# Patient Record
Sex: FEMALE | Race: BLACK OR AFRICAN AMERICAN | NOT HISPANIC OR LATINO | Employment: OTHER | URBAN - METROPOLITAN AREA
[De-identification: names, ages, dates, MRNs, and addresses within clinical notes are randomized per-mention and may not be internally consistent; named-entity substitution may affect disease eponyms.]

---

## 2017-01-19 ENCOUNTER — TELEPHONE (OUTPATIENT)
Dept: PODIATRY | Facility: CLINIC | Age: 57
End: 2017-01-19

## 2017-01-19 NOTE — TELEPHONE ENCOUNTER
----- Message from Jaki He sent at 1/19/2017  7:42 AM CST -----  Contact: self [phone] or [phone]  States that she would like to speak to nurse regarding her appt today. Please call back at [phone] or [phone]//thank you acc
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
## 2017-01-26 ENCOUNTER — OFFICE VISIT (OUTPATIENT)
Dept: PODIATRY | Facility: CLINIC | Age: 57
End: 2017-01-26
Payer: MEDICARE

## 2017-01-26 VITALS — SYSTOLIC BLOOD PRESSURE: 137 MMHG | HEART RATE: 84 BPM | HEIGHT: 67 IN | DIASTOLIC BLOOD PRESSURE: 88 MMHG

## 2017-01-26 DIAGNOSIS — M79.671 PAIN OF RIGHT HEEL: ICD-10-CM

## 2017-01-26 DIAGNOSIS — M77.51 BURSITIS OF HEEL, RIGHT: ICD-10-CM

## 2017-01-26 DIAGNOSIS — M76.61 ACHILLES TENDINITIS OF RIGHT LOWER EXTREMITY: Primary | ICD-10-CM

## 2017-01-26 PROCEDURE — 99213 OFFICE O/P EST LOW 20 MIN: CPT | Mod: S$PBB,,, | Performed by: PODIATRIST

## 2017-01-26 PROCEDURE — 99999 PR PBB SHADOW E&M-EST. PATIENT-LVL III: CPT | Mod: PBBFAC,,, | Performed by: PODIATRIST

## 2017-01-26 PROCEDURE — 99213 OFFICE O/P EST LOW 20 MIN: CPT | Mod: PBBFAC,PO | Performed by: PODIATRIST

## 2017-01-26 NOTE — PROGRESS NOTES
Subjective: This 56 year old female presents today with heel pain in right foot. Patient states medication or boot did very little. Patient states she does ice and stretch with a little relief.  Patient states pain is most severe in the morning, and after sitting for a prolonged period of time.  Patient states the pain has been present for several months. Patient does not relate a history of trauma. Patient rates pain 8/10. When asked to indicate where the pain is located, patient points to posterior heel. Patient denies other complaints at this time. Patient denies heel pain localized to any certain point when walking.    Chief Complaint   Patient presents with    achilles tendon     diabetic pt., right achilles tendonitits, pt. rated her current pain at a 8 today      PCP:Dr. HUSSAIN Olmos    Patient Active Problem List   Diagnosis    Avascular necrosis of bone of hip    Acquired hypothyroidism    Osteoporosis with pathological fracture    Depression    Anxiety disorder    Vertigo    Abnormal findings on diagnostic imaging of breast    Renal cyst    -donor kidney transplant - 13    Chronic immunosuppression with Prograf and Cellcept    Hypertension, renal    Anemia of chronic renal failure    Secondary hyperparathyroidism of renal origin    Delayed graft function 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     Uncontrolled type II diabetes mellitus with hypoglycemia    CTS (carpal tunnel syndrome)    Cubital tunnel syndrome on right    CKD (chronic kidney disease) stage 2, GFR 60-89 ml/min    Immunosuppressed status    Abnormal PFTs (pulmonary function tests)    Diffusion capacity of lung (dl), decreased    OP (osteoporosis)    Essential hypertension    Bilateral headache    Headache    Obesity, Class II, BMI 35-39.9, with comorbidity    Sleep related hypoventilation/hypoxemia in other disease    Uncontrolled diabetes mellitus type 2 without complications    Colon cancer screening     Moderate COPD (chronic obstructive pulmonary disease)    Nocturnal hypoxemia due to emphysema       Current Outpatient Prescriptions on File Prior to Visit   Medication Sig Dispense Refill    albuterol 90 mcg/actuation inhaler Inhale 2 puffs into the lungs every 4 (four) hours as needed for Wheezing. 1 Inhaler 3    albuterol-ipratropium 2.5mg-0.5mg/3mL (DUO-NEB) 0.5 mg-3 mg(2.5 mg base)/3 mL nebulizer solution Take 3 mLs by nebulization 3 (three) times daily. 270 mL 5    alprazolam (XANAX) 0.5 MG tablet Take 1-2 tablets (0.5-1 mg total) by mouth 2 (two) times daily as needed for Anxiety. sedating 60 tablet 1    blood sugar diagnostic (FREESTYLE TEST) Strp USE FOUR TIMES DAILY AS DIRECTED 400 strip 2    buPROPion (WELLBUTRIN XL) 150 MG TB24 tablet Take 1 tablet (150 mg total) by mouth once daily. 30 tablet 6    cetirizine (ZYRTEC) 10 MG tablet Take 1 tablet (10 mg total) by mouth daily as needed for Allergies. 30 tablet 11    ergocalciferol (ERGOCALCIFEROL) 50,000 unit Cap TAKE ONE CAPSULE BY MOUTH ONCE A WEEK 12 capsule 1    fluticasone-vilanterol (BREO) 200-25 mcg/dose DsDv diskus inhaler Inhale 1 puff into the lungs once daily. 60 each 5    furosemide (LASIX) 20 MG tablet TAKE 1 TABLET BY MOUTH EVERY DAY AS NEEDED 90 tablet 11    insulin glargine (LANTUS SOLOSTAR) 100 unit/mL (3 mL) InPn pen Inject 20 Units into the skin every evening. 6 mL 11    insulin lispro protamin-lispro (HUMALOG MIX 75-25 KWIKPEN) 100 unit/mL (75-25) InPn Inject 25-30 Units as directed 3 (three) times daily. 3 Box 11    lancets (FREESTYLE LANCETS) 28 gauge Misc 1 lancet by Combination route 2 (two) times daily as needed. Qid prn 400 each 2    lancets Misc 1 strip by Misc.(Non-Drug; Combo Route) route 4 (four) times daily with meals and nightly. 150 each 6    levothyroxine (SYNTHROID) 137 MCG Tab tablet Take 1 tablet (137 mcg total) by mouth before breakfast. 30 tablet 6    multivitamin (THERAGRAN) tablet Take 1 tablet by  "mouth once daily.      mycophenolate (CELLCEPT) 250 mg Cap Take 2 capsules (500 mg total) by mouth 2 (two) times daily. 120 capsule 11    nifedipine 30 MG ORAL TR24 (PROCARDIA-XL) 30 MG (OSM) 24 hr tablet Take 1 tablet (30 mg total) by mouth once daily. 30 tablet 11    ondansetron (ZOFRAN-ODT) 4 MG TbDL Take 1 tablet (4 mg total) by mouth every 8 (eight) hours as needed. 40 tablet 2    oxycodone-acetaminophen (PERCOCET)  mg per tablet OXYCODONE-ACETAMINOPHEN (Percocet)  MG ORAL TAB - 1 tab po q 8 hrs prn pain 90 tablet 0    pen needle, diabetic (BD ULTRA-FINE RORY PEN NEEDLES) 32 gauge x 5/32" Ndle Uses 4 daily, on multiple daily insulin injections 150 each 12    tacrolimus (PROGRAF) 1 MG Cap Take 5 mg in the AM by mouth and 4 mg in the PM by mouth. Z94.0 kidney transplant 270 capsule 11    [DISCONTINUED] methylPREDNISolone (MEDROL DOSEPACK) 4 mg tablet Take 1 tablet (4 mg total) by mouth once daily. Use as instructed on dose pack 1 Package 0     No current facility-administered medications on file prior to visit.        Review of patient's allergies indicates:   Allergen Reactions    Azithromycin Nausea And Vomiting    Adhesive        Past Surgical History   Procedure Laterality Date    Hysterectomy      Thyroidectomy      Vascular access surgeries       multiple. last time 2 weeks ago     section      Cyst removed form chest wall      Skin graft       revision    Kidney transplant  2013    Breast surgery      Colonoscopy N/A 3/9/2016     Procedure: COLONOSCOPY;  Surgeon: Douglas Daniel III, MD;  Location: [facility];  Service: Endoscopy;  Laterality: N/A;       Family History   Problem Relation Age of Onset    Diabetes Mother     Kidney disease Mother     Hyperlipidemia Mother     Hypertension Mother     Heart disease Mother     Arthritis Mother     Hypertension Father     Stroke Father     Hypertension Sister     Arthritis Sister     Asthma Sister     Heart " "disease Sister      heart attack       Social History     Social History    Marital status:      Spouse name: N/A    Number of children: 3    Years of education: N/A     Occupational History    disable      dept manager at [employer]     Social History Main Topics    Smoking status: Former Smoker     Packs/day: 1.00     Years: 10.00     Types: Cigarettes    Smokeless tobacco: Never Used      Comment: quit smoking approx 10-15 years ago     Alcohol use No    Drug use: No    Sexual activity: No     Other Topics Concern    Not on file     Social History Narrative    Does housework at home.     Vitals:    01/26/17 1357   BP: 137/88   Pulse: 84   Height: 5' 7" (1.702 m)   PainSc:   8     REVIEW OF SYSTEMS:  General: Denies any fever or chills  Chest: Denies shortness of breath, wheezing, coughing, or sputum production  Heart: Denies chest pain, cold extremities, orthopenia, or reduced exercise tolerance    OBJECTIVE:   PHYSICAL EXAM: Apperance: Alert and orient in no distress,well developed, and with good attention to grooming and body habits  Lower Extremity Exam  VASCULAR: Dorsalis pedis pulses 2/4 bilateral and Posterior Tibial pulses 2/4 bilateral. Capillary fill time <3 seconds and 4 seconds bilateral. No edema observed bilateral. Varicosities absent bilateral. Skin temperature of the lower extremities is warm to warm, proximal to distal. Hair growth WNL bilateral.  DERMATOLOGICAL: No skin rashes, subcutaneous nodules, lesions, or ulcers observed bilateral.   NEUROLOGICAL: Light touch, sharp-dull, proprioception all present and equal bilaterally.    MUSCULOSKELETAL: Muscle strength 5/5 for all foot inverters, everters, plantarflexors, and dorsiflexors bilateral. Ankle joints bilateral shows decreased ROM. bilateral ankle ROM shows greater decrease in dorsiflexion with knee extended. Ankle joint ROM is pain free and without crepitus bilateral. Pain with palpation of Right posterior 1/3 calcaneus at " region of Achilles tendon insertion. Negative pain to palpation bilateral plantar medial tubercle. Negative pain on medial-lateral compression of the calcaneus.     TEST RESULTS: Radiograph of the right foot taken reveals there is generalized osteopenia and multi-articular degenerative change.  Dorsal and plantar calcaneal spurs. Small 5th metatarsal bunionette.  No acute fracture or dislocation.    ASSESSMENT:  1. Achilles tendonitis, right  2. [place], gastrosoleus type, bilateral  3. Pain in foot, right     TREATMENT/PLAN: Treatment today consisted of the followin. The patient was counseled regarding findings of my examination, my impressions, treatment options, results of diagnostic tests, and usual treatment plan.   2. I explained to the patient that etiology and treatment options for heel pain including rest,  ice messages, stretching exercises, strappings/tappings, NSAID's, injections, new shoegear with orthotic inserts, and/or surgical treatment.   3. Patient agreed to continue stretching and icing exercises.  4. The patient and I reviewed the types of shoes she should be wearing, my recommendation includes generally the best time of the day for a shoe fitting is the afternoon, shoes with a wide toe box, very good cushion, and tennis shoes with removable inner soles.  5. Ordered MRI.   6. Patient to be contacted with MRI results for possible physical therapy referral.

## 2017-01-26 NOTE — MR AVS SNAPSHOT
[facility] - Podiatry  [address] [facility] [place] [address]  Phone: [phone]  Fax: [phone]                  Ana Maria Teague   2017 2:00 PM   Office Visit    Description:  Female : 1960   Provider:  Feliz Herrera DPM   Department:  [facility] - Podiatry           Reason for Visit     achilles tendon           Diagnoses this Visit        Comments    Achilles tendinitis of right lower extremity    -  Primary     Bursitis of heel, right                To Do List           Future Appointments        Provider Department Dept Phone    2/3/2017 2:45 PM [facility] [phone]    2017 10:20 AM Marcella Haas PA-C [facility] [phone]    2017 9:45 AM Damien Singleton MD [facility] Cardiology [phone]    2017 7:45 AM LABORATORY, [facility] [phone]    3/6/2017 2:00 PM Karine Olmos MD [facility] Internal Medicine [phone]      Goals (5 Years of Data)              3/28/14    Exercise 5x per week (30 min per time)   Not on track      [facility] Care [facility] -  Assistance  Registered nurses in the [facility] provide clinical advisement, health education, appointment booking, and other advisory services.  Call for this free service at [phone].             Medications           Message regarding Medications     Verify the changes and/or additions to your medication regime listed below are the same as discussed with your clinician today.  If any of these changes or additions are incorrect, please notify your healthcare provider.        STOP taking these medications     methylPREDNISolone (MEDROL DOSEPACK) 4 mg tablet Take 1 tablet (4 mg total) by mouth once daily. Use as instructed on dose pack           Verify that the below list of medications is an accurate representation of the medications you are currently taking.  If none reported, the list may be  blank. If incorrect, please contact your healthcare provider. Carry this list with you in case of emergency.           Current Medications     albuterol 90 mcg/actuation inhaler Inhale 2 puffs into the lungs every 4 (four) hours as needed for Wheezing.    albuterol-ipratropium 2.5mg-0.5mg/3mL (DUO-NEB) 0.5 mg-3 mg(2.5 mg base)/3 mL nebulizer solution Take 3 mLs by nebulization 3 (three) times daily.    alprazolam (XANAX) 0.5 MG tablet Take 1-2 tablets (0.5-1 mg total) by mouth 2 (two) times daily as needed for Anxiety. sedating    blood sugar diagnostic (FREESTYLE TEST) Strp USE FOUR TIMES DAILY AS DIRECTED    buPROPion (WELLBUTRIN XL) 150 MG TB24 tablet Take 1 tablet (150 mg total) by mouth once daily.    cetirizine (ZYRTEC) 10 MG tablet Take 1 tablet (10 mg total) by mouth daily as needed for Allergies.    ergocalciferol (ERGOCALCIFEROL) 50,000 unit Cap TAKE ONE CAPSULE BY MOUTH ONCE A WEEK    fluticasone-vilanterol (BREO) 200-25 mcg/dose DsDv diskus inhaler Inhale 1 puff into the lungs once daily.    furosemide (LASIX) 20 MG tablet TAKE 1 TABLET BY MOUTH EVERY DAY AS NEEDED    insulin glargine (LANTUS SOLOSTAR) 100 unit/mL (3 mL) InPn pen Inject 20 Units into the skin every evening.    insulin lispro protamin-lispro (HUMALOG MIX 75-25 KWIKPEN) 100 unit/mL (75-25) InPn Inject 25-30 Units as directed 3 (three) times daily.    lancets (FREESTYLE LANCETS) 28 gauge Misc 1 lancet by Combination route 2 (two) times daily as needed. Qid prn    lancets Misc 1 strip by Misc.(Non-Drug; Combo Route) route 4 (four) times daily with meals and nightly.    levothyroxine (SYNTHROID) 137 MCG Tab tablet Take 1 tablet (137 mcg total) by mouth before breakfast.    multivitamin (THERAGRAN) tablet Take 1 tablet by mouth once daily.    mycophenolate (CELLCEPT) 250 mg Cap Take 2 capsules (500 mg total) by mouth 2 (two) times daily.    nifedipine 30 MG ORAL TR24 (PROCARDIA-XL) 30 MG (OSM) 24 hr tablet Take 1 tablet (30 mg total) by mouth  "once daily.    ondansetron (ZOFRAN-ODT) 4 MG TbDL Take 1 tablet (4 mg total) by mouth every 8 (eight) hours as needed.    oxycodone-acetaminophen (PERCOCET)  mg per tablet OXYCODONE-ACETAMINOPHEN (Percocet)  MG ORAL TAB - 1 tab po q 8 hrs prn pain    pen needle, diabetic (BD ULTRA-FINE RORY PEN NEEDLES) 32 gauge x 5/32" Ndle Uses 4 daily, on multiple daily insulin injections    tacrolimus (PROGRAF) 1 MG Cap Take 5 mg in the AM by mouth and 4 mg in the PM by mouth. Z94.0 kidney transplant           Clinical Reference Information           Vital Signs - Last Recorded  Most recent update: 1/26/2017  2:02 PM by Alena Adams MA    BP Pulse Ht             137/88 (BP Location: Right arm, Patient Position: Sitting, BP Method: Automatic) 84 5' 7" (1.702 m)         Blood Pressure          Most Recent Value    BP  137/88      Allergies as of 1/26/2017     Azithromycin    Adhesive      Immunizations Administered on Date of Encounter - 1/26/2017     None      Orders Placed During Today's Visit     Future Labs/Procedures Expected by Expires    MRI Lower Extremity Without Contrast Right  1/26/2017 1/26/2018      Maintenance Dialysis History     Start End Type Comments Center    1/8/2004 9/28/2013 Hemo  [facility]            Current Dialysis Center Information     No center information to display.            Transplant Information        Txp Date Organ Coordinator Care Team    9/28/2013 Kidney Frantz Joshi RN Surgeon:  Jus Mcfarland MD   Referring Physician:  Lenora Dooley MD   Current Nephrologist:  Yuval Medina MD         "

## 2017-02-02 ENCOUNTER — TELEPHONE (OUTPATIENT)
Dept: RADIOLOGY | Facility: HOSPITAL | Age: 57
End: 2017-02-02

## 2017-02-03 ENCOUNTER — TELEPHONE (OUTPATIENT)
Dept: INTERNAL MEDICINE | Facility: CLINIC | Age: 57
End: 2017-02-03

## 2017-02-03 ENCOUNTER — HOSPITAL ENCOUNTER (OUTPATIENT)
Dept: RADIOLOGY | Facility: HOSPITAL | Age: 57
Discharge: HOME | End: 2017-02-03
Attending: PODIATRIST
Payer: MEDICARE

## 2017-02-03 DIAGNOSIS — M76.61 ACHILLES TENDINITIS OF RIGHT LOWER EXTREMITY: ICD-10-CM

## 2017-02-03 DIAGNOSIS — M77.51 BURSITIS OF HEEL, RIGHT: ICD-10-CM

## 2017-02-03 PROCEDURE — 73718 MRI LOWER EXTREMITY W/O DYE: CPT | Mod: TC,PO,RT

## 2017-02-03 PROCEDURE — 73718 MRI LOWER EXTREMITY W/O DYE: CPT | Mod: 26,RT,, | Performed by: RADIOLOGY

## 2017-02-03 NOTE — TELEPHONE ENCOUNTER
----- Message from Saadia Sewell sent at 2/3/2017  2:53 PM CST -----  Contact: pt  Pt requests nurse to call her regarding a code needed for test strips. Pt asks that the nurse call her pharmacy. Pt can be reached at [phone]. Pt states if she doesn't answer to leave a message.          ..  [facility] [employer] [address] [place] [address] KADIE HOLMAN  [address] [facility]  [address] [place] [address]  Phone: [phone] Fax: [phone]

## 2017-02-06 ENCOUNTER — OFFICE VISIT (OUTPATIENT)
Dept: PAIN MEDICINE | Facility: CLINIC | Age: 57
End: 2017-02-06
Payer: MEDICARE

## 2017-02-06 VITALS
DIASTOLIC BLOOD PRESSURE: 91 MMHG | HEART RATE: 82 BPM | BODY MASS INDEX: 37.98 KG/M2 | HEIGHT: 67 IN | SYSTOLIC BLOOD PRESSURE: 129 MMHG | WEIGHT: 242 LBS | RESPIRATION RATE: 16 BRPM

## 2017-02-06 DIAGNOSIS — M51.36 DDD (DEGENERATIVE DISC DISEASE), LUMBAR: ICD-10-CM

## 2017-02-06 DIAGNOSIS — M54.16 LEFT LUMBAR RADICULOPATHY: ICD-10-CM

## 2017-02-06 DIAGNOSIS — M25.552 HIP PAIN, LEFT: ICD-10-CM

## 2017-02-06 DIAGNOSIS — M47.817 SPONDYLOSIS OF LUMBOSACRAL REGION WITHOUT MYELOPATHY OR RADICULOPATHY: ICD-10-CM

## 2017-02-06 PROCEDURE — 99215 OFFICE O/P EST HI 40 MIN: CPT | Mod: PBBFAC,PO | Performed by: PHYSICIAN ASSISTANT

## 2017-02-06 PROCEDURE — 99999 PR PBB SHADOW E&M-EST. PATIENT-LVL V: CPT | Mod: PBBFAC,,, | Performed by: PHYSICIAN ASSISTANT

## 2017-02-06 PROCEDURE — 99214 OFFICE O/P EST MOD 30 MIN: CPT | Mod: S$PBB,,, | Performed by: PHYSICIAN ASSISTANT

## 2017-02-06 RX ORDER — OXYCODONE AND ACETAMINOPHEN 10; 325 MG/1; MG/1
TABLET ORAL
Qty: 90 TABLET | Refills: 0 | Status: SHIPPED | OUTPATIENT
Start: 2017-03-16 | End: 2017-02-06 | Stop reason: SDUPTHER

## 2017-02-06 RX ORDER — OXYCODONE AND ACETAMINOPHEN 10; 325 MG/1; MG/1
TABLET ORAL
Qty: 90 TABLET | Refills: 0 | Status: SHIPPED | OUTPATIENT
Start: 2017-04-14 | End: 2017-05-08 | Stop reason: SDUPTHER

## 2017-02-06 RX ORDER — OXYCODONE AND ACETAMINOPHEN 10; 325 MG/1; MG/1
TABLET ORAL
Qty: 90 TABLET | Refills: 0 | Status: SHIPPED | OUTPATIENT
Start: 2017-02-15 | End: 2017-02-06 | Stop reason: SDUPTHER
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
## 2017-02-09 ENCOUNTER — TELEPHONE (OUTPATIENT)
Dept: PODIATRY | Facility: CLINIC | Age: 57
End: 2017-02-09

## 2017-02-09 DIAGNOSIS — M76.61 ACHILLES TENDINITIS OF RIGHT LOWER EXTREMITY: Primary | ICD-10-CM

## 2017-02-09 DIAGNOSIS — M21.6X2 ACQUIRED EQUINUS DEFORMITY OF BOTH FEET: ICD-10-CM

## 2017-02-09 DIAGNOSIS — M21.6X1 ACQUIRED EQUINUS DEFORMITY OF BOTH FEET: ICD-10-CM

## 2017-02-09 DIAGNOSIS — M77.51 BURSITIS OF HEEL, RIGHT: ICD-10-CM

## 2017-02-09 NOTE — TELEPHONE ENCOUNTER
----- Message from Joe Ardon sent at 2/9/2017  3:34 PM CST -----  Pt is requesting a call from nurse in regards to lab results. Pt states she was un able to understand on portal.            Please call pt back at [phone]

## 2017-02-14 ENCOUNTER — TELEPHONE (OUTPATIENT)
Dept: PAIN MEDICINE | Facility: CLINIC | Age: 57
End: 2017-02-14

## 2017-02-14 ENCOUNTER — TELEPHONE (OUTPATIENT)
Dept: PODIATRY | Facility: CLINIC | Age: 57
End: 2017-02-14

## 2017-02-14 DIAGNOSIS — M77.51 BURSITIS OF HEEL, RIGHT: ICD-10-CM

## 2017-02-14 DIAGNOSIS — M21.6X1 ACQUIRED EQUINUS DEFORMITY OF BOTH FEET: ICD-10-CM

## 2017-02-14 DIAGNOSIS — M21.6X2 ACQUIRED EQUINUS DEFORMITY OF BOTH FEET: ICD-10-CM

## 2017-02-14 DIAGNOSIS — M76.61 ACHILLES TENDINITIS OF RIGHT LOWER EXTREMITY: Primary | ICD-10-CM

## 2017-02-14 NOTE — TELEPHONE ENCOUNTER
Ms. Ana Maria Teague was given an return call, and the physical therapy orders were faxed to [facility]. Matter taken care of.

## 2017-02-14 NOTE — TELEPHONE ENCOUNTER
----- Message from Annelise Mcgraw sent at 2/14/2017  1:58 PM CST -----  Contact: pt   Pt said office was suppose to send over a order for her to have therapy and it has not been done, pt is at the facility now,,, please fax orders to [phone], please call pt at [phone]

## 2017-02-14 NOTE — TELEPHONE ENCOUNTER
----- Message from Annelise Mcgraw sent at 2/14/2017  1:58 PM CST -----  Contact: pt   Pt said office was suppose to send over a order for her to have therapy and it has not been done, pt is at the facility now,,, please fax orders to [phone], please call pt at [phone]

## 2017-02-14 NOTE — TELEPHONE ENCOUNTER
Spoke with patient and let her know that I am re-faxing her PT order again just now.  She acknowledged.

## 2017-02-23 ENCOUNTER — PATIENT OUTREACH (OUTPATIENT)
Dept: ADMINISTRATIVE | Facility: HOSPITAL | Age: 57
End: 2017-02-23

## 2017-02-27 ENCOUNTER — LAB VISIT (OUTPATIENT)
Dept: LAB | Facility: HOSPITAL | Age: 57
End: 2017-02-27
Attending: INTERNAL MEDICINE
Payer: MEDICARE

## 2017-02-27 DIAGNOSIS — E11.9 TYPE 2 DIABETES MELLITUS WITHOUT COMPLICATION: ICD-10-CM

## 2017-02-27 LAB
CHOLEST/HDLC SERPL: 3.8 {RATIO}
HDL/CHOLESTEROL RATIO: 26.2 %
HDLC SERPL-MCNC: 195 MG/DL
HDLC SERPL-MCNC: 51 MG/DL
LDLC SERPL CALC-MCNC: 131 MG/DL
NONHDLC SERPL-MCNC: 144 MG/DL
TRIGL SERPL-MCNC: 65 MG/DL

## 2017-02-27 PROCEDURE — 80061 LIPID PANEL: CPT

## 2017-02-27 PROCEDURE — 36415 COLL VENOUS BLD VENIPUNCTURE: CPT | Mod: PO

## 2017-02-28 ENCOUNTER — TELEPHONE (OUTPATIENT)
Dept: INTERNAL MEDICINE | Facility: CLINIC | Age: 57
End: 2017-02-28

## 2017-02-28 NOTE — TELEPHONE ENCOUNTER
----- Message from Micaela Johnson sent at 2/28/2017 11:26 AM CST -----  Contact: Pt  Pt request call from nurse regarding apt that was r/s, I informed pt that the Dr was booked out but pt states that is no excuse because she can not see after a certain time of day so the 5:40 pm apt is too late so I offered pt another apt and she declined, please contact pt at [phone]

## 2017-03-08 ENCOUNTER — OFFICE VISIT (OUTPATIENT)
Dept: INTERNAL MEDICINE | Facility: CLINIC | Age: 57
End: 2017-03-08
Payer: MEDICARE

## 2017-03-08 ENCOUNTER — CLINICAL SUPPORT (OUTPATIENT)
Dept: CARDIOLOGY | Facility: CLINIC | Age: 57
End: 2017-03-08
Payer: MEDICARE

## 2017-03-08 ENCOUNTER — OFFICE VISIT (OUTPATIENT)
Dept: CARDIOLOGY | Facility: CLINIC | Age: 57
End: 2017-03-08
Payer: MEDICARE

## 2017-03-08 ENCOUNTER — LAB VISIT (OUTPATIENT)
Dept: LAB | Facility: HOSPITAL | Age: 57
End: 2017-03-08
Attending: INTERNAL MEDICINE
Payer: MEDICARE

## 2017-03-08 VITALS
WEIGHT: 250.25 LBS | HEART RATE: 70 BPM | HEIGHT: 67 IN | DIASTOLIC BLOOD PRESSURE: 84 MMHG | SYSTOLIC BLOOD PRESSURE: 124 MMHG | BODY MASS INDEX: 39.28 KG/M2

## 2017-03-08 VITALS
SYSTOLIC BLOOD PRESSURE: 124 MMHG | WEIGHT: 249.31 LBS | OXYGEN SATURATION: 97 % | HEART RATE: 81 BPM | HEIGHT: 67 IN | BODY MASS INDEX: 39.13 KG/M2 | TEMPERATURE: 97 F | DIASTOLIC BLOOD PRESSURE: 78 MMHG

## 2017-03-08 DIAGNOSIS — I10 ESSENTIAL HYPERTENSION: ICD-10-CM

## 2017-03-08 DIAGNOSIS — Z00.00 ENCOUNTER FOR PREVENTIVE HEALTH EXAMINATION: ICD-10-CM

## 2017-03-08 DIAGNOSIS — M81.0 OP (OSTEOPOROSIS): ICD-10-CM

## 2017-03-08 DIAGNOSIS — F32.A DEPRESSION, UNSPECIFIED DEPRESSION TYPE: ICD-10-CM

## 2017-03-08 DIAGNOSIS — Z79.4 UNCONTROLLED TYPE 2 DIABETES MELLITUS WITH HYPOGLYCEMIA WITHOUT COMA, WITH LONG-TERM CURRENT USE OF INSULIN: Primary | ICD-10-CM

## 2017-03-08 DIAGNOSIS — E03.9 ACQUIRED HYPOTHYROIDISM: ICD-10-CM

## 2017-03-08 DIAGNOSIS — J44.9 MODERATE COPD (CHRONIC OBSTRUCTIVE PULMONARY DISEASE): Chronic | ICD-10-CM

## 2017-03-08 DIAGNOSIS — Z12.31 ENCOUNTER FOR SCREENING MAMMOGRAM FOR MALIGNANT NEOPLASM OF BREAST: ICD-10-CM

## 2017-03-08 DIAGNOSIS — E66.01 SEVERE OBESITY (BMI 35.0-39.9): ICD-10-CM

## 2017-03-08 DIAGNOSIS — E78.00 PURE HYPERCHOLESTEROLEMIA: ICD-10-CM

## 2017-03-08 DIAGNOSIS — Z94.0 DECEASED-DONOR KIDNEY TRANSPLANT: Chronic | ICD-10-CM

## 2017-03-08 DIAGNOSIS — E03.4 HYPOTHYROIDISM DUE TO ACQUIRED ATROPHY OF THYROID: ICD-10-CM

## 2017-03-08 DIAGNOSIS — E11.649 UNCONTROLLED TYPE 2 DIABETES MELLITUS WITH HYPOGLYCEMIA WITHOUT COMA, WITH LONG-TERM CURRENT USE OF INSULIN: Primary | ICD-10-CM

## 2017-03-08 DIAGNOSIS — E55.9 VITAMIN D DEFICIENCY: ICD-10-CM

## 2017-03-08 DIAGNOSIS — I10 ESSENTIAL HYPERTENSION: Primary | ICD-10-CM

## 2017-03-08 DIAGNOSIS — R11.0 NAUSEA: ICD-10-CM

## 2017-03-08 LAB
BASOPHILS # BLD AUTO: 0.04 K/UL
BASOPHILS NFR BLD: 0.3 %
DIFFERENTIAL METHOD: ABNORMAL
EOSINOPHIL # BLD AUTO: 0.5 K/UL
EOSINOPHIL NFR BLD: 4 %
ERYTHROCYTE [DISTWIDTH] IN BLOOD BY AUTOMATED COUNT: 15.2 %
HCT VFR BLD AUTO: 37.9 %
HGB BLD-MCNC: 11.7 G/DL
LYMPHOCYTES # BLD AUTO: 3.2 K/UL
LYMPHOCYTES NFR BLD: 28.3 %
MCH RBC QN AUTO: 26.6 PG
MCHC RBC AUTO-ENTMCNC: 30.9 %
MCV RBC AUTO: 86 FL
MONOCYTES # BLD AUTO: 0.6 K/UL
MONOCYTES NFR BLD: 5.4 %
NEUTROPHILS # BLD AUTO: 7 K/UL
NEUTROPHILS NFR BLD: 61 %
PLATELET # BLD AUTO: 238 K/UL
PMV BLD AUTO: 12.1 FL
RBC # BLD AUTO: 4.4 M/UL
WBC # BLD AUTO: 11.43 K/UL

## 2017-03-08 PROCEDURE — 93010 ELECTROCARDIOGRAM REPORT: CPT | Mod: S$PBB,,, | Performed by: INTERNAL MEDICINE

## 2017-03-08 PROCEDURE — 99214 OFFICE O/P EST MOD 30 MIN: CPT | Mod: PBBFAC,27,PO | Performed by: PHYSICIAN ASSISTANT

## 2017-03-08 PROCEDURE — 99213 OFFICE O/P EST LOW 20 MIN: CPT | Mod: S$PBB,,, | Performed by: PHYSICIAN ASSISTANT

## 2017-03-08 PROCEDURE — 99214 OFFICE O/P EST MOD 30 MIN: CPT | Mod: S$PBB,,, | Performed by: INTERNAL MEDICINE

## 2017-03-08 PROCEDURE — 99999 PR PBB SHADOW E&M-EST. PATIENT-LVL IV: CPT | Mod: PBBFAC,,, | Performed by: PHYSICIAN ASSISTANT

## 2017-03-08 PROCEDURE — 99999 PR PBB SHADOW E&M-EST. PATIENT-LVL III: CPT | Mod: PBBFAC,,, | Performed by: INTERNAL MEDICINE

## 2017-03-08 RX ORDER — LEVOTHYROXINE SODIUM 137 UG/1
150 TABLET ORAL
Qty: 30 TABLET | Refills: 6 | Status: SHIPPED | OUTPATIENT
Start: 2017-03-08 | End: 2017-07-13 | Stop reason: SDUPTHER

## 2017-03-08 RX ORDER — ONDANSETRON 4 MG/1
4 TABLET, ORALLY DISINTEGRATING ORAL EVERY 8 HOURS PRN
Qty: 40 TABLET | Refills: 2 | Status: SHIPPED | OUTPATIENT
Start: 2017-03-08 | End: 2017-11-27

## 2017-03-08 RX ORDER — ERGOCALCIFEROL 1.25 MG/1
50000 CAPSULE ORAL
Qty: 12 CAPSULE | Refills: 3 | Status: SHIPPED | OUTPATIENT
Start: 2017-03-08 | End: 2018-03-27 | Stop reason: SDUPTHER

## 2017-03-08 RX ORDER — ATORVASTATIN CALCIUM 20 MG/1
20 TABLET, FILM COATED ORAL DAILY
Qty: 30 TABLET | Refills: 11 | Status: SHIPPED | OUTPATIENT
Start: 2017-03-08 | End: 2019-07-03 | Stop reason: SDUPTHER

## 2017-03-08 NOTE — MR AVS SNAPSHOT
[facility] Internal Medicine  [address] [place] [address]  Phone: [phone]  Fax: [phone]                  Ana Maria Teague   3/8/2017 5:40 PM   Office Visit    Description:  Female : 1960   Provider:  Karine Olmos MD   Department:  [facility] - Internal Medicine           Reason for Visit     Annual Exam           Diagnoses this Visit        Comments    Uncontrolled type 2 diabetes mellitus with hypoglycemia without coma, with long-term current use of insulin    -  Primary     Uncontrolled type 2 diabetes mellitus without complication, with long-term current use of insulin         OP (osteoporosis)         Severe obesity (BMI 35.0-39.9)         Essential hypertension         Acquired hypothyroidism         Encounter for preventive health examination         Depression, unspecified depression type         Pure hypercholesterolemia         Vitamin D deficiency         Encounter for screening mammogram for malignant neoplasm of breast         Hypothyroidism due to acquired atrophy of thyroid         Nausea                To Do List           Future Appointments        Provider Department Dept Phone    3[facility]2017 3:00 [phone]    3/8/2017 3:30 PM NAOMIE Gordon [facility] Card[facility] [phone]    3/8/2017 4:00 PM SPECIMEN, [facility] [phone]    3/8/2017 4:30 PM LAB, SAME DAY [facility] [phone]    3/8/2017 5:40 PM Karine Olmos MD [facility] Internal Medicine [phone]      Goals (5 Years of Data)              3/28/14    Exercise 5x per week (30 min per time)   Not on track      Follow-Up and Disposition     Return in about 3 months (around 2017).    Follow-up and Disposition History       These Medications        Disp Refills Start End    atorvastatin (LIPITOR) 20 MG tablet 30 tablet 11 3/8/2017     Take 1 tablet (20 mg total) by mouth once daily. - Oral    [facility] [address] [facility] [address] AT [facility] Ph #: [phone]       ergocalciferol (ERGOCALCIFEROL) 50,000 unit Cap 12 capsule 3 3/8/2017     Take 1 capsule (50,000 Units total) by mouth every 7 days. - Oral    Pharmacy: [facility] [address], [place] - [address] AT [facility] Ph #: [phone]       levothyroxine (SYNTHROID) 137 MCG Tab tablet 30 tablet 6 3/8/2017     Take 1 tablet (137 mcg total) by mouth before breakfast. - Oral    Pharmacy: [facility] [address], [place] - [address] AT [facility] Ph #: [phone]       ondansetron (ZOFRAN-ODT) 4 MG TbDL 40 tablet 2 3/8/2017     Take 1 tablet (4 mg total) by mouth every 8 (eight) hours as needed. - Oral    Pharmacy: [facility] [address] [place] - [address] [facility] Ph #: [phone]         [facility] On Call     [facility] On Call Nurse [facility] - 24/7 Assistance  Registered nurses in the [facility] Center provide clinical advisement, health education, appointment booking, and other advisory services.  Call for this free service at [phone].             Medications           Message regarding Medications     Verify the changes and/or additions to your medication regime listed below are the same as discussed with your clinician today.  If any of these changes or additions are incorrect, please notify your healthcare provider.        START taking these NEW medications        Refills    atorvastatin (LIPITOR) 20 MG tablet 11    Sig: Take 1 tablet (20 mg total) by mouth once daily.    Class: Normal    Route: Oral      CHANGE how you are taking these medications     Start Taking Instead of    ergocalciferol (ERGOCALCIFEROL) 50,000 unit Cap ergocalciferol (ERGOCALCIFEROL) 50,000 unit Cap    Dosage:  Take 1 capsule (50,000 Units total) by mouth every 7 days. Dosage:  TAKE ONE CAPSULE BY MOUTH ONCE A WEEK    Reason for Change:  Reorder             Verify that the below list of medications is an accurate representation of the medications you are currently taking.  If none reported, the list may be blank. If incorrect, please contact your healthcare provider. Carry this list with you in case of emergency.           Current Medications     albuterol 90 mcg/actuation inhaler Inhale 2 puffs into the lungs every 4 (four) hours as needed for Wheezing.    albuterol-ipratropium 2.5mg-0.5mg/3mL (DUO-NEB) 0.5 mg-3 mg(2.5 mg base)/3 mL nebulizer solution Take 3 mLs by nebulization 3 (three) times daily.    alprazolam (XANAX) 0.5 MG tablet Take 1-2 tablets (0.5-1 mg total) by mouth 2 (two) times daily as needed for Anxiety. sedating    atorvastatin (LIPITOR) 20 MG tablet Take 1 tablet (20 mg total) by mouth once daily.    blood sugar diagnostic (FREESTYLE TEST) Strp USE FOUR TIMES DAILY AS DIRECTED    buPROPion (WELLBUTRIN XL) 150 MG TB24 tablet Take 1 tablet (150 mg total) by mouth once daily.    cetirizine (ZYRTEC) 10 MG tablet Take 1 tablet (10 mg total) by mouth daily as needed for Allergies.    ergocalciferol (ERGOCALCIFEROL) 50,000 unit Cap Take 1 capsule (50,000 Units total) by mouth every 7 days.    fluticasone-vilanterol (BREO) 200-25 mcg/dose DsDv diskus inhaler Inhale 1 puff into the lungs once daily.    furosemide (LASIX) 20 MG tablet TAKE 1 TABLET BY MOUTH EVERY DAY AS NEEDED    insulin glargine (LANTUS SOLOSTAR) 100 unit/mL (3 mL) InPn pen Inject 20 Units into the skin every evening.    insulin lispro protamin-lispro (HUMALOG MIX 75-25 KWIKPEN) 100 unit/mL (75-25) InPn Inject 25-30 Units as directed 3 (three) times daily.    lancets (FREESTYLE LANCETS) 28 gauge Misc 1 lancet by Combination route 2 (two) times daily as needed. Qid prn    lancets Misc 1 strip by Misc.(Non-Drug; Combo Route) route 4 (four) times daily with meals and nightly.    levothyroxine (SYNTHROID) 137 MCG Tab tablet Take 1 tablet (137 mcg total) by mouth before breakfast.     "multivitamin (THERAGRAN) tablet Take 1 tablet by mouth once daily.    mycophenolate (CELLCEPT) 250 mg Cap Take 2 capsules (500 mg total) by mouth 2 (two) times daily.    nifedipine 30 MG ORAL TR24 (PROCARDIA-XL) 30 MG (OSM) 24 hr tablet Take 1 tablet (30 mg total) by mouth once daily.    ondansetron (ZOFRAN-ODT) 4 MG TbDL Take 1 tablet (4 mg total) by mouth every 8 (eight) hours as needed.    oxycodone-acetaminophen (PERCOCET)  mg per tablet Starting on Apr 14, 2017. OXYCODONE-ACETAMINOPHEN (Percocet)  MG ORAL TAB - 1 tab po q 8 hrs prn pain    pen needle, diabetic (BD ULTRA-FINE RORY PEN NEEDLES) 32 gauge x 5/32" Ndle Uses 4 daily, on multiple daily insulin injections    tacrolimus (PROGRAF) 1 MG Cap Take 5 mg in the AM by mouth and 4 mg in the PM by mouth. Z94.0 kidney transplant           Clinical Reference Information           Your Vitals Were     BP Pulse Temp Height    124/78 (BP Location: Right arm, Patient Position: Sitting, BP Method: Manual) 81 96.7 °F (35.9 °C) (Tympanic) 5' 7" (1.702 m)    Weight SpO2 BMI    113.1 kg (249 lb 5.4 oz) 97% 39.05 kg/m2      Blood Pressure          Most Recent Value    BP  124/78      Allergies as of 3/8/2017     Azithromycin    Adhesive      Immunizations Administered on Date of Encounter - 3/8/2017     None      Orders Placed During Today's Visit     Future Labs/Procedures Expected by Expires    ALT (SGPT)  3/8/2017 5/7/2018    CBC auto differential  3/8/2017 3/8/2018    Comprehensive metabolic panel  3/8/2017 3/8/2018    Hemoglobin A1c  3/8/2017 5/7/2018    Hemoglobin A1c  3/8/2017 5/7/2018    Lipid panel  3/8/2017 3/8/2018    Mammo Digital Screening Bilat with CAD  3/8/2017 3/8/2018    Microalbumin/creatinine urine ratio  3/8/2017 5/7/2018    TSH  3/8/2017 3/8/2018    Vitamin D  As directed 3/8/2018      Maintenance Dialysis History     Start End Type Comments Center    1/8/2004 9/28/2013 Hemo  [facility]            Current Dialysis " Center Information     No center information to display.            Transplant Information        Txp Date Organ Coordinator Care Team    9/28/2013 Kidney Frantz Joshi, KAMILLA Surgeon:  Jus Mcfarland MD   Referring Physician:  Lenora Dooley MD   Current Nephrologist:  Yuval Medina MD         Language Assistance Services     ATTENTION: Language assistance services are available, free of charge. Please call [phone].      ATENCIÓN: Si habla español, tiene a vilchis disposición servicios gratuitos de asistencia lingüística. Llame al [phone].     CHÚ Ý: N?u b?n nói Ti?ng Vi?t, có các d?ch v? h? tr? ngôn ng? mi?n phí dành cho b?n. G?i s? [phone].         [facility] complies with applicable Federal civil rights laws and does not discriminate on the basis of race, color, national origin, age, disability, or sex.

## 2017-03-08 NOTE — PROGRESS NOTES
Subjective:    Patient ID:  Ana Maria Teague is a 56 y.o. female who presents for follow-up of Hypertension      HPI   Ms. Teague is a 56 year old female patient with a PMHx of HTN, DM, CKD, ESRD s/p kidney transplant in 2013 who presents today for routine follow-up. Patient returns today and states she is doing well. Admits to chronic LANCE and remains on nocturnal oxygen. Followed regularly by Dr. Walker. Cardiac wise, patient seems to be stable. Denies any chest pain, chest tightness, or other anginal signs or symptoms. She also denies any lightheadedness, near syncope, or syncope. Admits to occasional palpitations but states these are not bothersome. HTN stable and well-controlled on current meds. Patient reports compliance with her medications. She is followed regularly by nephrology for history of kidney transplant. Recent labs reviewed, CMP WNL. Patient recently started on atorvastatin by PCP.  2D echo from 2/16 reviewed and showed normal EF, DD. EKG today shows NSR, voltage criteria for LVH, no acute changes.     Review of Systems   Constitution: Negative for chills, decreased appetite, fever, weakness and malaise/fatigue.   HENT: Negative for congestion, headaches, hoarse voice and sore throat.    Eyes: Negative for blurred vision and discharge.   Cardiovascular: Positive for dyspnea on exertion. Negative for chest pain, claudication, cyanosis, irregular heartbeat, leg swelling, near-syncope, orthopnea, palpitations and paroxysmal nocturnal dyspnea.   Respiratory: Negative for cough, hemoptysis, shortness of breath, snoring, sputum production and wheezing.    Endocrine: Negative for cold intolerance and heat intolerance.   Hematologic/Lymphatic: Negative for bleeding problem. Does not bruise/bleed easily.   Skin: Negative for rash.   Musculoskeletal: Positive for back pain and joint pain. Negative for arthritis, joint swelling, muscle cramps, muscle weakness and myalgias.   Gastrointestinal: Negative for  "abdominal pain, constipation, diarrhea, heartburn, melena and nausea.   Genitourinary: Negative for hematuria.   Neurological: Negative for dizziness, focal weakness, light-headedness, loss of balance, numbness, paresthesias and seizures.   Psychiatric/Behavioral: Negative for memory loss. The patient does not have insomnia.    Allergic/Immunologic: Negative for hives.       /84  Pulse 70  Ht 5' 7" (1.702 m)  Wt 113.5 kg (250 lb 3.6 oz)  BMI 39.19 kg/m2    Objective:    Physical Exam   Constitutional: She is oriented to person, place, and time. She appears well-developed and well-nourished. No distress.   HENT:   Head: Normocephalic and atraumatic.   Eyes: Pupils are equal, round, and reactive to light. Right eye exhibits no discharge. Left eye exhibits no discharge.   Neck: Neck supple. No JVD present. No tracheal deviation present. No thyromegaly present.   Cardiovascular: Normal rate, regular rhythm, S1 normal, S2 normal, normal heart sounds and intact distal pulses.  PMI is not displaced.  Exam reveals no gallop, no S3, no S4 and no friction rub.    No murmur heard.  Pulses:       Radial pulses are 2+ on the right side, and 2+ on the left side.   Pulmonary/Chest: Effort normal and breath sounds normal. No respiratory distress. She has no wheezes. She has no rales.   Abdominal: Soft. She exhibits no distension. There is no tenderness. There is no rebound.   Musculoskeletal: She exhibits no edema.   Neurological: She is alert and oriented to person, place, and time.   Skin: Skin is warm and dry. She is not diaphoretic. No erythema.   Psychiatric: She has a normal mood and affect. Her behavior is normal. Thought content normal.   Nursing note and vitals reviewed.          Chemistry        Component Value Date/Time     12/02/2016 0920    K 4.5 12/02/2016 0920     12/02/2016 0920    CO2 25 12/02/2016 0920    BUN 16 12/02/2016 0920    CREATININE 1.0 12/02/2016 0920    GLU 91 12/02/2016 0920      "   Component Value Date/Time    CALCIUM 9.6 2016 0920    ALKPHOS 124 2016 0830    AST 14 2016 0830    ALT 8 (L) 2016 0830    BILITOT 0.4 2016 0830        Lab Results   Component Value Date    CHOL 195 2017    CHOL 170 08/10/2015    CHOL 196 2014     Lab Results   Component Value Date    HDL 51 2017    HDL 46 08/10/2015    HDL 46 2014     Lab Results   Component Value Date    LDLCALC 131.0 2017    LDLCALC 113.0 08/10/2015    LDLCALC 139.0 2014     Lab Results   Component Value Date    TRIG 65 2017    TRIG 55 08/10/2015    TRIG 55 2014     Lab Results   Component Value Date    CHOLHDL 26.2 2017    CHOLHDL 27.1 08/10/2015    CHOLHDL 23.5 2014     Lab Results   Component Value Date    HGBA1C 7.9 (H) 2016       Assessment:       1. Essential hypertension    2. Obesity, Class II, BMI 35-39.9, with comorbidity    3. -donor kidney transplant - 13    4. Uncontrolled type 2 diabetes mellitus without complication, with long-term current use of insulin    5. Moderate COPD (chronic obstructive pulmonary disease)       Doing clinically well. No cardiac issues. Stable from CV standpoint. No angina. Counseled about risk factor modification. Continue current meds.   Plan:     -Continue current medical management and risk factor modification  -Cardiac, low salt diet  -Increase activity level as tolerated, weight loss  -RTC in 1 year with Dr. Singleton with EKG  -PCP gets labs    Chart reviewed. Dr. Singleton agrees with plan as outlined above.

## 2017-03-08 NOTE — LETTER
March 8, 2017      Damien Singleton MD  [address] [place] [address]           [facility] - Cardiology  [address] [place] [address]  Phone: [phone]  Fax: [phone]          Patient: Ana Maria Teague   MR Number: [id number]   YOB: 1960   Date of Visit: 3/8/2017       Dear Dr. Damien Singleton:    Thank you for referring Ana Maria Teague to me for evaluation. Attached you will find relevant portions of my assessment and plan of care.    If you have questions, please do not hesitate to call me. I look forward to following Ana Maria Teague along with you.    Sincerely,    NAOMIE Gordon    Enclosure  CC:  No Recipients    If you would like to receive this communication electronically, please contact [email or web address] or [phone] to request more information on [employer] access.    For providers and/or their staff who would like to refer a patient to [facility], please contact us through our one-stop-shop provider referral line, [facility] , at [phone].    If you feel you have received this communication in error or would no longer like to receive these types of communications, please e-mail [email or web address]

## 2017-03-08 NOTE — PROGRESS NOTES
"Subjective:       Patient ID: Ana Maria Teague is a 56 y.o. female.    Chief Complaint: Annual Exam    HPI Comments: Here for f/u medical problems and preventive exam.  Morning sugars 120s ac breakfast.  PC dinner 120-300.  Has been on steroids several times.  No low sugars lately.  In PT for foot problem.  No f/c/sw/cough.  No cp/sob/palp.  BMs normal.  Urine normal.  Taking weekly vit D.  Lots stressors, better on wellbutrin.  Xanax prn.    HM: 11/16 fluvax, 6/15 [id number], 3/14 [id number], 6/15 TDaP, 8/15 MMG, 8/15 BMD improved rep 3y, 1/11 Cscope rep 5-10y, 11/10 HCV neg.        Review of Systems   Constitutional: Negative for appetite change, chills, diaphoresis and fever.   HENT: Negative for congestion, ear pain, rhinorrhea, sinus pressure and sore throat.    Respiratory: Negative for cough, chest tightness and shortness of breath.    Cardiovascular: Negative for chest pain and palpitations.   Gastrointestinal: Negative for blood in stool, constipation, diarrhea, nausea and vomiting.   Genitourinary: Negative for dysuria, frequency, hematuria, menstrual problem, urgency and vaginal discharge.   Musculoskeletal: Negative for arthralgias.   Skin: Negative for rash.   Neurological: Negative for dizziness and headaches.   Psychiatric/Behavioral: Negative for sleep disturbance. The patient is not nervous/anxious.        Objective:   /78 (BP Location: Right arm, Patient Position: Sitting, BP Method: Manual)  Pulse 81  Temp 96.7 °F (35.9 °C) (Tympanic)   Ht 5' 7" (1.702 m)  Wt 113.1 kg (249 lb 5.4 oz)  SpO2 97%  BMI 39.05 kg/m2    Physical Exam   Constitutional: She is oriented to person, place, and time. She appears well-developed and well-nourished.   HENT:   Right Ear: External ear normal. Tympanic membrane is not injected.   Left Ear: External ear normal. Tympanic membrane is not injected.   Mouth/Throat: Oropharynx is clear and moist.   Eyes: Conjunctivae are normal.   Neck: Normal range of motion. Neck " supple. No thyromegaly present.   Cardiovascular: Normal rate, regular rhythm and intact distal pulses.  Exam reveals no gallop and no friction rub.    No murmur heard.  Pulses:       Dorsalis pedis pulses are 2+ on the right side, and 2+ on the left side.        Posterior tibial pulses are 2+ on the right side, and 2+ on the left side.   Pulmonary/Chest: Effort normal and breath sounds normal. She has no wheezes. She has no rales.   Abdominal: Soft. Bowel sounds are normal. She exhibits no mass. There is no tenderness.   Musculoskeletal: She exhibits no edema.   Feet:   Right Foot:   Protective Sensation: 10 sites tested. 10 sites sensed.   Skin Integrity: Negative for ulcer, blister, skin breakdown, erythema, warmth, callus or dry skin.   Left Foot:   Protective Sensation: 10 sites tested. 10 sites sensed.   Skin Integrity: Negative for ulcer, blister, skin breakdown, erythema, warmth, callus or dry skin.   Lymphadenopathy:     She has no cervical adenopathy.   Neurological: She is alert and oriented to person, place, and time.   Skin: Skin is warm. No rash noted.   Psychiatric: She has a normal mood and affect.     Results for KEVAN CARBONE (MRN [id number]) as of 3/8/2017 13:08   Ref. Range 8/16/2016 11:35   Hemoglobin A1C Latest Ref Range: 4.5 - 6.2 % 7.9 (H)   Estimated Avg Glucose Latest Ref Range: 68 - 131 mg/dL 180 (H)     Results for orders placed or performed in visit on 02/27/17   Lipid panel   Result Value Ref Range    Cholesterol 195 120 - 199 mg/dL    Triglycerides 65 30 - 150 mg/dL    HDL 51 40 - 75 mg/dL    LDL Cholesterol 131.0 63.0 - 159.0 mg/dL    HDL/Chol Ratio 26.2 20.0 - 50.0 %    Total Cholesterol/HDL Ratio 3.8 2.0 - 5.0    Non-HDL Cholesterol 144 mg/dL       Assessment:       1. Uncontrolled type 2 diabetes mellitus with hypoglycemia without coma, with long-term current use of insulin    2. Uncontrolled type 2 diabetes mellitus without complication, with long-term current use of insulin    3. OP  (osteoporosis)    4. Severe obesity (BMI 35.0-39.9)    5. Essential hypertension    6. Acquired hypothyroidism    7. Encounter for preventive health examination    8. Depression, unspecified depression type    9. Pure hypercholesterolemia    10. Vitamin D deficiency    11. Encounter for screening mammogram for malignant neoplasm of breast     12. Hypothyroidism due to acquired atrophy of thyroid    13. Nausea        Plan:       Ana Maria was seen today for annual exam.    Diagnoses and all orders for this visit:    Uncontrolled type 2 diabetes mellitus with hypoglycemia without coma, with long-term current use of insulin    Uncontrolled type 2 diabetes mellitus without complication, with long-term current use of insulin- check lab.  -     Microalbumin/creatinine urine ratio; Future  -     Hemoglobin A1c; Future    OP (osteoporosis)    Severe obesity (BMI 35.0-39.9)    Essential hypertension- stable on rx.    Acquired hypothyroidism  -     TSH; Future    Encounter for preventive health examination- lab now with [place].  -     Comprehensive metabolic panel; Future  -     CBC auto differential; Future  -     TSH; Future  -     Vitamin D; Future  -     Lipid panel; Future  -     ALT (SGPT); Future  -     Hemoglobin A1c; Future  -     Microalbumin/creatinine urine ratio; Future  -     Hemoglobin A1c; Future  -     Mammo Digital Screening Bilat with CAD; Future    Depression, unspecified depression type- cont rx.    Pure hypercholesterolemia- start statin, recheck 3mo.  -     atorvastatin (LIPITOR) 20 MG tablet; Take 1 tablet (20 mg total) by mouth once daily.  -     Lipid panel; Future  -     ALT (SGPT); Future    Vitamin D deficiency- check lab.  -     Vitamin D; Future    Hypothyroidism due to acquired atrophy of thyroid- check lab.    Nausea occasional, wants zofran for prn.

## 2017-03-08 NOTE — MR AVS SNAPSHOT
[facility]  [address] [facility] [address] [place] [address]  Phone: [phone]  Fax: [phone]                  Ana Maria Teague   3/8/2017 3:30 PM   Office Visit    Description:  Female : 1960   Provider:  NAOMIE Gordon   Department:  [facility]iology           Reason for Visit     Hypertension           Diagnoses this Visit        Comments    Essential hypertension    -  Primary     Obesity, Class II, BMI 35-39.9, with comorbidity         -donor kidney transplant         Uncontrolled type 2 diabetes mellitus without complication, with long-term current use of insulin         Moderate COPD (chronic obstructive pulmonary disease)                To Do List           Future Appointments        Provider Department Dept Phone    3/8/2017 3:00 PM EKG, [facility] [phone]    3/8/2017 3:30 PM NAOMIE Gordon [facility] [phone]    3/8/2017 4:00 PM SPECIMEN, [facility] [phone]    3/8/2017 4:30 PM LAB, SAME DAY [facility] [phone]    3/8/2017 5:40 PM Karine Olmos MD [facility] [phone]      Goals (5 Years of Data)              3/28/14    Exercise 5x per week (30 min per time)   Not on track      [facility]     [facility] Care [facility] -  Assistance  Registered nurses in the [facility] provide clinical advisement, health education, appointment booking, and other advisory services.  Call for this free service at [phone].             Medications           Message regarding Medications     Verify the changes and/or additions to your medication regime listed below are the same as discussed with your clinician today.  If any of these changes or additions are incorrect, please notify your healthcare provider.             Verify that the below list of medications is an accurate representation of the medications you are currently taking.  If none  reported, the list may be blank. If incorrect, please contact your healthcare provider. Carry this list with you in case of emergency.           Current Medications     albuterol 90 mcg/actuation inhaler Inhale 2 puffs into the lungs every 4 (four) hours as needed for Wheezing.    albuterol-ipratropium 2.5mg-0.5mg/3mL (DUO-NEB) 0.5 mg-3 mg(2.5 mg base)/3 mL nebulizer solution Take 3 mLs by nebulization 3 (three) times daily.    alprazolam (XANAX) 0.5 MG tablet Take 1-2 tablets (0.5-1 mg total) by mouth 2 (two) times daily as needed for Anxiety. sedating    atorvastatin (LIPITOR) 20 MG tablet Take 1 tablet (20 mg total) by mouth once daily.    blood sugar diagnostic (FREESTYLE TEST) Strp USE FOUR TIMES DAILY AS DIRECTED    buPROPion (WELLBUTRIN XL) 150 MG TB24 tablet Take 1 tablet (150 mg total) by mouth once daily.    cetirizine (ZYRTEC) 10 MG tablet Take 1 tablet (10 mg total) by mouth daily as needed for Allergies.    ergocalciferol (ERGOCALCIFEROL) 50,000 unit Cap Take 1 capsule (50,000 Units total) by mouth every 7 days.    fluticasone-vilanterol (BREO) 200-25 mcg/dose DsDv diskus inhaler Inhale 1 puff into the lungs once daily.    furosemide (LASIX) 20 MG tablet TAKE 1 TABLET BY MOUTH EVERY DAY AS NEEDED    insulin glargine (LANTUS SOLOSTAR) 100 unit/mL (3 mL) InPn pen Inject 20 Units into the skin every evening.    insulin lispro protamin-lispro (HUMALOG MIX 75-25 KWIKPEN) 100 unit/mL (75-25) InPn Inject 25-30 Units as directed 3 (three) times daily.    lancets (FREESTYLE LANCETS) 28 gauge Misc 1 lancet by Combination route 2 (two) times daily as needed. Qid prn    lancets Misc 1 strip by Misc.(Non-Drug; Combo Route) route 4 (four) times daily with meals and nightly.    levothyroxine (SYNTHROID) 137 MCG Tab tablet Take 1 tablet (137 mcg total) by mouth before breakfast.    multivitamin (THERAGRAN) tablet Take 1 tablet by mouth once daily.    mycophenolate (CELLCEPT) 250 mg Cap Take 2 capsules (500 mg total) by  "mouth 2 (two) times daily.    nifedipine 30 MG ORAL TR24 (PROCARDIA-XL) 30 MG (OSM) 24 hr tablet Take 1 tablet (30 mg total) by mouth once daily.    ondansetron (ZOFRAN-ODT) 4 MG TbDL Take 1 tablet (4 mg total) by mouth every 8 (eight) hours as needed.    oxycodone-acetaminophen (PERCOCET)  mg per tablet Starting on Apr 14, 2017. OXYCODONE-ACETAMINOPHEN (Percocet)  MG ORAL TAB - 1 tab po q 8 hrs prn pain    pen needle, diabetic (BD ULTRA-FINE RORY PEN NEEDLES) 32 gauge x 5/32" Ndle Uses 4 daily, on multiple daily insulin injections    tacrolimus (PROGRAF) 1 MG Cap Take 5 mg in the AM by mouth and 4 mg in the PM by mouth. Z94.0 kidney transplant           Clinical Reference Information           Your Vitals Were     BP Pulse Height Weight BMI    124/84 70 5' 7" (1.702 m) 113.5 kg (250 lb 3.6 oz) 39.19 kg/m2      Blood Pressure          Most Recent Value    BP  124/84      Allergies as of 3/8/2017     Azithromycin    Adhesive      Immunizations Administered on Date of Encounter - 3/8/2017     None      Maintenance Dialysis History     Start End Type Comments Center    1/8/2004 9/28/2013 Hemo  [facility]            Current Dialysis Center Information     No center information to display.            Transplant Information        Txp Date Organ Coordinator Care Team    9/28/2013 Kidney [facility]ant[facility] Joshi RN Surgeon:  Jus Mcfarland MD   Referring Physician:  Lenora Dooley MD   Current Nephrologist:  Yuval Medina MD         Language Assistance Services     ATTENTION: Language assistance services are available, free of charge. Please call [phone].      ATENCIÓN: Si habla español, tiene a vilchis disposición servicios gratuitos de asistencia lingüística. Llame al [phone].     CHÚ Ý: N?u b?n nói Ti?ng Vi?t, có các d?ch v? h? tr? ngôn ng? mi?n phí dành cho b?n. G?i s? [phone].         [facility] complies with applicable Federal civil rights laws and does not " discriminate on the basis of race, color, national origin, age, disability, or sex.

## 2017-03-09 LAB
25(OH)D3+25(OH)D2 SERPL-MCNC: 23 NG/ML
ALBUMIN SERPL BCP-MCNC: 3.4 G/DL
ALP SERPL-CCNC: 143 U/L
ALT SERPL W/O P-5'-P-CCNC: 14 U/L
ANION GAP SERPL CALC-SCNC: 11 MMOL/L
AST SERPL-CCNC: 15 U/L
BILIRUB SERPL-MCNC: 0.3 MG/DL
BUN SERPL-MCNC: 17 MG/DL
CALCIUM SERPL-MCNC: 9.1 MG/DL
CHLORIDE SERPL-SCNC: 107 MMOL/L
CO2 SERPL-SCNC: 24 MMOL/L
CREAT SERPL-MCNC: 1 MG/DL
EST. GFR  (AFRICAN AMERICAN): >60 ML/MIN/1.73 M^2
EST. GFR  (NON AFRICAN AMERICAN): >60 ML/MIN/1.73 M^2
ESTIMATED AVG GLUCOSE: 177 MG/DL
GLUCOSE SERPL-MCNC: 128 MG/DL
HBA1C MFR BLD HPLC: 7.8 %
POTASSIUM SERPL-SCNC: 4.1 MMOL/L
PROT SERPL-MCNC: 7.2 G/DL
SODIUM SERPL-SCNC: 142 MMOL/L
T4 FREE SERPL-MCNC: 0.87 NG/DL
TSH SERPL DL<=0.005 MIU/L-ACNC: 13.21 UIU/ML

## 2017-03-27 RX ORDER — CETIRIZINE HYDROCHLORIDE 10 MG/1
TABLET ORAL
Qty: 30 TABLET | Refills: 0 | Status: SHIPPED | OUTPATIENT
Start: 2017-03-27 | End: 2017-07-17 | Stop reason: SDUPTHER

## 2017-04-13 DIAGNOSIS — F41.9 ANXIETY AND DEPRESSION: ICD-10-CM

## 2017-04-13 DIAGNOSIS — F41.9 ANXIETY: ICD-10-CM

## 2017-04-13 DIAGNOSIS — F32.A ANXIETY AND DEPRESSION: ICD-10-CM

## 2017-04-13 RX ORDER — ALPRAZOLAM 0.5 MG/1
TABLET ORAL
Qty: 60 TABLET | Refills: 0 | Status: SHIPPED | OUTPATIENT
Start: 2017-04-13 | End: 2017-06-26 | Stop reason: SDUPTHER

## 2017-04-18 ENCOUNTER — TELEPHONE (OUTPATIENT)
Dept: PAIN MEDICINE | Facility: CLINIC | Age: 57
End: 2017-04-18

## 2017-04-18 RX ORDER — CYCLOBENZAPRINE HCL 10 MG
10 TABLET ORAL 2 TIMES DAILY PRN
Qty: 60 TABLET | Refills: 0 | Status: SHIPPED | OUTPATIENT
Start: 2017-04-18 | End: 2017-04-28

## 2017-04-18 NOTE — TELEPHONE ENCOUNTER
----- Message from Babak Fierro sent at 4/18/2017  8:32 AM CDT -----  Contact: Pt   Pt is requesting to speak with the nurse./ Pt is requesting a muscle relaxer for her back spasms./ Please advise #[phone]

## 2017-04-18 NOTE — TELEPHONE ENCOUNTER
Spoke with patient and let her know that medication has been sent to her pharmacy. She acknowledged.

## 2017-05-08 ENCOUNTER — OFFICE VISIT (OUTPATIENT)
Dept: PAIN MEDICINE | Facility: CLINIC | Age: 57
End: 2017-05-08
Payer: MEDICARE

## 2017-05-08 VITALS
HEIGHT: 67 IN | SYSTOLIC BLOOD PRESSURE: 140 MMHG | DIASTOLIC BLOOD PRESSURE: 87 MMHG | WEIGHT: 241 LBS | BODY MASS INDEX: 37.83 KG/M2 | HEART RATE: 95 BPM

## 2017-05-08 DIAGNOSIS — M51.36 DDD (DEGENERATIVE DISC DISEASE), LUMBAR: ICD-10-CM

## 2017-05-08 DIAGNOSIS — M54.16 LEFT LUMBAR RADICULOPATHY: ICD-10-CM

## 2017-05-08 DIAGNOSIS — M47.817 SPONDYLOSIS OF LUMBOSACRAL REGION WITHOUT MYELOPATHY OR RADICULOPATHY: ICD-10-CM

## 2017-05-08 DIAGNOSIS — M25.552 HIP PAIN, LEFT: ICD-10-CM

## 2017-05-08 DIAGNOSIS — G89.4 CHRONIC PAIN DISORDER: Primary | ICD-10-CM

## 2017-05-08 DIAGNOSIS — M79.18 MYOFASCIAL PAIN: ICD-10-CM

## 2017-05-08 PROCEDURE — 99214 OFFICE O/P EST MOD 30 MIN: CPT | Mod: PBBFAC,PO | Performed by: PHYSICIAN ASSISTANT

## 2017-05-08 PROCEDURE — 99999 PR PBB SHADOW E&M-EST. PATIENT-LVL IV: CPT | Mod: PBBFAC,,, | Performed by: PHYSICIAN ASSISTANT

## 2017-05-08 PROCEDURE — 99214 OFFICE O/P EST MOD 30 MIN: CPT | Mod: S$PBB,,, | Performed by: PHYSICIAN ASSISTANT

## 2017-05-08 RX ORDER — OXYCODONE AND ACETAMINOPHEN 10; 325 MG/1; MG/1
TABLET ORAL
Qty: 90 TABLET | Refills: 0 | Status: SHIPPED | OUTPATIENT
Start: 2017-05-24 | End: 2017-05-08 | Stop reason: SDUPTHER

## 2017-05-08 RX ORDER — OXYCODONE AND ACETAMINOPHEN 10; 325 MG/1; MG/1
TABLET ORAL
Qty: 90 TABLET | Refills: 0 | Status: SHIPPED | OUTPATIENT
Start: 2017-06-22 | End: 2017-05-08 | Stop reason: SDUPTHER

## 2017-05-08 RX ORDER — CYCLOBENZAPRINE HCL 10 MG
10 TABLET ORAL 3 TIMES DAILY PRN
COMMUNITY
End: 2017-08-21 | Stop reason: SDUPTHER

## 2017-05-08 RX ORDER — OXYCODONE AND ACETAMINOPHEN 10; 325 MG/1; MG/1
TABLET ORAL
Qty: 90 TABLET | Refills: 0 | Status: SHIPPED | OUTPATIENT
Start: 2017-07-21 | End: 2017-08-21 | Stop reason: SDUPTHER
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
## 2017-05-08 NOTE — MR AVS SNAPSHOT
[facility]  [address] [facility] [address]  [place] [address]  Phone: [phone]  Fax: [phone]                  Ana Maria MAE Ho   2017 11:00 AM   Office Visit    Description:  Female : 1960   Provider:  Marcella Haas PA-C   Department:  [facility]           Reason for Visit     Follow-up           Diagnoses this Visit        Comments    Chronic pain disorder    -  Primary     Left lumbar radiculopathy         Spondylosis of lumbosacral region without myelopathy or radiculopathy         DDD (degenerative disc disease), lumbar         Hip pain, left         Myofascial pain                To Do List           Future Appointments        Provider Department Dept Phone    2017 9:10 AM SPECIMEN, [facility] [phone]    2017 9:20 AM LABORATORY, [facility] [phone]    2017 9:30 AM Yuval Medina MD [facility] [phone]    2017 9:00 AM [facility] XR2 [facility] [phone]    2017 9:20 AM PULMONARY LAB, [facility] [phone]      Goals (5 Years of Data)              3/28/14    Exercise 5x per week (30 min per time)   Not on track      Follow-Up and Disposition     Return in about 15 weeks (around 2017) for Medication refill.    Follow-up and Disposition History       These Medications        Disp Refills Start End    oxycodone-acetaminophen (PERCOCET)  mg per tablet 90 tablet 0 2017     OXYCODONE-ACETAMINOPHEN (Percocet)  MG ORAL TAB - 1 tab po q 8 hrs prn pain    Pharmacy: [facility] [address] [place] [address] KADIE HOLMAN [facility] NARINDER HUNTER AT [facility] Ph #: [phone]         [facility]     [facility] Care [facility] -  Assistance  Unless otherwise directed by your provider, please contact [facility], our nurse care line that is available for  assistance.     Registered  nurses in the [facility] provide: appointment scheduling, clinical advisement, health education, and other advisory services.  Call: [phone] (toll free)               Medications           Message regarding Medications     Verify the changes and/or additions to your medication regime listed below are the same as discussed with your clinician today.  If any of these changes or additions are incorrect, please notify your healthcare provider.             Verify that the below list of medications is an accurate representation of the medications you are currently taking.  If none reported, the list may be blank. If incorrect, please contact your healthcare provider. Carry this list with you in case of emergency.           Current Medications     albuterol-ipratropium 2.5mg-0.5mg/3mL (DUO-NEB) 0.5 mg-3 mg(2.5 mg base)/3 mL nebulizer solution Take 3 mLs by nebulization 3 (three) times daily.    alprazolam (XANAX) 0.5 MG tablet TAKE 1 TO 2 TABLETS BY MOUTH TWICE DAILY AS NEEDED    atorvastatin (LIPITOR) 20 MG tablet Take 1 tablet (20 mg total) by mouth once daily.    blood sugar diagnostic (FREESTYLE TEST) Strp USE FOUR TIMES DAILY AS DIRECTED    cetirizine (ZYRTEC) 10 MG tablet TAKE 1 TABLET BY MOUTH EVERY DAY    cyclobenzaprine (FLEXERIL) 10 MG tablet Take 10 mg by mouth 3 (three) times daily as needed for Muscle spasms.    ergocalciferol (ERGOCALCIFEROL) 50,000 unit Cap Take 1 capsule (50,000 Units total) by mouth every 7 days.    fluticasone-vilanterol (BREO) 200-25 mcg/dose DsDv diskus inhaler Inhale 1 puff into the lungs once daily.    furosemide (LASIX) 20 MG tablet TAKE 1 TABLET BY MOUTH EVERY DAY AS NEEDED    insulin glargine (LANTUS SOLOSTAR) 100 unit/mL (3 mL) InPn pen Inject 20 Units into the skin every evening.    insulin lispro protamin-lispro (HUMALOG MIX 75-25 KWIKPEN) 100 unit/mL (75-25) InPn Inject 25-30 Units as directed 3 (three) times daily.    lancets (FREESTYLE LANCETS) 28 gauge  "Misc 1 lancet by Combination route 2 (two) times daily as needed. Qid prn    lancets Misc 1 strip by Misc.(Non-Drug; Combo Route) route 4 (four) times daily with meals and nightly.    levothyroxine (SYNTHROID) 137 MCG Tab tablet Take 1 tablet (137 mcg total) by mouth before breakfast.    multivitamin (THERAGRAN) tablet Take 1 tablet by mouth once daily.    mycophenolate (CELLCEPT) 250 mg Cap Take 2 capsules (500 mg total) by mouth 2 (two) times daily.    nifedipine 30 MG ORAL TR24 (PROCARDIA-XL) 30 MG (OSM) 24 hr tablet Take 1 tablet (30 mg total) by mouth once daily.    ondansetron (ZOFRAN-ODT) 4 MG TbDL Take 1 tablet (4 mg total) by mouth every 8 (eight) hours as needed.    oxycodone-acetaminophen (PERCOCET)  mg per tablet Starting on Jul 21, 2017. OXYCODONE-ACETAMINOPHEN (Percocet)  MG ORAL TAB - 1 tab po q 8 hrs prn pain    pen needle, diabetic (BD ULTRA-FINE RORY PEN NEEDLES) 32 gauge x 5/32" Ndle Uses 4 daily, on multiple daily insulin injections    tacrolimus (PROGRAF) 1 MG Cap Take 5 mg in the AM by mouth and 4 mg in the PM by mouth. Z94.0 kidney transplant    albuterol 90 mcg/actuation inhaler Inhale 2 puffs into the lungs every 4 (four) hours as needed for Wheezing.    buPROPion (WELLBUTRIN XL) 150 MG TB24 tablet Take 1 tablet (150 mg total) by mouth once daily.           Clinical Reference Information           Your Vitals Were     BP Pulse Height Weight BMI    140/87 95 5' 7" (1.702 m) 109.3 kg (241 lb) 37.75 kg/m2      Blood Pressure          Most Recent Value    BP  (!)  140/87      Allergies as of 5/8/2017     Azithromycin    Adhesive      Immunizations Administered on Date of Encounter - 5/8/2017     None      Maintenance Dialysis History     Start End Type Comments Center    1/8/2004 9/28/2013 Hemo  [facility]            Current Dialysis Center Information     No center information to display.            Transplant Information        Txp Date Organ Coordinator Care " Team    9/28/2013 Kidney Frantz Joshi RN Surgeon:  Jus Mcfarland MD   Referring Physician:  Lenora Dooley MD   Current Nephrologist:  Yuval Medina MD         Language Assistance Services     ATTENTION: Language assistance services are available, free of charge. Please call [phone].      ATENCIÓN: Si habla español, tiene a vilchis disposición servicios gratuitos de asistencia lingüística. Llame al [phone].     CHÚ Ý: N?u b?n nói Ti?ng Vi?t, có các d?ch v? h? tr? ngôn ng? mi?n phí dành cho b?n. G?i s? [phone].         [facility] complies with applicable Federal civil rights laws and does not discriminate on the basis of race, color, national origin, age, disability, or sex.

## 2017-05-17 ENCOUNTER — LAB VISIT (OUTPATIENT)
Dept: LAB | Facility: HOSPITAL | Age: 57
End: 2017-05-17
Attending: INTERNAL MEDICINE
Payer: MEDICARE

## 2017-05-17 DIAGNOSIS — Z94.0 S/P KIDNEY TRANSPLANT: ICD-10-CM

## 2017-05-17 LAB
ALBUMIN SERPL BCP-MCNC: 3.5 G/DL
ANION GAP SERPL CALC-SCNC: 13 MMOL/L
BASOPHILS # BLD AUTO: 0.05 K/UL
BASOPHILS NFR BLD: 0.4 %
BUN SERPL-MCNC: 12 MG/DL
CALCIUM SERPL-MCNC: 9.5 MG/DL
CHLORIDE SERPL-SCNC: 104 MMOL/L
CO2 SERPL-SCNC: 23 MMOL/L
CREAT SERPL-MCNC: 0.9 MG/DL
DIFFERENTIAL METHOD: ABNORMAL
EOSINOPHIL # BLD AUTO: 0.4 K/UL
EOSINOPHIL NFR BLD: 3.1 %
ERYTHROCYTE [DISTWIDTH] IN BLOOD BY AUTOMATED COUNT: 15.2 %
EST. GFR  (AFRICAN AMERICAN): >60 ML/MIN/1.73 M^2
EST. GFR  (NON AFRICAN AMERICAN): >60 ML/MIN/1.73 M^2
GLUCOSE SERPL-MCNC: 91 MG/DL
HCT VFR BLD AUTO: 40.5 %
HGB BLD-MCNC: 12.4 G/DL
LYMPHOCYTES # BLD AUTO: 2.8 K/UL
LYMPHOCYTES NFR BLD: 24.9 %
MCH RBC QN AUTO: 25.6 PG
MCHC RBC AUTO-ENTMCNC: 30.6 %
MCV RBC AUTO: 84 FL
MONOCYTES # BLD AUTO: 0.6 K/UL
MONOCYTES NFR BLD: 5 %
NEUTROPHILS # BLD AUTO: 7.5 K/UL
NEUTROPHILS NFR BLD: 65.8 %
PHOSPHATE SERPL-MCNC: 3.3 MG/DL
PLATELET # BLD AUTO: 250 K/UL
PMV BLD AUTO: 12.1 FL
POTASSIUM SERPL-SCNC: 4.2 MMOL/L
PTH-INTACT SERPL-MCNC: 83 PG/ML
PTH-INTACT SERPL-MCNC: 83 PG/ML
RBC # BLD AUTO: 4.84 M/UL
SODIUM SERPL-SCNC: 140 MMOL/L
URATE SERPL-MCNC: 5 MG/DL
WBC # BLD AUTO: 11.38 K/UL

## 2017-05-17 PROCEDURE — 83970 ASSAY OF PARATHORMONE: CPT

## 2017-05-17 PROCEDURE — 84550 ASSAY OF BLOOD/URIC ACID: CPT

## 2017-05-17 PROCEDURE — 36415 COLL VENOUS BLD VENIPUNCTURE: CPT | Mod: PO

## 2017-05-17 PROCEDURE — 85025 COMPLETE CBC W/AUTO DIFF WBC: CPT

## 2017-05-17 PROCEDURE — 80069 RENAL FUNCTION PANEL: CPT

## 2017-05-17 PROCEDURE — 80197 ASSAY OF TACROLIMUS: CPT

## 2017-05-18 LAB — TACROLIMUS BLD-MCNC: 4.7 NG/ML

## 2017-05-22 ENCOUNTER — TELEPHONE (OUTPATIENT)
Dept: NEPHROLOGY | Facility: CLINIC | Age: 57
End: 2017-05-22

## 2017-05-22 DIAGNOSIS — Z94.0 S/P KIDNEY TRANSPLANT: Primary | ICD-10-CM

## 2017-05-22 NOTE — TELEPHONE ENCOUNTER
----- Message from Isauro Negro sent at 5/22/2017  7:54 AM CDT -----  Contact: pt   States she is calling to rg wanting to discuss her appt/ states she wants to speak with nurse prior to resched and can be reached at [phone] or  [phone]//sergey/jose francisco

## 2017-05-22 NOTE — TELEPHONE ENCOUNTER
Pl reschedule appt for wed or  Thursday ( 5/24 or 5/25 ) , repeat Prograf level in AM     Dr Medina
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
## 2017-05-23 ENCOUNTER — LAB VISIT (OUTPATIENT)
Dept: LAB | Facility: HOSPITAL | Age: 57
End: 2017-05-23
Attending: INTERNAL MEDICINE
Payer: MEDICARE

## 2017-05-23 DIAGNOSIS — Z94.0 S/P KIDNEY TRANSPLANT: ICD-10-CM

## 2017-05-23 PROCEDURE — 80197 ASSAY OF TACROLIMUS: CPT

## 2017-05-23 PROCEDURE — 36415 COLL VENOUS BLD VENIPUNCTURE: CPT | Mod: PO

## 2017-05-24 LAB — TACROLIMUS BLD-MCNC: 5.4 NG/ML

## 2017-05-25 ENCOUNTER — OFFICE VISIT (OUTPATIENT)
Dept: NEPHROLOGY | Facility: CLINIC | Age: 57
End: 2017-05-25
Payer: MEDICARE

## 2017-05-25 VITALS
SYSTOLIC BLOOD PRESSURE: 130 MMHG | BODY MASS INDEX: 38.06 KG/M2 | HEIGHT: 67 IN | DIASTOLIC BLOOD PRESSURE: 80 MMHG | WEIGHT: 242.5 LBS | HEART RATE: 86 BPM

## 2017-05-25 DIAGNOSIS — Z94.0 S/P KIDNEY TRANSPLANT: Primary | ICD-10-CM

## 2017-05-25 PROCEDURE — 99214 OFFICE O/P EST MOD 30 MIN: CPT | Mod: S$PBB,,, | Performed by: INTERNAL MEDICINE

## 2017-05-25 PROCEDURE — 99213 OFFICE O/P EST LOW 20 MIN: CPT | Mod: PBBFAC | Performed by: INTERNAL MEDICINE

## 2017-05-25 PROCEDURE — 99999 PR PBB SHADOW E&M-EST. PATIENT-LVL III: CPT | Mod: PBBFAC,,, | Performed by: INTERNAL MEDICINE

## 2017-05-25 NOTE — PROGRESS NOTES
Subjective:       Patient ID: Ana Maria Teague is a 56 y.o.   female who presents for follow-up evaluation of CRT - 2013, HTN, anemia, SHPT      Karine Fernandez MD       HPI:  Ana Maria Teague Is a pleasant 56 -year-old  woman see in office today in followup for above medical problems. I saw her in clinic about 5 months ago. She is currently taking Prograf 5 mg in AM 4 mg  In PM , CellCept 500 mg twice a day. Other providerin the interim were reviewed.  She had a sleep study that ruled out obstructive sleep apnea, she has nocturnal hypoxemia, she was started on 2 L of oxygen at night by nasal cannula. Recent lab results were discussed with the patient. Renal function is stable with a creatinine of  0.9 Mg/dL.      Important Medical Info :      Patient has history of end-stage renal disease on hemodialysis for about 9 years at [facility] hemodialysis unit. She underwent a cadaveric renal transplant in 2013 at [facility] in [place]. Her hospital stay was complicated by incisional hematoma and bleeding requiring patient to return to OR for wash out. She also experienced delay of graft function, requiring HD.       Past Medical History:   Diagnosis Date    Abnormal glucose 2013    Acute bronchiolitis 10/15/2014    Anemia     Anemia of chronic renal failure 2013    Anxiety     Anxiety disorder     Avascular necrosis of bone of hip     Back pain     s/p steroid injections    Chronic immunosuppression with Prograf and Cellcept 2013    CKD (chronic kidney disease) stage 2, GFR 60-89 ml/min     CKD (chronic kidney disease) stage 5, GFR less than 15 ml/min     -donor kidney transplant - 13    Depression     Hypertension, renal 2013    Hypothyroidism     Immunosuppressed status 10/15/2014    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    Osteoporosis with pathological fracture     Renal disease due to hypertension  11/11/2013    Renal hypertension, non-vascular     Secondary hyperparathyroidism of renal origin 9/30/2013    Vertigo          Current Outpatient Prescriptions on File Prior to Visit   Medication Sig Dispense Refill    albuterol-ipratropium 2.5mg-0.5mg/3mL (DUO-NEB) 0.5 mg-3 mg(2.5 mg base)/3 mL nebulizer solution Take 3 mLs by nebulization 3 (three) times daily. 270 mL 5    alprazolam (XANAX) 0.5 MG tablet TAKE 1 TO 2 TABLETS BY MOUTH TWICE DAILY AS NEEDED 60 tablet 0    atorvastatin (LIPITOR) 20 MG tablet Take 1 tablet (20 mg total) by mouth once daily. 30 tablet 11    blood sugar diagnostic (FREESTYLE TEST) Strp USE FOUR TIMES DAILY AS DIRECTED 400 strip 2    buPROPion (WELLBUTRIN XL) 150 MG TB24 tablet Take 1 tablet (150 mg total) by mouth once daily. 30 tablet 6    cetirizine (ZYRTEC) 10 MG tablet TAKE 1 TABLET BY MOUTH EVERY DAY 30 tablet 0    cyclobenzaprine (FLEXERIL) 10 MG tablet Take 10 mg by mouth 3 (three) times daily as needed for Muscle spasms.      ergocalciferol (ERGOCALCIFEROL) 50,000 unit Cap Take 1 capsule (50,000 Units total) by mouth every 7 days. 12 capsule 3    fluticasone-vilanterol (BREO) 200-25 mcg/dose DsDv diskus inhaler Inhale 1 puff into the lungs once daily. 60 each 5    furosemide (LASIX) 20 MG tablet TAKE 1 TABLET BY MOUTH EVERY DAY AS NEEDED 90 tablet 11    insulin glargine (LANTUS SOLOSTAR) 100 unit/mL (3 mL) InPn pen Inject 20 Units into the skin every evening. 6 mL 11    insulin lispro protamin-lispro (HUMALOG MIX 75-25 KWIKPEN) 100 unit/mL (75-25) InPn Inject 25-30 Units as directed 3 (three) times daily. 3 Box 11    lancets (FREESTYLE LANCETS) 28 gauge Misc 1 lancet by Combination route 2 (two) times daily as needed. Qid prn 400 each 2    lancets Misc 1 strip by Misc.(Non-Drug; Combo Route) route 4 (four) times daily with meals and nightly. 150 each 6    levothyroxine (SYNTHROID) 137 MCG Tab tablet Take 1 tablet (137 mcg total) by mouth before breakfast. 30  "tablet 6    multivitamin (THERAGRAN) tablet Take 1 tablet by mouth once daily.      mycophenolate (CELLCEPT) 250 mg Cap Take 2 capsules (500 mg total) by mouth 2 (two) times daily. 120 capsule 11    nifedipine 30 MG ORAL TR24 (PROCARDIA-XL) 30 MG (OSM) 24 hr tablet Take 1 tablet (30 mg total) by mouth once daily. 30 tablet 11    ondansetron (ZOFRAN-ODT) 4 MG TbDL Take 1 tablet (4 mg total) by mouth every 8 (eight) hours as needed. 40 tablet 2    [START ON 7/21/2017] oxycodone-acetaminophen (PERCOCET)  mg per tablet OXYCODONE-ACETAMINOPHEN (Percocet)  MG ORAL TAB - 1 tab po q 8 hrs prn pain 90 tablet 0    pen needle, diabetic (BD ULTRA-FINE RORY PEN NEEDLES) 32 gauge x 5/32" Ndle Uses 4 daily, on multiple daily insulin injections 150 each 12    tacrolimus (PROGRAF) 1 MG Cap Take 5 mg in the AM by mouth and 4 mg in the PM by mouth. Z94.0 kidney transplant 270 capsule 11    albuterol 90 mcg/actuation inhaler Inhale 2 puffs into the lungs every 4 (four) hours as needed for Wheezing. 1 Inhaler 3     No current facility-administered medications on file prior to visit.          Review of Systems  :    Constitutional: Negative for fever, activity change, appetite change and fatigue.    HENT: Negative for congestion, sore throat, facial swelling, trouble swallowing and voice change.    Eyes: Negative for redness and visual disturbance.    Respiratory: Negative for apnea, cough, chest tightness, shortness of breath and wheezing.    Cardiovascular: Negative for chest pain, palpitations , has trace leg swelling.    Gastrointestinal: Negative for nausea, vomiting, abdominal pain, diarrhea, constipation, blood in stool and abdominal distention.    Genitourinary: Negative for dysuria, urgency, frequency, hematuria, flank pain, decreased urine volume, difficulty urinating and pelvic pain.    Musculoskeletal: Positive for back pain, no joint swelling , she has pain in her left wrist from carpal tunnel syndrome. "    Skin: Negative for color change and rash.    Neurological: Negative for dizziness, syncope, weakness and headaches.    Hematological: Does not bruise/bleed easily.    Psychiatric/Behavioral: Negative for behavioral problems, confusion and agitation. The patient is not nervous/anxious.      Objective:           Vitals:    05/25/17 0949   BP: 130/80   Pulse: 86         Physical Exam  :         Constitutional: She is oriented to person, place, and time. She appears well-developed and well-nourished. No distress.    HENT: Head: Normocephalic and atraumatic.    Mouth/Throat: Oropharynx is clear and moist. No oropharyngeal exudate.    Eyes: Conjunctivae normal and EOM are normal. Pupils are equal, round, and reactive to light. No scleral icterus.    Neck: Normal range of motion. Neck supple. No JVD present. Carotid bruit is not present. No tracheal deviation present. No mass and no thyromegaly present.    Cardiovascular: Normal rate, regular rhythm, normal heart sounds and intact distal pulses. Exam reveals no gallop and no friction rub. No murmur heard.    Pulmonary/Chest: Effort normal and breath sounds normal. No respiratory distress. She has no wheezes. She has no rales. She exhibits no tenderness.    Abdominal: obese, Soft. Bowel sounds are normal. She exhibits no distension, no abdominal bruit, no ascites and no mass. There is no hepatosplenomegaly. There is no tenderness. There is no rebound, no guarding and no CVA tenderness. No allograft tenderness   Musculoskeletal: Normal range of motion. She exhibits no  edema and no tenderness. Right arm AV graft with no thrill   Lymphadenopathy: She has no cervical adenopathy.   Neurological: She is alert and oriented to person, place, and time. No cranial nerve deficit. She exhibits normal muscle tone. Coordination normal.    Skin: Skin is warm and intact. No rash noted. No erythema. No pallor.   Psychiatric: She has a normal mood and affect. Her behavior is normal.  Judgment normal.           Labs:    Lab Results   Component Value Date    CREATININE 0.9 05/17/2017    BUN 12 05/17/2017     05/17/2017    K 4.2 05/17/2017     05/17/2017    CO2 23 05/17/2017       Lab Results   Component Value Date    WBC 11.38 05/17/2017    HGB 12.4 05/17/2017    HCT 40.5 05/17/2017    MCV 84 05/17/2017     05/17/2017       Lab Results   Component Value Date    PTH 83.0 (H) 05/17/2017    PTH 83.0 (H) 05/17/2017    CALCIUM 9.5 05/17/2017    CAION 1.17 10/08/2013    PHOS 3.3 05/17/2017       Lab Results   Component Value Date    ALBUMIN 3.5 05/17/2017       Lab Results   Component Value Date    URICACID 5.0 05/17/2017       Lab Results   Component Value Date    HGBA1C 7.8 (H) 03/08/2017       Prograf level - 5.4        Impression and plan - 56 -year-old  woman seen office today in followup for following medical problems ,      1. Status post cadaveric renal transplant ( 9/2013 ) - stable renal function with a serum creatinine of 0.9 mg/dL. Most recent Prograf level is 5.4 (target 4-7) , she's currently on Prograf 5 mg in AM and 4  Mg in PM  , cont CellCept 500 mg twice a day for immunosuppression.      2. Hypertension - blood pressure is controlled on nifedipine 30 mg daily, Target blood pressure less than 140/90.      3. Anemia - her hemoglobin is currently stable at 12  g, We'll continue to follow      4. Secondary hyperparathyroidism - PTH is 83 , off  Calcitriol now , cont Ergocalciferol once a week ,      5. Diabetes mellitus type 2 - discussed importance of weight loss and adherence with her medications and diabetic diet. Also discussed daily exercise     6. Volume - recent echocardiogram shows LV diastolic dysfunction,  discussed salt restricted diet, on Lasix 20 mg daily as needed basis.      7. Obesity - discussed daily exercise and weight loss.      I will see her in followup in about 5 months. Face-to-face time was 25 minutes reviewing her lab results  and plan of care.       Yuval Medina M.D.

## 2017-05-25 NOTE — PATIENT INSTRUCTIONS
Avoid NSAID pain medications such as advil, aleve, motrin, ibuprofen, naprosyn, meloxicam, diclofenac, mobic.

## 2017-05-29 ENCOUNTER — PROCEDURE VISIT (OUTPATIENT)
Dept: PULMONOLOGY | Facility: CLINIC | Age: 57
End: 2017-05-29
Payer: MEDICARE

## 2017-05-29 ENCOUNTER — HOSPITAL ENCOUNTER (OUTPATIENT)
Dept: RADIOLOGY | Facility: HOSPITAL | Age: 57
Discharge: HOME | End: 2017-05-29
Attending: INTERNAL MEDICINE
Payer: MEDICARE

## 2017-05-29 ENCOUNTER — OFFICE VISIT (OUTPATIENT)
Dept: PULMONOLOGY | Facility: CLINIC | Age: 57
End: 2017-05-29
Payer: MEDICARE

## 2017-05-29 VITALS
RESPIRATION RATE: 18 BRPM | HEART RATE: 89 BPM | OXYGEN SATURATION: 96 % | SYSTOLIC BLOOD PRESSURE: 103 MMHG | DIASTOLIC BLOOD PRESSURE: 84 MMHG | WEIGHT: 243 LBS | BODY MASS INDEX: 38.14 KG/M2 | HEIGHT: 67 IN

## 2017-05-29 DIAGNOSIS — J43.9 NOCTURNAL HYPOXEMIA DUE TO EMPHYSEMA: ICD-10-CM

## 2017-05-29 DIAGNOSIS — J44.9 MODERATE COPD (CHRONIC OBSTRUCTIVE PULMONARY DISEASE): Chronic | ICD-10-CM

## 2017-05-29 DIAGNOSIS — G47.36 NOCTURNAL HYPOXEMIA DUE TO EMPHYSEMA: ICD-10-CM

## 2017-05-29 DIAGNOSIS — J44.9 MODERATE COPD (CHRONIC OBSTRUCTIVE PULMONARY DISEASE): Primary | Chronic | ICD-10-CM

## 2017-05-29 LAB
POST FEF 25 75: 2.37 L/S (ref 1.65–3.15)
POST FET 100: 7.84 S
POST FEV1 FVC: 81 %
POST FEV1: 2.14 L (ref 2.1–2.76)
POST FIF 50: 2.55 L/S
POST FVC: 2.64 L (ref 2.68–3.46)
POST PEF: 3.01 L/S (ref 5.19–7.38)
PRE FEF 25 75: 1.08 L/S (ref 1.65–3.15)
PRE FET 100: 8.74 S
PRE FEV1 FVC: 60 %
PRE FEV1: 1.62 L (ref 2.1–2.76)
PRE FIF 50: 1.52 L/S
PRE FVC: 2.72 L (ref 2.68–3.46)
PRE PEF: 2.08 L/S (ref 5.19–7.38)
PREDICTED FEV1 FVC: 78.7 % (ref 73.8–83.59)
PREDICTED FEV1: 2.43 L (ref 2.1–2.76)
PREDICTED FVC: 3.07 L (ref 2.68–3.46)
PROVOCATION PROTOCOL: ABNORMAL

## 2017-05-29 PROCEDURE — 99999 PR PBB SHADOW E&M-EST. PATIENT-LVL IV: CPT | Mod: PBBFAC,,, | Performed by: INTERNAL MEDICINE

## 2017-05-29 PROCEDURE — 94620 PR PULMONARY STRESS TESTING,SIMPLE: CPT | Mod: 26,S$PBB,, | Performed by: INTERNAL MEDICINE

## 2017-05-29 PROCEDURE — 71020 XR CHEST PA AND LATERAL: CPT | Mod: 26,,, | Performed by: RADIOLOGY

## 2017-05-29 PROCEDURE — 99214 OFFICE O/P EST MOD 30 MIN: CPT | Mod: 25,S$PBB,, | Performed by: INTERNAL MEDICINE

## 2017-05-29 PROCEDURE — 94060 EVALUATION OF WHEEZING: CPT | Mod: 26,59,S$PBB, | Performed by: INTERNAL MEDICINE

## 2017-05-29 PROCEDURE — 99214 OFFICE O/P EST MOD 30 MIN: CPT | Mod: PBBFAC,25,PO | Performed by: INTERNAL MEDICINE

## 2017-05-29 NOTE — PATIENT INSTRUCTIONS
Thank You    Thank you for choosing the PULMONARY MEDICINE DEPARTMENT at [facility]. At [facility], your satisfaction is our priority. You may receive a survey asking about your experience with us. We would appreciate you taking the time to complete and return the survey. Our goal is to provide you with a level 5 or Very Good experience. We thank you for allowing us to serve you and value your feedback.    Your Nurse/Medical Assistant: ___Arely Hammonds___________________________    Sincerely,  Pulmonary Department  Phone: [phone]  Fax: [phone]

## 2017-05-29 NOTE — ASSESSMENT & PLAN NOTE
COPD ROS: taking medications as instructed, no medication side effects noted, no significant ongoing wheezing or shortness of breath, using bronchodilator MDI less than twice a week.   CAT score 14  BREO ELLIPAT and Albuterol HFA    New concerns: None.     Exam: appears well, vitals normal, no respiratory distress, acyanotic, normal RR, chest clear, no wheezing, crepitations, rhonchi, normal symmetric air entry.     Assessment: COPD reasonably well controlled.     Plan: current treatment plan is effective, no change in therapy.

## 2017-05-29 NOTE — PROGRESS NOTES
"Subjective:       Patient ID: Ana Maria Teague is a 56 y.o. female.    Chief Complaint: COPD (rev cxr vincent walk)    Ms. Ana Maria Teague is 56 years old  Last visit was 11/21/2016  COPD score is 14  No cough no wheezing no shortness of breath  Spirometry today FEV1 is 1.63 (67% predicted  Immunizations are up-to-date  Medications Breo Ellipta and rescue albuterol which he hardly ever uses  Weight has remained stable  We discussed about exercise and weight loss  6 minute walk test normal exercise capacity  She has nocturnal oxygen for her COPD hypoxemia  X-ray today was reviewed stable  Continue current medications      Review of Systems   Constitutional: Negative.    HENT: Negative.    Eyes: Negative.    Respiratory: Negative.  Negative for cough, sputum production, shortness of breath, wheezing and use of rescue inhaler.    Cardiovascular: Negative.    Genitourinary: Negative.    Endocrine: endocrine negative   Musculoskeletal: Negative.    Skin: Negative.    Gastrointestinal: Negative.    Neurological: Negative.    Psychiatric/Behavioral: Negative.        Objective:       Vitals:    05/29/17 1012   BP: 103/84   Pulse: 89   Resp: 18   SpO2: 96%   Weight: 110.2 kg (243 lb)   Height: 5' 7" (1.702 m)     Physical Exam   Constitutional: She is oriented to person, place, and time. She appears well-developed and well-nourished.   HENT:   Nose: Nose normal.   Neck: Normal range of motion. Neck supple. No JVD present.   Cardiovascular: Normal rate, regular rhythm and normal heart sounds.    Pulmonary/Chest: Normal expansion, symmetric chest wall expansion and effort normal.   Abdominal: Soft. Bowel sounds are normal.   Musculoskeletal: Normal range of motion.        Arms:  Lymphadenopathy:     She has no cervical adenopathy.   Neurological: She is alert and oriented to person, place, and time. She has normal reflexes.   Skin: Skin is warm and dry.   Psychiatric: She has a normal mood and affect. Her behavior is normal.   Nursing " "note and vitals reviewed.    Personal Diagnostic Review  Chest x-ray:   Heart and pulmonary vasculature are within normal limits.  Aorta is minimally tortuous.  Lungs are symmetrically aerated and clear.  Trachea is normal in position LEFT for slight rotation there is postsurgical changes noted in the lower neck and within both axillary and upper extremity regions.  Bilateral smooth apical pleural thickening.  Osteopenia, spondylosis and underlying scoliosis.   Impression           Stable exam without acute infiltrate.      6MW Test  Height: 5' 7" (170.2 cm)  Weight: 110.2 kg (243 lb)  BMI (Calculated): 38.1  Predicted Distance: 307.95  Interpretation  Predicted Distance Meters (Calculated): 444.25 meters    Normal exercise capacity    Pulmonary function tests: FEV1: 1.63  (67 % predicted), FVC:  2.72 (89 % predicted), FEV1/FVC:  60  No flowsheet data found.      Assessment:       Problem List Items Addressed This Visit     Moderate COPD (chronic obstructive pulmonary disease) - Primary (Chronic)     COPD ROS: taking medications as instructed, no medication side effects noted, no significant ongoing wheezing or shortness of breath, using bronchodilator MDI less than twice a week.   CAT score 14  BREO ELLIPAT and Albuterol HFA    New concerns: None.     Exam: appears well, vitals normal, no respiratory distress, acyanotic, normal RR, chest clear, no wheezing, crepitations, rhonchi, normal symmetric air entry.     Assessment: COPD reasonably well controlled.     Plan: current treatment plan is effective, no change in therapy.              Relevant Orders    X-Ray Chest PA And Lateral    Complete PFT without bronchodilator - Clinic    PULSE OXIMETRY OVERNIGHT (Completed)    OXYGEN FOR HOME USE    Obesity, Class II, BMI 35-39.9, with comorbidity     Weight loss and exercsie         Nocturnal hypoxemia due to emphysema     Oxygen 2.0 LPM         Relevant Orders    PULSE OXIMETRY OVERNIGHT    PULSE OXIMETRY OVERNIGHT " (Completed)    OXYGEN FOR HOME USE      Other Visit Diagnoses    None.       Plan:      Return in about 1 year (around 2018) for PFT, CXR, Overnight SAT.    This note was prepared using voice recognition system and is likely to have sound alike errors that may have been overlooked even after proof reading.  Please call me with any questions    Discussed diagnosis, its evaluation, treatment and usual course. All questions answered.    Thank you for the courtesy of participating in the care of this patient    Osmani Walker MD        Need repeat Overnight Sat for Night time Oxygen qualification    Osmani Walker MD      REPORT SUMMARY  Total valid samplin:59:36   High SpO2: 98%    Low SpO2: 72%    Mean SpO2  90.1 %  Cumulative time with oxygen saturation less than 88% (TC88):   00:25:24    CLINICAL INTERPRETATION   There is  significant nocturnal oxygen desaturation and    Clinical correlation is advised    Medicare Criteria Comments:   Oximetry test results suggest the patient falls under Medicare   Group 1 Criteria. ( Arterial oxygen saturation at or below 88%   for at least 5 minutes taken during sleep)  Osmani Walker MD    Nocturnal supplementary oxygen required    Please insert appropriate    Osmani Walker MD

## 2017-05-30 ENCOUNTER — TELEPHONE (OUTPATIENT)
Dept: PULMONOLOGY | Facility: CLINIC | Age: 57
End: 2017-05-30

## 2017-05-30 VITALS — BODY MASS INDEX: 38.14 KG/M2 | WEIGHT: 243 LBS | HEIGHT: 67 IN

## 2017-05-30 NOTE — PROCEDURES
"[facility]  Six Minute Walk     SUMMARY     Ordering Provider: Sanjuanita   Interpreting Provider: Sanjuanita  Performing nurse/tech/RT: Demi Corbin  Diagnosis: COPD  Height: 5' 7" (170.2 cm)  Weight: 110.2 kg (243 lb)  BMI (Calculated): 38.1   Patient Race:             Phase Oxygen Assessment Supplemental O2 Heart   Rate Blood Pressure Kyle Dyspnea Scale Rating   Resting 98 % Room Air 89 bpm 112/63 3   Exercise        Minute        1 97 % Room Air 115 bpm     2 95 % Room Air 120 bpm     3 95 % Room Air 122 bpm     4 97 % Room Air 137 bpm     5 97 % Room Air 123 bpm     6  96 % Room Air 125 bpm 120/76 5-6   Recovery        Minute        1 97 % Room Air 123 bpm     2 98 % Room Air 101 bpm     3 96 % Room Air 89 bpm     4 97 % Room Air 89 bpm 103/84 2     Six Minute Walk Summary  6MWT Status: completed without stopping  Patient Reported:  (Left Knee Pain)     Interpretation:  Did the patient stop or pause?: No         Total Time Walked (Calculated): 360 seconds  Final Partial Lap Distance (feet): 25 feet  Total Distance Meters (Calculated): 373.38 meters  Predicted Distance Meters (Calculated): 444.25 meters  Percentage of Predicted (Calculated): 84.05  Peak VO2 (Calculated): 15.18  Mets: 4.34  Has The Patient Had a Previous Six Minute Walk Test?: No       Previous 6MWT Results  Has The Patient Had a Previous Six Minute Walk Test?: No      REPORT    6 minute walk test was completed without pausing  Normal exercise capacity  Symptomatic complaints of knee pain  Dyspnea was moderate    Osmani Walker MD    "

## 2017-06-01 ENCOUNTER — PROCEDURE VISIT (OUTPATIENT)
Dept: PULMONOLOGY | Facility: CLINIC | Age: 57
End: 2017-06-01
Payer: MEDICARE

## 2017-06-01 DIAGNOSIS — Z92.25 HISTORY OF IMMUNOSUPPRESSION THERAPY: ICD-10-CM

## 2017-06-01 DIAGNOSIS — Z94.0 KIDNEY REPLACED BY TRANSPLANT: ICD-10-CM

## 2017-06-01 PROCEDURE — 94762 N-INVAS EAR/PLS OXIMTRY CONT: CPT | Mod: PBBFAC,PO

## 2017-06-02 DIAGNOSIS — J98.4 MIXED RESTRICTIVE AND OBSTRUCTIVE LUNG DISEASE: Chronic | ICD-10-CM

## 2017-06-02 DIAGNOSIS — J43.9 MIXED RESTRICTIVE AND OBSTRUCTIVE LUNG DISEASE: Chronic | ICD-10-CM

## 2017-06-02 DIAGNOSIS — J44.9 MODERATE COPD (CHRONIC OBSTRUCTIVE PULMONARY DISEASE): Chronic | ICD-10-CM

## 2017-06-02 RX ORDER — ALBUTEROL SULFATE 90 UG/1
AEROSOL, METERED RESPIRATORY (INHALATION)
Qty: 8.5 G | Refills: 0 | Status: SHIPPED | OUTPATIENT
Start: 2017-06-02 | End: 2018-04-14 | Stop reason: SDUPTHER

## 2017-06-02 NOTE — PROCEDURES
[facility]  [address] * [place] [address]  Telephone: [phone]  Test date: 17 Start: 17 23:45:18 Ana Maria Teague  Doctor: Denise End: 17 07:52:10 [id number]  Oximetry: Summary Report  Comments: Overnight study breathing room air.  Recording time: 08:06:52 Highest pulse: 112 Highest SpO2: 98%  Excluded samplin:07:16 Lowest pulse: 38 Lowest SpO2: 72%  Total valid samplin:59:36 Mean pulse: 73 Mean SpO2: 90.1%  1 S.D.: 6.2 1 S.D.: 3.0  Time with SpO2<90: 3:22:56, 42.3%  Time with SpO2<80: 0:03:08, 0.7%  Time with SpO2<70: 0:00:00, 0.0%  Time with SpO2<60: 0:00:00, 0.0%  Time with SpO2<88: 1:13:48, 15.4%  Time with SpO2 =>90: 4:36:40, 57.7%  Time with SpO2=>80 & <90: 3:19:48, 41.7%  Time with SpO2=>70 & <80: 0:03:08, 0.7%  Time with SpO2=>60 & <70: 0:00:00, 0.0%  The longest continuous time with saturation <=88 was 00:25:24, which started at  17 07:11:18.  A desaturation event was defined as a decrease of saturation by 4 or more.  No events were excluded due to artifact.  There were 15 desaturation events over 3 minutes duration.  There were 68 desaturation events of less than 3 minutes duration during which:  The mean high was 93.3%. The mean low was 85.6%.  The number of these events that were:  > 0 & <10 seconds: 1 > 0 seconds: 68  =>10 & <20 seconds: 7 =>10 seconds: 67  =>20 & <30 seconds: 13 =>20 seconds: 60  =>30 & <40 seconds: 5 =>30 seconds: 47  =>40 & <50 seconds: 13 =>40 seconds: 42  =>50 & <60 seconds: 6 =>50 seconds: 29  =>60 seconds: 23 =>60 seconds: 23  The mean length of desaturation events that were >=10 sec & <=3 mins was: 56.5 sec.  Desaturation event index (events >=10 sec per sampled hour): 8.4  Desaturation event index (events >= 0 sec per sampled hour): 8.5  © 2808-4683 [employer] Oximetry version Standard [id number].  Oximeter: [employer]ics 920M Plus memory, 4 second resolution.  [facility]  [address] [place] [address] Ave. * [place]  [place] [address]  Telephone: [phone]  Test date: 06/01/17 Start: 06/01/17 23:45:18 Ana Maria Teague  Doctor: Denise End: 06/02/17 07:52:10 [id number]  Oximetry: % Times Graphic Report  Comments: Overnight study breathing room air.  SATURATION  100  95  90  85  80  75  70  65  60  55  50  45  40  0 1 2 3 4 5 7 10 15 20 25  PERCENT OF TOTAL TIME  SATURATION  100  95  90  85  80  75  70  65  60  55  50  45  40  0 5 10 15 20 30 40 60 80 100  CUMULATIVE % TIME  © 5833-4343 [employer] Oximetry version Standard [id number].  Oximeter: [employer] 920M Plus memory, 4 second resolution.  [facility]  [address] [place] [address]. * [place] [address]  Telephone: [phone]  Test date: 06/01/17 Start: 06/01/17 23:45:18 Ana Maria Teague  Doctor: Denise End: 06/02/17 07:52:10 [id number]  Oximetry: % Times Text Report  Comments: Overnight study breathing room air.  SpO2:100 0.0 % time  SpO2: 99 0.0 % time SpO2: 89 16.8 % time SpO2: 79 0.2 % time  SpO2: 98 0.2 % time SpO2: 88 10.1 % time SpO2: 78 0.2 % time  SpO2: 97 1.0 % time SpO2: 87 7.1 % time SpO2: 77 0.2 % time  SpO2: 96 2.0 % time SpO2: 86 3.5 % time SpO2: 76 0.1 % time  SpO2: 95 3.5 % time SpO2: 85 1.8 % time SpO2: 75 0.0 % time  SpO2: 94 5.9 % time SpO2: 84 0.9 % time SpO2: 74 0.0 % time  SpO2: 93 9.7 % time SpO2: 83 0.6 % time SpO2: 73 0.0 % time  SpO2: 92 8.5 % time SpO2: 82 0.5 % time SpO2: 72 0.0 % time  SpO2: 91 9.1 % time SpO2: 81 0.2 % time SpO2: 71 % time  SpO2: 90 17.9 % time SpO2: 80 0.1 % time SpO2: 70 % time  Total 90's 57.7 % time Total 80's 41.7 % time Total 70's 0.7 % time  SpO2: 69 % time SpO2: 59 % time SpO2: 49 % time  SpO2: 68 % time SpO2: 58 % time SpO2: 48 % time  SpO2: 67 % time SpO2: 57 % time SpO2: 47 % time  SpO2: 66 % time SpO2: 56 % time SpO2: 46 % time  SpO2: 65 % time SpO2: 55 % time SpO2: 45 % time  SpO2: 64 % time SpO2: 54 % time SpO2: 44 % time  SpO2: 63 % time SpO2: 53 % time SpO2: 43 % time  SpO2: 62 % time SpO2: 52 % time  SpO2: 42 % time  SpO2: 61 % time SpO2: 51 % time SpO2: 41 % time  SpO2: 60 % time SpO2: 50 % time SpO2: 40 % time  Total 60's 0.0 % time Total 50's 0.0 % time Total 40's 0.0 % time  SpO2 <40 0.0 % time  Pulse range > 199 0.0 % time  Pulse range 180 - 199 0.0 % time  Pulse range 160 - 179 0.0 % time  Pulse range 140 - 159 0.0 % time  Pulse range 120 - 139 0.0 % time  Pulse range 100 - 119 0.7 % time  Pulse range 90 - 99 2.0 % time  Pulse range 80 - 89 7.4 % time  Pulse range 70 - 79 62.4 % time  Pulse range 60 - 69 27.6 % time  Pulse range 50 - 59 0.0 % time  Pulse range < 50 0.0 % time  © 3632-3823 [employer] Oximetry version Standard [id number].  Oximeter: [employer] 920M Plus memory, 4 second resolution.  [facility] [address] * [place] [address]  Telephone: [phone]  Test date: 06/01/17 Start: 06/01/17 23:45:18 Ana Maria Teague  Doctor: Denise End: 06/02/17 07:52:10 [id number]  Oximetry: Desaturation Report  Comments: Overnight study breathing room air.  The following lists the 40 desaturation events having the lowest drops. The event  durations had to be within 3 minutes to qualify, and the event onset was defined as  a decrease in SpO2 by 4 or more.  Saturation: Pulse Range:  Start date Start time End time Duration Onset Low Low High  1 06/02/17 00:11:18 00:12:58 00:01:40 94 86 71 - 78  2 06/02/17 00:13:54 00:16:06 00:02:12 91 87 75 - 83  3 06/02/17 00:36:30 00:39:18 00:02:48 98 88 68 - 82  4 06/02/17 00:40:22 00:41:46 00:01:24 92 88 67 - 78  5 06/02/17 00:57:58 01:00:18 00:02:20 94 88 68 - 83  6 06/02/17 02:08:46 02:09:58 00:01:12 92 84 68 - 87  7 06/02/17 02:10:42 02:11:26 00:00:44 97 76 74 - 90  8 06/02/17 02:11:50 02:12:02 00:00:12 90 79 73 - 76  9 06/02/17 02:12:22 02:13:34 00:01:12 93 79 78 - 87  10 06/02/17 02:14:42 02:15:18 00:00:36 88 77 79 - 82  11 06/02/17 02:15:26 02:15:38 00:00:12 82 77 80 - 85  12 06/02/17 02:17:14 02:17:54 00:00:40 95 82 69 - 78  13 06/02/17  02:18:38 02:19:26 00:00:48 94 82 71 - 80  14 06/02/17 02:20:50 02:21:14 00:00:24 91 87 65 - 74  15 06/02/17 02:21:42 02:22:22 00:00:40 94 79 71 - 85  16 06/02/17 02:22:34 02:22:46 00:00:12 83 73 82 - 88  17 06/02/17 02:23:34 02:24:14 00:00:40 95 77 75 - 83  18 06/02/17 02:24:26 02:24:54 00:00:28 85 72 79 - 86  19 06/02/17 02:25:26 02:25:34 00:00:08 85 78 77 - 83  20 06/02/17 02:26:10 02:26:26 00:00:16 91 84 77 - 82  21 06/02/17 02:27:30 02:28:10 00:00:40 91 85 67 - 86  22 06/02/17 02:32:46 02:34:02 00:01:16 94 82 69 - 88  23 06/02/17 02:37:10 02:38:18 00:01:08 94 88 68 - 81  24 06/02/17 02:43:58 02:45:18 00:01:20 96 88 70 - 78  25 06/02/17 02:45:54 02:46:50 00:00:56 94 74 64 - 88  26 06/02/17 04:47:06 04:48:02 00:00:56 93 87 70 - 90  27 06/02/17 04:49:50 04:51:26 00:01:36 93 84 63 - 82  28 06/02/17 04:51:54 04:52:14 00:00:20 96 82 79 - 88  29 06/02/17 04:52:54 04:53:54 00:01:00 98 84 70 - 85  30 06/02/17 04:54:18 04:55:02 00:00:44 95 87 71 - 79  31 06/02/17 04:55:26 04:56:06 00:00:40 91 83 69 - 81  32 06/02/17 04:56:38 04:57:26 00:00:48 92 83 66 - 79  33 06/02/17 04:58:42 04:59:46 00:01:04 91 84 63 - 83  34 06/02/17 05:00:02 05:00:22 00:00:20 88 84 65 - 71  35 06/02/17 05:05:34 05:06:42 00:01:08 89 81 66 - 74  36 06/02/17 05:07:02 05:07:30 00:00:28 85 76 69 - 76  37 06/02/17 05:09:46 05:10:22 00:00:36 89 83 67 - 70  38 06/02/17 05:17:42 05:20:22 00:02:40 91 83 63 - 72  39 06/02/17 05:27:30 05:30:18 00:02:48 91 85 63 - 73  40 06/02/17 05:40:22 05:41:26 00:01:04 90 84 64 - 69  © 9828-4232 [employer] Oximetry version Standard [id number].  Oximeter: Resp[employer]ics 920M Plus memory, 4 second resolution.  [facility]  [address] [place] [address]. * [place] [address]  Telephone: [phone]  Test date: 06/01/17 Start: 06/01/17 23:45:18 Ana Maria Teague  Doctor: Denise End: 06/02/17 07:52:10 [id number]  Oximetry: 8 hours per page  Comments: Overnight study breathing room air.  HOUR 06/01/17 HOUR 06/02/17  0:00  4:00  0:30 4:30  1:00 5:00  1:30 5:30  2:00 6:00  2:30 6:30  3:00 7:00  3:30 7:30  20 60  PULSE  100 140 20 60  PULSE  70 80 100 140  SpO2  90 100 70 80  SpO2  90 100  © 3920-0977 [employer] Oximetry version Standard [id number].  Oximeter: Resp[employer] 920M Plus memory, 4 second resolution.  [facility]  [address] * [place] [address]  Telephone: [phone]  Test date: 17 Start: 17 23:45:18 Ana Maria Teague  Doctor: Denise End: 17 07:52:10 [id number]  Oximetry: 8 hours per page  Comments: Overnight study breathing room air.  HOUR 17 HOUR 17  8:00 12:00  8:30 12:30  9:00 13:00  9:30 13:30  10:00 14:00  10:30 14:30  11:00 15:00  11:30 15:30  20 60  PULSE  100 140 20 60  PULSE  70 80 100 140  SpO2  90 100 70 80  SpO2  90 100  © 4746-0367 [employer] Oximetry version Standard [id number].  Oximeter: Resp[employer]ics 920M Plus memory, 4 second resolution.  [facility] [address] * [place] [address]  Telephone: [phone]  Test date: 17 Start: 17 23:45:18 Ana Maria Teague  Doctor: Denise End: 17 07:52:10 [id number]  Oximetry: 2 hours lowest average SpO2  Comments: Overnight study breathing room air.  HOUR 17 HOUR 17  4:30 5:30  4:45 5:45  5:00 6:00  5:15 6:15  20 60  PULSE  100 140 20 60  PULSE  70 80 100 140  SpO2  90 100 70 80  SpO2  90 100  © 2932-7840 [employer] Oximetry version Standard [id number].  Oximeter: Resp[employer]ics 920M Plus memory, 4 second resolution.      REPORT    'OVERNIGHT OXIMETRY REPORT:    Dictated by: Osmani Walker MD  Test date: 2017  Dictated on: 2017      Comment: This test was performed on Room Air     A desaturation event was defined as a decrease of saturation by 4 or more.    REPORT SUMMARY  Total valid samplin:59:36   High SpO2: 98%    Low SpO2: 72%    Mean SpO2  90.1 %  Cumulative time with oxygen saturation less than 88% (TC88):  00:25:24    CLINICAL INTERPRETATION   There is  significant nocturnal oxygen desaturation and  Clinical correlation is advised    Medicare Criteria Comments:   Oximetry test results suggest the patient falls under Medicare Group 1 Criteria. ( Arterial oxygen saturation at or below 88% for at least 5 minutes taken during sleep)  Osmani Walker MD    Details about Medicare Group Criteria coverage can be found at http://www.cms.hhs.gov/manuals/downloads/

## 2017-06-21 ENCOUNTER — HOSPITAL ENCOUNTER (OUTPATIENT)
Dept: RADIOLOGY | Facility: HOSPITAL | Age: 57
Discharge: HOME | End: 2017-06-21
Attending: INTERNAL MEDICINE
Payer: MEDICARE

## 2017-06-21 ENCOUNTER — OFFICE VISIT (OUTPATIENT)
Dept: PODIATRY | Facility: CLINIC | Age: 57
End: 2017-06-21
Payer: MEDICARE

## 2017-06-21 VITALS
BODY MASS INDEX: 38.13 KG/M2 | SYSTOLIC BLOOD PRESSURE: 153 MMHG | WEIGHT: 242.94 LBS | HEART RATE: 86 BPM | DIASTOLIC BLOOD PRESSURE: 94 MMHG | HEIGHT: 67 IN

## 2017-06-21 DIAGNOSIS — M76.61 ACHILLES TENDINITIS OF RIGHT LOWER EXTREMITY: Primary | ICD-10-CM

## 2017-06-21 DIAGNOSIS — Z00.00 ENCOUNTER FOR PREVENTIVE HEALTH EXAMINATION: ICD-10-CM

## 2017-06-21 DIAGNOSIS — M92.61 HAGLUND'S DEFORMITY OF RIGHT HEEL: ICD-10-CM

## 2017-06-21 DIAGNOSIS — Z12.31 ENCOUNTER FOR SCREENING MAMMOGRAM FOR MALIGNANT NEOPLASM OF BREAST: ICD-10-CM

## 2017-06-21 DIAGNOSIS — M77.51 RETROCALCANEAL BURSITIS (BACK OF HEEL), RIGHT: ICD-10-CM

## 2017-06-21 PROCEDURE — 99213 OFFICE O/P EST LOW 20 MIN: CPT | Mod: PBBFAC,PO | Performed by: PODIATRIST

## 2017-06-21 PROCEDURE — 99214 OFFICE O/P EST MOD 30 MIN: CPT | Mod: S$PBB,,, | Performed by: PODIATRIST

## 2017-06-21 PROCEDURE — 77063 BREAST TOMOSYNTHESIS BI: CPT | Mod: 26,,, | Performed by: RADIOLOGY

## 2017-06-21 PROCEDURE — 77067 SCR MAMMO BI INCL CAD: CPT | Mod: 26,,, | Performed by: RADIOLOGY

## 2017-06-21 PROCEDURE — 99999 PR PBB SHADOW E&M-EST. PATIENT-LVL III: CPT | Mod: PBBFAC,,, | Performed by: PODIATRIST

## 2017-06-21 RX ORDER — DEXAMETHASONE SODIUM PHOSPHATE 4 MG/ML
INJECTION, SOLUTION INTRA-ARTICULAR; INTRALESIONAL; INTRAMUSCULAR; INTRAVENOUS; SOFT TISSUE
Qty: 30 ML | Refills: 1 | Status: SHIPPED | OUTPATIENT
Start: 2017-06-21 | End: 2017-11-14

## 2017-06-21 NOTE — PROGRESS NOTES
Podiatry Note  CHIEF COMPLAINT   Chief Complaint   Patient presents with    Follow-up     right 6 wk achilles tendonitis with current pain and swelling    Diabetes Mellitus     PCP Dr. Zarco 3/8/2017         HPI  Ana Maria Teague is a 56 y.o. female w/ PMH of CKD, DM2,  s/p kidney transplant and other medical problems, who presents today complaining of painful achilles /  posterior aspect of the right heel.  Pt states she has had this problem for > 6 months. She has been treated by Dr. Herrera for > 6 months. She is here for second opinion, due to consistent pain. Pt has had physical therapy and been immobilized in a boot. She has not noted improvement. She states pain is constant. She rates pain 6/10. She is currently on disability.  Symptoms are aggravated with weight bearing. Symptoms were relieved with steroid dosepak, but her blood sugars increased so she had to stop the steroids.    Patient denies other pedal complaints at this time.    Hemoglobin A1C   Date Value Ref Range Status   03/08/2017 7.8 (H) 4.5 - 6.2 % Final     Comment:     According to ADA guidelines, hemoglobin A1C <7.0% represents  optimal control in non-pregnant diabetic patients.  Different  metrics may apply to specific populations.   Standards of Medical Care in Diabetes - 2016.  For the purpose of screening for the presence of diabetes:  <5.7%     Consistent with the absence of diabetes  5.7-6.4%  Consistent with increasing risk for diabetes   (prediabetes)  >or=6.5%  Consistent with diabetes  Currently no consensus exists for use of hemoglobin A1C  for diagnosis of diabetes for children.     08/16/2016 7.9 (H) 4.5 - 6.2 % Final     Comment:     According to ADA guidelines, hemoglobin A1C <7.0% represents  optimal control in non-pregnant diabetic patients.  Different  metrics may apply to specific populations.   Standards of Medical Care in Diabetes - 2016.  For the purpose of screening for the presence of diabetes:  <5.7%     Consistent  with the absence of diabetes  5.7-6.4%  Consistent with increasing risk for diabetes   (prediabetes)  >or=6.5%  Consistent with diabetes  Currently no consensus exists for use of hemoglobin A1C  for diagnosis of diabetes for children.     2016 7.5 (H) 4.5 - 6.2 % Final         PMH  Past Medical History:   Diagnosis Date    Abnormal glucose 2013    Acute bronchiolitis 10/15/2014    Anemia     Anemia of chronic renal failure 2013    Anxiety     Anxiety disorder     Avascular necrosis of bone of hip     Back pain     s/p steroid injections    Chronic immunosuppression with Prograf and Cellcept 2013    CKD (chronic kidney disease) stage 2, GFR 60-89 ml/min     CKD (chronic kidney disease) stage 5, GFR less than 15 ml/min     -donor kidney transplant - 13    Depression     Hypertension, renal 2013    Hypothyroidism     Immunosuppressed status 10/15/2014    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    Osteoporosis with pathological fracture     Renal disease due to hypertension 2013    Renal hypertension, non-vascular     Secondary hyperparathyroidism of renal origin 2013    Vertigo        PROBLEM LIST  Patient Active Problem List    Diagnosis Date Noted    Severe obesity (BMI 35.0-39.9) 2017    Nocturnal hypoxemia due to emphysema 2016    Moderate COPD (chronic obstructive pulmonary disease) 2016    Colon cancer screening 2016    Uncontrolled diabetes mellitus type 2 without complications 2016    Obesity, Class II, BMI 35-39.9, with comorbidity 2015    Sleep related hypoventilation/hypoxemia in other disease 2015    Headache 09/10/2015    Essential hypertension 2015    Bilateral headache 2015    OP (osteoporosis) 2015    Abnormal PFTs (pulmonary function tests) 2015    Diffusion capacity of lung (dl), decreased 2015    Immunosuppressed status  10/15/2014    CKD (chronic kidney disease) stage 2, GFR 60-89 ml/min 2014    Cubital tunnel syndrome on right 2014    CTS (carpal tunnel syndrome) 2014    Uncontrolled type II diabetes mellitus with hypoglycemia 2014    Delayed graft function 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      Chronic immunosuppression with Prograf and Cellcept 2013    Hypertension, renal 2013    Anemia of chronic renal failure 2013    Secondary hyperparathyroidism of renal origin 2013    -donor kidney transplant - 2013    Renal cyst 2013    Abnormal findings on diagnostic imaging of breast 2013    Avascular necrosis of bone of hip     Acquired hypothyroidism     Osteoporosis with pathological fracture     Depression     Anxiety disorder     Vertigo        MEDS  Current Outpatient Prescriptions on File Prior to Visit   Medication Sig Dispense Refill    albuterol-ipratropium 2.5mg-0.5mg/3mL (DUO-NEB) 0.5 mg-3 mg(2.5 mg base)/3 mL nebulizer solution Take 3 mLs by nebulization 3 (three) times daily. 270 mL 5    alprazolam (XANAX) 0.5 MG tablet TAKE 1 TO 2 TABLETS BY MOUTH TWICE DAILY AS NEEDED 60 tablet 0    atorvastatin (LIPITOR) 20 MG tablet Take 1 tablet (20 mg total) by mouth once daily. 30 tablet 11    blood sugar diagnostic (FREESTYLE TEST) Strp USE FOUR TIMES DAILY AS DIRECTED 400 strip 2    cetirizine (ZYRTEC) 10 MG tablet TAKE 1 TABLET BY MOUTH EVERY DAY 30 tablet 0    cyclobenzaprine (FLEXERIL) 10 MG tablet Take 10 mg by mouth 3 (three) times daily as needed for Muscle spasms.      ergocalciferol (ERGOCALCIFEROL) 50,000 unit Cap Take 1 capsule (50,000 Units total) by mouth every 7 days. 12 capsule 3    fluticasone-vilanterol (BREO) 200-25 mcg/dose DsDv diskus inhaler Inhale 1 puff into the lungs once daily. 60 each 5    furosemide (LASIX) 20 MG tablet TAKE 1 TABLET BY MOUTH EVERY DAY AS NEEDED 90 tablet 11    insulin glargine  "(LANTUS SOLOSTAR) 100 unit/mL (3 mL) InPn pen Inject 20 Units into the skin every evening. 6 mL 11    insulin lispro protamin-lispro (HUMALOG MIX 75-25 KWIKPEN) 100 unit/mL (75-25) InPn Inject 25-30 Units as directed 3 (three) times daily. 3 Box 11    lancets (FREESTYLE LANCETS) 28 gauge Misc 1 lancet by Combination route 2 (two) times daily as needed. Qid prn 400 each 2    lancets Misc 1 strip by Misc.(Non-Drug; Combo Route) route 4 (four) times daily with meals and nightly. 150 each 6    levothyroxine (SYNTHROID) 137 MCG Tab tablet Take 1 tablet (137 mcg total) by mouth before breakfast. 30 tablet 6    mycophenolate (CELLCEPT) 250 mg Cap Take 2 capsules (500 mg total) by mouth 2 (two) times daily. 120 capsule 11    nifedipine 30 MG ORAL TR24 (PROCARDIA-XL) 30 MG (OSM) 24 hr tablet Take 1 tablet (30 mg total) by mouth once daily. 30 tablet 11    ondansetron (ZOFRAN-ODT) 4 MG TbDL Take 1 tablet (4 mg total) by mouth every 8 (eight) hours as needed. 40 tablet 2    [START ON 2017] oxycodone-acetaminophen (PERCOCET)  mg per tablet OXYCODONE-ACETAMINOPHEN (Percocet)  MG ORAL TAB - 1 tab po q 8 hrs prn pain 90 tablet 0    pen needle, diabetic (BD ULTRA-FINE RORY PEN NEEDLES) 32 gauge x 5/32" Ndle Uses 4 daily, on multiple daily insulin injections 150 each 12    PROAIR HFA 90 mcg/actuation inhaler INHALE 2 PUFFS BY MOUTH INTO THE LUNGS EVERY 4 HOURS AS NEEDED FOR WHEEZING 8.5 g 0    tacrolimus (PROGRAF) 1 MG Cap Take 5 mg in the AM by mouth and 4 mg in the PM by mouth. Z94.0 kidney transplant 270 capsule 11     No current facility-administered medications on file prior to visit.        [facility]     Past Surgical History:   Procedure Laterality Date    BREAST BIOPSY Right     benign. 7 years ago.    BREAST SURGERY       SECTION      COLONOSCOPY N/A 3/9/2016    Procedure: COLONOSCOPY;  Surgeon: Douglas Daniel III, MD;  Location: [facility];  Service: Endoscopy;  Laterality: N/A;    cyst " "removed form chest wall      HYSTERECTOMY      KIDNEY TRANSPLANT  09/28/2013    SKIN GRAFT      revision    THYROIDECTOMY      vascular access surgeries      multiple. last time 2 weeks ago        ALL  Review of patient's allergies indicates:   Allergen Reactions    Azithromycin Nausea And Vomiting    Adhesive Other (See Comments)     Skin tears       SOC     Social History   Substance Use Topics    Smoking status: Former Smoker     Packs/day: 1.00     Years: 10.00     Types: Cigarettes    Smokeless tobacco: Never Used      Comment: quit smoking approx 10-15 years ago     Alcohol use Yes         Family HX    Family History   Problem Relation Age of Onset    Diabetes Mother     Kidney disease Mother     Hyperlipidemia Mother     Hypertension Mother     Heart disease Mother     Arthritis Mother     Hypertension Father     Stroke Father     Hypertension Sister     Arthritis Sister     Asthma Sister     Heart disease Sister      heart attack            REVIEW OF SYSTEMS  General: Denies any fever or chills  Chest: Denies shortness of breath, wheezing, coughing, or sputum production  Heart: Denies chest pain, cold extremities, orthopenia, or reduced exercise tolerance  As noted above and per history of current illness above, otherwise negative in the remainder of the 14 systems.     PHYSICAL EXAM  Vitals:    06/21/17 1155   BP: (!) 153/94   Pulse: 86   Weight: 110.2 kg (242 lb 15.2 oz)   Height: 5' 7" (1.702 m)   PainSc:   8   PainLoc: Foot       General: This patient is well-developed, well-nourished and appears stated age, well-oriented to person, place and time, and cooperative and pleasant on today's visit      LOWER EXTREMITY  Vascular exam:   · Dorsalis pedis and posterior tibial pulses palpable 2/4 bilaterally.   · Capillary refill time immediate to the toes.   · Feet are warm to the touch. Skin temperature warm to warm from proximally to distally   · There are no varicosities, " telangiectasias noted to bilateral foot and ankle regions.   · There are no ecchymoses noted to bilateral foot and ankle regions.   · There is mild gross lower extremity edema.    Dermatologic exam:   · Skin moist with healthy texture and turgor.  · There are no open ulcerations, lacerations, or fissures to bilateral foot and ankle regions. There are no signs of infection as there is no erythema, no proximal-extending lymphangiitis, no fluctuance, or crepitus noted on palpation to bilateral foot and ankle regions.   · There is no interdigital maceration.   · There are no hyperkeratotic lesions noted to feet. Nails are well-trimmed.    Neurologic exam:  · Epicritic sensation is intact as the patient is able to sense light touch to bilateral foot and ankle regions.   · Achilles and patellar deep tendon reflexes intact  · Babinski reflex absent    Musculoskeletal/Orthopedic exam:   · RIGHT TTP posterior aspect heel at insertion of achilles and TTP of achilles tendon proper.  Mild prominence noted at insertion RIGHT, neg defect palpated  · Muscle strength AT/EHL/EDL/PT: 5/5; Achilles/Gastroc/Soleus: 5/5; PB/PL: 5/5 Muscle tone is normal.  · Ankle joint ROM  B/L supple DF/PF, non-crepitus  · STJ ROM supple inv/ev, non crepitus b/l.  · On stance/Gait: able to stand, heel to toe gait. Able to weightbear, ambulate without assistance  · There is tenderness along course of inflammed achilles, RIGHT      IMAGING   Reviewed by me and I agree with radiologist findings, 3 views of foot/ankle, reveal: posterior calcaneal enthesophyte         Results for orders placed during the hospital encounter of 12/08/16   X-Ray Foot Complete Right    Narrative Right foot three views.    Findings: There is generalized osteopenia and multi-articular degenerative change.  Dorsal and plantar calcaneal spurs.  Small 5th metatarsal bunionette.  No acute fracture or dislocation.    Impression  As above.      Electronically signed by: LUCI COOK  MD  Date:     12/08/16  Time:    15:09      Right       IMAGING:  Technique: Standard multiplanar multisequence imaging of the right ankle was performed.    Findings: There is slight thickening of the distal Achilles tendon with intermediate to mildly hyperintense intrasubstance signal alteration consistent with tendinopathy and or tendinitis.  There is mild peritendinous edema as well as some reactive marrow edema within the underlying calcaneus.  No sanaz Achilles tendon disruption or retraction is demonstrated.    There is a small plantar calcaneal spur.  After aponeurosis is intact.    The flexor, extensor, peroneal tendons are unremarkable.    No evidence for medial or lateral collateral ligament injury.    Talar dome is intact without evidence of osteochondral defect or avascular necrosis.    There are small tibiotalar and subtalar joint effusions.   Impression      Findings consistent with mild Achilles tendinopathy and/or tendinitis.  No sanaz tendon disruption.       ASSESSMENT  Achilles tendinitis of right lower extremity  -     Ambulatory Referral to Physical/Occupational Therapy    Issa's deformity of right heel  -     Ambulatory Referral to Physical/Occupational Therapy    Retrocalcaneal bursitis (back of heel), right  -     Ambulatory Referral to Physical/Occupational Therapy    Other orders  -     dexamethasone (DECADRON) 4 mg/mL injection; 1 mL should be applied per physical therapist  Dispense: 30 mL; Refill: 1        PLAN    1. Patient was educated about clinical and imaging findings, and verbalizes understanding of above.  2. Treatment plan:   - reviewed MRI and xray images with patient  -Heel lift application b/l tennis shoes; due to persistent pain unresolved >6 months, surgical treatment options were discussed with patient in depth.    We will proceed first with IONTOPHORESIS with physical therapy. Referral and Rx placed. Discussed at home rehab, PRICE therapy/ discussed  CAST trial will  need NWB x 4-6 wks post surgery if conservative treatment fails.     The total visit time face to face with the patient was over 30 minutes. Time spent in counseling, discussion and coordination of care with the patient was over 15 minutes. Topics discussed as noted above.        Future Appointments  Date Time Provider Department Center   6/28/2017 2:00 PM Karine Olmos MD [facility] IM [place]   7/26/2017 1:00 PM Tawny Armstrong DPM [facility] POD [place]   7/31/2017 10:20 AM Marcella Haas PA-C [facility] INT [place]   9/28/2017 9:30 AM LABORATORY, [facility] LAB [place]   9/28/2017 9:40 AM SPECIMEN, [facility] SPECLAB [place]   10/5/2017 10:00 AM Yuval Medina MD [facility] NEPHRO [place]   5/10/2018 1:00 PM PULMONARY LAB, [facility] PULMLAB [place]   5/11/2018 8:45 AM [facility] XR2 [facility] XRAY [place]   5/11/2018 9:00 AM PULMONARY LAB, [facility] PULMLAB [place]   5/11/2018 9:40 AM Osmani Walker MD [facility] PULMSVC [place]         Report Electronically Signed By:  Tawny Armstrong DPM   Podiatric Medicine & Surgery  [facility]  6/21/2017              ;

## 2017-06-26 DIAGNOSIS — F41.9 ANXIETY: ICD-10-CM

## 2017-06-26 DIAGNOSIS — F32.A ANXIETY AND DEPRESSION: ICD-10-CM

## 2017-06-26 DIAGNOSIS — F41.9 ANXIETY AND DEPRESSION: ICD-10-CM

## 2017-06-26 NOTE — TELEPHONE ENCOUNTER
----- Message from Nallely Fierro sent at 6/26/2017 11:34 AM CDT -----  Contact: pt  The pt states she needs a refill on her Xanax, pt can be reached at [phone]///[email or web address]

## 2017-06-27 RX ORDER — ALPRAZOLAM 0.5 MG/1
TABLET ORAL
Qty: 60 TABLET | Refills: 0 | Status: SHIPPED | OUTPATIENT
Start: 2017-06-27 | End: 2018-12-18

## 2017-06-28 ENCOUNTER — OFFICE VISIT (OUTPATIENT)
Dept: INTERNAL MEDICINE | Facility: CLINIC | Age: 57
End: 2017-06-28
Payer: MEDICARE

## 2017-06-28 VITALS
WEIGHT: 245.13 LBS | DIASTOLIC BLOOD PRESSURE: 90 MMHG | BODY MASS INDEX: 38.47 KG/M2 | TEMPERATURE: 96 F | OXYGEN SATURATION: 97 % | HEIGHT: 67 IN | SYSTOLIC BLOOD PRESSURE: 144 MMHG

## 2017-06-28 DIAGNOSIS — E11.69 HYPERLIPIDEMIA ASSOCIATED WITH TYPE 2 DIABETES MELLITUS: ICD-10-CM

## 2017-06-28 DIAGNOSIS — I10 ESSENTIAL HYPERTENSION: ICD-10-CM

## 2017-06-28 DIAGNOSIS — E78.5 HYPERLIPIDEMIA ASSOCIATED WITH TYPE 2 DIABETES MELLITUS: ICD-10-CM

## 2017-06-28 DIAGNOSIS — J44.9 MODERATE COPD (CHRONIC OBSTRUCTIVE PULMONARY DISEASE): Chronic | ICD-10-CM

## 2017-06-28 DIAGNOSIS — Z29.89 NEED FOR PROPHYLACTIC IMMUNOTHERAPY: Chronic | ICD-10-CM

## 2017-06-28 DIAGNOSIS — F41.1 GENERALIZED ANXIETY DISORDER: ICD-10-CM

## 2017-06-28 DIAGNOSIS — N18.2 CKD (CHRONIC KIDNEY DISEASE) STAGE 2, GFR 60-89 ML/MIN: ICD-10-CM

## 2017-06-28 PROCEDURE — 99999 PR PBB SHADOW E&M-EST. PATIENT-LVL V: CPT | Mod: PBBFAC,,, | Performed by: INTERNAL MEDICINE

## 2017-06-28 PROCEDURE — 3046F HEMOGLOBIN A1C LEVEL >9.0%: CPT | Mod: ,,, | Performed by: INTERNAL MEDICINE

## 2017-06-28 PROCEDURE — 99214 OFFICE O/P EST MOD 30 MIN: CPT | Mod: S$PBB,,, | Performed by: INTERNAL MEDICINE

## 2017-06-28 PROCEDURE — 99215 OFFICE O/P EST HI 40 MIN: CPT | Mod: PBBFAC,PO | Performed by: INTERNAL MEDICINE

## 2017-06-28 PROCEDURE — 3066F NEPHROPATHY DOC TX: CPT | Mod: ,,, | Performed by: INTERNAL MEDICINE

## 2017-06-28 RX ORDER — CLONAZEPAM 0.25 MG/1
0.25 TABLET, ORALLY DISINTEGRATING ORAL 2 TIMES DAILY PRN
Qty: 60 TABLET | Refills: 2 | Status: SHIPPED | OUTPATIENT
Start: 2017-06-28 | End: 2017-09-06 | Stop reason: SDUPTHER

## 2017-06-28 NOTE — PROGRESS NOTES
"Subjective:       Patient ID: Ana Maria Teague is a 56 y.o. female.    Chief Complaint: Follow-up    Here for follow up of medical problems.  Seeing pain management.  Constant pain.  Recent steroid pack.  AC breakfast sugars .  BP at home 130-140s/ 80s.  Higher lately with pain.  Stopped wellbutrin, lexapro didn't help in past, prozac didn't help.  Taking xanax prn, not daily.  No f/c/sw.  No cp/palp.  Sleeps with O2, on pulm inhalers/ doing ok.    Updated/ annual due 3/18:  HM: 11/16 fluvax, 6/15 [id number], 3/14 [id number], 6/15 TDaP, 8/15 MMG, 8/15 BMD improved rep 3y, 1/11 Cscope rep 5-10y, 11/10 HCV neg.          Review of Systems   Constitutional: Negative for chills, diaphoresis and fever.   Respiratory: Negative for cough and shortness of breath.    Cardiovascular: Negative for chest pain, palpitations and leg swelling.   Gastrointestinal: Negative for blood in stool, constipation, diarrhea, nausea and vomiting.   Genitourinary: Negative for dysuria, frequency and hematuria.   Psychiatric/Behavioral: The patient is not nervous/anxious.        Objective:   BP (!) 144/90 (BP Location: Right arm)   Temp 96 °F (35.6 °C) (Tympanic)   Ht 5' 7" (1.702 m)   Wt 111.2 kg (245 lb 2.4 oz)   SpO2 97%   BMI 38.40 kg/m²     Physical Exam   Constitutional: She is oriented to person, place, and time. She appears well-developed.   HENT:   Mouth/Throat: Oropharynx is clear and moist.   Neck: Neck supple. Carotid bruit is not present. No thyroid mass present.   Cardiovascular: Normal rate, regular rhythm and intact distal pulses.  Exam reveals no gallop and no friction rub.    No murmur heard.  Pulmonary/Chest: Effort normal and breath sounds normal. She has no wheezes. She has no rales.   Abdominal: Soft. Bowel sounds are normal. She exhibits no mass. There is no hepatosplenomegaly. There is no tenderness.   Musculoskeletal: She exhibits no edema.   Lymphadenopathy:     She has no cervical adenopathy.   Neurological: She is " alert and oriented to person, place, and time.   Psychiatric: She has a normal mood and affect.     Results for KEVAN CARBONE (MRN [id number]) as of 6/28/2017 12:42   Ref. Range 2/27/2017 10:16 3/8/2017 13:38 3/8/2017 13:50 3/8/2017 14:32 5/17/2017 09:44 5/17/2017 10:21 5/17/2017 10:21 5/23/2017 09:08 5/29/2017 08:40 5/29/2017 09:30 6/21/2017 08:34 6/21/2017 08:50   ALT Latest Ref Range: 10 - 44 U/L   14         10   Triglycerides Latest Ref Range: 30 - 150 mg/dL 65           76   Cholesterol Latest Ref Range: 120 - 199 mg/dL 195           190   HDL Latest Ref Range: 40 - 75 mg/dL 51           48   LDL Cholesterol Latest Ref Range: 63.0 - 159.0 mg/dL 131.0           126.8   Total Cholesterol/HDL Ratio Latest Ref Range: 2.0 - 5.0  3.8           4.0   Vit D, 25-Hydroxy Latest Ref Range: 30 - 96 ng/mL   23 (L)            Hemoglobin A1C Latest Ref Range: 4.0 - 5.6 %   7.8 (H)         9.1 (H)   Estimated Avg Glucose Latest Ref Range: 68 - 131 mg/dL   177 (H)         214 (H)     Assessment:       1. Uncontrolled type 2 diabetes mellitus without complication, with long-term current use of insulin    2. Generalized anxiety disorder    3. Chronic immunosuppression with Prograf and Cellcept    4. CKD (chronic kidney disease) stage 2, GFR 60-89 ml/min    5. Essential hypertension    6. Moderate COPD (chronic obstructive pulmonary disease)    7. Hyperlipidemia associated with type 2 diabetes mellitus        Plan:       Kevan was seen today for follow-up.    Diagnoses and all orders for this visit:    Uncontrolled type 2 diabetes mellitus without complication, with long-term current use of insulin- needs better support and plan for when taking steroids.  -     Ambulatory Referral to Diabetes Education    Generalized anxiety disorder- try clonaz instead of xanax.  -     clonazePAM (KLONOPIN) 0.25 MG TbDL; Take 1 tablet (0.25 mg total) by mouth 2 (two) times daily as needed.    Chronic immunosuppression with Prograf and Cellcept, s/p  transplant.    CKD (chronic kidney disease) stage 2, GFR 60-89 ml/min    Essential hypertension- monitor BPs for next visit.    Moderate COPD (chronic obstructive pulmonary disease)- adeq control on regimen, per Pulm.    Hyperlipidemia associated with type 2 diabetes mellitus    Constant pain.  Per Podiatry and Pain management.

## 2017-06-29 DIAGNOSIS — J98.4 MIXED RESTRICTIVE AND OBSTRUCTIVE LUNG DISEASE: Chronic | ICD-10-CM

## 2017-06-29 DIAGNOSIS — J43.9 MIXED RESTRICTIVE AND OBSTRUCTIVE LUNG DISEASE: Chronic | ICD-10-CM

## 2017-06-29 DIAGNOSIS — J44.9 MODERATE COPD (CHRONIC OBSTRUCTIVE PULMONARY DISEASE): Chronic | ICD-10-CM

## 2017-06-29 RX ORDER — FLUTICASONE FUROATE AND VILANTEROL TRIFENATATE 200; 25 UG/1; UG/1
POWDER RESPIRATORY (INHALATION)
Qty: 60 EACH | Refills: 5 | Status: SHIPPED | OUTPATIENT
Start: 2017-06-29 | End: 2018-08-14

## 2017-07-06 ENCOUNTER — TELEPHONE (OUTPATIENT)
Dept: PODIATRY | Facility: CLINIC | Age: 57
End: 2017-07-06

## 2017-07-06 NOTE — TELEPHONE ENCOUNTER
Returned pt's call and informed her results with be faxed to PT.  Elida James MA  Office of ANA HindsM   Podiatry Department, [facility]

## 2017-07-06 NOTE — TELEPHONE ENCOUNTER
----- Message from Tracy Cedillo sent at 7/6/2017 12:09 PM CDT -----  Contact: pt  Please call pt @ [phone], regarding physical therapy, states therapyist need copies of pt Xrays/MRI, pt states she has appt tomorrow.

## 2017-07-12 ENCOUNTER — TELEPHONE (OUTPATIENT)
Dept: PODIATRY | Facility: CLINIC | Age: 57
End: 2017-07-12

## 2017-07-12 DIAGNOSIS — M76.60 ACHILLES TENDINITIS, UNSPECIFIED LATERALITY: Primary | ICD-10-CM

## 2017-07-12 NOTE — PROGRESS NOTES
PCP: Karine Fernandez MD    Subjective:      HISTORY OF PRESENT ILLNESS:   Ana Maria Teague Is a pleasant 56 y.o. Black or  female, she is in clinic today for her initial visit to establish care for diabetes mellitus. She was diagnosed as a type II diabetic in 2002. She states her Diabetes was precipitated by immunosuppressive medication for her renal transplant.  She has attended diabetes education in the past.     Her comorbid conditions are, Diabetes Type 2, Hypertension, Hyperlipidemia, Obesity and Hypothyroidism     She has the following diabetic complications, with diabetic chronic kidney disease     Hypertension:  Well controlled     Hyperlipidemia:  LDL is elevated on current regime (Goal is below 100) will consider intensifying Statin therapy    Hypothyroidism:  TSH was elevated, no changes in LT-4 dosage will retest today and if still elevated will intensify her LT-4.    Most recent   Lab Results   Component Value Date    HGBA1C 9.1 (H) 06/21/2017    ADA recommends less than 7.0. She  has lost 3 pounds since last visit.    Blood glucose testing is performed sporadically. Per recall, fasting BGs are 174-243, post meal blood sugars are 174-224. She denies polyuria, polydipsia, polyphagia, or blurred vision. Also denies nausea, vomiting, diarrhea, fever or hypoglycemic symptoms. She has not had any recent hospitalizations or significant hypoglycemia involving EMS or requiring assistance from others.     Blood Glucose reading this AM: not tested mg/dl fasting.  Blood Glucose reading in clinic:   Lab Results   Component Value Date    POCGLU 302 (A) 07/13/2017     Past Medical History:   Diagnosis Date    Abnormal glucose 12/30/2013    Acute bronchiolitis 10/15/2014    Anemia     Anemia of chronic renal failure 9/30/2013    Anxiety     Anxiety disorder     Avascular necrosis of bone of hip     Back pain     s/p steroid injections    Chronic immunosuppression with Prograf and Cellcept  2013    CKD (chronic kidney disease) stage 2, GFR 60-89 ml/min     CKD (chronic kidney disease) stage 5, GFR less than 15 ml/min     -donor kidney transplant - 13    Depression     Hypertension, renal 2013    Hypothyroidism     Immunosuppressed status 10/15/2014    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    Osteoporosis with pathological fracture     Renal disease due to hypertension 2013    Renal hypertension, non-vascular     Secondary hyperparathyroidism of renal origin 2013    Vertigo      Family History   Problem Relation Age of Onset    Diabetes Mother     Kidney disease Mother     Hyperlipidemia Mother     Hypertension Mother     Heart disease Mother     Arthritis Mother     Hypertension Father     Stroke Father     Hypertension Sister     Arthritis Sister     Asthma Sister     Heart disease Sister      heart attack     Social History     Social History    Marital status:      Spouse name: N/A    Number of children: 3    Years of education: N/A     Occupational History    disable      dept manager at [employer]     Social History Main Topics    Smoking status: Former Smoker     Packs/day: 1.00     Years: 10.00     Types: Cigarettes    Smokeless tobacco: Never Used      Comment: quit smoking approx 10-15 years ago     Alcohol use Yes    Drug use: No    Sexual activity: No     Other Topics Concern    Not on file     Social History Narrative    Does housework at home.     DM MEDICATIONS:   Current Outpatient Prescriptions:     albuterol-ipratropium 2.5mg-0.5mg/3mL (DUO-NEB) 0.5 mg-3 mg(2.5 mg base)/3 mL nebulizer solution, Take 3 mLs by nebulization 3 (three) times daily., Disp: 270 mL, Rfl: 5    alprazolam (XANAX) 0.5 MG tablet, TAKE 1 TO 2 TABLETS BY MOUTH TWICE DAILY AS NEEDED, Disp: 60 tablet, Rfl: 0    atorvastatin (LIPITOR) 20 MG tablet, Take 1 tablet (20 mg total) by mouth once daily., Disp: 30 tablet, Rfl:  11    blood sugar diagnostic (FREESTYLE TEST) Strp, USE FOUR TIMES DAILY AS DIRECTED, Disp: 400 strip, Rfl: 2    BREO ELLIPTA 200-25 mcg/dose DsDv diskus inhaler, INHALE 1 PUFF BY MOUTH INTO THE LUNGS EVERY DAY, Disp: 60 each, Rfl: 5    cetirizine (ZYRTEC) 10 MG tablet, TAKE 1 TABLET BY MOUTH EVERY DAY, Disp: 30 tablet, Rfl: 0    clonazePAM (KLONOPIN) 0.25 MG TbDL, Take 1 tablet (0.25 mg total) by mouth 2 (two) times daily as needed., Disp: 60 tablet, Rfl: 2    cyclobenzaprine (FLEXERIL) 10 MG tablet, Take 10 mg by mouth 3 (three) times daily as needed for Muscle spasms., Disp: , Rfl:     dexamethasone (DECADRON) 4 mg/mL injection, 1 mL should be applied per physical therapist, Disp: 30 mL, Rfl: 1    ergocalciferol (ERGOCALCIFEROL) 50,000 unit Cap, Take 1 capsule (50,000 Units total) by mouth every 7 days., Disp: 12 capsule, Rfl: 3    furosemide (LASIX) 20 MG tablet, TAKE 1 TABLET BY MOUTH EVERY DAY AS NEEDED, Disp: 90 tablet, Rfl: 11    insulin glargine (LANTUS SOLOSTAR) 100 unit/mL (3 mL) InPn pen, Inject 20 Units into the skin every evening., Disp: 6 mL, Rfl: 11    insulin lispro protamin-lispro (HUMALOG MIX 75-25 KWIKPEN) 100 unit/mL (75-25) InPn, Inject 25-30 Units as directed 3 (three) times daily., Disp: 3 Box, Rfl: 11    lancets (FREESTYLE LANCETS) 28 gauge Misc, 1 lancet by Combination route 2 (two) times daily as needed. Qid prn, Disp: 400 each, Rfl: 2    lancets Misc, 1 strip by Misc.(Non-Drug; Combo Route) route 4 (four) times daily with meals and nightly., Disp: 150 each, Rfl: 6    levothyroxine (SYNTHROID) 137 MCG Tab tablet, Take 1 tablet (137 mcg total) by mouth before breakfast., Disp: 30 tablet, Rfl: 6    mycophenolate (CELLCEPT) 250 mg Cap, Take 2 capsules (500 mg total) by mouth 2 (two) times daily., Disp: 120 capsule, Rfl: 11    nifedipine 30 MG ORAL TR24 (PROCARDIA-XL) 30 MG (OSM) 24 hr tablet, Take 1 tablet (30 mg total) by mouth once daily., Disp: 30 tablet, Rfl: 11     "ondansetron (ZOFRAN-ODT) 4 MG TbDL, Take 1 tablet (4 mg total) by mouth every 8 (eight) hours as needed., Disp: 40 tablet, Rfl: 2    [START ON 7/21/2017] oxycodone-acetaminophen (PERCOCET)  mg per tablet, OXYCODONE-ACETAMINOPHEN (Percocet)  MG ORAL TAB - 1 tab po q 8 hrs prn pain, Disp: 90 tablet, Rfl: 0    pen needle, diabetic (BD ULTRA-FINE RORY PEN NEEDLES) 32 gauge x 5/32" Ndle, Uses 4 daily, on multiple daily insulin injections, Disp: 150 each, Rfl: 12    PROAIR HFA 90 mcg/actuation inhaler, INHALE 2 PUFFS BY MOUTH INTO THE LUNGS EVERY 4 HOURS AS NEEDED FOR WHEEZING, Disp: 8.5 g, Rfl: 0    tacrolimus (PROGRAF) 1 MG Cap, Take 5 mg in the AM by mouth and 4 mg in the PM by mouth. Z94.0 kidney transplant, Disp: 270 capsule, Rfl: 11    Ana Maria is compliant most of the time with DM medications.     Ana Maria is compliant most of the time with lifestyle modifications to include activity and meal planning.     Labs Reviewed.  Lab Results   Component Value Date    WBC 11.38 05/17/2017    HGB 12.4 05/17/2017    HCT 40.5 05/17/2017     05/17/2017    CHOL 190 06/21/2017    TRIG 76 06/21/2017    HDL 48 06/21/2017    ALT 10 06/21/2017    AST 15 03/08/2017     05/17/2017    K 4.2 05/17/2017     05/17/2017    CREATININE 0.9 05/17/2017    ESTGFRAFRICA >60.0 05/17/2017    EGFRNONAA >60.0 05/17/2017    BUN 12 05/17/2017    CO2 23 05/17/2017    TSH 13.205 (H) 03/08/2017    INR 1.0 09/09/2015    GLU 91 05/17/2017       Lab Results   Component Value Date    HGBA1C 9.1 (H) 06/21/2017    HGBA1C 7.8 (H) 03/08/2017    HGBA1C 7.9 (H) 08/16/2016       Lab Results   Component Value Date    FREET4 0.87 03/08/2017     Lab Results   Component Value Date    TSH 13.205 (H) 03/08/2017     Lab Results   Component Value Date    IRON 83 10/02/2013    TIBC 232 (L) 10/02/2013    FERRITIN 2,507 (H) 10/02/2013     Lab Results   Component Value Date    PTH 83.0 (H) 05/17/2017    PTH 83.0 (H) 05/17/2017    CALCIUM 9.5 " 05/17/2017    CAION 1.17 10/08/2013    PHOS 3.3 05/17/2017       Review of Systems   Constitutional: Negative.  Negative for activity change, appetite change, chills, diaphoresis, fatigue, fever and unexpected weight change.   HENT: Negative.  Negative for congestion, dental problem, drooling, ear discharge, ear pain, facial swelling, hearing loss, mouth sores, nosebleeds, postnasal drip, rhinorrhea, sinus pressure, sneezing, sore throat, tinnitus, trouble swallowing and voice change.    Eyes: Negative.  Negative for photophobia, pain, discharge, redness, itching and visual disturbance.   Respiratory: Negative.  Negative for apnea, cough, choking, chest tightness, shortness of breath, wheezing and stridor.    Cardiovascular: Negative.  Negative for chest pain, palpitations and leg swelling.   Gastrointestinal: Negative.  Negative for abdominal distention, abdominal pain, anal bleeding, blood in stool, constipation, diarrhea, nausea, rectal pain and vomiting.   Endocrine: Negative.  Negative for cold intolerance, heat intolerance, polydipsia, polyphagia and polyuria.   Genitourinary: Negative.  Negative for decreased urine volume, difficulty urinating, dyspareunia, dysuria, enuresis, flank pain, frequency, genital sores, hematuria, menstrual problem, pelvic pain, urgency, vaginal bleeding, vaginal discharge and vaginal pain.   Musculoskeletal: Negative.  Negative for arthralgias, back pain, gait problem, joint swelling, myalgias, neck pain and neck stiffness.   Skin: Negative.  Negative for color change, pallor, rash and wound.   Allergic/Immunologic: Negative.  Negative for environmental allergies, food allergies and immunocompromised state.   Neurological: Negative.  Negative for dizziness, tremors, seizures, syncope, facial asymmetry, speech difficulty, weakness, light-headedness, numbness and headaches.   Hematological: Negative.  Negative for adenopathy. Does not bruise/bleed easily.   Psychiatric/Behavioral:  "Negative.  Negative for agitation, behavioral problems, confusion, decreased concentration, dysphoric mood, hallucinations, self-injury, sleep disturbance and suicidal ideas. The patient is not nervous/anxious and is not hyperactive.        Objective:   Last 3 sets of Vitals:   Vitals - 1 value per visit 6/21/2017 6/28/2017 7/13/2017   SYSTOLIC 153 144 132   DIASTOLIC 94 90 84   PULSE 86 - -   TEMPERATURE - 96 -   RESPIRATIONS - - -   SPO2 - 97 -   Weight (lb) 242.95 245.15 242.51   Weight (kg) 110.2 111.2 110   HEIGHT 5' 7" 5' 7" 5' 7"   BODY MASS INDEX 38.05 38.4 37.98   VISIT REPORT - - -   Pain Score  8 8 -   Some recent data might be hidden      Physical Exam   Constitutional: She is oriented to person, place, and time. She appears well-developed and well-nourished. She is cooperative.  Non-toxic appearance. She does not have a sickly appearance. She does not appear ill. No distress. She is not intubated.   HENT:   Head: Normocephalic and atraumatic. Not macrocephalic and not microcephalic. Head is without raccoon's eyes, without Meredith's sign, without abrasion, without contusion, without laceration, without right periorbital erythema and without left periorbital erythema. Hair is normal.   Right Ear: Hearing, tympanic membrane, external ear and ear canal normal. No lacerations. No drainage, swelling or tenderness. No foreign bodies. No mastoid tenderness. Tympanic membrane is not injected, not scarred, not perforated, not erythematous, not retracted and not bulging. Tympanic membrane mobility is normal. No middle ear effusion. No hemotympanum. No decreased hearing is noted.   Left Ear: Hearing, tympanic membrane, external ear and ear canal normal. No lacerations. No drainage, swelling or tenderness. No foreign bodies. No mastoid tenderness. Tympanic membrane is not injected, not scarred, not perforated, not erythematous, not retracted and not bulging. Tympanic membrane mobility is normal.  No middle ear " effusion. No hemotympanum. No decreased hearing is noted.   Nose: Nose normal. No mucosal edema, rhinorrhea, nose lacerations, sinus tenderness, nasal deformity, septal deviation or nasal septal hematoma. No epistaxis.  No foreign bodies. Right sinus exhibits no maxillary sinus tenderness and no frontal sinus tenderness. Left sinus exhibits no maxillary sinus tenderness and no frontal sinus tenderness.   Mouth/Throat: Oropharynx is clear and moist. No oropharyngeal exudate.   Eyes: Conjunctivae and EOM are normal. Pupils are equal, round, and reactive to light. Right eye exhibits no chemosis, no discharge and no exudate. No foreign body present in the right eye. Left eye exhibits no chemosis, no discharge, no exudate and no hordeolum. No foreign body present in the left eye. Right conjunctiva is not injected. Right conjunctiva has no hemorrhage. Left conjunctiva is not injected. Left conjunctiva has no hemorrhage. No scleral icterus. Right eye exhibits normal extraocular motion and no nystagmus. Left eye exhibits normal extraocular motion and no nystagmus. Right pupil is round and reactive. Left pupil is round and reactive. Pupils are equal.   Neck: Trachea normal, normal range of motion and full passive range of motion without pain. Neck supple. Normal carotid pulses, no hepatojugular reflux and no JVD present. No tracheal tenderness, no spinous process tenderness and no muscular tenderness present. Carotid bruit is not present. No neck rigidity. No tracheal deviation, no edema, no erythema and normal range of motion present. No thyroid mass and no thyromegaly present.   Cardiovascular: Normal rate, regular rhythm, normal heart sounds and intact distal pulses.   No extrasystoles are present. PMI is not displaced.  Exam reveals no gallop, no friction rub and no decreased pulses.    No murmur heard.  Pulses:       Dorsalis pedis pulses are 2+ on the right side, and 2+ on the left side.        Posterior tibial pulses  are 2+ on the right side, and 2+ on the left side.   Pulmonary/Chest: Effort normal and breath sounds normal. No accessory muscle usage or stridor. No apnea, no tachypnea and no bradypnea. She is not intubated. No respiratory distress. She has no decreased breath sounds. She has no wheezes. She has no rhonchi. She has no rales. Chest wall is not dull to percussion. She exhibits no mass, no tenderness, no bony tenderness, no laceration, no crepitus, no edema, no deformity, no swelling and no retraction.   Abdominal: Soft. Normal appearance and bowel sounds are normal. She exhibits no shifting dullness, no distension, no pulsatile liver, no fluid wave, no abdominal bruit, no ascites, no pulsatile midline mass and no mass. There is no hepatosplenomegaly, splenomegaly or hepatomegaly. There is no tenderness. There is no rigidity, no rebound, no guarding, no CVA tenderness, no tenderness at McBurney's point and negative Marcelino's sign.   Musculoskeletal: Normal range of motion. She exhibits no edema or tenderness.        Right foot: There is normal range of motion and no deformity.        Left foot: There is normal range of motion and no deformity.   Feet:   Right Foot:   Protective Sensation: 5 sites tested. 5 sites sensed.   Skin Integrity: Negative for ulcer, blister, skin breakdown, erythema, warmth, callus or dry skin.   Left Foot:   Protective Sensation: 5 sites tested. 5 sites sensed.   Skin Integrity: Negative for ulcer, blister, skin breakdown, erythema, warmth, callus or dry skin.   Lymphadenopathy:        Head (right side): No submental, no submandibular, no tonsillar, no preauricular, no posterior auricular and no occipital adenopathy present.        Head (left side): No submental, no submandibular, no tonsillar, no preauricular, no posterior auricular and no occipital adenopathy present.     She has no cervical adenopathy.        Right cervical: No superficial cervical, no deep cervical and no posterior  cervical adenopathy present.       Left cervical: No superficial cervical, no deep cervical and no posterior cervical adenopathy present.     She has no axillary adenopathy.   Neurological: She is alert and oriented to person, place, and time. She has normal reflexes. She is not disoriented. She displays no atrophy, no tremor and normal reflexes. No cranial nerve deficit or sensory deficit. She exhibits normal muscle tone. She displays no seizure activity. Coordination and gait normal.   Reflex Scores:       Bicep reflexes are 2+ on the right side and 2+ on the left side.       Brachioradialis reflexes are 2+ on the right side and 2+ on the left side.       Patellar reflexes are 2+ on the right side and 2+ on the left side.  Skin: Skin is warm and dry. No abrasion, no bruising, no burn, no ecchymosis, no laceration, no lesion, no petechiae, no purpura and no rash noted. Rash is not macular, not papular, not maculopapular, not nodular, not pustular, not vesicular and not urticarial. She is not diaphoretic. No cyanosis or erythema. No pallor. Nails show no clubbing.   Psychiatric: She has a normal mood and affect. Her behavior is normal. Judgment and thought content normal. Her mood appears not anxious. Her affect is not angry, not blunt and not labile. Her speech is not rapid and/or pressured, not delayed, not tangential and not slurred. She is not agitated, not aggressive, not hyperactive, not slowed, not withdrawn, not actively hallucinating and not combative. Thought content is not paranoid and not delusional. Cognition and memory are not impaired. She does not express impulsivity or inappropriate judgment. She does not exhibit a depressed mood. She expresses no homicidal and no suicidal ideation. She expresses no suicidal plans and no homicidal plans. She is communicative. She exhibits normal recent memory and normal remote memory. She is attentive.   Nursing note and vitals reviewed.        STANDARDS OF  CARE:  Eye doctor: Major, last exam Last year need to schedule exam.  Dental exam: Recommend regular exams; denies gums bleeding.  Podiatry doctor:    ACTIVITY LEVEL: She exercises rarely.  BLOOD GLUCOSE TESTING: Self-monitoring with Freestyle  SOCIAL HISTORY: . Lives with spouse. 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 Impediments in learning class environment - NONE.  2. Needs improvement in self-care management skills.    1. Uncontrolled type 2 diabetes mellitus without complication, with long-term current use of insulin      Plan:     Ana Maria Teageu is seen today for   1. Uncontrolled type 2 diabetes mellitus with hyperglycemia, with long-term current use of insulin    2. Uncontrolled type 2 diabetes mellitus without complication, with long-term current use of insulin    3. Hypothyroidism due to acquired atrophy of thyroid      We have discussed the etiology and treatment options associated with the diagnosis as well as alternatives. She has elected the following treatments.     Uncontrolled type 2 diabetes mellitus with hyperglycemia, with long-term current use of insulin  -     TSH; Future; Expected date: 07/13/2017  -     insulin glargine (LANTUS SOLOSTAR) 100 unit/mL (3 mL) InPn pen; Inject 50 Units into the skin every evening.  Dispense: 15 mL; Refill: 11  -     insulin lispro (HUMALOG KWIKPEN) 100 unit/mL InPn pen; Titrate up to 18 units subcutaneously three times a day 10-15 min before meals as directed in after visit summary.  Dispense: 16.2 mL; Refill: 11    Uncontrolled type 2 diabetes mellitus without complication, with long-term current use of insulin  -     POCT glucose    Hypothyroidism due to acquired atrophy of thyroid  -     TSH; Future; Expected date: 07/13/2017    1.) Patient was instructed to monitor blood glucose twice daily, fasting, post meal and ac dinner or at bedtime. Reminded to bring BG records or meter to each visit for review.  2.) Reviewed  "pathophysiology of type 2 diabetes, complications related to the disease, importance of annual dilated eye exam and self daily foot examination.  3.) Continue medications as prescribed Lantus; Humalog 75/25; . [facility] MyChart or Phone review in 1 week with BG records for adjustment of medication.  4.) Refer patient to Dietician/CDE for ongoing diabetes education, meal planning, carbohydrate counting, and diabetes support.  5.) Discussed activity, benefits, methods, and precautions. Recommended patient start/continue some form of exercise and increase as tolerated to 60 minutes per day to facilitate weight loss and aid in control of BGs. Also reminded patient of WHO recommendation of 10,000 steps daily as a goal.   6.)   7.) Return to clinic in 4-6 weeks for follow up. Advised patient to call clinic with any questions or concerns.     I have reviewed your results and they are still quite high. I would like you to start "Treating to Target". The treatment will be Insulin and your target will be the Fasting and 2 hour post meal blood sugar. It will work in this manner;    1. Goal for Fasting blood sugar is  mg/dl. I realize that you will need time to adjust to the new levels and presently you may feel too low if you are too aggressive now. So go slow and aim to lower your blood sugar to below 200 then 150 then 100 over several months.    2. Goal for 2 hour post meal blood sugar is below 180 mg/dl, here the same rules apply as in #1.    3. You will check your fasting blood sugar daily, if not where we would like it to be over a 3 day period then that evening we will increase the Lantus dose by 5 units. Then repeat the process over the next 3 days. Remember this is a slow process and take our time getting to goal. But, each week should be better than prior weeks. Blood sugars below 70 are unacceptable and should raise a "RED FLAG" where we may have to reduce our dose of insulin.    4. You will check your post meal " "glucose daily as well. However, each day you will check a different meal, (ie. Monday-breakfast; Tuesday- lunch; Wednesday- supper, then repeat). If your post meal glucose is not where we would like, increase pre-meal insulin by 2 units next time. A word of CAUTION: mealtime insulin is dependant on the size and concentration of your meal content. If not consuming a large meal do not take large dose of insulin. Use the reasonable person rule.     5. If you have any questions please do not hesitate to call.    Intensive insulin Therapy with correction factor:    You are on Intensive insulin therapy with Basal and Bolus insulin. Lantus, Levamir or NPH is your Basal insulin and will help maintain your fasting and between meal sugar. Your fast acting or rescue insulin is either Humalog, Novalog or Regular insulin and will control your post meal sugar.     You will Take 25 units of Lantus at 9 pm each night. This will be adjusted up or down depending on your fasting blood sugar before breakfast.    Humalog will follow this pre meal schedule; Correction factor of "2 units per 50 mg/dl" and is based on 30-60 grams of carbohydrates per meal.    If blood sugar is below 70 eat first then check your blood sugar 2 hours later and make correction.  If blood pre-meal sugar is  70 -150 take 8 units of Humalog;  If blood pre-meal sugar is 151-200 take +2 units of Humalog;  If blood pre-meal sugar is 201-250 take +4 units of Humalog;  If blood pre-meal sugar is 251-300 take +6 units of Humalog;  If blood pre-meal sugar is 301-350 take +8 units of Humalog;  If blood pre-meal sugar is 351-400+ take +10 units of Humalog;  Also increase water intake and call for appointment.    A total of 60 minutes was spent in face to face time, of which 50 % was spent in counseling patient on disease process, complications, treatment, and side effects of medications.     The patient was explained the above plan and given opportunity to ask questions.  " She understands, chooses and consents to this plan and accepts all the risks, which include but are not limited to the risks mentioned above.   She understands the alternative of having no testing, interventions or treatments at this time. She left content and without further questions.

## 2017-07-12 NOTE — TELEPHONE ENCOUNTER
Pt called to request MRI and x-ray images for therapy. Educated that images can be acquired from the radiology department upon request. Verbalized understanding.

## 2017-07-12 NOTE — TELEPHONE ENCOUNTER
----- Message from Leticiavaleria Hui sent at 7/12/2017  3:05 PM CDT -----  Contact: pt  Please call pt ASAP today at [phone] re her Physical Therapist wanting copies of her x-rays but she needs to speak with you about it today because she has an appt in the morning and can take a new x-ray if it is also needed.

## 2017-07-13 ENCOUNTER — OFFICE VISIT (OUTPATIENT)
Dept: DIABETES | Facility: CLINIC | Age: 57
End: 2017-07-13
Payer: MEDICARE

## 2017-07-13 ENCOUNTER — HOSPITAL ENCOUNTER (OUTPATIENT)
Dept: RADIOLOGY | Facility: HOSPITAL | Age: 57
Discharge: HOME | End: 2017-07-13
Attending: PODIATRIST
Payer: MEDICARE

## 2017-07-13 VITALS
BODY MASS INDEX: 38.06 KG/M2 | DIASTOLIC BLOOD PRESSURE: 84 MMHG | WEIGHT: 242.5 LBS | HEIGHT: 67 IN | SYSTOLIC BLOOD PRESSURE: 132 MMHG

## 2017-07-13 DIAGNOSIS — E03.4 HYPOTHYROIDISM DUE TO ACQUIRED ATROPHY OF THYROID: ICD-10-CM

## 2017-07-13 DIAGNOSIS — Z79.4 UNCONTROLLED TYPE 2 DIABETES MELLITUS WITH HYPERGLYCEMIA, WITH LONG-TERM CURRENT USE OF INSULIN: Primary | ICD-10-CM

## 2017-07-13 DIAGNOSIS — E11.65 UNCONTROLLED TYPE 2 DIABETES MELLITUS WITH HYPERGLYCEMIA, WITH LONG-TERM CURRENT USE OF INSULIN: Primary | ICD-10-CM

## 2017-07-13 DIAGNOSIS — M76.60 ACHILLES TENDINITIS, UNSPECIFIED LATERALITY: ICD-10-CM

## 2017-07-13 LAB — GLUCOSE SERPL-MCNC: 302 MG/DL (ref 70–110)

## 2017-07-13 PROCEDURE — 99999 PR PBB SHADOW E&M-EST. PATIENT-LVL IV: CPT | Mod: PBBFAC,,, | Performed by: PHYSICIAN ASSISTANT

## 2017-07-13 PROCEDURE — 73630 X-RAY EXAM OF FOOT: CPT | Mod: 26,RT,, | Performed by: RADIOLOGY

## 2017-07-13 PROCEDURE — 73630 X-RAY EXAM OF FOOT: CPT | Mod: TC,PO,RT

## 2017-07-13 PROCEDURE — 3066F NEPHROPATHY DOC TX: CPT | Mod: ,,, | Performed by: PHYSICIAN ASSISTANT

## 2017-07-13 PROCEDURE — 3046F HEMOGLOBIN A1C LEVEL >9.0%: CPT | Mod: ,,, | Performed by: PHYSICIAN ASSISTANT

## 2017-07-13 PROCEDURE — 99215 OFFICE O/P EST HI 40 MIN: CPT | Mod: S$PBB,,, | Performed by: PHYSICIAN ASSISTANT

## 2017-07-13 RX ORDER — LEVOTHYROXINE SODIUM 150 UG/1
150 TABLET ORAL
Qty: 90 TABLET | Refills: 3 | Status: SHIPPED | OUTPATIENT
Start: 2017-07-13 | End: 2018-12-18 | Stop reason: SDUPTHER

## 2017-07-13 RX ORDER — INSULIN GLARGINE 100 [IU]/ML
50 INJECTION, SOLUTION SUBCUTANEOUS NIGHTLY
Qty: 15 ML | Refills: 11 | Status: SHIPPED | OUTPATIENT
Start: 2017-07-13 | End: 2018-07-13

## 2017-07-13 RX ORDER — INSULIN LISPRO 100 [IU]/ML
INJECTION, SOLUTION INTRAVENOUS; SUBCUTANEOUS
Qty: 16.2 ML | Refills: 11 | Status: SHIPPED | OUTPATIENT
Start: 2017-07-13 | End: 2017-07-14 | Stop reason: SDUPTHER

## 2017-07-13 NOTE — PATIENT INSTRUCTIONS
" I have reviewed your results and they are still quite high. I would like you to start "Treating to Target". The treatment will be Insulin and your target will be the Fasting and 2 hour post meal blood sugar. It will work in this manner;    1. Goal for Fasting blood sugar is  mg/dl. I realize that you will need time to adjust to the new levels and presently you may feel too low if you are too aggressive now. So go slow and aim to lower your blood sugar to below 200 then 150 then 100 over several months.    2. Goal for 2 hour post meal blood sugar is below 180 mg/dl, here the same rules apply as in #1.    3. You will check your fasting blood sugar daily, if not where we would like it to be over a 3 day period then that evening we will increase the Lantus dose by 5 units. Then repeat the process over the next 3 days. Remember this is a slow process and take our time getting to goal. But, each week should be better than prior weeks. Blood sugars below 70 are unacceptable and should raise a "RED FLAG" where we may have to reduce our dose of insulin.    4. You will check your post meal glucose daily as well. However, each day you will check a different meal, (ie. Monday-breakfast; Tuesday- lunch; Wednesday- supper, then repeat). If your post meal glucose is not where we would like, increase pre-meal insulin by 2 units next time. A word of CAUTION: mealtime insulin is dependant on the size and concentration of your meal content. If not consuming a large meal do not take large dose of insulin. Use the reasonable person rule.     5. If you have any questions please do not hesitate to call.    Intensive insulin Therapy with correction factor:    You are on Intensive insulin therapy with Basal and Bolus insulin. Lantus, Levamir or NPH is your Basal insulin and will help maintain your fasting and between meal sugar. Your fast acting or rescue insulin is either Humalog, Novalog or Regular insulin and will control your " "post meal sugar.     You will Take 25 units of Lantus at 9 pm each night. This will be adjusted up or down depending on your fasting blood sugar before breakfast.    Humalog will follow this pre meal schedule; Correction factor of "2 units per 50 mg/dl" and is based on 30-60 grams of carbohydrates per meal.    If blood sugar is below 70 eat first then check your blood sugar 2 hours later and make correction.  If blood pre-meal sugar is  70 -150 take 8 units of Humalog;  If blood pre-meal sugar is 151-200 take +2 units of Humalog;  If blood pre-meal sugar is 201-250 take +4 units of Humalog;  If blood pre-meal sugar is 251-300 take +6 units of Humalog;  If blood pre-meal sugar is 301-350 take +8 units of Humalog;  If blood pre-meal sugar is 351-400+ take +10 units of Humalog;  Also increase water intake and call for appointment.  "

## 2017-07-13 NOTE — LETTER
July 13, 2017      Karine Olmos MD  [address] [facility] [address]  [place] [address]           [facility]  [address] [place] [address]  Phone: [phone]  Fax: [phone]          Patient: Ana Maria Teague   MR Number: [id number]   YOB: 1960   Date of Visit: 7/13/2017       Dear Dr. Karine Omlos:    Thank you for referring Ana Maria Teague to me for evaluation. Attached you will find relevant portions of my assessment and plan of care.    If you have questions, please do not hesitate to call me. I look forward to following Ana Maria Teague along with you.    Sincerely,    Ashley Garza Jr., VENUS    Enclosure  CC:  No Recipients    If you would like to receive this communication electronically, please contact [email or web address] or [phone] to request more information on [facility] Link access.    For providers and/or their staff who would like to refer a patient to [facility], please contact us through our one-stop-shop provider referral line, [facility], at [phone].    If you feel you have received this communication in error or would no longer like to receive these types of communications, please e-mail [email or web address]

## 2017-07-14 DIAGNOSIS — Z79.4 UNCONTROLLED TYPE 2 DIABETES MELLITUS WITH HYPERGLYCEMIA, WITH LONG-TERM CURRENT USE OF INSULIN: ICD-10-CM

## 2017-07-14 DIAGNOSIS — E11.65 UNCONTROLLED TYPE 2 DIABETES MELLITUS WITH HYPERGLYCEMIA, WITH LONG-TERM CURRENT USE OF INSULIN: ICD-10-CM

## 2017-07-14 RX ORDER — INSULIN LISPRO 100 [IU]/ML
INJECTION, SOLUTION INTRAVENOUS; SUBCUTANEOUS
Qty: 45 ML | Refills: 3 | Status: SHIPPED | OUTPATIENT
Start: 2017-07-14 | End: 2019-03-07 | Stop reason: SDUPTHER

## 2017-07-17 RX ORDER — CETIRIZINE HYDROCHLORIDE 10 MG/1
TABLET ORAL
Qty: 30 TABLET | Refills: 11 | Status: SHIPPED | OUTPATIENT
Start: 2017-07-17 | End: 2017-11-27 | Stop reason: SDUPTHER

## 2017-07-26 ENCOUNTER — OFFICE VISIT (OUTPATIENT)
Dept: PODIATRY | Facility: CLINIC | Age: 57
End: 2017-07-26
Payer: MEDICARE

## 2017-07-26 VITALS
BODY MASS INDEX: 38.06 KG/M2 | HEIGHT: 67 IN | SYSTOLIC BLOOD PRESSURE: 146 MMHG | DIASTOLIC BLOOD PRESSURE: 96 MMHG | HEART RATE: 84 BPM | WEIGHT: 242.5 LBS

## 2017-07-26 DIAGNOSIS — M76.61 ACHILLES TENDINITIS OF RIGHT LOWER EXTREMITY: Primary | ICD-10-CM

## 2017-07-26 DIAGNOSIS — M77.51 RETROCALCANEAL BURSITIS (BACK OF HEEL), RIGHT: ICD-10-CM

## 2017-07-26 DIAGNOSIS — M92.61 HAGLUND'S DEFORMITY OF RIGHT HEEL: ICD-10-CM

## 2017-07-26 PROCEDURE — 99999 PR PBB SHADOW E&M-EST. PATIENT-LVL IV: CPT | Mod: PBBFAC,,, | Performed by: PODIATRIST

## 2017-07-26 PROCEDURE — 99214 OFFICE O/P EST MOD 30 MIN: CPT | Mod: PBBFAC,PO | Performed by: PODIATRIST

## 2017-07-26 PROCEDURE — 99213 OFFICE O/P EST LOW 20 MIN: CPT | Mod: S$PBB,,, | Performed by: PODIATRIST

## 2017-07-26 NOTE — PROGRESS NOTES
Podiatry Note  CHIEF COMPLAINT   Chief Complaint   Patient presents with    Tendonitis     f/u right achilles tendonitis pt states pain still occurs, relief with PT         HPI  Ana Maria Teague is a 56 y.o. female w/ PMH of CKD, DM2,  s/p kidney transplant and other medical problems, who presents today complaining of painful achilles /  posterior aspect of the right heel.  Pt states she has had this problem for > 6 months. She has been treated by Dr. Herrera for > 6 months. She is here for second opinion, due to consistent pain. Pt has had physical therapy and been immobilized in a boot. She has not noted improvement. She states pain is constant. She rates pain 6/10. She is currently on disability.  Symptoms are aggravated with weight bearing. Symptoms were relieved with steroid dosepak, but her blood sugars increased so she had to stop the steroids.    7/26/2017  Pt is here today for follow up achilles tendonitis. She has started iontophoressis and has noted improvement in symptoms. She is wearing heel lift and tennis shoes. She states pain still 6/10 but this is intermittent. Patient denies other pedal complaints at this time.    Hemoglobin A1C   Date Value Ref Range Status   06/21/2017 9.1 (H) 4.0 - 5.6 % Final     Comment:     According to ADA guidelines, hemoglobin A1c <7.0% represents  optimal control in non-pregnant diabetic patients. Different  metrics may apply to specific patient populations.   Standards of Medical Care in Diabetes-2016.  For the purpose of screening for the presence of diabetes:  <5.7%     Consistent with the absence of diabetes  5.7-6.4%  Consistent with increasing risk for diabetes   (prediabetes)  >or=6.5%  Consistent with diabetes  Currently, no consensus exists for use of hemoglobin A1c  for diagnosis of diabetes for children.  This Hemoglobin A1c assay has significant interference with fetal   hemoglobin   (HbF). The results are invalid for patients with abnormal amounts of   HbF,    including those with known Hereditary Persistence   of Fetal Hemoglobin. Heterozygous hemoglobin variants (HbAS, HbAC,   HbAD, HbAE, HbA2) do not significantly interfere with this assay;   however, presence of multiple variants in a sample may impact the %   interference.     03/08/2017 7.8 (H) 4.5 - 6.2 % Final     Comment:     According to ADA guidelines, hemoglobin A1C <7.0% represents  optimal control in non-pregnant diabetic patients.  Different  metrics may apply to specific populations.   Standards of Medical Care in Diabetes - 2016.  For the purpose of screening for the presence of diabetes:  <5.7%     Consistent with the absence of diabetes  5.7-6.4%  Consistent with increasing risk for diabetes   (prediabetes)  >or=6.5%  Consistent with diabetes  Currently no consensus exists for use of hemoglobin A1C  for diagnosis of diabetes for children.     08/16/2016 7.9 (H) 4.5 - 6.2 % Final     Comment:     According to ADA guidelines, hemoglobin A1C <7.0% represents  optimal control in non-pregnant diabetic patients.  Different  metrics may apply to specific populations.   Standards of Medical Care in Diabetes - 2016.  For the purpose of screening for the presence of diabetes:  <5.7%     Consistent with the absence of diabetes  5.7-6.4%  Consistent with increasing risk for diabetes   (prediabetes)  >or=6.5%  Consistent with diabetes  Currently no consensus exists for use of hemoglobin A1C  for diagnosis of diabetes for children.           [facility]  Past Medical History:   Diagnosis Date    Abnormal glucose 12/30/2013    Acute bronchiolitis 10/15/2014    Anemia     Anemia of chronic renal failure 9/30/2013    Anxiety     Anxiety disorder     Avascular necrosis of bone of hip     Back pain     s/p steroid injections    Chronic immunosuppression with Prograf and Cellcept 9/30/2013    CKD (chronic kidney disease) stage 2, GFR 60-89 ml/min     CKD (chronic kidney disease) stage 5, GFR less than 15 ml/min      -donor kidney transplant - 13    Depression     Hypertension, renal 2013    Hypothyroidism     Immunosuppressed status 10/15/2014    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    Osteoporosis with pathological fracture     Renal disease due to hypertension 2013    Renal hypertension, non-vascular     Secondary hyperparathyroidism of renal origin 2013    Vertigo        PROBLEM LIST  Patient Active Problem List    Diagnosis Date Noted    Hyperlipidemia associated with type 2 diabetes mellitus 2017    Severe obesity (BMI 35.0-39.9) 2017    Nocturnal hypoxemia due to emphysema 2016    Moderate COPD (chronic obstructive pulmonary disease) 2016    Uncontrolled type 2 diabetes mellitus without complication, with long-term current use of insulin 2016    Obesity, Class II, BMI 35-39.9, with comorbidity 2015    Sleep related hypoventilation/hypoxemia in other disease 2015    Headache 09/10/2015    Essential hypertension 2015    Bilateral headache 2015    Age-related osteoporosis without current pathological fracture 2015    Abnormal PFTs (pulmonary function tests) 2015    Diffusion capacity of lung (dl), decreased 2015    Immunosuppressed status 10/15/2014    CKD (chronic kidney disease) stage 2, GFR 60-89 ml/min 2014    Cubital tunnel syndrome on right 2014    CTS (carpal tunnel syndrome) 2014    Uncontrolled type II diabetes mellitus with hypoglycemia 2014    Delayed graft function 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      Chronic immunosuppression with Prograf and Cellcept 2013    Hypertension, renal 2013    Anemia of chronic renal failure 2013    Secondary hyperparathyroidism of renal origin 2013    -donor kidney transplant - 2013    Renal cyst 2013    Abnormal findings on diagnostic imaging of breast  07/08/2013    Avascular necrosis of bone of hip     Acquired hypothyroidism     Osteoporosis with pathological fracture     Depression     Anxiety disorder     Vertigo        MEDS  Current Outpatient Prescriptions on File Prior to Visit   Medication Sig Dispense Refill    albuterol-ipratropium 2.5mg-0.5mg/3mL (DUO-NEB) 0.5 mg-3 mg(2.5 mg base)/3 mL nebulizer solution Take 3 mLs by nebulization 3 (three) times daily. 270 mL 5    alprazolam (XANAX) 0.5 MG tablet TAKE 1 TO 2 TABLETS BY MOUTH TWICE DAILY AS NEEDED 60 tablet 0    atorvastatin (LIPITOR) 20 MG tablet Take 1 tablet (20 mg total) by mouth once daily. 30 tablet 11    blood sugar diagnostic (FREESTYLE TEST) Strp USE FOUR TIMES DAILY AS DIRECTED 400 strip 2    BREO ELLIPTA 200-25 mcg/dose DsDv diskus inhaler INHALE 1 PUFF BY MOUTH INTO THE LUNGS EVERY DAY 60 each 5    cetirizine (ZYRTEC) 10 MG tablet TAKE 1 TABLET BY MOUTH EVERY DAY 30 tablet 11    clonazePAM (KLONOPIN) 0.25 MG TbDL Take 1 tablet (0.25 mg total) by mouth 2 (two) times daily as needed. 60 tablet 2    cyclobenzaprine (FLEXERIL) 10 MG tablet Take 10 mg by mouth 3 (three) times daily as needed for Muscle spasms.      dexamethasone (DECADRON) 4 mg/mL injection 1 mL should be applied per physical therapist 30 mL 1    ergocalciferol (ERGOCALCIFEROL) 50,000 unit Cap Take 1 capsule (50,000 Units total) by mouth every 7 days. 12 capsule 3    furosemide (LASIX) 20 MG tablet TAKE 1 TABLET BY MOUTH EVERY DAY AS NEEDED 90 tablet 11    insulin glargine (LANTUS SOLOSTAR) 100 unit/mL (3 mL) InPn pen Inject 50 Units into the skin every evening. 15 mL 11    insulin lispro (HUMALOG KWIKPEN) 100 unit/mL InPn pen Titrate up to 18 units subcutaneously three times a day 10-15 min before meals as directed in after visit summary. 45 mL 3    lancets (FREESTYLE LANCETS) 28 gauge Misc 1 lancet by Combination route 2 (two) times daily as needed. Qid prn 400 each 2    lancets Misc 1 strip by  "Misc.(Non-Drug; Combo Route) route 4 (four) times daily with meals and nightly. 150 each 6    levothyroxine (SYNTHROID) 150 MCG tablet Take 1 tablet (150 mcg total) by mouth before breakfast. 90 tablet 3    mycophenolate (CELLCEPT) 250 mg Cap Take 2 capsules (500 mg total) by mouth 2 (two) times daily. 120 capsule 11    nifedipine 30 MG ORAL TR24 (PROCARDIA-XL) 30 MG (OSM) 24 hr tablet Take 1 tablet (30 mg total) by mouth once daily. 30 tablet 11    ondansetron (ZOFRAN-ODT) 4 MG TbDL Take 1 tablet (4 mg total) by mouth every 8 (eight) hours as needed. 40 tablet 2    oxycodone-acetaminophen (PERCOCET)  mg per tablet OXYCODONE-ACETAMINOPHEN (Percocet)  MG ORAL TAB - 1 tab po q 8 hrs prn pain 90 tablet 0    pen needle, diabetic (BD ULTRA-FINE RORY PEN NEEDLES) 32 gauge x 5/32" Ndle Uses 4 daily, on multiple daily insulin injections 150 each 12    PROAIR HFA 90 mcg/actuation inhaler INHALE 2 PUFFS BY MOUTH INTO THE LUNGS EVERY 4 HOURS AS NEEDED FOR WHEEZING 8.5 g 0    tacrolimus (PROGRAF) 1 MG Cap Take 5 mg in the AM by mouth and 4 mg in the PM by mouth. Z94.0 kidney transplant 270 capsule 11     No current facility-administered medications on file prior to visit.        PSH     Past Surgical History:   Procedure Laterality Date    BREAST BIOPSY Right     benign. 7 years ago.    BREAST SURGERY       SECTION      COLONOSCOPY N/A 3/9/2016    Procedure: COLONOSCOPY;  Surgeon: Douglas Daniel III, MD;  [facility];  Service: Endoscopy;  Laterality: N/A;    cyst removed form chest wall      HYSTERECTOMY      KIDNEY TRANSPLANT  2013    SKIN GRAFT      revision    THYROIDECTOMY      vascular access surgeries      multiple. last time 2 weeks ago        ALL  Review of patient's allergies indicates:   Allergen Reactions    Azithromycin Nausea And Vomiting    Adhesive Other (See Comments)     Skin tears       SOC     Social History   Substance Use Topics    Smoking status: " "Former Smoker     Packs/day: 1.00     Years: 10.00     Types: Cigarettes    Smokeless tobacco: Never Used      Comment: quit smoking approx 10-15 years ago     Alcohol use Yes         Family HX    Family History   Problem Relation Age of Onset    Diabetes Mother     Kidney disease Mother     Hyperlipidemia Mother     Hypertension Mother     Heart disease Mother     Arthritis Mother     Hypertension Father     Stroke Father     Hypertension Sister     Arthritis Sister     Asthma Sister     Heart disease Sister      heart attack            REVIEW OF SYSTEMS  General: Denies any fever or chills  Chest: Denies shortness of breath, wheezing, coughing, or sputum production  Heart: Denies chest pain, cold extremities, orthopenia, or reduced exercise tolerance  As noted above and per history of current illness above, otherwise negative in the remainder of the 14 systems.     PHYSICAL EXAM  Vitals:    07/26/17 1339   BP: (!) 146/96   Pulse: 84   Weight: 110 kg (242 lb 8.1 oz)   Height: 5' 7" (1.702 m)       General: This patient is well-developed, well-nourished and appears stated age, well-oriented to person, place and time, and cooperative and pleasant on today's visit      LOWER EXTREMITY  Vascular exam:   · Dorsalis pedis and posterior tibial pulses palpable 2/4 bilaterally.   · Capillary refill time immediate to the toes.   · Feet are warm to the touch. Skin temperature warm to warm from proximally to distally   · There are no varicosities, telangiectasias noted to bilateral foot and ankle regions.   · There are no ecchymoses noted to bilateral foot and ankle regions.   · There is mild gross lower extremity edema.    Dermatologic exam:   · Skin moist with healthy texture and turgor.  · There are no open ulcerations, lacerations, or fissures to bilateral foot and ankle regions. There are no signs of infection as there is no erythema, no proximal-extending lymphangiitis, no fluctuance, or crepitus noted on " palpation to bilateral foot and ankle regions.   · There is no interdigital maceration.   · There are no hyperkeratotic lesions noted to feet. Nails are well-trimmed.    Neurologic exam:  · Epicritic sensation is intact as the patient is able to sense light touch to bilateral foot and ankle regions.   · Achilles and patellar deep tendon reflexes intact  · Babinski reflex absent    Musculoskeletal/Orthopedic exam:   · RIGHT TTP posterior aspect heel at insertion of achilles and TTP of achilles tendon proper.  Mild prominence noted at insertion RIGHT, neg defect palpated  · Muscle strength AT/EHL/EDL/PT: 5/5; Achilles/Gastroc/Soleus: 5/5; PB/PL: 5/5 Muscle tone is normal.  · Ankle joint ROM  B/L supple DF/PF, non-crepitus  · STJ ROM supple inv/ev, non crepitus b/l.  · On stance/Gait: able to stand, heel to toe gait. Able to weightbear, ambulate without assistance  · There is tenderness along course of inflammed achilles, RIGHT      IMAGING   Reviewed by me and I agree with radiologist findings, 3 views of foot/ankle, reveal: posterior calcaneal enthesophyte         Results for orders placed during the hospital encounter of 12/08/16   X-Ray Foot Complete Right    Narrative Right foot three views.    Findings: There is generalized osteopenia and multi-articular degenerative change.  Dorsal and plantar calcaneal spurs.  Small 5th metatarsal bunionette.  No acute fracture or dislocation.    Impression  As above.      Electronically signed by: LUCI COOK MD  Date:     12/08/16  Time:    15:09      Right       IMAGING:  Technique: Standard multiplanar multisequence imaging of the right ankle was performed.    Findings: There is slight thickening of the distal Achilles tendon with intermediate to mildly hyperintense intrasubstance signal alteration consistent with tendinopathy and or tendinitis.  There is mild peritendinous edema as well as some reactive marrow edema within the underlying calcaneus.  No sanaz Achilles  tendon disruption or retraction is demonstrated.    There is a small plantar calcaneal spur.  After aponeurosis is intact.    The flexor, extensor, peroneal tendons are unremarkable.    No evidence for medial or lateral collateral ligament injury.    Talar dome is intact without evidence of osteochondral defect or avascular necrosis.    There are small tibiotalar and subtalar joint effusions.   Impression      Findings consistent with mild Achilles tendinopathy and/or tendinitis.  No sanaz tendon disruption.       ASSESSMENT  Achilles tendinitis of right lower extremity    Issa's deformity of right heel    Retrocalcaneal bursitis (back of heel), right        PLAN    1. Patient was educated about clinical and imaging findings, and verbalizes understanding of above.  2. Treatment plan:   Continue with PT  Discussed complete NWB in cast- pt declines at ths time. If symptoms recalcitrant will need trial NWB prior to surgical intervention        Future Appointments  Date Time Provider Department Center   7/31/2017 10:20 AM Marcella Haas PA-C [facility] INT [facility]   8/23/2017 10:40 AM Tawny Armstrong DPM [facility] POD [facility]   8/24/2017 10:00 AM Ashley Garza Jr., PA-C [facility] DIABETE [facility]   9/6/2017 2:20 PM Karine Olmos MD [facility] IM [facility]   9/28/2017 9:30 AM LABORATORY, [facility] LAB [facility]   9/28/2017 9:40 AM SPECIMEN, [facility] SPECLAB [facility]   10/5/2017 10:00 AM Yuval Medina MD [facility] NEPHRO [facility]   5/10/2018 1:00 PM PULMONARY LAB, [facility] PULMLAB [facility]   5/11/2018 8:45 AM [facility] XR2 [facility] XRAY [facility]   5/11/2018 9:00 AM PULMONARY LAB, [facility] PULMLAB [facility]   5/11/2018 9:40 AM Osmani Walker MD [facility] PULMSVC [facility]         Report Electronically Signed By:  Tawny Armstrong DPM   Podiatric Medicine & Surgery  [facility]  7/26/2017              ;

## 2017-08-21 ENCOUNTER — OFFICE VISIT (OUTPATIENT)
Dept: PAIN MEDICINE | Facility: CLINIC | Age: 57
End: 2017-08-21
Payer: MEDICARE

## 2017-08-21 VITALS — DIASTOLIC BLOOD PRESSURE: 96 MMHG | SYSTOLIC BLOOD PRESSURE: 156 MMHG | HEART RATE: 82 BPM

## 2017-08-21 DIAGNOSIS — G89.4 CHRONIC PAIN DISORDER: ICD-10-CM

## 2017-08-21 DIAGNOSIS — M79.18 MYOFASCIAL PAIN: ICD-10-CM

## 2017-08-21 DIAGNOSIS — M47.817 SPONDYLOSIS OF LUMBOSACRAL REGION WITHOUT MYELOPATHY OR RADICULOPATHY: ICD-10-CM

## 2017-08-21 DIAGNOSIS — M54.16 LEFT LUMBAR RADICULOPATHY: Primary | ICD-10-CM

## 2017-08-21 DIAGNOSIS — M25.552 HIP PAIN, LEFT: ICD-10-CM

## 2017-08-21 DIAGNOSIS — M51.36 DDD (DEGENERATIVE DISC DISEASE), LUMBAR: ICD-10-CM

## 2017-08-21 PROCEDURE — 3080F DIAST BP >= 90 MM HG: CPT | Mod: ,,, | Performed by: PHYSICIAN ASSISTANT

## 2017-08-21 PROCEDURE — 99214 OFFICE O/P EST MOD 30 MIN: CPT | Mod: S$PBB,,, | Performed by: PHYSICIAN ASSISTANT

## 2017-08-21 PROCEDURE — 99999 PR PBB SHADOW E&M-EST. PATIENT-LVL III: CPT | Mod: PBBFAC,,, | Performed by: PHYSICIAN ASSISTANT

## 2017-08-21 PROCEDURE — 99213 OFFICE O/P EST LOW 20 MIN: CPT | Mod: PBBFAC,PO | Performed by: PHYSICIAN ASSISTANT

## 2017-08-21 PROCEDURE — 3077F SYST BP >= 140 MM HG: CPT | Mod: ,,, | Performed by: PHYSICIAN ASSISTANT

## 2017-08-21 RX ORDER — OXYCODONE AND ACETAMINOPHEN 10; 325 MG/1; MG/1
TABLET ORAL
Qty: 90 TABLET | Refills: 0 | Status: SHIPPED | OUTPATIENT
Start: 2017-09-22 | End: 2017-11-16 | Stop reason: SDUPTHER

## 2017-08-21 RX ORDER — CYCLOBENZAPRINE HCL 10 MG
10 TABLET ORAL 3 TIMES DAILY PRN
Qty: 30 TABLET | Refills: 1 | Status: SHIPPED | OUTPATIENT
Start: 2017-08-21 | End: 2019-08-28 | Stop reason: SDUPTHER

## 2017-08-21 RX ORDER — OXYCODONE AND ACETAMINOPHEN 10; 325 MG/1; MG/1
TABLET ORAL
Qty: 90 TABLET | Refills: 0 | Status: SHIPPED | OUTPATIENT
Start: 2017-08-24 | End: 2017-08-21 | Stop reason: SDUPTHER

## 2017-08-21 RX ORDER — OXYCODONE AND ACETAMINOPHEN 10; 325 MG/1; MG/1
1 TABLET ORAL 3 TIMES DAILY PRN
Qty: 90 TABLET | Refills: 0 | Status: SHIPPED | OUTPATIENT
Start: 2017-10-21 | End: 2017-09-11 | Stop reason: SDUPTHER
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
## 2017-08-23 ENCOUNTER — OFFICE VISIT (OUTPATIENT)
Dept: PODIATRY | Facility: CLINIC | Age: 57
End: 2017-08-23
Payer: MEDICARE

## 2017-08-23 VITALS
DIASTOLIC BLOOD PRESSURE: 87 MMHG | SYSTOLIC BLOOD PRESSURE: 131 MMHG | WEIGHT: 240.31 LBS | HEART RATE: 81 BPM | HEIGHT: 67 IN | BODY MASS INDEX: 37.72 KG/M2

## 2017-08-23 DIAGNOSIS — M77.51 RETROCALCANEAL BURSITIS (BACK OF HEEL), RIGHT: ICD-10-CM

## 2017-08-23 DIAGNOSIS — M92.61 HAGLUND'S DEFORMITY OF RIGHT HEEL: ICD-10-CM

## 2017-08-23 DIAGNOSIS — M76.61 ACHILLES TENDINITIS OF RIGHT LOWER EXTREMITY: Primary | ICD-10-CM

## 2017-08-23 PROCEDURE — 3075F SYST BP GE 130 - 139MM HG: CPT | Mod: ,,, | Performed by: PODIATRIST

## 2017-08-23 PROCEDURE — 99213 OFFICE O/P EST LOW 20 MIN: CPT | Mod: PBBFAC,PO | Performed by: PODIATRIST

## 2017-08-23 PROCEDURE — 99213 OFFICE O/P EST LOW 20 MIN: CPT | Mod: S$PBB,,, | Performed by: PODIATRIST

## 2017-08-23 PROCEDURE — 99999 PR PBB SHADOW E&M-EST. PATIENT-LVL III: CPT | Mod: PBBFAC,,, | Performed by: PODIATRIST

## 2017-08-23 PROCEDURE — 3079F DIAST BP 80-89 MM HG: CPT | Mod: ,,, | Performed by: PODIATRIST

## 2017-08-23 NOTE — PROGRESS NOTES
Podiatry Note  CHIEF COMPLAINT   Chief Complaint   Patient presents with    Follow-up     right achilles tendonitis f/u pt states minor pain improvement          HPI  Ana Maria Teague is a 56 y.o. female w/ PMH of CKD, DM2,  s/p kidney transplant and other medical problems, who presents today complaining of painful achilles /  posterior aspect of the right heel.  Pt states she has had this problem for > 6 months. She has been treated by Dr. Herrera for > 6 months. She is here for second opinion, due to consistent pain. Pt has had physical therapy and been immobilized in a boot. She has not noted improvement. She states pain is constant. She rates pain 6/10. She is currently on disability.  Symptoms are aggravated with weight bearing. Symptoms were relieved with steroid dosepak, but her blood sugars increased so she had to stop the steroids.    8/23/2017  Pt is here today for follow up achilles tendonitis. She has started iontophoressis and has noted improvement in symptoms. PT is performed at [facility]. Pain is on average 7/10.  She is wearing heel lift and tennis shoes. She states pain still 7/10 but this is intermittent. Patient denies other pedal complaints at this time.    Hemoglobin A1C   Date Value Ref Range Status   06/21/2017 9.1 (H) 4.0 - 5.6 % Final     Comment:     According to ADA guidelines, hemoglobin A1c <7.0% represents  optimal control in non-pregnant diabetic patients. Different  metrics may apply to specific patient populations.   Standards of Medical Care in Diabetes-2016.  For the purpose of screening for the presence of diabetes:  <5.7%     Consistent with the absence of diabetes  5.7-6.4%  Consistent with increasing risk for diabetes   (prediabetes)  >or=6.5%  Consistent with diabetes  Currently, no consensus exists for use of hemoglobin A1c  for diagnosis of diabetes for children.  This Hemoglobin A1c assay has significant interference with fetal   hemoglobin   (HbF).  The results are invalid for patients with abnormal amounts of   HbF,   including those with known Hereditary Persistence   of Fetal Hemoglobin. Heterozygous hemoglobin variants (HbAS, HbAC,   HbAD, HbAE, HbA2) do not significantly interfere with this assay;   however, presence of multiple variants in a sample may impact the %   interference.     03/08/2017 7.8 (H) 4.5 - 6.2 % Final     Comment:     According to ADA guidelines, hemoglobin A1C <7.0% represents  optimal control in non-pregnant diabetic patients.  Different  metrics may apply to specific populations.   Standards of Medical Care in Diabetes - 2016.  For the purpose of screening for the presence of diabetes:  <5.7%     Consistent with the absence of diabetes  5.7-6.4%  Consistent with increasing risk for diabetes   (prediabetes)  >or=6.5%  Consistent with diabetes  Currently no consensus exists for use of hemoglobin A1C  for diagnosis of diabetes for children.     08/16/2016 7.9 (H) 4.5 - 6.2 % Final     Comment:     According to ADA guidelines, hemoglobin A1C <7.0% represents  optimal control in non-pregnant diabetic patients.  Different  metrics may apply to specific populations.   Standards of Medical Care in Diabetes - 2016.  For the purpose of screening for the presence of diabetes:  <5.7%     Consistent with the absence of diabetes  5.7-6.4%  Consistent with increasing risk for diabetes   (prediabetes)  >or=6.5%  Consistent with diabetes  Currently no consensus exists for use of hemoglobin A1C  for diagnosis of diabetes for children.           [facility]  Past Medical History:   Diagnosis Date    Abnormal glucose 12/30/2013    Acute bronchiolitis 10/15/2014    Anemia     Anemia of chronic renal failure 9/30/2013    Anxiety     Anxiety disorder     Avascular necrosis of bone of hip     Back pain     s/p steroid injections    Chronic immunosuppression with Prograf and Cellcept 9/30/2013    CKD (chronic kidney disease) stage 2, GFR 60-89 ml/min      CKD (chronic kidney disease) stage 5, GFR less than 15 ml/min     -donor kidney transplant - 13    Depression     Hypertension, renal 2013    Hypothyroidism     Immunosuppressed status 10/15/2014    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    Osteoporosis with pathological fracture     Renal disease due to hypertension 2013    Renal hypertension, non-vascular     Secondary hyperparathyroidism of renal origin 2013    Vertigo        PROBLEM LIST  Patient Active Problem List    Diagnosis Date Noted    Hyperlipidemia associated with type 2 diabetes mellitus 2017    Severe obesity (BMI 35.0-39.9) 2017    Nocturnal hypoxemia due to emphysema 2016    Moderate COPD (chronic obstructive pulmonary disease) 2016    Uncontrolled type 2 diabetes mellitus without complication, with long-term current use of insulin 2016    Obesity, Class II, BMI 35-39.9, with comorbidity 2015    Sleep related hypoventilation/hypoxemia in other disease 2015    Headache 09/10/2015    Essential hypertension 2015    Bilateral headache 2015    Age-related osteoporosis without current pathological fracture 2015    Abnormal PFTs (pulmonary function tests) 2015    Diffusion capacity of lung (dl), decreased 2015    Immunosuppressed status 10/15/2014    CKD (chronic kidney disease) stage 2, GFR 60-89 ml/min 2014    Cubital tunnel syndrome on right 2014    CTS (carpal tunnel syndrome) 2014    Uncontrolled type II diabetes mellitus with hypoglycemia 2014    Delayed graft function 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      Chronic immunosuppression with Prograf and Cellcept 2013    Hypertension, renal 2013    Anemia of chronic renal failure 2013    Secondary hyperparathyroidism of renal origin 2013    -donor kidney transplant - 2013    Renal  cyst 07/24/2013    Abnormal findings on diagnostic imaging of breast 07/08/2013    Avascular necrosis of bone of hip     Acquired hypothyroidism     Osteoporosis with pathological fracture     Depression     Anxiety disorder     Vertigo        MEDS  Current Outpatient Prescriptions on File Prior to Visit   Medication Sig Dispense Refill    albuterol-ipratropium 2.5mg-0.5mg/3mL (DUO-NEB) 0.5 mg-3 mg(2.5 mg base)/3 mL nebulizer solution Take 3 mLs by nebulization 3 (three) times daily. 270 mL 5    alprazolam (XANAX) 0.5 MG tablet TAKE 1 TO 2 TABLETS BY MOUTH TWICE DAILY AS NEEDED 60 tablet 0    atorvastatin (LIPITOR) 20 MG tablet Take 1 tablet (20 mg total) by mouth once daily. 30 tablet 11    blood sugar diagnostic (FREESTYLE TEST) Strp USE FOUR TIMES DAILY AS DIRECTED 400 strip 2    BREO ELLIPTA 200-25 mcg/dose DsDv diskus inhaler INHALE 1 PUFF BY MOUTH INTO THE LUNGS EVERY DAY 60 each 5    cetirizine (ZYRTEC) 10 MG tablet TAKE 1 TABLET BY MOUTH EVERY DAY 30 tablet 11    clonazePAM (KLONOPIN) 0.25 MG TbDL Take 1 tablet (0.25 mg total) by mouth 2 (two) times daily as needed. 60 tablet 2    cyclobenzaprine (FLEXERIL) 10 MG tablet Take 1 tablet (10 mg total) by mouth 3 (three) times daily as needed for Muscle spasms. 30 tablet 1    dexamethasone (DECADRON) 4 mg/mL injection 1 mL should be applied per physical therapist 30 mL 1    ergocalciferol (ERGOCALCIFEROL) 50,000 unit Cap Take 1 capsule (50,000 Units total) by mouth every 7 days. 12 capsule 3    furosemide (LASIX) 20 MG tablet TAKE 1 TABLET BY MOUTH EVERY DAY AS NEEDED 90 tablet 11    insulin glargine (LANTUS SOLOSTAR) 100 unit/mL (3 mL) InPn pen Inject 50 Units into the skin every evening. 15 mL 11    insulin lispro (HUMALOG KWIKPEN) 100 unit/mL InPn pen Titrate up to 18 units subcutaneously three times a day 10-15 min before meals as directed in after visit summary. 45 mL 3    lancets (FREESTYLE LANCETS) 28 gauge Misc 1 lancet by Combination  "route 2 (two) times daily as needed. Qid prn 400 each 2    lancets Misc 1 strip by Misc.(Non-Drug; Combo Route) route 4 (four) times daily with meals and nightly. 150 each 6    levothyroxine (SYNTHROID) 150 MCG tablet Take 1 tablet (150 mcg total) by mouth before breakfast. 90 tablet 3    mycophenolate (CELLCEPT) 250 mg Cap Take 2 capsules (500 mg total) by mouth 2 (two) times daily. 120 capsule 11    nifedipine 30 MG ORAL TR24 (PROCARDIA-XL) 30 MG (OSM) 24 hr tablet Take 1 tablet (30 mg total) by mouth once daily. 30 tablet 11    ondansetron (ZOFRAN-ODT) 4 MG TbDL Take 1 tablet (4 mg total) by mouth every 8 (eight) hours as needed. 40 tablet 2    [START ON 2017] oxycodone-acetaminophen (PERCOCET)  mg per tablet OXYCODONE-ACETAMINOPHEN (Percocet)  MG ORAL TAB - 1 tab po q 8 hrs prn pain 90 tablet 0    [START ON 10/21/2017] oxycodone-acetaminophen (PERCOCET)  mg per tablet Take 1 tablet by mouth 3 (three) times daily as needed for Pain. 90 tablet 0    pen needle, diabetic (BD ULTRA-FINE RORY PEN NEEDLES) 32 gauge x 5/32" Ndle Uses 4 daily, on multiple daily insulin injections 150 each 12    PROAIR HFA 90 mcg/actuation inhaler INHALE 2 PUFFS BY MOUTH INTO THE LUNGS EVERY 4 HOURS AS NEEDED FOR WHEEZING 8.5 g 0    tacrolimus (PROGRAF) 1 MG Cap Take 5 mg in the AM by mouth and 4 mg in the PM by mouth. Z94.0 kidney transplant 270 capsule 11     No current facility-administered medications on file prior to visit.        [facility]     Past Surgical History:   Procedure Laterality Date    BREAST BIOPSY Right     benign. 7 years ago.    BREAST SURGERY       SECTION      COLONOSCOPY N/A 3/9/2016    Procedure: COLONOSCOPY;  Surgeon: Douglas Daniel III, MD;  Location: [facility];  Service: Endoscopy;  Laterality: N/A;    cyst removed form chest wall      HYSTERECTOMY      KIDNEY TRANSPLANT  2013    SKIN GRAFT      revision    THYROIDECTOMY      vascular access surgeries      " "multiple. last time 2 weeks ago        ALL  Review of patient's allergies indicates:   Allergen Reactions    Azithromycin Nausea And Vomiting    Adhesive Other (See Comments)     Skin tears       SOC     Social History   Substance Use Topics    Smoking status: Former Smoker     Packs/day: 1.00     Years: 10.00     Types: Cigarettes    Smokeless tobacco: Never Used      Comment: quit smoking approx 10-15 years ago     Alcohol use Yes         Family HX    Family History   Problem Relation Age of Onset    Diabetes Mother     Kidney disease Mother     Hyperlipidemia Mother     Hypertension Mother     Heart disease Mother     Arthritis Mother     Hypertension Father     Stroke Father     Hypertension Sister     Arthritis Sister     Asthma Sister     Heart disease Sister      heart attack            REVIEW OF SYSTEMS  General: Denies any fever or chills  Chest: Denies shortness of breath, wheezing, coughing, or sputum production  Heart: Denies chest pain, cold extremities, orthopenia, or reduced exercise tolerance  As noted above and per history of current illness above, otherwise negative in the remainder of the 14 systems.     PHYSICAL EXAM  Vitals:    08/23/17 1030   BP: 131/87   Pulse: 81   Weight: 109 kg (240 lb 4.8 oz)   Height: 5' 7" (1.702 m)   PainSc:   7   PainLoc: Foot       General: This patient is well-developed, well-nourished and appears stated age, well-oriented to person, place and time, and cooperative and pleasant on today's visit      LOWER EXTREMITY  Vascular exam:   · Dorsalis pedis and posterior tibial pulses palpable 2/4 bilaterally.   · Capillary refill time immediate to the toes.   · Feet are warm to the touch. Skin temperature warm to warm from proximally to distally   · There are no varicosities, telangiectasias noted to bilateral foot and ankle regions.   · There are no ecchymoses noted to bilateral foot and ankle regions.   · There is mild gross lower extremity " edema.    Dermatologic exam:   · Skin moist with healthy texture and turgor.  · There are no open ulcerations, lacerations, or fissures to bilateral foot and ankle regions. There are no signs of infection as there is no erythema, no proximal-extending lymphangiitis, no fluctuance, or crepitus noted on palpation to bilateral foot and ankle regions.   · There is no interdigital maceration.   · There are no hyperkeratotic lesions noted to feet. Nails are well-trimmed.    Neurologic exam:  · Epicritic sensation is intact as the patient is able to sense light touch to bilateral foot and ankle regions.   · Achilles and patellar deep tendon reflexes intact  · Babinski reflex absent    Musculoskeletal/Orthopedic exam:   · RIGHT TTP posterior aspect heel at insertion of achilles and TTP of achilles tendon proper.  Mild prominence noted at insertion RIGHT, neg defect palpated  · Muscle strength AT/EHL/EDL/PT: 5/5; Achilles/Gastroc/Soleus: 5/5; PB/PL: 5/5 Muscle tone is normal.  · Ankle joint ROM  B/L supple DF/PF, non-crepitus  · STJ ROM supple inv/ev, non crepitus b/l.  · On stance/Gait: able to stand, heel to toe gait. Able to weightbear, ambulate without assistance  · There is tenderness along course of inflammed achilles, RIGHT      IMAGING   Reviewed by me and I agree with radiologist findings, 3 views of foot/ankle, reveal: posterior calcaneal enthesophyte         Results for orders placed during the hospital encounter of 12/08/16   X-Ray Foot Complete Right    Narrative Right foot three views.    Findings: There is generalized osteopenia and multi-articular degenerative change.  Dorsal and plantar calcaneal spurs.  Small 5th metatarsal bunionette.  No acute fracture or dislocation.    Impression  As above.      Electronically signed by: LUCI COOK MD  Date:     12/08/16  Time:    15:09      Right       IMAGING:  Technique: Standard multiplanar multisequence imaging of the right ankle was performed.    Findings:  There is slight thickening of the distal Achilles tendon with intermediate to mildly hyperintense intrasubstance signal alteration consistent with tendinopathy and or tendinitis.  There is mild peritendinous edema as well as some reactive marrow edema within the underlying calcaneus.  No sanaz Achilles tendon disruption or retraction is demonstrated.    There is a small plantar calcaneal spur.  After aponeurosis is intact.    The flexor, extensor, peroneal tendons are unremarkable.    No evidence for medial or lateral collateral ligament injury.    Talar dome is intact without evidence of osteochondral defect or avascular necrosis.    There are small tibiotalar and subtalar joint effusions.   Impression      Findings consistent with mild Achilles tendinopathy and/or tendinitis.  No sanaz tendon disruption.       ASSESSMENT  Achilles tendinitis of right lower extremity    Retrocalcaneal bursitis (back of heel), right    Issa's deformity of right heel        PLAN    1. Patient was educated about clinical and imaging findings, and verbalizes understanding of above.  2. Treatment plan:   Continue with PT twice weekly   Discussed complete NWB in cast- pt declines at the time.  She understands that symptoms will not improve without complete offloading/immobilization of foot. Pt states she would like to try PT for 2-3 additional weeks, and If symptoms recalcitrant will need trial NWB prior to surgical intervention        Future Appointments  Date Time Provider Department Center   8/28/2017 12:00 PM Ashley Garza Jr., VENUS [facility] DIABETE [place]   9/6/2017 2:20 PM Karine Olmos MD [facility] IM [facility]   9/28/2017 9:30 AM LABORATORY, [facility] LAB [facility]   9/28/2017 9:40 AM SPECIMEN, [facility] SPECLAB [facility]   10/5/2017 10:00 AM Yuval Medina MD [facility] NEPHRO [facility]   11/13/2017 10:40 AM Marcella Haas PA-C [facility] INT FLORIAN [facility]   5/10/2018 1:00 PM PULMONARY LAB, [facility] PULMLAB [facility]   5/11/2018 8:45 AM [facility] XR2  [facility] XRAY [facility]   5/11/2018 9:00 AM PULMONARY LAB, [facility] PULMLAB [facility]   5/11/2018 9:40 AM Osmani Walker MD [facility] PULMSVC [facility]         Report Electronically Signed By:  Tawny Armstrong DPM   Podiatric Medicine & Surgery  [facility]  8/23/2017              ;

## 2017-08-24 DIAGNOSIS — Z94.0 KIDNEY REPLACED BY TRANSPLANT: Primary | ICD-10-CM

## 2017-09-06 ENCOUNTER — OFFICE VISIT (OUTPATIENT)
Dept: INTERNAL MEDICINE | Facility: CLINIC | Age: 57
End: 2017-09-06
Payer: MEDICARE

## 2017-09-06 ENCOUNTER — LAB VISIT (OUTPATIENT)
Dept: LAB | Facility: HOSPITAL | Age: 57
End: 2017-09-06
Attending: INTERNAL MEDICINE
Payer: MEDICARE

## 2017-09-06 VITALS
DIASTOLIC BLOOD PRESSURE: 80 MMHG | HEART RATE: 110 BPM | HEIGHT: 67 IN | SYSTOLIC BLOOD PRESSURE: 138 MMHG | TEMPERATURE: 97 F | BODY MASS INDEX: 38.2 KG/M2 | OXYGEN SATURATION: 98 % | WEIGHT: 243.38 LBS

## 2017-09-06 DIAGNOSIS — F41.1 GENERALIZED ANXIETY DISORDER: ICD-10-CM

## 2017-09-06 DIAGNOSIS — I10 ESSENTIAL HYPERTENSION: ICD-10-CM

## 2017-09-06 DIAGNOSIS — N18.2 CKD (CHRONIC KIDNEY DISEASE) STAGE 2, GFR 60-89 ML/MIN: ICD-10-CM

## 2017-09-06 DIAGNOSIS — Z29.89 NEED FOR PROPHYLACTIC IMMUNOTHERAPY: Chronic | ICD-10-CM

## 2017-09-06 DIAGNOSIS — E03.9 ACQUIRED HYPOTHYROIDISM: ICD-10-CM

## 2017-09-06 DIAGNOSIS — E11.641: ICD-10-CM

## 2017-09-06 DIAGNOSIS — F32.A DEPRESSION, UNSPECIFIED DEPRESSION TYPE: ICD-10-CM

## 2017-09-06 DIAGNOSIS — M81.0 AGE-RELATED OSTEOPOROSIS WITHOUT CURRENT PATHOLOGICAL FRACTURE: ICD-10-CM

## 2017-09-06 DIAGNOSIS — E03.9 ACQUIRED HYPOTHYROIDISM: Primary | ICD-10-CM

## 2017-09-06 LAB — TSH SERPL DL<=0.005 MIU/L-ACNC: 0.64 UIU/ML

## 2017-09-06 PROCEDURE — 3046F HEMOGLOBIN A1C LEVEL >9.0%: CPT | Mod: ,,, | Performed by: INTERNAL MEDICINE

## 2017-09-06 PROCEDURE — 83036 HEMOGLOBIN GLYCOSYLATED A1C: CPT

## 2017-09-06 PROCEDURE — 36415 COLL VENOUS BLD VENIPUNCTURE: CPT | Mod: PO

## 2017-09-06 PROCEDURE — 3066F NEPHROPATHY DOC TX: CPT | Mod: ,,, | Performed by: INTERNAL MEDICINE

## 2017-09-06 PROCEDURE — 99999 PR PBB SHADOW E&M-EST. PATIENT-LVL III: CPT | Mod: PBBFAC,,, | Performed by: INTERNAL MEDICINE

## 2017-09-06 PROCEDURE — 84443 ASSAY THYROID STIM HORMONE: CPT

## 2017-09-06 PROCEDURE — 3075F SYST BP GE 130 - 139MM HG: CPT | Mod: ,,, | Performed by: INTERNAL MEDICINE

## 2017-09-06 PROCEDURE — 99213 OFFICE O/P EST LOW 20 MIN: CPT | Mod: PBBFAC,PO | Performed by: INTERNAL MEDICINE

## 2017-09-06 PROCEDURE — 99214 OFFICE O/P EST MOD 30 MIN: CPT | Mod: S$PBB,,, | Performed by: INTERNAL MEDICINE

## 2017-09-06 PROCEDURE — 3079F DIAST BP 80-89 MM HG: CPT | Mod: ,,, | Performed by: INTERNAL MEDICINE

## 2017-09-06 RX ORDER — DULOXETIN HYDROCHLORIDE 30 MG/1
30 CAPSULE, DELAYED RELEASE ORAL DAILY
Qty: 30 CAPSULE | Refills: 6 | Status: SHIPPED | OUTPATIENT
Start: 2017-09-06 | End: 2017-09-11

## 2017-09-06 RX ORDER — CLONAZEPAM 0.5 MG/1
0.5 TABLET, ORALLY DISINTEGRATING ORAL 2 TIMES DAILY PRN
Qty: 60 TABLET | Refills: 2 | Status: SHIPPED | OUTPATIENT
Start: 2017-09-06 | End: 2018-08-14

## 2017-09-06 NOTE — PROGRESS NOTES
"Subjective:       Patient ID: Ana Maria Teague is a 56 y.o. female.    Chief Complaint: Follow-up    Here for follow up of medical problems.  BP checked at home 120s-148/ 70-88.  Sugars improving.  Ory is helping.  Also increased synthroid due to elev TSH.  Discussed diet, ate rice crispies with sugar this morning.  Hopeless.  Chronic pain.  Anxiety is uncontrolled.    Updated/ annual due 3/18:  HM: 11/16 fluvax, 6/15 llulgz31, 3/14 hqrxgs60, 6/15 TDaP, 8/16 MMG, 8/15 BMD improved rep 3y, 1/11 Cscope rep 5-10y, 11/10 HCV neg.             Review of Systems   Constitutional: Negative for chills, diaphoresis and fever.   Respiratory: Negative for cough and shortness of breath.    Cardiovascular: Negative for chest pain, palpitations and leg swelling.   Gastrointestinal: Negative for blood in stool, constipation, diarrhea, nausea and vomiting.   Genitourinary: Negative for dysuria, frequency and hematuria.   Psychiatric/Behavioral: The patient is not nervous/anxious.        Objective:   /80   Pulse 110   Temp 96.5 °F (35.8 °C) (Tympanic)   Ht 5' 7" (1.702 m)   Wt 110.4 kg (243 lb 6.2 oz)   SpO2 98%   BMI 38.12 kg/m²     Physical Exam   Constitutional: She is oriented to person, place, and time. She appears well-developed.   HENT:   Mouth/Throat: Oropharynx is clear and moist.   Neck: Neck supple. Carotid bruit is not present. No thyroid mass present.   Cardiovascular: Normal rate, regular rhythm and intact distal pulses.  Exam reveals no gallop and no friction rub.    No murmur heard.  Pulmonary/Chest: Effort normal and breath sounds normal. She has no wheezes. She has no rales.   Abdominal: Soft. Bowel sounds are normal. She exhibits no mass. There is no hepatosplenomegaly. There is no tenderness.   Musculoskeletal: She exhibits no edema.   Lymphadenopathy:     She has no cervical adenopathy.   Neurological: She is alert and oriented to person, place, and time.   Psychiatric: She has a normal mood and affect. "      Results for KEVAN CARBONE (MRN [id number]) as of 9/6/2017 14:26   Ref. Range 6/21/2017 08:50 7/13/2017 10:30 7/13/2017 11:58 7/13/2017 12:05   ALT Latest Ref Range: 10 - 44 U/L 10      Triglycerides Latest Ref Range: 30 - 150 mg/dL 76      Cholesterol Latest Ref Range: 120 - 199 mg/dL 190      HDL Latest Ref Range: 40 - 75 mg/dL 48      LDL Cholesterol Latest Ref Range: 63.0 - 159.0 mg/dL 126.8      Total Cholesterol/HDL Ratio Latest Ref Range: 2.0 - 5.0  4.0      Hemoglobin A1C Latest Ref Range: 4.0 - 5.6 % 9.1 (H)      Estimated Avg Glucose Latest Ref Range: 68 - 131 mg/dL 214 (H)      TSH Latest Ref Range: 0.400 - 4.000 uIU/mL    10.051 (H)   Free T4 Latest Ref Range: 0.71 - 1.51 ng/dL    0.97     Assessment:       1. Acquired hypothyroidism    2. Age-related osteoporosis without current pathological fracture    3. Generalized anxiety disorder    4. Chronic immunosuppression with Prograf and Cellcept    5. Essential hypertension    6. Depression, unspecified depression type    7. Uncontrolled type 2 diabetes mellitus with hypoglycemia and coma, without long-term current use of insulin    8. CKD (chronic kidney disease) stage 2, GFR 60-89 ml/min        Plan:       Kevan was seen today for follow-up.    Diagnoses and all orders for this visit:    Acquired hypothyroidism- check lab since Ory increased 6-7wk ago.  -     TSH; Future    Age-related osteoporosis without current pathological fracture    Generalized anxiety disorder/ Depression/ chronic pain- start cymbalta 30mg daily, recheck 2mo.  -     clonazePAM (KLONOPIN) 0.5 MG disintegrating tablet; Take 1 tablet (0.5 mg total) by mouth 2 (two) times daily as needed.    Chronic immunosuppression with Prograf and Cellcept    Essential hypertension- cont to monitor at home.    Uncontrolled type 2 diabetes mellitus with hypoglycemia and coma, without long-term current use of insulin  -     Hemoglobin A1c; Future    CKD (chronic kidney disease) stage 2, GFR 60-89  ml/min

## 2017-09-07 LAB
ESTIMATED AVG GLUCOSE: 169 MG/DL
HBA1C MFR BLD HPLC: 7.5 %

## 2017-09-11 ENCOUNTER — OFFICE VISIT (OUTPATIENT)
Dept: PODIATRY | Facility: CLINIC | Age: 57
End: 2017-09-11
Payer: MEDICARE

## 2017-09-11 VITALS
WEIGHT: 244.06 LBS | DIASTOLIC BLOOD PRESSURE: 89 MMHG | BODY MASS INDEX: 38.3 KG/M2 | SYSTOLIC BLOOD PRESSURE: 158 MMHG | HEIGHT: 67 IN | HEART RATE: 86 BPM

## 2017-09-11 DIAGNOSIS — M77.51 RETROCALCANEAL BURSITIS (BACK OF HEEL), RIGHT: ICD-10-CM

## 2017-09-11 DIAGNOSIS — M92.61 HAGLUND'S DEFORMITY OF RIGHT HEEL: ICD-10-CM

## 2017-09-11 DIAGNOSIS — M76.61 ACHILLES TENDINITIS OF RIGHT LOWER EXTREMITY: Primary | ICD-10-CM

## 2017-09-11 DIAGNOSIS — M79.671 PAIN OF RIGHT HEEL: ICD-10-CM

## 2017-09-11 PROCEDURE — 99999 PR PBB SHADOW E&M-EST. PATIENT-LVL III: CPT | Mod: PBBFAC,,, | Performed by: PODIATRIST

## 2017-09-11 PROCEDURE — 99213 OFFICE O/P EST LOW 20 MIN: CPT | Mod: PBBFAC,PO | Performed by: PODIATRIST

## 2017-09-11 PROCEDURE — 3077F SYST BP >= 140 MM HG: CPT | Mod: ,,, | Performed by: PODIATRIST

## 2017-09-11 PROCEDURE — 3079F DIAST BP 80-89 MM HG: CPT | Mod: ,,, | Performed by: PODIATRIST

## 2017-09-11 PROCEDURE — 99214 OFFICE O/P EST MOD 30 MIN: CPT | Mod: S$PBB,,, | Performed by: PODIATRIST

## 2017-09-11 NOTE — PROGRESS NOTES
Podiatry Note  CHIEF COMPLAINT   Chief Complaint   Patient presents with    Follow-up     F/U right achilles tendonitis. Continues with pain to site 7/10         HPI  Ana Maria Teague is a 56 y.o. female w/ PMH of CKD, DM2,  s/p kidney transplant and other medical problems, who presents today complaining of painful achilles /  posterior aspect of the right heel.  Pt states she has had this problem for > 6 months. She has been treated by Dr. Herrera for > 6 months. She is here for second opinion, due to consistent pain. Pt has had physical therapy and been immobilized in a boot. She has not noted improvement. She states pain is constant. She rates pain 6/10. She is currently on disability.  Symptoms are aggravated with weight bearing. Symptoms were relieved with steroid dosepak, but her blood sugars increased so she had to stop the steroids.    8/23/17  Pt is here today for follow up achilles tendonitis. She has started iontophoressis and has noted improvement in symptoms. PT is performed [facility]. Pain is on average 7/10.  She is wearing heel lift and tennis shoes. She states pain still 7/10 but this is intermittent. Patient denies other pedal complaints at this time.    9/11/17  Pt is her for follow up. She states pain is worsening on right heel. She is doing a lot of movement as she has to take care of daughter and . She is retired-unemployed. She states she is not improving with physical therapy.     Hemoglobin A1C   Date Value Ref Range Status   09/06/2017 7.5 (H) 4.0 - 5.6 % Final     Comment:     According to ADA guidelines, hemoglobin A1c <7.0% represents  optimal control in non-pregnant diabetic patients. Different  metrics may apply to specific patient populations.   Standards of Medical Care in Diabetes-2016.  For the purpose of screening for the presence of diabetes:  <5.7%     Consistent with the absence of diabetes  5.7-6.4%  Consistent with increasing risk for diabetes    (prediabetes)  >or=6.5%  Consistent with diabetes  Currently, no consensus exists for use of hemoglobin A1c  for diagnosis of diabetes for children.  This Hemoglobin A1c assay has significant interference with fetal   hemoglobin   (HbF). The results are invalid for patients with abnormal amounts of   HbF,   including those with known Hereditary Persistence   of Fetal Hemoglobin. Heterozygous hemoglobin variants (HbAS, HbAC,   HbAD, HbAE, HbA2) do not significantly interfere with this assay;   however, presence of multiple variants in a sample may impact the %   interference.     06/21/2017 9.1 (H) 4.0 - 5.6 % Final     Comment:     According to ADA guidelines, hemoglobin A1c <7.0% represents  optimal control in non-pregnant diabetic patients. Different  metrics may apply to specific patient populations.   Standards of Medical Care in Diabetes-2016.  For the purpose of screening for the presence of diabetes:  <5.7%     Consistent with the absence of diabetes  5.7-6.4%  Consistent with increasing risk for diabetes   (prediabetes)  >or=6.5%  Consistent with diabetes  Currently, no consensus exists for use of hemoglobin A1c  for diagnosis of diabetes for children.  This Hemoglobin A1c assay has significant interference with fetal   hemoglobin   (HbF). The results are invalid for patients with abnormal amounts of   HbF,   including those with known Hereditary Persistence   of Fetal Hemoglobin. Heterozygous hemoglobin variants (HbAS, HbAC,   HbAD, HbAE, HbA2) do not significantly interfere with this assay;   however, presence of multiple variants in a sample may impact the %   interference.     03/08/2017 7.8 (H) 4.5 - 6.2 % Final     Comment:     According to ADA guidelines, hemoglobin A1C <7.0% represents  optimal control in non-pregnant diabetic patients.  Different  metrics may apply to specific populations.   Standards of Medical Care in Diabetes - 2016.  For the purpose of screening for the presence of  diabetes:  <5.7%     Consistent with the absence of diabetes  5.7-6.4%  Consistent with increasing risk for diabetes   (prediabetes)  >or=6.5%  Consistent with diabetes  Currently no consensus exists for use of hemoglobin A1C  for diagnosis of diabetes for children.           [facility]  Past Medical History:   Diagnosis Date    Abnormal glucose 2013    Acute bronchiolitis 10/15/2014    Anemia     Anemia of chronic renal failure 2013    Anxiety     Anxiety disorder     Avascular necrosis of bone of hip     Back pain     s/p steroid injections    Chronic immunosuppression with Prograf and Cellcept 2013    CKD (chronic kidney disease) stage 2, GFR 60-89 ml/min     CKD (chronic kidney disease) stage 5, GFR less than 15 ml/min     -donor kidney transplant - 13    Depression     Hypertension, renal 2013    Hypothyroidism     Immunosuppressed status 10/15/2014    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    Osteoporosis with pathological fracture     Renal disease due to hypertension 2013    Renal hypertension, non-vascular     Secondary hyperparathyroidism of renal origin 2013    Vertigo        PROBLEM LIST  Patient Active Problem List    Diagnosis Date Noted    Hyperlipidemia associated with type 2 diabetes mellitus 2017    Severe obesity (BMI 35.0-39.9) 2017    Nocturnal hypoxemia due to emphysema 2016    Moderate COPD (chronic obstructive pulmonary disease) 2016    Uncontrolled type 2 diabetes mellitus without complication, with long-term current use of insulin 2016    Obesity, Class II, BMI 35-39.9, with comorbidity 2015    Sleep related hypoventilation/hypoxemia in other disease 2015    Headache 09/10/2015    Essential hypertension 2015    Bilateral headache 2015    Age-related osteoporosis without current pathological fracture 2015    Abnormal PFTs (pulmonary function  tests) 2015    Diffusion capacity of lung (dl), decreased 2015    Immunosuppressed status 10/15/2014    CKD (chronic kidney disease) stage 2, GFR 60-89 ml/min 2014    Cubital tunnel syndrome on right 2014    CTS (carpal tunnel syndrome) 2014    Uncontrolled type II diabetes mellitus with hypoglycemia 2014    Delayed graft function 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      Chronic immunosuppression with Prograf and Cellcept 2013    Hypertension, renal 2013    Anemia of chronic renal failure 2013    Secondary hyperparathyroidism of renal origin 2013    -donor kidney transplant - 2013    Renal cyst 2013    Abnormal findings on diagnostic imaging of breast 2013    Avascular necrosis of bone of hip     Acquired hypothyroidism     Osteoporosis with pathological fracture     Depression     Anxiety disorder     Vertigo        MEDS  Current Outpatient Prescriptions on File Prior to Visit   Medication Sig Dispense Refill    albuterol-ipratropium 2.5mg-0.5mg/3mL (DUO-NEB) 0.5 mg-3 mg(2.5 mg base)/3 mL nebulizer solution Take 3 mLs by nebulization 3 (three) times daily. 270 mL 5    atorvastatin (LIPITOR) 20 MG tablet Take 1 tablet (20 mg total) by mouth once daily. 30 tablet 11    blood sugar diagnostic (FREESTYLE TEST) Strp USE FOUR TIMES DAILY AS DIRECTED 400 strip 2    BREO ELLIPTA 200-25 mcg/dose DsDv diskus inhaler INHALE 1 PUFF BY MOUTH INTO THE LUNGS EVERY DAY 60 each 5    cetirizine (ZYRTEC) 10 MG tablet TAKE 1 TABLET BY MOUTH EVERY DAY 30 tablet 11    clonazePAM (KLONOPIN) 0.5 MG disintegrating tablet Take 1 tablet (0.5 mg total) by mouth 2 (two) times daily as needed. 60 tablet 2    cyclobenzaprine (FLEXERIL) 10 MG tablet Take 1 tablet (10 mg total) by mouth 3 (three) times daily as needed for Muscle spasms. 30 tablet 1    dexamethasone (DECADRON) 4 mg/mL injection 1 mL should be applied per physical  "therapist 30 mL 1    ergocalciferol (ERGOCALCIFEROL) 50,000 unit Cap Take 1 capsule (50,000 Units total) by mouth every 7 days. 12 capsule 3    furosemide (LASIX) 20 MG tablet TAKE 1 TABLET BY MOUTH EVERY DAY AS NEEDED 90 tablet 11    insulin glargine (LANTUS SOLOSTAR) 100 unit/mL (3 mL) InPn pen Inject 50 Units into the skin every evening. 15 mL 11    insulin lispro (HUMALOG KWIKPEN) 100 unit/mL InPn pen Titrate up to 18 units subcutaneously three times a day 10-15 min before meals as directed in after visit summary. 45 mL 3    lancets (FREESTYLE LANCETS) 28 gauge Misc 1 lancet by Combination route 2 (two) times daily as needed. Qid prn 400 each 2    lancets Misc 1 strip by Misc.(Non-Drug; Combo Route) route 4 (four) times daily with meals and nightly. 150 each 6    levothyroxine (SYNTHROID) 150 MCG tablet Take 1 tablet (150 mcg total) by mouth before breakfast. 90 tablet 3    mycophenolate (CELLCEPT) 250 mg Cap Take 2 capsules (500 mg total) by mouth 2 (two) times daily. 120 capsule 11    nifedipine 30 MG ORAL TR24 (PROCARDIA-XL) 30 MG (OSM) 24 hr tablet Take 1 tablet (30 mg total) by mouth once daily. 30 tablet 11    [START ON 9/22/2017] oxycodone-acetaminophen (PERCOCET)  mg per tablet OXYCODONE-ACETAMINOPHEN (Percocet)  MG ORAL TAB - 1 tab po q 8 hrs prn pain 90 tablet 0    pen needle, diabetic (BD ULTRA-FINE RORY PEN NEEDLES) 32 gauge x 5/32" Ndle Uses 4 daily, on multiple daily insulin injections 150 each 12    PROAIR HFA 90 mcg/actuation inhaler INHALE 2 PUFFS BY MOUTH INTO THE LUNGS EVERY 4 HOURS AS NEEDED FOR WHEEZING 8.5 g 0    tacrolimus (PROGRAF) 1 MG Cap Take 5 mg in the AM by mouth and 4 mg in the PM by mouth. Z94.0 kidney transplant 270 capsule 11    alprazolam (XANAX) 0.5 MG tablet TAKE 1 TO 2 TABLETS BY MOUTH TWICE DAILY AS NEEDED 60 tablet 0    ondansetron (ZOFRAN-ODT) 4 MG TbDL Take 1 tablet (4 mg total) by mouth every 8 (eight) hours as needed. 40 tablet 2    " [DISCONTINUED] duloxetine (CYMBALTA) 30 MG capsule Take 1 capsule (30 mg total) by mouth once daily. 30 capsule 6    [DISCONTINUED] oxycodone-acetaminophen (PERCOCET)  mg per tablet Take 1 tablet by mouth 3 (three) times daily as needed for Pain. 90 tablet 0     No current facility-administered medications on file prior to visit.        PSH     Past Surgical History:   Procedure Laterality Date    BREAST BIOPSY Right     benign. 7 years ago.    BREAST SURGERY       SECTION      COLONOSCOPY N/A 3/9/2016    Procedure: COLONOSCOPY;  Surgeon: Douglas Daniel III, MD;  Location[facility];  Service: Endoscopy;  Laterality: N/A;    cyst removed form chest wall      HYSTERECTOMY      KIDNEY TRANSPLANT  2013    SKIN GRAFT      revision    THYROIDECTOMY      vascular access surgeries      multiple. last time 2 weeks ago        ALL  Review of patient's allergies indicates:   Allergen Reactions    Azithromycin Nausea And Vomiting    Adhesive Other (See Comments)     Skin tears       SOC     Social History   Substance Use Topics    Smoking status: Former Smoker     Packs/day: 1.00     Years: 10.00     Types: Cigarettes    Smokeless tobacco: Never Used      Comment: quit smoking approx 10-15 years ago     Alcohol use Yes         Family HX    Family History   Problem Relation Age of Onset    Diabetes Mother     Kidney disease Mother     Hyperlipidemia Mother     Hypertension Mother     Heart disease Mother     Arthritis Mother     Hypertension Father     Stroke Father     Hypertension Sister     Arthritis Sister     Asthma Sister     Heart disease Sister      heart attack        REVIEW OF SYSTEMS   General: This patient is well-developed, well-nourished and appears stated age, well-oriented to person, place and time, and cooperative and pleasant on today's visit  Constitutional: Negative for chills and fever.   Respiratory: Negative for shortness of breath.    Cardiovascular:  "Negative for chest pain, palpitations, orthopnea, claudication and leg swelling.    Gastrointestinal: Negative for diarrhea, nausea and vomiting.   Musculoskeletal: Negative for joint pain.  Positive for tendon pain posterior ankle  Skin: Negative for rash, skin, or nail changes   Neurological: Negative for dizziness, tingling, sensory change, focal weakness and weakness.   Peripheral Vascular: no claudication or cyanosis  Psychiatric/Behavioral: Negative for altered mental status       PHYSICAL EXAM  Vitals:    09/11/17 1120   BP: (!) 158/89   Pulse: 86   Weight: 110.7 kg (244 lb 0.8 oz)   Height: 5' 7" (1.702 m)   PainSc:   7   PainLoc: Ankle       General: This patient is well-developed, well-nourished and appears stated age, well-oriented to person, place and time, and cooperative and pleasant on today's visit      LOWER EXTREMITY  Vascular exam:   · Dorsalis pedis and posterior tibial pulses palpable 2/4 bilaterally.   · Capillary refill time immediate to the toes.   · Feet are warm to the touch. Skin temperature warm to warm from proximally to distally   · There are no varicosities, telangiectasias noted to bilateral foot and ankle regions.   · There are no ecchymoses noted to bilateral foot and ankle regions.   · There is mild gross lower extremity edema.    Dermatologic exam:   · Skin moist with healthy texture and turgor.  · There are no open ulcerations, lacerations, or fissures to bilateral foot and ankle regions. There are no signs of infection as there is no erythema, no proximal-extending lymphangiitis, no fluctuance, or crepitus noted on palpation to bilateral foot and ankle regions.   · There is no interdigital maceration.   · There are no hyperkeratotic lesions noted to feet. Nails are well-trimmed.    Neurologic exam:  · Epicritic sensation is intact as the patient is able to sense light touch to bilateral foot and ankle regions.   · Achilles and patellar deep tendon reflexes intact  · Babinski " reflex absent    Musculoskeletal/Orthopedic exam:   · RIGHT TTP posterior aspect heel at insertion of achilles and TTP of achilles tendon proper.  Mild prominence noted at insertion RIGHT, neg defect palpated  · Muscle strength AT/EHL/EDL/PT: 5/5; Achilles/Gastroc/Soleus: 5/5; PB/PL: 5/5 Muscle tone is normal.  · Ankle joint ROM  B/L supple DF/PF, non-crepitus  · STJ ROM supple inv/ev, non crepitus b/l.  · On stance/Gait: able to stand, heel to toe gait. Able to weightbear, ambulate without assistance  · There is tenderness along course of inflammed achilles, RIGHT      IMAGING   Reviewed by me and I agree with radiologist findings, 3 views of foot/ankle, reveal: posterior calcaneal enthesophyte         Results for orders placed during the hospital encounter of 12/08/16   X-Ray Foot Complete Right    Narrative Right foot three views.    Findings: There is generalized osteopenia and multi-articular degenerative change.  Dorsal and plantar calcaneal spurs.  Small 5th metatarsal bunionette.  No acute fracture or dislocation.    Impression  As above.      Electronically signed by: LUCI COOK MD  Date:     12/08/16  Time:    15:09      Right       IMAGING:  Technique: Standard multiplanar multisequence imaging of the right ankle was performed.    Findings: There is slight thickening of the distal Achilles tendon with intermediate to mildly hyperintense intrasubstance signal alteration consistent with tendinopathy and or tendinitis.  There is mild peritendinous edema as well as some reactive marrow edema within the underlying calcaneus.  No sanaz Achilles tendon disruption or retraction is demonstrated.    There is a small plantar calcaneal spur.  After aponeurosis is intact.    The flexor, extensor, peroneal tendons are unremarkable.    No evidence for medial or lateral collateral ligament injury.    Talar dome is intact without evidence of osteochondral defect or avascular necrosis.    There are small tibiotalar  and subtalar joint effusions.   Impression      Findings consistent with mild Achilles tendinopathy and/or tendinitis.  No sanaz tendon disruption.       ASSESSMENT  Achilles tendinitis of right lower extremity    Retrocalcaneal bursitis (back of heel), right    Issa's deformity of right heel    Pain of right heel        PLAN    1. Patient was educated about clinical and imaging findings, and verbalizes understanding of above.  2. Treatment plan:   Due to continued pain and inflammation right achilles tendon, I recommend complete immobilization prior to any surgical consideration. Pt understands and would like to schedule CAST placement- GRAVITY equinus for 4 weeks with crutches/knee scooter    Future Appointments  Date Time Provider Department Center   9/28/2017 9:30 AM LABORATORY, [facility] LAB [facility]   9/28/2017 9:40 AM SPECIMEN, [facility] SPECLAB [facility]   10/5/2017 10:00 AM Yuval Medina MD [facility] NEPHRO [facility]   10/6/2017 11:00 AM Adelaida rDake NP [facility]   11/13/2017 10:00 AM Oliver Elmore MD ON[facility] IN  [facility]   5/10/2018 1:00 PM PULMONARY LAB, [facility] PULMLAB [facility]   5/11/2018 8:45 AM [facility] XR2 [facility] XRAY [facility]   5/11/2018 9:00 AM PULMONARY LAB, [facility]   5/11/2018 9:40 AM Osmani Walker MD [facility] PULMSVC [facility]         Report Electronically Signed By:  Tawny Armstrong DPM   Podiatric Medicine & Surgery  [facility]  9/11/2017              ;

## 2017-09-11 NOTE — PATIENT INSTRUCTIONS
Dear Ana Maria Teague is a 56 y.o. female,     You have received a CAST to your affected leg today. Follow the below instructions:    ACTIVITY  · Restrict activities and rest. This is important for adequate healing  · For bathing you may sponge bathe, or cover your cast/bandage with a trash bag and tape above the knee with duct tape.  There are also commercially available cast protectors to purchase at the pharmacy ([employer] or [employer]ep[employer], ~$15.00)  · Do not get your cast/ splint WET.  If it gets SOAKED, IT WILL NEED TO BE CHANGED.  Do NOT sit with a wet bandage.  If it is during office hours, come to our clinic. If after hours, page the podiatry physician on call at [phone].  · You must remain not place any WEIGHT (No Weight Bearing) to your injured foot with use of crutches or wheelchair for assistance. You may use crutches or also have the option of purchasing a knee scooter (available on [employer], [employer], [employer]) to assist you with getting around.   · Keep the dressing / cast clean and dry until follow up with your surgeon. (If it becomes wet, you must inform your doctor immediately)    I placed an order for a KNEE SCOOTER as an optional means to remain non weight bearing to your injured leg without using crutches. Some insurances will cover this. Contat your insurance to find out. Otherwise, price is typically $50.00 a month which is a very good price compared to competitors.     [facility] [employer]  Address  [facility] [address] [place] [id number]   Contact  Phone: [phone]  Toll Free: [phone]  Fax: [phone]   Knee scooters for rent: Appx $50.00/month; Most insurances are accepted

## 2017-09-13 RX ORDER — MYCOPHENOLATE MOFETIL 250 MG/1
CAPSULE ORAL
Qty: 120 CAPSULE | Refills: 11 | Status: SHIPPED | OUTPATIENT
Start: 2017-09-13 | End: 2018-10-08 | Stop reason: SDUPTHER

## 2017-09-19 ENCOUNTER — TELEPHONE (OUTPATIENT)
Dept: NEUROLOGY | Facility: CLINIC | Age: 57
End: 2017-09-19

## 2017-09-19 NOTE — TELEPHONE ENCOUNTER
----- Message from Isauro TAVERAS Marky sent at 9/19/2017  9:42 AM CDT -----  Contact: pt   States she's calling rg being out of her med and can be reached at [phone]//[email or web address]     1. What is the name of the medication you are requesting? Lisinopril   2. What is the dose? 30mg  3. How do you take the medication? Orally, topically, etc? Orally   4. How often do you take this medication? Once a day at night   5. Do you need a 30 day or 90 day supply? 90 days  6. How many refills are you requesting? 2- 3   7. What is your preferred pharmacy and location of the pharmacy?   8. Who can we contact with further questions? Pt    [facility] [address] [place] [address] KADIE HOLMAN  [address] [facility]  [address] [address]  Phone: [phone] Fax: [phone]

## 2017-09-25 ENCOUNTER — TELEPHONE (OUTPATIENT)
Dept: PULMONOLOGY | Facility: CLINIC | Age: 57
End: 2017-09-25

## 2017-09-25 NOTE — TELEPHONE ENCOUNTER
----- Message from Micaela Johnson sent at 9/25/2017  9:56 AM CDT -----  Contact: Pt  Pt request call from nurse because she is having a burning sensation in her chest and states it is not heart burn/sore throat and a cough with no appetite and wants to discuss, please contact pt at [phone]

## 2017-09-25 NOTE — TELEPHONE ENCOUNTER
Pt states she's having chest burning and discomfort with sob. Encouraged pt to go to ER for tx. She said she would.

## 2017-09-28 ENCOUNTER — CLINICAL SUPPORT (OUTPATIENT)
Dept: PODIATRY | Facility: CLINIC | Age: 57
End: 2017-09-28
Payer: MEDICARE

## 2017-09-28 ENCOUNTER — LAB VISIT (OUTPATIENT)
Dept: LAB | Facility: HOSPITAL | Age: 57
End: 2017-09-28
Attending: INTERNAL MEDICINE
Payer: MEDICARE

## 2017-09-28 DIAGNOSIS — M77.51 RETROCALCANEAL BURSITIS (BACK OF HEEL), RIGHT: ICD-10-CM

## 2017-09-28 DIAGNOSIS — Z92.25 HISTORY OF IMMUNOSUPPRESSION THERAPY: ICD-10-CM

## 2017-09-28 DIAGNOSIS — Z94.0 KIDNEY REPLACED BY TRANSPLANT: ICD-10-CM

## 2017-09-28 DIAGNOSIS — M76.61 ACHILLES TENDINITIS OF RIGHT LOWER EXTREMITY: Primary | ICD-10-CM

## 2017-09-28 DIAGNOSIS — Z94.0 S/P KIDNEY TRANSPLANT: ICD-10-CM

## 2017-09-28 LAB
ALBUMIN SERPL BCP-MCNC: 3.4 G/DL
ALP SERPL-CCNC: 135 U/L
ALT SERPL W/O P-5'-P-CCNC: 11 U/L
ANION GAP SERPL CALC-SCNC: 13 MMOL/L
ANION GAP SERPL CALC-SCNC: 13 MMOL/L
AST SERPL-CCNC: 16 U/L
BASOPHILS # BLD AUTO: 0.04 K/UL
BASOPHILS # BLD AUTO: 0.04 K/UL
BASOPHILS NFR BLD: 0.4 %
BASOPHILS NFR BLD: 0.4 %
BILIRUB DIRECT SERPL-MCNC: 0.2 MG/DL
BILIRUB SERPL-MCNC: 0.5 MG/DL
BUN SERPL-MCNC: 14 MG/DL
BUN SERPL-MCNC: 14 MG/DL
CALCIUM SERPL-MCNC: 9.7 MG/DL
CALCIUM SERPL-MCNC: 9.7 MG/DL
CHLORIDE SERPL-SCNC: 104 MMOL/L
CHLORIDE SERPL-SCNC: 104 MMOL/L
CO2 SERPL-SCNC: 23 MMOL/L
CO2 SERPL-SCNC: 23 MMOL/L
CREAT SERPL-MCNC: 1 MG/DL
CREAT SERPL-MCNC: 1 MG/DL
DIFFERENTIAL METHOD: ABNORMAL
DIFFERENTIAL METHOD: ABNORMAL
EOSINOPHIL # BLD AUTO: 0.2 K/UL
EOSINOPHIL # BLD AUTO: 0.2 K/UL
EOSINOPHIL NFR BLD: 2.3 %
EOSINOPHIL NFR BLD: 2.3 %
ERYTHROCYTE [DISTWIDTH] IN BLOOD BY AUTOMATED COUNT: 14.8 %
ERYTHROCYTE [DISTWIDTH] IN BLOOD BY AUTOMATED COUNT: 14.8 %
EST. GFR  (AFRICAN AMERICAN): >60 ML/MIN/1.73 M^2
EST. GFR  (AFRICAN AMERICAN): >60 ML/MIN/1.73 M^2
EST. GFR  (NON AFRICAN AMERICAN): >60 ML/MIN/1.73 M^2
EST. GFR  (NON AFRICAN AMERICAN): >60 ML/MIN/1.73 M^2
GLUCOSE SERPL-MCNC: 131 MG/DL
GLUCOSE SERPL-MCNC: 131 MG/DL
HCT VFR BLD AUTO: 38.4 %
HCT VFR BLD AUTO: 38.4 %
HGB BLD-MCNC: 12.1 G/DL
HGB BLD-MCNC: 12.1 G/DL
LYMPHOCYTES # BLD AUTO: 2.7 K/UL
LYMPHOCYTES # BLD AUTO: 2.7 K/UL
LYMPHOCYTES NFR BLD: 25.9 %
LYMPHOCYTES NFR BLD: 25.9 %
MAGNESIUM SERPL-MCNC: 1.6 MG/DL
MAGNESIUM SERPL-MCNC: 1.6 MG/DL
MCH RBC QN AUTO: 26 PG
MCH RBC QN AUTO: 26 PG
MCHC RBC AUTO-ENTMCNC: 31.5 G/DL
MCHC RBC AUTO-ENTMCNC: 31.5 G/DL
MCV RBC AUTO: 83 FL
MCV RBC AUTO: 83 FL
MONOCYTES # BLD AUTO: 0.6 K/UL
MONOCYTES # BLD AUTO: 0.6 K/UL
MONOCYTES NFR BLD: 5.3 %
MONOCYTES NFR BLD: 5.3 %
NEUTROPHILS # BLD AUTO: 6.9 K/UL
NEUTROPHILS # BLD AUTO: 6.9 K/UL
NEUTROPHILS NFR BLD: 66.1 %
NEUTROPHILS NFR BLD: 66.1 %
PHOSPHATE SERPL-MCNC: 3.3 MG/DL
PHOSPHATE SERPL-MCNC: 3.3 MG/DL
PLATELET # BLD AUTO: 271 K/UL
PLATELET # BLD AUTO: 271 K/UL
PMV BLD AUTO: 11.5 FL
PMV BLD AUTO: 11.5 FL
POTASSIUM SERPL-SCNC: 4 MMOL/L
POTASSIUM SERPL-SCNC: 4 MMOL/L
PROT SERPL-MCNC: 7.7 G/DL
RBC # BLD AUTO: 4.65 M/UL
RBC # BLD AUTO: 4.65 M/UL
SODIUM SERPL-SCNC: 140 MMOL/L
SODIUM SERPL-SCNC: 140 MMOL/L
URATE SERPL-MCNC: 5.2 MG/DL
WBC # BLD AUTO: 10.56 K/UL
WBC # BLD AUTO: 10.56 K/UL

## 2017-09-28 PROCEDURE — 87799 DETECT AGENT NOS DNA QUANT: CPT

## 2017-09-28 PROCEDURE — 29405 APPL SHORT LEG CAST: CPT | Mod: PBBFAC,PO | Performed by: PODIATRIST

## 2017-09-28 PROCEDURE — 99499 UNLISTED E&M SERVICE: CPT | Mod: S$PBB,,, | Performed by: PODIATRIST

## 2017-09-28 PROCEDURE — 29405 APPL SHORT LEG CAST: CPT | Mod: S$PBB,RT,, | Performed by: PODIATRIST

## 2017-09-28 PROCEDURE — 83735 ASSAY OF MAGNESIUM: CPT

## 2017-09-28 PROCEDURE — 84550 ASSAY OF BLOOD/URIC ACID: CPT

## 2017-09-28 PROCEDURE — 80197 ASSAY OF TACROLIMUS: CPT

## 2017-09-28 PROCEDURE — 82247 BILIRUBIN TOTAL: CPT

## 2017-09-28 PROCEDURE — 80069 RENAL FUNCTION PANEL: CPT

## 2017-09-28 PROCEDURE — 36415 COLL VENOUS BLD VENIPUNCTURE: CPT | Mod: PO

## 2017-09-28 PROCEDURE — 84075 ASSAY ALKALINE PHOSPHATASE: CPT

## 2017-09-28 PROCEDURE — 85025 COMPLETE CBC W/AUTO DIFF WBC: CPT

## 2017-09-28 NOTE — PROGRESS NOTES
S:/ Ana Maria Teague is a 56 y.o. female patient seen today for cast application 2/2 to achilles tendonitis.    O:/ skin texture and turgor WNL  Pt able to wiggle digits    A/P:     Verbal consent was obtained to proceed with SLC application. A short leg cast on the right lower extremity- gravity equinus was applied to the affected procedure without incident. Pt denies any paresthesias, CFT to digits were within normal limits upon discharge. Pt tolerated the procedure well. The patient has been provided at home care instructions and will report to clinic as scheduled for follow up visit.    Report Electronically Signed By:  Tawny Armstrong DPM   Podiatric Medicine & Surgery  [facility]  9/28/2017  10:15 AM

## 2017-09-28 NOTE — PATIENT INSTRUCTIONS
[employer]    Address  [address]  [place] [address]  Contact  Phone: [phone]  Toll [phone]    Knee scooters for rent: Appx $50.00/month

## 2017-09-29 LAB
TACROLIMUS BLD-MCNC: 5.3 NG/ML
TACROLIMUS BLD-MCNC: 5.3 NG/ML

## 2017-10-02 ENCOUNTER — TELEPHONE (OUTPATIENT)
Dept: NEUROLOGY | Facility: CLINIC | Age: 57
End: 2017-10-02

## 2017-10-02 DIAGNOSIS — Z94.0 KIDNEY TRANSPLANTED: Primary | ICD-10-CM

## 2017-10-02 LAB
BK VIRUS DNA PCR, QUANT, BLOOD: <125 COPIES/ML
BK VIRUS DNA, BLOOD: NOT DETECTED
LOG BKV COPIES/ML: <2.1 LOG (10) COPIES/ML

## 2017-10-02 NOTE — TELEPHONE ENCOUNTER
Pt. States she just had a cast put on her leg and has not learned how to walk well with it on, would like to reschedule appt. If it's ok with Dr. Medina, please advise

## 2017-10-02 NOTE — TELEPHONE ENCOUNTER
----- Message from Tracy Cedillo sent at 10/2/2017  8:16 AM CDT -----  Contact: pt  Please call pt @ [phone] regarding appt today, pt need to speak with nurse before canceling appt for today.

## 2017-10-03 DIAGNOSIS — M79.671 BILATERAL FOOT PAIN: Primary | ICD-10-CM

## 2017-10-03 DIAGNOSIS — Z97.8 CAST IN PLACE ON LOWER EXTREMITY: Primary | ICD-10-CM

## 2017-10-03 DIAGNOSIS — M79.672 BILATERAL FOOT PAIN: Primary | ICD-10-CM

## 2017-10-04 ENCOUNTER — TELEPHONE (OUTPATIENT)
Dept: PODIATRY | Facility: CLINIC | Age: 57
End: 2017-10-04

## 2017-10-04 NOTE — TELEPHONE ENCOUNTER
----- Message from Ursula Peters sent at 10/4/2017  2:08 PM CDT -----  Contact: Lenora with [employer]  Please call Lenora @ [phone] concerning an order for patient a wheel chair. Thanks, sowmya

## 2017-10-05 ENCOUNTER — TELEPHONE (OUTPATIENT)
Dept: PODIATRY | Facility: CLINIC | Age: 57
End: 2017-10-05

## 2017-10-05 NOTE — TELEPHONE ENCOUNTER
----- Message from Jeison Hdez sent at 10/5/2017 11:01 AM CDT -----  Contact: tyra Newman/ [employer]  She's calling in regard to the orders sent, ph# [phone]

## 2017-10-05 NOTE — TELEPHONE ENCOUNTER
Returned paty's call, who stated that clinic note will need to state why pt needs wheelchair for insurance purposes. Informed Paty will make Dr. Patti DPM aware and will fax note once completed. Call ended pleasantly.  Elida James MA  Office of Dr. Tawny Armstrong DPM   Podiatry Department, [facility]

## 2017-10-26 ENCOUNTER — OFFICE VISIT (OUTPATIENT)
Dept: PODIATRY | Facility: CLINIC | Age: 57
End: 2017-10-26
Payer: MEDICARE

## 2017-10-26 VITALS — SYSTOLIC BLOOD PRESSURE: 149 MMHG | HEART RATE: 81 BPM | DIASTOLIC BLOOD PRESSURE: 94 MMHG

## 2017-10-26 DIAGNOSIS — L89.891 PRESSURE ULCER OF TOE OF RIGHT FOOT, STAGE 1: ICD-10-CM

## 2017-10-26 DIAGNOSIS — M76.61 TENDONITIS, ACHILLES, RIGHT: Primary | ICD-10-CM

## 2017-10-26 PROCEDURE — 99999 PR PBB SHADOW E&M-EST. PATIENT-LVL II: CPT | Mod: PBBFAC,,, | Performed by: PODIATRIST

## 2017-10-26 PROCEDURE — 99212 OFFICE O/P EST SF 10 MIN: CPT | Mod: PBBFAC,PO | Performed by: PODIATRIST

## 2017-10-26 PROCEDURE — 29700 RMVL/BIVLV GAUNTLET BOOT/CST: CPT | Mod: PBBFAC,PO | Performed by: PODIATRIST

## 2017-10-26 PROCEDURE — 29700 RMVL/BIVLV GAUNTLET BOOT/CST: CPT | Mod: S$PBB,,, | Performed by: PODIATRIST

## 2017-10-26 PROCEDURE — 99213 OFFICE O/P EST LOW 20 MIN: CPT | Mod: 25,S$PBB,, | Performed by: PODIATRIST

## 2017-10-26 NOTE — PROGRESS NOTES
Podiatry Note  CHIEF COMPLAINT     Follow up achilles tendonitis/cast removal    HPI  Ana Maria Teague is a 57 y.o. female w/ PMH of CKD, DM2,  s/p kidney transplant and other medical problems, who presents today complaining of painful achilles /  posterior aspect of the right heel.  Pt states she has had this problem for > 6 months. She has been treated by Dr. Herrera for > 6 months. She is here for second opinion, due to consistent pain. Pt has had physical therapy and been immobilized in a boot. She has not noted improvement. She states pain is constant. She rates pain 6/10. She is currently on disability.  Symptoms are aggravated with weight bearing. Symptoms were relieved with steroid dosepak, but her blood sugars increased so she had to stop the steroids.    8/23/17  Pt is here today for follow up achilles tendonitis. She has started iontophoressis and has noted improvement in symptoms. PT is performed at [facility]. Pain is on average 7/10.  She is wearing heel lift and tennis shoes. She states pain still 7/10 but this is intermittent. Patient denies other pedal complaints at this time.    9/11/17  Pt is her for follow up. She states pain is worsening on right heel. She is doing a lot of movement as she has to take care of daughter and . She is retired-unemployed. She states she is not improving with physical therapy.     10/26/2017  Pt is here for follow up status post CAST. She has been ambulating in CAST although she should have been NWB. She denies any pain. She has no further complaints.    Hemoglobin A1C   Date Value Ref Range Status   09/06/2017 7.5 (H) 4.0 - 5.6 % Final     Comment:     According to ADA guidelines, hemoglobin A1c <7.0% represents  optimal control in non-pregnant diabetic patients. Different  metrics may apply to specific patient populations.   Standards of Medical Care in Diabetes-2016.  For the purpose of screening for the presence of diabetes:  <5.7%      Consistent with the absence of diabetes  5.7-6.4%  Consistent with increasing risk for diabetes   (prediabetes)  >or=6.5%  Consistent with diabetes  Currently, no consensus exists for use of hemoglobin A1c  for diagnosis of diabetes for children.  This Hemoglobin A1c assay has significant interference with fetal   hemoglobin   (HbF). The results are invalid for patients with abnormal amounts of   HbF,   including those with known Hereditary Persistence   of Fetal Hemoglobin. Heterozygous hemoglobin variants (HbAS, HbAC,   HbAD, HbAE, HbA2) do not significantly interfere with this assay;   however, presence of multiple variants in a sample may impact the %   interference.     06/21/2017 9.1 (H) 4.0 - 5.6 % Final     Comment:     According to ADA guidelines, hemoglobin A1c <7.0% represents  optimal control in non-pregnant diabetic patients. Different  metrics may apply to specific patient populations.   Standards of Medical Care in Diabetes-2016.  For the purpose of screening for the presence of diabetes:  <5.7%     Consistent with the absence of diabetes  5.7-6.4%  Consistent with increasing risk for diabetes   (prediabetes)  >or=6.5%  Consistent with diabetes  Currently, no consensus exists for use of hemoglobin A1c  for diagnosis of diabetes for children.  This Hemoglobin A1c assay has significant interference with fetal   hemoglobin   (HbF). The results are invalid for patients with abnormal amounts of   HbF,   including those with known Hereditary Persistence   of Fetal Hemoglobin. Heterozygous hemoglobin variants (HbAS, HbAC,   HbAD, HbAE, HbA2) do not significantly interfere with this assay;   however, presence of multiple variants in a sample may impact the %   interference.     03/08/2017 7.8 (H) 4.5 - 6.2 % Final     Comment:     According to ADA guidelines, hemoglobin A1C <7.0% represents  optimal control in non-pregnant diabetic patients.  Different  metrics may apply to specific populations.   Standards  of Medical Care in Diabetes - 2016.  For the purpose of screening for the presence of diabetes:  <5.7%     Consistent with the absence of diabetes  5.7-6.4%  Consistent with increasing risk for diabetes   (prediabetes)  >or=6.5%  Consistent with diabetes  Currently no consensus exists for use of hemoglobin A1C  for diagnosis of diabetes for children.          REVIEW OF SYSTEMS   General: This patient is well-developed, well-nourished and appears stated age, well-oriented to person, place and time, and cooperative and pleasant on today's visit  Constitutional: Negative for chills and fever.   Respiratory: Negative for shortness of breath.    Cardiovascular: Negative for chest pain, palpitations, orthopnea, claudication and leg swelling.    Gastrointestinal: Negative for diarrhea, nausea and vomiting.   Musculoskeletal: Negative for joint pain.  Positive for tendon pain posterior ankle  Skin: Negative for rash, skin, or nail changes   Neurological: Negative for dizziness, tingling, sensory change, focal weakness and weakness.   Peripheral Vascular: no claudication or cyanosis  Psychiatric/Behavioral: Negative for altered mental status       PHYSICAL EXAM  Vitals:    10/26/17 1109   BP: (!) 149/94   Pulse: 81       General: This patient is well-developed, well-nourished and appears stated age, well-oriented to person, place and time, and cooperative and pleasant on today's visit      LOWER EXTREMITY  Vascular exam:   · Dorsalis pedis and posterior tibial pulses palpable 2/4 bilaterally.   · Capillary refill time immediate to the toes.   · Feet are warm to the touch. Skin temperature warm to warm from proximally to distally   · There are no varicosities, telangiectasias noted to bilateral foot and ankle regions.   · There are no ecchymoses noted to bilateral foot and ankle regions.   · There is mild gross lower extremity edema.    Dermatologic exam:   · Skin moist with healthy texture and turgor.  · There are no open  ulcerations, lacerations, or fissures to bilateral foot and ankle regions. There are no signs of infection as there is no erythema, no proximal-extending lymphangiitis, no fluctuance, or crepitus noted on palpation to bilateral foot and ankle regions.   · There is no interdigital maceration.   · There is CLOSED abrasion on right dorsal aspect fourth digit and lateral fifth  · There are no hyperkeratotic lesions noted to feet. Nails are well-trimmed.    Neurologic exam:  · Epicritic sensation is intact as the patient is able to sense light touch to bilateral foot and ankle regions.   · Achilles and patellar deep tendon reflexes intact  · Babinski reflex absent    Musculoskeletal/Orthopedic exam:   · NEGATIVE TTP posterior aspect heel at insertion of achilles and  NEGATIVE TTP of achilles tendon proper.  Mild prominence noted at insertion RIGHT, neg defect palpated  · Muscle strength AT/EHL/EDL/PT: 5/5; Achilles/Gastroc/Soleus: 5/5; PB/PL: 5/5 Muscle tone is normal.  · Ankle joint ROM  B/L supple DF/PF, non-crepitus  · STJ ROM supple inv/ev, non crepitus b/l.  · On stance/Gait: able to stand, heel to toe gait. Able to weightbear, ambulate without assistance      IMAGING   Reviewed by me and I agree with radiologist findings, 3 views of foot/ankle, reveal: posterior calcaneal enthesophyte         Results for orders placed during the hospital encounter of 12/08/16   X-Ray Foot Complete Right    Narrative Right foot three views.    Findings: There is generalized osteopenia and multi-articular degenerative change.  Dorsal and plantar calcaneal spurs.  Small 5th metatarsal bunionette.  No acute fracture or dislocation.    Impression  As above.      Electronically signed by: LUCI COOK MD  Date:     12/08/16  Time:    15:09      Right       IMAGING:  Technique: Standard multiplanar multisequence imaging of the right ankle was performed.    Findings: There is slight thickening of the distal Achilles tendon with  intermediate to mildly hyperintense intrasubstance signal alteration consistent with tendinopathy and or tendinitis.  There is mild peritendinous edema as well as some reactive marrow edema within the underlying calcaneus.  No sanaz Achilles tendon disruption or retraction is demonstrated.    There is a small plantar calcaneal spur.  After aponeurosis is intact.    The flexor, extensor, peroneal tendons are unremarkable.    No evidence for medial or lateral collateral ligament injury.    Talar dome is intact without evidence of osteochondral defect or avascular necrosis.    There are small tibiotalar and subtalar joint effusions.   Impression      Findings consistent with mild Achilles tendinopathy and/or tendinitis.  No sanaz tendon disruption.       ASSESSMENT  Tendonitis, Achilles, right  -     Ambulatory Referral to Physical/Occupational Therapy    Pressure ulcer of toe of right foot, stage 1        PLAN    1. Patient was educated about clinical and imaging findings, and verbalizes understanding of above.  2. Treatment plan:     Verbal consent was obtained to proceed with removal of SLC . Pt tolerated the procedure well. The patient has been provided at home care instructions.     Transition into CAM boot x 1 week with heel lift and then tennis shoes  Physical therapy order placed per patient preference BR PT.    Neosporin and padding to pressure ulcer on toe/ offload area.     Future Appointments  Date Time Provider Department Center   11/13/2017 10:00 AM Oliver Elmore MD [facility] IN PN  Medical [facility]   11/20/2017 8:05 AM LABORATORY, [facility] LAB [facility]   11/20/2017 8:40 AM SPECIMEN, [facility] SPECLAB [facility]   11/27/2017 9:00 AM Yuval Medina MD [facility] NEPHRO [facility]   5/10/2018 1:00 PM PULMONARY LAB, [facility] PULMLAB [facility]   5/11/2018 8:45 AM [facility]   5/11/2018 9:00 AM PULMONARY LAB, [facility]ULMLAB [facility]   5/11/2018 9:40 AM Osmani Walker MD [facility] PULMSVC [facility]         Report  Electronically Signed By:  Tawny Armstrong DPM   Podiatric Medicine & Surgery  [facility]  10/26/2017              ;

## 2017-11-06 ENCOUNTER — TELEPHONE (OUTPATIENT)
Dept: PODIATRY | Facility: CLINIC | Age: 57
End: 2017-11-06

## 2017-11-06 DIAGNOSIS — M79.671 RIGHT FOOT PAIN: ICD-10-CM

## 2017-11-06 DIAGNOSIS — M25.571 RIGHT ANKLE PAIN, UNSPECIFIED CHRONICITY: Primary | ICD-10-CM

## 2017-11-06 NOTE — TELEPHONE ENCOUNTER
Returned call to pt who called to request appt for continued right foot/ankle pain post cast removal. Offered and accepted appt with Dr. Leonor DPM 11/9/17 at 1p at the [facility] with x-rays prior. Also scheduled 11/29/17 at 3:20p with Dr. Armstrong.

## 2017-11-09 ENCOUNTER — HOSPITAL ENCOUNTER (OUTPATIENT)
Dept: RADIOLOGY | Facility: HOSPITAL | Age: 57
Discharge: HOME | End: 2017-11-09
Attending: PODIATRIST
Payer: MEDICARE

## 2017-11-09 ENCOUNTER — OFFICE VISIT (OUTPATIENT)
Dept: PODIATRY | Facility: CLINIC | Age: 57
End: 2017-11-09
Payer: MEDICARE

## 2017-11-09 ENCOUNTER — OFFICE VISIT (OUTPATIENT)
Dept: URGENT CARE | Facility: CLINIC | Age: 57
End: 2017-11-09
Payer: MEDICARE

## 2017-11-09 ENCOUNTER — TELEPHONE (OUTPATIENT)
Dept: INTERNAL MEDICINE | Facility: CLINIC | Age: 57
End: 2017-11-09

## 2017-11-09 VITALS
SYSTOLIC BLOOD PRESSURE: 156 MMHG | HEIGHT: 67 IN | DIASTOLIC BLOOD PRESSURE: 96 MMHG | WEIGHT: 244.5 LBS | HEART RATE: 87 BPM | BODY MASS INDEX: 38.37 KG/M2

## 2017-11-09 VITALS
OXYGEN SATURATION: 96 % | WEIGHT: 244.5 LBS | BODY MASS INDEX: 38.37 KG/M2 | TEMPERATURE: 97 F | HEIGHT: 67 IN | DIASTOLIC BLOOD PRESSURE: 100 MMHG | HEART RATE: 88 BPM | SYSTOLIC BLOOD PRESSURE: 160 MMHG

## 2017-11-09 DIAGNOSIS — M25.571 RIGHT ANKLE PAIN, UNSPECIFIED CHRONICITY: ICD-10-CM

## 2017-11-09 DIAGNOSIS — H81.11 BENIGN PAROXYSMAL POSITIONAL VERTIGO OF RIGHT EAR: Primary | ICD-10-CM

## 2017-11-09 DIAGNOSIS — M76.61 TENDONITIS, ACHILLES, RIGHT: ICD-10-CM

## 2017-11-09 DIAGNOSIS — M25.571 RIGHT ANKLE PAIN, UNSPECIFIED CHRONICITY: Primary | ICD-10-CM

## 2017-11-09 DIAGNOSIS — M92.61 HAGLUND'S DEFORMITY OF RIGHT HEEL: ICD-10-CM

## 2017-11-09 DIAGNOSIS — M79.671 RIGHT FOOT PAIN: ICD-10-CM

## 2017-11-09 DIAGNOSIS — M77.51 RETROCALCANEAL BURSITIS (BACK OF HEEL), RIGHT: ICD-10-CM

## 2017-11-09 PROCEDURE — 99213 OFFICE O/P EST LOW 20 MIN: CPT | Mod: PBBFAC,PO | Performed by: PHYSICIAN ASSISTANT

## 2017-11-09 PROCEDURE — 73630 X-RAY EXAM OF FOOT: CPT | Mod: 26,RT,, | Performed by: RADIOLOGY

## 2017-11-09 PROCEDURE — 73610 X-RAY EXAM OF ANKLE: CPT | Mod: 26,RT,, | Performed by: RADIOLOGY

## 2017-11-09 PROCEDURE — 99214 OFFICE O/P EST MOD 30 MIN: CPT | Mod: S$PBB,,, | Performed by: PODIATRIST

## 2017-11-09 PROCEDURE — 99214 OFFICE O/P EST MOD 30 MIN: CPT | Mod: S$PBB,,, | Performed by: PHYSICIAN ASSISTANT

## 2017-11-09 PROCEDURE — 99999 PR PBB SHADOW E&M-EST. PATIENT-LVL V: CPT | Mod: PBBFAC,,, | Performed by: PODIATRIST

## 2017-11-09 PROCEDURE — 99215 OFFICE O/P EST HI 40 MIN: CPT | Mod: PBBFAC,25,27 | Performed by: PODIATRIST

## 2017-11-09 PROCEDURE — 73630 X-RAY EXAM OF FOOT: CPT | Mod: TC,RT

## 2017-11-09 PROCEDURE — 99999 PR PBB SHADOW E&M-EST. PATIENT-LVL III: CPT | Mod: PBBFAC,,, | Performed by: PHYSICIAN ASSISTANT

## 2017-11-09 PROCEDURE — 73610 X-RAY EXAM OF ANKLE: CPT | Mod: TC,RT

## 2017-11-09 RX ORDER — MECLIZINE HCL 12.5 MG 12.5 MG/1
12.5 TABLET ORAL EVERY 8 HOURS PRN
Qty: 14 TABLET | Refills: 0 | Status: SHIPPED | OUTPATIENT
Start: 2017-11-09 | End: 2017-11-27

## 2017-11-09 RX ORDER — PROMETHAZINE HYDROCHLORIDE 25 MG/1
25 TABLET ORAL EVERY 6 HOURS PRN
Qty: 14 TABLET | Refills: 0 | Status: SHIPPED | OUTPATIENT
Start: 2017-11-09 | End: 2019-12-23

## 2017-11-09 NOTE — PATIENT INSTRUCTIONS
Benign Paroxysmal Positional Vertigo     Your health care provider may move your head in certain ways to treat your BPPV.     Benign paroxysmal positional vertigo (BPPV) is a problem with the inner ear. The inner ear contains the vestibular system. This system is what helps you keep your balance. BPPV causes a feeling of spinning. It is a common problem of the vestibular system.  Understanding the vestibular system  The vestibular system of the ear is made up of very tiny parts. They include the utricle, saccule, and semicircular canals. The utricle is a tiny organ that contains calcium crystals. In some people, the crystals can move into the semicircular canals. When this happens, the system no longer works as it should. This causes BPPV. Benign means it is not life-threatening. Paroxysmal means it happens suddenly. Positional means that it happens when you move your head. Vertigo is a feeling of spinning.  What causes BPPV?  Causes include injury to your head or neck. Other problems with the vestibular system may cause BPPV. In many people, the cause of BPPV is not known.  Symptoms of BPPV  You many have repeated feelings of spinning (vertigo). The vertigo usually lasts less than 1 minute. Some movements, suchas rolling over in bed, can bring on vertigo.  Diagnosing BPPV  Your primary health care provider may diagnose and treat your BPPV. Or you may see an ear, nose, and throat doctor (otolaryngologist). In some cases, you may see a nervous system doctor (neurologist).  The health care provider will ask about your symptoms and your medical history. He or she will examine you. You may have hearing and balance tests. As part of the exam, your health care provider may have you move your head and body in certain ways. If you have BPPV, the movements can bring on vertigo. Your provider will also look for abnormal movements of your eyes. You may have other tests to check your vestibular or nervous systems.  Treatment  for BPPV  Your health care provider may try to move the calcium crystals. This is done by having you move your head and neck in certain ways. This treatment is safe and often works well. You may also be told to do these movements at home. You may still have vertigo for a few weeks. Your health care provider will recheck your symptoms, usually in about a month. Special physical therapy may also be part of treatment. In rare cases surgery may be needed for BPPV that does not go away.     When to call the health care provider  Call your health care provider right away if you have any of these:  · Symptoms that do not go away with treatment  · Symptoms that get worse  · New symptoms   Date Last Reviewed: 3/19/2015  © 5653-5491 [employer]. [address] [place] [address], [place] [address]. All rights reserved. This information is not intended as a substitute for professional medical care. Always follow your healthcare professional's instructions.      Follow up with PCP in 2-3 days if no improvement in symptoms. You may need referral to ENT.
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
## 2017-11-09 NOTE — TELEPHONE ENCOUNTER
----- Message from Sosa Banks sent at 11/9/2017  7:46 AM CST -----  Contact: pt   Call pt regarding getting a med called in.    [phone]

## 2017-11-14 ENCOUNTER — OFFICE VISIT (OUTPATIENT)
Dept: OTOLARYNGOLOGY | Facility: CLINIC | Age: 57
End: 2017-11-14
Payer: MEDICARE

## 2017-11-14 ENCOUNTER — CLINICAL SUPPORT (OUTPATIENT)
Dept: AUDIOLOGY | Facility: CLINIC | Age: 57
End: 2017-11-14
Payer: MEDICARE

## 2017-11-14 VITALS
TEMPERATURE: 97 F | SYSTOLIC BLOOD PRESSURE: 151 MMHG | BODY MASS INDEX: 37.22 KG/M2 | DIASTOLIC BLOOD PRESSURE: 100 MMHG | WEIGHT: 237.63 LBS | HEART RATE: 89 BPM

## 2017-11-14 DIAGNOSIS — R42 VERTIGO: Primary | ICD-10-CM

## 2017-11-14 DIAGNOSIS — H81.11 BPPV (BENIGN PAROXYSMAL POSITIONAL VERTIGO), RIGHT: Primary | ICD-10-CM

## 2017-11-14 PROCEDURE — 99999 PR PBB SHADOW E&M-EST. PATIENT-LVL IV: CPT | Mod: PBBFAC,,, | Performed by: ORTHOPAEDIC SURGERY

## 2017-11-14 PROCEDURE — 99204 OFFICE O/P NEW MOD 45 MIN: CPT | Mod: S$PBB,,, | Performed by: ORTHOPAEDIC SURGERY

## 2017-11-14 PROCEDURE — 92567 TYMPANOMETRY: CPT | Mod: PBBFAC,PO | Performed by: AUDIOLOGIST

## 2017-11-14 PROCEDURE — 92557 COMPREHENSIVE HEARING TEST: CPT | Mod: PBBFAC,PO | Performed by: AUDIOLOGIST

## 2017-11-14 PROCEDURE — 99214 OFFICE O/P EST MOD 30 MIN: CPT | Mod: PBBFAC,PO | Performed by: ORTHOPAEDIC SURGERY

## 2017-11-14 NOTE — PROGRESS NOTES
Subjective:       Patient ID: Ana Maria Teague is a 57 y.o. female.    Chief Complaint: Dizziness (Review audiogram)    Patient is a very pleasant 57 y.o. female here to see me today for the first time for evaluation of dizziness.   She first had issues in 2009, and that resolved with antivert and phenergan.  This episode, she reports that the symptoms have been present for the last 1 week.  On average, the patent reports symptoms that occur approximately 2 time each days.  She describes the dizziness as whirling and spinning and says that it lasts seconds.  She has noted that nothing specific acts as a trigger.  She denies otalgia, otorrhea, tinnitus, hearing loss.  She has started any new medications, and has not had any recent dietary changes.  She says that with the spinning sensation she had a headache and blurry vision, she also has found that she is sensitive to bright lights.  She has been taking antivert once or twice daily, most recently this morning.  She had an Epley done at that time, and now feels that her dizziness is better than it was at that time.      Review of Systems   Constitutional: Negative for chills, fatigue, fever and unexpected weight change.   HENT: Positive for hearing loss. Negative for congestion, dental problem, ear discharge, ear pain, facial swelling, nosebleeds, postnasal drip, rhinorrhea, sinus pressure, sneezing, sore throat, tinnitus, trouble swallowing and voice change.    Eyes: Negative for redness, itching and visual disturbance.   Respiratory: Negative for cough, choking and wheezing.    Cardiovascular: Negative for chest pain and palpitations.   Gastrointestinal: Negative for abdominal pain.        No reflux.   Musculoskeletal: Negative for gait problem.   Skin: Negative for rash.   Neurological: Positive for dizziness. Negative for light-headedness and headaches.       Objective:      Physical Exam   Constitutional: She is oriented to person, place, and time. She appears  well-developed and well-nourished. No distress.   HENT:   Head: Normocephalic and atraumatic.   Right Ear: Tympanic membrane, external ear and ear canal normal.   Left Ear: Tympanic membrane, external ear and ear canal normal.   Nose: Nose normal. No mucosal edema, rhinorrhea, nasal deformity or septal deviation. No epistaxis. Right sinus exhibits no maxillary sinus tenderness and no frontal sinus tenderness. Left sinus exhibits no maxillary sinus tenderness and no frontal sinus tenderness.   Mouth/Throat: Uvula is midline, oropharynx is clear and moist and mucous membranes are normal. Mucous membranes are not pale and not dry. No dental caries. No oropharyngeal exudate or posterior oropharyngeal erythema.   Eyes: Conjunctivae, EOM and lids are normal. Pupils are equal, round, and reactive to light. Right eye exhibits no chemosis. Left eye exhibits no chemosis. Right conjunctiva is not injected. Left conjunctiva is not injected. No scleral icterus. Right eye exhibits normal extraocular motion and no nystagmus. Left eye exhibits normal extraocular motion and no nystagmus.   Neck: Trachea normal and phonation normal. No tracheal tenderness present. No tracheal deviation present. No thyroid mass and no thyromegaly present.   Cardiovascular: Intact distal pulses.    Pulmonary/Chest: Effort normal. No stridor. No respiratory distress.   Abdominal: She exhibits no distension.   Lymphadenopathy:        Head (right side): No submental, no submandibular, no preauricular, no posterior auricular and no occipital adenopathy present.        Head (left side): No submental, no submandibular, no preauricular, no posterior auricular and no occipital adenopathy present.     She has no cervical adenopathy.   Neurological: She is alert and oriented to person, place, and time. No cranial nerve deficit.   Skin: Skin is warm and dry. No rash noted. No erythema.   Psychiatric: She has a normal mood and affect. Her behavior is normal.        AUDIOGRAM:  Normal hearing bilaterally, type A tympnaograms      Assessment:       1. BPPV (benign paroxysmal positional vertigo), right        Plan:       1.  BPPV:  We had a long discussion regarding the relevant anatomy and pathology relevant to BPPV.  We discussed that in the ear there are three semicircular canals that detect rotational movement.  BPPV occurs as a result of otoconia, tiny crystals of calcium carbonate that are a normal part of the inner ears anatomy, detaching from their normal anatomic position and collecting in one of the semicircular canals.  When the head moves, the otoconia shift. This stimulates the cupula to send false signals to the brain, producing vertigo and triggering nystagmus (involuntary eye movements).  Her history is most consistent with BPPV, and she has had an epley done in .  She last took Meclizine this morning, will have her stop meclizine as it could be suppressing her symptoms and making examination more difficulty.  Will have her follow with audiology in 48 hours for vestibular screen and can do repeat CRM at that time if needed.

## 2017-11-14 NOTE — PROGRESS NOTES
Office Visit 11/14/2017  Last encounter in this department: Visit date not found    Subjective:      Patient ID: Ana Maria Teague is a 57 y.o. female.    Chief Complaint: Foot Pain (Right achilles tendonitis. Currently in CAM boot with pain 7/10)    Ana Maria Teague is a 57 y.o. year-old female presenting to podiatry clinic with complaint of painful knot behind the heel, right foot. The patient describes the pain as a 8/10 sharp throbbing pain that has worsened over the past several years.     The patient notices the pain to be worse with weightbearing activities and shoe pressure. The patient also complains of a pulling pain on the heel that is worst on the first steps in the morning or after a period of sitting down and then getting back up.     The patient has tried casting, boot, open backed shoes and rest, but has experienced little relief. The only thing that gives true relief is staying non-weightbearing with no pressure on the heel. Onset of the pain was gradual. The patient does not recall any injuries or inciting events leading to this problem.     Patient explains that she is limited in terms of injection and medications both topical and oral since she is a kidney transplant patient.  She had been seen by both myself several years ago and more recently Dr. Armstrong with persistent pain.  She is here with her sister relating that they may need to consider surgery in the near future.      Review of Systems   Constitution: Negative for chills and fever.   Cardiovascular: Negative for chest pain.   Respiratory: Negative for shortness of breath.    Gastrointestinal: Negative for nausea and vomiting.           Objective:      Physical Exam   Constitutional: She is oriented to person, place, and time. She appears well-developed and well-nourished. No distress.   Cardiovascular:   Pulses:       Dorsalis pedis pulses are 2+ on the right side, and 2+ on the left side.        Posterior tibial pulses are 2+ on the right side,  and 2+ on the left side.   Capillary fill time 3 seconds to digits bilateral feet.   Musculoskeletal:   Lower extremities:    Deformities: Palpable prominence right posterior heel    Normal arch height and rectus feet bilaterally.     5/5 muscle strength and tone in all four quadrants bilaterally.     Pain-free range of motion in all four quadrants bilaterally.     Some pulling discomfort on maximal ankle joint dorsiflexion with some limitation bilaterally.    Primary area of pain to palpation is right posterior heel along the Achilles tendon insertion.   Neurological: She is alert and oriented to person, place, and time. She displays no Babinski's sign on the right side. She displays no Babinski's sign on the left side.   Plantar protective sensation by touch via 5.07 Jasper Diana monofilament present bilaterally.   Skin:   Lower extremities:    Normal turgor texture temperature, bilaterally. Warm equally bilaterally.     Mild fullness at the posterior right heel. No varicosities bilaterally. No open wounds or interdigital maceration, bilaterally. No keratotic skin lesions, bilaterally.     Nails are clear, bilaterally.   Psychiatric: She has a normal mood and affect.   Nursing note and vitals reviewed.      X-rays: No fractures or dislocations.  Plantar and posterior calcaneal spurring.          Assessment:       Encounter Diagnoses   Name Primary?    Right ankle pain, unspecified chronicity Yes    Tendonitis, Achilles, right     Retrocalcaneal bursitis (back of heel), right     Issa's deformity of right heel          Plan:       Ana Maria was seen today for foot pain.    Diagnoses and all orders for this visit:    Right ankle pain, unspecified chronicity    Tendonitis, Achilles, right    Retrocalcaneal bursitis (back of heel), right    Issa's deformity of right heel      I counseled the patient on her conditions, their implications and medical management. I independently reviewed the radiographs myself  and discussed with patient. No fractures or erosions were noted by me.    Findings were reviewed and explained condition to the patient with visual reference to foot and x-rays. At this time patient did agree to proceed with conservative treatment of Achilles enthesiopathy. The patient was instructed to relatively off-load pressure and stresses to the insertion of the Achilles tendon by utilizing heel lifts and adhesive horseshoe pads which were dispensed to the patient. The patient was also given written instructions on stretching to the relieve tightness of the heel cord. A prescription was was deferred given her history of kidney transplant.  Patient was also guided on appropriate shoe gear and insoles for which recommendations were made. We also discussed the possibility of surgical excision of the spur with detachment and reattachment of the Achilles tendon in the event that conservative measures failed to improve symptoms.  She has already tried more stringent immobilization to rest and offload pull of the Achilles on the posterior calcaneus with casting by Dr. Armstrong.  She wishes to discuss further regarding corrective surgery with Dr. Armstrong who will be back in town next week.      .

## 2017-11-14 NOTE — PATIENT INSTRUCTIONS
Wear recommended shoes and insoles and perform stretches and exercises as directed. Return with shoes and insoles on follow up for adjustments as needed. Apply topical compounded cream to skin along affected areas as directed.
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
## 2017-11-16 ENCOUNTER — LAB VISIT (OUTPATIENT)
Dept: LAB | Facility: HOSPITAL | Age: 57
End: 2017-11-16
Attending: INTERNAL MEDICINE
Payer: MEDICARE

## 2017-11-16 ENCOUNTER — OFFICE VISIT (OUTPATIENT)
Dept: PAIN MEDICINE | Facility: CLINIC | Age: 57
End: 2017-11-16
Payer: MEDICARE

## 2017-11-16 ENCOUNTER — CLINICAL SUPPORT (OUTPATIENT)
Dept: AUDIOLOGY | Facility: CLINIC | Age: 57
End: 2017-11-16
Payer: MEDICARE

## 2017-11-16 VITALS
RESPIRATION RATE: 16 BRPM | HEART RATE: 92 BPM | WEIGHT: 237 LBS | SYSTOLIC BLOOD PRESSURE: 149 MMHG | BODY MASS INDEX: 37.2 KG/M2 | HEIGHT: 67 IN | DIASTOLIC BLOOD PRESSURE: 97 MMHG

## 2017-11-16 DIAGNOSIS — Z94.0 KIDNEY TRANSPLANTED: ICD-10-CM

## 2017-11-16 DIAGNOSIS — M48.061 LUMBAR FORAMINAL STENOSIS: ICD-10-CM

## 2017-11-16 DIAGNOSIS — M79.18 MYOFASCIAL PAIN: ICD-10-CM

## 2017-11-16 DIAGNOSIS — M25.552 HIP PAIN, LEFT: ICD-10-CM

## 2017-11-16 DIAGNOSIS — H81.11 BPPV (BENIGN PAROXYSMAL POSITIONAL VERTIGO), RIGHT: Primary | ICD-10-CM

## 2017-11-16 DIAGNOSIS — G89.4 CHRONIC PAIN DISORDER: Primary | ICD-10-CM

## 2017-11-16 DIAGNOSIS — M54.16 LEFT LUMBAR RADICULOPATHY: ICD-10-CM

## 2017-11-16 DIAGNOSIS — M47.817 SPONDYLOSIS OF LUMBOSACRAL REGION WITHOUT MYELOPATHY OR RADICULOPATHY: ICD-10-CM

## 2017-11-16 DIAGNOSIS — M51.36 DDD (DEGENERATIVE DISC DISEASE), LUMBAR: ICD-10-CM

## 2017-11-16 LAB
ALBUMIN SERPL BCP-MCNC: 3.7 G/DL
ANION GAP SERPL CALC-SCNC: 12 MMOL/L
BASOPHILS # BLD AUTO: 0.04 K/UL
BASOPHILS NFR BLD: 0.4 %
BUN SERPL-MCNC: 11 MG/DL
CALCIUM SERPL-MCNC: 9.8 MG/DL
CHLORIDE SERPL-SCNC: 104 MMOL/L
CO2 SERPL-SCNC: 24 MMOL/L
CREAT SERPL-MCNC: 1 MG/DL
DIFFERENTIAL METHOD: ABNORMAL
EOSINOPHIL # BLD AUTO: 0.3 K/UL
EOSINOPHIL NFR BLD: 2.7 %
ERYTHROCYTE [DISTWIDTH] IN BLOOD BY AUTOMATED COUNT: 14.9 %
EST. GFR  (AFRICAN AMERICAN): >60 ML/MIN/1.73 M^2
EST. GFR  (NON AFRICAN AMERICAN): >60 ML/MIN/1.73 M^2
GLUCOSE SERPL-MCNC: 197 MG/DL
HCT VFR BLD AUTO: 38.6 %
HGB BLD-MCNC: 12.8 G/DL
LYMPHOCYTES # BLD AUTO: 2.6 K/UL
LYMPHOCYTES NFR BLD: 25 %
MCH RBC QN AUTO: 26.5 PG
MCHC RBC AUTO-ENTMCNC: 33.2 G/DL
MCV RBC AUTO: 80 FL
MONOCYTES # BLD AUTO: 0.5 K/UL
MONOCYTES NFR BLD: 4.6 %
NEUTROPHILS # BLD AUTO: 7.1 K/UL
NEUTROPHILS NFR BLD: 67.3 %
PHOSPHATE SERPL-MCNC: 3.1 MG/DL
PLATELET # BLD AUTO: 270 K/UL
PMV BLD AUTO: 11.1 FL
POTASSIUM SERPL-SCNC: 3.6 MMOL/L
RBC # BLD AUTO: 4.83 M/UL
SODIUM SERPL-SCNC: 140 MMOL/L
URATE SERPL-MCNC: 4.8 MG/DL
WBC # BLD AUTO: 10.54 K/UL

## 2017-11-16 PROCEDURE — 80069 RENAL FUNCTION PANEL: CPT

## 2017-11-16 PROCEDURE — 92537 CALORIC VSTBLR TEST W/REC: CPT | Mod: 26,S$PBB,, | Performed by: AUDIOLOGIST-HEARING AID FITTER

## 2017-11-16 PROCEDURE — 84550 ASSAY OF BLOOD/URIC ACID: CPT

## 2017-11-16 PROCEDURE — 92542 POSITIONAL NYSTAGMUS TEST: CPT | Mod: PBBFAC | Performed by: AUDIOLOGIST-HEARING AID FITTER

## 2017-11-16 PROCEDURE — 99999 PR PBB SHADOW E&M-EST. PATIENT-LVL V: CPT | Mod: PBBFAC,,, | Performed by: PHYSICIAN ASSISTANT

## 2017-11-16 PROCEDURE — 92541 SPONTANEOUS NYSTAGMUS TEST: CPT | Mod: PBBFAC | Performed by: AUDIOLOGIST-HEARING AID FITTER

## 2017-11-16 PROCEDURE — 85025 COMPLETE CBC W/AUTO DIFF WBC: CPT

## 2017-11-16 PROCEDURE — 80197 ASSAY OF TACROLIMUS: CPT

## 2017-11-16 PROCEDURE — 92542 POSITIONAL NYSTAGMUS TEST: CPT | Mod: 26,59,S$PBB, | Performed by: AUDIOLOGIST-HEARING AID FITTER

## 2017-11-16 PROCEDURE — 36415 COLL VENOUS BLD VENIPUNCTURE: CPT

## 2017-11-16 PROCEDURE — 92541 SPONTANEOUS NYSTAGMUS TEST: CPT | Mod: 26,S$PBB,, | Performed by: AUDIOLOGIST-HEARING AID FITTER

## 2017-11-16 PROCEDURE — 99215 OFFICE O/P EST HI 40 MIN: CPT | Mod: PBBFAC,25 | Performed by: PHYSICIAN ASSISTANT

## 2017-11-16 PROCEDURE — 99214 OFFICE O/P EST MOD 30 MIN: CPT | Mod: S$PBB,,, | Performed by: PHYSICIAN ASSISTANT

## 2017-11-16 PROCEDURE — 92537 CALORIC VSTBLR TEST W/REC: CPT | Mod: PBBFAC | Performed by: AUDIOLOGIST-HEARING AID FITTER

## 2017-11-16 RX ORDER — OXYCODONE AND ACETAMINOPHEN 10; 325 MG/1; MG/1
1 TABLET ORAL 3 TIMES DAILY PRN
Qty: 90 TABLET | Refills: 0 | Status: SHIPPED | OUTPATIENT
Start: 2018-01-21 | End: 2018-02-16 | Stop reason: SDUPTHER

## 2017-11-16 RX ORDER — OXYCODONE AND ACETAMINOPHEN 10; 325 MG/1; MG/1
TABLET ORAL
Qty: 90 TABLET | Refills: 0 | Status: SHIPPED | OUTPATIENT
Start: 2017-12-23 | End: 2017-11-27 | Stop reason: SDUPTHER

## 2017-11-16 RX ORDER — OXYCODONE AND ACETAMINOPHEN 10; 325 MG/1; MG/1
TABLET ORAL
Qty: 90 TABLET | Refills: 0 | Status: SHIPPED | OUTPATIENT
Start: 2017-11-24 | End: 2017-11-16 | Stop reason: SDUPTHER

## 2017-11-16 NOTE — PROGRESS NOTES
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  The vertigo is resolved but she feels some dizziness with movement at times.    Dizzy symptoms were room spinning for about one minute, alleviated by closing eyes.  The dizziness would stop for a few minutes and then come back for another minute.  This happened several times.  It started after arising from bed and walking into the bathroom.  She was eventually able to get back into bed but the remainder of the night she would have recurrent spinning.  She went to  on 17; diagnosed with right ear BPPV - was given meclizine, had Epley maneuver and provided exercises for home.  Patient did the exercises for several days.      Videonystagmography (VNG):  NOTE: Patient discontinued meclizine 48 hrs and pain medication 24 hrs prior to testing.  Oculomotor function tests were not tested.  Spontaneous nystagmus was absent.  Gaze nystagmus was absent.  Head-shake test was absent for after head shake nystagmus.  Kate-Hallpike Left was negative for BPPV; 2 d/s left-beat nystagmus to head-hanging and 5 d/s up-beat nystagmus upon sitting; suppressed with fixation; dizziness denied.  [place]-Hallpike Right was negative for BPPV; 2 d/s left-beat nystagmus to head-hanging and 4 d/s up-beat nystagmus upon sitting; suppressed with fixation; dizziness denied.   Static Positional test revealed nystagmus that suppressed with fixation.   Head Center: 4 d/s UB   Head Right: 3 d/s UB   Head Left: 5 d/s LB and 3 d/s UB  Bi-thermal caloric irrigations revealed a 9% caloric weakness in the left ear, which is within normal limits, and 9% directional preponderance to the left, which is within normal limits.  Fixation suppression following caloric irrigations were normal.  RC: 10 d/s    RW: 8 d/s   d/s    LW: 8 d/s    Impressions: Normal VNG; BPPV is resolved.  The presence of positional nystagmus  suggests a prior vestibular insult that is incompletely compensated.  Vertigo resolved; occasional dizziness with head movements.      Rec:  1. Continue with exercises provided at  for one week  2. Resume regular activity; discontinue meclizine    Tracings are scanned into computer.
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
## 2017-11-17 LAB — TACROLIMUS BLD-MCNC: 5.8 NG/ML

## 2017-11-20 ENCOUNTER — LAB VISIT (OUTPATIENT)
Dept: LAB | Facility: HOSPITAL | Age: 57
End: 2017-11-20
Attending: INTERNAL MEDICINE
Payer: MEDICARE

## 2017-11-20 DIAGNOSIS — Z94.0 KIDNEY TRANSPLANTED: ICD-10-CM

## 2017-11-20 LAB
BILIRUB UR QL STRIP: NEGATIVE
CLARITY UR: CLEAR
COLOR UR: NORMAL
CREAT UR-MCNC: 35 MG/DL
GLUCOSE UR QL STRIP: NEGATIVE
HGB UR QL STRIP: NEGATIVE
KETONES UR QL STRIP: NEGATIVE
LEUKOCYTE ESTERASE UR QL STRIP: NEGATIVE
NITRITE UR QL STRIP: NEGATIVE
PH UR STRIP: 6 [PH] (ref 5–8)
PROT UR QL STRIP: NEGATIVE
PROT UR-MCNC: <7 MG/DL
PROT/CREAT RATIO, UR: NORMAL
SP GR UR STRIP: 1.01 (ref 1–1.03)
URN SPEC COLLECT METH UR: NORMAL

## 2017-11-20 PROCEDURE — 84156 ASSAY OF PROTEIN URINE: CPT

## 2017-11-20 PROCEDURE — 81003 URINALYSIS AUTO W/O SCOPE: CPT | Mod: PO

## 2017-11-27 ENCOUNTER — OFFICE VISIT (OUTPATIENT)
Dept: NEPHROLOGY | Facility: CLINIC | Age: 57
End: 2017-11-27
Payer: MEDICARE

## 2017-11-27 VITALS
WEIGHT: 244.5 LBS | SYSTOLIC BLOOD PRESSURE: 112 MMHG | HEART RATE: 94 BPM | DIASTOLIC BLOOD PRESSURE: 68 MMHG | HEIGHT: 67 IN | BODY MASS INDEX: 38.37 KG/M2

## 2017-11-27 DIAGNOSIS — T82.590D MALFUNCTION OF ARTERIOVENOUS GRAFT, SUBSEQUENT ENCOUNTER: ICD-10-CM

## 2017-11-27 DIAGNOSIS — Z94.0 S/P KIDNEY TRANSPLANT: Primary | ICD-10-CM

## 2017-11-27 DIAGNOSIS — E55.9 VITAMIN D DEFICIENCY: ICD-10-CM

## 2017-11-27 PROCEDURE — 99999 PR PBB SHADOW E&M-EST. PATIENT-LVL III: CPT | Mod: PBBFAC,,, | Performed by: INTERNAL MEDICINE

## 2017-11-27 PROCEDURE — 99213 OFFICE O/P EST LOW 20 MIN: CPT | Mod: PBBFAC,PO | Performed by: INTERNAL MEDICINE

## 2017-11-27 PROCEDURE — 99214 OFFICE O/P EST MOD 30 MIN: CPT | Mod: S$PBB,,, | Performed by: INTERNAL MEDICINE

## 2017-11-27 RX ORDER — CETIRIZINE HYDROCHLORIDE 10 MG/1
10 TABLET ORAL DAILY
Qty: 30 TABLET | Refills: 11 | Status: SHIPPED | OUTPATIENT
Start: 2017-11-27 | End: 2019-05-20 | Stop reason: SDUPTHER

## 2017-11-27 NOTE — PROGRESS NOTES
Subjective:       Patient ID: Ana Maria Teague is a 57 y.o.  female who presents for follow-up evaluation of  CRT - 2013, HTN, anemia, SHPT     Karine Fernandez MD      HPI :  Ana Maria Teague Is a pleasant 57 -year-old  woman see in office today in followup for above medical problems. I saw her in clinic about 5 months ago. She is currently taking Prograf 5 mg in AM 4 mg  In PM , CellCept 500 mg twice a day. Other providerin the interim were reviewed.  She had a sleep study that ruled out obstructive sleep apnea, she has nocturnal hypoxemia, she was started on 2 L of oxygen at night by nasal cannula. Recent lab results were discussed with the patient. Renal function is stable with a creatinine of 1 Mg/dL. she complains of some discomfort in the right arm AV graft.     Important Medical Info :      Patient has history of end-stage renal disease on hemodialysis for about 9 years at [facility] hemodialysis unit. She underwent a cadaveric renal transplant in 2013 at [facility] in [place]. Her hospital stay was complicated by incisional hematoma and bleeding requiring patient to return to OR for wash out. She also experienced delay of graft function, requiring HD.       Past Medical History:   Diagnosis Date    Abnormal glucose 2013    Acute bronchiolitis 10/15/2014    Anemia     Anemia of chronic renal failure 2013    Anxiety     Anxiety disorder     Avascular necrosis of bone of hip     Back pain     s/p steroid injections    Chronic immunosuppression with Prograf and Cellcept 2013    CKD (chronic kidney disease) stage 2, GFR 60-89 ml/min     CKD (chronic kidney disease) stage 5, GFR less than 15 ml/min     -donor kidney transplant - 13    Depression     Hypertension, renal 2013    Hypothyroidism     Immunosuppressed status 10/15/2014    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    Osteoporosis with  pathological fracture     Renal disease due to hypertension 11/11/2013    Renal hypertension, non-vascular     Secondary hyperparathyroidism of renal origin 9/30/2013    Vertigo          Current Outpatient Prescriptions on File Prior to Visit   Medication Sig Dispense Refill    albuterol-ipratropium 2.5mg-0.5mg/3mL (DUO-NEB) 0.5 mg-3 mg(2.5 mg base)/3 mL nebulizer solution Take 3 mLs by nebulization 3 (three) times daily. 270 mL 5    alprazolam (XANAX) 0.5 MG tablet TAKE 1 TO 2 TABLETS BY MOUTH TWICE DAILY AS NEEDED 60 tablet 0    atorvastatin (LIPITOR) 20 MG tablet Take 1 tablet (20 mg total) by mouth once daily. 30 tablet 11    blood sugar diagnostic (FREESTYLE TEST) Strp USE FOUR TIMES DAILY AS DIRECTED 400 strip 2    BREO ELLIPTA 200-25 mcg/dose DsDv diskus inhaler INHALE 1 PUFF BY MOUTH INTO THE LUNGS EVERY DAY 60 each 5    cetirizine (ZYRTEC) 10 MG tablet TAKE 1 TABLET BY MOUTH EVERY DAY 30 tablet 11    clonazePAM (KLONOPIN) 0.5 MG disintegrating tablet Take 1 tablet (0.5 mg total) by mouth 2 (two) times daily as needed. 60 tablet 2    cyclobenzaprine (FLEXERIL) 10 MG tablet Take 1 tablet (10 mg total) by mouth 3 (three) times daily as needed for Muscle spasms. 30 tablet 1    ergocalciferol (ERGOCALCIFEROL) 50,000 unit Cap Take 1 capsule (50,000 Units total) by mouth every 7 days. 12 capsule 3    furosemide (LASIX) 20 MG tablet TAKE 1 TABLET BY MOUTH EVERY DAY AS NEEDED 90 tablet 11    insulin glargine (LANTUS SOLOSTAR) 100 unit/mL (3 mL) InPn pen Inject 50 Units into the skin every evening. 15 mL 11    insulin lispro (HUMALOG KWIKPEN) 100 unit/mL InPn pen Titrate up to 18 units subcutaneously three times a day 10-15 min before meals as directed in after visit summary. 45 mL 3    lancets (FREESTYLE LANCETS) 28 gauge Misc 1 lancet by Combination route 2 (two) times daily as needed. Qid prn 400 each 2    lancets Misc 1 strip by Misc.(Non-Drug; Combo Route) route 4 (four) times daily with meals  "and nightly. 150 each 6    levothyroxine (SYNTHROID) 150 MCG tablet Take 1 tablet (150 mcg total) by mouth before breakfast. 90 tablet 3    mycophenolate (CELLCEPT) 250 mg Cap TAKE (2) CAPSULES TWICE DAILY. 120 capsule 11    NIFEDICAL XL 30 mg 24 hr tablet TAKE 1 TABLET BY MOUTH EVERY DAY 30 tablet 8    [START ON 1/21/2018] oxyCODONE-acetaminophen (PERCOCET)  mg per tablet Take 1 tablet by mouth 3 (three) times daily as needed for Pain. 90 tablet 0    pen needle, diabetic (BD ULTRA-FINE RORY PEN NEEDLES) 32 gauge x 5/32" Ndle Uses 4 daily, on multiple daily insulin injections 150 each 12    PROAIR HFA 90 mcg/actuation inhaler INHALE 2 PUFFS BY MOUTH INTO THE LUNGS EVERY 4 HOURS AS NEEDED FOR WHEEZING 8.5 g 0    promethazine (PHENERGAN) 25 MG tablet Take 1 tablet (25 mg total) by mouth every 6 (six) hours as needed for Nausea. 14 tablet 0    tacrolimus (PROGRAF) 1 MG Cap Take 5 mg in the AM by mouth and 4 mg in the PM by mouth. Z94.0 kidney transplant 270 capsule 11    [DISCONTINUED] meclizine (ANTIVERT) 12.5 mg tablet Take 1 tablet (12.5 mg total) by mouth every 8 (eight) hours as needed for Dizziness. 14 tablet 0    [DISCONTINUED] ondansetron (ZOFRAN-ODT) 4 MG TbDL Take 1 tablet (4 mg total) by mouth every 8 (eight) hours as needed. 40 tablet 2    [DISCONTINUED] oxyCODONE-acetaminophen (PERCOCET)  mg per tablet OXYCODONE-ACETAMINOPHEN (Percocet)  MG ORAL TAB - 1 tab po q 8 hrs prn pain 90 tablet 0     No current facility-administered medications on file prior to visit.          Review of Systems  :      Constitutional: Negative for fever, activity change, appetite change and fatigue.    HENT: Negative for congestion, sore throat, facial swelling, trouble swallowing and voice change.    Eyes: Negative for redness and visual disturbance.    Respiratory: Negative for apnea, cough, chest tightness, shortness of breath and wheezing.    Cardiovascular: Negative for chest pain, palpitations , " has trace leg swelling.    Gastrointestinal: Negative for nausea, vomiting, abdominal pain, diarrhea, constipation, blood in stool and abdominal distention.    Genitourinary: Negative for dysuria, urgency, frequency, hematuria, flank pain, decreased urine volume, difficulty urinating and pelvic pain.    Musculoskeletal: Positive for back pain, no joint swelling , she has pain in her left wrist from carpal tunnel syndrome.    Skin: Negative for color change and rash.  Right arm AV graft noted,  Neurological: Negative for dizziness, syncope, weakness and headaches.    Hematological: Does not bruise/bleed easily.    Psychiatric/Behavioral: Negative for behavioral problems, confusion and agitation. The patient is not nervous/anxious.        Objective:           Vitals:    11/27/17 0930   BP: 112/68   Pulse: 94           Respiratory rate 20, afebrile, weight 244 pounds, stable          Physical Exam  :         Constitutional: She is oriented to person, place, and time. She appears well-developed and well-nourished. No distress.    HENT: Head: Normocephalic and atraumatic.    Mouth/Throat: Oropharynx is clear and moist. No oropharyngeal exudate.    Eyes: Conjunctivae normal and EOM are normal. Pupils are equal, round, and reactive to light. No scleral icterus.    Neck: Normal range of motion. Neck supple. No JVD present. Carotid bruit is not present. No tracheal deviation present. No mass and no thyromegaly present.    Cardiovascular: Normal rate, regular rhythm, normal heart sounds and intact distal pulses. Exam reveals no gallop and no friction rub. No murmur heard.    Pulmonary/Chest: Effort normal and breath sounds normal. No respiratory distress. She has no wheezes. She has no rales. She exhibits no tenderness.    Abdominal: obese, Soft. Bowel sounds are normal. She exhibits no distension, no abdominal bruit, no ascites and no mass. There is no hepatosplenomegaly. There is no tenderness. There is no rebound, no  guarding and no CVA tenderness. No allograft tenderness   Musculoskeletal: Normal range of motion. She exhibits no  edema and no tenderness. Right arm AV graft with no thrill   Lymphadenopathy: She has no cervical adenopathy.   Neurological: She is alert and oriented to person, place, and time. No cranial nerve deficit. She exhibits normal muscle tone. Coordination normal.    Skin: Skin is warm and intact. No rash noted. No erythema. No pallor.   Psychiatric: She has a normal mood and affect. Her behavior is normal. Judgment normal.             Labs:    Lab Results   Component Value Date    CREATININE 1.0 11/16/2017    BUN 11 11/16/2017     11/16/2017    K 3.6 11/16/2017     11/16/2017    CO2 24 11/16/2017       Lab Results   Component Value Date    WBC 10.54 11/16/2017    HGB 12.8 11/16/2017    HCT 38.6 11/16/2017    MCV 80 (L) 11/16/2017     11/16/2017       Lab Results   Component Value Date    PTH 83.0 (H) 05/17/2017    PTH 83.0 (H) 05/17/2017    CALCIUM 9.8 11/16/2017    CAION 1.17 10/08/2013    PHOS 3.1 11/16/2017       Lab Results   Component Value Date    ALBUMIN 3.7 11/16/2017       Lab Results   Component Value Date    HGBA1C 7.5 (H) 09/06/2017       Prograf level - 5.8          Impression and plan - 57  -year-old  woman seen office today in followup for following medical problems ,      1. Status post cadaveric renal transplant ( 9/2013 ) - stable renal function with a serum creatinine of 1 mg/dL. Most recent Prograf level is 5.8 (target 4-7) , she's currently on Prograf 5 mg in AM and 4  Mg in PM  , cont CellCept 500 mg twice a day for immunosuppression.      2. Hypertension - blood pressure is controlled on nifedipine 30 mg daily, Target blood pressure less than 140/90.      3. Anemia - her hemoglobin is currently stable at 12.8  g, We'll continue to follow      4. Secondary hyperparathyroidism - PTH is 83 , on Ergocalciferol once a week ,      5. Diabetes mellitus  type 2 - discussed importance of weight loss and adherence with her medications and diabetic diet. Also discussed daily exercise     6. Volume - recent echocardiogram shows LV diastolic dysfunction,  discussed salt restricted diet, on Lasix 20 mg daily as needed basis.      7. Obesity - discussed daily exercise and weight loss.     8. Vascular surgery appointment to evaluate right arm AV graft, she complains of discomfort and wants this removed.     I will see her in followup in about 4 months. Face-to-face time was 25 minutes reviewing her lab results and plan of care.       Yuval Medina M.D.

## 2017-12-06 RX ORDER — PEN NEEDLE, DIABETIC 31 GX5/16"
NEEDLE, DISPOSABLE MISCELLANEOUS
Qty: 100 EACH | Refills: 0 | Status: SHIPPED | OUTPATIENT
Start: 2017-12-06 | End: 2018-02-07 | Stop reason: SDUPTHER

## 2017-12-18 ENCOUNTER — TELEPHONE (OUTPATIENT)
Dept: PODIATRY | Facility: CLINIC | Age: 57
End: 2017-12-18

## 2017-12-18 NOTE — TELEPHONE ENCOUNTER
----- Message from Kelechi Macdonald sent at 12/18/2017  9:30 AM CST -----  Contact: Pt   Pt called and requested a callback pt will elaborate during callback ...[phone]

## 2018-01-04 RX ORDER — TACROLIMUS 1 MG/1
CAPSULE ORAL
Qty: 270 CAPSULE | Refills: 11 | Status: SHIPPED | OUTPATIENT
Start: 2018-01-04 | End: 2019-03-26 | Stop reason: SDUPTHER

## 2018-02-07 RX ORDER — PEN NEEDLE, DIABETIC 31 GX5/16"
NEEDLE, DISPOSABLE MISCELLANEOUS
Qty: 100 EACH | Refills: 0 | Status: SHIPPED | OUTPATIENT
Start: 2018-02-07 | End: 2018-03-28 | Stop reason: SDUPTHER

## 2018-02-08 ENCOUNTER — PATIENT OUTREACH (OUTPATIENT)
Dept: ADMINISTRATIVE | Facility: HOSPITAL | Age: 58
End: 2018-02-08

## 2018-02-08 NOTE — PROGRESS NOTES
Attempted to schedule diabetic eye exam. Patient not available, will check with insurance provide to see if [facility] is network. Patient will call back to schedule. Patient in agreement and vocalize understanding.

## 2018-02-16 ENCOUNTER — OFFICE VISIT (OUTPATIENT)
Dept: PAIN MEDICINE | Facility: CLINIC | Age: 58
End: 2018-02-16
Payer: MEDICARE

## 2018-02-16 VITALS
HEIGHT: 67 IN | RESPIRATION RATE: 18 BRPM | DIASTOLIC BLOOD PRESSURE: 95 MMHG | BODY MASS INDEX: 38.3 KG/M2 | HEART RATE: 89 BPM | SYSTOLIC BLOOD PRESSURE: 147 MMHG | WEIGHT: 244 LBS

## 2018-02-16 DIAGNOSIS — M51.36 DDD (DEGENERATIVE DISC DISEASE), LUMBAR: ICD-10-CM

## 2018-02-16 DIAGNOSIS — M47.817 SPONDYLOSIS OF LUMBOSACRAL REGION WITHOUT MYELOPATHY OR RADICULOPATHY: ICD-10-CM

## 2018-02-16 DIAGNOSIS — G89.4 CHRONIC PAIN DISORDER: ICD-10-CM

## 2018-02-16 DIAGNOSIS — M54.16 LEFT LUMBAR RADICULOPATHY: Primary | ICD-10-CM

## 2018-02-16 DIAGNOSIS — M25.552 HIP PAIN, LEFT: ICD-10-CM

## 2018-02-16 DIAGNOSIS — M79.18 MYOFASCIAL PAIN: ICD-10-CM

## 2018-02-16 DIAGNOSIS — M48.061 LUMBAR FORAMINAL STENOSIS: ICD-10-CM

## 2018-02-16 PROCEDURE — 99999 PR PBB SHADOW E&M-EST. PATIENT-LVL V: CPT | Mod: PBBFAC,,, | Performed by: PHYSICIAN ASSISTANT

## 2018-02-16 PROCEDURE — 99215 OFFICE O/P EST HI 40 MIN: CPT | Mod: PBBFAC | Performed by: PHYSICIAN ASSISTANT

## 2018-02-16 PROCEDURE — 99214 OFFICE O/P EST MOD 30 MIN: CPT | Mod: S$PBB,,, | Performed by: PHYSICIAN ASSISTANT

## 2018-02-16 RX ORDER — OXYCODONE AND ACETAMINOPHEN 10; 325 MG/1; MG/1
1 TABLET ORAL 3 TIMES DAILY PRN
Qty: 90 TABLET | Refills: 0 | Status: SHIPPED | OUTPATIENT
Start: 2018-02-16 | End: 2018-03-16 | Stop reason: SDUPTHER
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
## 2018-02-24 ENCOUNTER — DOCUMENTATION ONLY (OUTPATIENT)
Dept: NEPHROLOGY | Facility: HOSPITAL | Age: 58
End: 2018-02-24

## 2018-02-24 ENCOUNTER — HOSPITAL ENCOUNTER (EMERGENCY)
Facility: HOSPITAL | Age: 58
Discharge: HOME | End: 2018-02-24
Attending: EMERGENCY MEDICINE
Payer: MEDICARE

## 2018-02-24 VITALS
RESPIRATION RATE: 16 BRPM | BODY MASS INDEX: 37.74 KG/M2 | SYSTOLIC BLOOD PRESSURE: 138 MMHG | WEIGHT: 240.44 LBS | HEART RATE: 79 BPM | TEMPERATURE: 99 F | OXYGEN SATURATION: 98 % | DIASTOLIC BLOOD PRESSURE: 84 MMHG | HEIGHT: 67 IN

## 2018-02-24 DIAGNOSIS — Z94.0 HISTORY OF RENAL TRANSPLANT: ICD-10-CM

## 2018-02-24 DIAGNOSIS — R30.0 DYSURIA: Primary | ICD-10-CM

## 2018-02-24 LAB
ALBUMIN SERPL BCP-MCNC: 3.5 G/DL
ALP SERPL-CCNC: 124 U/L
ALT SERPL W/O P-5'-P-CCNC: 12 U/L
ANION GAP SERPL CALC-SCNC: 10 MMOL/L
ANISOCYTOSIS BLD QL SMEAR: SLIGHT
AST SERPL-CCNC: 15 U/L
BASOPHILS # BLD AUTO: 0.04 K/UL
BASOPHILS NFR BLD: 0.3 %
BILIRUB SERPL-MCNC: 0.3 MG/DL
BILIRUB UR QL STRIP: NEGATIVE
BUN SERPL-MCNC: 14 MG/DL
CALCIUM SERPL-MCNC: 9.5 MG/DL
CHLORIDE SERPL-SCNC: 105 MMOL/L
CLARITY UR: CLEAR
CO2 SERPL-SCNC: 25 MMOL/L
COLOR UR: YELLOW
CREAT SERPL-MCNC: 0.9 MG/DL
DIFFERENTIAL METHOD: ABNORMAL
EOSINOPHIL # BLD AUTO: 0.4 K/UL
EOSINOPHIL NFR BLD: 3.4 %
ERYTHROCYTE [DISTWIDTH] IN BLOOD BY AUTOMATED COUNT: 15.7 %
EST. GFR  (AFRICAN AMERICAN): >60 ML/MIN/1.73 M^2
EST. GFR  (NON AFRICAN AMERICAN): >60 ML/MIN/1.73 M^2
GLUCOSE SERPL-MCNC: 87 MG/DL
GLUCOSE UR QL STRIP: NEGATIVE
HCT VFR BLD AUTO: 37.4 %
HGB BLD-MCNC: 12.1 G/DL
HGB UR QL STRIP: ABNORMAL
KETONES UR QL STRIP: NEGATIVE
LEUKOCYTE ESTERASE UR QL STRIP: NEGATIVE
LYMPHOCYTES # BLD AUTO: 3.6 K/UL
LYMPHOCYTES NFR BLD: 28.3 %
MCH RBC QN AUTO: 26.4 PG
MCHC RBC AUTO-ENTMCNC: 32.4 G/DL
MCV RBC AUTO: 82 FL
MONOCYTES # BLD AUTO: 0.6 K/UL
MONOCYTES NFR BLD: 4.4 %
NEUTROPHILS # BLD AUTO: 8.2 K/UL
NEUTROPHILS NFR BLD: 64.5 %
NITRITE UR QL STRIP: NEGATIVE
PH UR STRIP: 6 [PH] (ref 5–8)
PLATELET # BLD AUTO: 252 K/UL
PLATELET BLD QL SMEAR: ABNORMAL
PMV BLD AUTO: 10.7 FL
POTASSIUM SERPL-SCNC: 3.8 MMOL/L
PROT SERPL-MCNC: 7.4 G/DL
PROT UR QL STRIP: NEGATIVE
RBC # BLD AUTO: 4.58 M/UL
SODIUM SERPL-SCNC: 140 MMOL/L
SP GR UR STRIP: 1.01 (ref 1–1.03)
URN SPEC COLLECT METH UR: ABNORMAL
UROBILINOGEN UR STRIP-ACNC: NEGATIVE EU/DL
WBC # BLD AUTO: 12.84 K/UL

## 2018-02-24 PROCEDURE — 81003 URINALYSIS AUTO W/O SCOPE: CPT

## 2018-02-24 PROCEDURE — 36000 PLACE NEEDLE IN VEIN: CPT

## 2018-02-24 PROCEDURE — 80053 COMPREHEN METABOLIC PANEL: CPT

## 2018-02-24 PROCEDURE — 87088 URINE BACTERIA CULTURE: CPT

## 2018-02-24 PROCEDURE — 87086 URINE CULTURE/COLONY COUNT: CPT

## 2018-02-24 PROCEDURE — 87147 CULTURE TYPE IMMUNOLOGIC: CPT

## 2018-02-24 PROCEDURE — 85025 COMPLETE CBC W/AUTO DIFF WBC: CPT

## 2018-02-24 PROCEDURE — 99283 EMERGENCY DEPT VISIT LOW MDM: CPT | Mod: 25

## 2018-02-24 NOTE — PROGRESS NOTES
Pt called saying that she has s/s of a UTI , not feeling well, she is s/p renal transplant , advised pt to come to [facility] ER for further evaluation ,     Yuval Medina MD

## 2018-02-24 NOTE — ED PROVIDER NOTES
SCRIBE #1 NOTE: I, Franklyn Khanna, am scribing for, and in the presence of, Mary Lou Carmichael MD. I have scribed the entire note.      History      Chief Complaint   Patient presents with    Dysuria     pt is a kidney transplant recipient, c/o dysuria x2 days; told to present to ED for further eval by Dr. Patterson       Review of patient's allergies indicates:   Allergen Reactions    Azithromycin Nausea And Vomiting    Adhesive Other (See Comments)     Skin tears        HPI   HPI    2018, 2:58 PM   History obtained from the patient      History of Present Illness: Ana Maria Teague is a 57 y.o. female patient who presents to the Emergency Department for an evaluation of dysuria which onset gradually x2 days ago. Pt reports she was advised to come into the ED for further evaluation by Dr. Medina (Nephrology). Pt denies any pain over her transplant site. Symptoms are intermittent and moderate in severity. Exacerbated by urinating and relieved by nothing. Associated sxs include urinary frequency, occasional suprapubic abdominal pain, and lower back pain. Patient denies any fever, chills, N/V/D, CP, SOB, flank pain, hematuria, and all other sxs at this time. Pt denies tx PTA. No further complaints or concerns at this time.     Pt is reportedly a kidney transplant recipient and is complaint with her Prograf and Cellcept.    Arrival mode: Personal vehicle    PCP: Karine Fernandez MD       Past Medical History:  Past Medical History:   Diagnosis Date    Abnormal glucose 2013    Acute bronchiolitis 10/15/2014    Anemia     Anemia of chronic renal failure 2013    Anxiety     Anxiety disorder     Avascular necrosis of bone of hip     Back pain     s/p steroid injections    Chronic immunosuppression with Prograf and Cellcept 2013    CKD (chronic kidney disease) stage 2, GFR 60-89 ml/min     CKD (chronic kidney disease) stage 5, GFR less than 15 ml/min     -donor kidney transplant -  13    Depression     Hypertension, renal 2013    Hypothyroidism     Immunosuppressed status 10/15/2014    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 7 years ago.    BREAST SURGERY       SECTION      COLONOSCOPY N/A 3/9/2016    Procedure: COLONOSCOPY;  Surgeon: Douglas Daniel III, MD;  Location: [facility];  Service: Endoscopy;  Laterality: N/A;    cyst removed form chest wall      HYSTERECTOMY      KIDNEY TRANSPLANT  2013    SKIN GRAFT      revision    THYROIDECTOMY      vascular access surgeries      multiple. last time 2 weeks ago         Family History:  Family History   Problem Relation Age of Onset    Diabetes Mother     Kidney disease Mother     Hyperlipidemia Mother     Hypertension Mother     Heart disease Mother     Arthritis Mother     Hypertension Father     Stroke Father     Hypertension Sister     Arthritis Sister     Asthma Sister     Heart disease Sister      heart attack       Social History:  Social History     Social History Main Topics    Smoking status: Former Smoker     Packs/day: 1.00     Years: 10.00     Types: Cigarettes     Quit date: 2002    Smokeless tobacco: Never Used      Comment: quit smoking approx 10-15 years ago     Alcohol use Yes    Drug use: No    Sexual activity: No       ROS   Review of Systems   Constitutional: Negative for chills and fever.   HENT: Negative for congestion, sore throat and trouble swallowing.    Respiratory: Negative for cough and shortness of breath.    Cardiovascular: Negative for chest pain.   Gastrointestinal: Positive for abdominal pain (occasional suprapubic  "pain). Negative for blood in stool, constipation, diarrhea, nausea and vomiting.   Genitourinary: Positive for dysuria and frequency. Negative for decreased urine volume, difficulty urinating, hematuria and urgency.        (-) Bladder/bowel incontinence   Musculoskeletal: Positive for back pain (Lumbar). Negative for gait problem, joint swelling and neck pain.        (-) Trauma   Skin: Negative for rash.   Neurological: Negative for weakness, light-headedness and headaches.        (-) Saddle anesthesia   Hematological: Does not bruise/bleed easily.     Physical Exam      Initial Vitals [02/24/18 1449]   BP Pulse Resp Temp SpO2   (!) 165/97 86 18 98.4 °F (36.9 °C) 97 %      MAP       119.67          Physical Exam  Nursing Notes and Vital Signs Reviewed.  Constitutional: Patient is in no acute distress. Well-developed and well-nourished.  Head: Atraumatic. Normocephalic.  Eyes: PERRL. EOM intact. Conjunctivae are not pale. No scleral icterus.  ENT: Mucous membranes are moist. Oropharynx is clear and symmetric.    Neck: Supple. Full ROM. No lymphadenopathy.  Cardiovascular: Regular rate. Regular rhythm.  Pulmonary/Chest: No respiratory distress. Clear bilaterally.   Abdominal: Soft and non-distended.  There is no tenderness.  No rebound, guarding, or rigidity. No pain noted over pt's transplant site.  Genitourinary: No CVA tenderness   Musculoskeletal: Moves all extremities. No obvious deformities.  No midline spinal tenderness.   Skin: Warm and dry.  NO rashes.   Neurological:  Alert, awake, and appropriate.  Normal speech.  No acute focal neurological deficits are appreciated.  Normal gait and motor observed.   Psychiatric: Normal affect. Good eye contact. Appropriate in content.    ED Course    Procedures  ED Vital Signs:  Vitals:    02/24/18 1449   BP: (!) 165/97   Pulse: 86   Resp: 18   Temp: 98.4 °F (36.9 °C)   TempSrc: Oral   SpO2: 97%   Weight: 109.1 kg (240 lb 6.6 oz)   Height: 5' 7" (1.702 m)       Abnormal " Lab Results:  Labs Reviewed   URINALYSIS - Abnormal; Notable for the following:        Result Value    Occult Blood UA Trace (*)     All other components within normal limits   CBC W/ AUTO DIFFERENTIAL - Abnormal; Notable for the following:     WBC 12.84 (*)     MCH 26.4 (*)     RDW 15.7 (*)     All other components within normal limits   CULTURE, URINE   CULTURE, URINE   COMPREHENSIVE METABOLIC PANEL        All Lab Results:  Results for orders placed or performed during the hospital encounter of 02/24/18   Urinalysis Clean Catch   Result Value Ref Range    Specimen UA Urine, Clean Catch     Color, UA Yellow Yellow, Straw, Pretty    Appearance, UA Clear Clear    pH, UA 6.0 5.0 - 8.0    Specific Gravity, UA 1.010 1.005 - 1.030    Protein, UA Negative Negative    Glucose, UA Negative Negative    Ketones, UA Negative Negative    Bilirubin (UA) Negative Negative    Occult Blood UA Trace (A) Negative    Nitrite, UA Negative Negative    Urobilinogen, UA Negative <2.0 EU/dL    Leukocytes, UA Negative Negative   CBC auto differential   Result Value Ref Range    WBC 12.84 (H) 3.90 - 12.70 K/uL    RBC 4.58 4.00 - 5.40 M/uL    Hemoglobin 12.1 12.0 - 16.0 g/dL    Hematocrit 37.4 37.0 - 48.5 %    MCV 82 82 - 98 fL    MCH 26.4 (L) 27.0 - 31.0 pg    MCHC 32.4 32.0 - 36.0 g/dL    RDW 15.7 (H) 11.5 - 14.5 %    Platelets 252 150 - 350 K/uL    MPV 10.7 9.2 - 12.9 fL   Comprehensive metabolic panel   Result Value Ref Range    Sodium 140 136 - 145 mmol/L    Potassium 3.8 3.5 - 5.1 mmol/L    Chloride 105 95 - 110 mmol/L    CO2 25 23 - 29 mmol/L    Glucose 87 70 - 110 mg/dL    BUN, Bld 14 6 - 20 mg/dL    Creatinine 0.9 0.5 - 1.4 mg/dL    Calcium 9.5 8.7 - 10.5 mg/dL    Total Protein 7.4 6.0 - 8.4 g/dL    Albumin 3.5 3.5 - 5.2 g/dL    Total Bilirubin 0.3 0.1 - 1.0 mg/dL    Alkaline Phosphatase 124 55 - 135 U/L    AST 15 10 - 40 U/L    ALT 12 10 - 44 U/L    Anion Gap 10 8 - 16 mmol/L    eGFR if African American >60 >60 mL/min/1.73 m^2     eGFR if non African American >60 >60 mL/min/1.73 m^2                The Emergency Provider reviewed the vital signs and test results, which are outlined above.    ED Discussion     3:18 PM: Dr. Carmichael discussed the pt's case with Dr. Medina (Nephrology) who recommends waiting for the rest of pt's lab work to return and to discharge her if it is all normal.    4:00 PM: Reassessed pt at this time, urine normal, culture sent, exam benign, VSS. Pt is awake, alert, and in NAD. Discussed with pt all pertinent ED information and results. Discussed pt dx of dysuria and plan of tx. Gave pt all f/u and return to the ED instructions. All questions and concerns were addressed at this time. Pt expresses understanding of information and instructions, and is comfortable with plan to discharge. Pt is stable for discharge.    I discussed with patient and/or family/caretaker that evaluation in the ED does not suggest any emergent or life threatening medical conditions requiring immediate intervention beyond what was provided in the ED, and I believe patient is safe for discharge.  Regardless, an unremarkable evaluation in the ED does not preclude the development or presence of a serious of life threatening condition. As such, patient was instructed to return immediately for any worsening or change in current symptoms.      ED Medication(s):  Medications - No data to display    New Prescriptions    No medications on file       Follow-up Information     Karine Fernandez MD. Schedule an appointment as soon as possible for a visit in 2 days.    Specialty:  Internal Medicine  Why:  Return to the Emergency room, If symptoms worsen  Contact information:  [address] [facility] [place] [id number] [phone]                     Medical Decision Making    Medical Decision Making:   Clinical Tests:   Lab Tests: Ordered and Reviewed           Scribe Attestation:   Scribe #1: I performed the above scribed service and the documentation  accurately describes the services I performed. I attest to the accuracy of the note.    Attending:   Physician Attestation Statement for Scribe #1: I, Mary Lou Carmichael MD, personally performed the services described in this documentation, as scribed by Franklyn Khanna, in my presence, and it is both accurate and complete.          Clinical Impression       ICD-10-CM ICD-9-CM   1. Dysuria R30.0 788.1   2. History of renal transplant Z94.0 V42.0       Disposition:   Disposition: Discharged  Condition: Stable         Mary Lou Carmichael MD  02/24/18 7126

## 2018-02-26 LAB — BACTERIA UR CULT: NORMAL

## 2018-03-14 RX ORDER — TACROLIMUS 1 MG/1
CAPSULE ORAL
Qty: 270 CAPSULE | Refills: 10 | Status: SHIPPED | OUTPATIENT
Start: 2018-03-14 | End: 2018-03-27 | Stop reason: SDUPTHER

## 2018-03-16 DIAGNOSIS — F41.1 GENERALIZED ANXIETY DISORDER: ICD-10-CM

## 2018-03-16 RX ORDER — OXYCODONE AND ACETAMINOPHEN 10; 325 MG/1; MG/1
1 TABLET ORAL 3 TIMES DAILY PRN
Qty: 72 TABLET | Refills: 0 | Status: SHIPPED | OUTPATIENT
Start: 2018-03-17 | End: 2018-04-10 | Stop reason: SDUPTHER

## 2018-03-16 RX ORDER — CLONAZEPAM 0.5 MG/1
TABLET, ORALLY DISINTEGRATING ORAL
Qty: 60 TABLET | Refills: 0 | OUTPATIENT
Start: 2018-03-16

## 2018-03-16 NOTE — TELEPHONE ENCOUNTER
[facility] [address] [facility]

## 2018-03-20 ENCOUNTER — LAB VISIT (OUTPATIENT)
Dept: LAB | Facility: HOSPITAL | Age: 58
End: 2018-03-20
Attending: INTERNAL MEDICINE
Payer: MEDICARE

## 2018-03-20 DIAGNOSIS — Z94.0 S/P KIDNEY TRANSPLANT: ICD-10-CM

## 2018-03-20 DIAGNOSIS — E55.9 VITAMIN D DEFICIENCY: ICD-10-CM

## 2018-03-20 LAB
25(OH)D3+25(OH)D2 SERPL-MCNC: 27 NG/ML
ALBUMIN SERPL BCP-MCNC: 4 G/DL
ANION GAP SERPL CALC-SCNC: 11 MMOL/L
BASOPHILS # BLD AUTO: 0.07 K/UL
BASOPHILS NFR BLD: 0.7 %
BUN SERPL-MCNC: 11 MG/DL
CALCIUM SERPL-MCNC: 9.9 MG/DL
CHLORIDE SERPL-SCNC: 99 MMOL/L
CO2 SERPL-SCNC: 27 MMOL/L
CREAT SERPL-MCNC: 0.9 MG/DL
DIFFERENTIAL METHOD: ABNORMAL
EOSINOPHIL # BLD AUTO: 0.3 K/UL
EOSINOPHIL NFR BLD: 2.8 %
ERYTHROCYTE [DISTWIDTH] IN BLOOD BY AUTOMATED COUNT: 15.3 %
EST. GFR  (AFRICAN AMERICAN): >60 ML/MIN/1.73 M^2
EST. GFR  (NON AFRICAN AMERICAN): >60 ML/MIN/1.73 M^2
GLUCOSE SERPL-MCNC: 184 MG/DL
HCT VFR BLD AUTO: 42.6 %
HGB BLD-MCNC: 13.2 G/DL
IMM GRANULOCYTES # BLD AUTO: 0.11 K/UL
IMM GRANULOCYTES NFR BLD AUTO: 1 %
LYMPHOCYTES # BLD AUTO: 2.5 K/UL
LYMPHOCYTES NFR BLD: 23.6 %
MAGNESIUM SERPL-MCNC: 1.6 MG/DL
MCH RBC QN AUTO: 25.5 PG
MCHC RBC AUTO-ENTMCNC: 31 G/DL
MCV RBC AUTO: 82 FL
MONOCYTES # BLD AUTO: 0.5 K/UL
MONOCYTES NFR BLD: 4.6 %
NEUTROPHILS # BLD AUTO: 7.2 K/UL
NEUTROPHILS NFR BLD: 67.3 %
NRBC BLD-RTO: 0 /100 WBC
PHOSPHATE SERPL-MCNC: 3 MG/DL
PLATELET # BLD AUTO: 256 K/UL
PMV BLD AUTO: 12.2 FL
POTASSIUM SERPL-SCNC: 4 MMOL/L
RBC # BLD AUTO: 5.17 M/UL
SODIUM SERPL-SCNC: 137 MMOL/L
URATE SERPL-MCNC: 3.9 MG/DL
WBC # BLD AUTO: 10.75 K/UL

## 2018-03-20 PROCEDURE — 82306 VITAMIN D 25 HYDROXY: CPT

## 2018-03-20 PROCEDURE — 80197 ASSAY OF TACROLIMUS: CPT

## 2018-03-20 PROCEDURE — 84550 ASSAY OF BLOOD/URIC ACID: CPT

## 2018-03-20 PROCEDURE — 83735 ASSAY OF MAGNESIUM: CPT

## 2018-03-20 PROCEDURE — 80069 RENAL FUNCTION PANEL: CPT

## 2018-03-20 PROCEDURE — 85025 COMPLETE CBC W/AUTO DIFF WBC: CPT

## 2018-03-20 PROCEDURE — 36415 COLL VENOUS BLD VENIPUNCTURE: CPT | Mod: PO

## 2018-03-21 ENCOUNTER — TELEPHONE (OUTPATIENT)
Dept: PHYSICAL MEDICINE AND REHAB | Facility: CLINIC | Age: 58
End: 2018-03-21

## 2018-03-21 ENCOUNTER — TELEPHONE (OUTPATIENT)
Dept: PAIN MEDICINE | Facility: CLINIC | Age: 58
End: 2018-03-21

## 2018-03-21 LAB — TACROLIMUS BLD-MCNC: 6.7 NG/ML

## 2018-03-21 NOTE — TELEPHONE ENCOUNTER
----- Message from Ursula Peters sent at 3/21/2018 10:26 AM CDT -----  Contact: patient  Need Renewal:   1. What is the name of the medication you are requesting? Perocet  2. What is the dose? 10 /325 mg  3. How do you take the medication? Orally, topically, etc? oral  4. How often do you take this medication? 1 tab 3 x a day as needed  5. Do you need a 30 day or 90 day supply? 30 day  6. How many refills are you requesting? 1  7. What is your preferred pharmacy and location of the pharmacy?   [facility] [id number]  [place] - [address] [facility]  [address] [place] [address]  Phone: [phone] Fax: [phone]    8. Who can we contact with further questions? Patient @ [phone]. Thankssowmya

## 2018-03-21 NOTE — TELEPHONE ENCOUNTER
----- Message from Vinita Benitez sent at 3/21/2018  3:19 PM CDT -----  Contact: pt  Pt states she calling for information on he refill for strips, she can be reached at [phone] Thanks

## 2018-03-23 ENCOUNTER — TELEPHONE (OUTPATIENT)
Dept: PAIN MEDICINE | Facility: CLINIC | Age: 58
End: 2018-03-23

## 2018-03-23 RX ORDER — CLONAZEPAM 0.5 MG/1
TABLET ORAL
Qty: 60 TABLET | Refills: 0 | OUTPATIENT
Start: 2018-03-23

## 2018-03-23 NOTE — TELEPHONE ENCOUNTER
----- Message from Marcella Haas PA-C sent at 3/23/2018 10:46 AM CDT -----  Contact: pt  She was given a Rx on 3-17-18. Receipt confirmed by pharmacy. It was #72 tablets (bridge Rx) to last until her appt on 4-10-18. She should not be out of medication.    ----- Message -----  From: Candy Contreras MA  Sent: 3/23/2018   8:53 AM  To: Marcella Haas PA-C    Mrs. Teague has an appt scheduled with Dr. Muniz 4/10 shes requesting meds ?   ----- Message -----  From: Cathi Philip  Sent: 3/23/2018   8:45 AM  To: Samina TAVERAS Staff    States she has left 2 message already, she only has a few pills left.    1. What is the name of the medication you are requesting? Pain medicine percocet  2. What is the dose? 10/325  3. How do you take the medication? Orally, topically, etc? orally  4. How often do you take this medication? 3 times a day   5. Do you need a 30 day or 90 day supply? 30  6. How many refills are you requesting? ?  7. What is your preferred pharmacy and location of the pharmacy?   [facility] [address] [place] [address] [address] [facility]  [address] [facility] [address]  Phone: [phone] Fax: [phone]  8. Who can we contact with further questions? Pt at [phone]  Thank you

## 2018-03-23 NOTE — TELEPHONE ENCOUNTER
----- Message from Cathi Philip sent at 3/23/2018  8:45 AM CDT -----  Contact: pt  States she has left 2 message already, she only has a few pills left.    1. What is the name of the medication you are requesting? Pain medicine percocet  2. What is the dose? 10/325  3. How do you take the medication? Orally, topically, etc? orally  4. How often do you take this medication? 3 times a day   5. Do you need a 30 day or 90 day supply? 30  6. How many refills are you requesting? ?  7. What is your preferred pharmacy and location of the pharmacy?   [facility] [id number] - [place] - [address] [facility]  [address] [place] [address]  Phone: [phone] Fax: [phone]  8. Who can we contact with further questions? Pt at [phone]  Thank you

## 2018-03-27 ENCOUNTER — OFFICE VISIT (OUTPATIENT)
Dept: NEPHROLOGY | Facility: CLINIC | Age: 58
End: 2018-03-27
Payer: MEDICARE

## 2018-03-27 ENCOUNTER — LAB VISIT (OUTPATIENT)
Dept: LAB | Facility: HOSPITAL | Age: 58
End: 2018-03-27
Attending: INTERNAL MEDICINE
Payer: MEDICARE

## 2018-03-27 VITALS
DIASTOLIC BLOOD PRESSURE: 86 MMHG | WEIGHT: 235 LBS | HEIGHT: 67 IN | HEART RATE: 71 BPM | SYSTOLIC BLOOD PRESSURE: 118 MMHG | BODY MASS INDEX: 36.88 KG/M2

## 2018-03-27 DIAGNOSIS — N39.0 UTI (URINARY TRACT INFECTION), UNCOMPLICATED: ICD-10-CM

## 2018-03-27 DIAGNOSIS — E55.9 VITAMIN D DEFICIENCY: ICD-10-CM

## 2018-03-27 DIAGNOSIS — R11.0 NAUSEA: ICD-10-CM

## 2018-03-27 DIAGNOSIS — Z94.0 S/P KIDNEY TRANSPLANT: Primary | ICD-10-CM

## 2018-03-27 PROCEDURE — 99999 PR PBB SHADOW E&M-EST. PATIENT-LVL III: CPT | Mod: PBBFAC,,, | Performed by: INTERNAL MEDICINE

## 2018-03-27 PROCEDURE — 87086 URINE CULTURE/COLONY COUNT: CPT

## 2018-03-27 PROCEDURE — 99214 OFFICE O/P EST MOD 30 MIN: CPT | Mod: S$PBB,,, | Performed by: INTERNAL MEDICINE

## 2018-03-27 PROCEDURE — 87088 URINE BACTERIA CULTURE: CPT

## 2018-03-27 PROCEDURE — 87147 CULTURE TYPE IMMUNOLOGIC: CPT

## 2018-03-27 PROCEDURE — 99213 OFFICE O/P EST LOW 20 MIN: CPT | Mod: PBBFAC,PO | Performed by: INTERNAL MEDICINE

## 2018-03-27 RX ORDER — ONDANSETRON 4 MG/1
4 TABLET, FILM COATED ORAL EVERY 12 HOURS PRN
Qty: 40 TABLET | Refills: 4 | Status: SHIPPED | OUTPATIENT
Start: 2018-03-27 | End: 2019-12-23

## 2018-03-27 RX ORDER — ERGOCALCIFEROL 1.25 MG/1
50000 CAPSULE ORAL
Qty: 12 CAPSULE | Refills: 3 | Status: SHIPPED | OUTPATIENT
Start: 2018-03-27 | End: 2018-08-14

## 2018-03-27 NOTE — PROGRESS NOTES
Subjective:       Patient ID: Ana Maria Teague is a 57 y.o.   female who presents for follow-up evaluation of CRT - 9/2013, HTN, anemia, SHPT        Karine Fernandez MD      HPI:  Ana Maria Teague Is a pleasant 57 -year-old  woman see in office today in followup for above medical problems. I saw her in clinic about 5 months ago. She is currently taking Prograf 5 mg in AM 4 mg  In PM , CellCept 500 mg twice a day. Other providerin the interim were reviewed.  She had a sleep study that ruled out obstructive sleep apnea, she has nocturnal hypoxemia, she was started on 2 L of oxygen at night by nasal cannula. Recent lab results were discussed with the patient. Renal function is stable with a creatinine less than  1 Mg/dL. Has appt with vascular surgery , 4/2/18 for possible AV graft removal ( has been hurting ) , she was seen in the emergency room about a month ago for burning micturition, urine culture shows strep and likely a, 50-99,000 colonies, no symptoms today, will repeat urine culture, she was not treated at the previous ER visit,     Important Medical Info :      Patient has history of end-stage renal disease on hemodialysis for about 9 years at [facility] hemodialysis unit. She underwent a cadaveric renal transplant in 9/2013 at [facility] in [place]. Her hospital stay was complicated by incisional hematoma and bleeding requiring patient to return to OR for wash out. She also experienced delay of graft function, requiring HD.      Past Medical History:   Diagnosis Date    Abnormal glucose 12/30/2013    Acute bronchiolitis 10/15/2014    Anemia     Anemia of chronic renal failure 9/30/2013    Anxiety     Anxiety disorder     Avascular necrosis of bone of hip     Back pain     s/p steroid injections    Chronic immunosuppression with Prograf and Cellcept 9/30/2013    CKD (chronic kidney disease) stage 2, GFR 60-89 ml/min     CKD (chronic kidney disease) stage 5,  "GFR less than 15 ml/min     -donor kidney transplant - 13    Depression     Hypertension, renal 2013    Hypothyroidism     Immunosuppressed status 10/15/2014    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 270 mL 5    atorvastatin (LIPITOR) 20 MG tablet Take 1 tablet (20 mg total) by mouth once daily. 30 tablet 11    BD ULTRA-FINE RORY PEN NEEDLES 32 gauge x 5/32" Ndle USE AS DIRECTED WITH INSULIN PEN FOUR TIMES DAILY 100 each 0    blood sugar diagnostic (FREESTYLE TEST) Strp USE FOUR TIMES DAILY AS DIRECTED 400 strip 2    BREO ELLIPTA 200-25 mcg/dose DsDv diskus inhaler INHALE 1 PUFF BY MOUTH INTO THE LUNGS EVERY DAY 60 each 5    cetirizine (ZYRTEC) 10 MG tablet Take 1 tablet (10 mg total) by mouth once daily. 30 tablet 11    insulin glargine (LANTUS SOLOSTAR) 100 unit/mL (3 mL) InPn pen Inject 50 Units into the skin every evening. 15 mL 11    insulin lispro (HUMALOG KWIKPEN) 100 unit/mL InPn pen Titrate up to 18 units subcutaneously three times a day 10-15 min before meals as directed in after visit summary. 45 mL 3    lancets (FREESTYLE LANCETS) 28 gauge Misc 1 lancet by Combination route 2 (two) times daily as needed. Qid prn 400 each 2    lancets Misc 1 strip by Misc.(Non-Drug; Combo Route) route 4 (four) times daily with meals and nightly. 150 each 6    levothyroxine (SYNTHROID) 150 MCG tablet Take 1 tablet (150 mcg total) by mouth before breakfast. 90 tablet 3    mycophenolate (CELLCEPT) 250 mg Cap TAKE (2) CAPSULES " TWICE DAILY. 120 capsule 11    NIFEDICAL XL 30 mg 24 hr tablet TAKE 1 TABLET BY MOUTH EVERY DAY 30 tablet 8    oxyCODONE-acetaminophen (PERCOCET)  mg per tablet Take 1 tablet by mouth 3 (three) times daily as needed for Pain. 72 tablet 0    PROAIR HFA 90 mcg/actuation inhaler INHALE 2 PUFFS BY MOUTH INTO THE LUNGS EVERY 4 HOURS AS NEEDED FOR WHEEZING 8.5 g 0    tacrolimus (PROGRAF) 1 MG Cap TAKE 5 CAPSULES IN THE MORNING, AND 4 IN THE EVENING 270 capsule 11    alprazolam (XANAX) 0.5 MG tablet TAKE 1 TO 2 TABLETS BY MOUTH TWICE DAILY AS NEEDED 60 tablet 0    clonazePAM (KLONOPIN) 0.5 MG disintegrating tablet Take 1 tablet (0.5 mg total) by mouth 2 (two) times daily as needed. 60 tablet 2    cyclobenzaprine (FLEXERIL) 10 MG tablet Take 1 tablet (10 mg total) by mouth 3 (three) times daily as needed for Muscle spasms. 30 tablet 1    ergocalciferol (ERGOCALCIFEROL) 50,000 unit Cap Take 1 capsule (50,000 Units total) by mouth every 7 days. 12 capsule 3    furosemide (LASIX) 20 MG tablet TAKE 1 TABLET BY MOUTH EVERY DAY AS NEEDED 90 tablet 11    promethazine (PHENERGAN) 25 MG tablet Take 1 tablet (25 mg total) by mouth every 6 (six) hours as needed for Nausea. 14 tablet 0    [DISCONTINUED] tacrolimus (PROGRAF) 1 MG Cap TAKE 5 CAPSULES IN THE MORNING, AND 4 IN THE EVENING 270 capsule 10     No current facility-administered medications on file prior to visit.              Review of Systems  :      Constitutional: Negative for fever, activity change, appetite change and fatigue.    HENT: Negative for congestion, sore throat, facial swelling, trouble swallowing and voice change.    Eyes: Negative for redness and visual disturbance.    Respiratory: Negative for apnea, cough, chest tightness, shortness of breath and wheezing.    Cardiovascular: Negative for chest pain, palpitations , has trace leg swelling.    Gastrointestinal: Negative for nausea, vomiting, abdominal pain, diarrhea, constipation, blood in  stool and abdominal distention.    Genitourinary: Negative for dysuria, urgency, frequency, hematuria, flank pain, decreased urine volume, difficulty urinating and pelvic pain.    Musculoskeletal: Positive for back pain, no joint swelling , she has pain in her left wrist from carpal tunnel syndrome.    Skin: Negative for color change and rash.  Right arm AV graft noted,  Neurological: Negative for dizziness, syncope, weakness and headaches.    Hematological: Does not bruise/bleed easily.    Psychiatric/Behavioral: Negative for behavioral problems, confusion and agitation. The patient is not nervous/anxious.              Objective:           Vitals:    03/27/18 0932   BP: 118/86   Pulse: 71       Weight 235 pounds, previous weight was 244 pounds      Physical Exam  :        Constitutional: She is oriented to person, place, and time. She appears well-developed and well-nourished. No distress.    HENT: Head: Normocephalic and atraumatic.   Oropharynx is clear and moist. No oropharyngeal exudate.    Eyes: Conjunctivae normal and EOM are normal. Pupils are equal, round, and reactive to light. No scleral icterus.    Neck: Normal range of motion. Neck supple. No JVD present. Carotid bruit is not present. No tracheal deviation present. No mass and no thyromegaly present.    Cardiovascular: Normal rate, regular rhythm, normal heart sounds and intact distal pulses. Exam reveals no gallop and no friction rub. No murmur heard.    Pulmonary/Chest: Effort normal and breath sounds normal. No respiratory distress. She has no wheezes. She has no rales. She exhibits no tenderness.    Abdominal: obese, Soft. Bowel sounds are normal. She exhibits no distension, no abdominal bruit, no ascites and no mass. There is no hepatosplenomegaly. There is no tenderness. There is no rebound, no guarding and no CVA tenderness. No allograft tenderness   Musculoskeletal: Normal range of motion. She exhibits no  edema and no tenderness. Right arm AV  graft with no thrill   Lymphadenopathy: She has no cervical adenopathy.   Neurological: She is alert and oriented to person, place, and time. No cranial nerve deficit. She exhibits normal muscle tone. Coordination normal.    Skin: Skin is warm and intact. No rash noted. No erythema. No pallor.   Psychiatric: She has a normal mood and affect. Her behavior is normal. Judgment normal.             Labs:    Lab Results   Component Value Date    CREATININE 0.9 03/20/2018    BUN 11 03/20/2018     03/20/2018    K 4.0 03/20/2018    CL 99 03/20/2018    CO2 27 03/20/2018       Lab Results   Component Value Date    WBC 10.75 03/20/2018    HGB 13.2 03/20/2018    HCT 42.6 03/20/2018    MCV 82 03/20/2018     03/20/2018       Lab Results   Component Value Date    PTH 83.0 (H) 05/17/2017    PTH 83.0 (H) 05/17/2017    CALCIUM 9.9 03/20/2018    CAION 1.17 10/08/2013    PHOS 3.0 03/20/2018       Lab Results   Component Value Date    ALBUMIN 4.0 03/20/2018       Lab Results   Component Value Date    URICACID 3.9 03/20/2018       Prograf level - 6.7        Impression and plan - 57  -year-old  woman seen office today in followup for following medical problems ,      1. Status post cadaveric renal transplant ( 9/2013 ) - stable renal function with a serum creatinine of 1 mg/dL. Most recent Prograf level is 6.7  (target 4-7) , she's currently on Prograf 5 mg in AM and 4  Mg in PM  , cont CellCept 500 mg twice a day for immunosuppression.      2. Hypertension - blood pressure is controlled ,       3. Anemia - her hemoglobin is currently stable at 13.2 g, Will  continue to follow      4. Secondary hyperparathyroidism - PTH is 83 , on Ergocalciferol once a week ,      5. Diabetes mellitus type 2 - discussed importance of weight loss and adherence with her medications and diabetic diet. Also discussed daily exercise     6. Volume - recent echocardiogram shows LV diastolic dysfunction,  discussed salt restricted  diet, on Lasix 20 mg daily as needed basis.      7. Obesity - discussed daily exercise and weight loss.      8. Vascular surgery appointment to evaluate right arm AV graft, she complains of discomfort and wants this removed.    9.  Urinary tract infection - we will repeat urine culture,     I will see her in followup in about 4 months. Face-to-face time was 25 minutes reviewing her lab results and plan of care.       Yuval Medina M.D.

## 2018-03-28 LAB — BACTERIA UR CULT: NORMAL

## 2018-03-28 RX ORDER — PEN NEEDLE, DIABETIC 31 GX5/16"
NEEDLE, DISPOSABLE MISCELLANEOUS
Qty: 100 EACH | Refills: 0 | Status: SHIPPED | OUTPATIENT
Start: 2018-03-28 | End: 2018-06-08 | Stop reason: SDUPTHER

## 2018-03-29 DIAGNOSIS — N39.0 UTI (URINARY TRACT INFECTION), UNCOMPLICATED: Primary | ICD-10-CM

## 2018-03-29 RX ORDER — CEPHALEXIN 500 MG/1
500 CAPSULE ORAL EVERY 12 HOURS
Qty: 17 CAPSULE | Refills: 0 | Status: SHIPPED | OUTPATIENT
Start: 2018-03-29 | End: 2018-04-23 | Stop reason: ALTCHOICE

## 2018-03-29 NOTE — PROGRESS NOTES
Discussed recent urine culture reports.  Positive for strep.  Will treat patient with Keflex 500  mg twice a day for 1 week.  Treatment due to kidney transplant status.  Prescription sent to her pharmacy.    Yuval Medina MD

## 2018-04-02 DIAGNOSIS — Z94.0 KIDNEY REPLACED BY TRANSPLANT: Primary | ICD-10-CM

## 2018-04-10 ENCOUNTER — OFFICE VISIT (OUTPATIENT)
Dept: PAIN MEDICINE | Facility: CLINIC | Age: 58
End: 2018-04-10
Payer: MEDICARE

## 2018-04-10 VITALS
DIASTOLIC BLOOD PRESSURE: 92 MMHG | WEIGHT: 235 LBS | RESPIRATION RATE: 16 BRPM | HEIGHT: 67 IN | BODY MASS INDEX: 36.88 KG/M2 | SYSTOLIC BLOOD PRESSURE: 146 MMHG | HEART RATE: 77 BPM

## 2018-04-10 DIAGNOSIS — M47.816 LUMBAR SPONDYLOSIS: Primary | ICD-10-CM

## 2018-04-10 DIAGNOSIS — M47.816 LUMBAR FACET ARTHROPATHY: ICD-10-CM

## 2018-04-10 DIAGNOSIS — M53.3 SACROILIAC JOINT PAIN: ICD-10-CM

## 2018-04-10 PROCEDURE — 99214 OFFICE O/P EST MOD 30 MIN: CPT | Mod: S$PBB,,, | Performed by: ANESTHESIOLOGY

## 2018-04-10 PROCEDURE — 99999 PR PBB SHADOW E&M-EST. PATIENT-LVL III: CPT | Mod: PBBFAC,,, | Performed by: ANESTHESIOLOGY

## 2018-04-10 PROCEDURE — 99213 OFFICE O/P EST LOW 20 MIN: CPT | Mod: PBBFAC,PO | Performed by: ANESTHESIOLOGY

## 2018-04-10 RX ORDER — OXYCODONE AND ACETAMINOPHEN 10; 325 MG/1; MG/1
1 TABLET ORAL EVERY 8 HOURS PRN
Qty: 90 TABLET | Refills: 0 | Status: SHIPPED | OUTPATIENT
Start: 2018-05-10 | End: 2018-05-21 | Stop reason: SDUPTHER

## 2018-04-10 RX ORDER — OXYCODONE AND ACETAMINOPHEN 10; 325 MG/1; MG/1
1 TABLET ORAL EVERY 8 HOURS PRN
Qty: 90 TABLET | Refills: 0 | Status: SHIPPED | OUTPATIENT
Start: 2018-04-10 | End: 2018-04-10 | Stop reason: SDUPTHER
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
## 2018-04-14 DIAGNOSIS — J44.9 MODERATE COPD (CHRONIC OBSTRUCTIVE PULMONARY DISEASE): Chronic | ICD-10-CM

## 2018-04-14 DIAGNOSIS — J43.9 MIXED RESTRICTIVE AND OBSTRUCTIVE LUNG DISEASE: Chronic | ICD-10-CM

## 2018-04-14 DIAGNOSIS — J98.4 MIXED RESTRICTIVE AND OBSTRUCTIVE LUNG DISEASE: Chronic | ICD-10-CM

## 2018-04-16 ENCOUNTER — LAB VISIT (OUTPATIENT)
Dept: LAB | Facility: HOSPITAL | Age: 58
End: 2018-04-16
Attending: INTERNAL MEDICINE
Payer: MEDICARE

## 2018-04-16 DIAGNOSIS — Z94.0 KIDNEY REPLACED BY TRANSPLANT: ICD-10-CM

## 2018-04-16 LAB
ALBUMIN SERPL BCP-MCNC: 3.5 G/DL
ALBUMIN SERPL BCP-MCNC: 3.5 G/DL
ALP SERPL-CCNC: 147 U/L
ALT SERPL W/O P-5'-P-CCNC: 11 U/L
ANION GAP SERPL CALC-SCNC: 12 MMOL/L
AST SERPL-CCNC: 11 U/L
BASOPHILS # BLD AUTO: 0.09 K/UL
BASOPHILS NFR BLD: 0.9 %
BILIRUB DIRECT SERPL-MCNC: 0.2 MG/DL
BILIRUB SERPL-MCNC: 0.5 MG/DL
BUN SERPL-MCNC: 12 MG/DL
CALCIUM SERPL-MCNC: 9.5 MG/DL
CHLORIDE SERPL-SCNC: 101 MMOL/L
CO2 SERPL-SCNC: 25 MMOL/L
CREAT SERPL-MCNC: 0.9 MG/DL
DIFFERENTIAL METHOD: ABNORMAL
EOSINOPHIL # BLD AUTO: 0.4 K/UL
EOSINOPHIL NFR BLD: 3.3 %
ERYTHROCYTE [DISTWIDTH] IN BLOOD BY AUTOMATED COUNT: 14.6 %
EST. GFR  (AFRICAN AMERICAN): >60 ML/MIN/1.73 M^2
EST. GFR  (NON AFRICAN AMERICAN): >60 ML/MIN/1.73 M^2
GLUCOSE SERPL-MCNC: 180 MG/DL
HCT VFR BLD AUTO: 38.1 %
HGB BLD-MCNC: 12 G/DL
IMM GRANULOCYTES # BLD AUTO: 0.1 K/UL
IMM GRANULOCYTES NFR BLD AUTO: 0.9 %
LYMPHOCYTES # BLD AUTO: 2.5 K/UL
LYMPHOCYTES NFR BLD: 23.6 %
MAGNESIUM SERPL-MCNC: 1.7 MG/DL
MCH RBC QN AUTO: 26.2 PG
MCHC RBC AUTO-ENTMCNC: 31.5 G/DL
MCV RBC AUTO: 83 FL
MONOCYTES # BLD AUTO: 0.5 K/UL
MONOCYTES NFR BLD: 5 %
NEUTROPHILS # BLD AUTO: 7 K/UL
NEUTROPHILS NFR BLD: 66.3 %
NRBC BLD-RTO: 0 /100 WBC
PHOSPHATE SERPL-MCNC: 2.9 MG/DL
PLATELET # BLD AUTO: 272 K/UL
PMV BLD AUTO: 12.2 FL
POTASSIUM SERPL-SCNC: 3.7 MMOL/L
PROT SERPL-MCNC: 7.4 G/DL
RBC # BLD AUTO: 4.58 M/UL
SODIUM SERPL-SCNC: 138 MMOL/L
WBC # BLD AUTO: 10.53 K/UL

## 2018-04-16 PROCEDURE — 84075 ASSAY ALKALINE PHOSPHATASE: CPT

## 2018-04-16 PROCEDURE — 82247 BILIRUBIN TOTAL: CPT

## 2018-04-16 PROCEDURE — 80197 ASSAY OF TACROLIMUS: CPT

## 2018-04-16 PROCEDURE — 36415 COLL VENOUS BLD VENIPUNCTURE: CPT | Mod: PO

## 2018-04-16 PROCEDURE — 85025 COMPLETE CBC W/AUTO DIFF WBC: CPT

## 2018-04-16 PROCEDURE — 80069 RENAL FUNCTION PANEL: CPT

## 2018-04-16 PROCEDURE — 87799 DETECT AGENT NOS DNA QUANT: CPT

## 2018-04-16 PROCEDURE — 83735 ASSAY OF MAGNESIUM: CPT

## 2018-04-16 RX ORDER — ALBUTEROL SULFATE 90 UG/1
AEROSOL, METERED RESPIRATORY (INHALATION)
Qty: 8.5 G | Refills: 3 | Status: SHIPPED | OUTPATIENT
Start: 2018-04-16 | End: 2018-08-14

## 2018-04-17 LAB — TACROLIMUS BLD-MCNC: 4.3 NG/ML

## 2018-04-18 LAB
BKV DNA SERPL NAA+PROBE-ACNC: <125 COPIES/ML
BKV DNA SERPL NAA+PROBE-LOG#: <2.1 LOG (10) COPIES/ML
BKV DNA SERPL QL NAA+PROBE: NOT DETECTED

## 2018-04-23 ENCOUNTER — OFFICE VISIT (OUTPATIENT)
Dept: TRANSPLANT | Facility: CLINIC | Age: 58
End: 2018-04-23
Payer: MEDICARE

## 2018-04-23 VITALS
SYSTOLIC BLOOD PRESSURE: 157 MMHG | TEMPERATURE: 99 F | OXYGEN SATURATION: 97 % | WEIGHT: 236.75 LBS | HEART RATE: 77 BPM | HEIGHT: 67 IN | BODY MASS INDEX: 37.16 KG/M2 | DIASTOLIC BLOOD PRESSURE: 91 MMHG | RESPIRATION RATE: 18 BRPM

## 2018-04-23 DIAGNOSIS — N18.2 ANEMIA OF CHRONIC RENAL FAILURE, STAGE 2 (MILD): Chronic | ICD-10-CM

## 2018-04-23 DIAGNOSIS — N18.2 CKD (CHRONIC KIDNEY DISEASE) STAGE 2, GFR 60-89 ML/MIN: ICD-10-CM

## 2018-04-23 DIAGNOSIS — Z94.0 DECEASED-DONOR KIDNEY TRANSPLANT: Primary | Chronic | ICD-10-CM

## 2018-04-23 DIAGNOSIS — I12.9 HYPERTENSION, RENAL: Chronic | ICD-10-CM

## 2018-04-23 DIAGNOSIS — E66.01 SEVERE OBESITY (BMI 35.0-39.9): ICD-10-CM

## 2018-04-23 DIAGNOSIS — D63.1 ANEMIA OF CHRONIC RENAL FAILURE, STAGE 2 (MILD): Chronic | ICD-10-CM

## 2018-04-23 DIAGNOSIS — D84.9 IMMUNOSUPPRESSED STATUS: ICD-10-CM

## 2018-04-23 DIAGNOSIS — L03.113 CELLULITIS OF RIGHT UPPER EXTREMITY: ICD-10-CM

## 2018-04-23 DIAGNOSIS — Z29.89 NEED FOR PROPHYLACTIC IMMUNOTHERAPY: Chronic | ICD-10-CM

## 2018-04-23 PROBLEM — L03.90 CELLULITIS: Status: ACTIVE | Noted: 2018-04-23

## 2018-04-23 PROCEDURE — 99999 PR PBB SHADOW E&M-EST. PATIENT-LVL V: CPT | Mod: PBBFAC,,, | Performed by: NURSE PRACTITIONER

## 2018-04-23 PROCEDURE — 99215 OFFICE O/P EST HI 40 MIN: CPT | Mod: PBBFAC | Performed by: NURSE PRACTITIONER

## 2018-04-23 PROCEDURE — 99204 OFFICE O/P NEW MOD 45 MIN: CPT | Mod: S$PBB,,, | Performed by: NURSE PRACTITIONER

## 2018-04-23 RX ORDER — MUPIROCIN 20 MG/G
OINTMENT TOPICAL 3 TIMES DAILY
Qty: 30 G | Refills: 0 | Status: SHIPPED | OUTPATIENT
Start: 2018-04-23 | End: 2019-02-21

## 2018-04-23 RX ORDER — DOXYCYCLINE 100 MG/1
100 CAPSULE ORAL EVERY 12 HOURS
Qty: 20 CAPSULE | Refills: 0 | Status: SHIPPED | OUTPATIENT
Start: 2018-04-23 | End: 2018-05-03

## 2018-04-23 NOTE — PROGRESS NOTES
Kidney Post-Transplant Assessment    Referring Physician: Lenora Dooley  Current Nephrologist: Yuval Medina    ORGAN: RIGHT KIDNEY  Donor Type:  - brain death  PHS Increased Risk: no  Cold Ischemia: 789 mins  Induction Medications: campath - alemtuzumab (anti-cd52)    Subjective:     CC:  Reassessment of renal allograft function and management of chronic immunosuppression.    HPI:  Ms. Teague is a 57 y.o. year old Black or  female who received a  - brain death kidney transplant on 13.  She has CKD stage 2 - GFR 60-89 and her baseline creatinine is between 0.8-1.1. She takes mycophenolate mofetil and tacrolimus for maintenance immunosuppression.  Recent hospitalizations or ER visits since her previous clinic visit.  Reports Graft removal on  and staples removed on . Yesterday the right arm became red, warm and an area popped opened and started to ooze. She is scheduled to f/u on  w/ vascular in [place]. She will call today to schedule an earlier apt for evaluation of the arm.  Overall feels well.  Denies chest pain, SOB, leg pain, abdominal pain or LUTs.    Past Medical History:   Diagnosis Date    Abnormal glucose 2013    Acute bronchiolitis 10/15/2014    Anemia     Anemia of chronic renal failure 2013    Anxiety     Anxiety disorder     Avascular necrosis of bone of hip     Back pain     s/p steroid injections    Chronic immunosuppression with Prograf and Cellcept 2013    CKD (chronic kidney disease) stage 2, GFR 60-89 ml/min     CKD (chronic kidney disease) stage 5, GFR less than 15 ml/min     -donor kidney transplant - 13    Depression     Hypertension, renal 2013    Hypothyroidism     Immunosuppressed status 10/15/2014    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    Osteoporosis with pathological fracture     Renal disease due to hypertension 2013    Renal hypertension,  "non-vascular     Secondary hyperparathyroidism of renal origin 9/30/2013    Vertigo        Review of Systems   Constitutional: Negative for activity change, appetite change, chills, fatigue, fever and unexpected weight change.   HENT: Negative for congestion, facial swelling, postnasal drip, rhinorrhea, sinus pressure, sore throat and trouble swallowing.    Eyes: Negative for pain, redness and visual disturbance.   Respiratory: Negative for cough, chest tightness, shortness of breath and wheezing.    Cardiovascular: Negative.  Negative for chest pain, palpitations and leg swelling.   Gastrointestinal: Negative for abdominal pain, diarrhea, nausea and vomiting.   Genitourinary: Negative for dysuria, flank pain and urgency.   Musculoskeletal: Negative for gait problem, neck pain and neck stiffness.        Right arm, red w/ oozing wound   Skin: Negative for rash.   Allergic/Immunologic: Positive for immunocompromised state. Negative for environmental allergies and food allergies.   Neurological: Negative for dizziness, weakness, light-headedness and headaches.   Psychiatric/Behavioral: Negative for agitation and confusion. The patient is not nervous/anxious.        Objective:   Blood pressure (!) 157/91, pulse 77, temperature 99.2 °F (37.3 °C), temperature source Oral, resp. rate 18, height 5' 7" (1.702 m), weight 107.4 kg (236 lb 12.4 oz), SpO2 97 %.body mass index is 37.08 kg/m².    Physical Exam   Constitutional: She is oriented to person, place, and time. She appears well-developed and well-nourished.   HENT:   Head: Normocephalic.   Mouth/Throat: Oropharynx is clear and moist. No oropharyngeal exudate.   Eyes: Conjunctivae and EOM are normal. Pupils are equal, round, and reactive to light. No scleral icterus.   Neck: Normal range of motion. Neck supple.   Cardiovascular: Normal rate, regular rhythm and normal heart sounds.    Pulmonary/Chest: Effort normal and breath sounds normal.   Abdominal: Soft. Normal " appearance and bowel sounds are normal. She exhibits no distension and no mass. There is no splenomegaly or hepatomegaly. There is no tenderness. There is no rebound, no guarding, no CVA tenderness, no tenderness at McBurney's point and negative Marcelino's sign.       Musculoskeletal: Normal range of motion. She exhibits no edema.        Arms:  Lymphadenopathy:     She has no cervical adenopathy.   Neurological: She is alert and oriented to person, place, and time. She exhibits normal muscle tone. Coordination normal.   Skin: Skin is warm and dry.   Psychiatric: She has a normal mood and affect. Her behavior is normal.   Vitals reviewed.              Labs:  Lab Results   Component Value Date    WBC 10.53 2018    HGB 12.0 2018    HCT 38.1 2018     2018    K 3.7 2018     2018    CO2 25 2018    BUN 12 2018    CREATININE 0.9 2018    EGFRNONAA >60.0 2018    CALCIUM 9.5 2018    PHOS 2.9 2018    MG 1.7 2018    ALBUMIN 3.5 2018    ALBUMIN 3.5 2018    AST 11 2018    ALT 11 2018       No results found for: EXTANC, EXTWBC, EXTSEGS, EXTPLATELETS, EXTHEMOGLOBI, EXTHEMATOCRI, EXTCREATININ, EXTSODIUM, EXTPOTASSIUM, EXTBUN, EXTCO2, EXTCALCIUM, EXTPHOSPHORU, EXTGLUCOSE, EXTALBUMIN, EXTAST, EXTALT, EXTBILITOTAL, EXTLIPASE, EXTAMYLASE    No results found for: EXTCYCLOSLVL, EXTSIROLIMUS, EXTTACROLVL, EXTPROTCRE, EXTPTHINTACT, EXTPROTEINUA, EXTWBCUA, EXTRBCUA    Labs were reviewed with the patient.    Assessment:     1. -donor kidney transplant - 13    2. Chronic immunosuppression with Prograf and Cellcept    3. Anemia of chronic renal failure, stage 2 (mild)    4. CKD (chronic kidney disease) stage 2, GFR 60-89 ml/min    5. Hypertension, renal    6. Immunosuppressed status    7. Severe obesity (BMI 35.0-39.9)    8. Uncontrolled type 2 diabetes mellitus without complication, with long-term current use of insulin         Plan:     Cellulitis--start doxycycline 100 mg BID, cleanse area w/ mild soap and water. Apply Mupirocin ointment as prescribed   Schedule f/u next available w/ vascular for further assessment.     Follow-up:   1. CKD stage: 2 stable    2. Immunosuppression:   Prograf trough 4.3, which is  therapeutic target 4-6. Continue  Prograf 5/4, OOM647 Mg BID   Will continue to monitor for drug toxicities    3. Allograft Function: Stable. Continue good po hydration.   Lab Results   Component Value Date    CREATININE 0.9 04/16/2018        4. Hypertension management: advise low salt diet and home BP monitoring    nifedical 30 mg QD , Lasix 20 mg daily/ prn     5. Metabolic Bone Disease/Secondary Hyperparathyroidism:stable  Will monitor PTH, CA and Vit D/guidelines,    Lab Results   Component Value Date    PTH 83.0 (H) 05/17/2017    PTH 83.0 (H) 05/17/2017    CALCIUM 9.5 04/16/2018    CAION 1.17 10/08/2013    PHOS 2.9 04/16/2018   Ergocalciferol 50,000 Units / week--> as prescribed -->MGMT deferred to general nephrology      6. Electrolytes:  Will monitor /guidelines  Lab Results   Component Value Date     04/16/2018    K 3.7 04/16/2018     04/16/2018    CO2 25 04/16/2018       7. Anemia: stable. No need for intervention    Will monitor /guidelines  Lab Results   Component Value Date    WBC 10.53 04/16/2018    HGB 12.0 04/16/2018    HCT 38.1 04/16/2018    MCV 83 04/16/2018     04/16/2018       8.  Cytopenias: no significant cytopenias will monitor as per our guidelines. Medicine list reviewed including potential causes of drug-induced cytopenias    9.Proteinuria: continue p/c ratio as per guidelines    4/16/2018   Prot/Creat Ratio, Ur 0.00 - 0.20 Unable to calculate        10. BK virus infection screening:  will continue to monitor/ guidelines  4/16/2018 BK PCR not detected     11. Weight education: provided during the clinic visit   Body mass index is 37.08 kg/m².       12.Patient safety education  regarding immunosuppression including prophylaxis posttransplant for CMV, PCP : Education provided about vaccination and prevention of infections       Follow-up:   Annual follow-up with kidney transplant clinic with repeat labs, including renal function panel with UA, urine protein/creatinine ratio, and drug trough level q3 months.  Patient also reminded to follow-up with general nephrologist.    Adelaida Drake NP       Education:   Material provided to the patient.  Patient reminded to call with any health changes since these can be early signs of significant complications.  Also, I advised the patient to be sure any new medications or changes of old medications are discussed with either a pharmacist or physician knowledgeable with transplant to avoid rejection/drug toxicity related to significant drug interactions.    UNOS Patient Status  Functional Status: 80% - Normal activity with effort: some symptoms of disease  Physical Capacity: No Limitations

## 2018-04-23 NOTE — LETTER
April 23, 2018        Yuval Medina  [address] [place] [address]  Phone: [phone]  Fax: [phone]             [facility]  [address]  [facility] [address]  Phone: [phone]   Patient: Ana Maria Teague   MR Number: [id number]   YOB: 1960   Date of Visit: 4/23/2018       Dear Dr. Yuval Medina    Thank you for referring Ana Maria Teague to me for evaluation. Attached you will find relevant portions of my assessment and plan of care.    If you have questions, please do not hesitate to call me. I look forward to following Ana Maria Teague along with you.    Sincerely,    Adelaida Drake, NP    Enclosure    If you would like to receive this communication electronically, please contact [email or web address] or [phone] to request [facility] access.    [facility] is a tool which provides read-only access to select patient information with whom you have a relationship. Its easy to use and provides real time access to review your patients record including encounter summaries, notes, results, and demographic information.    If you feel you have received this communication in error or would no longer like to receive these types of communications, please e-mail [email or web address]

## 2018-04-27 DIAGNOSIS — E11.9 TYPE 2 DIABETES MELLITUS WITHOUT COMPLICATION: ICD-10-CM

## 2018-05-01 ENCOUNTER — TELEPHONE (OUTPATIENT)
Dept: TRANSPLANT | Facility: CLINIC | Age: 58
End: 2018-05-01

## 2018-05-01 NOTE — TELEPHONE ENCOUNTER
Attempted contact to patient regarding office visit instructions per Adelaida Drake :    Cellulitis--start doxycycline 100 mg BID, cleanse area w/ mild soap and water. Apply Mupirocin ointment as prescribed   Schedule f/u next available w/ vascular for further assessment.     Left detailed message with call back information.

## 2018-05-10 ENCOUNTER — TELEPHONE (OUTPATIENT)
Dept: TRANSPLANT | Facility: CLINIC | Age: 58
End: 2018-05-10

## 2018-05-10 ENCOUNTER — PROCEDURE VISIT (OUTPATIENT)
Dept: PULMONOLOGY | Facility: CLINIC | Age: 58
End: 2018-05-10
Payer: MEDICARE

## 2018-05-10 DIAGNOSIS — J43.9 NOCTURNAL HYPOXEMIA DUE TO EMPHYSEMA: ICD-10-CM

## 2018-05-10 DIAGNOSIS — R94.2 ABNORMAL PFT: ICD-10-CM

## 2018-05-10 DIAGNOSIS — R94.2 ABNORMAL PFTS (PULMONARY FUNCTION TESTS): ICD-10-CM

## 2018-05-10 DIAGNOSIS — R94.2 DIFFUSION CAPACITY OF LUNG (DL), DECREASED: ICD-10-CM

## 2018-05-10 DIAGNOSIS — G47.34 SLEEP-RELATED NONOBSTRUCTIVE ALVEOLAR HYPOVENTILATION: ICD-10-CM

## 2018-05-10 DIAGNOSIS — G47.36 NOCTURNAL HYPOXEMIA DUE TO EMPHYSEMA: ICD-10-CM

## 2018-05-10 PROCEDURE — 94762 N-INVAS EAR/PLS OXIMTRY CONT: CPT | Mod: PBBFAC,PO

## 2018-05-10 RX ORDER — SULFAMETHOXAZOLE AND TRIMETHOPRIM 400; 80 MG/1; MG/1
1 TABLET ORAL DAILY
COMMUNITY
End: 2018-06-08 | Stop reason: SDUPTHER

## 2018-05-10 NOTE — TELEPHONE ENCOUNTER
Patient returned this writer's call.  She reports that she has seen vascular surgery regarding her fistula and is currently on abx for further treatment cellulitis.

## 2018-05-11 ENCOUNTER — OFFICE VISIT (OUTPATIENT)
Dept: PULMONOLOGY | Facility: CLINIC | Age: 58
End: 2018-05-11
Payer: MEDICARE

## 2018-05-11 ENCOUNTER — TELEPHONE (OUTPATIENT)
Dept: PULMONOLOGY | Facility: CLINIC | Age: 58
End: 2018-05-11

## 2018-05-11 ENCOUNTER — PROCEDURE VISIT (OUTPATIENT)
Dept: PULMONOLOGY | Facility: CLINIC | Age: 58
End: 2018-05-11
Payer: MEDICARE

## 2018-05-11 ENCOUNTER — HOSPITAL ENCOUNTER (OUTPATIENT)
Dept: RADIOLOGY | Facility: HOSPITAL | Age: 58
Discharge: HOME | End: 2018-05-11
Attending: INTERNAL MEDICINE
Payer: MEDICARE

## 2018-05-11 VITALS
WEIGHT: 235 LBS | HEIGHT: 67 IN | SYSTOLIC BLOOD PRESSURE: 102 MMHG | HEART RATE: 82 BPM | BODY MASS INDEX: 36.88 KG/M2 | DIASTOLIC BLOOD PRESSURE: 58 MMHG | OXYGEN SATURATION: 98 % | RESPIRATION RATE: 18 BRPM

## 2018-05-11 DIAGNOSIS — R94.2 DIFFUSION CAPACITY OF LUNG (DL), DECREASED: ICD-10-CM

## 2018-05-11 DIAGNOSIS — J44.9 MODERATE COPD (CHRONIC OBSTRUCTIVE PULMONARY DISEASE): Chronic | ICD-10-CM

## 2018-05-11 DIAGNOSIS — R06.02 SOB (SHORTNESS OF BREATH): Primary | ICD-10-CM

## 2018-05-11 DIAGNOSIS — G47.34 SLEEP-RELATED NONOBSTRUCTIVE ALVEOLAR HYPOVENTILATION: ICD-10-CM

## 2018-05-11 DIAGNOSIS — R94.2 ABNORMAL PFTS (PULMONARY FUNCTION TESTS): Primary | ICD-10-CM

## 2018-05-11 DIAGNOSIS — E66.01 SEVERE OBESITY (BMI 35.0-39.9): ICD-10-CM

## 2018-05-11 DIAGNOSIS — J44.9 CHRONIC OBSTRUCTIVE PULMONARY DISEASE, UNSPECIFIED COPD TYPE: Primary | ICD-10-CM

## 2018-05-11 PROBLEM — L03.90 CELLULITIS: Status: RESOLVED | Noted: 2018-04-23 | Resolved: 2018-05-11

## 2018-05-11 LAB
POST FEF 25 75: 2.48 L/S (ref 1.61–3.11)
POST FET 100: 7.42 S
POST FEV1 FVC: 81 %
POST FEV1: 2.27 L (ref 2.08–2.74)
POST FIF 50: 1.77 L/S
POST FVC: 2.79 L (ref 2.66–3.44)
POST PEF: 5.02 L/S (ref 5.13–7.32)
PRE DLCO: 16.2 ML/MMHG/MIN (ref 23.08–31.37)
PRE ERV: 0.74 L
PRE FEF 25 75: 2.63 L/S (ref 1.61–3.11)
PRE FET 100: 7.28 S
PRE FEV1 FVC: 82 %
PRE FEV1: 2.25 L (ref 2.08–2.74)
PRE FIF 50: 1.93 L/S
PRE FRC PL: 3.09 L (ref 2.02–2.97)
PRE FVC: 2.74 L (ref 2.66–3.44)
PRE KROGHS K: 3.92 1/MIN
PRE PEF: 3.84 L/S (ref 5.13–7.32)
PRE RV: 2.1 L (ref 1.72–2.42)
PRE SVC: 2.74 L
PRE TLC: 4.84 L (ref 5.13–5.9)
PREDICTED DLCO: 27.23 ML/MMHG/MIN (ref 23.08–31.37)
PREDICTED FEV1 FVC: 78.48 % (ref 73.59–83.38)
PREDICTED FEV1: 2.41 L (ref 2.08–2.74)
PREDICTED FRC N2: 2.5 L (ref 2.02–2.97)
PREDICTED FRC PL: 2.5 L (ref 2.02–2.97)
PREDICTED FVC: 3.05 L (ref 2.66–3.44)
PREDICTED RV: 2.07 L (ref 1.72–2.42)
PREDICTED SVC: 3.37 L
PREDICTED TLC: 5.51 L (ref 5.13–5.9)
PROVOCATION PROTOCOL: ABNORMAL

## 2018-05-11 PROCEDURE — 99999 PR PBB SHADOW E&M-EST. PATIENT-LVL III: CPT | Mod: PBBFAC,,, | Performed by: INTERNAL MEDICINE

## 2018-05-11 PROCEDURE — 71046 X-RAY EXAM CHEST 2 VIEWS: CPT | Mod: 26,,, | Performed by: RADIOLOGY

## 2018-05-11 PROCEDURE — 94729 DIFFUSING CAPACITY: CPT | Mod: 26,S$PBB,, | Performed by: INTERNAL MEDICINE

## 2018-05-11 PROCEDURE — 94729 DIFFUSING CAPACITY: CPT | Mod: PBBFAC,PO

## 2018-05-11 PROCEDURE — 99214 OFFICE O/P EST MOD 30 MIN: CPT | Mod: 25,S$PBB,, | Performed by: INTERNAL MEDICINE

## 2018-05-11 PROCEDURE — 94060 EVALUATION OF WHEEZING: CPT | Mod: PBBFAC,PO

## 2018-05-11 PROCEDURE — 94726 PLETHYSMOGRAPHY LUNG VOLUMES: CPT | Mod: 26,S$PBB,, | Performed by: INTERNAL MEDICINE

## 2018-05-11 PROCEDURE — 99213 OFFICE O/P EST LOW 20 MIN: CPT | Mod: PBBFAC,25,PO | Performed by: INTERNAL MEDICINE

## 2018-05-11 PROCEDURE — 94060 EVALUATION OF WHEEZING: CPT | Mod: 26,S$PBB,, | Performed by: INTERNAL MEDICINE

## 2018-05-11 PROCEDURE — 71046 X-RAY EXAM CHEST 2 VIEWS: CPT | Mod: TC,FY,PO

## 2018-05-11 PROCEDURE — 94726 PLETHYSMOGRAPHY LUNG VOLUMES: CPT | Mod: PBBFAC,PO

## 2018-05-11 NOTE — PROGRESS NOTES
"Subjective:       Patient ID: Ana Maria Teague is a 57 y.o. female.    Chief Complaint: abn pft (rev onsat cxr pft)    Ms. Ana Maria Teague is 57 years old  Last visit was 05/29/2017, recent removal of Av fistula on Abx: Doxy and Bactrim  No cough no wheezing no shortness of breath  Spirometry today FEV1 is 2.25L( 93%)   predicted  Immunizations are up-to-date  Medications Breo Ellipta and rescue albuterol which she hardly ever uses  Normal spirometry suggest asthma phenotype, may not need BREO, will hold and she will get back to me  Weight has dropped from 243 lb to 235 lb  She has nocturnal oxygen for sleep hypoventilatio hypoxemia: She still qualifies for nocturnal oxygen  X-ray today was reviewed stable  Continue current medications      Review of Systems   Constitutional: Negative.    HENT: Negative.    Eyes: Negative.    Respiratory: Negative.  Negative for cough, sputum production, shortness of breath, wheezing and use of rescue inhaler.    Cardiovascular: Negative.    Genitourinary: Negative.    Endocrine: endocrine negative   Musculoskeletal: Negative.    Skin: Negative.    Gastrointestinal: Negative.    Neurological: Negative.    Psychiatric/Behavioral: Negative.        Objective:       Vitals:    05/11/18 0923   BP: (!) 102/58   Pulse: 82   Resp: 18   SpO2: 98%   Weight: 106.6 kg (235 lb)   Height: 5' 7" (1.702 m)     Physical Exam   Constitutional: She is oriented to person, place, and time. She appears well-developed and well-nourished.   HENT:   Head: Normocephalic.   Nose: Nose normal.   Mouth/Throat: Oropharynx is clear and moist.   Neck: Normal range of motion. Neck supple. No JVD present.   Cardiovascular: Normal rate, regular rhythm and normal heart sounds.    Pulmonary/Chest: Normal expansion, symmetric chest wall expansion and effort normal.   Abdominal: Soft. Bowel sounds are normal.   Musculoskeletal: Normal range of motion.        Arms:  Lymphadenopathy:     She has no cervical adenopathy. "   Neurological: She is alert and oriented to person, place, and time. She has normal reflexes.   Skin: Skin is warm and dry.   Psychiatric: She has a normal mood and affect. Her behavior is normal.   Nursing note and vitals reviewed.    Personal Diagnostic Review  CXR  FINDINGS:  The heart is borderline enlarged.  Surgical clips in the lower neck and vascular stents in the right axilla.  Lungs are clear bilaterally.  No acute osseous abnormality.      OVERNIGHT OXIMETRY REPORT:    Dictated by: Osmani Walker MD  Test date: 05/10/2018  Dictated on: 2018      Comment: This test was performed on Room Air     A desaturation event was defined as a decrease of saturation by 4 or more.    REPORT SUMMARY  Total valid samplin:06:08   High SpO2: 98%    Low SpO2: 76%    Mean SpO2  89.1 %  Cumulative time with oxygen saturation less than 88% (TC88): 00:26:24    CLINICAL INTERPRETATION   There is  significant nocturnal oxygen desaturation and  Clinical correlation is advised    Medicare Criteria Comments:   Oximetry test results suggest the patient falls under Medicare Group 1 Criteria. ( Arterial oxygen saturation at or below 88% for at least 5 minutes taken during sleep)  Osmani Walker MD    Details about Medicare Group Criteria coverage can be found at http://www.cms.hhs.gov/manuals/downloads/       Pulmonary function tests: FEV1: 2.25  (93 % predicted), FVC:  2.74 (90 % predicted), FEV1/FVC:  82  TLC 4.84L ( 88%)  DLCO 16.2( 59%)  No flowsheet data found.      Assessment:       Problem List Items Addressed This Visit     Abnormal PFTs (pulmonary function tests) - Primary     Spirometry : No obstruction: will hold BREO for now with option to switch to arnuity  TLC : 4.84L( 88%)  DLCO: 16.2( 59%)           Relevant Orders    Spirometry with/without bronchodilator    Diffusion capacity of lung (dl), decreased    Sleep-related nonobstructive alveolar hypoventilation     PSG : AHI was 2.5  ONSAT on RA  SpO2 cheli 76%  SpO2 was below 88%: 00:26:24  Oxygen 2.0 LPM  Sedative pain meds: Xanax, Klonopin, Percocet and Flexeril playing a role         Severe obesity (BMI 35.0-39.9)     General weight loss/lifestyle modification strategies discussed (elicit support from others; identify saboteurs; non-food rewards).  Diet interventions: low calorie (1000 kCal/d) deficit diet               Plan:      Follow-up in about 3 months (around 8/11/2018) for Spirometry and CXR next visit, Weight loss and exercise.    FEV1 improved by 38%: looks like asthma phenotype, Not COPD  Hold BREO, consider change to Arnuity    This note was prepared using voice recognition system and is likely to have sound alike errors that may have been overlooked even after proof reading.  Please call me with any questions    Discussed diagnosis, its evaluation, treatment and usual course. All questions answered.    Thank you for the courtesy of participating in the care of this patient    Osmani Walker MD        Need repeat Overnight Sat for Night time Oxygen qualification    Osmani Walker MD    .    Osmani Walker MD

## 2018-05-14 ENCOUNTER — PATIENT MESSAGE (OUTPATIENT)
Dept: PULMONOLOGY | Facility: CLINIC | Age: 58
End: 2018-05-14

## 2018-05-14 ENCOUNTER — TELEPHONE (OUTPATIENT)
Dept: PULMONOLOGY | Facility: CLINIC | Age: 58
End: 2018-05-14

## 2018-05-14 DIAGNOSIS — G47.34 SLEEP-RELATED NONOBSTRUCTIVE ALVEOLAR HYPOVENTILATION: ICD-10-CM

## 2018-05-14 DIAGNOSIS — R94.2 ABNORMAL PFT: Primary | ICD-10-CM

## 2018-05-14 DIAGNOSIS — R94.2 ABNORMAL PFTS (PULMONARY FUNCTION TESTS): ICD-10-CM

## 2018-05-14 DIAGNOSIS — R94.2 DIFFUSION CAPACITY OF LUNG (DL), DECREASED: ICD-10-CM

## 2018-05-14 NOTE — PROCEDURES
[facility]  [address] * [place] [address]  Telephone: [phone]  Test date: 05/10/18 Start: 05/10/18 23:34:58 Ana Maria Teague  Doctor: Dr Walker End: 18 06:50:10 [id number]  Oximetry: Summary Report  Comments: room air  Recording time: 07:15:12 Highest pulse: 110 Highest SpO2: 98%  Excluded samplin:09:04 Lowest pulse: 54 Lowest SpO2: 76%  Total valid samplin:06:08 Mean pulse: 69 Mean SpO2: 89.1%  1 S.D.: 5.8 1 S.D.: 2.4  Time with SpO2<90: 4:27:20, 62.7%  Time with SpO2<80: 0:01:00, 0.2%  Time with SpO2<70: 0:00:00, 0.0%  Time with SpO2<60: 0:00:00, 0.0%  Time with SpO2<88: 1:31:44, 21.5%  Time with SpO2 =>90: 2:38:48, 37.3%  Time with SpO2=>80 & <90: 4:26:20, 62.5%  Time with SpO2=>70 & <80: 0:01:00, 0.2%  Time with SpO2=>60 & <70: 0:00:00, 0.0%  The longest continuous time with saturation <=88 was 00:26:24, which started at  18 00:12:14.  A desaturation event was defined as a decrease of saturation by 4 or more.  No events were excluded due to artifact.  There were 17 desaturation events over 3 minutes duration.  There were 84 desaturation events of less than 3 minutes duration during which:  The mean high was 92.5%. The mean low was 85.3%.  The number of these events that were:  > 0 & <10 seconds: 6 > 0 seconds: 84  =>10 & <20 seconds: 14 =>10 seconds: 78  =>20 & <30 seconds: 15 =>20 seconds: 64  =>30 & <40 seconds: 8 =>30 seconds: 49  =>40 & <50 seconds: 9 =>40 seconds: 41  =>50 & <60 seconds: 7 =>50 seconds: 32  =>60 seconds: 25 =>60 seconds: 25  The mean length of desaturation events that were >=10 sec & <=3 mins was: 49.9 sec.  Desaturation event index (events >=10 sec per sampled hour): 11.0  Desaturation event index (events >= 0 sec per sampled hour): 11.8  © 3123-2376 [employer] Oximetry version Standard [id number].  Oximeter: Respironics 920M Plus memory, 4 second resolution.  [facility]  [address] [place] [address]. * [place]  [address]  Telephone: [phone]  Test date: 05/10/18 Start: 05/10/18 23:34:58 Ana Maria Teague  Doctor: Dr Walker End: 05/11/18 06:50:10 [id number]  Oximetry: % Times Graphic Report  Comments: room air  SATURATION  100  95  90  85  80  75  70  65  60  55  50  45  40  0 1 2 3 4 5 7 10 15 20 25  PERCENT OF TOTAL TIME  SATURATION  100  95  90  85  80  75  70  65  60  55  50  45  40  0 5 10 15 20 30 40 60 80 100  CUMULATIVE % TIME  © 3064-5854 [employer] Oximetry version Standard NY9040.  Oximeter: RespirNoDaysOffs 920M Plus memory, 4 second resolution.  [facility]  [address] [place] [address]. * [place] [address]  Telephone: [phone]  Test date: 05/10/18 Start: 05/10/18 23:34:58 Ana Maria Teague  Doctor: Dr Walker End: 05/11/18 06:50:10 [id number]  Oximetry: % Times Text Report  Comments: room air  SpO2:100 0.0 % time  SpO2: 99 0.0 % time SpO2: 89 23.5 % time SpO2: 79 0.1 % time  SpO2: 98 0.1 % time SpO2: 88 17.7 % time SpO2: 78 0.1 % time  SpO2: 97 0.1 % time SpO2: 87 13.0 % time SpO2: 77 0.1 % time  SpO2: 96 1.0 % time SpO2: 86 4.9 % time SpO2: 76 0.0 % time  SpO2: 95 1.3 % time SpO2: 85 1.6 % time SpO2: 75 % time  SpO2: 94 2.8 % time SpO2: 84 0.5 % time SpO2: 74 % time  SpO2: 93 3.4 % time SpO2: 83 0.5 % time SpO2: 73 % time  SpO2: 92 4.3 % time SpO2: 82 0.4 % time SpO2: 72 % time  SpO2: 91 7.2 % time SpO2: 81 0.2 % time SpO2: 71 % time  SpO2: 90 17.2 % time SpO2: 80 0.2 % time SpO2: 70 % time  Total 90's 37.3 % time Total 80's 62.5 % time Total 70's 0.2 % time  SpO2: 69 % time SpO2: 59 % time SpO2: 49 % time  SpO2: 68 % time SpO2: 58 % time SpO2: 48 % time  SpO2: 67 % time SpO2: 57 % time SpO2: 47 % time  SpO2: 66 % time SpO2: 56 % time SpO2: 46 % time  SpO2: 65 % time SpO2: 55 % time SpO2: 45 % time  SpO2: 64 % time SpO2: 54 % time SpO2: 44 % time  SpO2: 63 % time SpO2: 53 % time SpO2: 43 % time  SpO2: 62 % time SpO2: 52 % time SpO2: 42 % time  SpO2: 61 % time SpO2: 51 % time SpO2: 41 % time  SpO2:  60 % time SpO2: 50 % time SpO2: 40 % time  Total 60's 0.0 % time Total 50's 0.0 % time Total 40's 0.0 % time  SpO2 <40 0.0 % time  Pulse range > 199 0.0 % time  Pulse range 180 - 199 0.0 % time  Pulse range 160 - 179 0.0 % time  Pulse range 140 - 159 0.0 % time  Pulse range 120 - 139 0.0 % time  Pulse range 100 - 119 0.7 % time  Pulse range 90 - 99 0.5 % time  Pulse range 80 - 89 3.5 % time  Pulse range 70 - 79 38.2 % time  Pulse range 60 - 69 55.7 % time  Pulse range 50 - 59 1.5 % time  Pulse range < 50 0.0 % time  © 4369-4778 [employer] Oximetry version Standard IA6289.  Oximeter: Respironics 920M Plus memory, 4 second resolution.  [facility] [address] * [place] [address]  Telephone: [phone]  Test date: 05/10/18 Start: 05/10/18 23:34:58 Ana Maria Teague  Doctor: Dr Walker End: 05/11/18 06:50:10 [id number]  Oximetry: Desaturation Report  Comments: room air  The following lists the 40 desaturation events having the lowest drops. The event  durations had to be within 3 minutes to qualify, and the event onset was defined as  a decrease in SpO2 by 4 or more.  Saturation: Pulse Range:  Start date Start time End time Duration Onset Low Low High  1 05/11/18 00:46:06 00:48:18 00:02:12 91 85 64 - 78  2 05/11/18 00:50:38 00:51:54 00:01:16 92 85 62 - 79  3 05/11/18 01:11:54 01:12:10 00:00:16 88 83 72 - 78  4 05/11/18 01:12:30 01:12:54 00:00:24 89 82 66 - 89  5 05/11/18 01:13:14 01:13:30 00:00:16 87 79 68 - 72  6 05/11/18 01:15:14 01:15:30 00:00:16 92 84 65 - 74  7 05/11/18 01:15:42 01:16:22 00:00:40 88 76 70 - 81  8 05/11/18 01:16:38 01:16:46 00:00:08 82 77 68 - 81  9 05/11/18 01:17:22 01:17:38 00:00:16 93 81 68 - 69  10 05/11/18 01:17:58 01:18:46 00:00:48 92 78 73 - 84  11 05/11/18 01:19:14 01:19:22 00:00:08 83 77 66 - 77  12 05/11/18 01:19:54 01:20:02 00:00:08 88 84 64 - 74  13 05/11/18 01:20:14 01:20:38 00:00:24 90 79 67 - 69  14 05/11/18 01:21:10 01:22:06 00:00:56 93 80 67 -  86  15 05/11/18 01:22:38 01:22:46 00:00:08 89 84 64 - 77  16 05/11/18 01:24:06 01:24:54 00:00:48 94 82 64 - 87  17 05/11/18 01:25:34 01:27:18 00:01:44 93 82 62 - 78  18 05/11/18 01:28:06 01:28:26 00:00:20 91 85 67 - 71  19 05/11/18 01:28:58 01:29:34 00:00:36 91 82 63 - 77  20 05/11/18 01:31:10 01:32:38 00:01:28 96 86 64 - 79  21 05/11/18 01:33:38 01:34:34 00:00:56 93 86 63 - 76  22 05/11/18 01:34:58 01:35:14 00:00:16 91 85 65 - 72  23 05/11/18 01:37:10 01:38:06 00:00:56 92 84 66 - 76  24 05/11/18 01:43:26 01:44:18 00:00:52 92 84 66 - 79  25 05/11/18 01:45:10 01:46:18 00:01:08 93 85 70 - 75  26 05/11/18 01:47:54 01:48:38 00:00:44 91 84 63 - 87  27 05/11/18 03:31:50 03:32:58 00:01:08 93 84 63 - 72  28 05/11/18 03:33:46 03:34:46 00:01:00 90 83 61 - 81  29 05/11/18 03:36:10 03:36:34 00:00:24 91 81 59 - 64  30 05/11/18 03:37:06 03:37:34 00:00:28 94 84 63 - 77  31 05/11/18 03:40:02 03:40:34 00:00:32 91 79 60 - 72  32 05/11/18 03:43:30 03:44:22 00:00:52 93 85 60 - 82  33 05/11/18 03:45:46 03:47:30 00:01:44 95 85 62 - 75  34 05/11/18 03:48:10 03:49:14 00:01:04 91 81 61 - 82  35 05/11/18 03:53:38 03:54:10 00:00:32 94 85 61 - 70  36 05/11/18 03:54:58 03:55:54 00:00:56 95 82 62 - 82  37 05/11/18 03:56:30 03:57:18 00:00:48 95 85 60 - 83  38 05/11/18 03:57:50 03:58:02 00:00:12 92 82 64 - 71  39 05/11/18 05:03:22 05:04:54 00:01:32 92 85 58 - 77  40 05/11/18 06:27:26 06:28:02 00:00:36 91 83 62 - 82  © 5330-5487 [employer] Oximetry version Standard [id number].  Oximeter: Respironics 920M Plus memory, 4 second resolution.  [facility]  [address] [place] [address]. * [place] [address]  Telephone: [phone]  Test date: 05/10/18 Start: 05/10/18 23:34:58 Ana Maria Teague  Doctor: Dr Walker End: 05/11/18 06:50:10 [id number]  Oximetry: 8 hours per page  Comments: room air  HOUR 05/10/18 HOUR 05/11/18  0:00 4:00  0:30 4:30  1:00 5:00  1:30 5:30  2:00 6:00  2:30 6:30  3:00 7:00  3:30 7:30  40 80  PULSE  120 160 40  80  PULSE  70 80 120 160  SpO2  90 100 70 80  SpO2  90 100  © 9083-2147 [employer] Oximetry version Standard [id number].  Oximeter: Respironics 920M Plus memory, 4 second resolution.  [facility]  [address] * [place] [address]  Telephone: [phone]  Test date: 05/10/18 Start: 05/10/18 23:34:58 Ana Maria Teague  Doctor: Dr Walker End: 18 06:50:10 [id number]  Oximetry: 2 hours lowest average SpO2  Comments: room air  HOUR 05/10/18 HOUR 18  0:00 1:00  0:15 1:15  0:30 1:30  0:45 1:45  40 80  PULSE  120 160 40 80  PULSE  70 80 120 160  SpO2  90 100 70 80  SpO2  90 100  © 9632-3183 [employer] Oximetry version Standard [id number].  Oximeter: Respironics 920M Plus memory, 4 second resolution.    REPORT    OVERNIGHT OXIMETRY REPORT:    Dictated by: Osmani Walker MD  Test date: 05/10/2018  Dictated on: 2018      Comment: This test was performed on Room Air     A desaturation event was defined as a decrease of saturation by 4 or more.    REPORT SUMMARY  Total valid samplin:06:08   High SpO2: 98%    Low SpO2: 76%    Mean SpO2  89.1 %  Cumulative time with oxygen saturation less than 88% (TC88):  00:26:24    CLINICAL INTERPRETATION   There is  significant nocturnal oxygen desaturation and  Recommend overnight polysomnography if clinically indicated    Medicare Criteria Comments:   Oximetry test results suggest the patient falls under Medicare Group 1 Criteria. ( Arterial oxygen saturation at or below 88% for at least 5 minutes taken during sleep)  Osmani Walker MD    Details about Medicare Group Criteria coverage can be found at http://www.cms.hhs.gov/manuals/downloads/     Osmani Walker MD

## 2018-05-18 ENCOUNTER — TELEPHONE (OUTPATIENT)
Dept: PAIN MEDICINE | Facility: CLINIC | Age: 58
End: 2018-05-18

## 2018-05-18 NOTE — TELEPHONE ENCOUNTER
----- Message from Kelechi Macdonald sent at 5/18/2018 10:13 AM CDT -----  Contact: Pt   Pt called in regards needing a refill on pain medication pt called on Monday have not heard back...[phone]            ..  [facility] [place] [address] - [place] - [address] AT [facility] [place]  [address] [address]  Phone: [phone] Fax: [phone]

## 2018-05-21 ENCOUNTER — OFFICE VISIT (OUTPATIENT)
Dept: PAIN MEDICINE | Facility: CLINIC | Age: 58
End: 2018-05-21
Payer: MEDICARE

## 2018-05-21 VITALS
WEIGHT: 235 LBS | RESPIRATION RATE: 18 BRPM | DIASTOLIC BLOOD PRESSURE: 90 MMHG | BODY MASS INDEX: 36.88 KG/M2 | HEART RATE: 81 BPM | SYSTOLIC BLOOD PRESSURE: 133 MMHG | HEIGHT: 67 IN

## 2018-05-21 DIAGNOSIS — M47.817 SPONDYLOSIS OF LUMBOSACRAL REGION WITHOUT MYELOPATHY OR RADICULOPATHY: ICD-10-CM

## 2018-05-21 DIAGNOSIS — M51.36 DDD (DEGENERATIVE DISC DISEASE), LUMBAR: ICD-10-CM

## 2018-05-21 DIAGNOSIS — M48.061 LUMBAR FORAMINAL STENOSIS: ICD-10-CM

## 2018-05-21 DIAGNOSIS — M25.552 HIP PAIN, LEFT: ICD-10-CM

## 2018-05-21 DIAGNOSIS — M47.816 LUMBAR SPONDYLOSIS: Primary | ICD-10-CM

## 2018-05-21 DIAGNOSIS — M47.816 LUMBAR FACET ARTHROPATHY: ICD-10-CM

## 2018-05-21 DIAGNOSIS — M54.16 LEFT LUMBAR RADICULOPATHY: ICD-10-CM

## 2018-05-21 DIAGNOSIS — M53.3 SACROILIAC JOINT PAIN: ICD-10-CM

## 2018-05-21 PROCEDURE — 99214 OFFICE O/P EST MOD 30 MIN: CPT | Mod: S$PBB,,, | Performed by: PHYSICIAN ASSISTANT

## 2018-05-21 PROCEDURE — 99215 OFFICE O/P EST HI 40 MIN: CPT | Mod: PBBFAC,PO | Performed by: PHYSICIAN ASSISTANT

## 2018-05-21 PROCEDURE — 99999 PR PBB SHADOW E&M-EST. PATIENT-LVL V: CPT | Mod: PBBFAC,,, | Performed by: PHYSICIAN ASSISTANT

## 2018-05-21 RX ORDER — OXYCODONE AND ACETAMINOPHEN 10; 325 MG/1; MG/1
1 TABLET ORAL EVERY 8 HOURS PRN
Qty: 90 TABLET | Refills: 0 | Status: SHIPPED | OUTPATIENT
Start: 2018-05-21 | End: 2018-07-16 | Stop reason: SDUPTHER

## 2018-05-21 RX ORDER — OXYCODONE AND ACETAMINOPHEN 10; 325 MG/1; MG/1
1 TABLET ORAL EVERY 8 HOURS PRN
Qty: 90 TABLET | Refills: 0 | Status: SHIPPED | OUTPATIENT
Start: 2018-06-20 | End: 2018-06-08 | Stop reason: SDUPTHER
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
## 2018-05-28 RX ORDER — NIFEDIPINE 30 MG/1
TABLET, EXTENDED RELEASE ORAL
Qty: 30 TABLET | Refills: 9 | Status: SHIPPED | OUTPATIENT
Start: 2018-05-28 | End: 2019-03-06 | Stop reason: SDUPTHER

## 2018-05-30 ENCOUNTER — TELEPHONE (OUTPATIENT)
Dept: NEPHROLOGY | Facility: CLINIC | Age: 58
End: 2018-05-30

## 2018-05-30 DIAGNOSIS — F41.9 ANXIETY AND DEPRESSION: ICD-10-CM

## 2018-05-30 DIAGNOSIS — F41.9 ANXIETY: ICD-10-CM

## 2018-05-30 DIAGNOSIS — F32.A ANXIETY AND DEPRESSION: ICD-10-CM

## 2018-05-30 RX ORDER — PEN NEEDLE, DIABETIC 31 GX5/16"
NEEDLE, DISPOSABLE MISCELLANEOUS
Qty: 100 EACH | Refills: 0 | OUTPATIENT
Start: 2018-05-30

## 2018-05-30 RX ORDER — ALPRAZOLAM 0.5 MG/1
TABLET ORAL
Qty: 60 TABLET | Refills: 0 | OUTPATIENT
Start: 2018-05-30

## 2018-05-30 NOTE — TELEPHONE ENCOUNTER
Called and informed patient that her medication has been approved through her insurance. She expressed understanding.

## 2018-05-30 NOTE — TELEPHONE ENCOUNTER
----- Message from Liliam Calvillo sent at 5/30/2018 11:20 AM CDT -----  Contact: Pt  Pt called and stated she has requested a refill at her pharmacy on her Xanax or clonazepam medications and it was denied. She also stated that she requested a call back several times reguarding getting another prescription but no one has returned her call. She can be reached at [phone] (home) [phone] (work).    Thanks,  TF

## 2018-05-30 NOTE — TELEPHONE ENCOUNTER
----- Message from Liliam Favorite sent at 5/30/2018 11:19 AM CDT -----  Contact: Pt  Pt called and stated that her insurance no longer covers the [place] and she needs a prior auth. She can be reached at [phone] (home) [phone] (work).    Thanks,  TF

## 2018-06-06 NOTE — PROGRESS NOTES
"Subjective:      Patient ID: Ana Maria Teague is a 57 y.o. female.    Chief Complaint: Medication Refill; Insomnia; and Ankle Pain      HPI  Here for f/u medical problems and preventive exam.  Didn't tolerate cymbalta.  Lots stress, wants xanax.  Can't sleep well.  Says not anxious every day, but wants treatment for situational anxiety.  AC breakfast 120-130.  BPs 120-145/ 70-90.  S/p access graft surgery recently.  No f/c/sw/cough.  No cp/sob/palp.  BMs normal.  Urine normal.  Not taking vit D x 2 weeks.    HM: 11/16 fluvax, 6/15 [id number], 3/14 [id number], 6/15 TDaP, 6/17 MMG, 8/15 BMD improved rep 3y, 1/11 Cscope rep 5-10y, 11/10 HCV neg, Pain DrKiko Here.     Review of Systems   Constitutional: Negative for appetite change, chills, diaphoresis and fever.   HENT: Negative for congestion, ear pain, rhinorrhea, sinus pressure and sore throat.    Respiratory: Negative for cough, chest tightness and shortness of breath.    Cardiovascular: Negative for chest pain and palpitations.   Gastrointestinal: Negative for blood in stool, constipation, diarrhea, nausea and vomiting.   Genitourinary: Negative for dysuria, frequency, hematuria, menstrual problem, urgency and vaginal discharge.   Musculoskeletal: Negative for arthralgias.   Skin: Negative for rash.   Neurological: Negative for dizziness and headaches.   Psychiatric/Behavioral: Negative for sleep disturbance. The patient is not nervous/anxious.          Objective:   /80   Pulse 106   Temp 98.3 °F (36.8 °C) (Tympanic)   Ht 5' 7" (1.702 m)   Wt 107.7 kg (237 lb 7 oz)   SpO2 (!) 94%   BMI 37.19 kg/m²     Physical Exam   Constitutional: She is oriented to person, place, and time. She appears well-developed and well-nourished.   HENT:   Right Ear: External ear normal. Tympanic membrane is not injected.   Left Ear: External ear normal. Tympanic membrane is not injected.   Mouth/Throat: Oropharynx is clear and moist.   Eyes: Conjunctivae are normal.   Neck: Normal range " of motion. Neck supple. No thyromegaly present.   Cardiovascular: Normal rate, regular rhythm and intact distal pulses.  Exam reveals no gallop and no friction rub.    No murmur heard.  Pulmonary/Chest: Effort normal and breath sounds normal. She has no wheezes. She has no rales. Right breast exhibits no mass, no nipple discharge, no skin change and no tenderness. Left breast exhibits no mass, no nipple discharge, no skin change and no tenderness.   Abdominal: Soft. Bowel sounds are normal. She exhibits no mass. There is no tenderness.   Musculoskeletal: She exhibits no edema.   Lymphadenopathy:     She has no cervical adenopathy.        Right axillary: No lateral adenopathy present.        Left axillary: No lateral adenopathy present.  Neurological: She is alert and oriented to person, place, and time.   Skin: Skin is warm. No rash noted.   Psychiatric: She has a normal mood and affect.       Results for KEVAN CARBONE (MRN [id number]) as of 6/8/2018 11:06   Ref. Range 4/16/2018 10:00 4/16/2018 10:00   WBC Latest Ref Range: 3.90 - 12.70 K/uL 10.53    RBC Latest Ref Range: 4.00 - 5.40 M/uL 4.58    Hemoglobin Latest Ref Range: 12.0 - 16.0 g/dL 12.0    Hematocrit Latest Ref Range: 37.0 - 48.5 % 38.1    MCV Latest Ref Range: 82 - 98 fL 83    MCH Latest Ref Range: 27.0 - 31.0 pg 26.2 (L)    MCHC Latest Ref Range: 32.0 - 36.0 g/dL 31.5 (L)    RDW Latest Ref Range: 11.5 - 14.5 % 14.6 (H)    Platelets Latest Ref Range: 150 - 350 K/uL 272    MPV Latest Ref Range: 9.2 - 12.9 fL 12.2    Gran% Latest Ref Range: 38.0 - 73.0 % 66.3    Gran # (ANC) Latest Ref Range: 1.8 - 7.7 K/uL 7.0    Immature Granulocytes Latest Ref Range: 0.0 - 0.5 % 0.9 (H)    Immature Grans (Abs) Latest Ref Range: 0.00 - 0.04 K/uL 0.10 (H)    Lymph% Latest Ref Range: 18.0 - 48.0 % 23.6    Lymph # Latest Ref Range: 1.0 - 4.8 K/uL 2.5    Mono% Latest Ref Range: 4.0 - 15.0 % 5.0    Mono # Latest Ref Range: 0.3 - 1.0 K/uL 0.5    Eosinophil% Latest Ref Range: 0.0 -  8.0 % 3.3    Eos # Latest Ref Range: 0.0 - 0.5 K/uL 0.4    Basophil% Latest Ref Range: 0.0 - 1.9 % 0.9    Baso # Latest Ref Range: 0.00 - 0.20 K/uL 0.09    nRBC Latest Ref Range: 0 /100 WBC 0    Sodium Latest Ref Range: 136 - 145 mmol/L 138    Potassium Latest Ref Range: 3.5 - 5.1 mmol/L 3.7    Chloride Latest Ref Range: 95 - 110 mmol/L 101    CO2 Latest Ref Range: 23 - 29 mmol/L 25    Anion Gap Latest Ref Range: 8 - 16 mmol/L 12    BUN, Bld Latest Ref Range: 6 - 20 mg/dL 12    Creatinine Latest Ref Range: 0.5 - 1.4 mg/dL 0.9    eGFR if non African American Latest Ref Range: >60 mL/min/1.73 m^2 >60.0    eGFR if African American Latest Ref Range: >60 mL/min/1.73 m^2 >60.0    Glucose Latest Ref Range: 70 - 110 mg/dL 180 (H)    Calcium Latest Ref Range: 8.7 - 10.5 mg/dL 9.5    Phosphorus Latest Ref Range: 2.7 - 4.5 mg/dL 2.9    Magnesium Latest Ref Range: 1.6 - 2.6 mg/dL 1.7    Alkaline Phosphatase Latest Ref Range: 55 - 135 U/L  147 (H)   Total Protein Latest Ref Range: 6.0 - 8.4 g/dL  7.4   Albumin Latest Ref Range: 3.5 - 5.2 g/dL 3.5 3.5   Total Bilirubin Latest Ref Range: 0.1 - 1.0 mg/dL  0.5   Bilirubin, Direct Latest Ref Range: 0.1 - 0.3 mg/dL  0.2   AST Latest Ref Range: 10 - 40 U/L  11   ALT Latest Ref Range: 10 - 44 U/L  11       Assessment:       1. Encounter for preventive health examination    2. Uncontrolled type 2 diabetes mellitus without complication, with long-term current use of insulin    3. Acquired hypothyroidism    4. Age-related osteoporosis without current pathological fracture    5. Anxiety disorder, unspecified type    6. CKD (chronic kidney disease) stage 2, GFR 60-89 ml/min    7. Chronic immunosuppression with Prograf and Cellcept    8. Essential hypertension    9. Vitamin D deficiency    10. Encounter for screening mammogram for malignant neoplasm of breast     11. Primary insomnia    12. Anxiety    13. Anxiety and depression    14. Asymptomatic postmenopausal state          Plan:  "    Encounter for preventive health examination- lab now with [place].  RTC 3mo.  MMG and BMD prior.  -     TSH; Future; Expected date: 06/08/2018  -     Vitamin D; Future  -     Hemoglobin A1c; Future; Expected date: 06/08/2018  -     Microalbumin/creatinine urine ratio; Future; Expected date: 06/08/2018  -     Mammo Digital Screening Bilat with CAD; Future; Expected date: 06/08/2018  -     Lipid panel; Future; Expected date: 06/08/2018  -     Basic metabolic panel; Future; Expected date: 06/08/2018    Uncontrolled type 2 diabetes mellitus with CKD 2, with long-term current use of insulin, check lipids.  -     Hemoglobin A1c; Future; Expected date: 06/08/2018  -     Microalbumin/creatinine urine ratio; Future; Expected date: 06/08/2018  -     Basic metabolic panel; Future; Expected date: 06/08/2018  -     pen needle, diabetic (BD ULTRA-FINE RORY PEN NEEDLE) 32 gauge x 5/32" Ndle; USE AS DIRECTED WITH INSULIN PEN FOUR TIMES DAILY  Dispense: 100 each; Refill: 0    Acquired hypothyroidism- clin stable, check lab.    Age-related osteoporosis without current pathological fracture- recheck BMD.    Anxiety disorder, unspecified type--     LORazepam (ATIVAN) 0.5 MG tablet; Take 1 tablet (0.5 mg total) by mouth every 6 (six) hours as needed for Anxiety.  Dispense: 40 tablet; Refill: 1    CKD (chronic kidney disease) stage 2, GFR 60-89 ml/min    Chronic immunosuppression with Prograf and Cellcept    Essential hypertension- adeq control at home.    Vitamin D deficiency  -     Vitamin D; Future    Primary insomnia  -     zolpidem (AMBIEN) 5 MG Tab; Take 1 tablet (5 mg total) by mouth nightly as needed.  Dispense: 30 tablet; Refill: 5          "

## 2018-06-08 ENCOUNTER — LAB VISIT (OUTPATIENT)
Dept: LAB | Facility: HOSPITAL | Age: 58
End: 2018-06-08
Attending: INTERNAL MEDICINE
Payer: MEDICARE

## 2018-06-08 ENCOUNTER — OFFICE VISIT (OUTPATIENT)
Dept: INTERNAL MEDICINE | Facility: CLINIC | Age: 58
End: 2018-06-08
Payer: MEDICARE

## 2018-06-08 VITALS
BODY MASS INDEX: 37.27 KG/M2 | SYSTOLIC BLOOD PRESSURE: 132 MMHG | HEART RATE: 106 BPM | DIASTOLIC BLOOD PRESSURE: 80 MMHG | OXYGEN SATURATION: 94 % | TEMPERATURE: 98 F | HEIGHT: 67 IN | WEIGHT: 237.44 LBS

## 2018-06-08 DIAGNOSIS — F32.A ANXIETY AND DEPRESSION: ICD-10-CM

## 2018-06-08 DIAGNOSIS — F51.01 PRIMARY INSOMNIA: ICD-10-CM

## 2018-06-08 DIAGNOSIS — F41.9 ANXIETY: ICD-10-CM

## 2018-06-08 DIAGNOSIS — N18.2 CKD (CHRONIC KIDNEY DISEASE) STAGE 2, GFR 60-89 ML/MIN: ICD-10-CM

## 2018-06-08 DIAGNOSIS — E03.9 ACQUIRED HYPOTHYROIDISM: ICD-10-CM

## 2018-06-08 DIAGNOSIS — F41.9 ANXIETY AND DEPRESSION: ICD-10-CM

## 2018-06-08 DIAGNOSIS — F41.9 ANXIETY DISORDER, UNSPECIFIED TYPE: ICD-10-CM

## 2018-06-08 DIAGNOSIS — I10 ESSENTIAL HYPERTENSION: ICD-10-CM

## 2018-06-08 DIAGNOSIS — Z78.0 ASYMPTOMATIC POSTMENOPAUSAL STATE: ICD-10-CM

## 2018-06-08 DIAGNOSIS — E78.5 HYPERLIPIDEMIA ASSOCIATED WITH TYPE 2 DIABETES MELLITUS: ICD-10-CM

## 2018-06-08 DIAGNOSIS — Z12.31 ENCOUNTER FOR SCREENING MAMMOGRAM FOR MALIGNANT NEOPLASM OF BREAST: ICD-10-CM

## 2018-06-08 DIAGNOSIS — Z00.00 ENCOUNTER FOR PREVENTIVE HEALTH EXAMINATION: Primary | ICD-10-CM

## 2018-06-08 DIAGNOSIS — E11.22 CONTROLLED TYPE 2 DIABETES MELLITUS WITH STAGE 2 CHRONIC KIDNEY DISEASE, WITH LONG-TERM CURRENT USE OF INSULIN: ICD-10-CM

## 2018-06-08 DIAGNOSIS — Z29.89 NEED FOR PROPHYLACTIC IMMUNOTHERAPY: Chronic | ICD-10-CM

## 2018-06-08 DIAGNOSIS — E11.69 HYPERLIPIDEMIA ASSOCIATED WITH TYPE 2 DIABETES MELLITUS: ICD-10-CM

## 2018-06-08 DIAGNOSIS — Z00.00 ENCOUNTER FOR PREVENTIVE HEALTH EXAMINATION: ICD-10-CM

## 2018-06-08 DIAGNOSIS — N18.2 CONTROLLED TYPE 2 DIABETES MELLITUS WITH STAGE 2 CHRONIC KIDNEY DISEASE, WITH LONG-TERM CURRENT USE OF INSULIN: ICD-10-CM

## 2018-06-08 DIAGNOSIS — E55.9 VITAMIN D DEFICIENCY: ICD-10-CM

## 2018-06-08 DIAGNOSIS — M81.0 AGE-RELATED OSTEOPOROSIS WITHOUT CURRENT PATHOLOGICAL FRACTURE: ICD-10-CM

## 2018-06-08 DIAGNOSIS — Z79.4 CONTROLLED TYPE 2 DIABETES MELLITUS WITH STAGE 2 CHRONIC KIDNEY DISEASE, WITH LONG-TERM CURRENT USE OF INSULIN: ICD-10-CM

## 2018-06-08 LAB
25(OH)D3+25(OH)D2 SERPL-MCNC: 26 NG/ML
ANION GAP SERPL CALC-SCNC: 11 MMOL/L
BUN SERPL-MCNC: 16 MG/DL
CALCIUM SERPL-MCNC: 9.4 MG/DL
CHLORIDE SERPL-SCNC: 104 MMOL/L
CHOLEST SERPL-MCNC: 172 MG/DL
CHOLEST/HDLC SERPL: 3.7 {RATIO}
CO2 SERPL-SCNC: 25 MMOL/L
CREAT SERPL-MCNC: 1.2 MG/DL
EST. GFR  (AFRICAN AMERICAN): 57.6 ML/MIN/1.73 M^2
EST. GFR  (NON AFRICAN AMERICAN): 49.9 ML/MIN/1.73 M^2
ESTIMATED AVG GLUCOSE: 243 MG/DL
GLUCOSE SERPL-MCNC: 158 MG/DL
HBA1C MFR BLD HPLC: 10.1 %
HDLC SERPL-MCNC: 46 MG/DL
HDLC SERPL: 26.7 %
LDLC SERPL CALC-MCNC: 102 MG/DL
NONHDLC SERPL-MCNC: 126 MG/DL
POTASSIUM SERPL-SCNC: 3.6 MMOL/L
SODIUM SERPL-SCNC: 140 MMOL/L
T4 FREE SERPL-MCNC: 1.12 NG/DL
TRIGL SERPL-MCNC: 120 MG/DL
TSH SERPL DL<=0.005 MIU/L-ACNC: 5.3 UIU/ML

## 2018-06-08 PROCEDURE — 84439 ASSAY OF FREE THYROXINE: CPT

## 2018-06-08 PROCEDURE — 99214 OFFICE O/P EST MOD 30 MIN: CPT | Mod: S$PBB,,, | Performed by: INTERNAL MEDICINE

## 2018-06-08 PROCEDURE — 82306 VITAMIN D 25 HYDROXY: CPT

## 2018-06-08 PROCEDURE — 99213 OFFICE O/P EST LOW 20 MIN: CPT | Mod: PBBFAC,PO | Performed by: INTERNAL MEDICINE

## 2018-06-08 PROCEDURE — 99999 PR PBB SHADOW E&M-EST. PATIENT-LVL III: CPT | Mod: PBBFAC,,, | Performed by: INTERNAL MEDICINE

## 2018-06-08 PROCEDURE — 80048 BASIC METABOLIC PNL TOTAL CA: CPT

## 2018-06-08 PROCEDURE — 83036 HEMOGLOBIN GLYCOSYLATED A1C: CPT

## 2018-06-08 PROCEDURE — 36415 COLL VENOUS BLD VENIPUNCTURE: CPT | Mod: PO

## 2018-06-08 PROCEDURE — 84443 ASSAY THYROID STIM HORMONE: CPT

## 2018-06-08 PROCEDURE — 80061 LIPID PANEL: CPT

## 2018-06-08 RX ORDER — ZOLPIDEM TARTRATE 5 MG/1
5 TABLET ORAL NIGHTLY PRN
Qty: 30 TABLET | Refills: 5 | Status: SHIPPED | OUTPATIENT
Start: 2018-06-08 | End: 2018-12-18 | Stop reason: SDUPTHER

## 2018-06-08 RX ORDER — PEN NEEDLE, DIABETIC 30 GX3/16"
NEEDLE, DISPOSABLE MISCELLANEOUS
Qty: 100 EACH | Refills: 0 | Status: SHIPPED | OUTPATIENT
Start: 2018-06-08 | End: 2018-08-02 | Stop reason: SDUPTHER

## 2018-06-08 RX ORDER — LORAZEPAM 0.5 MG/1
0.5 TABLET ORAL EVERY 6 HOURS PRN
Qty: 40 TABLET | Refills: 1 | Status: SHIPPED | OUTPATIENT
Start: 2018-06-08 | End: 2018-09-21 | Stop reason: SDUPTHER

## 2018-06-14 ENCOUNTER — PATIENT MESSAGE (OUTPATIENT)
Dept: INTERNAL MEDICINE | Facility: CLINIC | Age: 58
End: 2018-06-14

## 2018-06-19 ENCOUNTER — TELEPHONE (OUTPATIENT)
Dept: OPHTHALMOLOGY | Facility: CLINIC | Age: 58
End: 2018-06-19

## 2018-06-19 NOTE — TELEPHONE ENCOUNTER
Spoke to pt re: message that patient was overdue for eye examination. She defers's making it now due to a recent death in the family. She will call us back to reschedule

## 2018-07-16 ENCOUNTER — OFFICE VISIT (OUTPATIENT)
Dept: PAIN MEDICINE | Facility: CLINIC | Age: 58
End: 2018-07-16
Payer: MEDICARE

## 2018-07-16 VITALS
BODY MASS INDEX: 37.2 KG/M2 | SYSTOLIC BLOOD PRESSURE: 147 MMHG | RESPIRATION RATE: 18 BRPM | WEIGHT: 237 LBS | DIASTOLIC BLOOD PRESSURE: 92 MMHG | HEART RATE: 80 BPM | HEIGHT: 67 IN

## 2018-07-16 DIAGNOSIS — M54.16 LEFT LUMBAR RADICULOPATHY: ICD-10-CM

## 2018-07-16 DIAGNOSIS — M47.816 LUMBAR SPONDYLOSIS: Primary | ICD-10-CM

## 2018-07-16 DIAGNOSIS — M53.3 SACROILIAC JOINT PAIN: ICD-10-CM

## 2018-07-16 DIAGNOSIS — M51.36 DDD (DEGENERATIVE DISC DISEASE), LUMBAR: ICD-10-CM

## 2018-07-16 DIAGNOSIS — M47.817 SPONDYLOSIS OF LUMBOSACRAL REGION WITHOUT MYELOPATHY OR RADICULOPATHY: ICD-10-CM

## 2018-07-16 DIAGNOSIS — M47.816 LUMBAR FACET ARTHROPATHY: ICD-10-CM

## 2018-07-16 PROCEDURE — 99999 PR PBB SHADOW E&M-EST. PATIENT-LVL V: CPT | Mod: PBBFAC,,, | Performed by: PHYSICIAN ASSISTANT

## 2018-07-16 PROCEDURE — 99215 OFFICE O/P EST HI 40 MIN: CPT | Mod: PBBFAC,PO | Performed by: PHYSICIAN ASSISTANT

## 2018-07-16 PROCEDURE — 99214 OFFICE O/P EST MOD 30 MIN: CPT | Mod: S$PBB,,, | Performed by: PHYSICIAN ASSISTANT

## 2018-07-16 RX ORDER — GABAPENTIN 300 MG/1
CAPSULE ORAL
Qty: 60 CAPSULE | Refills: 1 | Status: SHIPPED | OUTPATIENT
Start: 2018-07-16 | End: 2018-09-12 | Stop reason: SDUPTHER

## 2018-07-16 RX ORDER — OXYCODONE AND ACETAMINOPHEN 10; 325 MG/1; MG/1
1 TABLET ORAL EVERY 8 HOURS PRN
Qty: 90 TABLET | Refills: 0 | Status: SHIPPED | OUTPATIENT
Start: 2018-07-20 | End: 2018-08-14

## 2018-07-16 RX ORDER — OXYCODONE AND ACETAMINOPHEN 10; 325 MG/1; MG/1
1 TABLET ORAL EVERY 8 HOURS PRN
Qty: 90 TABLET | Refills: 0 | Status: SHIPPED | OUTPATIENT
Start: 2018-08-19 | End: 2018-08-14
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
## 2018-07-20 ENCOUNTER — HOSPITAL ENCOUNTER (OUTPATIENT)
Dept: RADIOLOGY | Facility: HOSPITAL | Age: 58
Discharge: HOME | End: 2018-07-20
Attending: INTERNAL MEDICINE
Payer: MEDICARE

## 2018-07-20 DIAGNOSIS — Z00.00 ENCOUNTER FOR PREVENTIVE HEALTH EXAMINATION: ICD-10-CM

## 2018-07-20 DIAGNOSIS — Z12.31 ENCOUNTER FOR SCREENING MAMMOGRAM FOR MALIGNANT NEOPLASM OF BREAST: ICD-10-CM

## 2018-07-20 DIAGNOSIS — E03.9 ACQUIRED HYPOTHYROIDISM: ICD-10-CM

## 2018-07-20 PROCEDURE — 77063 BREAST TOMOSYNTHESIS BI: CPT | Mod: 26,,, | Performed by: RADIOLOGY

## 2018-07-20 PROCEDURE — 77067 SCR MAMMO BI INCL CAD: CPT | Mod: 26,,, | Performed by: RADIOLOGY

## 2018-07-20 PROCEDURE — 77067 SCR MAMMO BI INCL CAD: CPT | Mod: TC,PO

## 2018-08-02 DIAGNOSIS — F41.9 ANXIETY: ICD-10-CM

## 2018-08-02 RX ORDER — PEN NEEDLE, DIABETIC 31 GX5/16"
NEEDLE, DISPOSABLE MISCELLANEOUS
Qty: 100 EACH | Refills: 0 | Status: SHIPPED | OUTPATIENT
Start: 2018-08-02 | End: 2018-10-24 | Stop reason: SDUPTHER

## 2018-08-08 ENCOUNTER — TELEPHONE (OUTPATIENT)
Dept: PULMONOLOGY | Facility: CLINIC | Age: 58
End: 2018-08-08

## 2018-08-09 ENCOUNTER — TELEPHONE (OUTPATIENT)
Dept: INTERNAL MEDICINE | Facility: CLINIC | Age: 58
End: 2018-08-09

## 2018-08-09 NOTE — TELEPHONE ENCOUNTER
Pt scheduled with Adela AKBAR for right ear pain, pt was transferred to appt desk to scheduled appt with eye provider.  sarahl

## 2018-08-09 NOTE — TELEPHONE ENCOUNTER
----- Message from Patricia Mcgraw sent at 8/9/2018 10:28 AM CDT -----  Contact: pt   Calling in regards to a missed call and a scheduled appointment and have health concerns  with her ears  and please advise [phone]

## 2018-08-14 ENCOUNTER — OFFICE VISIT (OUTPATIENT)
Dept: OPHTHALMOLOGY | Facility: CLINIC | Age: 58
End: 2018-08-14
Payer: MEDICARE

## 2018-08-14 ENCOUNTER — LAB VISIT (OUTPATIENT)
Dept: LAB | Facility: HOSPITAL | Age: 58
End: 2018-08-14
Attending: INTERNAL MEDICINE
Payer: MEDICARE

## 2018-08-14 ENCOUNTER — OFFICE VISIT (OUTPATIENT)
Dept: INTERNAL MEDICINE | Facility: CLINIC | Age: 58
End: 2018-08-14
Payer: MEDICARE

## 2018-08-14 VITALS
OXYGEN SATURATION: 96 % | SYSTOLIC BLOOD PRESSURE: 130 MMHG | TEMPERATURE: 98 F | WEIGHT: 240.94 LBS | DIASTOLIC BLOOD PRESSURE: 84 MMHG | BODY MASS INDEX: 37.82 KG/M2 | HEART RATE: 94 BPM | HEIGHT: 67 IN

## 2018-08-14 DIAGNOSIS — I10 ESSENTIAL HYPERTENSION: ICD-10-CM

## 2018-08-14 DIAGNOSIS — E11.69 HYPERLIPIDEMIA ASSOCIATED WITH TYPE 2 DIABETES MELLITUS: ICD-10-CM

## 2018-08-14 DIAGNOSIS — M81.0 AGE-RELATED OSTEOPOROSIS WITHOUT CURRENT PATHOLOGICAL FRACTURE: ICD-10-CM

## 2018-08-14 DIAGNOSIS — E11.9 TYPE 2 DIABETES MELLITUS WITHOUT COMPLICATION, WITH LONG-TERM CURRENT USE OF INSULIN: Primary | ICD-10-CM

## 2018-08-14 DIAGNOSIS — Z79.4 TYPE 2 DIABETES MELLITUS WITHOUT COMPLICATION, WITH LONG-TERM CURRENT USE OF INSULIN: Primary | ICD-10-CM

## 2018-08-14 DIAGNOSIS — D63.1 ANEMIA OF CHRONIC RENAL FAILURE, STAGE 2 (MILD): Chronic | ICD-10-CM

## 2018-08-14 DIAGNOSIS — N18.2 ANEMIA OF CHRONIC RENAL FAILURE, STAGE 2 (MILD): Chronic | ICD-10-CM

## 2018-08-14 DIAGNOSIS — Z94.0 S/P KIDNEY TRANSPLANT: ICD-10-CM

## 2018-08-14 DIAGNOSIS — N18.2 CKD (CHRONIC KIDNEY DISEASE) STAGE 2, GFR 60-89 ML/MIN: ICD-10-CM

## 2018-08-14 DIAGNOSIS — E78.5 HYPERLIPIDEMIA ASSOCIATED WITH TYPE 2 DIABETES MELLITUS: ICD-10-CM

## 2018-08-14 DIAGNOSIS — Z94.0 DECEASED-DONOR KIDNEY TRANSPLANT: Chronic | ICD-10-CM

## 2018-08-14 DIAGNOSIS — J32.9 SINUSITIS, UNSPECIFIED CHRONICITY, UNSPECIFIED LOCATION: Primary | ICD-10-CM

## 2018-08-14 DIAGNOSIS — Z29.89 NEED FOR PROPHYLACTIC IMMUNOTHERAPY: Chronic | ICD-10-CM

## 2018-08-14 DIAGNOSIS — R05.9 COUGH: ICD-10-CM

## 2018-08-14 LAB
BILIRUB UR QL STRIP: NEGATIVE
CLARITY UR: CLEAR
COLOR UR: YELLOW
GLUCOSE UR QL STRIP: NEGATIVE
HGB UR QL STRIP: NEGATIVE
KETONES UR QL STRIP: NEGATIVE
LEUKOCYTE ESTERASE UR QL STRIP: NEGATIVE
NITRITE UR QL STRIP: NEGATIVE
PH UR STRIP: 8 [PH] (ref 5–8)
PROT UR QL STRIP: NEGATIVE
SP GR UR STRIP: 1.01 (ref 1–1.03)
URN SPEC COLLECT METH UR: NORMAL

## 2018-08-14 PROCEDURE — 99213 OFFICE O/P EST LOW 20 MIN: CPT | Mod: S$PBB,,, | Performed by: PHYSICIAN ASSISTANT

## 2018-08-14 PROCEDURE — 92004 COMPRE OPH EXAM NEW PT 1/>: CPT | Mod: S$PBB,,, | Performed by: OPHTHALMOLOGY

## 2018-08-14 PROCEDURE — 99215 OFFICE O/P EST HI 40 MIN: CPT | Mod: PBBFAC,27,PO | Performed by: PHYSICIAN ASSISTANT

## 2018-08-14 PROCEDURE — 81003 URINALYSIS AUTO W/O SCOPE: CPT | Mod: PO

## 2018-08-14 PROCEDURE — 99213 OFFICE O/P EST LOW 20 MIN: CPT | Mod: PBBFAC,PO | Performed by: OPHTHALMOLOGY

## 2018-08-14 PROCEDURE — 99999 PR PBB SHADOW E&M-EST. PATIENT-LVL III: CPT | Mod: PBBFAC,,, | Performed by: OPHTHALMOLOGY

## 2018-08-14 PROCEDURE — 99999 PR PBB SHADOW E&M-EST. PATIENT-LVL V: CPT | Mod: PBBFAC,,, | Performed by: PHYSICIAN ASSISTANT

## 2018-08-14 RX ORDER — INSULIN GLARGINE 100 [IU]/ML
50 INJECTION, SOLUTION SUBCUTANEOUS
COMMUNITY
End: 2018-12-03 | Stop reason: SDUPTHER

## 2018-08-14 RX ORDER — ALBUTEROL SULFATE 90 UG/1
2 AEROSOL, METERED RESPIRATORY (INHALATION) EVERY 6 HOURS PRN
COMMUNITY
End: 2022-06-06 | Stop reason: SDUPTHER

## 2018-08-14 RX ORDER — OXYCODONE AND ACETAMINOPHEN 10; 325 MG/1; MG/1
1 TABLET ORAL
COMMUNITY
End: 2019-04-22 | Stop reason: SDUPTHER

## 2018-08-14 RX ORDER — DOXYCYCLINE 100 MG/1
100 CAPSULE ORAL EVERY 12 HOURS
Qty: 20 CAPSULE | Refills: 0 | Status: SHIPPED | OUTPATIENT
Start: 2018-08-14 | End: 2018-08-24

## 2018-08-14 RX ORDER — ERGOCALCIFEROL 1.25 MG/1
50000 CAPSULE ORAL
COMMUNITY
End: 2019-05-09 | Stop reason: SDUPTHER

## 2018-08-14 NOTE — PROGRESS NOTES
Subjective:       Patient ID: Ana Maria Teague is a 57 y.o. female.    Chief Complaint: Otalgia    57 year old female c/o L ear pain and static-like noises to L ear when chewing X more than one week. She also reports mild R ear discomfort at times. She reports post-nasal drainage, dry cough, mild sore throat, and (resolved) sinus pressure. She reports using her inhalers with improvement of cough. She reports no fever, chills, N/V, CP, SOB, or other medical complaints.     PCP: Dr. Olmos    Patient Active Problem List:     Avascular necrosis of bone of hip     Acquired hypothyroidism     Osteoporosis with pathological fracture     Depression     Anxiety and depression     Vertigo     Abnormal findings on diagnostic imaging of breast     Renal cyst     -donor kidney transplant - 13     Chronic immunosuppression with Prograf and Cellcept     Hypertension, renal     Anemia of chronic renal failure     Secondary hyperparathyroidism of renal origin     Delayed graft function 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      Uncontrolled type II diabetes mellitus with hypoglycemia     CTS (carpal tunnel syndrome)     Cubital tunnel syndrome on right     CKD (chronic kidney disease) stage 2, GFR 60-89 ml/min     Immunosuppressed status     Abnormal PFTs (pulmonary function tests)     Diffusion capacity of lung (dl), decreased     Age-related osteoporosis without current pathological fracture     Essential hypertension     Bilateral headache     Headache     Sleep-related nonobstructive alveolar hypoventilation     Uncontrolled type 2 diabetes mellitus with stage 3 chronic kidney disease, with long-term current use of insulin     Severe obesity (BMI 35.0-39.9)     Hyperlipidemia associated with type 2 diabetes mellitus      Review of Systems   Constitutional: Negative for chills and fever.   HENT: Positive for ear pain, postnasal drip and sore throat.    Respiratory: Positive for cough. Negative for shortness of breath.   "  Cardiovascular: Negative for chest pain, palpitations and leg swelling.   Gastrointestinal: Negative for nausea and vomiting.   Skin: Negative for rash.   Neurological: Negative for dizziness, weakness, numbness and headaches.   Psychiatric/Behavioral: Negative for confusion.       Objective:       Vitals:    08/14/18 1143   BP: 130/84   BP Location: Right arm   Patient Position: Sitting   BP Method: Large (Manual)   Pulse: 94   Temp: 98.4 °F (36.9 °C)   TempSrc: Tympanic   SpO2: 96%   Weight: 109.3 kg (240 lb 15.4 oz)   Height: 5' 7" (1.702 m)     Physical Exam   Constitutional: She is oriented to person, place, and time. She appears well-developed and well-nourished. No distress.   HENT:   Head: Normocephalic and atraumatic.   Right Ear: Tympanic membrane and ear canal normal.   Left Ear: Ear canal normal. Tympanic membrane is injected.   Nose: Right sinus exhibits maxillary sinus tenderness. Left sinus exhibits maxillary sinus tenderness.   Mouth/Throat: No oropharyngeal exudate.   Cobblestone appearance of posterior pharynx   Eyes: EOM are normal. No scleral icterus.   Neck: Neck supple.   Cardiovascular: Normal rate and regular rhythm.   Pulmonary/Chest: Effort normal and breath sounds normal. No respiratory distress. She has no decreased breath sounds. She has no wheezes. She has no rhonchi. She has no rales.   Musculoskeletal: Normal range of motion. She exhibits no edema.   Neurological: She is alert and oriented to person, place, and time. No cranial nerve deficit.   Skin: Skin is warm and dry. No rash noted.   Psychiatric: She has a normal mood and affect. Her speech is normal and behavior is normal. Thought content normal.       Assessment:       1. Sinusitis, unspecified chronicity, unspecified location    2. Cough    3. Essential hypertension    4. Hyperlipidemia associated with type 2 diabetes mellitus    5. CKD (chronic kidney disease) stage 2, GFR 60-89 ml/min    6. Anemia of chronic renal failure, " stage 2 (mild)    7. Uncontrolled type 2 diabetes mellitus with stage 3 chronic kidney disease, with long-term current use of insulin    8. -donor kidney transplant - 13    9. Chronic immunosuppression with Prograf and Cellcept    10. Age-related osteoporosis without current pathological fracture        Plan:         Ana Maria was seen today for otalgia.    Diagnoses and all orders for this visit:    Sinusitis, Cough  -     doxycycline (VIBRAMYCIN) 100 MG Cap; Take 1 capsule (100 mg total) by mouth every 12 (twelve) hours. for 10 days  Pt to inform transplant team of new Rx prior to taking. Monitor sxs. RTC if sxs persist or worsen. ER if severe sxs occur.    Essential hypertension  Controlled. Monitor BP.     Hyperlipidemia associated with type 2 diabetes mellitus  Pt taking Lipitor. F/u with PCP.    CKD (chronic kidney disease) stage 2, GFR 60-89 ml/min; Anemia of chronic renal failure, stage 2 (mild)  F/u with nephrology as recommended.    Uncontrolled type 2 diabetes mellitus with stage 3 chronic kidney disease, with long-term current use of insulin  Monitor glucose, check feet daily, and stay UTD with eye doctor. F/u with DM clinic as recommended.    -donor kidney transplant - 13; Chronic immunosuppression with Prograf and Cellcept  F/u with transplant team as recommended.    Age-related osteoporosis without current pathological fracture  Recent DEXA results reviewed with pt per her request - f/u with PCP as scheduled for further management. Pt declines rheum consult at this time.    Follow-up with your PCP as scheduled in 2 months for health management.

## 2018-08-14 NOTE — PROGRESS NOTES
===============================  08/14/2018   Ana Maria Teague,   57 y.o. female   Last visit [facility]: :Visit date not found   Last visit eye dept. Visit date not found  [facility]:  Uncorrected distance visual acuity was 20/25 in the right eye and 20/25 in the left eye.  Tonometry     Tonometry (Applanation, 9:28 AM)       Right Left    Pressure 23 24               Not recorded        Manifest Refraction     Manifest Refraction       Sphere Cylinder Axis Dist [facility]    Right -0.50 +0.50 180 20/20    Left -0.25 +0.75 180 20/20              Chief Complaint   Patient presents with    Diabetes     Yearly diabetic eye exam        HPI     Diabetes      Additional comments: Yearly diabetic eye exam              Comments     DM SINCE 2013  NO SX'S              Last edited by AFTAB Martin MD on 8/14/2018 12:13 PM. (History)          ________________  8/14/2018  Problem List Items Addressed This Visit     None      Visit Diagnoses     Type 2 diabetes mellitus without complication, with long-term current use of insulin    -  Primary        Dm  X 5 years  Oct ok   Min ns   nodr  rtc 1 year    .       ===========================

## 2018-08-24 ENCOUNTER — PROCEDURE VISIT (OUTPATIENT)
Dept: PULMONOLOGY | Facility: CLINIC | Age: 58
End: 2018-08-24
Payer: MEDICARE

## 2018-08-24 ENCOUNTER — OFFICE VISIT (OUTPATIENT)
Dept: PULMONOLOGY | Facility: CLINIC | Age: 58
End: 2018-08-24
Payer: MEDICARE

## 2018-08-24 ENCOUNTER — HOSPITAL ENCOUNTER (OUTPATIENT)
Dept: RADIOLOGY | Facility: HOSPITAL | Age: 58
Discharge: HOME | End: 2018-08-24
Attending: INTERNAL MEDICINE
Payer: MEDICARE

## 2018-08-24 VITALS
SYSTOLIC BLOOD PRESSURE: 132 MMHG | WEIGHT: 240.94 LBS | HEIGHT: 67 IN | OXYGEN SATURATION: 95 % | DIASTOLIC BLOOD PRESSURE: 71 MMHG | BODY MASS INDEX: 37.82 KG/M2 | RESPIRATION RATE: 17 BRPM

## 2018-08-24 DIAGNOSIS — G47.34 SLEEP-RELATED NONOBSTRUCTIVE ALVEOLAR HYPOVENTILATION: Primary | ICD-10-CM

## 2018-08-24 DIAGNOSIS — J45.909 ASTHMA, UNSPECIFIED ASTHMA SEVERITY, UNSPECIFIED WHETHER COMPLICATED, UNSPECIFIED WHETHER PERSISTENT: ICD-10-CM

## 2018-08-24 DIAGNOSIS — J44.9 CHRONIC OBSTRUCTIVE PULMONARY DISEASE, UNSPECIFIED COPD TYPE: ICD-10-CM

## 2018-08-24 DIAGNOSIS — E66.9 OBESITY, CLASS II, BMI 35-39.9: ICD-10-CM

## 2018-08-24 DIAGNOSIS — R94.2 ABNORMAL PFTS (PULMONARY FUNCTION TESTS): ICD-10-CM

## 2018-08-24 LAB
BRPFT: ABNORMAL
FEF 25 75 CHG: -10.7 %
FEF 25 75 LLN: 0.96
FEF 25 75 POST REF: 95.6 %
FEF 25 75 POST: 2.09 L/S (ref 0.96–3.93)
FEF 25 75 PRE REF: 107 %
FEF 25 75 PRE: 2.34 L/S (ref 0.96–3.93)
FEF 25 75 REF: 2.19
FET100 CHG: 17.3 %
FET100 POST: 9.17 SEC
FET100 PRE: 7.82 SEC
FEV1 CHG: -1.6 %
FEV1 FVC CHG: 1.2 %
FEV1 FVC LLN: 68
FEV1 FVC POST REF: 103.1 %
FEV1 FVC POST: 81.96 % (ref 68.02–89.36)
FEV1 FVC PRE REF: 101.8 %
FEV1 FVC PRE: 80.95 % (ref 68.02–89.36)
FEV1 FVC REF: 80
FEV1 LLN: 1.76
FEV1 POST REF: 85 %
FEV1 POST: 2.04 L (ref 1.76–3.02)
FEV1 PRE REF: 86.4 %
FEV1 PRE: 2.08 L (ref 1.76–3.02)
FEV1 REF: 2.4
FEV6 CHG: -2.2 %
FEV6 LLN: 2.2
FEV6 POST REF: 83.7 %
FEV6 POST: 2.48 L (ref 2.2–3.73)
FEV6 PRE REF: 85.6 %
FEV6 PRE: 2.54 L (ref 2.2–3.73)
FEV6 REF: 2.96
FVC CHG: -2.8 %
FVC LLN: 2.26
FVC POST REF: 82 %
FVC POST: 2.49 L (ref 2.26–3.87)
FVC PRE REF: 84.4 %
FVC PRE: 2.57 L (ref 2.26–3.87)
FVC REF: 3.04
MVV LLN: 91
MVV REF: 107
PEF CHG: -13 %
PEF LLN: 4.04
PEF POST REF: 49.2 %
PEF POST: 3.07 L/S (ref 4.04–8.43)
PEF PRE REF: 56.6 %
PEF PRE: 3.53 L/S (ref 4.04–8.43)
PEF REF: 6.23

## 2018-08-24 PROCEDURE — 71046 X-RAY EXAM CHEST 2 VIEWS: CPT | Mod: TC,FY,PO

## 2018-08-24 PROCEDURE — 99214 OFFICE O/P EST MOD 30 MIN: CPT | Mod: PBBFAC,25,PO | Performed by: NURSE PRACTITIONER

## 2018-08-24 PROCEDURE — 99999 PR PBB SHADOW E&M-EST. PATIENT-LVL IV: CPT | Mod: PBBFAC,,, | Performed by: NURSE PRACTITIONER

## 2018-08-24 PROCEDURE — 71046 X-RAY EXAM CHEST 2 VIEWS: CPT | Mod: 26,,, | Performed by: RADIOLOGY

## 2018-08-24 PROCEDURE — 99214 OFFICE O/P EST MOD 30 MIN: CPT | Mod: 25,S$PBB,, | Performed by: NURSE PRACTITIONER

## 2018-08-24 PROCEDURE — 94060 EVALUATION OF WHEEZING: CPT | Mod: 26,S$PBB,, | Performed by: INTERNAL MEDICINE

## 2018-08-24 PROCEDURE — 94060 EVALUATION OF WHEEZING: CPT | Mod: PBBFAC,PO

## 2018-08-24 NOTE — LETTER
August 24, 2018      Osmani Walker MD  [address] [place] [address]           [facility]  [address] [place] [address]  Phone: [phone]  Fax: [phone]          Patient: Ana Maria Teague   MR Number: [id number]   YOB: 1960   Date of Visit: 8/24/2018       Dear Dr. Osmani Walker:    Thank you for referring Ana Maria Teague to me for evaluation. Attached you will find relevant portions of my assessment and plan of care.    If you have questions, please do not hesitate to call me. I look forward to following Ana Maria Teague along with you.    Sincerely,    Elizabeth Lejeune, CHRISTINA    Enclosure  CC:  No Recipients    If you would like to receive this communication electronically, please contact [email or web address] or [phone] to request more information on [employer] Link access.    For providers and/or their staff who would like to refer a patient to [facility], please contact us through our one-stop-shop provider referral line, [facility], at [phone].    If you feel you have received this communication in error or would no longer like to receive these types of communications, please e-mail [email or web address]

## 2018-08-24 NOTE — PROGRESS NOTES
"Subjective:      Patient ID: Ana Maria Teague is a 57 y.o. female.    Chief Complaint: COPD      Medications Breo Ellipta stopped on last visit. Use of albuterol rarely except for on a few hot days with heat index over 100degrees.   Normal spirometry suggest asthma phenotype,   She has nocturnal oxygen for sleep hypoventilatio hypoxemia: On nocturnal oxygen and benefiting from use.   No fever, chills, or hemoptysis. No pleuritic type chest pain. Breathing is stable as compared to 6 months ago.    Patient Active Problem List:     Avascular necrosis of bone of hip     Osteoporosis with pathological fracture     Depression     Anxiety and depression     Vertigo     Abnormal findings on diagnostic imaging of breast     Renal cyst     -donor kidney transplant - 13     Chronic immunosuppression with Prograf and Cellcept     Hypertension, renal     Anemia of chronic renal failure     Secondary hyperparathyroidism of renal origin     Delayed graft function 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      Uncontrolled type II diabetes mellitus with hypoglycemia     CTS (carpal tunnel syndrome)     Cubital tunnel syndrome on right     CKD (chronic kidney disease) stage 2, GFR 60-89 ml/min     Immunosuppressed status     Abnormal PFTs (pulmonary function tests)     Diffusion capacity of lung (dl), decreased     Age-related osteoporosis without current pathological fracture     Essential hypertension     Bilateral headache     Headache     Sleep-related nonobstructive alveolar hypoventilation     Uncontrolled type 2 diabetes mellitus with stage 3 chronic kidney disease, with long-term current use of insulin     Severe obesity (BMI 35.0-39.9)     Hyperlipidemia associated with type 2 diabetes mellitus            /71   Resp 17   Ht 5' 7" (1.702 m)   Wt 109.3 kg (240 lb 15.4 oz)   SpO2 95%   BMI 37.74 kg/m²   Body mass index is 37.74 kg/m².    Review of Systems   Constitutional: Negative.    HENT: Negative.    Respiratory: " Negative.    Cardiovascular: Negative.    Musculoskeletal: Negative.    Gastrointestinal: Negative.    Neurological: Negative.    Psychiatric/Behavioral: Negative.      Objective:      Physical Exam   Constitutional: She is oriented to person, place, and time. She appears well-developed and well-nourished.   Obese   HENT:   Head: Normocephalic and atraumatic.   Neck: Normal range of motion. Neck supple.   Cardiovascular: Normal rate and regular rhythm.   Pulmonary/Chest: Effort normal and breath sounds normal.   Musculoskeletal: Normal range of motion. She exhibits no edema.   Neurological: She is alert and oriented to person, place, and time.   Skin: Skin is warm and dry.   Psychiatric: She has a normal mood and affect.     Personal Diagnostic Review  Normal spirometry  Results for orders placed or performed in visit on 08/24/18   Spirometry with/without bronchodilator   Result Value Ref Range    FEV1 POST 2.04 1.76 - 3.02 L    FEV1 FVC POST 81.96 68.02 - 89.36 %    FVC POST 2.49 2.26 - 3.87 L    PEF POST 3.07 (L) 4.04 - 8.43 L/s    FEV6 POST 2.48 2.20 - 3.73 L    FEF 25 75 POST 2.09 0.96 - 3.93 L/s    [id number] POST 9.17 sec    FEV1 PRE 2.08 1.76 - 3.02 L    FEV1 FVC PRE 80.95 68.02 - 89.36 %    FVC PRE 2.57 2.26 - 3.87 L    PEF PRE 3.53 (L) 4.04 - 8.43 L/s    FEV6 PRE 2.54 2.20 - 3.73 L    FEF 25 75 PRE 2.34 0.96 - 3.93 L/s    [id number] PRE 7.82 sec    FVC Ref 3.04     FVC LLN 2.26     FVC Pre Ref 84.4 %    FVC Post Ref 82.0 %    FVC Chg -2.8 %    FEV1 Ref 2.40     FEV1 LLN 1.76     FEV1 Pre Ref 86.4 %    FEV1 Post Ref 85.0 %    FEV1 Chg -1.6 %    FEV1 FVC Ref 80     FEV1 FVC LLN 68     FEV1 FVC Pre Ref 101.8 %    FEV1 FVC Post Ref 103.1 %    FEV1 FVC Chg 1.2 %    FEV6 Ref 2.96     FEV6 LLN 2.20     FEV6 Pre Ref 85.6 %    FEV6 Post Ref 83.7 %    FEV6 Chg -2.2 %    FEF 25 75 Ref 2.19     FEF 25 75 LLN 0.96     FEF 25 75 Pre Ref 107.0 %    FEF 25 75 Post Ref 95.6 %    FEF 25 75 Chg -10.7 %    PEF Ref 6.23     PEF LLN  "4.04     PEF Pre Ref 56.6 %    PEF Post Ref 49.2 %    PEF Chg -13.0 %    [id number] Chg 17.3 %    MVV Ref 107     MVV LLN 91        Results for orders placed during the hospital encounter of 08/24/18   X-Ray Chest PA And Lateral    Narrative EXAMINATION:  XR CHEST PA AND LATERAL    CLINICAL HISTORY:  SOB; Chronic obstructive pulmonary disease, unspecified    TECHNIQUE:  PA and lateral views of the chest were performed.    COMPARISON:  May 11, 2018    FINDINGS:  Cardiac silhouette and mediastinal contours are unchanged.  Lungs are clear.  Osseous structures are intact.      Impression No acute cardiopulmonary process.      Electronically signed by: Zach Newton MD  Date:    08/24/2018  Time:    08:41         Assessment:       1. Sleep-related nonobstructive alveolar hypoventilation    2. Obesity, Class II, BMI 35-39.9    3. Asthma, unspecified asthma severity, unspecified whether complicated, unspecified whether persistent        Outpatient Encounter Medications as of 8/24/2018   Medication Sig Dispense Refill    albuterol 90 mcg/actuation inhaler Inhale 2 puffs into the lungs.      atorvastatin (LIPITOR) 20 MG tablet Take 1 tablet (20 mg total) by mouth once daily. 30 tablet 11    BD ULTRA-FINE RORY PEN NEEDLE 32 gauge x 5/32" Ndle USE AS DIRECTED WITH INSULIN PEN FOUR TIMES DAILY 100 each 0    blood sugar diagnostic (FREESTYLE TEST) Strp USE FOUR TIMES DAILY AS DIRECTED 400 strip 2    cetirizine (ZYRTEC) 10 MG tablet Take 1 tablet (10 mg total) by mouth once daily. 30 tablet 11    cyclobenzaprine (FLEXERIL) 10 MG tablet Take 1 tablet (10 mg total) by mouth 3 (three) times daily as needed for Muscle spasms. 30 tablet 1    doxycycline (VIBRAMYCIN) 100 MG Cap Take 1 capsule (100 mg total) by mouth every 12 (twelve) hours. for 10 days 20 capsule 0    ergocalciferol (ERGOCALCIFEROL) 50,000 unit Cap Take 50,000 Units by mouth.      furosemide (LASIX) 20 MG tablet TAKE 1 TABLET BY MOUTH EVERY DAY AS NEEDED 90 " tablet 11    insulin glargine (LANTUS SOLOSTAR) 100 unit/mL (3 mL) InPn pen Inject 50 Units into the skin.      insulin lispro (HUMALOG KWIKPEN) 100 unit/mL InPn pen Titrate up to 18 units subcutaneously three times a day 10-15 min before meals as directed in after visit summary. 45 mL 3    lancets (FREESTYLE LANCETS) 28 gauge Misc 1 lancet by Combination route 2 (two) times daily as needed. Qid prn 400 each 2    lancets Misc 1 strip by Misc.(Non-Drug; Combo Route) route 4 (four) times daily with meals and nightly. 150 each 6    LORazepam (ATIVAN) 0.5 MG tablet Take 1 tablet (0.5 mg total) by mouth every 6 (six) hours as needed for Anxiety. 40 tablet 1    mycophenolate (CELLCEPT) 250 mg Cap TAKE (2) CAPSULES TWICE DAILY. 120 capsule 11    NIFEdipine (PROCARDIA-XL) 30 MG (OSM) 24 hr tablet TAKE 1 TABLET BY MOUTH EVERY DAY 30 tablet 9    ondansetron (ZOFRAN) 4 MG tablet Take 1 tablet (4 mg total) by mouth every 12 (twelve) hours as needed for Nausea. 40 tablet 4    oxyCODONE-acetaminophen (PERCOCET)  mg per tablet Take 1 tablet by mouth.      promethazine (PHENERGAN) 25 MG tablet Take 1 tablet (25 mg total) by mouth every 6 (six) hours as needed for Nausea. 14 tablet 0    tacrolimus (PROGRAF) 1 MG Cap TAKE 5 CAPSULES IN THE MORNING, AND 4 IN THE EVENING 270 capsule 11    zolpidem (AMBIEN) 5 MG Tab Take 1 tablet (5 mg total) by mouth nightly as needed. 30 tablet 5    albuterol-ipratropium 2.5mg-0.5mg/3mL (DUO-NEB) 0.5 mg-3 mg(2.5 mg base)/3 mL nebulizer solution Take 3 mLs by nebulization 3 (three) times daily. 270 mL 5    alprazolam (XANAX) 0.5 MG tablet TAKE 1 TO 2 TABLETS BY MOUTH TWICE DAILY AS NEEDED 60 tablet 0    gabapentin (NEURONTIN) 300 MG capsule Take 1 capsule (300mg) by mouth every night X 1 week then increase to 2 capsules (600mg) QHS thereafter. Increase as tolerated. 60 capsule 1    levothyroxine (SYNTHROID) 150 MCG tablet Take 1 tablet (150 mcg total) by mouth before breakfast. 90  tablet 3    mupirocin (BACTROBAN) 2 % ointment Apply topically 3 (three) times daily. 30 g 0     No facility-administered encounter medications on file as of 8/24/2018.      No orders of the defined types were placed in this encounter.    Plan:   Let me know if continued use of Albuterol.   If over 1-2 x week, will start on ICS.   Continue oxygen with sleep.  Weight loss  Follow up in 6 months with spirometry or call earlier if any problems

## 2018-08-30 DIAGNOSIS — E03.4 HYPOTHYROIDISM DUE TO ACQUIRED ATROPHY OF THYROID: ICD-10-CM

## 2018-08-30 RX ORDER — LEVOTHYROXINE SODIUM 150 UG/1
150 TABLET ORAL
Qty: 90 TABLET | Refills: 3 | OUTPATIENT
Start: 2018-08-30 | End: 2019-08-30

## 2018-09-07 RX ORDER — OXYCODONE AND ACETAMINOPHEN 10; 325 MG/1; MG/1
1 TABLET ORAL EVERY 8 HOURS PRN
Qty: 90 TABLET | Refills: 0 | Status: SHIPPED | OUTPATIENT
Start: 2018-10-25 | End: 2018-11-24

## 2018-09-07 RX ORDER — OXYCODONE AND ACETAMINOPHEN 10; 325 MG/1; MG/1
1 TABLET ORAL EVERY 8 HOURS PRN
Qty: 90 TABLET | Refills: 0 | Status: SHIPPED | OUTPATIENT
Start: 2018-09-26 | End: 2018-10-26

## 2018-09-12 ENCOUNTER — OFFICE VISIT (OUTPATIENT)
Dept: PAIN MEDICINE | Facility: CLINIC | Age: 58
End: 2018-09-12
Payer: MEDICARE

## 2018-09-12 VITALS
DIASTOLIC BLOOD PRESSURE: 86 MMHG | HEIGHT: 67 IN | SYSTOLIC BLOOD PRESSURE: 148 MMHG | HEART RATE: 84 BPM | WEIGHT: 240 LBS | RESPIRATION RATE: 18 BRPM | BODY MASS INDEX: 37.67 KG/M2

## 2018-09-12 DIAGNOSIS — M54.16 LEFT LUMBAR RADICULOPATHY: ICD-10-CM

## 2018-09-12 DIAGNOSIS — M47.816 LUMBAR SPONDYLOSIS: Primary | ICD-10-CM

## 2018-09-12 DIAGNOSIS — M47.816 LUMBAR FACET ARTHROPATHY: ICD-10-CM

## 2018-09-12 DIAGNOSIS — M53.3 SACROILIAC JOINT PAIN: ICD-10-CM

## 2018-09-12 DIAGNOSIS — M47.817 SPONDYLOSIS OF LUMBOSACRAL REGION WITHOUT MYELOPATHY OR RADICULOPATHY: ICD-10-CM

## 2018-09-12 PROCEDURE — 99999 PR PBB SHADOW E&M-EST. PATIENT-LVL IV: CPT | Mod: PBBFAC,,, | Performed by: PHYSICIAN ASSISTANT

## 2018-09-12 PROCEDURE — 99214 OFFICE O/P EST MOD 30 MIN: CPT | Mod: PBBFAC,PO | Performed by: PHYSICIAN ASSISTANT

## 2018-09-12 PROCEDURE — 99214 OFFICE O/P EST MOD 30 MIN: CPT | Mod: S$PBB,,, | Performed by: PHYSICIAN ASSISTANT

## 2018-09-12 RX ORDER — GABAPENTIN 300 MG/1
CAPSULE ORAL
Qty: 60 CAPSULE | Refills: 1 | Status: SHIPPED | OUTPATIENT
Start: 2018-09-12 | End: 2019-01-28 | Stop reason: SDUPTHER
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
## 2018-09-14 ENCOUNTER — TELEPHONE (OUTPATIENT)
Dept: INTERNAL MEDICINE | Facility: CLINIC | Age: 58
End: 2018-09-14

## 2018-09-14 NOTE — TELEPHONE ENCOUNTER
----- Message from Jaki He sent at 9/14/2018  7:38 AM CDT -----  Contact: self [phone]  States that she would like to speak to nurse regarding being worked in for an earlier appt today or another day. Please call back at [phone]//thank you acc

## 2018-09-14 NOTE — TELEPHONE ENCOUNTER
----- Message from Vinita Benitez sent at 9/14/2018 12:12 PM CDT -----  Contact: pt  Pt stated she's not able to keep her appointment, she can be reached at [phone] Thanks

## 2018-09-21 ENCOUNTER — OFFICE VISIT (OUTPATIENT)
Dept: INTERNAL MEDICINE | Facility: CLINIC | Age: 58
End: 2018-09-21
Payer: MEDICARE

## 2018-09-21 VITALS
HEIGHT: 67 IN | DIASTOLIC BLOOD PRESSURE: 86 MMHG | OXYGEN SATURATION: 97 % | HEART RATE: 110 BPM | BODY MASS INDEX: 37.44 KG/M2 | WEIGHT: 238.56 LBS | TEMPERATURE: 98 F | SYSTOLIC BLOOD PRESSURE: 132 MMHG

## 2018-09-21 DIAGNOSIS — F32.A ANXIETY AND DEPRESSION: ICD-10-CM

## 2018-09-21 DIAGNOSIS — Z94.0 DECEASED-DONOR KIDNEY TRANSPLANT: Chronic | ICD-10-CM

## 2018-09-21 DIAGNOSIS — I15.2 HYPERTENSION ASSOCIATED WITH DIABETES: ICD-10-CM

## 2018-09-21 DIAGNOSIS — M81.0 AGE-RELATED OSTEOPOROSIS WITHOUT CURRENT PATHOLOGICAL FRACTURE: ICD-10-CM

## 2018-09-21 DIAGNOSIS — F41.9 ANXIETY AND DEPRESSION: ICD-10-CM

## 2018-09-21 DIAGNOSIS — F32.A DEPRESSION, UNSPECIFIED DEPRESSION TYPE: ICD-10-CM

## 2018-09-21 DIAGNOSIS — E11.59 HYPERTENSION ASSOCIATED WITH DIABETES: ICD-10-CM

## 2018-09-21 DIAGNOSIS — E11.9 DIABETES MELLITUS WITHOUT COMPLICATION: ICD-10-CM

## 2018-09-21 DIAGNOSIS — E78.5 HYPERLIPIDEMIA ASSOCIATED WITH TYPE 2 DIABETES MELLITUS: ICD-10-CM

## 2018-09-21 DIAGNOSIS — E11.69 HYPERLIPIDEMIA ASSOCIATED WITH TYPE 2 DIABETES MELLITUS: ICD-10-CM

## 2018-09-21 DIAGNOSIS — D84.9 IMMUNOSUPPRESSED STATUS: ICD-10-CM

## 2018-09-21 PROCEDURE — 99999 PR PBB SHADOW E&M-EST. PATIENT-LVL IV: CPT | Mod: PBBFAC,,, | Performed by: INTERNAL MEDICINE

## 2018-09-21 PROCEDURE — 99214 OFFICE O/P EST MOD 30 MIN: CPT | Mod: PBBFAC,PO | Performed by: INTERNAL MEDICINE

## 2018-09-21 PROCEDURE — 99214 OFFICE O/P EST MOD 30 MIN: CPT | Mod: S$PBB,,, | Performed by: INTERNAL MEDICINE

## 2018-09-21 RX ORDER — LORAZEPAM 0.5 MG/1
0.5 TABLET ORAL EVERY 6 HOURS PRN
Qty: 40 TABLET | Refills: 1 | Status: SHIPPED | OUTPATIENT
Start: 2018-09-21 | End: 2018-09-21 | Stop reason: SDUPTHER

## 2018-09-21 RX ORDER — LORAZEPAM 0.5 MG/1
0.5 TABLET ORAL EVERY 6 HOURS PRN
Qty: 60 TABLET | Refills: 1 | Status: SHIPPED | OUTPATIENT
Start: 2018-09-21 | End: 2018-12-18 | Stop reason: SDUPTHER

## 2018-10-08 RX ORDER — MYCOPHENOLATE MOFETIL 250 MG/1
CAPSULE ORAL
Qty: 120 CAPSULE | Refills: 11 | Status: SHIPPED | OUTPATIENT
Start: 2018-10-08 | End: 2019-10-10 | Stop reason: SDUPTHER

## 2018-10-17 ENCOUNTER — TELEPHONE (OUTPATIENT)
Dept: INTERNAL MEDICINE | Facility: CLINIC | Age: 58
End: 2018-10-17

## 2018-10-17 NOTE — TELEPHONE ENCOUNTER
Pt requesting a handicap hang tag r/t many health conditions.  I will give the form to Dr. Olmos and pt can pick it up if she signs it./rpr

## 2018-10-17 NOTE — TELEPHONE ENCOUNTER
----- Message from Dorian Ibarra sent at 10/17/2018 12:23 PM CDT -----  Contact: Pt  Please give pt a call at .. [phone] (work) regarding a form to get a handicap sticker.

## 2018-10-24 DIAGNOSIS — F41.9 ANXIETY: ICD-10-CM

## 2018-10-24 RX ORDER — PEN NEEDLE, DIABETIC 31 GX5/16"
NEEDLE, DISPOSABLE MISCELLANEOUS
Qty: 100 EACH | Refills: 0 | Status: SHIPPED | OUTPATIENT
Start: 2018-10-24 | End: 2018-12-02 | Stop reason: SDUPTHER

## 2018-11-19 RX ORDER — OXYCODONE AND ACETAMINOPHEN 10; 325 MG/1; MG/1
1 TABLET ORAL EVERY 8 HOURS PRN
Qty: 90 TABLET | Refills: 0 | Status: SHIPPED | OUTPATIENT
Start: 2018-12-07 | End: 2019-01-06

## 2018-11-19 RX ORDER — OXYCODONE AND ACETAMINOPHEN 10; 325 MG/1; MG/1
1 TABLET ORAL EVERY 8 HOURS PRN
Qty: 90 TABLET | Refills: 0 | Status: SHIPPED | OUTPATIENT
Start: 2019-01-05 | End: 2019-02-04

## 2018-11-26 ENCOUNTER — OFFICE VISIT (OUTPATIENT)
Dept: PAIN MEDICINE | Facility: CLINIC | Age: 58
End: 2018-11-26
Payer: MEDICARE

## 2018-11-26 VITALS
HEART RATE: 95 BPM | BODY MASS INDEX: 37.35 KG/M2 | WEIGHT: 238 LBS | RESPIRATION RATE: 18 BRPM | SYSTOLIC BLOOD PRESSURE: 152 MMHG | DIASTOLIC BLOOD PRESSURE: 82 MMHG | HEIGHT: 67 IN

## 2018-11-26 DIAGNOSIS — M51.36 DDD (DEGENERATIVE DISC DISEASE), LUMBAR: ICD-10-CM

## 2018-11-26 DIAGNOSIS — Z79.4 UNCONTROLLED TYPE 2 DIABETES MELLITUS WITH HYPERGLYCEMIA, WITH LONG-TERM CURRENT USE OF INSULIN: ICD-10-CM

## 2018-11-26 DIAGNOSIS — E11.65 UNCONTROLLED TYPE 2 DIABETES MELLITUS WITH HYPERGLYCEMIA, WITH LONG-TERM CURRENT USE OF INSULIN: ICD-10-CM

## 2018-11-26 DIAGNOSIS — M47.816 LUMBAR SPONDYLOSIS: Primary | ICD-10-CM

## 2018-11-26 DIAGNOSIS — M47.816 LUMBAR FACET ARTHROPATHY: ICD-10-CM

## 2018-11-26 DIAGNOSIS — M53.3 SACROILIAC JOINT PAIN: ICD-10-CM

## 2018-11-26 DIAGNOSIS — M54.16 LEFT LUMBAR RADICULOPATHY: ICD-10-CM

## 2018-11-26 PROCEDURE — 99214 OFFICE O/P EST MOD 30 MIN: CPT | Mod: S$PBB,,, | Performed by: PHYSICIAN ASSISTANT

## 2018-11-26 PROCEDURE — 99999 PR PBB SHADOW E&M-EST. PATIENT-LVL V: CPT | Mod: PBBFAC,,, | Performed by: PHYSICIAN ASSISTANT

## 2018-11-26 PROCEDURE — 99215 OFFICE O/P EST HI 40 MIN: CPT | Mod: PBBFAC,PO | Performed by: PHYSICIAN ASSISTANT

## 2018-11-26 RX ORDER — INSULIN GLARGINE 100 [IU]/ML
INJECTION, SOLUTION SUBCUTANEOUS
Qty: 15 ML | Refills: 0 | OUTPATIENT
Start: 2018-11-26
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
## 2018-12-02 DIAGNOSIS — F41.9 ANXIETY: ICD-10-CM

## 2018-12-03 RX ORDER — PEN NEEDLE, DIABETIC 31 GX5/16"
NEEDLE, DISPOSABLE MISCELLANEOUS
Qty: 100 EACH | Refills: 0 | Status: SHIPPED | OUTPATIENT
Start: 2018-12-03 | End: 2018-12-03 | Stop reason: SDUPTHER

## 2018-12-03 NOTE — TELEPHONE ENCOUNTER
----- Message from Joe Ardon sent at 12/3/2018  9:54 AM CST -----  ..1. What is the name of the medication you are requesting? lacets   2. What is the dose? n/a  3. How do you take the medication? Orally, topically, etc? n/a  4. How often do you take this medication? Daily  5. Do you need a 30 day or 90 day supply? 30  6. How many refills are you requesting? 1  7. What is your preferred pharmacy and location of the pharmacy? .  [facility] [place] - [address] AT [facility]  [address] [address]  Phone: [phone] Fax: [phone]        8. Who can we contact with further questions? Please call pt back at [phone]

## 2018-12-03 NOTE — TELEPHONE ENCOUNTER
----- Message from Nallely Fierro sent at 12/3/2018  3:02 PM CST -----  Contact: pt  The pt states she needs a refill on her Insulin / needles, the pt can be reached at [phone]///[id number]

## 2018-12-04 RX ORDER — PEN NEEDLE, DIABETIC 30 GX3/16"
NEEDLE, DISPOSABLE MISCELLANEOUS
Qty: 100 EACH | Refills: 0 | Status: SHIPPED | OUTPATIENT
Start: 2018-12-04 | End: 2018-12-18 | Stop reason: SDUPTHER

## 2018-12-04 RX ORDER — LANCETS 28 GAUGE
1 EACH MISCELLANEOUS 2 TIMES DAILY PRN
Qty: 400 EACH | Refills: 2 | Status: SHIPPED | OUTPATIENT
Start: 2018-12-04

## 2018-12-04 RX ORDER — INSULIN GLARGINE 100 [IU]/ML
50 INJECTION, SOLUTION SUBCUTANEOUS DAILY
Qty: 15 ML | Refills: 6 | Status: SHIPPED | OUTPATIENT
Start: 2018-12-04 | End: 2019-07-03

## 2018-12-18 ENCOUNTER — OFFICE VISIT (OUTPATIENT)
Dept: INTERNAL MEDICINE | Facility: CLINIC | Age: 58
End: 2018-12-18
Payer: MEDICARE

## 2018-12-18 VITALS
HEIGHT: 67 IN | DIASTOLIC BLOOD PRESSURE: 78 MMHG | OXYGEN SATURATION: 98 % | WEIGHT: 239.63 LBS | BODY MASS INDEX: 37.61 KG/M2 | HEART RATE: 94 BPM | SYSTOLIC BLOOD PRESSURE: 138 MMHG | TEMPERATURE: 98 F

## 2018-12-18 DIAGNOSIS — E03.4 HYPOTHYROIDISM DUE TO ACQUIRED ATROPHY OF THYROID: ICD-10-CM

## 2018-12-18 DIAGNOSIS — I15.2 HYPERTENSION ASSOCIATED WITH DIABETES: ICD-10-CM

## 2018-12-18 DIAGNOSIS — D63.1 ANEMIA OF CHRONIC RENAL FAILURE, STAGE 2 (MILD): Chronic | ICD-10-CM

## 2018-12-18 DIAGNOSIS — E11.59 HYPERTENSION ASSOCIATED WITH DIABETES: ICD-10-CM

## 2018-12-18 DIAGNOSIS — F32.A ANXIETY AND DEPRESSION: ICD-10-CM

## 2018-12-18 DIAGNOSIS — N25.81 SECONDARY HYPERPARATHYROIDISM OF RENAL ORIGIN: Chronic | ICD-10-CM

## 2018-12-18 DIAGNOSIS — F51.01 PRIMARY INSOMNIA: ICD-10-CM

## 2018-12-18 DIAGNOSIS — Z94.0 DECEASED-DONOR KIDNEY TRANSPLANT: Chronic | ICD-10-CM

## 2018-12-18 DIAGNOSIS — M81.0 AGE-RELATED OSTEOPOROSIS WITHOUT CURRENT PATHOLOGICAL FRACTURE: ICD-10-CM

## 2018-12-18 DIAGNOSIS — D84.9 IMMUNOSUPPRESSED STATUS: ICD-10-CM

## 2018-12-18 DIAGNOSIS — N18.2 ANEMIA OF CHRONIC RENAL FAILURE, STAGE 2 (MILD): Chronic | ICD-10-CM

## 2018-12-18 DIAGNOSIS — F41.9 ANXIETY AND DEPRESSION: ICD-10-CM

## 2018-12-18 PROCEDURE — 99999 PR PBB SHADOW E&M-EST. PATIENT-LVL V: CPT | Mod: PBBFAC,,, | Performed by: PHYSICIAN ASSISTANT

## 2018-12-18 PROCEDURE — 99214 OFFICE O/P EST MOD 30 MIN: CPT | Mod: S$PBB,,, | Performed by: PHYSICIAN ASSISTANT

## 2018-12-18 PROCEDURE — 99215 OFFICE O/P EST HI 40 MIN: CPT | Mod: PBBFAC,PO | Performed by: PHYSICIAN ASSISTANT

## 2018-12-18 RX ORDER — ZOLPIDEM TARTRATE 5 MG/1
5 TABLET ORAL NIGHTLY PRN
Qty: 30 TABLET | Refills: 1 | Status: SHIPPED | OUTPATIENT
Start: 2018-12-18 | End: 2019-07-03 | Stop reason: SDUPTHER

## 2018-12-18 RX ORDER — LORAZEPAM 0.5 MG/1
0.5 TABLET ORAL EVERY 6 HOURS PRN
Qty: 60 TABLET | Refills: 0 | Status: SHIPPED | OUTPATIENT
Start: 2018-12-18 | End: 2019-01-09

## 2018-12-18 RX ORDER — LEVOTHYROXINE SODIUM 150 UG/1
150 TABLET ORAL
Qty: 90 TABLET | Refills: 3 | Status: SHIPPED | OUTPATIENT
Start: 2018-12-18 | End: 2020-05-15 | Stop reason: SDUPTHER

## 2018-12-18 RX ORDER — PEN NEEDLE, DIABETIC 30 GX3/16"
NEEDLE, DISPOSABLE MISCELLANEOUS
Qty: 100 EACH | Refills: 0 | Status: SHIPPED | OUTPATIENT
Start: 2018-12-18 | End: 2019-03-07 | Stop reason: SDUPTHER

## 2018-12-18 NOTE — PROGRESS NOTES
Subjective:       Patient ID: Ana Maria Teague is a 58 y.o. female.    Chief Complaint: Follow-up    58 year old female presents to clinic for 3 mo f/u from last PCP visit. She was referred to counseling and DM clinic last visit but has not been to either. Glucose at home has been 100-150 AC breakfast, says higher if forgets her medication. BP at home has been 130s-140s/70s-80s. Requests refills of lorazepam, Ambien, Synthroid, glucose pen needles, and glucose strips. Still under a lot of stress with family. She reports no CP, SOB, fever, chills, SI/HI, or other medical complaints.    PCP: Dr. Olmos    Patient Active Problem List:     Avascular necrosis of bone of hip     Osteoporosis with pathological fracture     Depression     Anxiety and depression     Vertigo     Abnormal findings on diagnostic imaging of breast     Renal cyst     -donor kidney transplant - 13     Chronic immunosuppression with Prograf and Cellcept     Hypertension, renal     Anemia of chronic renal failure     Secondary hyperparathyroidism of renal origin     Delayed graft function 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      Uncontrolled type II diabetes mellitus with hypoglycemia     CTS (carpal tunnel syndrome)     Cubital tunnel syndrome on right     CKD (chronic kidney disease) stage 2, GFR 60-89 ml/min     Immunosuppressed status     Abnormal PFTs (pulmonary function tests)     Diffusion capacity of lung (dl), decreased     Age-related osteoporosis without current pathological fracture     Hypertension associated with diabetes     Bilateral headache     Headache     Sleep-related nonobstructive alveolar hypoventilation     Uncontrolled type 2 diabetes mellitus with stage 3 chronic kidney disease, with long-term current use of insulin     Severe obesity (BMI 35.0-39.9)     Hyperlipidemia associated with type 2 diabetes mellitus      Review of Systems   Constitutional: Negative for chills and fever.   Respiratory: Negative for cough  "and shortness of breath.    Cardiovascular: Negative for chest pain, palpitations and leg swelling.   Gastrointestinal: Negative for nausea and vomiting.   Skin: Negative for rash.   Neurological: Negative for dizziness, weakness, numbness and headaches.   Psychiatric/Behavioral: Negative for confusion.       Objective:       Vitals:    12/18/18 1054   BP: 138/78  Comment: avg BP at home   BP Location: Right arm   Patient Position: Sitting   BP Method: Small (Manual)   Pulse: 94   Temp: 97.6 °F (36.4 °C)   TempSrc: Tympanic   SpO2: 98%   Weight: 108.7 kg (239 lb 10.2 oz)   Height: 5' 7" (1.702 m)     Physical Exam   Constitutional: She is oriented to person, place, and time. She appears well-developed and well-nourished. No distress.   HENT:   Head: Normocephalic and atraumatic.   Eyes: EOM are normal. No scleral icterus.   Neck: Neck supple.   Cardiovascular: Normal rate and regular rhythm.   Pulmonary/Chest: Effort normal and breath sounds normal. No respiratory distress. She has no decreased breath sounds. She has no wheezes. She has no rhonchi. She has no rales.   Musculoskeletal: Normal range of motion.   Neurological: She is alert and oriented to person, place, and time. No cranial nerve deficit.   Skin: Skin is warm and dry. No rash noted.   Psychiatric: She has a normal mood and affect. Her speech is normal and behavior is normal. Thought content normal.       Component      Latest Ref Rng & Units 8/14/2018 6/8/2018   Glucose      70 - 110 mg/dL 95    Sodium      136 - 145 mmol/L 142    Potassium      3.5 - 5.1 mmol/L 3.8    Chloride      95 - 110 mmol/L 104    CO2      23 - 29 mmol/L 29    BUN, Bld      6 - 20 mg/dL 9    Calcium      8.7 - 10.5 mg/dL 9.7    Creatinine      0.5 - 1.4 mg/dL 0.9    Albumin      3.5 - 5.2 g/dL 3.7    Phosphorus      2.7 - 4.5 mg/dL 2.9    eGFR if African American      >60 mL/min/1.73 m:2 >60.0    eGFR if non African American      >60 mL/min/1.73 m:2 >60.0    Anion Gap      8 - " "16 mmol/L 9    Cholesterol      120 - 199 mg/dL  172   Triglycerides      30 - 150 mg/dL  120   HDL      40 - 75 mg/dL  46   LDL Cholesterol      63.0 - 159.0 mg/dL  102.0   HDL/Chol Ratio      20.0 - 50.0 %  26.7   Total Cholesterol/HDL Ratio      2.0 - 5.0  3.7   Non-HDL Cholesterol      mg/dL  126   Hemoglobin A1C      4.0 - 5.6 %  10.1 (H)   Estimated Avg Glucose      68 - 131 mg/dL  243 (H)   TSH      0.400 - 4.000 uIU/mL  5.298 (H)   Free T4      0.71 - 1.51 ng/dL  1.12   Vit D, 25-Hydroxy      30 - 96 ng/mL 31      Assessment:       1. Uncontrolled type 2 diabetes mellitus with stage 3 chronic kidney disease, with long-term current use of insulin    2. Anxiety and depression    3. Hypertension associated with diabetes    4. -donor kidney transplant - 13    5. Immunosuppressed status    6. Anemia of chronic renal failure, stage 2 (mild)    7. Secondary hyperparathyroidism of renal origin    8. Age-related osteoporosis without current pathological fracture    9. Hypothyroidism due to acquired atrophy of thyroid    10. Primary insomnia        Plan:         Ana Maria was seen today for follow-up.    Diagnoses and all orders for this visit:    Uncontrolled type 2 diabetes mellitus with stage 3 chronic kidney disease, with long-term current use of insulin  -     Hemoglobin A1c; Future  -     pen needle, diabetic (BD ULTRA-FINE RORY PEN NEEDLE) 32 gauge x 5/32" Ndle; USE WITH INSULIN PEN FOUR TIMES DAILY  -     blood sugar diagnostic (FREESTYLE LITE STRIPS) Strp; AS DIRECTED  A1C with result review following. Healthy diet. F/u with DM clinic for further management - appt scheduled for pt.    Anxiety and depression  -     LORazepam (ATIVAN) 0.5 MG tablet; Take 1 tablet (0.5 mg total) by mouth every 6 (six) hours as needed for Anxiety.  Ativan refilled per pt request - sedating and addicting properties discussed with pt - use with caution and avoid taking with other sedating medications. F/u with psyc - " contact info for [facility] psyc dept given to pt today.    Hypertension associated with diabetes  Controlled. Monitor BP.    -donor kidney transplant - 13    Immunosuppressed status    Anemia of chronic renal failure, stage 2 (mild)    Secondary hyperparathyroidism of renal origin    Age-related osteoporosis without current pathological fracture    Hypothyroidism due to acquired atrophy of thyroid  -     TSH; Future  -     levothyroxine (SYNTHROID) 150 MCG tablet; Take 1 tablet (150 mcg total) by mouth before breakfast.  Synthroid refilled per pt request. Will recheck lab with result review following.    Primary insomnia  -     zolpidem (AMBIEN) 5 MG Tab; Take 1 tablet (5 mg total) by mouth nightly as needed.  Stable. Refilled per pt request today.    Follow-up with your PCP as scheduled in 2 months for health management and specialists as recommended.

## 2018-12-21 ENCOUNTER — LAB VISIT (OUTPATIENT)
Dept: LAB | Facility: HOSPITAL | Age: 58
End: 2018-12-21
Attending: PHYSICIAN ASSISTANT
Payer: MEDICARE

## 2018-12-21 DIAGNOSIS — E03.4 HYPOTHYROIDISM DUE TO ACQUIRED ATROPHY OF THYROID: ICD-10-CM

## 2018-12-21 LAB
ESTIMATED AVG GLUCOSE: 169 MG/DL
HBA1C MFR BLD HPLC: 7.5 %
TSH SERPL DL<=0.005 MIU/L-ACNC: 2.77 UIU/ML

## 2018-12-21 PROCEDURE — 83036 HEMOGLOBIN GLYCOSYLATED A1C: CPT

## 2018-12-21 PROCEDURE — 84443 ASSAY THYROID STIM HORMONE: CPT

## 2018-12-21 PROCEDURE — 36415 COLL VENOUS BLD VENIPUNCTURE: CPT | Mod: PO

## 2018-12-26 ENCOUNTER — TELEPHONE (OUTPATIENT)
Dept: INTERNAL MEDICINE | Facility: CLINIC | Age: 58
End: 2018-12-26

## 2018-12-26 NOTE — TELEPHONE ENCOUNTER
----- Message from Fouzia Gonzalez sent at 12/26/2018  8:47 AM CST -----  Contact: Patient  Patient is returning a call, please call them back at [phone]. Thank you

## 2019-01-09 ENCOUNTER — INITIAL CONSULT (OUTPATIENT)
Dept: PSYCHIATRY | Facility: CLINIC | Age: 59
End: 2019-01-09
Payer: MEDICARE

## 2019-01-09 DIAGNOSIS — F33.0 MILD EPISODE OF RECURRENT MAJOR DEPRESSIVE DISORDER: Primary | ICD-10-CM

## 2019-01-09 DIAGNOSIS — F41.1 GENERALIZED ANXIETY DISORDER: ICD-10-CM

## 2019-01-09 DIAGNOSIS — F41.1 GAD (GENERALIZED ANXIETY DISORDER): ICD-10-CM

## 2019-01-09 DIAGNOSIS — F33.0 MDD (MAJOR DEPRESSIVE DISORDER), RECURRENT EPISODE, MILD: Primary | ICD-10-CM

## 2019-01-09 PROCEDURE — 90791 PR PSYCHIATRIC DIAGNOSTIC EVALUATION: ICD-10-PCS | Mod: S$PBB,,, | Performed by: PSYCHOLOGIST

## 2019-01-09 PROCEDURE — 90792 PR PSYCHIATRIC DIAGNOSTIC EVALUATION W/MEDICAL SERVICES: ICD-10-PCS | Mod: S$PBB,,, | Performed by: PSYCHIATRY & NEUROLOGY

## 2019-01-09 PROCEDURE — 90791 PSYCH DIAGNOSTIC EVALUATION: CPT | Mod: PBBFAC,PO | Performed by: PSYCHOLOGIST

## 2019-01-09 PROCEDURE — 90792 PSYCH DIAG EVAL W/MED SRVCS: CPT | Mod: PBBFAC,PO | Performed by: PSYCHIATRY & NEUROLOGY

## 2019-01-09 PROCEDURE — 90791 PSYCH DIAGNOSTIC EVALUATION: CPT | Mod: S$PBB,,, | Performed by: PSYCHOLOGIST

## 2019-01-09 PROCEDURE — 90792 PSYCH DIAG EVAL W/MED SRVCS: CPT | Mod: S$PBB,,, | Performed by: PSYCHIATRY & NEUROLOGY

## 2019-01-09 RX ORDER — LORAZEPAM 1 MG/1
1 TABLET ORAL 2 TIMES DAILY
Qty: 60 TABLET | Refills: 0 | Status: SHIPPED | OUTPATIENT
Start: 2019-01-09 | End: 2019-02-07

## 2019-01-09 NOTE — PROGRESS NOTES
Outpatient Psychiatry Initial Visit (MD/NP)    1/9/2019    Ana Maria Teague, a 58 y.o. female, presenting for initial evaluation visit. Met with patient     Reason for Encounter: Consult from Dr. Lennon     Chief Complaint: Medication management for Depression and Anxiety Disorder         History of Present Illness:   57 yo female with a history of Depression and Anxiety Disorder. Patient has multiple medical conditions, including kidney transplant, uncontrolled diabetes, decreased function capacity, hypothyroidism, chronic pain.   A lot of social stressors, mainly stemming from her schizophrenic son not taking his medications. He is no longer welcome at the house because of his bizarre behavior when not medicated, however, he does sleep in a car that is across from their house on the family property, and this causes her a lot of distress.   She has problems with sleep, she takes Ambien, still won't sleep for a couple of hours. Sleep problems have worsened in the past year, initially was better controlled with oxygen, but since her son's mental health deteriorated, she has been having worsening problems with sleep. Sleeps for about 3 to 4 hours a night not rest.     Depression Symptoms: some isolation and tearfulness    1)Depressed mood most of the day, nearly every day: some  2) Markedly diminished interest or pleasure: denied  3) Significant weight loss when not dieting or weight gain or decrease or increase in appetite nearly every day: denied  4) Insomnia or hypersomnia nearly every day: insomnia managed with ambien  5) Psychomotor agitation or retardation nearly: denied  6) Fatigue or loss of energy nearly every day: some  7) Feelings of worthlessness or excessive or inappropriate guilt: denied  8) Diminished ability to think or concentrate, or indecisiveness, nearly every day: denied   9) Recurrent thoughts of death (not just fear of dying), recurrent suicidal ideation without a specific plan, or a suicide attempt  "or a specific plan for committing suicide: denied      Anxiety Symptoms:  Anxiety Disorder Symptoms:      Excessive Anxiety & Worry (occurring more days than not for at least past 6 months) yes  Difficulty in Controlling Worry -- yes  Sleep disturbance -- yes  Restlessness/psychomotor agitation -- yes  Fatigues easily -- yes  Irritability -- yes  Muscle tension/headaches -- yes    Panic Attack symptoms: DENIED ALL OF THE BELOW  ?Sensations of shortness of breath or smothering:  ?Feelings of choking  ?Chest pain or discomfort   ?Nausea or abdominal distress  ?Feeling dizzy, unsteady, light-headed, or faint   ?Chills or heat sensations  ?Paresthesias (numbness or tingling sensations)  ?Derealization (feelings of unreality) or depersonalization (being detached from oneself)   ?Fear of losing control or "going crazy"   ?Fear of dying        Psychosis: denied    Melania Screen: Patient denied any episodes of decreased need for sleep, with increased energy, or irritability, or feelings of grandiosity, or impulsive behavior      Review Of Systems:     Pertinent items are noted in HPI.    Current Evaluation:         Constitutional  General:   Well groomed, age appropriate     Musculoskeletal  Gait & Station:   non ataxic     Psychiatric  Speech:   clear and coherent, regular rate and rhythm   Mood & Affect:  Congruent affect, tearful at times when discussing son, but otherwise no problems, some anxiety when it comes to discussing her son   Thought Process:  Linear, logical, goal directed   Thought Content:  No delusions, no hallucinations, no si or hi   Insight:  fair   Judgement: intact   Orientation:  Oriented to time, place, person, and situation   Memory: Intact for both recent and remote as evidenced by 3/3 object recall   Language: Intact   Attention Span & Concentration:  Intact to conversation. Capable of doing days of the week backwards   Fund of Knowledge:  Adequate for age and education level       Relevant " Elements of Neurological Exam: normal gait      Laboratory Data  Lab Visit on 12/21/2018   Component Date Value Ref Range Status    Hemoglobin A1C 12/21/2018 7.5* 4.0 - 5.6 % Final    Estimated Avg Glucose 12/21/2018 169* 68 - 131 mg/dL Final    TSH 12/21/2018 2.766  0.400 - 4.000 uIU/mL Final               Past History:       Medications  Outpatient Encounter Medications as of 1/9/2019   Medication Sig Dispense Refill    albuterol 90 mcg/actuation inhaler Inhale 2 puffs into the lungs.      albuterol-ipratropium 2.5mg-0.5mg/3mL (DUO-NEB) 0.5 mg-3 mg(2.5 mg base)/3 mL nebulizer solution Take 3 mLs by nebulization 3 (three) times daily. 270 mL 5    atorvastatin (LIPITOR) 20 MG tablet Take 1 tablet (20 mg total) by mouth once daily. 30 tablet 11    blood sugar diagnostic (FREESTYLE LITE STRIPS) Strp AS DIRECTED 100 strip 0    cetirizine (ZYRTEC) 10 MG tablet Take 1 tablet (10 mg total) by mouth once daily. 30 tablet 11    cyclobenzaprine (FLEXERIL) 10 MG tablet Take 1 tablet (10 mg total) by mouth 3 (three) times daily as needed for Muscle spasms. 30 tablet 1    ergocalciferol (ERGOCALCIFEROL) 50,000 unit Cap Take 50,000 Units by mouth.      furosemide (LASIX) 20 MG tablet TAKE 1 TABLET BY MOUTH EVERY DAY AS NEEDED 90 tablet 11    gabapentin (NEURONTIN) 300 MG capsule Take 1 capsule (300mg) by mouth every night X 1 week then increase to 2 capsules (600mg) QHS thereafter. Increase as tolerated. 60 capsule 1    insulin glargine (LANTUS SOLOSTAR) 100 unit/mL (3 mL) InPn pen Inject 50 Units into the skin once daily. 15 mL 6    insulin lispro (HUMALOG KWIKPEN) 100 unit/mL InPn pen Titrate up to 18 units subcutaneously three times a day 10-15 min before meals as directed in after visit summary. 45 mL 3    lancets (FREESTYLE LANCETS) 28 gauge Misc 1 lancet by Combination route 2 (two) times daily as needed. Qid prn 400 each 2    lancets Misc 1 strip by Misc.(Non-Drug; Combo Route) route 4 (four) times daily  "with meals and nightly. 150 each 6    levothyroxine (SYNTHROID) 150 MCG tablet Take 1 tablet (150 mcg total) by mouth before breakfast. 90 tablet 3    LORazepam (ATIVAN) 1 MG tablet Take 1 tablet (1 mg total) by mouth 2 (two) times daily. 60 tablet 0    mupirocin (BACTROBAN) 2 % ointment Apply topically 3 (three) times daily. 30 g 0    mycophenolate (CELLCEPT) 250 mg Cap TAKE (2) CAPSULES TWICE DAILY. 120 capsule 11    NIFEdipine (PROCARDIA-XL) 30 MG (OSM) 24 hr tablet TAKE 1 TABLET BY MOUTH EVERY DAY 30 tablet 9    ondansetron (ZOFRAN) 4 MG tablet Take 1 tablet (4 mg total) by mouth every 12 (twelve) hours as needed for Nausea. 40 tablet 4    oxyCODONE-acetaminophen (PERCOCET)  mg per tablet Take 1 tablet by mouth.      oxyCODONE-acetaminophen (PERCOCET)  mg per tablet Take 1 tablet by mouth every 8 (eight) hours as needed for Pain. 90 tablet 0    pen needle, diabetic (BD ULTRA-FINE RORY PEN NEEDLE) 32 gauge x 5/32" Ndle USE WITH INSULIN PEN FOUR TIMES DAILY 100 each 0    promethazine (PHENERGAN) 25 MG tablet Take 1 tablet (25 mg total) by mouth every 6 (six) hours as needed for Nausea. 14 tablet 0    tacrolimus (PROGRAF) 1 MG Cap TAKE 5 CAPSULES IN THE MORNING, AND 4 IN THE EVENING 270 capsule 11    zolpidem (AMBIEN) 5 MG Tab Take 1 tablet (5 mg total) by mouth nightly as needed. 30 tablet 1    [DISCONTINUED] LORazepam (ATIVAN) 0.5 MG tablet Take 1 tablet (0.5 mg total) by mouth every 6 (six) hours as needed for Anxiety. 60 tablet 0     No facility-administered encounter medications on file as of 1/9/2019.        Medication Compliance  Yes    Past Medical/Surgical History:   Past Medical History:   Diagnosis Date    Abnormal glucose 12/30/2013    Acquired hypothyroidism     Acute bronchiolitis 10/15/2014    Anemia     Anemia of chronic renal failure 9/30/2013    Anxiety     Anxiety disorder     Avascular necrosis of bone of hip     Back pain     s/p steroid injections    Chronic " immunosuppression with Prograf and Cellcept 2013    CKD (chronic kidney disease) stage 2, GFR 60-89 ml/min     CKD (chronic kidney disease) stage 5, GFR less than 15 ml/min     -donor kidney transplant - 13    Depression     Hypertension, renal 2013    Hypothyroidism     Immunosuppressed status 10/15/2014    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 7 years ago.     SECTION      COLONOSCOPY N/A 3/9/2016    [facility] Douglas [facility]    cyst removed form chest wall      HYSTERECTOMY      KIDNEY TRANSPLANT  2013    LAVAGE, PERITONEAL, THERAPEUTIC Right 10/7/2013    Performed by Vern Mcdonald MD at [facility] OR [facility]    SKIN GRAFT      revision    THYROIDECTOMY      TRANSPLANT, KIDNEY N/A 2013    Performed by Jus Mcfarland [facility] at [facility] OR [facility]    vascular access surgeries      multiple. last time 2 weeks ago       Neurological History:  Seizures:  no  Head Trauma:  no      Past Psychiatric History:   Previous Psychiatric Hospitalizations: no   Previous SI/HI: no  Previous Suicide Attempts: no   Previous Medication Trials: Zoloft, Prozac, wellbutrin, lexapro; these medications were tried with subpar results   Psychiatric Care (current & past): None Before   History of Psychotherapy: patient currently sees Dr. Aly      SOCIAL HISTORY:  Developmental/Childhood: good childhood, normal development  History of Physical/Sexual Abuse: no  Education: 11th Grade     Financial: financially secure   Relationship Status/Sexual Orientation:    Children: 3 biological, 1 adopted daughter   Housing Status: lives with    Pentec[employer]al: Catholic   History: none   Recreational Activities: family oriented activities   Access to Gun: no    Substance Abuse History:   No history of substance abuse  Tobacco:   Quit 20 years ago         Family History of Psychiatric History:  Family History   Problem Relation Age of Onset    Diabetes Mother     Kidney disease Mother     Hyperlipidemia Mother     Hypertension Mother     Heart disease Mother     Arthritis Mother     Hypertension Father     Stroke Father     Hypertension Sister     Arthritis Sister     Asthma Sister     Heart disease Sister         heart attack       Allergies:  Review of patient's allergies indicates:   Allergen Reactions    Azithromycin Nausea And Vomiting    Adhesive Other (See Comments)     Skin tears           Assessment - Diagnosis - Goals:     Impression:   59 yo female who presents with Mild Depression and Generalized Anxiety Disorder, that is aggravated by the current state of her family: mainly a non-compliant schizophrenic son. Patient for the most part feels stable, but the fact that her son is homeless and when agitated harasses the family including his daughters, causes a lot of distress for the patient. Although her mood is slightly depressed, it is situational for the most part, and she is carrying on with activities of daily living. Her main concern is addressing the anxiety she experiences when she sees her son outside her home, and when she starts worrying about his well-being and the well-being of her family.      ICD-10-CM ICD-9-CM   1. Mild episode of recurrent major depressive disorder F33.0 296.31   2. Generalized anxiety disorder F41.1 300.02       Strengths and Liabilities: Strength: Patient accepts guidance/feedback, Strength: Patient is expressive/articulate., Strength: Patient has positive support network., Liability: Patient lacks coping skills.    Treatment Goals:  Specify outcomes written in observable, behavioral  terms:   Improve anxiety  Eventually decrease dependence on Benzos for anxiety relief    Treatment Plan/Recommendations:     MDD/mild:  -patient has tried multiple ssri's in the past with poor results, weary of beginning another anti-depressant  -Right now depression is not as prevalent, and appears to be mainly situational  -continue seeing Dr. Aly    Anxiety:  -For the time being can continue the Ativan 1mg as needed for anxiety, but educated patient that this is a temporary solution until she develops better skills with Dr. Aly  -Between now and her next appointment she is to consider medications that I have provided literature on and consider starting them to transition from Benzos to better medication, that is not habit forming or sedating  -Discussed risks of opiate and other sedative anxiolytics concurrently.         -Patient was educated on possible side-effects of medications, voices understanding and wishes to start medication management on the above mentioned medications.   -Discussed 911 for suicidal or homicidal ideation or possible psychosis or worsening symptoms  -Provided patient education through patient portal  -Patient will contact clinic with any questions or concerns    Return to Clinic: 1 month    Counseling time: 10 min  Total time: 50 min    Joslyn Odom MD  1/16/2019

## 2019-01-09 NOTE — PATIENT INSTRUCTIONS
Understanding Anxiety Disorders  Almost everyone gets nervous now and then. Its normal to have knots in your stomach before a test, or for your heart to race on a first date. But an anxiety disorder is much more than a case of nerves. In fact, its symptoms may be overwhelming. But treatment can relieve many of these symptoms. Talking to your healthcare provider is the first step.    What are anxiety disorders?  An anxiety disorder causes intense feelings of panic and fear. These feelings may arise for no apparent reason. And they tend to recur again and again. They may prevent you from coping with life and cause you great distress. As a result, you may avoid anything that triggers your fear. In extreme cases, you may never leave the house. Anxiety disorders may cause other symptoms, such as:  · Obsessive thoughts you cant control  · Constant nightmares or painful thoughts of the past  · Nausea, sweating, and muscle tension  · Trouble sleeping or concentrating  What causes anxiety disorders?  Anxiety disorders tend to run in families. For some people, childhood abuse or neglect may play a role. For others, stressful life events or trauma may trigger anxiety disorders. Anxiety can trigger low self-esteem and poor coping skills.  Common anxiety disorders  · Panic disorder. This causes an intense fear of being in danger.  · Phobias. These are extreme fears of certain objects, places, or events.  · Obsessive-compulsive disorder. This causes you to have unwanted thoughts and urges. You also may perform certain actions over and over.  · Posttraumatic stress disorder. This occurs in people who have survived a terrible ordeal. It can cause nightmares and flashbacks about the event.  · Generalized anxiety disorder. This causes constant worry that can greatly disrupt your life.   Getting better  You may believe that nothing can help you. Or, you might fear what others may think. But most anxiety symptoms can be eased.  Having an anxiety disorder is nothing to be ashamed of. Most people do best with treatment that combines medicine and therapy. These arent cures. But they can help you live a healthier life.  Date Last Reviewed: 2/1/2017 © 2000-2017 [employer]. [address] [place] [address], [place] [address]. All rights reserved. This information is not intended as a substitute for professional medical care. Always follow your healthcare professional's instructions.          Depression Affects Your Mind and Body  Everyone feels sad or blue from time to time for a few days or weeks. Depression is when these feelings don't go away and they interfere with daily life.  Depression is a real illness that can develop at any age. It is one of the most common mental health problems in the [place] Depression makes you feel sad, helpless, and hopeless. It gets in the way of your life and relationships. It inhibits your ability to think and act. But, with help, you can feel better again.     When I was depressed, I felt awful. I was so tired all the time I could hardly think, but at night I couldnt fall asleep. My head hurt. My stomach hurt. I didnt know what was wrong with me.   Depression affects your whole body  Brain chemicals affect your body as well as your mood. So depression may do more than just make you feel low. You may also feel bad physically. Depression can:  · Cause trouble with mental tasks such as remembering, concentrating, or making decisions  · Make you feel nervous and jumpy  · Cause trouble sleeping. Or you may sleep too much  · Change your appetite  · Cause headaches, stomachaches, or other aches and pains  · Drain your body of energy  Depression and other illness  It is common for people who have chronic health problems to also have depression. It can often be hard to tell which one caused the other. A person might become depressed after finding out they have a health problem. But some studies suggest being  depressed may make certain health problems more likely. And some depressed people stop taking care of themselves. This may make them more likely to get sick.  Date Last Reviewed: 1/1/2017 © 2000-2017 [employer]. [address] [place] [address], [place] [address]. All rights reserved. This information is not intended as a substitute for professional medical care. Always follow your healthcare professional's instructions.          Mirtazapine tablets  What is this medicine?  MIRTAZAPINE (gerber DIDIER a peen) is used to treat depression.  How should I use this medicine?  Take this medicine by mouth with a glass of water. Follow the directions on the prescription label. Take your medicine at regular intervals. Do not take your medicine more often than directed. Do not stop taking this medicine suddenly except upon the advice of your doctor. Stopping this medicine too quickly may cause serious side effects or your condition may worsen.  A special MedGuide will be given to you by the pharmacist with each prescription and refill. Be sure to read this information carefully each time.  Talk to your pediatrician regarding the use of this medicine in children. Special care may be needed.  What side effects may I notice from receiving this medicine?  Side effects that you should report to your doctor or health care professional as soon as possible:  · allergic reactions like skin rash, itching or hives, swelling of the face, lips, or tongue  · breathing problems  · confusion  · fever, sore throat, or mouth ulcers or blisters  · flu like symptoms including fever, chills, cough, muscle or joint aches and pains  · stomach pain with nausea and/or vomiting  · suicidal thoughts or other mood changes  · swelling of the hands or feet  · unusual bleeding or bruising  · unusually weak or tired  · vomiting  Side effects that usually do not require medical attention (report to your doctor or health care professional if they continue or  are bothersome):  · constipation  · increased appetite  · weight gain  What may interact with this medicine?  Do not take this medicine with any of the following medications:  · linezolid  · MAOIs like Carbex, Eldepryl, Marplan, Nardil, and Parnate  · methylene blue (injected into a vein)  This medicine may also interact with the following medications:  · alcohol  · antiviral medicines for HIV or AIDS  · certain medicines that treat or prevent blood clots like warfarin  · certain medicines for depression, anxiety, or psychotic disturbances  · certain medicines for fungal infections like ketoconazole and itraconazole  · certain medicines for migraine headache like almotriptan, eletriptan, frovatriptan, naratriptan, rizatriptan, sumatriptan, zolmitriptan  · certain medicines for seizures like carbamazepine or phenytoin  · certain medicines for sleep  · cimetidine  · erythromycin  · fentanyl  · lithium  · medicines for blood pressure  · nefazodone  · rasagiline  · rifampin  · supplements like [place]'s wort, kava kava, valerian  · tramadol  · tryptophan  What if I miss a dose?  If you miss a dose, take it as soon as you can. If it is almost time for your next dose, take only that dose. Do not take double or extra doses.  Where should I keep my medicine?  Keep out of the reach of children.  Store at room temperature between 15 and 30 degrees C (59 and 86 degrees F) Protect from light and moisture. Throw away any unused medicine after the expiration date.  What should I tell my health care provider before I take this medicine?  They need to know if you have any of these conditions:  · bipolar disorder  · glaucoma  · kidney disease  · liver disease  · suicidal thoughts  · an unusual or allergic reaction to mirtazapine, other medicines, foods, dyes, or preservatives  · pregnant or trying to get pregnant  · breast-feeding  What should I watch for while using this medicine?  Tell your doctor if your symptoms do not get  better or if they get worse. Visit your doctor or health care professional for regular checks on your progress. Because it may take several weeks to see the full effects of this medicine, it is important to continue your treatment as prescribed by your doctor.  Patients and their families should watch out for new or worsening thoughts of suicide or depression. Also watch out for sudden changes in feelings such as feeling anxious, agitated, panicky, irritable, hostile, aggressive, impulsive, severely restless, overly excited and hyperactive, or not being able to sleep. If this happens, especially at the beginning of treatment or after a change in dose, call your health care professional.  You may get drowsy or dizzy. Do not drive, use machinery, or do anything that needs mental alertness until you know how this medicine affects you. Do not stand or sit up quickly, especially if you are an older patient. This reduces the risk of dizzy or fainting spells. Alcohol may interfere with the effect of this medicine. Avoid alcoholic drinks.  This medicine may cause dry eyes and blurred vision. If you wear contact lenses you may feel some discomfort. Lubricating drops may help. See your eye doctor if the problem does not go away or is severe.  Your mouth may get dry. Chewing sugarless gum or sucking hard candy, and drinking plenty of water may help. Contact your doctor if the problem does not go away or is severe.  NOTE:This sheet is a summary. It may not cover all possible information. If you have questions about this medicine, talk to your doctor, pharmacist, or health care provider. Copyright© 2017 [employer]      Lorazepam tablets  What is this medicine?  LORAZEPAM (sienna A ze sowmya) is a benzodiazepine. It is used to treat anxiety.  How should I use this medicine?  Take this medicine by mouth with a glass of water. Follow the directions on the prescription label. Take your medicine at regular intervals. Do not take it more  often than directed. Do not stop taking except on your doctor's advice.  A special MedGuide will be given to you by the pharmacist with each prescription and refill. Be sure to read this information carefully each time.  Talk to your pediatrician regarding the use of this medicine in children. While this drug may be used in children as young as 12 years for selected conditions, precautions do apply.  What side effects may I notice from receiving this medicine?  Side effects that you should report to your doctor or health care professional as soon as possible:  · allergic reactions like skin rash, itching or hives, swelling of the face, lips, or tongue  · breathing problems  · confusion  · loss of balance or coordination  · signs and symptoms of low blood pressure like dizziness; feeling faint or lightheaded, falls; unusually weak or tired  · suicidal thoughts or other mood changes  Side effects that usually do not require medical attention (report to your doctor or health care professional if they continue or are bothersome):  · dizziness  · headache  · nausea, vomiting  · tiredness  What may interact with this medicine?  Do not take this medicine with any of the following medications:  · narcotic medicines for cough  · sodium oxybate  This medicine may also interact with the following medications:  · alcohol  · antihistamines for allergy, cough and cold  · certain medicines for anxiety or sleep  · certain medicines for depression, like amitriptyline, fluoxetine, sertraline  · certain medicines for seizures like carbamazepine, phenobarbital, phenytoin, primidone  · general anesthetics like lidocaine, pramoxine, tetracaine  · MAOIs like Carbex, Eldepryl, Marplan, Nardil, and Parnate  · medicines that relax muscles for surgery  · narcotic medicines for pain  · phenothiazines like chlorpromazine, mesoridazine, prochlorperazine, thioridazine  What if I miss a dose?  If you miss a dose, take it as soon as you can. If it  is almost time for your next dose, take only that dose. Do not take double or extra doses.  Where should I keep my medicine?  Keep out of the reach of children. This medicine can be abused. Keep your medicine in a safe place to protect it from theft. Do not share this medicine with anyone. Selling or giving away this medicine is dangerous and against the law.  This medicine may cause accidental overdose and death if taken by other adults, children, or pets. Mix any unused medicine with a substance like cat litter or coffee grounds. Then throw the medicine away in a sealed container like a sealed bag or a coffee can with a lid. Do not use the medicine after the expiration date.  Store at room temperature between 20 and 25 degrees C (68 and 77 degrees F). Protect from light. Keep container tightly closed.  What should I tell my health care provider before I take this medicine?  They need to know if you have any of these conditions:  · glaucoma  · history of drug or alcohol abuse problem  · kidney disease  · liver disease  · lung or breathing disease, like asthma  · mental illness  · myasthenia gravis  · Parkinson's disease  · suicidal thoughts, plans, or attempt; a previous suicide attempt by you or a family member  · an unusual or allergic reaction to lorazepam, other medicines, foods, dyes, or preservatives  · pregnant or trying to get pregnant  · breast-feeding  What should I watch for while using this medicine?  Tell your doctor or health care professional if your symptoms do not start to get better or if they get worse.  Do not stop taking except on your doctor's advice. You may develop a severe reaction. Your doctor will tell you how much medicine to take.  You may get drowsy or dizzy. Do not drive, use machinery, or do anything that needs mental alertness until you know how this medicine affects you. To reduce the risk of dizzy and fainting spells, do not stand or sit up quickly, especially if you are an older  patient. Alcohol may increase dizziness and drowsiness. Avoid alcoholic drinks.  If you are taking another medicine that also causes drowsiness, you may have more side effects. Give your health care provider a list of all medicines you use. Your doctor will tell you how much medicine to take. Do not take more medicine than directed. Call emergency for help if you have problems breathing or unusual sleepiness.  NOTE:This sheet is a summary. It may not cover all possible information. If you have questions about this medicine, talk to your doctor, pharmacist, or health care provider. Copyright© 2017 [employer]          Buspirone tablets  What is this medicine?  BUSPIRONE (byoo PERI diaz) is used to treat anxiety disorders.  How should I use this medicine?  Take this medicine by mouth with a glass of water. Follow the directions on the prescription label. You may take this medicine with or without food. To ensure that this medicine always works the same way for you, you should take it either always with or always without food. Take your doses at regular intervals. Do not take your medicine more often than directed. Do not stop taking except on the advice of your doctor or health care professional.  Talk to your pediatrician regarding the use of this medicine in children. Special care may be needed.  What side effects may I notice from receiving this medicine?  Side effects that you should report to your doctor or health care professional as soon as possible:  · blurred vision or other vision changes  · chest pain  · confusion  · difficulty breathing  · feelings of hostility or anger  · muscle aches and pains  · numbness or tingling in hands or feet  · ringing in the ears  · skin rash and itching  · vomiting  · weakness  Side effects that usually do not require medical attention (report to your doctor or health care professional if they continue or are bothersome):  · disturbed dreams,  nightmares  · headache  · nausea  · restlessness or nervousness  · sore throat and nasal congestion  · stomach upset  What may interact with this medicine?  Do not take this medicine with any of the following medications:  · linezolid  · MAOIs like Carbex, Eldepryl, Marplan, Nardil, and Parnate  · methylene blue  · procarbazine  This medicine may also interact with the following medications:  · diazepam  · digoxin  · diltiazem  · erythromycin  · grapefruit juice  · haloperidol  · medicines for mental depression or mood problems  · medicines for seizures like carbamazepine, phenobarbital and phenytoin  · nefazodone  · other medications for anxiety  · rifampin  · ritonavir  · some antifungal medicines like itraconazole, ketoconazole, and voriconazole  · verapamil  · warfarin  What if I miss a dose?  If you miss a dose, take it as soon as you can. If it is almost time for your next dose, take only that dose. Do not take double or extra doses.  Where should I keep my medicine?  Keep out of the reach of children.  Store at room temperature below 30 degrees C (86 degrees F). Protect from light. Keep container tightly closed. Throw away any unused medicine after the expiration date.  What should I tell my health care provider before I take this medicine?  They need to know if you have any of these conditions:  · kidney or liver disease  · an unusual or allergic reaction to buspirone, other medicines, foods, dyes, or preservatives  · pregnant or trying to get pregnant  · breast-feeding  What should I watch for while using this medicine?  Visit your doctor or health care professional for regular checks on your progress. It may take 1 to 2 weeks before your anxiety gets better.  You may get drowsy or dizzy. Do not drive, use machinery, or do anything that needs mental alertness until you know how this drug affects you. Do not stand or sit up quickly, especially if you are an older patient. This reduces the risk of dizzy or  fainting spells. Alcohol can make you more drowsy and dizzy. Avoid alcoholic drinks.  NOTE:This sheet is a summary. It may not cover all possible information. If you have questions about this medicine, talk to your doctor, pharmacist, or health care provider. Copyright© 2017 [employer]        Paroxetine tablets  What is this medicine?  PAROXETINE (pa CHARLENE e teen) is used to treat depression. It may also be used to treat anxiety disorders, obsessive compulsive disorder, panic attacks, post traumatic stress, and premenstrual dysphoric disorder (PMDD).  How should I use this medicine?  Take this medicine by mouth with a glass of water. Follow the directions on the prescription label. You can take it with or without food. Take your medicine at regular intervals. Do not take your medicine more often than directed. Do not stop taking this medicine suddenly except upon the advice of your doctor. Stopping this medicine too quickly may cause serious side effects or your condition may worsen.  A special MedGuide will be given to you by the pharmacist with each prescription and refill. Be sure to read this information carefully each time.  Talk to your pediatrician regarding the use of this medicine in children. Special care may be needed.  What side effects may I notice from receiving this medicine?  Side effects that you should report to your doctor or health care professional as soon as possible:  · allergic reactions like skin rash, itching or hives, swelling of the face, lips, or tongue  · black or bloody stools, blood in the urine or vomit  · fast, irregular heartbeat  · hallucination, loss of contact with reality  · painful or prolonged erection (men)  · seizures  · suicidal thoughts or other mood changes  · trouble passing urine or change in the amount of urine  · unusual bleeding or bruising  · unusually weak or tired  · vomiting  Side effects that usually do not require medical attention (report to your doctor or  health care professional if they continue or are bothersome):  · change in appetite, weight  · change in sex drive or performance  · constipation or diarrhea  · difficulty sleeping  · drowsy  · headache  · increased sweating  · muscle pain or weakness  · tremors  What may interact with this medicine?  Do not take this medicine with any of the following medications:  · linezolid  · MAOIs like Carbex, Eldepryl, Marplan, Nardil, and Parnate  · methylene blue (injected into a vein)  · pimozide  · thioridazine  This medicine may also interact with the following medications:  · alcohol  · aspirin and aspirin-like medicines  · atomoxetine  · certain medicines for depression, anxiety, or psychotic disturbances  · certain medicines for irregular heart beat like propafenone, flecainide, encainide, and quinidine  · certain medicines for migraine headache like almotriptan, eletriptan, frovatriptan, naratriptan, rizatriptan, sumatriptan, zolmitriptan  · cimetidine  · digoxin  · diuretics  · fentanyl  · fosamprenavir/ritonavir  · furazolidone  · isoniazid  · lithium  · medicines that treat or prevent blood clots like warfarin, enoxaparin, and dalteparin  · medicines for sleep  · metoprolol  · NSAIDs, medicines for pain and inflammation, like ibuprofen or naproxen  · phenobarbital  · phenytoin  · procarbazine  · procyclidine  · rasagiline  · supplements like [place]'s wort, kava kava, valerian  · tamoxifen  · theophylline  · tramadol  · tryptophan  What if I miss a dose?  If you miss a dose, take it as soon as you can. If it is almost time for your next dose, take only that dose. Do not take double or extra doses.  Where should I keep my medicine?  Keep out of the reach of children.  Store at room temperature between 15 and 30 degrees C (59 and 86 degrees F). Keep container tightly closed. Throw away any unused medicine after the expiration date.  What should I tell my health care provider before I take this medicine?  They need  to know if you have any of these conditions:  · bleeding disorders  · glaucoma  · heart disease  · kidney disease  · liver disease  · low levels of sodium in the blood  · susan or bipolar disorder  · seizures  · suicidal thoughts, plans, or attempt; a previous suicide attempt by you or a family member  · take MAOIs like Carbex, Eldepryl, Marplan, Nardil, and Parnate  · take medicines that treat or prevent blood clots  · an unusual or allergic reaction to paroxetine, other medicines, foods, dyes, or preservatives  · pregnant or trying to get pregnant  · breast-feeding  What should I watch for while using this medicine?  Tell your doctor if your symptoms do not get better or if they get worse. Visit your doctor or health care professional for regular checks on your progress. Because it may take several weeks to see the full effects of this medicine, it is important to continue your treatment as prescribed by your doctor.  Patients and their families should watch out for new or worsening thoughts of suicide or depression. Also watch out for sudden changes in feelings such as feeling anxious, agitated, panicky, irritable, hostile, aggressive, impulsive, severely restless, overly excited and hyperactive, or not being able to sleep. If this happens, especially at the beginning of treatment or after a change in dose, call your health care professional.  You may get drowsy or dizzy. Do not drive, use machinery, or do anything that needs mental alertness until you know how this medicine affects you. Do not stand or sit up quickly, especially if you are an older patient. This reduces the risk of dizzy or fainting spells. Alcohol may interfere with the effect of this medicine. Avoid alcoholic drinks.  Your mouth may get dry. Chewing sugarless gum or sucking hard candy, and drinking plenty of water will help. Contact your doctor if the problem does not go away or is severe.  NOTE:This sheet is a summary. It may not cover all  possible information. If you have questions about this medicine, talk to your doctor, pharmacist, or health care provider. Copyright© 2017 [employer]        Buspirone tablets  What is this medicine?  BUSPIRONE (espinoza diaz) is used to treat anxiety disorders.  How should I use this medicine?  Take this medicine by mouth with a glass of water. Follow the directions on the prescription label. You may take this medicine with or without food. To ensure that this medicine always works the same way for you, you should take it either always with or always without food. Take your doses at regular intervals. Do not take your medicine more often than directed. Do not stop taking except on the advice of your doctor or health care professional.  Talk to your pediatrician regarding the use of this medicine in children. Special care may be needed.  What side effects may I notice from receiving this medicine?  Side effects that you should report to your doctor or health care professional as soon as possible:  · blurred vision or other vision changes  · chest pain  · confusion  · difficulty breathing  · feelings of hostility or anger  · muscle aches and pains  · numbness or tingling in hands or feet  · ringing in the ears  · skin rash and itching  · vomiting  · weakness  Side effects that usually do not require medical attention (report to your doctor or health care professional if they continue or are bothersome):  · disturbed dreams, nightmares  · headache  · nausea  · restlessness or nervousness  · sore throat and nasal congestion  · stomach upset  What may interact with this medicine?  Do not take this medicine with any of the following medications:  · linezolid  · MAOIs like Carbex, Eldepryl, Marplan, Nardil, and Parnate  · methylene blue  · procarbazine  This medicine may also interact with the following medications:  · diazepam  · digoxin  · diltiazem  · erythromycin  · grapefruit juice  · haloperidol  · medicines for  mental depression or mood problems  · medicines for seizures like carbamazepine, phenobarbital and phenytoin  · nefazodone  · other medications for anxiety  · rifampin  · ritonavir  · some antifungal medicines like itraconazole, ketoconazole, and voriconazole  · verapamil  · warfarin  What if I miss a dose?  If you miss a dose, take it as soon as you can. If it is almost time for your next dose, take only that dose. Do not take double or extra doses.  Where should I keep my medicine?  Keep out of the reach of children.  Store at room temperature below 30 degrees C (86 degrees F). Protect from light. Keep container tightly closed. Throw away any unused medicine after the expiration date.  What should I tell my health care provider before I take this medicine?  They need to know if you have any of these conditions:  · kidney or liver disease  · an unusual or allergic reaction to buspirone, other medicines, foods, dyes, or preservatives  · pregnant or trying to get pregnant  · breast-feeding  What should I watch for while using this medicine?  Visit your doctor or health care professional for regular checks on your progress. It may take 1 to 2 weeks before your anxiety gets better.  You may get drowsy or dizzy. Do not drive, use machinery, or do anything that needs mental alertness until you know how this drug affects you. Do not stand or sit up quickly, especially if you are an older patient. This reduces the risk of dizzy or fainting spells. Alcohol can make you more drowsy and dizzy. Avoid alcoholic drinks.  NOTE:This sheet is a summary. It may not cover all possible information. If you have questions about this medicine, talk to your doctor, pharmacist, or health care provider. Copyright© 2017 [employer]

## 2019-01-09 NOTE — PROGRESS NOTES
"Psychiatry Initial Visit (PhD/LCSW)  Diagnostic Interview - CPT 92793    Date: 1/9/2019    Site: [facility]    Referral source: Karine Olmos MD    Clinical status of patient: Outpatient    Ana Maria Teague, a 58 y.o. female, for initial evaluation visit.  Met with patient.    Chief complaint/reason for encounter: anxiety and depression    History of present illness: Patient reported experiencing depression and anxiety for one year.  Symptoms include depressed mood, tearfulness, sleep disturbance, excessive worry, diminished interest in previously pleasurable activities, concentration problems, restlessness, irritability, and social isolation.  Patient denied current suicidal and homicidal ideation.  Patient reported that her symptoms have been intensified by her relationship with her estranged son.  Patient indicated that she is worried about her son and his wellbeing since he refuses to take his medication, has been aggressive toward patient's , and has distanced himself from patient at times.  Patient also reported that she is the "strong one" in her family and she tends to take on a caregiver/protector role for multiple members of her family at various times (father, middle sister, , and granddaughter).    Pain: noncontributory    Symptoms:   · Mood: depressed mood, diminished interest, insomnia, poor concentration, social isolation, and tearfulness  · Anxiety: excessive anxiety/worry, restlessness/keyed up and irritability  · Substance abuse: denied  · Cognitive functioning: denied  · Health behaviors: noncontributory    Psychiatric history: psychotropic management by PCP    Medical history: noncontributory - kidney transplant in 2013    Family history of psychiatric illness: Bipolar Disorder - Son and Maternal Cousin, Schizophrenia - Son    Social history (marriage, employment, etc.): Patient presented casually dressed, alert, and oriented.  Patient reported that she grew up in [place], [place]" with her father and sisters.  She noted that her mother  when she was [age] years old.  Patient is the youngest of three children born to her parents.  She has one step sister and one step brother.  She noted that her sisters helped raise her until her father remarried.  She noted having good relationships with her sisters.  Her father remarried when she was about 12 years old.  She noted that she and her stepmother had a good relationship later in life.  She reports having a good relationship with her father.  Patient reported that she has been  to her  since age 16.  She noted that she has a good relationship with her .  She has three biological children (two sons and one daughter).  Patient noted that she also raised her stepdaughter and two of her children.  She reported that she has a strained relationship with her younger son due to his mental health conditions, but good relationships with her other children.  Patient noted that she has been estranged from her younger son since he starting exhibiting behavioral problems years ago.  She has assisted her son in receiving  mental health treatment, but he refuses to take his medication.  She  stopped attending school in the 11 th grade, but she has a GED.  She has some college experience.  She worked at [employer] as a  for 15 years before having to retire when she went on dialysis.        Substance use:   Alcohol: infrequent   Drugs: Marijuana - started age 14 years and stopped 20 years, 3-4 times per week   Tobacco: Cigarettes - started age 14, stopped in her 30's   Caffeine: Cokes - occasional    Current medications and drug reactions (include OTC, herbal): see medication list     Strengths and liabilities: Strength: Patient accepts guidance/feedback, Strength: Patient is expressive/articulate., Liability: Patient lacks coping skills.    Current Evaluation:     Mental Status Exam:  General Appearance:  unremarkable, age  appropriate   Speech: normal tone, normal rate, normal pitch, normal volume      Level of Cooperation: cooperative      Thought Processes: normal and logical   Mood: anxious, depressed      Thought Content: normal, no suicidality, no homicidality, delusions, or paranoia   Affect: congruent and appropriate   Orientation: Oriented x3   Fund of General Knowledge: intact and appropriate to age and level of education   Judgment & Insight: fair     Language  intact     Diagnostic Impression - Plan:       ICD-10-CM ICD-9-CM   1. MDD (major depressive disorder), recurrent episode, mild F33.0 296.31   2. CEDRICK (generalized anxiety disorder) F41.1 300.02       Plan:individual psychotherapy and consult psychiatrist for medication evaluation    Return to Clinic: 1 week      Length of Service (minutes): 45

## 2019-01-28 ENCOUNTER — OFFICE VISIT (OUTPATIENT)
Dept: PAIN MEDICINE | Facility: CLINIC | Age: 59
End: 2019-01-28
Payer: MEDICARE

## 2019-01-28 VITALS
HEART RATE: 68 BPM | SYSTOLIC BLOOD PRESSURE: 157 MMHG | HEIGHT: 67 IN | BODY MASS INDEX: 37.51 KG/M2 | WEIGHT: 239 LBS | DIASTOLIC BLOOD PRESSURE: 99 MMHG | RESPIRATION RATE: 18 BRPM

## 2019-01-28 DIAGNOSIS — M47.816 LUMBAR FACET ARTHROPATHY: ICD-10-CM

## 2019-01-28 DIAGNOSIS — M51.36 DDD (DEGENERATIVE DISC DISEASE), LUMBAR: ICD-10-CM

## 2019-01-28 DIAGNOSIS — M53.3 SACROILIAC JOINT PAIN: ICD-10-CM

## 2019-01-28 DIAGNOSIS — M54.16 LEFT LUMBAR RADICULOPATHY: ICD-10-CM

## 2019-01-28 DIAGNOSIS — M47.816 LUMBAR SPONDYLOSIS: Primary | ICD-10-CM

## 2019-01-28 PROCEDURE — 99214 PR OFFICE/OUTPT VISIT, EST, LEVL IV, 30-39 MIN: ICD-10-PCS | Mod: S$PBB,,, | Performed by: PHYSICIAN ASSISTANT

## 2019-01-28 PROCEDURE — 99214 OFFICE O/P EST MOD 30 MIN: CPT | Mod: S$PBB,,, | Performed by: PHYSICIAN ASSISTANT

## 2019-01-28 PROCEDURE — 99999 PR PBB SHADOW E&M-EST. PATIENT-LVL IV: CPT | Mod: PBBFAC,,, | Performed by: PHYSICIAN ASSISTANT

## 2019-01-28 PROCEDURE — 99999 PR PBB SHADOW E&M-EST. PATIENT-LVL IV: ICD-10-PCS | Mod: PBBFAC,,, | Performed by: PHYSICIAN ASSISTANT

## 2019-01-28 PROCEDURE — 99214 OFFICE O/P EST MOD 30 MIN: CPT | Mod: PBBFAC,PN | Performed by: PHYSICIAN ASSISTANT

## 2019-01-28 RX ORDER — GABAPENTIN 300 MG/1
CAPSULE ORAL
Qty: 60 CAPSULE | Refills: 1 | Status: SHIPPED | OUTPATIENT
Start: 2019-01-28 | End: 2019-04-22 | Stop reason: SDUPTHER

## 2019-01-28 RX ORDER — OXYCODONE AND ACETAMINOPHEN 10; 325 MG/1; MG/1
1 TABLET ORAL EVERY 8 HOURS PRN
Qty: 90 TABLET | Refills: 0 | Status: SHIPPED | OUTPATIENT
Start: 2019-03-23 | End: 2019-04-22 | Stop reason: SDUPTHER

## 2019-01-28 RX ORDER — OXYCODONE AND ACETAMINOPHEN 10; 325 MG/1; MG/1
1 TABLET ORAL EVERY 8 HOURS PRN
Qty: 90 TABLET | Refills: 0 | Status: SHIPPED | OUTPATIENT
Start: 2019-02-21 | End: 2019-02-13 | Stop reason: SDUPTHER
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
## 2019-02-07 ENCOUNTER — OFFICE VISIT (OUTPATIENT)
Dept: PSYCHIATRY | Facility: CLINIC | Age: 59
End: 2019-02-07
Payer: MEDICARE

## 2019-02-07 VITALS
SYSTOLIC BLOOD PRESSURE: 144 MMHG | HEART RATE: 84 BPM | BODY MASS INDEX: 37.36 KG/M2 | WEIGHT: 238.56 LBS | DIASTOLIC BLOOD PRESSURE: 92 MMHG

## 2019-02-07 DIAGNOSIS — F41.1 ANXIETY AS ACUTE REACTION TO EXCEPTIONAL STRESS: Primary | ICD-10-CM

## 2019-02-07 DIAGNOSIS — F43.0 ANXIETY AS ACUTE REACTION TO EXCEPTIONAL STRESS: Primary | ICD-10-CM

## 2019-02-07 PROCEDURE — 99213 OFFICE O/P EST LOW 20 MIN: CPT | Mod: PBBFAC,PN | Performed by: PSYCHIATRY & NEUROLOGY

## 2019-02-07 PROCEDURE — 99999 PR PBB SHADOW E&M-EST. PATIENT-LVL III: ICD-10-PCS | Mod: PBBFAC,,, | Performed by: PSYCHIATRY & NEUROLOGY

## 2019-02-07 PROCEDURE — 99213 OFFICE O/P EST LOW 20 MIN: CPT | Mod: S$PBB,,, | Performed by: PSYCHIATRY & NEUROLOGY

## 2019-02-07 PROCEDURE — 99213 PR OFFICE/OUTPT VISIT, EST, LEVL III, 20-29 MIN: ICD-10-PCS | Mod: S$PBB,,, | Performed by: PSYCHIATRY & NEUROLOGY

## 2019-02-07 PROCEDURE — 99999 PR PBB SHADOW E&M-EST. PATIENT-LVL III: CPT | Mod: PBBFAC,,, | Performed by: PSYCHIATRY & NEUROLOGY

## 2019-02-07 RX ORDER — HYDROXYZINE HYDROCHLORIDE 25 MG/1
25 TABLET, FILM COATED ORAL 2 TIMES DAILY PRN
Qty: 60 TABLET | Refills: 1 | Status: SHIPPED | OUTPATIENT
Start: 2019-02-07 | End: 2019-02-21

## 2019-02-07 RX ORDER — LORAZEPAM 1 MG/1
1 TABLET ORAL DAILY PRN
Qty: 30 TABLET | Refills: 0 | Status: SHIPPED | OUTPATIENT
Start: 2019-02-07 | End: 2020-06-25 | Stop reason: SDUPTHER

## 2019-02-07 RX ORDER — VENLAFAXINE HYDROCHLORIDE 37.5 MG/1
37.5 CAPSULE, EXTENDED RELEASE ORAL DAILY
Qty: 30 CAPSULE | Refills: 1 | Status: SHIPPED | OUTPATIENT
Start: 2019-02-07 | End: 2019-04-26

## 2019-02-07 NOTE — PROGRESS NOTES
ESTABLISHED OUTPATIENT VISIT   E/M LEVEL 3: [id number]    ENCOUNTER DATE: 2/13/2019  SITE: [facility] [place]      HISTORY    CHIEF COMPLAINT   Ana Maria Teague is a 58 y.o. female who presents for follow-up of management for  anxiety     HPI     Continues to have pains and continues to have problems with hip and knee. Other than that she is able to functioning and getting up and cooking and taking care of her family and new grand baby. Every now and then she will get into a mood where she wants to take a break from the responsibilities of being the primary care taker for her family, but overall she feels that she functions well.  The anxiety is still there, because it is mainly associated with her son not taking his medication and continuing to exhibit erratic behavior. He continues to live on the family property, and this is distressing for her because she worries that he will hurt or become aggressive toward other family members. She says that she begins to worry and has restlessness, some palpitations and rumination about the possiblity of an escalation between her son and the other family members. She uses ativan at these times in order to calm down and be able to handle the situation   She denies any anhedonia, she denies changes in appetite, or depressed mood. No suicidal thoughts. No feelings of worthlessness or hopelessness.     ROS   Psychological ROS: positive for - anxiety and irritability  negative for - depression or suicidal ideation, no homicidal ideation    PFSH  Past Medical History reviewed: Yes  Family History reviewed: Yes  Social History reviewed: Yes  Neurological History:  Seizures:  no  Head Trauma:  no        Past Psychiatric History:   Previous Psychiatric Hospitalizations: no   Previous SI/HI: no  Previous Suicide Attempts: no   Previous Medication Trials: Zoloft, Prozac, wellbutrin, lexapro; these medications were tried with subpar results, currently on Ativan  Psychiatric Care  (current & past): None Before   History of Psychotherapy: patient currently sees Dr. Aly, but has not seen her recently        SOCIAL HISTORY:  Developmental/Childhood: good childhood, normal development  History of Physical/Sexual Abuse: no  Education: 11th Grade     Financial: financially secure   Relationship Status/Sexual Orientation:    Children: 3 biological, 1 adopted daughter   Housing Status: lives with   Church: [employer]   History: none   Recreational Activities: family oriented activities   Access to Gun: no     Substance Abuse History:   No history of substance abuse  Tobacco:   Quit 20 years ago     Allergies:   Review of patient's allergies indicates:   Allergen Reactions    Azithromycin Nausea And Vomiting    Adhesive Other (See Comments)     Skin tears        PSYCHOTROPIC MEDICATIONS   Current Outpatient Medications on File Prior to Visit   Medication Sig Dispense Refill    albuterol 90 mcg/actuation inhaler Inhale 2 puffs into the lungs.      albuterol-ipratropium 2.5mg-0.5mg/3mL (DUO-NEB) 0.5 mg-3 mg(2.5 mg base)/3 mL nebulizer solution Take 3 mLs by nebulization 3 (three) times daily. 270 mL 5    atorvastatin (LIPITOR) 20 MG tablet Take 1 tablet (20 mg total) by mouth once daily. 30 tablet 11    blood sugar diagnostic (FREESTYLE LITE STRIPS) Strp AS DIRECTED 100 strip 0    cetirizine (ZYRTEC) 10 MG tablet Take 1 tablet (10 mg total) by mouth once daily. 30 tablet 11    cyclobenzaprine (FLEXERIL) 10 MG tablet Take 1 tablet (10 mg total) by mouth 3 (three) times daily as needed for Muscle spasms. 30 tablet 1    ergocalciferol (ERGOCALCIFEROL) 50,000 unit Cap Take 50,000 Units by mouth.      furosemide (LASIX) 20 MG tablet TAKE 1 TABLET BY MOUTH EVERY DAY AS NEEDED 90 tablet 11    gabapentin (NEURONTIN) 300 MG capsule Take 1-2 capsules by mouth every night 60 capsule 1    insulin glargine (LANTUS SOLOSTAR) 100 unit/mL (3 mL) InPn pen Inject 50 Units into the  "skin once daily. 15 mL 6    insulin lispro (HUMALOG KWIKPEN) 100 unit/mL InPn pen Titrate up to 18 units subcutaneously three times a day 10-15 min before meals as directed in after visit summary. 45 mL 3    lancets (FREESTYLE LANCETS) 28 gauge Misc 1 lancet by Combination route 2 (two) times daily as needed. Qid prn 400 each 2    lancets Misc 1 strip by Misc.(Non-Drug; Combo Route) route 4 (four) times daily with meals and nightly. 150 each 6    levothyroxine (SYNTHROID) 150 MCG tablet Take 1 tablet (150 mcg total) by mouth before breakfast. 90 tablet 3    mupirocin (BACTROBAN) 2 % ointment Apply topically 3 (three) times daily. 30 g 0    mycophenolate (CELLCEPT) 250 mg Cap TAKE (2) CAPSULES TWICE DAILY. 120 capsule 11    NIFEdipine (PROCARDIA-XL) 30 MG (OSM) 24 hr tablet TAKE 1 TABLET BY MOUTH EVERY DAY 30 tablet 9    ondansetron (ZOFRAN) 4 MG tablet Take 1 tablet (4 mg total) by mouth every 12 (twelve) hours as needed for Nausea. 40 tablet 4    oxyCODONE-acetaminophen (PERCOCET)  mg per tablet Take 1 tablet by mouth.      [START ON 3/23/2019] oxyCODONE-acetaminophen (PERCOCET)  mg per tablet Take 1 tablet by mouth every 8 (eight) hours as needed for Pain. 90 tablet 0    pen needle, diabetic (BD ULTRA-FINE RORY PEN NEEDLE) 32 gauge x 5/32" Ndle USE WITH INSULIN PEN FOUR TIMES DAILY 100 each 0    promethazine (PHENERGAN) 25 MG tablet Take 1 tablet (25 mg total) by mouth every 6 (six) hours as needed for Nausea. 14 tablet 0    tacrolimus (PROGRAF) 1 MG Cap TAKE 5 CAPSULES IN THE MORNING, AND 4 IN THE EVENING 270 capsule 11    zolpidem (AMBIEN) 5 MG Tab Take 1 tablet (5 mg total) by mouth nightly as needed. 30 tablet 1    [DISCONTINUED] oxyCODONE-acetaminophen (PERCOCET)  mg per tablet Take 1 tablet by mouth every 8 (eight) hours as needed for Pain. 90 tablet 0     No current facility-administered medications on file prior to visit.        EXAMINATION    [unfilled]  Vitals:    02/07/19 " 1107   BP: (!) 144/92   Pulse: 84     Wt Readings from Last 1 Encounters:   02/13/19 108.9 kg (240 lb 1.3 oz)       Constitutional  General:   Well groomed, age appropriate      Musculoskeletal  Gait & Station:   non ataxic      Psychiatric  Speech:   clear and coherent, regular rate and rhythm   Mood & Affect:  Mood: fair Affect: Bright, does appear anxious when discussing her son   Thought Process:  Linear, logical, goal directed   Thought Content:  No delusions, no hallucinations, no si no hi   Insight:  fair   Judgement: intact   Orientation:  Oriented to time, place, person, and situation   Memory: Intact for both recent and remote recall   Language: Intact   Attention Span & Concentration:  Intact to conversation. Capable of doing days of the week backwards   Fund of Knowledge:  Adequate for age and education level       MEDICAL DECISION MAKING    DIAGNOSES  Encounter Diagnosis   Name Primary?    Anxiety as acute reaction to exceptional stress Yes         PROBLEM LIST AND MANAGEMENT PLANS    -Educated the patient about the dependence on ativan, the contraindication with opiates and the mechanism of action of medication. Patient is very hesitant about getting of the medication, because she has had a negative experience when she was on other medications.     -Plan:  -May continue to use Ativan and provided a refill, but patient has agreed to start other type of medication management as she understands that Ativan cannot be a long term solution.   -Will start Effexor XR AT 37.5 mg. Discussed discontinuation syndrome and other risks of medication  -Patient will also begin to use Hydroxyzine as needed for anxiety in order to rely less on Ativan. We discussed the risk of withdrawal seizures if ativan is abruptly stopped.   -Urged patient to resume therapy with Dr. Aly    PRESCRIPTION DRUG MANAGEMENT  Compliance: yes  Side Effects: none  Regimen Adjustments: Started Effexor, Started Hydroxyzine.       DIAGNOSTIC  TESTING  none    Joslyn Odom

## 2019-02-07 NOTE — PATIENT INSTRUCTIONS
Benzodiazapine Withdrawal: Clonazepam, Xanax, Valium, etc.  Any abrupt or overly rapid reduction in benzodiazepine (BZD) dose among chronic users can produce withdrawal. Rapid recognition and treatment of BZD withdrawal is crucial because the syndrome can be life-threatening. The symptoms and signs of BZD withdrawal can include any of the following  ?Tremors  ?Anxiety  ?Perceptual disturbances  ?Dysphoria  ?Psychosis  ?Seizures     Resources:  · National Institutes of Mental Rojljx084-706-6900pea.[facility].nih.gov  · National [place] on Mental Bhifkas433-324-3904xvg.rudy.org   · Mental Health Ygrycjh394-911-1765eba.nmha.org  · National Suicide Wurgnar061-162-2428 (800-SUICIDE)  · National Suicide Prevention Vnhniuvy411-282-3279 (080-699-HWRT)www.suicidepreventionlifeline.org     Are you having suicidal thoughts?  You may be feeling helpless, hopeless, and that you cant go on. You may even have thoughts of suicide. But help is available. There are ways to ease this pain and manage the problems in your life.  If you are thinking about harming or killing yourself, please tell your healthcare provider or someone you care about immediately or go to the nearest crisis walk-in center or emergency room.  You can also call, toll-free,  · 800-SUICIDE (944-008-5347)   · 532-948-RZBO ([phone])

## 2019-02-07 NOTE — PROGRESS NOTES
"Subjective:      Patient ID: Ana Maria Teague is a 58 y.o. female.    Chief Complaint: Follow-up (2 month)      HPI  Here for follow up of medical problems.  Having burning left arm pain, into 4th/5th digits.  Having lots nasal drainage, stopped zyrtec because ran out.  Seeing Ory now, with changes in insulin dose.  Saw Psychiatrist, not tolerating venlafaxine due to nausea.  The nasal drainage is making her have wheezing, using albuterol more lately.  No f/c.  No cp/palp.    Updated/ annual due 6/19:  HM: 11/16 fluvax, 6/15 [id number], 3/14 [id number], 6/15 TDaP, 7/18 MMG, 7/18 BMD with OP needs treatment rep 2y, 1/11 Cscope rep 5-10y, 11/10 HCV neg, Pain DrKiko Anaya, 8/18 Eye Dr. Martin.      Review of Systems   Constitutional: Negative for chills, diaphoresis and fever.   Respiratory: Negative for cough and shortness of breath.    Cardiovascular: Negative for chest pain, palpitations and leg swelling.   Gastrointestinal: Negative for blood in stool, constipation, diarrhea, nausea and vomiting.   Genitourinary: Negative for dysuria, frequency and hematuria.   Psychiatric/Behavioral: The patient is not nervous/anxious.          Objective:   /78 (BP Location: Right arm, Patient Position: Sitting, BP Method: Large (Manual))   Pulse 98   Temp 97.9 °F (36.6 °C) (Tympanic)   Ht 5' 7" (1.702 m)   Wt 108 kg (238 lb 1.6 oz)   SpO2 97%   BMI 37.29 kg/m²     Physical Exam   Constitutional: She is oriented to person, place, and time. She appears well-developed.   HENT:   Right Ear: External ear normal. Tympanic membrane is not injected.   Left Ear: External ear normal. Tympanic membrane is not injected.   Mouth/Throat: Oropharynx is clear and moist.   Eyes: Conjunctivae are normal.   Neck: Neck supple. Carotid bruit is not present. No thyroid mass and no thyromegaly present.   Cardiovascular: Normal rate, regular rhythm and intact distal pulses. Exam reveals no gallop and no friction rub.   No murmur heard.  Pulmonary/Chest: " Effort normal and breath sounds normal. She has no wheezes. She has no rales.   Abdominal: Soft. Bowel sounds are normal. She exhibits no mass. There is no hepatosplenomegaly. There is no tenderness.   Musculoskeletal: She exhibits no edema.   Lymphadenopathy:     She has no cervical adenopathy.   Neurological: She is alert and oriented to person, place, and time.   Psychiatric: She has a normal mood and affect.           Assessment:       1. Left arm pain    2. Anxiety and depression    3. Hypertension associated with diabetes    4. Uncontrolled type 2 diabetes mellitus with stage 3 chronic kidney disease, with long-term current use of insulin    5. Seasonal allergic rhinitis due to pollen    6. Simple chronic bronchitis    7. Preventive measure    8. Vitamin D deficiency          Plan:     Left arm pain  -     Ambulatory consult to Physical Medicine Rehab    Anxiety and depression- needs f/u with Psych, not tolerating effexor.    Hypertension associated with diabetes- stable, cont rx.    Uncontrolled type 2 diabetes mellitus with stage 3 chronic kidney disease, with long-term current use of insulin- cont with [facility]ry, check lab 4mo.    Seasonal allergic rhinitis due to pollen worsening Simple chronic bronchitis- take flonase daily, call if not better 1wk.  -     fluticasone (FLONASE) 50 mcg/actuation nasal spray; 2 sprays (100 mcg total) by Each Nare route once daily.  Dispense: 16 g; Refill: 6    Preventive measure- due in 4mo.  -     CBC auto differential; Future; Expected date: 02/21/2019  -     Comprehensive metabolic panel; Future; Expected date: 02/21/2019  -     Lipid panel; Future; Expected date: 02/21/2019  -     TSH; Future; Expected date: 02/21/2019  -     Hemoglobin A1c; Future; Expected date: 02/21/2019  -     Microalbumin/creatinine urine ratio; Future; Expected date: 02/21/2019

## 2019-02-13 ENCOUNTER — OFFICE VISIT (OUTPATIENT)
Dept: DIABETES | Facility: CLINIC | Age: 59
End: 2019-02-13
Payer: MEDICARE

## 2019-02-13 VITALS
WEIGHT: 240.06 LBS | DIASTOLIC BLOOD PRESSURE: 82 MMHG | HEIGHT: 67 IN | SYSTOLIC BLOOD PRESSURE: 130 MMHG | BODY MASS INDEX: 37.68 KG/M2

## 2019-02-13 DIAGNOSIS — I15.2 HYPERTENSION ASSOCIATED WITH DIABETES: ICD-10-CM

## 2019-02-13 DIAGNOSIS — E11.69 HYPERLIPIDEMIA ASSOCIATED WITH TYPE 2 DIABETES MELLITUS: ICD-10-CM

## 2019-02-13 DIAGNOSIS — E78.5 HYPERLIPIDEMIA ASSOCIATED WITH TYPE 2 DIABETES MELLITUS: ICD-10-CM

## 2019-02-13 DIAGNOSIS — E03.9 ACQUIRED HYPOTHYROIDISM: ICD-10-CM

## 2019-02-13 DIAGNOSIS — E11.59 HYPERTENSION ASSOCIATED WITH DIABETES: ICD-10-CM

## 2019-02-13 LAB — GLUCOSE SERPL-MCNC: 192 MG/DL (ref 70–110)

## 2019-02-13 PROCEDURE — 99214 OFFICE O/P EST MOD 30 MIN: CPT | Mod: S$PBB,,, | Performed by: PHYSICIAN ASSISTANT

## 2019-02-13 PROCEDURE — 99999 PR PBB SHADOW E&M-EST. PATIENT-LVL III: CPT | Mod: PBBFAC,,, | Performed by: PHYSICIAN ASSISTANT

## 2019-02-13 PROCEDURE — 99999 PR PBB SHADOW E&M-EST. PATIENT-LVL III: ICD-10-PCS | Mod: PBBFAC,,, | Performed by: PHYSICIAN ASSISTANT

## 2019-02-13 PROCEDURE — 99213 OFFICE O/P EST LOW 20 MIN: CPT | Mod: PBBFAC,PN | Performed by: PHYSICIAN ASSISTANT

## 2019-02-13 PROCEDURE — 99214 PR OFFICE/OUTPT VISIT, EST, LEVL IV, 30-39 MIN: ICD-10-PCS | Mod: S$PBB,,, | Performed by: PHYSICIAN ASSISTANT

## 2019-02-13 PROCEDURE — 82962 GLUCOSE BLOOD TEST: CPT | Mod: PBBFAC,PN | Performed by: PHYSICIAN ASSISTANT

## 2019-02-13 NOTE — PROGRESS NOTES
PCP: Karine Fernandez MD    Subjective:      HISTORY OF PRESENT ILLNESS:  Ana Maria Teague is a pleasant 58 y.o. female presenting to follow up on diabetes mellitus. Also, pertinent to this visit in decision making is hypertension, hyperlipidemia, and adherence. She has Since that time she has been in her usual state of health without significant hyperglycemia or hypoglycemia. She has been checking her blood glucose regularly twice daily, fasting and before supper. She has had diabetes for 5 or more years. Her last visit in Diabetes Management was 2017. Since that time she has had moderate improvement in her glycemia with A1c of 7.5%. Blood glucose testing is performed sporadically. Per recall, fasting BGs are 174-243, post meal blood sugars are 174-224. She denies polyuria, polydipsia, polyphagia, or blurred vision. Also denies nausea, vomiting, diarrhea, fever or hypoglycemic symptoms. She has not had any recent hospitalizations or significant hypoglycemia involving EMS or requiring assistance from others.  Her current concerns are glycemic control.    She denies any hospital admissions, emergency room visits, hypoglycemia, syncope, diaphoresis, chest pain, or dyspnea.    She  has lost 3 pounds since last visit. 37.60 kg/m²   Lab Results   Component Value Date    POCGLU 192 (A) 2019     Lab Results   Component Value Date    HGBA1C 7.5 (H) 2018    ADA recommends less than 7.0.         Past Medical History:   Diagnosis Date    Abnormal glucose 2013    Acquired hypothyroidism     Acute bronchiolitis 10/15/2014    Anemia     Anemia of chronic renal failure 2013    Anxiety     Anxiety disorder     Avascular necrosis of bone of hip     Back pain     s/p steroid injections    Chronic immunosuppression with Prograf and Cellcept 2013    CKD (chronic kidney disease) stage 2, GFR 60-89 ml/min     CKD (chronic kidney disease) stage 5, GFR less than 15 ml/min     -donor  kidney transplant - 13    Depression     Hypertension, renal 2013    Hypothyroidism     Immunosuppressed status 10/15/2014    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    Osteoporosis with pathological fracture     Renal disease due to hypertension 2013    Renal hypertension, non-vascular     Secondary hyperparathyroidism of renal origin 2013    Vertigo      Family History   Problem Relation Age of Onset    Diabetes Mother     Kidney disease Mother     Hyperlipidemia Mother     Hypertension Mother     Heart disease Mother     Arthritis Mother     Hypertension Father     Stroke Father     Hypertension Sister     Arthritis Sister     Asthma Sister     Heart disease Sister         heart attack     Social History     Socioeconomic History    Marital status:      Spouse name: Not on file    Number of children: 3    Years of education: Not on file    Highest education level: Not on file   Social Needs    Financial resource strain: Not on file    Food insecurity - worry: Not on file    Food insecurity - inability: Not on file    Transportation needs - medical: Not on file    Transportation needs - non-medical: Not on file   Occupational History    Occupation: disable     Comment: dept manager at [employer]   Tobacco Use    Smoking status: Former Smoker     Packs/day: 1.00     Years: 10.00     Pack years: 10.00     Types: Cigarettes     Last attempt to quit: 2002     Years since quittin.2    Smokeless tobacco: Never Used    Tobacco comment: quit smoking approx 10-15 years ago    Substance and Sexual Activity    Alcohol use: No    Drug use: No    Sexual activity: No     Partners: Male   Other Topics Concern    Not on file   Social History Narrative    Does housework at home.     DM MEDICATIONS:   Current Outpatient Medications:     albuterol 90 mcg/actuation inhaler, Inhale 2 puffs into the lungs., Disp: , Rfl:      albuterol-ipratropium 2.5mg-0.5mg/3mL (DUO-NEB) 0.5 mg-3 mg(2.5 mg base)/3 mL nebulizer solution, Take 3 mLs by nebulization 3 (three) times daily., Disp: 270 mL, Rfl: 5    atorvastatin (LIPITOR) 20 MG tablet, Take 1 tablet (20 mg total) by mouth once daily., Disp: 30 tablet, Rfl: 11    blood sugar diagnostic (FREESTYLE LITE STRIPS) Strp, AS DIRECTED, Disp: 100 strip, Rfl: 0    cetirizine (ZYRTEC) 10 MG tablet, Take 1 tablet (10 mg total) by mouth once daily., Disp: 30 tablet, Rfl: 11    cyclobenzaprine (FLEXERIL) 10 MG tablet, Take 1 tablet (10 mg total) by mouth 3 (three) times daily as needed for Muscle spasms., Disp: 30 tablet, Rfl: 1    ergocalciferol (ERGOCALCIFEROL) 50,000 unit Cap, Take 50,000 Units by mouth., Disp: , Rfl:     gabapentin (NEURONTIN) 300 MG capsule, Take 1-2 capsules by mouth every night, Disp: 60 capsule, Rfl: 1    insulin glargine (LANTUS SOLOSTAR) 100 unit/mL (3 mL) InPn pen, Inject 50 Units into the skin once daily., Disp: 15 mL, Rfl: 6    insulin lispro (HUMALOG KWIKPEN) 100 unit/mL InPn pen, Titrate up to 18 units subcutaneously three times a day 10-15 min before meals as directed in after visit summary., Disp: 45 mL, Rfl: 3    lancets (FREESTYLE LANCETS) 28 gauge Misc, 1 lancet by Combination route 2 (two) times daily as needed. Qid prn, Disp: 400 each, Rfl: 2    lancets Misc, 1 strip by Misc.(Non-Drug; Combo Route) route 4 (four) times daily with meals and nightly., Disp: 150 each, Rfl: 6    levothyroxine (SYNTHROID) 150 MCG tablet, Take 1 tablet (150 mcg total) by mouth before breakfast., Disp: 90 tablet, Rfl: 3    LORazepam (ATIVAN) 1 MG tablet, Take 1 tablet (1 mg total) by mouth daily as needed for Anxiety., Disp: 30 tablet, Rfl: 0    mycophenolate (CELLCEPT) 250 mg Cap, TAKE (2) CAPSULES TWICE DAILY., Disp: 120 capsule, Rfl: 11    NIFEdipine (PROCARDIA-XL) 30 MG (OSM) 24 hr tablet, TAKE 1 TABLET BY MOUTH EVERY DAY, Disp: 30 tablet, Rfl: 9    ondansetron (ZOFRAN) 4  "MG tablet, Take 1 tablet (4 mg total) by mouth every 12 (twelve) hours as needed for Nausea., Disp: 40 tablet, Rfl: 4    oxyCODONE-acetaminophen (PERCOCET)  mg per tablet, Take 1 tablet by mouth., Disp: , Rfl:     [START ON 3/23/2019] oxyCODONE-acetaminophen (PERCOCET)  mg per tablet, Take 1 tablet by mouth every 8 (eight) hours as needed for Pain., Disp: 90 tablet, Rfl: 0    pen needle, diabetic (BD ULTRA-FINE RORY PEN NEEDLE) 32 gauge x 5/32" Ndle, USE WITH INSULIN PEN FOUR TIMES DAILY, Disp: 100 each, Rfl: 0    promethazine (PHENERGAN) 25 MG tablet, Take 1 tablet (25 mg total) by mouth every 6 (six) hours as needed for Nausea., Disp: 14 tablet, Rfl: 0    tacrolimus (PROGRAF) 1 MG Cap, TAKE 5 CAPSULES IN THE MORNING, AND 4 IN THE EVENING, Disp: 270 capsule, Rfl: 11    venlafaxine (EFFEXOR-XR) 37.5 MG 24 hr capsule, Take 1 capsule (37.5 mg total) by mouth once daily., Disp: 30 capsule, Rfl: 1    zolpidem (AMBIEN) 5 MG Tab, Take 1 tablet (5 mg total) by mouth nightly as needed., Disp: 30 tablet, Rfl: 1    fluticasone (FLONASE) 50 mcg/actuation nasal spray, 2 sprays (100 mcg total) by Each Nare route once daily., Disp: 16 g, Rfl: 6    Ana Maria is compliant most of the time with DM medications.     Ana Maria is compliant most of the time with lifestyle modifications to include activity and meal planning.     Labs Reviewed.  Lab Results   Component Value Date    WBC 10.53 04/16/2018    HGB 12.0 04/16/2018    HCT 38.1 04/16/2018     04/16/2018    CHOL 172 06/08/2018    TRIG 120 06/08/2018    HDL 46 06/08/2018    ALT 11 04/16/2018    AST 11 04/16/2018     08/14/2018    K 3.8 08/14/2018     08/14/2018    CREATININE 0.9 08/14/2018    ESTGFRAFRICA >60.0 08/14/2018    EGFRNONAA >60.0 08/14/2018    BUN 9 08/14/2018    CO2 29 08/14/2018    TSH 2.766 12/21/2018    INR 1.0 09/09/2015    GLU 95 08/14/2018       Lab Results   Component Value Date    HGBA1C 7.5 (H) 12/21/2018    HGBA1C 10.1 (H) " 06/08/2018    HGBA1C 7.5 (H) 09/06/2017       Lab Results   Component Value Date    FREET4 1.12 06/08/2018     Lab Results   Component Value Date    TSH 2.766 12/21/2018     Lab Results   Component Value Date    IRON 83 10/02/2013    TIBC 232 (L) 10/02/2013    FERRITIN 2,507 (H) 10/02/2013     Lab Results   Component Value Date    PTH 77.0 08/14/2018    CALCIUM 9.7 08/14/2018    CAION 1.17 10/08/2013    PHOS 2.9 08/14/2018       Review of Systems   Constitutional: Negative.  Negative for activity change, appetite change, chills, diaphoresis, fatigue, fever and unexpected weight change.   HENT: Negative.  Negative for congestion, dental problem, drooling, ear discharge, ear pain, facial swelling, hearing loss, mouth sores, nosebleeds, postnasal drip, rhinorrhea, sinus pressure, sneezing, sore throat, tinnitus, trouble swallowing and voice change.    Eyes: Negative.  Negative for photophobia, pain, discharge, redness, itching and visual disturbance.   Respiratory: Negative.  Negative for apnea, cough, choking, chest tightness, shortness of breath, wheezing and stridor.    Cardiovascular: Negative.  Negative for chest pain, palpitations and leg swelling.   Gastrointestinal: Negative.  Negative for abdominal distention, abdominal pain, anal bleeding, blood in stool, constipation, diarrhea, nausea, rectal pain and vomiting.   Endocrine: Negative.  Negative for cold intolerance, heat intolerance, polydipsia, polyphagia and polyuria.   Genitourinary: Negative.  Negative for decreased urine volume, difficulty urinating, dyspareunia, dysuria, enuresis, flank pain, frequency, genital sores, hematuria, menstrual problem, pelvic pain, urgency, vaginal bleeding, vaginal discharge and vaginal pain.   Musculoskeletal: Negative.  Negative for arthralgias, back pain, gait problem, joint swelling, myalgias, neck pain and neck stiffness.   Skin: Negative.  Negative for color change, pallor, rash and wound.   Allergic/Immunologic:  "Negative.  Negative for environmental allergies, food allergies and immunocompromised state.   Neurological: Negative.  Negative for dizziness, tremors, seizures, syncope, facial asymmetry, speech difficulty, weakness, light-headedness, numbness and headaches.   Hematological: Negative.  Negative for adenopathy. Does not bruise/bleed easily.   Psychiatric/Behavioral: Negative.  Negative for agitation, behavioral problems, confusion, decreased concentration, dysphoric mood, hallucinations, self-injury, sleep disturbance and suicidal ideas. The patient is not nervous/anxious and is not hyperactive.        Objective:   Last 3 sets of Vitals:   Vitals - 1 value per visit 1/28/2019 2/13/2019 2/21/2019   SYSTOLIC 157 130 130   DIASTOLIC 99 82 78   PULSE 68 - 98   TEMPERATURE - - 97.9   RESPIRATIONS 18 - -   SPO2 - - 97   Weight (lb) 239 240.08 238.1   Weight (kg) 108.41 108.9 108   HEIGHT 5' 7" 5' 7" 5' 7"   BODY MASS INDEX 37.43 37.6 37.29   VISIT REPORT - - -   Pain Score  6 6 6   Some encounter information is confidential and restricted. Go to Review Flowsheets activity to see all data.   Some recent data might be hidden      Physical Exam   Constitutional: She is oriented to person, place, and time. She appears well-developed and well-nourished. She is cooperative.  Non-toxic appearance. She does not have a sickly appearance. She does not appear ill. No distress. She is not intubated.   HENT:   Head: Normocephalic and atraumatic. Not macrocephalic and not microcephalic. Head is without raccoon's eyes, without Meredith's sign, without abrasion, without contusion, without laceration, without right periorbital erythema and without left periorbital erythema. Hair is normal.   Right Ear: Hearing, tympanic membrane, external ear and ear canal normal. No lacerations. No drainage, swelling or tenderness. No foreign bodies. No mastoid tenderness. Tympanic membrane is not injected, not scarred, not perforated, not erythematous, " not retracted and not bulging. Tympanic membrane mobility is normal. No middle ear effusion. No hemotympanum. No decreased hearing is noted.   Left Ear: Hearing, tympanic membrane, external ear and ear canal normal. No lacerations. No drainage, swelling or tenderness. No foreign bodies. No mastoid tenderness. Tympanic membrane is not injected, not scarred, not perforated, not erythematous, not retracted and not bulging. Tympanic membrane mobility is normal.  No middle ear effusion. No hemotympanum. No decreased hearing is noted.   Nose: Nose normal. No mucosal edema, rhinorrhea, nose lacerations, sinus tenderness, nasal deformity, septal deviation or nasal septal hematoma. No epistaxis.  No foreign bodies. Right sinus exhibits no maxillary sinus tenderness and no frontal sinus tenderness. Left sinus exhibits no maxillary sinus tenderness and no frontal sinus tenderness.   Mouth/Throat: Oropharynx is clear and moist. No oropharyngeal exudate.   Eyes: Conjunctivae and EOM are normal. Pupils are equal, round, and reactive to light. Right eye exhibits no chemosis, no discharge and no exudate. No foreign body present in the right eye. Left eye exhibits no chemosis, no discharge, no exudate and no hordeolum. No foreign body present in the left eye. Right conjunctiva is not injected. Right conjunctiva has no hemorrhage. Left conjunctiva is not injected. Left conjunctiva has no hemorrhage. No scleral icterus. Right eye exhibits normal extraocular motion and no nystagmus. Left eye exhibits normal extraocular motion and no nystagmus. Right pupil is round and reactive. Left pupil is round and reactive. Pupils are equal.   Neck: Trachea normal, normal range of motion and full passive range of motion without pain. Neck supple. Normal carotid pulses, no hepatojugular reflux and no JVD present. No tracheal tenderness, no spinous process tenderness and no muscular tenderness present. Carotid bruit is not present. No neck rigidity.  No tracheal deviation, no edema, no erythema and normal range of motion present. No thyroid mass and no thyromegaly present.   Cardiovascular: Normal rate, regular rhythm, normal heart sounds and intact distal pulses.  No extrasystoles are present. PMI is not displaced. Exam reveals no gallop, no friction rub and no decreased pulses.   No murmur heard.  Pulses:       Dorsalis pedis pulses are 2+ on the right side, and 2+ on the left side.        Posterior tibial pulses are 2+ on the right side, and 2+ on the left side.   Pulmonary/Chest: Effort normal and breath sounds normal. No accessory muscle usage or stridor. No apnea, no tachypnea and no bradypnea. She is not intubated. No respiratory distress. She has no decreased breath sounds. She has no wheezes. She has no rhonchi. She has no rales. Chest wall is not dull to percussion. She exhibits no mass, no tenderness, no bony tenderness, no laceration, no crepitus, no edema, no deformity, no swelling and no retraction.   Abdominal: Soft. Normal appearance and bowel sounds are normal. She exhibits no shifting dullness, no distension, no pulsatile liver, no fluid wave, no abdominal bruit, no ascites, no pulsatile midline mass and no mass. There is no hepatosplenomegaly, splenomegaly or hepatomegaly. There is no tenderness. There is no rigidity, no rebound, no guarding, no CVA tenderness, no tenderness at McBurney's point and negative Marcelino's sign.   Musculoskeletal: Normal range of motion. She exhibits no edema or tenderness.        Right foot: There is normal range of motion and no deformity.        Left foot: There is normal range of motion and no deformity.   Feet:   Right Foot:   Protective Sensation: 5 sites tested. 5 sites sensed.   Skin Integrity: Negative for ulcer, blister, skin breakdown, erythema, warmth, callus or dry skin.   Left Foot:   Protective Sensation: 5 sites tested. 5 sites sensed.   Skin Integrity: Negative for ulcer, blister, skin breakdown,  erythema, warmth, callus or dry skin.   Lymphadenopathy:        Head (right side): No submental, no submandibular, no tonsillar, no preauricular, no posterior auricular and no occipital adenopathy present.        Head (left side): No submental, no submandibular, no tonsillar, no preauricular, no posterior auricular and no occipital adenopathy present.     She has no cervical adenopathy.        Right cervical: No superficial cervical, no deep cervical and no posterior cervical adenopathy present.       Left cervical: No superficial cervical, no deep cervical and no posterior cervical adenopathy present.     She has no axillary adenopathy.   Neurological: She is alert and oriented to person, place, and time. She has normal reflexes. She is not disoriented. She displays no atrophy, no tremor and normal reflexes. No cranial nerve deficit or sensory deficit. She exhibits normal muscle tone. She displays no seizure activity. Coordination and gait normal.   Reflex Scores:       Bicep reflexes are 2+ on the right side and 2+ on the left side.       Brachioradialis reflexes are 2+ on the right side and 2+ on the left side.       Patellar reflexes are 2+ on the right side and 2+ on the left side.  Skin: Skin is warm and dry. No abrasion, no bruising, no burn, no ecchymosis, no laceration, no lesion, no petechiae, no purpura and no rash noted. Rash is not macular, not papular, not maculopapular, not nodular, not pustular, not vesicular and not urticarial. She is not diaphoretic. No cyanosis or erythema. No pallor. Nails show no clubbing.   Psychiatric: She has a normal mood and affect. Her behavior is normal. Judgment and thought content normal. Her mood appears not anxious. Her affect is not angry, not blunt and not labile. Her speech is not rapid and/or pressured, not delayed, not tangential and not slurred. She is not agitated, not aggressive, not hyperactive, not slowed, not withdrawn, not actively hallucinating and not  combative. Thought content is not paranoid and not delusional. Cognition and memory are not impaired. She does not express impulsivity or inappropriate judgment. She does not exhibit a depressed mood. She expresses no homicidal and no suicidal ideation. She expresses no suicidal plans and no homicidal plans. She is communicative. She exhibits normal recent memory and normal remote memory. She is attentive.   Nursing note and vitals reviewed.        STANDARDS OF CARE:  Eye doctor: Major, last exam Last year need to schedule exam.  Dental exam: Recommend regular exams; denies gums bleeding.  Podiatry doctor:    ACTIVITY LEVEL: She exercises rarely.  BLOOD GLUCOSE TESTING: Self-monitoring with Freestyle  SOCIAL HISTORY: . Lives with spouse. homemaker no tobacco use    Assessment:     1. Uncontrolled type 2 diabetes mellitus with stage 3 chronic kidney disease, with long-term current use of insulin    2. Hypertension associated with diabetes    3. Hyperlipidemia associated with type 2 diabetes mellitus    4. Acquired hypothyroidism      Plan:     Ana Maria Teague is seen today for   1. Uncontrolled type 2 diabetes mellitus with stage 3 chronic kidney disease, with long-term current use of insulin    2. Hypertension associated with diabetes    3. Hyperlipidemia associated with type 2 diabetes mellitus    4. Acquired hypothyroidism      We have discussed the etiology and treatment options associated with the diagnosis as well as alternatives. She has elected the following treatments.     Uncontrolled type 2 diabetes mellitus with stage 3 chronic kidney disease, with long-term current use of insulin  -     POCT Glucose, Hand-Held Device  -     Hemoglobin A1c; Future; Expected date: 03/25/2019    Hypertension associated with diabetes  -     POCT Glucose, Hand-Held Device  -     Hemoglobin A1c; Future; Expected date: 03/25/2019    Hyperlipidemia associated with type 2 diabetes mellitus  -     POCT Glucose, Hand-Held  "Device  -     Hemoglobin A1c; Future; Expected date: 03/25/2019    Acquired hypothyroidism  -     POCT Glucose, Hand-Held Device  -     Hemoglobin A1c; Future; Expected date: 03/25/2019    Uncontrolled type 2 diabetes mellitus without complication, with long-term current use of insulin  -     POCT glucose   - Continue with D&E, reduce portion size, and restrict carbohydrates (no more that 45 grams ) per meal.   - Needs improvement will intensify insulin therapy as outlined below.   - Follow up in 6 weeks  - Will consider CGM in Future      Hypothyroidism due to acquired atrophy of thyroid  -     Stable and controlled. Continue current treatment plan      1.) Patient was instructed to monitor blood glucose twice daily, fasting, post meal and ac dinner or at bedtime. Reminded to bring BG records or meter to each visit for review.  2.) Reviewed pathophysiology of type 2 diabetes, complications related to the disease, importance of annual dilated eye exam and self daily foot examination.  3.) Continue medications as prescribed Lantus; Humalog U-100; . [facility] MyChart or Phone review in 1 week with BG records for adjustment of medication.  4.) Refer patient to Dietician/CDE for ongoing diabetes education, meal planning, carbohydrate counting, and diabetes support.  5.) Discussed activity, benefits, methods, and precautions. Recommended patient start/continue some form of exercise and increase as tolerated to 60 minutes per day to facilitate weight loss and aid in control of BGs. Also reminded patient of WHO recommendation of 10,000 steps daily as a goal.   6.)   7.) Return to clinic in 4-6 weeks for follow up. Advised patient to call clinic with any questions or concerns.     I have reviewed your results and they are still quite high. I would like you to start "Treating to Target". The treatment will be Insulin and your target will be the Fasting and 2 hour post meal blood sugar. It will work in this manner;    1. Goal " "for Fasting blood sugar is  mg/dl. I realize that you will need time to adjust to the new levels and presently you may feel too low if you are too aggressive now. So go slow and aim to lower your blood sugar to below 200 then 150 then 100 over several months.    2. Goal for 2 hour post meal blood sugar is below 180 mg/dl, here the same rules apply as in #1.    3. You will check your fasting blood sugar daily, if not where we would like it to be over a 3 day period then that evening we will increase the Lantus dose by 5 units. Then repeat the process over the next 3 days. Remember this is a slow process and take our time getting to goal. But, each week should be better than prior weeks. Blood sugars below 70 are unacceptable and should raise a "RED FLAG" where we may have to reduce our dose of insulin.    4. You will check your post meal glucose daily as well. However, each day you will check a different meal, (ie. Monday-breakfast; Tuesday- lunch; Wednesday- supper, then repeat). If your post meal glucose is not where we would like, increase pre-meal insulin by 2 units next time. A word of CAUTION: mealtime insulin is dependant on the size and concentration of your meal content. If not consuming a large meal do not take large dose of insulin. Use the reasonable person rule.     5. If you have any questions please do not hesitate to call.    Intensive insulin Therapy with correction factor:    You are on Intensive insulin therapy with Basal and Bolus insulin. Lantus, Levamir or NPH is your Basal insulin and will help maintain your fasting and between meal sugar. Your fast acting or rescue insulin is either Humalog, Novalog or Regular insulin and will control your post meal sugar.     You will Take 50 units of Lantus at 9 pm each night. This will be adjusted up or down depending on your fasting blood sugar before breakfast.    Humalog will follow this pre meal schedule; Correction factor of "2 units per 50 " "mg/dl" and is based on 30-60 grams of carbohydrates per meal.    If blood sugar is below 70 eat first then check your blood sugar 2 hours later and make correction.  If blood pre-meal sugar is  70 -150 take 8 units of Humalog;  If blood pre-meal sugar is 151-200 take +2 units of Humalog;  If blood pre-meal sugar is 201-250 take +4 units of Humalog;  If blood pre-meal sugar is 251-300 take +6 units of Humalog;  If blood pre-meal sugar is 301-350 take +8 units of Humalog;  If blood pre-meal sugar is 351-400+ take +10 units of Humalog;  Also increase water intake and call for appointment.    A total of 60 minutes was spent in face to face time, of which 50 % was spent in counseling patient on disease process, complications, treatment, and side effects of medications.     The patient was explained the above plan and given opportunity to ask questions.  She understands, chooses and consents to this plan and accepts all the risks, which include but are not limited to the risks mentioned above.   She understands the alternative of having no testing, interventions or treatments at this time. She left content and without further questions.                     "

## 2019-02-21 ENCOUNTER — OFFICE VISIT (OUTPATIENT)
Dept: INTERNAL MEDICINE | Facility: CLINIC | Age: 59
End: 2019-02-21
Payer: MEDICARE

## 2019-02-21 VITALS
HEART RATE: 98 BPM | DIASTOLIC BLOOD PRESSURE: 78 MMHG | HEIGHT: 67 IN | SYSTOLIC BLOOD PRESSURE: 130 MMHG | TEMPERATURE: 98 F | BODY MASS INDEX: 37.37 KG/M2 | WEIGHT: 238.13 LBS | OXYGEN SATURATION: 97 %

## 2019-02-21 DIAGNOSIS — E55.9 VITAMIN D DEFICIENCY: ICD-10-CM

## 2019-02-21 DIAGNOSIS — E11.59 HYPERTENSION ASSOCIATED WITH DIABETES: ICD-10-CM

## 2019-02-21 DIAGNOSIS — M79.602 LEFT ARM PAIN: Primary | ICD-10-CM

## 2019-02-21 DIAGNOSIS — J41.0 SIMPLE CHRONIC BRONCHITIS: ICD-10-CM

## 2019-02-21 DIAGNOSIS — I15.2 HYPERTENSION ASSOCIATED WITH DIABETES: ICD-10-CM

## 2019-02-21 DIAGNOSIS — F32.A ANXIETY AND DEPRESSION: ICD-10-CM

## 2019-02-21 DIAGNOSIS — J30.1 SEASONAL ALLERGIC RHINITIS DUE TO POLLEN: ICD-10-CM

## 2019-02-21 DIAGNOSIS — Z29.9 PREVENTIVE MEASURE: ICD-10-CM

## 2019-02-21 DIAGNOSIS — F41.9 ANXIETY AND DEPRESSION: ICD-10-CM

## 2019-02-21 PROBLEM — J42 CHRONIC BRONCHITIS: Status: ACTIVE | Noted: 2019-02-21

## 2019-02-21 PROBLEM — J30.9 ALLERGIC RHINITIS: Status: ACTIVE | Noted: 2019-02-21

## 2019-02-21 PROCEDURE — 99999 PR PBB SHADOW E&M-EST. PATIENT-LVL III: ICD-10-PCS | Mod: PBBFAC,,, | Performed by: INTERNAL MEDICINE

## 2019-02-21 PROCEDURE — 99213 OFFICE O/P EST LOW 20 MIN: CPT | Mod: PBBFAC,PN | Performed by: INTERNAL MEDICINE

## 2019-02-21 PROCEDURE — 99214 PR OFFICE/OUTPT VISIT, EST, LEVL IV, 30-39 MIN: ICD-10-PCS | Mod: S$PBB,,, | Performed by: INTERNAL MEDICINE

## 2019-02-21 PROCEDURE — 99214 OFFICE O/P EST MOD 30 MIN: CPT | Mod: S$PBB,,, | Performed by: INTERNAL MEDICINE

## 2019-02-21 PROCEDURE — 99999 PR PBB SHADOW E&M-EST. PATIENT-LVL III: CPT | Mod: PBBFAC,,, | Performed by: INTERNAL MEDICINE

## 2019-02-21 RX ORDER — FLUTICASONE PROPIONATE 50 MCG
2 SPRAY, SUSPENSION (ML) NASAL DAILY
Qty: 16 G | Refills: 6 | Status: SHIPPED | OUTPATIENT
Start: 2019-02-21 | End: 2020-06-25 | Stop reason: SDUPTHER

## 2019-02-27 DIAGNOSIS — Z94.0 KIDNEY REPLACED BY TRANSPLANT: Primary | ICD-10-CM

## 2019-02-28 ENCOUNTER — EXTERNAL CHRONIC CARE MANAGEMENT (OUTPATIENT)
Dept: PRIMARY CARE CLINIC | Facility: CLINIC | Age: 59
End: 2019-02-28
Payer: MEDICARE

## 2019-02-28 ENCOUNTER — TELEPHONE (OUTPATIENT)
Dept: INTERNAL MEDICINE | Facility: CLINIC | Age: 59
End: 2019-02-28

## 2019-02-28 PROCEDURE — 99490 CHRNC CARE MGMT STAFF 1ST 20: CPT | Mod: S$PBB,,, | Performed by: INTERNAL MEDICINE

## 2019-02-28 PROCEDURE — 99490 PR CHRONIC CARE MGMT, 1ST 20 MIN: ICD-10-PCS | Mod: S$PBB,,, | Performed by: INTERNAL MEDICINE

## 2019-02-28 PROCEDURE — 99490 CHRNC CARE MGMT STAFF 1ST 20: CPT | Mod: PBBFAC,PO | Performed by: INTERNAL MEDICINE

## 2019-02-28 RX ORDER — AMOXICILLIN 875 MG/1
875 TABLET, FILM COATED ORAL 2 TIMES DAILY
Qty: 20 TABLET | Refills: 0 | Status: SHIPPED | OUTPATIENT
Start: 2019-02-28 | End: 2019-03-10

## 2019-02-28 NOTE — TELEPHONE ENCOUNTER
----- Message from Sosa Banks sent at 2/28/2019  7:33 AM CST -----  Contact: pt   Pt states that the nose spray isnt working well and would like to have the Antibiotic sent to the pharmacy. Pt states to call her and let her know that when the med is called in.    .[phone] (home) [phone] (work)      .  [email or web address] [address] [place] [address] KADIE HOLMAN  [address] AT [facility]  [address] [address]  Phone: [phone] Fax: [phone]

## 2019-02-28 NOTE — TELEPHONE ENCOUNTER
----- Message from Che Reilly MA sent at 2/28/2019  9:31 AM CST -----  Contact: pt       ----- Message -----  From: Sosa Banks  Sent: 2/28/2019   7:33 AM  To: Nickolas BROOKE Staff    Pt states that the nose spray isnt working well and would like to have the Antibiotic sent to the pharmacy. Pt states to call her and let her know that when the med is called in.    [phone] (home) [phone] (work)      .  [facility] [address] [place] [address] KADIE HOLMAN - [address] AT [facility]  [address] [address]  Phone: [phone] Fax: [phone]

## 2019-03-04 ENCOUNTER — TELEPHONE (OUTPATIENT)
Dept: PULMONOLOGY | Facility: CLINIC | Age: 59
End: 2019-03-04

## 2019-03-04 NOTE — TELEPHONE ENCOUNTER
----- Message from Alexandria Zabala sent at 3/4/2019  7:42 AM CST -----  Contact: self [phone]  .Type:  Sooner Apoointment Request    Caller is requesting a sooner appointment.  Caller declined first available appointment listed below.  Caller will not accept being placed on the waitlist and is requesting a message be sent to doctor.  Name of Caller:Ana Maria Ho  When is the first available appointment? 05/03  Symptoms:   Would the patient rather a call back or a response via [facility]? Call back  Best Call Back Number:[phone]  Additional Information: Pt would like to have Spirometry test and office visit rescheduled to an appt before next available of 05/03.

## 2019-03-06 RX ORDER — NIFEDIPINE 30 MG/1
TABLET, EXTENDED RELEASE ORAL
Qty: 30 TABLET | Refills: 11 | Status: SHIPPED | OUTPATIENT
Start: 2019-03-06 | End: 2019-07-28 | Stop reason: SDUPTHER

## 2019-03-07 DIAGNOSIS — E11.65 UNCONTROLLED TYPE 2 DIABETES MELLITUS WITH HYPERGLYCEMIA, WITH LONG-TERM CURRENT USE OF INSULIN: ICD-10-CM

## 2019-03-07 DIAGNOSIS — Z79.4 UNCONTROLLED TYPE 2 DIABETES MELLITUS WITH HYPERGLYCEMIA, WITH LONG-TERM CURRENT USE OF INSULIN: ICD-10-CM

## 2019-03-08 RX ORDER — PEN NEEDLE, DIABETIC 30 GX3/16"
NEEDLE, DISPOSABLE MISCELLANEOUS
Qty: 100 EACH | Refills: 0 | Status: SHIPPED | OUTPATIENT
Start: 2019-03-08 | End: 2019-05-09 | Stop reason: SDUPTHER

## 2019-03-08 RX ORDER — INSULIN LISPRO 100 [IU]/ML
INJECTION, SOLUTION INTRAVENOUS; SUBCUTANEOUS
Qty: 45 ML | Refills: 0 | Status: SHIPPED | OUTPATIENT
Start: 2019-03-08 | End: 2019-08-26 | Stop reason: SDUPTHER

## 2019-03-26 RX ORDER — TACROLIMUS 1 MG/1
CAPSULE ORAL
Qty: 270 CAPSULE | Refills: 11 | Status: SHIPPED | OUTPATIENT
Start: 2019-03-26 | End: 2020-03-17

## 2019-03-27 ENCOUNTER — LAB VISIT (OUTPATIENT)
Dept: LAB | Facility: HOSPITAL | Age: 59
End: 2019-03-27
Attending: INTERNAL MEDICINE
Payer: MEDICARE

## 2019-03-27 DIAGNOSIS — E11.59 HYPERTENSION ASSOCIATED WITH DIABETES: ICD-10-CM

## 2019-03-27 DIAGNOSIS — E11.69 HYPERLIPIDEMIA ASSOCIATED WITH TYPE 2 DIABETES MELLITUS: ICD-10-CM

## 2019-03-27 DIAGNOSIS — E78.5 HYPERLIPIDEMIA ASSOCIATED WITH TYPE 2 DIABETES MELLITUS: ICD-10-CM

## 2019-03-27 DIAGNOSIS — I15.2 HYPERTENSION ASSOCIATED WITH DIABETES: ICD-10-CM

## 2019-03-27 DIAGNOSIS — E03.9 ACQUIRED HYPOTHYROIDISM: ICD-10-CM

## 2019-03-27 LAB
ESTIMATED AVG GLUCOSE: 171 MG/DL (ref 68–131)
HBA1C MFR BLD HPLC: 7.6 % (ref 4–5.6)

## 2019-03-27 PROCEDURE — 83036 HEMOGLOBIN GLYCOSYLATED A1C: CPT

## 2019-03-27 PROCEDURE — 36415 COLL VENOUS BLD VENIPUNCTURE: CPT | Mod: PO

## 2019-03-31 ENCOUNTER — EXTERNAL CHRONIC CARE MANAGEMENT (OUTPATIENT)
Dept: PRIMARY CARE CLINIC | Facility: CLINIC | Age: 59
End: 2019-03-31
Payer: MEDICARE

## 2019-03-31 PROCEDURE — 99490 PR CHRONIC CARE MGMT, 1ST 20 MIN: ICD-10-PCS | Mod: S$PBB,,, | Performed by: INTERNAL MEDICINE

## 2019-03-31 PROCEDURE — 99490 CHRNC CARE MGMT STAFF 1ST 20: CPT | Mod: S$PBB,,, | Performed by: INTERNAL MEDICINE

## 2019-03-31 PROCEDURE — 99490 CHRNC CARE MGMT STAFF 1ST 20: CPT | Mod: PBBFAC,PO | Performed by: INTERNAL MEDICINE

## 2019-04-09 ENCOUNTER — TELEPHONE (OUTPATIENT)
Dept: TRANSPLANT | Facility: CLINIC | Age: 59
End: 2019-04-09

## 2019-04-09 NOTE — TELEPHONE ENCOUNTER
Annual Transplant follow up assessment    Call placed to annual post transplant patient for health assessment and evaluation of need for annual transplant clinic visit.      -Are you following with a General Nephrologist? If so, who, and when was your last visit?  Dr. Medina a few months ago, is due and will call to make an apt    -Have you had any hospitalizations since your last visit?  None, however had surgery to arm to remove a graft last year    -What is your current dose of Immunos? Who prescribed your last refill?    -Do you have any new health problems? None at the moment, had Bronchitis earlier this year that was treated with antibiotics    -Have you been told you have any form of Cancer? no    -Have you had any recurrent infections? No    Per Dr Cullen, after reviewing labs done by us, patient is cleared to skip this year's annual visit. Patient informed that she is to contact our office however if she needs us or has any questions and we can have her seen if needed.

## 2019-04-22 ENCOUNTER — OFFICE VISIT (OUTPATIENT)
Dept: PAIN MEDICINE | Facility: CLINIC | Age: 59
End: 2019-04-22
Payer: MEDICARE

## 2019-04-22 ENCOUNTER — HOSPITAL ENCOUNTER (OUTPATIENT)
Dept: RADIOLOGY | Facility: HOSPITAL | Age: 59
Discharge: HOME | End: 2019-04-22
Attending: PHYSICIAN ASSISTANT
Payer: MEDICARE

## 2019-04-22 VITALS
DIASTOLIC BLOOD PRESSURE: 84 MMHG | SYSTOLIC BLOOD PRESSURE: 151 MMHG | RESPIRATION RATE: 18 BRPM | WEIGHT: 238 LBS | HEART RATE: 65 BPM | BODY MASS INDEX: 37.35 KG/M2 | HEIGHT: 67 IN

## 2019-04-22 DIAGNOSIS — M47.816 LUMBAR SPONDYLOSIS: ICD-10-CM

## 2019-04-22 DIAGNOSIS — M53.3 SACROILIAC JOINT PAIN: ICD-10-CM

## 2019-04-22 DIAGNOSIS — M25.532 LEFT WRIST PAIN: Primary | ICD-10-CM

## 2019-04-22 DIAGNOSIS — M51.36 DDD (DEGENERATIVE DISC DISEASE), LUMBAR: ICD-10-CM

## 2019-04-22 DIAGNOSIS — M25.532 LEFT WRIST PAIN: ICD-10-CM

## 2019-04-22 DIAGNOSIS — M54.16 LEFT LUMBAR RADICULOPATHY: ICD-10-CM

## 2019-04-22 PROCEDURE — 73130 X-RAY EXAM OF HAND: CPT | Mod: TC,LT

## 2019-04-22 PROCEDURE — 99999 PR PBB SHADOW E&M-EST. PATIENT-LVL V: CPT | Mod: PBBFAC,,, | Performed by: PHYSICIAN ASSISTANT

## 2019-04-22 PROCEDURE — 99214 OFFICE O/P EST MOD 30 MIN: CPT | Mod: S$PBB,,, | Performed by: PHYSICIAN ASSISTANT

## 2019-04-22 PROCEDURE — 73130 X-RAY EXAM OF HAND: CPT | Mod: 26,LT,, | Performed by: RADIOLOGY

## 2019-04-22 PROCEDURE — 99999 PR PBB SHADOW E&M-EST. PATIENT-LVL V: ICD-10-PCS | Mod: PBBFAC,,, | Performed by: PHYSICIAN ASSISTANT

## 2019-04-22 PROCEDURE — 99214 PR OFFICE/OUTPT VISIT, EST, LEVL IV, 30-39 MIN: ICD-10-PCS | Mod: S$PBB,,, | Performed by: PHYSICIAN ASSISTANT

## 2019-04-22 PROCEDURE — 73110 XR WRIST COMPLETE 3 VIEWS LEFT: ICD-10-PCS | Mod: 26,LT,, | Performed by: RADIOLOGY

## 2019-04-22 PROCEDURE — 73110 X-RAY EXAM OF WRIST: CPT | Mod: 26,LT,, | Performed by: RADIOLOGY

## 2019-04-22 PROCEDURE — 73110 X-RAY EXAM OF WRIST: CPT | Mod: TC,LT

## 2019-04-22 PROCEDURE — 73130 XR HAND COMPLETE 3 VIEW LEFT: ICD-10-PCS | Mod: 26,LT,, | Performed by: RADIOLOGY

## 2019-04-22 PROCEDURE — 99215 OFFICE O/P EST HI 40 MIN: CPT | Mod: PBBFAC,PN | Performed by: PHYSICIAN ASSISTANT

## 2019-04-22 RX ORDER — OXYCODONE AND ACETAMINOPHEN 10; 325 MG/1; MG/1
1 TABLET ORAL EVERY 8 HOURS PRN
Qty: 90 TABLET | Refills: 0 | Status: SHIPPED | OUTPATIENT
Start: 2019-05-22 | End: 2019-06-21

## 2019-04-22 RX ORDER — GABAPENTIN 300 MG/1
CAPSULE ORAL
Qty: 60 CAPSULE | Refills: 1 | Status: SHIPPED | OUTPATIENT
Start: 2019-04-22 | End: 2019-06-25 | Stop reason: SDUPTHER

## 2019-04-22 RX ORDER — OXYCODONE AND ACETAMINOPHEN 10; 325 MG/1; MG/1
1 TABLET ORAL EVERY 8 HOURS PRN
Qty: 90 TABLET | Refills: 0 | Status: SHIPPED | OUTPATIENT
Start: 2019-04-22 | End: 2019-05-22 | Stop reason: SDUPTHER
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
## 2019-04-24 ENCOUNTER — PATIENT MESSAGE (OUTPATIENT)
Dept: PAIN MEDICINE | Facility: CLINIC | Age: 59
End: 2019-04-24

## 2019-04-26 ENCOUNTER — OFFICE VISIT (OUTPATIENT)
Dept: ORTHOPEDICS | Facility: CLINIC | Age: 59
End: 2019-04-26
Payer: MEDICARE

## 2019-04-26 ENCOUNTER — TELEPHONE (OUTPATIENT)
Dept: PHYSICAL MEDICINE AND REHAB | Facility: CLINIC | Age: 59
End: 2019-04-26

## 2019-04-26 VITALS
DIASTOLIC BLOOD PRESSURE: 84 MMHG | HEIGHT: 67 IN | WEIGHT: 238.13 LBS | HEART RATE: 68 BPM | BODY MASS INDEX: 37.37 KG/M2 | SYSTOLIC BLOOD PRESSURE: 155 MMHG

## 2019-04-26 DIAGNOSIS — R20.0 NUMBNESS AND TINGLING: Primary | ICD-10-CM

## 2019-04-26 DIAGNOSIS — R20.2 NUMBNESS AND TINGLING: Primary | ICD-10-CM

## 2019-04-26 DIAGNOSIS — M79.602 LEFT ARM PAIN: ICD-10-CM

## 2019-04-26 PROCEDURE — 99999 PR PBB SHADOW E&M-EST. PATIENT-LVL III: CPT | Mod: PBBFAC,,, | Performed by: ORTHOPAEDIC SURGERY

## 2019-04-26 PROCEDURE — 99204 OFFICE O/P NEW MOD 45 MIN: CPT | Mod: S$PBB,,, | Performed by: ORTHOPAEDIC SURGERY

## 2019-04-26 PROCEDURE — 99204 PR OFFICE/OUTPT VISIT, NEW, LEVL IV, 45-59 MIN: ICD-10-PCS | Mod: S$PBB,,, | Performed by: ORTHOPAEDIC SURGERY

## 2019-04-26 PROCEDURE — 99999 PR PBB SHADOW E&M-EST. PATIENT-LVL III: ICD-10-PCS | Mod: PBBFAC,,, | Performed by: ORTHOPAEDIC SURGERY

## 2019-04-26 PROCEDURE — 99213 OFFICE O/P EST LOW 20 MIN: CPT | Mod: PBBFAC,PN | Performed by: ORTHOPAEDIC SURGERY

## 2019-04-26 NOTE — PROGRESS NOTES
Subjective:     Patient ID: Ana Maria Teague is a 58 y.o. female.    Chief Complaint: Pain of the Left Wrist    Ana Maria Teague is a 58 y.o. female here for left wrist pain.     Wrist Pain    The pain is present in the left wrist. This is a new problem. The current episode started more than 1 month ago (Pain for about 3 months). There has been no history of extremity trauma. Movement associated with injury: no injury.The problem occurs constantly. The problem has been gradually worsening. The quality of the pain is described as sharp and aching (tenderness). The pain is at a severity of 6/10. Associated symptoms include an inability to bear weight, joint swelling, a limited range of motion and stiffness. Pertinent negatives include no fever or numbness. The symptoms are aggravated by activity, bearing weight, lifting and rotation. She has tried oral narcotics, brace/corset, cold and OTC ointments for the symptoms. The treatment provided mild relief. Physical therapy was not tried.      Past Medical History:   Diagnosis Date    Abnormal glucose 2013    Acquired hypothyroidism     Acute bronchiolitis 10/15/2014    Anemia     Anemia of chronic renal failure 2013    Anxiety     Anxiety disorder     Avascular necrosis of bone of hip     Back pain     s/p steroid injections    Chronic immunosuppression with Prograf and Cellcept 2013    CKD (chronic kidney disease) stage 2, GFR 60-89 ml/min     CKD (chronic kidney disease) stage 5, GFR less than 15 ml/min     -donor kidney transplant - 13    Depression     Hypertension, renal 2013    Hypothyroidism     Immunosuppressed status 10/15/2014    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 7 years ago.     SECTION      COLONOSCOPY N/A 3/9/2016    Performed by Douglas Daniel III, MD at [facility] ENDO    cyst removed form chest wall      HYSTERECTOMY      KIDNEY TRANSPLANT  2013    LAVAGE, PERITONEAL, THERAPEUTIC Right 10/7/2013    Performed by Vern Mcdonald MD at [facility] OR 2ND FLR    SKIN GRAFT      revision    THYROIDECTOMY      TRANSPLANT, KIDNEY N/A 2013    Performed by Jus Mcfarland MD at [facility] OR 2ND FLR    vascular access surgeries      multiple. last time 2 weeks ago     Family History   Problem Relation Age of Onset    Diabetes Mother     Kidney disease Mother     Hyperlipidemia Mother     Hypertension Mother     Heart disease Mother     Arthritis Mother     Hypertension Father     Stroke Father     Hypertension Sister     Arthritis Sister     Asthma Sister     Heart disease Sister         heart attack     Social History     Socioeconomic History    Marital status:      Spouse name: Not on file    Number of children: 3    Years of education: Not on file    Highest education level: Not on file   Occupational History    Occupation: disable     Comment: dept manager at [employer]   Social Needs    Financial resource strain: Not on file    Food insecurity:     Worry: Not on file     Inability: Not on file    Transportation needs:     Medical: Not on file     Non-medical: Not on file   Tobacco Use    Smoking status: Former Smoker     Packs/day: 1.00     Years: 10.00     Pack years: 10.00     Types: Cigarettes     Last attempt to quit: 2002     Years since quittin.4    Smokeless tobacco: Never Used    Tobacco comment: quit smoking approx 10-15 years ago    Substance and Sexual Activity    Alcohol use: No    Drug use: No    Sexual activity: Never     Partners: Male   Lifestyle    Physical activity:     Days per week: Not on file     Minutes per session: Not on file     Stress: Not on file   Relationships    Social connections:     Talks on phone: Not on file     Gets together: Not on file     Attends Worship service: Not on file     Active member of club or organization: Not on file     Attends meetings of clubs or organizations: Not on file     Relationship status: Not on file   Other Topics Concern    Not on file   Social History Narrative    Does housework at home.     Medication List with Changes/Refills   Current Medications    ALBUTEROL 90 MCG/ACTUATION INHALER    Inhale 2 puffs into the lungs.    ALBUTEROL-IPRATROPIUM 2.5MG-0.5MG/3ML (DUO-NEB) 0.5 MG-3 MG(2.5 MG BASE)/3 ML NEBULIZER SOLUTION    Take 3 mLs by nebulization 3 (three) times daily.    ATORVASTATIN (LIPITOR) 20 MG TABLET    Take 1 tablet (20 mg total) by mouth once daily.    BLOOD SUGAR DIAGNOSTIC (FREESTYLE LITE STRIPS) STRP    AS DIRECTED    CETIRIZINE (ZYRTEC) 10 MG TABLET    Take 1 tablet (10 mg total) by mouth once daily.    CYCLOBENZAPRINE (FLEXERIL) 10 MG TABLET    Take 1 tablet (10 mg total) by mouth 3 (three) times daily as needed for Muscle spasms.    ERGOCALCIFEROL (ERGOCALCIFEROL) 50,000 UNIT CAP    Take 50,000 Units by mouth every 7 days.     FLUTICASONE (FLONASE) 50 MCG/ACTUATION NASAL SPRAY    2 sprays (100 mcg total) by Each Nare route once daily.    GABAPENTIN (NEURONTIN) 300 MG CAPSULE    Take 1-2 capsules by mouth every night    INSULIN GLARGINE (LANTUS SOLOSTAR) 100 UNIT/ML (3 ML) INPN PEN    Inject 50 Units into the skin once daily.    INSULIN LISPRO (HUMALOG KWIKPEN INSULIN) 100 UNIT/ML PEN    INJECT UP TO 18 UNITS UNDER THE SKIN THREE TIMES DAILY 10 TO 15 MINUTES BEFORE MEALS AS DIRECTED IN AFTER VISIT SUMMARY    LANCETS (FREESTYLE LANCETS) 28 GAUGE MISC    1 lancet by Combination route 2 (two) times daily as needed. Qid prn    LANCETS MISC    1 strip by Misc.(Non-Drug; Combo Route) route 4 (four) times daily with meals and nightly.    LEVOTHYROXINE (SYNTHROID) 150 MCG TABLET    Take 1  "tablet (150 mcg total) by mouth before breakfast.    LORAZEPAM (ATIVAN) 1 MG TABLET    Take 1 tablet (1 mg total) by mouth daily as needed for Anxiety.    MYCOPHENOLATE (CELLCEPT) 250 MG CAP    TAKE (2) CAPSULES TWICE DAILY.    NIFEDIPINE (PROCARDIA-XL) 30 MG (OSM) 24 HR TABLET    TAKE 1 TABLET BY MOUTH EVERY DAY    ONDANSETRON (ZOFRAN) 4 MG TABLET    Take 1 tablet (4 mg total) by mouth every 12 (twelve) hours as needed for Nausea.    OXYCODONE-ACETAMINOPHEN (PERCOCET)  MG PER TABLET    Take 1 tablet by mouth every 8 (eight) hours as needed for Pain.    OXYCODONE-ACETAMINOPHEN (PERCOCET)  MG PER TABLET    Take 1 tablet by mouth every 8 (eight) hours as needed for Pain.    PEN NEEDLE, DIABETIC (BD ULTRA-FINE RORY PEN NEEDLE) 32 GAUGE X 5/32" NDLE    USE AS DIRECTED WITH INSULIN PEN FOUR TIMES DAILY    PROMETHAZINE (PHENERGAN) 25 MG TABLET    Take 1 tablet (25 mg total) by mouth every 6 (six) hours as needed for Nausea.    TACROLIMUS (PROGRAF) 1 MG CAP    TAKE 5 CAPSULES IN THE MORNING, AND 4 IN THE EVENING    ZOLPIDEM (AMBIEN) 5 MG TAB    Take 1 tablet (5 mg total) by mouth nightly as needed.   Discontinued Medications    VENLAFAXINE (EFFEXOR-XR) 37.5 MG 24 HR CAPSULE    Take 1 capsule (37.5 mg total) by mouth once daily.     Review of patient's allergies indicates:   Allergen Reactions    Azithromycin Nausea And Vomiting    Adhesive Other (See Comments)     Skin tears    Nsaids (non-steroidal anti-inflammatory drug)      Kidney Transplant     Review of Systems   Constitution: Negative for fever and night sweats.   HENT: Negative for hearing loss.    Eyes: Negative for blurred vision and visual disturbance.   Cardiovascular: Negative for chest pain and leg swelling.   Respiratory: Positive for shortness of breath.    Endocrine: Negative for polyuria.   Hematologic/Lymphatic: Negative for bleeding problem.   Skin: Negative for rash.   Musculoskeletal: Positive for arthritis, back pain, joint pain, joint " swelling, muscle weakness, neck pain and stiffness. Negative for muscle cramps.   Gastrointestinal: Negative for melena.   Genitourinary: Negative for hematuria.   Neurological: Negative for loss of balance, numbness and paresthesias.   Psychiatric/Behavioral: Negative for altered mental status.       Objective:   Body mass index is 37.29 kg/m².  Vitals:    04/26/19 1103   BP: (!) 155/84   Pulse: 68       General: Ana Maria is well-developed, well-nourished, appears stated age, in no acute distress, alert and oriented.       General    Vitals reviewed.  Constitutional: She is oriented to person, place, and time. She appears well-developed and well-nourished. No distress.   HENT:   Right Ear: External ear normal.   Left Ear: External ear normal.   Nose: Nose normal.   Mouth/Throat: No oropharyngeal exudate.   Eyes: Pupils are equal, round, and reactive to light. Right eye exhibits no discharge. Left eye exhibits no discharge.   Neck: Normal range of motion.   Cardiovascular: Normal rate.    Pulmonary/Chest: Effort normal and breath sounds normal. No respiratory distress.   Abdominal: Soft.   Neurological: She is alert and oriented to person, place, and time. She has normal reflexes. No cranial nerve deficit. She exhibits normal muscle tone. Coordination normal.   Psychiatric: She has a normal mood and affect. Her behavior is normal. Judgment and thought content normal.         Right Ankle/Foot Exam     Inspection   Scars: absent  Deformity: absent  Erythema: absent  Bruising: Ankle - absent Foot - absent  Effusion: Ankle - absent Foot - absent  Atrophy: Ankle - absent Foot - absent    Range of Motion   Ankle Joint   Dorsiflexion:  10 normal   Plantar flexion:  35 normal   Subtalar Joint   Inversion:  15 normal   Eversion:  5 normal   Mckinnon Test:  negative    Alignment   Knee Alignment: neutral  Hindfoot Alignment: neutral  Midfoot Alignment: normal  Forefoot Alignment: normal    Tests   Anterior drawer: 1+  Varus  tilt: 1+  Heel Walk: able to perform  Tiptoe Walk: able to perform  Single Heel Rise: able to perform  External Rotation Test: negative  Squeeze Test: negative    Other   Ankle Crepitus: absent  Foot Crepitus:  absent  Sensation: normal  Peroneal Subluxation: negative    Left Ankle/Foot Exam     Inspection  Deformity: absent  Erythema: absent  Bruising: Ankle - absent Foot - absent  Effusion: Ankle - absent Foot - absent  Atrophy: Ankle - absent Foot - absent  Scars: absent    Range of Motion   Ankle Joint  Dorsiflexion:  10 normal   Plantar flexion:  35 normal     Subtalar Joint   Inversion:  15 normal   Eversion:  5 normal   Mckinnon Test:  normal    Alignment   Knee Alignment: neutral  Hindfoot Alignment: neutral  Midfoot Alignment: normal  Forefoot Alignment: normal    Tests   Anterior drawer: 1+  Varus tilt: 1+  Heel Walk: able to perform  Tiptoe Walk: able to perform  Single Heel Rise: able to perform  External Rotation Test: negative  Squeeze Test: absent    Other   Foot Crepitus:  absent  Ankle Crepitus: absent  Sensation: normal  Peroneal Subluxation: negative      Right Hand/Wrist Exam     Inspection   Scars: Wrist - absent   Effusion: Wrist - absent   Bruising: Wrist - absent   Deformity: Wrist - deformity     Range of Motion     Wrist   Extension:  50 normal   Flexion:  70 normal   Abduction: 20 normal  Adduction: 35 normal    Tests   Phalens Sign: negative  Tinel's sign (median nerve): negative  Finkelstein's test: negative  Carpal Tunnel Compression Test: negative  Cubital Tunnel Compression Test: negative      Other     Neuorologic Exam    Median Distribution: normal  Ulnar Distribution: normal  Radial Distribution: normal      Left Hand/Wrist Exam     Inspection   Scars: Wrist - present   Effusion: Wrist - absent   Bruising: Wrist - absent   Deformity: Wrist - absent     Range of Motion     Wrist   Extension:  50 normal   Flexion:  70 normal   Abduction: 20 normal  Adduction: 35 normal    Tests    Phalens Sign: negative  Tinel's sign (median nerve): negative  Finkelstein's test: negative  Carpal Tunnel Compression Test: negative  Cubital Tunnel Compression Test: negative      Other     Sensory Exam  Median Distribution: normal  Ulnar Distribution: normal  Radial Distribution: normal      Right Elbow Exam     Inspection   Scars: absent  Effusion: absent  Bruising: absent  Deformity: absent  Atrophy: absent    Range of Motion   Extension:  0 normal   Flexion:  130 normal   Pronation: normal   Supination:  80 normal     Tests   Varus: negative  Valgus: negative  Tinel's sign (cubital tunnel): negative  Tennis Elbow: negative  Golfer's Elbow: negative  Radial Capitellar Grind: negative    Other   Sensation: normal      Left Elbow Exam     Inspection   Scars: present  Effusion: absent  Bruising: absent  Deformity: absent  Atrophy: absent    Range of Motion   Extension:  0 normal   Flexion:  130 normal   Pronation: normal   Supination:  80 normal     Tests   Varus: negative  Tinel's sign (cubital tunnel): negative  Tennis Elbow: negative  Golfer's Elbow: negative  Radial Capitellar Grind: negative    Other   Sensation: normal    Comments:  Tenderness over for musculature, radially on the dorsal side, volarly on the ulnar side          Muscle Strength   Right Upper Extremity   Wrist extension: 5/5/5   Wrist flexion: 5/5/5   : 5/5/5   Pinch Mechanism: 5/5  Elbow Pronation:  5/5   Elbow Supination:  5/5   Elbow Extension: 5/5  Elbow Flexion: 5/5  Intrinsics: 5/5  EPL (Extensor Pollicis Longus): 5/5  Left Upper Extremity  Wrist extension: 5/5/5   Wrist flexion: 5/5/5   :  4/5/5   Pinch Mechanism: 5/5  Elbow Pronation:  5/5   Elbow Supination:  5/5   Elbow Extension: 5/5  Elbow Flexion: 5/5  Intrinsics: 5/5  EPL (Extensor Pollicis Longus): 5/5  Right Lower Extremity   Anterior tibial:  5/5/5  Posterior tibial:  5/5/5  Gastrocsoleus:  5/5/5  Peroneal muscle:  5/5/5  EHL:  5/5  FDL: 5/5  EDL: 5/5  FHL:  5/5  Left Lower Extremity   Anterior tibial:  5/5/5   Posterior tibial:  5/5/5  Gastrocsoleus:  5/5/5  Peroneal muscle:  5/5/5  EHL:  5/5  FDL: 5/5  EDL: 5/5  FHL: 5/5    Reflexes     Left Side  Post Tibial:  2+  Achilles:  2+  Plantar Reflex: 2+    Right Side   Post Tibial:  2+  Achilles:  2+  Plantar Reflex: 2+    Vascular Exam     Right Pulses  Dorsalis Pedis:      2+  Posterior Tibial:      2+  Radial:                    2+      Left Pulses  Dorsalis Pedis:      2+  Posterior Tibial:      2+  Radial:                    2+      Capillary Refill  Right Hand: normal capillary refill  Left Hand: normal capillary refill    Edema  Right Forearm: absent  Right Lower Leg: absent  Left Forearm: absent  Left Lower Leg: absent      Imaging reviewed and interpreted today X-Ray Hand 3 View Left  Narrative: EXAMINATION:  XR HAND COMPLETE 3 VIEW LEFT    CLINICAL HISTORY:  . Pain in left wrist    TECHNIQUE:  PA, lateral, and oblique views of the left hand were performed.    COMPARISON:  None    FINDINGS:  No osseous erosion.  Bones appear slightly demineralized.  No fracture or dislocation.  No soft tissue swelling. Surgical clips are seen within the soft tissues of the distal left forearm.  Impression: As above    Electronically signed by: Sam Vera MD  Date:    04/22/2019  Time:    11:09  X-Ray Wrist Complete Left  Narrative: EXAMINATION:  XR WRIST COMPLETE 3 VIEWS LEFT    CLINICAL HISTORY:  Pain in left wrist    TECHNIQUE:  PA, lateral, and oblique views of the left wrist were performed.    COMPARISON:  None    FINDINGS:  Multiple clips project about the distal left forearm.  Mild atherosclerosis.  No acute fracture or dislocation.  Mild degenerative changes at the 1st carpometacarpal articulation.  No soft tissue swelling.  Impression: As above    Electronically signed by: Sam Vera MD  Date:    04/22/2019  Time:    11:03      Assessment:     Encounter Diagnoses   Name Primary?    Numbness and tingling Yes    Left  arm pain         Plan:     We reviewed with Ana Maria today, the pathology and natural history of her diagnosis. We had an extensive discussion as to the conservative treatment and management of their condition. We also discussed the variety of treatment options to include medication, physical therapy, diagnostic testing as well as other treatments.The decision was made to go forward with:    -nerve conduction study  -PT/OT    Does not have classic findings for carpal tunnel, but does endorse some vague nerve related pains in the arm.  This may be secondary to her history of prior extensive dissection and surgery for vein graft discussed that she needs any nerve exploration surgery, may get her set up with hand/micro surgery

## 2019-04-26 NOTE — LETTER
April 26, 2019      Rafita Muniz MD  [address]  [place] [address]           [facility]  [address]  [place] [address]  Phone: [phone]  Fax: [phone]          Patient: Ana Maria Teague   MR Number: [id number]   YOB: 1960   Date of Visit: 4/26/2019       Dear Dr. Rafita Muniz:    Thank you for referring Ana Maria Teague to me for evaluation. Attached you will find relevant portions of my assessment and plan of care.    If you have questions, please do not hesitate to call me. I look forward to following Ana Maria Teague along with you.    Sincerely,    Ronaldo Salvador MD    Enclosure  CC:  No Recipients    If you would like to receive this communication electronically, please contact [email or web address] or [phone] to request more information on [facility] access.    For providers and/or their staff who would like to refer a patient to [facility], please contact us through our one-stop-shop provider referral line, [facility], at [phone].    If you feel you have received this communication in error or would no longer like to receive these types of communications, please e-mail [email or web address]

## 2019-04-30 ENCOUNTER — EXTERNAL CHRONIC CARE MANAGEMENT (OUTPATIENT)
Dept: PRIMARY CARE CLINIC | Facility: CLINIC | Age: 59
End: 2019-04-30
Payer: MEDICARE

## 2019-04-30 PROCEDURE — 99490 CHRNC CARE MGMT STAFF 1ST 20: CPT | Mod: PBBFAC,PO | Performed by: INTERNAL MEDICINE

## 2019-04-30 PROCEDURE — 99490 PR CHRONIC CARE MGMT, 1ST 20 MIN: ICD-10-PCS | Mod: S$PBB,,, | Performed by: INTERNAL MEDICINE

## 2019-04-30 PROCEDURE — 99490 CHRNC CARE MGMT STAFF 1ST 20: CPT | Mod: S$PBB,,, | Performed by: INTERNAL MEDICINE

## 2019-05-09 ENCOUNTER — PATIENT MESSAGE (OUTPATIENT)
Dept: DIABETES | Facility: CLINIC | Age: 59
End: 2019-05-09

## 2019-05-09 ENCOUNTER — PATIENT MESSAGE (OUTPATIENT)
Dept: NEPHROLOGY | Facility: CLINIC | Age: 59
End: 2019-05-09

## 2019-05-09 RX ORDER — PEN NEEDLE, DIABETIC 30 GX3/16"
NEEDLE, DISPOSABLE MISCELLANEOUS
Qty: 100 EACH | Refills: 11 | Status: SHIPPED | OUTPATIENT
Start: 2019-05-09 | End: 2020-07-27 | Stop reason: SDUPTHER

## 2019-05-09 RX ORDER — ERGOCALCIFEROL 1.25 MG/1
50000 CAPSULE ORAL
Qty: 4 CAPSULE | Refills: 6 | Status: SHIPPED | OUTPATIENT
Start: 2019-05-09 | End: 2019-07-16 | Stop reason: SDUPTHER

## 2019-05-19 ENCOUNTER — PATIENT MESSAGE (OUTPATIENT)
Dept: DIABETES | Facility: CLINIC | Age: 59
End: 2019-05-19

## 2019-05-19 ENCOUNTER — PATIENT MESSAGE (OUTPATIENT)
Dept: INTERNAL MEDICINE | Facility: CLINIC | Age: 59
End: 2019-05-19

## 2019-05-20 RX ORDER — CETIRIZINE HYDROCHLORIDE 10 MG/1
10 TABLET ORAL DAILY
Qty: 30 TABLET | Refills: 11 | Status: SHIPPED | OUTPATIENT
Start: 2019-05-20 | End: 2020-11-18 | Stop reason: SDUPTHER

## 2019-05-21 NOTE — PROGRESS NOTES
PCP: Karine Fernandez MD    Subjective:      HISTORY OF PRESENT ILLNESS:  Ana Maria Teague is a pleasant 58 y.o. female presenting to follow up on diabetes mellitus. Also, pertinent to this visit in decision making is hypertension, hyperlipidemia, and adherence. Since that time she has been in her usual state of health without significant hyperglycemia or hypoglycemia. She has been checking her blood glucose regularly twice daily, fasting and before supper. She has had diabetes for 5 or more years. Her last visit in Diabetes Management was 02/13/2019. Since that time she has had moderate improvement in her glycemia with A1c of 7.5%. Blood glucose testing is performed sporadically. Per recall, fasting BGs are 174-243, post meal blood sugars are 174-224. She denies polyuria, polydipsia, polyphagia, or blurred vision. Also denies nausea, vomiting, diarrhea, fever or hypoglycemic symptoms. She has not had any recent hospitalizations or significant hypoglycemia involving EMS or requiring assistance from others.  Her current concerns are glycemic control.    She denies any hospital admissions, emergency room visits, hypoglycemia, syncope, diaphoresis, chest pain, or dyspnea.    She  has lost 3 pounds since last visit. 37.36 kg/m²   Lab Results   Component Value Date    POCGLU 167 (A) 05/22/2019       Ana Maria is compliant most of the time with DM medications.     Ana Maria is compliant most of the time with lifestyle modifications to include activity and meal planning.     Diabetes Management Status    Statin: Taking  ACE/ARB: Not taking    Screening or Prevention Patient's value Goal Complete/Controlled?   HgA1C Testing and Control   Lab Results   Component Value Date    HGBA1C 7.6 (H) 03/27/2019      Annually/Less than 8% Yes   Lipid profile : 06/08/2018 Annually Yes   LDL control Lab Results   Component Value Date    LDLCALC 102.0 06/08/2018    Annually/Less than 100 mg/dl  No   Nephropathy screening Lab Results   Component  "Value Date    LABMICR 16.0 06/08/2018     Lab Results   Component Value Date    PROTEINUA Negative 08/14/2018    Annually Yes   Blood pressure BP Readings from Last 1 Encounters:   05/22/19 128/84    Less than 140/90 No   Dilated retinal exam : 08/14/2018 Annually Yes   Foot exam   : 02/13/2019 Annually Yes         Lab Results   Component Value Date    HGBA1C 7.6 (H) 03/27/2019    HGBA1C 7.5 (H) 12/21/2018    HGBA1C 10.1 (H) 06/08/2018       Review of Systems   Constitutional: Negative for activity change and unexpected weight change.   Eyes: Negative for visual disturbance.   Respiratory: Negative for chest tightness and shortness of breath.    Cardiovascular: Negative for chest pain.   Gastrointestinal: Negative for constipation and diarrhea.   Endocrine: Negative for cold intolerance, heat intolerance, polydipsia, polyphagia and polyuria.   Genitourinary: Negative for frequency.   Skin: Negative for wound.   Neurological: Negative for numbness.   Psychiatric/Behavioral: Negative for confusion.       Objective:   /84   Ht 5' 7" (1.702 m)   Wt 108.2 kg (238 lb 8.6 oz)   BMI 37.36 kg/m²      Physical Exam   Constitutional: She is oriented to person, place, and time. She appears well-developed and well-nourished. No distress.   Neck: Normal range of motion. Neck supple. No tracheal deviation present. No thyromegaly present.   Cardiovascular: Normal rate, regular rhythm and normal heart sounds.   Pulmonary/Chest: Effort normal and breath sounds normal.   Abdominal: Soft. Bowel sounds are normal.   Musculoskeletal: She exhibits no edema.   Lymphadenopathy:     She has no cervical adenopathy.   Neurological: She is alert and oriented to person, place, and time.   Skin: She is not diaphoretic.   Psychiatric: She has a normal mood and affect.   Nursing note and vitals reviewed.        Assessment:     1. Uncontrolled type 2 diabetes mellitus with stage 3 chronic kidney disease, with long-term current use of " "insulin      Plan:     Ana Maria Teague is seen today for   1. Uncontrolled type 2 diabetes mellitus with stage 3 chronic kidney disease, with long-term current use of insulin        Uncontrolled type 2 diabetes mellitus with stage 3 chronic kidney disease, with long-term current use of insulin  -     POCT Glucose, Hand-Held Device    Uncontrolled type 2 diabetes mellitus without complication, with long-term current use of insulin  -     POCT glucose   - Continue with D&E, reduce portion size, and restrict carbohydrates (no more that 45 grams ) per meal.   - Needs improvement will intensify insulin therapy as outlined below.   - Follow up in 12 weeks   - Will consider CGM in Future      Hypothyroidism due to acquired atrophy of thyroid  -     Stable and controlled. Continue current treatment plan       I have reviewed your results and they are still quite high. I would like you to start "Treating to Target". The treatment will be Insulin and your target will be the Fasting and 2 hour post meal blood sugar. It will work in this manner;    1. Goal for Fasting blood sugar is  mg/dl. I realize that you will need time to adjust to the new levels and presently you may feel too low if you are too aggressive now. So go slow and aim to lower your blood sugar to below 200 then 150 then 100 over several months.    2. Goal for 2 hour post meal blood sugar is below 180 mg/dl, here the same rules apply as in #1.    3. You will check your fasting blood sugar daily, if not where we would like it to be over a 3 day period then that evening we will increase the Lantus dose by 5 units. Then repeat the process over the next 3 days. Remember this is a slow process and take our time getting to goal. But, each week should be better than prior weeks. Blood sugars below 70 are unacceptable and should raise a "RED FLAG" where we may have to reduce our dose of insulin.    4. You will check your post meal glucose daily as well. However, each " "day you will check a different meal, (ie. Monday-breakfast; Tuesday- lunch; Wednesday- supper, then repeat). If your post meal glucose is not where we would like, increase pre-meal insulin by 2 units next time. A word of CAUTION: mealtime insulin is dependant on the size and concentration of your meal content. If not consuming a large meal do not take large dose of insulin. Use the reasonable person rule.     5. If you have any questions please do not hesitate to call.    Intensive insulin Therapy with correction factor:    You are on Intensive insulin therapy with Basal and Bolus insulin. Lantus, Levamir or NPH is your Basal insulin and will help maintain your fasting and between meal sugar. Your fast acting or rescue insulin is either Humalog, Novalog or Regular insulin and will control your post meal sugar.     You will Take 50 units of Lantus at 9 pm each night. This will be adjusted up or down depending on your fasting blood sugar before breakfast.    Humalog will follow this pre meal schedule; Correction factor of "2 units per 50 mg/dl" and is based on 30-60 grams of carbohydrates per meal.    If blood sugar is below 70 eat first then check your blood sugar 2 hours later and make correction.  If blood pre-meal sugar is  70 -150 take 8 units of Humalog;  If blood pre-meal sugar is 151-200 take +2 units of Humalog;  If blood pre-meal sugar is 201-250 take +4 units of Humalog;  If blood pre-meal sugar is 251-300 take +6 units of Humalog;  If blood pre-meal sugar is 301-350 take +8 units of Humalog;  If blood pre-meal sugar is 351-400+ take +10 units of Humalog;  Also increase water intake and call for appointment.    A total of 60 minutes was spent in face to face time, of which 50 % was spent in counseling patient on disease process, complications, treatment, and side effects of medications.     The patient was explained the above plan and given opportunity to ask questions.  She understands, chooses and " consents to this plan and accepts all the risks, which include but are not limited to the risks mentioned above.   She understands the alternative of having no testing, interventions or treatments at this time. She left content and without further questions.

## 2019-05-22 ENCOUNTER — OFFICE VISIT (OUTPATIENT)
Dept: DIABETES | Facility: CLINIC | Age: 59
End: 2019-05-22
Payer: MEDICARE

## 2019-05-22 VITALS
HEIGHT: 67 IN | BODY MASS INDEX: 37.44 KG/M2 | DIASTOLIC BLOOD PRESSURE: 84 MMHG | SYSTOLIC BLOOD PRESSURE: 128 MMHG | WEIGHT: 238.56 LBS

## 2019-05-22 LAB — GLUCOSE SERPL-MCNC: 167 MG/DL (ref 70–110)

## 2019-05-22 PROCEDURE — 99214 PR OFFICE/OUTPT VISIT, EST, LEVL IV, 30-39 MIN: ICD-10-PCS | Mod: S$PBB,,, | Performed by: PHYSICIAN ASSISTANT

## 2019-05-22 PROCEDURE — 99999 PR PBB SHADOW E&M-EST. PATIENT-LVL IV: ICD-10-PCS | Mod: PBBFAC,,, | Performed by: PHYSICIAN ASSISTANT

## 2019-05-22 PROCEDURE — 82962 GLUCOSE BLOOD TEST: CPT | Mod: PBBFAC | Performed by: PHYSICIAN ASSISTANT

## 2019-05-22 PROCEDURE — 99999 PR PBB SHADOW E&M-EST. PATIENT-LVL IV: CPT | Mod: PBBFAC,,, | Performed by: PHYSICIAN ASSISTANT

## 2019-05-22 PROCEDURE — 99214 OFFICE O/P EST MOD 30 MIN: CPT | Mod: S$PBB,,, | Performed by: PHYSICIAN ASSISTANT

## 2019-05-22 PROCEDURE — 99214 OFFICE O/P EST MOD 30 MIN: CPT | Mod: PBBFAC | Performed by: PHYSICIAN ASSISTANT

## 2019-05-22 NOTE — PATIENT INSTRUCTIONS
" I have reviewed your results and they are still quite high. I would like you to start "Treating to Target". The treatment will be Insulin and your target will be the Fasting and 2 hour post meal blood sugar. It will work in this manner;    1. Goal for Fasting blood sugar is  mg/dl. I realize that you will need time to adjust to the new levels and presently you may feel too low if you are too aggressive now. So go slow and aim to lower your blood sugar to below 200 then 150 then 100 over several months.    2. Goal for 2 hour post meal blood sugar is below 180 mg/dl, here the same rules apply as in #1.    3. You will check your fasting blood sugar daily, if not where we would like it to be over a 3 day period then that evening we will increase the Lantus dose by 5 units. Then repeat the process over the next 3 days. Remember this is a slow process and take our time getting to goal. But, each week should be better than prior weeks. Blood sugars below 70 are unacceptable and should raise a "RED FLAG" where we may have to reduce our dose of insulin.    4. You will check your post meal glucose daily as well. However, each day you will check a different meal, (ie. Monday-breakfast; Tuesday- lunch; Wednesday- supper, then repeat). If your post meal glucose is not where we would like, increase pre-meal insulin by 2 units next time. A word of CAUTION: mealtime insulin is dependant on the size and concentration of your meal content. If not consuming a large meal do not take large dose of insulin. Use the reasonable person rule.     5. If you have any questions please do not hesitate to call.    Intensive insulin Therapy with correction factor:    You are on Intensive insulin therapy with Basal and Bolus insulin. Lantus, Levamir or NPH is your Basal insulin and will help maintain your fasting and between meal sugar. Your fast acting or rescue insulin is either Humalog, Novalog or Regular insulin and will control your " "post meal sugar.     You will Take 50 units of Lantus at 9 pm each night. This will be adjusted up or down depending on your fasting blood sugar before breakfast.    Humalog will follow this pre meal schedule; Correction factor of "2 units per 50 mg/dl" and is based on 30-60 grams of carbohydrates per meal.    If blood sugar is below 70 eat first then check your blood sugar 2 hours later and make correction.  If blood pre-meal sugar is  70 -150 take 8 units of Humalog;  If blood pre-meal sugar is 151-200 take +2 units of Humalog;  If blood pre-meal sugar is 201-250 take +4 units of Humalog;  If blood pre-meal sugar is 251-300 take +6 units of Humalog;  If blood pre-meal sugar is 301-350 take +8 units of Humalog;  If blood pre-meal sugar is 351-400+ take +10 units of Humalog;  Also increase water intake and call for appointment.  "

## 2019-05-31 ENCOUNTER — EXTERNAL CHRONIC CARE MANAGEMENT (OUTPATIENT)
Dept: PRIMARY CARE CLINIC | Facility: CLINIC | Age: 59
End: 2019-05-31
Payer: MEDICARE

## 2019-05-31 PROCEDURE — 99490 CHRNC CARE MGMT STAFF 1ST 20: CPT | Mod: S$PBB,,, | Performed by: INTERNAL MEDICINE

## 2019-05-31 PROCEDURE — 99490 PR CHRONIC CARE MGMT, 1ST 20 MIN: ICD-10-PCS | Mod: S$PBB,,, | Performed by: INTERNAL MEDICINE

## 2019-05-31 PROCEDURE — 99490 CHRNC CARE MGMT STAFF 1ST 20: CPT | Mod: PBBFAC,PO | Performed by: INTERNAL MEDICINE

## 2019-06-12 RX ORDER — OXYCODONE AND ACETAMINOPHEN 10; 325 MG/1; MG/1
1 TABLET ORAL EVERY 8 HOURS PRN
Qty: 90 TABLET | Refills: 0 | Status: SHIPPED | OUTPATIENT
Start: 2019-07-03 | End: 2019-06-17

## 2019-06-12 RX ORDER — OXYCODONE AND ACETAMINOPHEN 10; 325 MG/1; MG/1
1 TABLET ORAL EVERY 8 HOURS PRN
Qty: 90 TABLET | Refills: 0 | Status: SHIPPED | OUTPATIENT
Start: 2019-08-02 | End: 2019-06-17

## 2019-06-17 ENCOUNTER — PATIENT MESSAGE (OUTPATIENT)
Dept: PAIN MEDICINE | Facility: CLINIC | Age: 59
End: 2019-06-17

## 2019-06-17 ENCOUNTER — TELEPHONE (OUTPATIENT)
Dept: PAIN MEDICINE | Facility: CLINIC | Age: 59
End: 2019-06-17

## 2019-06-17 ENCOUNTER — PATIENT MESSAGE (OUTPATIENT)
Dept: NEPHROLOGY | Facility: CLINIC | Age: 59
End: 2019-06-17

## 2019-06-17 NOTE — TELEPHONE ENCOUNTER
----- Message from Marina Guerra sent at 6/17/2019  8:15 AM CDT -----  Contact: pt   Type:  Sooner Apoointment Request    Caller is requesting a sooner appointment.  Caller declined first available appointment listed below.  Caller will not accept being placed on the waitlist and is requesting a message be sent to doctor.  Name of Caller:Ana Maria Teague   When is the first available appointment?7/8/2019  Symptoms:8 wk f-u  Would the patient rather a call back or a response via [facility]? call  Best Call Back Number:[phone]  Additional Information: n/a-pt is in need of an appointment around 11 AM

## 2019-06-18 ENCOUNTER — PATIENT MESSAGE (OUTPATIENT)
Dept: INTERNAL MEDICINE | Facility: CLINIC | Age: 59
End: 2019-06-18

## 2019-06-24 ENCOUNTER — PATIENT MESSAGE (OUTPATIENT)
Dept: DIABETES | Facility: CLINIC | Age: 59
End: 2019-06-24

## 2019-06-24 ENCOUNTER — PATIENT MESSAGE (OUTPATIENT)
Dept: NEPHROLOGY | Facility: CLINIC | Age: 59
End: 2019-06-24

## 2019-06-24 ENCOUNTER — PATIENT MESSAGE (OUTPATIENT)
Dept: FAMILY MEDICINE | Facility: CLINIC | Age: 59
End: 2019-06-24

## 2019-06-24 ENCOUNTER — PATIENT MESSAGE (OUTPATIENT)
Dept: PSYCHIATRY | Facility: CLINIC | Age: 59
End: 2019-06-24

## 2019-06-25 ENCOUNTER — OFFICE VISIT (OUTPATIENT)
Dept: PAIN MEDICINE | Facility: CLINIC | Age: 59
End: 2019-06-25
Payer: MEDICARE

## 2019-06-25 VITALS
RESPIRATION RATE: 18 BRPM | WEIGHT: 238 LBS | SYSTOLIC BLOOD PRESSURE: 130 MMHG | HEART RATE: 72 BPM | HEIGHT: 67 IN | DIASTOLIC BLOOD PRESSURE: 81 MMHG | BODY MASS INDEX: 37.35 KG/M2

## 2019-06-25 DIAGNOSIS — M47.816 LUMBAR SPONDYLOSIS: Primary | ICD-10-CM

## 2019-06-25 DIAGNOSIS — M51.36 DDD (DEGENERATIVE DISC DISEASE), LUMBAR: ICD-10-CM

## 2019-06-25 DIAGNOSIS — M54.16 LEFT LUMBAR RADICULOPATHY: ICD-10-CM

## 2019-06-25 DIAGNOSIS — M53.3 SACROILIAC JOINT PAIN: ICD-10-CM

## 2019-06-25 DIAGNOSIS — M54.59 LUMBAR FACET JOINT PAIN: ICD-10-CM

## 2019-06-25 PROCEDURE — 99999 PR PBB SHADOW E&M-EST. PATIENT-LVL IV: ICD-10-PCS | Mod: PBBFAC,,, | Performed by: PHYSICIAN ASSISTANT

## 2019-06-25 PROCEDURE — 99214 OFFICE O/P EST MOD 30 MIN: CPT | Mod: PBBFAC | Performed by: PHYSICIAN ASSISTANT

## 2019-06-25 PROCEDURE — 99214 OFFICE O/P EST MOD 30 MIN: CPT | Mod: S$PBB,,, | Performed by: PHYSICIAN ASSISTANT

## 2019-06-25 PROCEDURE — 99999 PR PBB SHADOW E&M-EST. PATIENT-LVL IV: CPT | Mod: PBBFAC,,, | Performed by: PHYSICIAN ASSISTANT

## 2019-06-25 PROCEDURE — 99214 PR OFFICE/OUTPT VISIT, EST, LEVL IV, 30-39 MIN: ICD-10-PCS | Mod: S$PBB,,, | Performed by: PHYSICIAN ASSISTANT

## 2019-06-25 RX ORDER — GABAPENTIN 300 MG/1
CAPSULE ORAL
Qty: 60 CAPSULE | Refills: 1 | Status: SHIPPED | OUTPATIENT
Start: 2019-06-25 | End: 2019-08-28 | Stop reason: SDUPTHER

## 2019-06-25 RX ORDER — OXYCODONE AND ACETAMINOPHEN 10; 325 MG/1; MG/1
1 TABLET ORAL EVERY 8 HOURS PRN
Qty: 90 TABLET | Refills: 0 | Status: SHIPPED | OUTPATIENT
Start: 2019-07-03 | End: 2019-08-02

## 2019-06-25 RX ORDER — OXYCODONE AND ACETAMINOPHEN 10; 325 MG/1; MG/1
1 TABLET ORAL EVERY 8 HOURS PRN
Qty: 90 TABLET | Refills: 0 | Status: SHIPPED | OUTPATIENT
Start: 2019-08-02 | End: 2019-08-16 | Stop reason: SDUPTHER
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
## 2019-06-27 ENCOUNTER — LAB VISIT (OUTPATIENT)
Dept: LAB | Facility: HOSPITAL | Age: 59
End: 2019-06-27
Attending: INTERNAL MEDICINE
Payer: MEDICARE

## 2019-06-27 DIAGNOSIS — I15.2 HYPERTENSION ASSOCIATED WITH DIABETES: ICD-10-CM

## 2019-06-27 DIAGNOSIS — Z29.9 PREVENTIVE MEASURE: ICD-10-CM

## 2019-06-27 DIAGNOSIS — E11.59 HYPERTENSION ASSOCIATED WITH DIABETES: ICD-10-CM

## 2019-06-27 LAB
ALBUMIN SERPL BCP-MCNC: 3.6 G/DL (ref 3.5–5.2)
ALP SERPL-CCNC: 112 U/L (ref 55–135)
ALT SERPL W/O P-5'-P-CCNC: 9 U/L (ref 10–44)
ANION GAP SERPL CALC-SCNC: 11 MMOL/L (ref 8–16)
AST SERPL-CCNC: 15 U/L (ref 10–40)
BASOPHILS # BLD AUTO: 0.08 K/UL (ref 0–0.2)
BASOPHILS NFR BLD: 0.8 % (ref 0–1.9)
BILIRUB SERPL-MCNC: 0.4 MG/DL (ref 0.1–1)
BUN SERPL-MCNC: 12 MG/DL (ref 6–20)
CALCIUM SERPL-MCNC: 9.8 MG/DL (ref 8.7–10.5)
CHLORIDE SERPL-SCNC: 105 MMOL/L (ref 95–110)
CHOLEST SERPL-MCNC: 168 MG/DL (ref 120–199)
CHOLEST/HDLC SERPL: 3.2 {RATIO} (ref 2–5)
CO2 SERPL-SCNC: 26 MMOL/L (ref 23–29)
CREAT SERPL-MCNC: 0.9 MG/DL (ref 0.5–1.4)
DIFFERENTIAL METHOD: ABNORMAL
EOSINOPHIL # BLD AUTO: 0.2 K/UL (ref 0–0.5)
EOSINOPHIL NFR BLD: 1.7 % (ref 0–8)
ERYTHROCYTE [DISTWIDTH] IN BLOOD BY AUTOMATED COUNT: 15.1 % (ref 11.5–14.5)
EST. GFR  (AFRICAN AMERICAN): >60 ML/MIN/1.73 M^2
EST. GFR  (NON AFRICAN AMERICAN): >60 ML/MIN/1.73 M^2
ESTIMATED AVG GLUCOSE: 157 MG/DL (ref 68–131)
GLUCOSE SERPL-MCNC: 69 MG/DL (ref 70–110)
HBA1C MFR BLD HPLC: 7.1 % (ref 4–5.6)
HCT VFR BLD AUTO: 41.2 % (ref 37–48.5)
HDLC SERPL-MCNC: 53 MG/DL (ref 40–75)
HDLC SERPL: 31.5 % (ref 20–50)
HGB BLD-MCNC: 12.2 G/DL (ref 12–16)
IMM GRANULOCYTES # BLD AUTO: 0.08 K/UL (ref 0–0.04)
IMM GRANULOCYTES NFR BLD AUTO: 0.8 % (ref 0–0.5)
LDLC SERPL CALC-MCNC: 102.2 MG/DL (ref 63–159)
LYMPHOCYTES # BLD AUTO: 2.4 K/UL (ref 1–4.8)
LYMPHOCYTES NFR BLD: 22.4 % (ref 18–48)
MCH RBC QN AUTO: 25.7 PG (ref 27–31)
MCHC RBC AUTO-ENTMCNC: 29.6 G/DL (ref 32–36)
MCV RBC AUTO: 87 FL (ref 82–98)
MONOCYTES # BLD AUTO: 0.4 K/UL (ref 0.3–1)
MONOCYTES NFR BLD: 4.2 % (ref 4–15)
NEUTROPHILS # BLD AUTO: 7.4 K/UL (ref 1.8–7.7)
NEUTROPHILS NFR BLD: 70.1 % (ref 38–73)
NONHDLC SERPL-MCNC: 115 MG/DL
NRBC BLD-RTO: 0 /100 WBC
PLATELET # BLD AUTO: 277 K/UL (ref 150–350)
PMV BLD AUTO: 12.3 FL (ref 9.2–12.9)
POTASSIUM SERPL-SCNC: 3.6 MMOL/L (ref 3.5–5.1)
PROT SERPL-MCNC: 7.5 G/DL (ref 6–8.4)
RBC # BLD AUTO: 4.75 M/UL (ref 4–5.4)
SODIUM SERPL-SCNC: 142 MMOL/L (ref 136–145)
TRIGL SERPL-MCNC: 64 MG/DL (ref 30–150)
TSH SERPL DL<=0.005 MIU/L-ACNC: 3.99 UIU/ML (ref 0.4–4)
WBC # BLD AUTO: 10.51 K/UL (ref 3.9–12.7)

## 2019-06-27 PROCEDURE — 80053 COMPREHEN METABOLIC PANEL: CPT

## 2019-06-27 PROCEDURE — 80061 LIPID PANEL: CPT

## 2019-06-27 PROCEDURE — 85025 COMPLETE CBC W/AUTO DIFF WBC: CPT

## 2019-06-27 PROCEDURE — 83036 HEMOGLOBIN GLYCOSYLATED A1C: CPT

## 2019-06-27 PROCEDURE — 84443 ASSAY THYROID STIM HORMONE: CPT

## 2019-06-27 PROCEDURE — 36415 COLL VENOUS BLD VENIPUNCTURE: CPT | Mod: PO

## 2019-06-28 ENCOUNTER — PATIENT MESSAGE (OUTPATIENT)
Dept: FAMILY MEDICINE | Facility: CLINIC | Age: 59
End: 2019-06-28

## 2019-06-30 ENCOUNTER — EXTERNAL CHRONIC CARE MANAGEMENT (OUTPATIENT)
Dept: PRIMARY CARE CLINIC | Facility: CLINIC | Age: 59
End: 2019-06-30
Payer: MEDICARE

## 2019-06-30 PROCEDURE — 99490 PR CHRONIC CARE MGMT, 1ST 20 MIN: ICD-10-PCS | Mod: S$PBB,,, | Performed by: INTERNAL MEDICINE

## 2019-06-30 PROCEDURE — 99490 CHRNC CARE MGMT STAFF 1ST 20: CPT | Mod: S$PBB,,, | Performed by: INTERNAL MEDICINE

## 2019-06-30 PROCEDURE — 99490 CHRNC CARE MGMT STAFF 1ST 20: CPT | Mod: PBBFAC,PO | Performed by: INTERNAL MEDICINE

## 2019-07-03 ENCOUNTER — OFFICE VISIT (OUTPATIENT)
Dept: FAMILY MEDICINE | Facility: CLINIC | Age: 59
End: 2019-07-03
Payer: MEDICARE

## 2019-07-03 VITALS
SYSTOLIC BLOOD PRESSURE: 118 MMHG | TEMPERATURE: 99 F | OXYGEN SATURATION: 96 % | HEART RATE: 85 BPM | DIASTOLIC BLOOD PRESSURE: 80 MMHG | HEIGHT: 67 IN | BODY MASS INDEX: 37.82 KG/M2 | WEIGHT: 240.94 LBS

## 2019-07-03 DIAGNOSIS — Z94.0 DECEASED-DONOR KIDNEY TRANSPLANT: Chronic | ICD-10-CM

## 2019-07-03 DIAGNOSIS — E78.00 PURE HYPERCHOLESTEROLEMIA: ICD-10-CM

## 2019-07-03 DIAGNOSIS — N25.81 SECONDARY HYPERPARATHYROIDISM OF RENAL ORIGIN: Chronic | ICD-10-CM

## 2019-07-03 DIAGNOSIS — F41.9 ANXIETY AND DEPRESSION: ICD-10-CM

## 2019-07-03 DIAGNOSIS — E78.5 HYPERLIPIDEMIA ASSOCIATED WITH TYPE 2 DIABETES MELLITUS: ICD-10-CM

## 2019-07-03 DIAGNOSIS — E11.69 HYPERLIPIDEMIA ASSOCIATED WITH TYPE 2 DIABETES MELLITUS: ICD-10-CM

## 2019-07-03 DIAGNOSIS — E03.9 ACQUIRED HYPOTHYROIDISM: ICD-10-CM

## 2019-07-03 DIAGNOSIS — M81.0 AGE-RELATED OSTEOPOROSIS WITHOUT CURRENT PATHOLOGICAL FRACTURE: ICD-10-CM

## 2019-07-03 DIAGNOSIS — I15.2 HYPERTENSION ASSOCIATED WITH DIABETES: ICD-10-CM

## 2019-07-03 DIAGNOSIS — Z29.89 NEED FOR PROPHYLACTIC IMMUNOTHERAPY: Chronic | ICD-10-CM

## 2019-07-03 DIAGNOSIS — F32.A ANXIETY AND DEPRESSION: ICD-10-CM

## 2019-07-03 DIAGNOSIS — E11.59 HYPERTENSION ASSOCIATED WITH DIABETES: ICD-10-CM

## 2019-07-03 DIAGNOSIS — F51.01 PRIMARY INSOMNIA: ICD-10-CM

## 2019-07-03 DIAGNOSIS — Z00.00 ENCOUNTER FOR PREVENTIVE HEALTH EXAMINATION: ICD-10-CM

## 2019-07-03 DIAGNOSIS — Z12.31 ENCOUNTER FOR SCREENING MAMMOGRAM FOR MALIGNANT NEOPLASM OF BREAST: ICD-10-CM

## 2019-07-03 DIAGNOSIS — J41.0 SIMPLE CHRONIC BRONCHITIS: ICD-10-CM

## 2019-07-03 DIAGNOSIS — E66.01 SEVERE OBESITY WITH BODY MASS INDEX (BMI) OF 35.0 TO 35.9 AND COMORBIDITY: ICD-10-CM

## 2019-07-03 PROCEDURE — 99215 OFFICE O/P EST HI 40 MIN: CPT | Mod: PBBFAC,PO,25 | Performed by: INTERNAL MEDICINE

## 2019-07-03 PROCEDURE — 90471 IMMUNIZATION ADMIN: CPT | Mod: PBBFAC,PO

## 2019-07-03 PROCEDURE — 99999 PR PBB SHADOW E&M-EST. PATIENT-LVL V: CPT | Mod: PBBFAC,,, | Performed by: INTERNAL MEDICINE

## 2019-07-03 PROCEDURE — 99999 PR PBB SHADOW E&M-EST. PATIENT-LVL V: ICD-10-PCS | Mod: PBBFAC,,, | Performed by: INTERNAL MEDICINE

## 2019-07-03 PROCEDURE — 99214 PR OFFICE/OUTPT VISIT, EST, LEVL IV, 30-39 MIN: ICD-10-PCS | Mod: 25,S$PBB,, | Performed by: INTERNAL MEDICINE

## 2019-07-03 PROCEDURE — 99214 OFFICE O/P EST MOD 30 MIN: CPT | Mod: 25,S$PBB,, | Performed by: INTERNAL MEDICINE

## 2019-07-03 RX ORDER — ATORVASTATIN CALCIUM 20 MG/1
20 TABLET, FILM COATED ORAL DAILY
Qty: 30 TABLET | Refills: 11 | Status: SHIPPED | OUTPATIENT
Start: 2019-07-03 | End: 2020-06-25

## 2019-07-03 RX ORDER — INSULIN GLARGINE 100 [IU]/ML
45 INJECTION, SOLUTION SUBCUTANEOUS DAILY
Qty: 15 ML | Refills: 6
Start: 2019-07-03 | End: 2021-03-24

## 2019-07-03 RX ORDER — ZOLPIDEM TARTRATE 5 MG/1
5 TABLET ORAL NIGHTLY PRN
Qty: 30 TABLET | Refills: 1 | Status: SHIPPED | OUTPATIENT
Start: 2019-07-03 | End: 2020-06-25 | Stop reason: SDUPTHER

## 2019-07-03 NOTE — PROGRESS NOTES
"Subjective:      Patient ID: Ana Maria Teague is a 58 y.o. female.    Chief Complaint: Follow-up      HPI  Here for f/u medical problems and preventive exam.  Takes a lot of antirejection meds on 12h schedule.    BMD last year with hi fx risk hip.  Weekly fosamax WOULD BE DIFFICULT to take correctly.    Doesn't tolerate effexor, hasn't f/u with Psych.  AC breakfast sugars 50-90.  Taking 50u Lantus at hs.  Also on humalog.    No f/c/sw/cough.  No cp/sob/palp.  BMs normal, daily.  Urine normal.  Takes vit D.  Daily narcotic.  Chronic pain.  Hasn't been taking lipitor for over 6mo, doesn't think had s/e.    HM: 11/18 fluvax, 7/19 today HAV, 6/15 [id number], 3/14 [id number], 6/15 TDaP, 7/18 MMG, 7/18 BMD with OP needs treatment rep 2y, 1/11 Cscope rep 5-10y, 11/10 HCV neg, Pain DrKiko Anaya, 8/18 Eye Dr. Martin.         Review of Systems   Constitutional: Negative for appetite change, chills, diaphoresis and fever.   HENT: Negative for congestion, ear pain, rhinorrhea, sinus pressure and sore throat.    Respiratory: Negative for cough, chest tightness and shortness of breath.    Cardiovascular: Negative for chest pain and palpitations.   Gastrointestinal: Negative for blood in stool, constipation, diarrhea, nausea and vomiting.   Genitourinary: Negative for dysuria, frequency, hematuria, menstrual problem, urgency and vaginal discharge.   Musculoskeletal: Negative for arthralgias.   Skin: Negative for rash.   Neurological: Negative for dizziness and headaches.   Psychiatric/Behavioral: Negative for sleep disturbance. The patient is not nervous/anxious.          Objective:   /80 (BP Location: Right arm, Patient Position: Sitting, BP Method: Large (Manual))   Pulse 85   Temp 98.6 °F (37 °C) (Oral)   Ht 5' 7" (1.702 m)   Wt 109.3 kg (240 lb 15.4 oz)   SpO2 96%   BMI 37.74 kg/m²     Physical Exam   Constitutional: She is oriented to person, place, and time. She appears well-developed and well-nourished.   HENT:   Right Ear: " External ear normal. Tympanic membrane is not injected.   Left Ear: External ear normal. Tympanic membrane is not injected.   Mouth/Throat: Oropharynx is clear and moist.   Eyes: Conjunctivae are normal.   Neck: Normal range of motion. Neck supple. No thyromegaly present.   Cardiovascular: Normal rate, regular rhythm and intact distal pulses. Exam reveals no gallop and no friction rub.   No murmur heard.  Pulses:       Dorsalis pedis pulses are 2+ on the right side, and 2+ on the left side.        Posterior tibial pulses are 2+ on the right side, and 2+ on the left side.   Pulmonary/Chest: Effort normal and breath sounds normal. She has no wheezes. She has no rales. Right breast exhibits no mass, no nipple discharge, no skin change and no tenderness. Left breast exhibits no mass, no nipple discharge, no skin change and no tenderness.   Abdominal: Soft. Bowel sounds are normal. She exhibits no mass. There is no tenderness.   Musculoskeletal: She exhibits no edema.   Feet:   Right Foot:   Protective Sensation: 10 sites tested. 10 sites sensed.   Skin Integrity: Negative for ulcer, blister, skin breakdown, erythema, warmth, callus or dry skin.   Left Foot:   Protective Sensation: 10 sites tested. 10 sites sensed.   Skin Integrity: Negative for ulcer, blister, skin breakdown, erythema, warmth, callus or dry skin.   Lymphadenopathy:     She has no cervical adenopathy.        Right axillary: No lateral adenopathy present.        Left axillary: No lateral adenopathy present.  Neurological: She is alert and oriented to person, place, and time.   Skin: Skin is warm. No rash noted.   Psychiatric: She has a normal mood and affect.           Assessment:       1. Uncontrolled type 2 diabetes mellitus with stage 3 chronic kidney disease, with long-term current use of insulin    2. Encounter for preventive health examination    3. Acquired hypothyroidism    4. Age-related osteoporosis without current pathological fracture    5.  Anxiety and depression    6. Simple chronic bronchitis    7. Chronic immunosuppression with Prograf and Cellcept    8. -donor kidney transplant - 13    9. Hypertension associated with diabetes    10. Hyperlipidemia associated with type 2 diabetes mellitus    11. Secondary hyperparathyroidism of renal origin    12. Severe obesity with body mass index (BMI) of 35.0 to 35.9 and comorbidity    13. Encounter for screening mammogram for malignant neoplasm of breast     14. Uncontrolled type 2 diabetes mellitus without complication, with long-term current use of insulin    15. Pure hypercholesterolemia    16. Primary insomnia        Results for KEVAN CARBONE (MRN [id number]) as of 7/3/2019 08:45   Ref. Range 2019 08:07 2019 08:11   WBC Latest Ref Range: 3.90 - 12.70 K/uL  10.51   RBC Latest Ref Range: 4.00 - 5.40 M/uL  4.75   Hemoglobin Latest Ref Range: 12.0 - 16.0 g/dL  12.2   Hematocrit Latest Ref Range: 37.0 - 48.5 %  41.2   MCV Latest Ref Range: 82 - 98 fL  87   MCH Latest Ref Range: 27.0 - 31.0 pg  25.7 (L)   MCHC Latest Ref Range: 32.0 - 36.0 g/dL  29.6 (L)   RDW Latest Ref Range: 11.5 - 14.5 %  15.1 (H)   Platelets Latest Ref Range: 150 - 350 K/uL  277   MPV Latest Ref Range: 9.2 - 12.9 fL  12.3   Gran% Latest Ref Range: 38.0 - 73.0 %  70.1   Gran # (ANC) Latest Ref Range: 1.8 - 7.7 K/uL  7.4   Lymph% Latest Ref Range: 18.0 - 48.0 %  22.4   Lymph # Latest Ref Range: 1.0 - 4.8 K/uL  2.4   Mono% Latest Ref Range: 4.0 - 15.0 %  4.2   Mono # Latest Ref Range: 0.3 - 1.0 K/uL  0.4   Eosinophil% Latest Ref Range: 0.0 - 8.0 %  1.7   Eos # Latest Ref Range: 0.0 - 0.5 K/uL  0.2   Basophil% Latest Ref Range: 0.0 - 1.9 %  0.8   Baso # Latest Ref Range: 0.00 - 0.20 K/uL  0.08   nRBC Latest Ref Range: 0 /100 WBC  0   Differential Method Unknown  Automated   Immature Grans (Abs) Latest Ref Range: 0.00 - 0.04 K/uL  0.08 (H)   Immature Granulocytes Latest Ref Range: 0.0 - 0.5 %  0.8 (H)   Sodium Latest Ref  Range: 136 - 145 mmol/L  142   Potassium Latest Ref Range: 3.5 - 5.1 mmol/L  3.6   Chloride Latest Ref Range: 95 - 110 mmol/L  105   CO2 Latest Ref Range: 23 - 29 mmol/L  26   Anion Gap Latest Ref Range: 8 - 16 mmol/L  11   BUN, Bld Latest Ref Range: 6 - 20 mg/dL  12   Creatinine Latest Ref Range: 0.5 - 1.4 mg/dL  0.9   eGFR if non African American Latest Ref Range: >60 mL/min/1.73 m^2  >60.0   eGFR if African American Latest Ref Range: >60 mL/min/1.73 m^2  >60.0   Glucose Latest Ref Range: 70 - 110 mg/dL  69 (L)   Calcium Latest Ref Range: 8.7 - 10.5 mg/dL  9.8   Alkaline Phosphatase Latest Ref Range: 55 - 135 U/L  112   PROTEIN TOTAL Latest Ref Range: 6.0 - 8.4 g/dL  7.5   Albumin Latest Ref Range: 3.5 - 5.2 g/dL  3.6   BILIRUBIN TOTAL Latest Ref Range: 0.1 - 1.0 mg/dL  0.4   AST Latest Ref Range: 10 - 40 U/L  15   ALT Latest Ref Range: 10 - 44 U/L  9 (L)   Triglycerides Latest Ref Range: 30 - 150 mg/dL  64   Cholesterol Latest Ref Range: 120 - 199 mg/dL  168   HDL Latest Ref Range: 40 - 75 mg/dL  53   Hdl/Cholesterol Ratio Latest Ref Range: 20.0 - 50.0 %  31.5   LDL Cholesterol External Latest Ref Range: 63.0 - 159.0 mg/dL  102.2   Non-HDL Cholesterol Latest Units: mg/dL  115   Total Cholesterol/HDL Ratio Latest Ref Range: 2.0 - 5.0   3.2   Hemoglobin A1C External Latest Ref Range: 4.0 - 5.6 %  7.1 (H)   Estimated Avg Glucose Latest Ref Range: 68 - 131 mg/dL  157 (H)   TSH Latest Ref Range: 0.400 - 4.000 uIU/mL  3.994   Microalbum.,U,Random Latest Units: ug/mL 5.0    Creatinine, Random Ur Latest Ref Range: 15.0 - 325.0 mg/dL 39.0    MICROALB/CREAT RATIO Latest Ref Range: 0.0 - 30.0 ug/mg 12.8      Plan:     Uncontrolled type 2 diabetes mellitus with stage 3 chronic kidney disease, with long-term current use of insulin- overcontrolled, decrease lantus to 45u daily.  Recheck 4mo.  -     insulin glargine 100 units/mL (3mL) SubQ pen; Inject 45 Units into the skin once daily.  Dispense: 15 mL; Refill: 6  -      Hemoglobin A1c; Future; Expected date: 2019    Encounter for preventive health examination-  -     Mammo Digital Screening Bilat; Future; Expected date: 2019  -     atorvastatin (LIPITOR) 20 MG tablet; Take 1 tablet (20 mg total) by mouth once daily.  Dispense: 30 tablet; Refill: 11    Acquired hypothyroidism- Clinically stable, continue present treatment.    Age-related osteoporosis without current pathological fracture, needs treatment.  -     Ambulatory referral to Rheumatology    Anxiety and depression- intol of SNRI.  Will use occas ambien for sleep.  F/u with Psychiatry.    Simple chronic bronchitis- stable, cont prn albuterol.    Chronic immunosuppression with Prograf and Cellcept,-donor kidney transplant - 13    Hypertension associated with diabetes- stable, cont rx.    Hyperlipidemia associated with type 2 diabetes mellitus- restart statin, recheck 4mo.  -     atorvastatin (LIPITOR) 20 MG tablet; Take 1 tablet (20 mg total) by mouth once daily.  Dispense: 30 tablet; Refill: 11  -     Lipid panel; Future; Expected date: 2019    Secondary hyperparathyroidism of renal origin- per Nephro.    Severe obesity with body mass index (BMI) of 35.0 to 35.9 and comorbidity- work on diet.    Encounter for screening mammogram for malignant neoplasm of breast   -     Mammo Digital Screening Bilat; Future; Expected date: 2019    RTC 4 mo.

## 2019-07-11 ENCOUNTER — PATIENT MESSAGE (OUTPATIENT)
Dept: PHYSICAL MEDICINE AND REHAB | Facility: CLINIC | Age: 59
End: 2019-07-11

## 2019-07-16 ENCOUNTER — PATIENT MESSAGE (OUTPATIENT)
Dept: NEPHROLOGY | Facility: CLINIC | Age: 59
End: 2019-07-16

## 2019-07-16 ENCOUNTER — OFFICE VISIT (OUTPATIENT)
Dept: NEPHROLOGY | Facility: CLINIC | Age: 59
End: 2019-07-16
Payer: MEDICARE

## 2019-07-16 VITALS
WEIGHT: 239.44 LBS | HEART RATE: 76 BPM | DIASTOLIC BLOOD PRESSURE: 68 MMHG | HEIGHT: 67 IN | BODY MASS INDEX: 37.58 KG/M2 | SYSTOLIC BLOOD PRESSURE: 124 MMHG

## 2019-07-16 DIAGNOSIS — E55.9 VITAMIN D DEFICIENCY: ICD-10-CM

## 2019-07-16 DIAGNOSIS — Z94.0 S/P KIDNEY TRANSPLANT: Primary | ICD-10-CM

## 2019-07-16 PROCEDURE — 99214 PR OFFICE/OUTPT VISIT, EST, LEVL IV, 30-39 MIN: ICD-10-PCS | Mod: S$PBB,,, | Performed by: INTERNAL MEDICINE

## 2019-07-16 PROCEDURE — 99213 OFFICE O/P EST LOW 20 MIN: CPT | Mod: PBBFAC | Performed by: INTERNAL MEDICINE

## 2019-07-16 PROCEDURE — 99999 PR PBB SHADOW E&M-EST. PATIENT-LVL III: CPT | Mod: PBBFAC,,, | Performed by: INTERNAL MEDICINE

## 2019-07-16 PROCEDURE — 99214 OFFICE O/P EST MOD 30 MIN: CPT | Mod: S$PBB,,, | Performed by: INTERNAL MEDICINE

## 2019-07-16 PROCEDURE — 99999 PR PBB SHADOW E&M-EST. PATIENT-LVL III: ICD-10-PCS | Mod: PBBFAC,,, | Performed by: INTERNAL MEDICINE

## 2019-07-16 RX ORDER — ERGOCALCIFEROL 1.25 MG/1
50000 CAPSULE ORAL
Qty: 4 CAPSULE | Refills: 6 | Status: SHIPPED | OUTPATIENT
Start: 2019-07-16 | End: 2020-05-11

## 2019-07-16 NOTE — PROGRESS NOTES
Subjective:       Patient ID: Ana Maria Teague is a 58 y.o.   female who presents for follow-up evaluation of CRT - 2013, HTN, anemia, SHPT        Karine Fernandez MD      HPI : Ana Maria Teague Is a pleasant 58 -year-old  woman see in office today in followup for above medical problems. I saw her in clinic about a year ago, lost to f/u ,  She is currently taking Prograf 5 mg in AM 4 mg  In PM , CellCept 500 mg twice a day. Other providerin the interim were reviewed.  She had a sleep study that ruled out obstructive sleep apnea, she has nocturnal hypoxemia, she was started on 2 L of oxygen at night by nasal cannula. Recent lab results were discussed with the patient. Renal function is stable with a creatinine less than  1 Mg/dL. Had appt with vascular surgery , Dr Calloway in 18 and right AV graft removed ( has been hurting ) , has one in [facility], wants removed       Important Medical Info :      Patient has history of end-stage renal disease on hemodialysis for about 9 years at [facility] hemodialysis unit. She underwent a cadaveric renal transplant in 2013 at [facility] in [place]. Her hospital stay was complicated by incisional hematoma and bleeding requiring patient to return to OR for wash out. She also experienced delay of graft function, requiring HD.       Past Medical History:   Diagnosis Date    Abnormal glucose 2013    Acquired hypothyroidism     Acute bronchiolitis 10/15/2014    Anemia     Anemia of chronic renal failure 2013    Anxiety     Anxiety disorder     Avascular necrosis of bone of hip     Back pain     s/p steroid injections    Chronic immunosuppression with Prograf and Cellcept 2013    CKD (chronic kidney disease) stage 2, GFR 60-89 ml/min     CKD (chronic kidney disease) stage 5, GFR less than 15 ml/min     -donor kidney transplant - 13    Depression     Hypertension, renal 2013     Hypothyroidism     Immunosuppressed status 10/15/2014    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 Transplantation 2/7/2014    Osteoporosis with pathological fracture     Renal disease due to hypertension 11/11/2013    Renal hypertension, non-vascular     Secondary hyperparathyroidism of renal origin 9/30/2013    Vertigo        Current Outpatient Medications on File Prior to Visit   Medication Sig Dispense Refill    albuterol 90 mcg/actuation inhaler Inhale 2 puffs into the lungs.      albuterol-ipratropium 2.5mg-0.5mg/3mL (DUO-NEB) 0.5 mg-3 mg(2.5 mg base)/3 mL nebulizer solution Take 3 mLs by nebulization 3 (three) times daily. 270 mL 5    atorvastatin (LIPITOR) 20 MG tablet Take 1 tablet (20 mg total) by mouth once daily. 30 tablet 11    blood sugar diagnostic (FREESTYLE LITE STRIPS) Strp Use to check blood glucose 3 times a day. 100 strip 11    cetirizine (ZYRTEC) 10 MG tablet Take 1 tablet (10 mg total) by mouth once daily. 30 tablet 11    cyclobenzaprine (FLEXERIL) 10 MG tablet Take 1 tablet (10 mg total) by mouth 3 (three) times daily as needed for Muscle spasms. 30 tablet 1    ergocalciferol (ERGOCALCIFEROL) 50,000 unit Cap Take 1 capsule (50,000 Units total) by mouth every 7 days. 4 capsule 6    fluticasone (FLONASE) 50 mcg/actuation nasal spray 2 sprays (100 mcg total) by Each Nare route once daily. 16 g 6    gabapentin (NEURONTIN) 300 MG capsule Take 1-2 capsules by mouth every night 60 capsule 1    insulin glargine 100 units/mL (3mL) SubQ pen Inject 45 Units into the skin once daily. 15 mL 6    insulin lispro (HUMALOG KWIKPEN INSULIN) 100 unit/mL pen INJECT UP TO 18 UNITS UNDER THE SKIN THREE TIMES DAILY 10 TO 15 MINUTES BEFORE MEALS AS DIRECTED IN AFTER VISIT SUMMARY 45 mL 0    lancets (FREESTYLE LANCETS) 28 gauge Misc 1 lancet by Combination route 2 (two) times daily as needed. Qid prn 400 each 2    lancets Misc 1 strip by Misc.(Non-Drug; Combo Route) route 4 (four) times daily with meals and  "nightly. 150 each 6    levothyroxine (SYNTHROID) 150 MCG tablet Take 1 tablet (150 mcg total) by mouth before breakfast. 90 tablet 3    LORazepam (ATIVAN) 1 MG tablet Take 1 tablet (1 mg total) by mouth daily as needed for Anxiety. 30 tablet 0    mycophenolate (CELLCEPT) 250 mg Cap TAKE (2) CAPSULES TWICE DAILY. 120 capsule 11    NIFEdipine (PROCARDIA-XL) 30 MG (OSM) 24 hr tablet TAKE 1 TABLET BY MOUTH EVERY DAY 30 tablet 11    ondansetron (ZOFRAN) 4 MG tablet Take 1 tablet (4 mg total) by mouth every 12 (twelve) hours as needed for Nausea. 40 tablet 4    oxyCODONE-acetaminophen (PERCOCET)  mg per tablet Take 1 tablet by mouth every 8 (eight) hours as needed for Pain. 90 tablet 0    [START ON 8/2/2019] oxyCODONE-acetaminophen (PERCOCET)  mg per tablet Take 1 tablet by mouth every 8 (eight) hours as needed for Pain. 90 tablet 0    pen needle, diabetic (BD ULTRA-FINE RORY PEN NEEDLE) 32 gauge x 5/32" Ndle USE AS DIRECTED WITH INSULIN PEN FOUR TIMES DAILY 100 each 11    promethazine (PHENERGAN) 25 MG tablet Take 1 tablet (25 mg total) by mouth every 6 (six) hours as needed for Nausea. 14 tablet 0    tacrolimus (PROGRAF) 1 MG Cap TAKE 5 CAPSULES IN THE MORNING, AND 4 IN THE EVENING 270 capsule 11    zolpidem (AMBIEN) 5 MG Tab Take 1 tablet (5 mg total) by mouth nightly as needed. 30 tablet 1     No current facility-administered medications on file prior to visit.          Review of Systems  :    Constitutional: Negative for fever, activity change, appetite change and fatigue.    HENT: Negative for congestion, sore throat, facial swelling, trouble swallowing and voice change.    Eyes: Negative for redness and visual disturbance.    Respiratory: Negative for apnea, cough, chest tightness, shortness of breath and wheezing.    Cardiovascular: Negative for chest pain, palpitations , has trace leg swelling.    Gastrointestinal: Negative for nausea, vomiting, abdominal pain, diarrhea, constipation, blood " in stool and abdominal distention.    Genitourinary: Negative for dysuria, urgency, frequency, hematuria, flank pain, decreased urine volume, difficulty urinating and pelvic pain.    Musculoskeletal: Positive for back pain, no joint swelling , she has pain in her left wrist from carpal tunnel syndrome.    Skin: Negative for color change and rash.  Right arm AV graft noted,  Neurological: Negative for dizziness, syncope, weakness and headaches.    Hematological: Does not bruise/bleed easily.    Psychiatric/Behavioral: Negative for behavioral problems, confusion and agitation. The patient is not nervous/anxious.           Objective:         Vitals:    07/16/19 1011   BP: 124/68   Pulse: 76       Weight 239 lbs , stable       Physical Exam  :      Constitutional: She is oriented to person, place, and time. She appears well-developed and well-nourished. No distress.    HENT: Head: Normocephalic and atraumatic.   Oropharynx is clear and moist. No oropharyngeal exudate.    Eyes: Conjunctivae normal and EOM are normal. Pupils are equal, round, and reactive to light. No scleral icterus.    Neck: Normal range of motion. Neck supple. No JVD present. Carotid bruit is not present. No tracheal deviation present. No mass and no thyromegaly present.    Cardiovascular: Normal rate, regular rhythm, normal heart sounds and intact distal pulses. Exam reveals no gallop and no friction rub. No murmur heard.    Pulmonary/Chest: Effort normal and breath sounds normal. No respiratory distress. She has no wheezes. She has no rales. She exhibits no tenderness.    Abdominal: obese, Soft. Bowel sounds are normal. She exhibits no distension, no abdominal bruit, no ascites and no mass. There is no hepatosplenomegaly. There is no tenderness. There is no rebound, no guarding and no CVA tenderness. No allograft tenderness   Musculoskeletal: Normal range of motion. She exhibits no  edema and no tenderness. Healed scar in right upper extremity from  removal of old AV graft, has an AV graft in the left arm with no thrill,  Lymphadenopathy: She has no cervical adenopathy.   Neurological: She is alert and oriented to person, place, and time. No cranial nerve deficit. She exhibits normal muscle tone. Coordination normal.    Skin: Skin is warm and intact. No rash noted. No erythema. No pallor.   Psychiatric: She has a normal mood and affect. Her behavior is normal. Judgment normal.        Labs:    Lab Results   Component Value Date    CREATININE 0.9 06/27/2019    BUN 12 06/27/2019     06/27/2019    K 3.6 06/27/2019     06/27/2019    CO2 26 06/27/2019       Lab Results   Component Value Date    WBC 10.51 06/27/2019    HGB 12.2 06/27/2019    HCT 41.2 06/27/2019    MCV 87 06/27/2019     06/27/2019       Lab Results   Component Value Date    PTH 77.0 08/14/2018    CALCIUM 9.8 06/27/2019    CAION 1.17 10/08/2013    PHOS 3.4 03/27/2019       Lab Results   Component Value Date    ALBUMIN 3.6 06/27/2019     Prograf level - 6.7        Impression and plan - 58  -year-old  woman seen office today in followup for following medical problems ,      1. Status post cadaveric renal transplant ( 9/2013 ) - stable renal function with a serum creatinine of 1 mg/dL. Most recent Prograf level is 6.7  (target 4-7) , she's currently on Prograf 5 mg in AM and 4  Mg in PM  , cont CellCept 500 mg twice a day for immunosuppression.      2. Hypertension - blood pressure is controlled ,       3. Anemia - her hemoglobin is currently stable at 12.2 g, Will  continue to follow      4. Secondary hyperparathyroidism - PTH is 77 , on Ergocalciferol once a week ,      5. Diabetes mellitus type 2 - discussed importance of weight loss and adherence with her medications and diabetic diet. Also discussed daily exercise     6. Volume - recent echocardiogram shows LV diastolic dysfunction,  discussed salt restricted diet, on Lasix 20 mg daily as needed basis.      7.  Obesity - discussed daily exercise and weight loss.      8. AV graft in CORI, removed on the right side by Dr Calloway , wants CORI AVG removed too,         I will see her in followup in about 4 months. Face-to-face time was 25 minutes reviewing her lab results and plan of care.       Yuval Medina M.D.

## 2019-07-28 RX ORDER — NIFEDIPINE 30 MG/1
TABLET, EXTENDED RELEASE ORAL
Qty: 30 TABLET | Refills: 11 | Status: SHIPPED | OUTPATIENT
Start: 2019-07-28 | End: 2019-08-01 | Stop reason: ALTCHOICE

## 2019-07-31 ENCOUNTER — EXTERNAL CHRONIC CARE MANAGEMENT (OUTPATIENT)
Dept: PRIMARY CARE CLINIC | Facility: CLINIC | Age: 59
End: 2019-07-31
Payer: MEDICARE

## 2019-07-31 PROCEDURE — 99490 CHRNC CARE MGMT STAFF 1ST 20: CPT | Mod: S$PBB,,, | Performed by: INTERNAL MEDICINE

## 2019-07-31 PROCEDURE — 99490 PR CHRONIC CARE MGMT, 1ST 20 MIN: ICD-10-PCS | Mod: S$PBB,,, | Performed by: INTERNAL MEDICINE

## 2019-07-31 PROCEDURE — 99490 CHRNC CARE MGMT STAFF 1ST 20: CPT | Mod: PBBFAC,PO | Performed by: INTERNAL MEDICINE

## 2019-08-01 ENCOUNTER — PATIENT MESSAGE (OUTPATIENT)
Dept: PULMONOLOGY | Facility: CLINIC | Age: 59
End: 2019-08-01

## 2019-08-01 ENCOUNTER — PATIENT MESSAGE (OUTPATIENT)
Dept: NEPHROLOGY | Facility: CLINIC | Age: 59
End: 2019-08-01

## 2019-08-01 DIAGNOSIS — I10 HTN (HYPERTENSION), BENIGN: Primary | ICD-10-CM

## 2019-08-01 RX ORDER — AMLODIPINE BESYLATE 5 MG/1
5 TABLET ORAL DAILY
Qty: 30 TABLET | Refills: 11 | Status: SHIPPED | OUTPATIENT
Start: 2019-08-01 | End: 2020-04-01 | Stop reason: ALTCHOICE

## 2019-08-01 RX ORDER — FUROSEMIDE 20 MG/1
20 TABLET ORAL DAILY PRN
Qty: 30 TABLET | Refills: 11 | Status: SHIPPED | OUTPATIENT
Start: 2019-08-01 | End: 2022-01-10 | Stop reason: SDUPTHER

## 2019-08-01 NOTE — PROGRESS NOTES
Patient reports that her insurance co-pay for nifedipine 30 mg daily is 40 dollars a month, will try amlodipine 5 mg daily,

## 2019-08-16 ENCOUNTER — PATIENT MESSAGE (OUTPATIENT)
Dept: PAIN MEDICINE | Facility: CLINIC | Age: 59
End: 2019-08-16

## 2019-08-16 RX ORDER — OXYCODONE AND ACETAMINOPHEN 10; 325 MG/1; MG/1
1 TABLET ORAL EVERY 8 HOURS PRN
Qty: 90 TABLET | Refills: 0 | Status: SHIPPED | OUTPATIENT
Start: 2019-08-16 | End: 2019-09-15

## 2019-08-19 ENCOUNTER — TELEPHONE (OUTPATIENT)
Dept: PAIN MEDICINE | Facility: CLINIC | Age: 59
End: 2019-08-19

## 2019-08-20 ENCOUNTER — PATIENT OUTREACH (OUTPATIENT)
Dept: ADMINISTRATIVE | Facility: HOSPITAL | Age: 59
End: 2019-08-20

## 2019-08-20 NOTE — PROGRESS NOTES
Attempted to contact patient in response to community message from CARR[place] Candel[place].  No answer. Left a detailed message including all back number. Patient already has a scheduled appointment with Rheumatology 10/30/19 with Dr. BRADY

## 2019-08-21 RX ORDER — OXYCODONE AND ACETAMINOPHEN 10; 325 MG/1; MG/1
1 TABLET ORAL EVERY 8 HOURS PRN
Qty: 90 TABLET | Refills: 0 | Status: SHIPPED | OUTPATIENT
Start: 2019-10-15 | End: 2019-11-14

## 2019-08-21 RX ORDER — OXYCODONE AND ACETAMINOPHEN 10; 325 MG/1; MG/1
1 TABLET ORAL EVERY 8 HOURS PRN
Qty: 90 TABLET | Refills: 0 | Status: SHIPPED | OUTPATIENT
Start: 2019-09-15 | End: 2019-10-15

## 2019-08-26 ENCOUNTER — PATIENT MESSAGE (OUTPATIENT)
Dept: PAIN MEDICINE | Facility: CLINIC | Age: 59
End: 2019-08-26

## 2019-08-26 DIAGNOSIS — Z79.4 UNCONTROLLED TYPE 2 DIABETES MELLITUS WITH HYPERGLYCEMIA, WITH LONG-TERM CURRENT USE OF INSULIN: ICD-10-CM

## 2019-08-26 DIAGNOSIS — E11.65 UNCONTROLLED TYPE 2 DIABETES MELLITUS WITH HYPERGLYCEMIA, WITH LONG-TERM CURRENT USE OF INSULIN: ICD-10-CM

## 2019-08-26 RX ORDER — INSULIN LISPRO 100 [IU]/ML
INJECTION, SOLUTION INTRAVENOUS; SUBCUTANEOUS
Qty: 45 ML | Refills: 11 | Status: SHIPPED | OUTPATIENT
Start: 2019-08-26 | End: 2021-06-09 | Stop reason: SDUPTHER

## 2019-08-26 RX ORDER — INSULIN GLARGINE 100 [IU]/ML
INJECTION, SOLUTION SUBCUTANEOUS
Qty: 15 ML | Refills: 6 | Status: SHIPPED | OUTPATIENT
Start: 2019-08-26 | End: 2021-07-19 | Stop reason: SDUPTHER

## 2019-08-28 ENCOUNTER — OFFICE VISIT (OUTPATIENT)
Dept: PAIN MEDICINE | Facility: CLINIC | Age: 59
End: 2019-08-28
Payer: MEDICARE

## 2019-08-28 VITALS
BODY MASS INDEX: 37.51 KG/M2 | HEART RATE: 69 BPM | WEIGHT: 239 LBS | HEIGHT: 67 IN | DIASTOLIC BLOOD PRESSURE: 84 MMHG | RESPIRATION RATE: 18 BRPM | SYSTOLIC BLOOD PRESSURE: 135 MMHG

## 2019-08-28 DIAGNOSIS — M53.3 SACROILIAC JOINT PAIN: Primary | ICD-10-CM

## 2019-08-28 DIAGNOSIS — M47.816 LUMBAR SPONDYLOSIS: ICD-10-CM

## 2019-08-28 DIAGNOSIS — M54.16 LEFT LUMBAR RADICULOPATHY: ICD-10-CM

## 2019-08-28 DIAGNOSIS — M54.59 LUMBAR FACET JOINT PAIN: ICD-10-CM

## 2019-08-28 DIAGNOSIS — M51.36 DDD (DEGENERATIVE DISC DISEASE), LUMBAR: ICD-10-CM

## 2019-08-28 PROCEDURE — 99214 OFFICE O/P EST MOD 30 MIN: CPT | Mod: PBBFAC | Performed by: PHYSICIAN ASSISTANT

## 2019-08-28 PROCEDURE — 99214 PR OFFICE/OUTPT VISIT, EST, LEVL IV, 30-39 MIN: ICD-10-PCS | Mod: S$PBB,,, | Performed by: PHYSICIAN ASSISTANT

## 2019-08-28 PROCEDURE — 99999 PR PBB SHADOW E&M-EST. PATIENT-LVL IV: ICD-10-PCS | Mod: PBBFAC,,, | Performed by: PHYSICIAN ASSISTANT

## 2019-08-28 PROCEDURE — 99214 OFFICE O/P EST MOD 30 MIN: CPT | Mod: S$PBB,,, | Performed by: PHYSICIAN ASSISTANT

## 2019-08-28 PROCEDURE — 99999 PR PBB SHADOW E&M-EST. PATIENT-LVL IV: CPT | Mod: PBBFAC,,, | Performed by: PHYSICIAN ASSISTANT

## 2019-08-28 RX ORDER — GABAPENTIN 300 MG/1
CAPSULE ORAL
Qty: 60 CAPSULE | Refills: 1 | Status: SHIPPED | OUTPATIENT
Start: 2019-08-28 | End: 2020-03-12 | Stop reason: SDUPTHER

## 2019-08-28 RX ORDER — CYCLOBENZAPRINE HCL 10 MG
10 TABLET ORAL 3 TIMES DAILY PRN
Qty: 30 TABLET | Refills: 1 | Status: SHIPPED | OUTPATIENT
Start: 2019-08-28 | End: 2020-03-12 | Stop reason: SDUPTHER
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
## 2019-08-31 ENCOUNTER — EXTERNAL CHRONIC CARE MANAGEMENT (OUTPATIENT)
Dept: PRIMARY CARE CLINIC | Facility: CLINIC | Age: 59
End: 2019-08-31
Payer: MEDICARE

## 2019-08-31 PROCEDURE — 99487 PR COMPLX CHRON CARE MGMT, 1ST HR, PER MONTH: ICD-10-PCS | Mod: S$PBB,,, | Performed by: INTERNAL MEDICINE

## 2019-08-31 PROCEDURE — 99487 CPLX CHRNC CARE 1ST 60 MIN: CPT | Mod: S$PBB,,, | Performed by: INTERNAL MEDICINE

## 2019-08-31 PROCEDURE — 99487 CPLX CHRNC CARE 1ST 60 MIN: CPT | Mod: PBBFAC,PO | Performed by: INTERNAL MEDICINE

## 2019-09-03 ENCOUNTER — PATIENT MESSAGE (OUTPATIENT)
Dept: DIABETES | Facility: CLINIC | Age: 59
End: 2019-09-03

## 2019-09-16 ENCOUNTER — OFFICE VISIT (OUTPATIENT)
Dept: PULMONOLOGY | Facility: CLINIC | Age: 59
End: 2019-09-16
Payer: MEDICARE

## 2019-09-16 ENCOUNTER — HOSPITAL ENCOUNTER (OUTPATIENT)
Dept: RADIOLOGY | Facility: HOSPITAL | Age: 59
Discharge: HOME | End: 2019-09-16
Attending: INTERNAL MEDICINE
Payer: MEDICARE

## 2019-09-16 ENCOUNTER — CLINICAL SUPPORT (OUTPATIENT)
Dept: PULMONOLOGY | Facility: CLINIC | Age: 59
End: 2019-09-16
Payer: MEDICARE

## 2019-09-16 VITALS
HEART RATE: 90 BPM | RESPIRATION RATE: 18 BRPM | SYSTOLIC BLOOD PRESSURE: 130 MMHG | WEIGHT: 239.19 LBS | OXYGEN SATURATION: 98 % | BODY MASS INDEX: 37.54 KG/M2 | DIASTOLIC BLOOD PRESSURE: 80 MMHG | HEIGHT: 67 IN

## 2019-09-16 DIAGNOSIS — G47.34 SLEEP-RELATED NONOBSTRUCTIVE ALVEOLAR HYPOVENTILATION: ICD-10-CM

## 2019-09-16 DIAGNOSIS — E11.59 HYPERTENSION ASSOCIATED WITH DIABETES: ICD-10-CM

## 2019-09-16 DIAGNOSIS — J41.0 SIMPLE CHRONIC BRONCHITIS: Primary | ICD-10-CM

## 2019-09-16 DIAGNOSIS — J41.0 SIMPLE CHRONIC BRONCHITIS: ICD-10-CM

## 2019-09-16 DIAGNOSIS — I15.2 HYPERTENSION ASSOCIATED WITH DIABETES: ICD-10-CM

## 2019-09-16 DIAGNOSIS — I12.9 HYPERTENSION, RENAL: Chronic | ICD-10-CM

## 2019-09-16 DIAGNOSIS — G47.01 INSOMNIA DUE TO MEDICAL CONDITION: ICD-10-CM

## 2019-09-16 LAB
BRPFT: NORMAL
FEF 25 75 CHG: 12.5 %
FEF 25 75 LLN: 0.93
FEF 25 75 POST REF: 111 %
FEF 25 75 PRE REF: 98.7 %
FEF 25 75 REF: 2.14
FET100 CHG: -25.9 %
FEV1 CHG: -3.9 %
FEV1 FVC CHG: 5 %
FEV1 FVC LLN: 68
FEV1 FVC POST REF: 105.2 %
FEV1 FVC PRE REF: 100.1 %
FEV1 FVC REF: 79
FEV1 LLN: 1.73
FEV1 POST REF: 83.3 %
FEV1 PRE REF: 86.8 %
FEV1 REF: 2.37
FVC CHG: -8.5 %
FVC LLN: 2.22
FVC POST REF: 78.7 %
FVC PRE REF: 86.1 %
FVC REF: 3
PEF CHG: -2.8 %
PEF LLN: 3.97
PEF POST REF: 81.2 %
PEF PRE REF: 83.5 %
PEF REF: 6.16
POST FEF 25 75: 2.38 L/S (ref 0.93–3.35)
POST FET 100: 7.3 SEC
POST FEV1 FVC: 83.51 % (ref 67.79–91.02)
POST FEV1: 1.98 L (ref 1.73–3.01)
POST FVC: 2.37 L (ref 2.22–3.79)
POST PEF: 5 L/S (ref 3.97–8.35)
PRE FEF 25 75: 2.11 L/S (ref 0.93–3.35)
PRE FET 100: 9.85 SEC
PRE FEV1 FVC: 79.51 % (ref 67.79–91.02)
PRE FEV1: 2.06 L (ref 1.73–3.01)
PRE FVC: 2.59 L (ref 2.22–3.79)
PRE PEF: 5.14 L/S (ref 3.97–8.35)

## 2019-09-16 PROCEDURE — 99999 PR PBB SHADOW E&M-EST. PATIENT-LVL V: ICD-10-PCS | Mod: PBBFAC,,, | Performed by: INTERNAL MEDICINE

## 2019-09-16 PROCEDURE — 71046 X-RAY EXAM CHEST 2 VIEWS: CPT | Mod: 26,,, | Performed by: RADIOLOGY

## 2019-09-16 PROCEDURE — 94060 EVALUATION OF WHEEZING: CPT | Mod: 26,S$PBB,, | Performed by: INTERNAL MEDICINE

## 2019-09-16 PROCEDURE — 94060 PR EVAL OF BRONCHOSPASM: ICD-10-PCS | Mod: 26,S$PBB,, | Performed by: INTERNAL MEDICINE

## 2019-09-16 PROCEDURE — 99214 PR OFFICE/OUTPT VISIT, EST, LEVL IV, 30-39 MIN: ICD-10-PCS | Mod: 25,S$PBB,, | Performed by: INTERNAL MEDICINE

## 2019-09-16 PROCEDURE — 99999 PR PBB SHADOW E&M-EST. PATIENT-LVL V: CPT | Mod: PBBFAC,,, | Performed by: INTERNAL MEDICINE

## 2019-09-16 PROCEDURE — 71046 X-RAY EXAM CHEST 2 VIEWS: CPT | Mod: TC

## 2019-09-16 PROCEDURE — 71046 XR CHEST PA AND LATERAL: ICD-10-PCS | Mod: 26,,, | Performed by: RADIOLOGY

## 2019-09-16 PROCEDURE — 99214 OFFICE O/P EST MOD 30 MIN: CPT | Mod: 25,S$PBB,, | Performed by: INTERNAL MEDICINE

## 2019-09-16 PROCEDURE — 99215 OFFICE O/P EST HI 40 MIN: CPT | Mod: PBBFAC,25 | Performed by: INTERNAL MEDICINE

## 2019-09-16 PROCEDURE — 94060 EVALUATION OF WHEEZING: CPT | Mod: PBBFAC

## 2019-09-16 NOTE — PATIENT INSTRUCTIONS
Why quality sleep is important:  Lack of quality sleep affects your health. After just one night of only four or five hours' sleep, your natural killer cells - the ones that attack the cancer cells that appear in your body every day - drop by 70%, and that a lack of sleep is linked to cancer of the bowel, prostate and breast.  People who are chronically sleep deprived are more likely to be overweight, have strokes and cardiovascular disease, infections, and certain types of cancer than those who get enough sleep.    An adult sleeping only 6.75 hours a night would be predicted to live only to their early 60s without medical intervention.  If we sleep too little, we become unable to process what we've learned during the day and we have more trouble remembering it in the future. As mentioned, researchers also believe that sleep may promote the removal of waste products from brain cells.  Sleep is vital to the rest of the body too. When people don't get enough sleep, their health risks rise. Symptoms of depression, seizures, high blood pressure and migraines worsen. Immunity is compromised, increasing the likelihood of illness and infection.   Sleep also plays a role in metabolism: Even one night of missed sleep can create a prediabetic state in an otherwise healthy person.  Sleep helps us thrive by contributing to a healthy immune system, and can also balance our appetites by helping to regulate levels of the hormones ghrelin and leptin, which play a role in our feelings of hunger and fullness. So when we're sleep deprived, we may feel the need to eat more, which can lead to weight gain.     A 2013 study reported that men who slept too little had a sperm count 29% lower than those who regularly get a full and restful night's sleep.   The time taken to reach physical exhaustion by athletes who obtain anything less than eight hours of sleep, and especially less than six hours, drops by 10-30%.     Best way to obtain  "adequate sleep:  If you have sleep apnea, used prescribed CPAP any time you are sleeping.  Avoid caffeine in the afternoon/evening time.  Avoid alcohol. Alcohol is a sedative that blocks your REM sleep     Stimulus control -- Stimulus control therapy is based on the idea that some people with insomnia have learned to associate the bedroom with staying awake rather than sleeping.  ?You should spend no more than 20 minutes lying in bed trying to fall asleep.  ?If you cannot fall asleep within 20 minutes, get up, go to another room and read or find another relaxing activity until you feel sleepy again. Activities such as eating, balancing your checkbook, doing housework, watching TV, or studying for a test, which "reward" you for staying awake, should be avoided.  ?When you start to feel sleepy, you can return to bed. If you cannot fall asleep in another 20 minutes, repeat the process.  ?Set an alarm clock and get up at the same time every day, including weekends.  ?Do not take a nap during the day.  You may not sleep much on the first night. However, sleep is more likely on succeeding nights because sleepiness is increased and naps are not allowed.     Restrict time in bedroom to 8 hours. Some people with insomnia have long awakenings during the night and some try to deal with their poor sleep by staying in bed longer in the morning to "make up" some of their lost sleep. This additional sleep later in the morning may make it more difficult to fall asleep that night, resulting in the need to stay in bed even longer the following morning. This restriction should consolidate sleep and breaks this cycle.  Do not attempt to go to bed until you are awake for 16 hours.   Do not go to be if you are not sleepy.     One main reason for insomnia today is electronics. No TV or electronics in the bedroom. Associate the bedroom to only sleep and sex.   All electronic use outside the bedroom. Stop using electronics 1-2 hours before " "bedtime. The electronics have blue lights which have a profound suppressive effect on your natural melatonin which assist with sleep. If your phone or pad has a "night shift" option, change to that 2 hours before bedtime. This makes the light on the phone a yellow softer light. Check your settings-display and brightness-night shift. When we use electronics, we also tend to delay sleep time by getting involved in a game or social media. Set a timer when it is ready to go to bed and stick with it.   Dim lights an hour before bedtime.  Meditation and warm bath helps with preparing for sleep.  Make bedroom cool and dark. White noise helps some people sleep more soundly.  Sleep should be nonnegotiable . Give yourself at least 8 hours of uninterrupted sleep nightly for your health and well-being.       Why we don't promote sleeping Pills:  Unfortunately, they don't work as well as we wish they did. Sleep medications don't deliver the same restorative benefits as natural sleep. The quality of sleep that you have when you're on these drugs is not the same as normal, naturalistic sleep. They're classified as sedative hypnotics, so the drugs actually just sedate you -- and sedation is not sleep. Drugs such as ambien simply switch off the top of your cortex, the top of your brain, and put you into a state of unconsciousness. Sleep is important to a number of brain functions, including how nerve cells (neurons) communicate with each other.  In fact, your brain and body stay remarkably active while you sleep.  Recent findings suggest that sleep plays a housekeeping role that removes toxins in your brain that build up while you are awake. At this time, there is no good pharmacological approach to replicate such a complex set of biological changes  Research is showing a strong association between sleep aids and a higher risk of death and higher risk of cancer. Sleep aids are also habit forming and become less effective over " time. When discontinuing the sleeping aid, there is a withdrawal period with worsening insomnia for a few weeks.

## 2019-09-16 NOTE — PROGRESS NOTES
Subjective:       Patient ID: Ana Maria Teague is a 58 y.o. female.  Patient Active Problem List   Diagnosis    Avascular necrosis of bone of hip    Acquired hypothyroidism    Osteoporosis with pathological fracture    Depression    Anxiety and depression    Vertigo    Abnormal findings on diagnostic imaging of breast    Renal cyst    -donor kidney transplant - 13    Chronic immunosuppression with Prograf and Cellcept    Hypertension, renal    Anemia of chronic renal failure    Secondary hyperparathyroidism of renal origin    Delayed graft function 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     Uncontrolled type II diabetes mellitus with hypoglycemia    CTS (carpal tunnel syndrome)    Cubital tunnel syndrome on right    CKD (chronic kidney disease) stage 2, GFR 60-89 ml/min    Immunosuppressed status    Abnormal PFTs (pulmonary function tests)    Diffusion capacity of lung (dl), decreased    Age-related osteoporosis without current pathological fracture    Hypertension associated with diabetes    Bilateral headache    Headache    Sleep-related nonobstructive alveolar hypoventilation    Uncontrolled type 2 diabetes mellitus with stage 3 chronic kidney disease, with long-term current use of insulin    Hyperlipidemia associated with type 2 diabetes mellitus    Allergic rhinitis    Chronic bronchitis    BMI 37.0-37.9, adult    Insomnia due to medical condition     Immunization History   Administered Date(s) Administered    Hepatitis A, Adult 2008, 2008, 2019    Influenza - High Dose - PF (65 years and older) 10/01/2015    Influenza - Quadrivalent 2014, 2016    Influenza - Quadrivalent - PF (6 months and older) 2018    Pneumococcal Conjugate - 13 Valent 2015    Pneumococcal Polysaccharide - 23 Valent 2014    Tdap 2015     Social History     Tobacco Use   Smoking Status Former Smoker    Packs/day: 1.00    Years: 10.00    Pack  "years: 10.00    Types: Cigarettes    Last attempt to quit: 2002    Years since quittin.8   Smokeless Tobacco Never Used   Tobacco Comment    quit smoking approx 10-15 years ago      COPD QUESTIONNAIRE  How often do you cough?: Some of the time  How often do you have phlegm (mucus) in your chest?: Almost never  How often does your chest feel tight?: Almost never  When you walk up a hill or one flight of stairs, how often are you breathless?: Some of the time  How often are you limited doing any activities at home?: Some of the time  How often are you confident leaving the house despite your lung condition?: All of the time  How often do you sleep soundly?: A little of the time  How often do you have energy?: Some of the time  Total score: 16     Chief Complaint: COPD    Ms. Ana Maria Teague is 58 years old  Last visit was 2019,   Last seen Ms Lejeune  Shunt right arm removed, pending removal on left arm  No cough no wheezing no shortness of breath  Spirometry today FEV1 is 2.25L( 93%)   predicted  Immunizations are up-to-date  Medications   rescue albuterol which she hardly ever uses: Stopped BREO  Has Night oxygen       Review of Systems   Constitutional: Negative.    HENT: Negative.    Eyes: Negative.    Respiratory: Negative.  Negative for cough, sputum production, shortness of breath, wheezing and use of rescue inhaler.    Cardiovascular: Negative.    Genitourinary: Negative.    Endocrine: endocrine negative   Musculoskeletal: Negative.    Skin: Negative.    Gastrointestinal: Negative.    Neurological: Negative.    Psychiatric/Behavioral: Negative.        Objective:       Vitals:    19 1016   BP: 130/80   Pulse: 90   Resp: 18   SpO2: 98%   Weight: 108.5 kg (239 lb 3.2 oz)   Height: 5' 7" (1.702 m)     Physical Exam   Constitutional: She is oriented to person, place, and time. She appears well-developed and well-nourished.   HENT:   Head: Normocephalic.   Nose: Nose normal.   Mouth/Throat: " Oropharynx is clear and moist.   Neck: Normal range of motion. Neck supple. No JVD present.   Cardiovascular: Normal rate, regular rhythm and normal heart sounds.   Pulmonary/Chest: Normal expansion, symmetric chest wall expansion and effort normal.   Abdominal: Soft. Bowel sounds are normal.   Musculoskeletal: Normal range of motion.        Arms:  Lymphadenopathy:     She has no cervical adenopathy.   Neurological: She is alert and oriented to person, place, and time. She has normal reflexes.   Skin: Skin is warm and dry.   Psychiatric: She has a normal mood and affect. Her behavior is normal.   Nursing note and vitals reviewed.    Personal Diagnostic Review  No flowsheet data found.      Assessment:       Problem List Items Addressed This Visit     Hypertension, renal (Chronic)     Stable on Norvasc         Hypertension associated with diabetes    Sleep-related nonobstructive alveolar hypoventilation     Nocturnal oxygen 2.0 LPM           Relevant Orders    X-Ray Chest PA And Lateral    Spirometry with/without bronchodilator    Chronic bronchitis - Primary     Cat score 16  Normal spirometry 2018  PRN albuterol HFA  Current immunisations: need influenza         Relevant Orders    X-Ray Chest PA And Lateral    Spirometry with/without bronchodilator    BMI 37.0-37.9, adult    Insomnia due to medical condition     Insomnia worse with chr pain and anxiety  Ambien taken: 3/7 weekly  Sedative pain meds: Gabapentin, Percocet and Flexeril for muscle spasms             Plan:      Follow up in about 1 year (around 9/16/2020), or cxr and vincent today, Insomnia score today, for PFT, 6MWD, CXR next annula visit.    Requested Prescriptions      No prescriptions requested or ordered in this encounter       Orders Placed This Encounter   Procedures    X-Ray Chest PA And Lateral     Standing Status:   Future     Standing Expiration Date:   9/15/2020    Spirometry with/without bronchodilator     Standing Status:   Future      Standing Expiration Date:   9/15/2020       This note was prepared using voice recognition system and is likely to have sound alike errors that may have been overlooked even after proof reading.  Please call me with any questions    Discussed diagnosis, its evaluation, treatment and usual course. All questions answered.    Thank you for the courtesy of participating in the care of this patient    Osmani Walker MD        Need repeat Overnight Sat for Night time Oxygen qualification    Osmani Walker MD    .    Osmani Walker MD

## 2019-09-16 NOTE — ASSESSMENT & PLAN NOTE
Insomnia worse with chr pain and anxiety  Ambien taken: 3/7 weekly  Sedative pain meds: Gabapentin, Percocet and Flexeril for muscle spasms

## 2019-09-30 ENCOUNTER — EXTERNAL CHRONIC CARE MANAGEMENT (OUTPATIENT)
Dept: PRIMARY CARE CLINIC | Facility: CLINIC | Age: 59
End: 2019-09-30
Payer: MEDICARE

## 2019-09-30 PROCEDURE — 99490 PR CHRONIC CARE MGMT, 1ST 20 MIN: ICD-10-PCS | Mod: S$PBB,,, | Performed by: INTERNAL MEDICINE

## 2019-09-30 PROCEDURE — 99490 CHRNC CARE MGMT STAFF 1ST 20: CPT | Mod: PBBFAC,PO | Performed by: INTERNAL MEDICINE

## 2019-09-30 PROCEDURE — 99490 CHRNC CARE MGMT STAFF 1ST 20: CPT | Mod: S$PBB,,, | Performed by: INTERNAL MEDICINE

## 2019-10-01 ENCOUNTER — PATIENT MESSAGE (OUTPATIENT)
Dept: NEPHROLOGY | Facility: CLINIC | Age: 59
End: 2019-10-01

## 2019-10-03 NOTE — PROGRESS NOTES
Got this message from the patient, discussed with her on the phone, sounded like hossein , suggested over-the-counter antifungal creams, if not getting better in the next 2-3 days she will let me know    Yuval Medina MD         Good afternoon, I have a question concerning my bikini cut area, it's a little red and moist I think I need an antibiotic pill and ointment to prevent a serious bacterial infection.  I still use [facility] on [address] and [address]. Please let me know Thanks

## 2019-10-10 RX ORDER — MYCOPHENOLATE MOFETIL 250 MG/1
CAPSULE ORAL
Qty: 120 CAPSULE | Refills: 11 | Status: SHIPPED | OUTPATIENT
Start: 2019-10-10 | End: 2020-11-06

## 2019-10-21 ENCOUNTER — PATIENT MESSAGE (OUTPATIENT)
Dept: PSYCHIATRY | Facility: CLINIC | Age: 59
End: 2019-10-21

## 2019-10-21 RX ORDER — INSULIN GLARGINE 100 [IU]/ML
INJECTION, SOLUTION SUBCUTANEOUS
Qty: 15 ML | Refills: 4 | Status: SHIPPED | OUTPATIENT
Start: 2019-10-21 | End: 2020-05-01 | Stop reason: SDUPTHER

## 2019-10-23 ENCOUNTER — PATIENT OUTREACH (OUTPATIENT)
Dept: ADMINISTRATIVE | Facility: HOSPITAL | Age: 59
End: 2019-10-23

## 2019-10-23 NOTE — PROGRESS NOTES
PreVisit Chart Audit Perfomed       Richelle GUSMAN LPN Care Coordinator  Care Coordination Department  [facility]  [phone]

## 2019-10-23 NOTE — PROGRESS NOTES
"Subjective:      Patient ID: Ana Maria Teague is a 59 y.o. female.    Chief Complaint: Follow-up (4 months )      HPI  Here for follow up of medical problems.  AC breakfast sugars 120 range.  Tolerating lipitor.  INtol of SSRIs, gets more nervous.  Ativan relieves anxiety.    BMs normal.  No cp/sob/palp.    Updated/ annual due 7/20:  HM: 11/19 fluvax, 7/19 HAV, 6/15 [id number], 3/14 scohvg17, 6/15 TDaP, 10/19 MMG, 7/18 BMD with OP needs treatment rep 2y, 1/11 Cscope rep 5-10y, 11/10 HCV neg, Pain Dr. Anaya, 8/18 Eye Dr. Martin.     Review of Systems   Constitutional: Negative for chills, diaphoresis and fever.   Respiratory: Negative for cough and shortness of breath.    Cardiovascular: Negative for chest pain, palpitations and leg swelling.   Gastrointestinal: Negative for blood in stool, constipation, diarrhea, nausea and vomiting.   Genitourinary: Negative for dysuria, frequency and hematuria.   Psychiatric/Behavioral: The patient is not nervous/anxious.          Objective:   Pulse 99   Temp 98.9 °F (37.2 °C) (Oral)   Ht 5' 7" (1.702 m)   Wt 107.8 kg (237 lb 10.5 oz)   SpO2 96%   BMI 37.22 kg/m²     Physical Exam   Constitutional: She is oriented to person, place, and time. She appears well-developed.   HENT:   Mouth/Throat: Oropharynx is clear and moist.   Neck: Neck supple. Carotid bruit is not present. No thyroid mass present.   Cardiovascular: Normal rate, regular rhythm and intact distal pulses. Exam reveals no gallop and no friction rub.   No murmur heard.  Pulmonary/Chest: Effort normal and breath sounds normal. She has no wheezes. She has no rales.   Abdominal: Soft. Bowel sounds are normal. She exhibits no mass. There is no hepatosplenomegaly. There is no tenderness.   Musculoskeletal: She exhibits no edema.   Lymphadenopathy:     She has no cervical adenopathy.   Neurological: She is alert and oriented to person, place, and time.   Psychiatric: She has a normal mood and affect.       Results for RAF, " KEVAN MAE (MRN [id number]) as of 11/6/2019 08:51   Ref. Range 6/27/2019 08:11 9/16/2019 11:42 9/16/2019 12:14 10/30/2019 08:28 10/30/2019 09:56   Sodium Latest Ref Range: 136 - 145 mmol/L 142    143   Potassium Latest Ref Range: 3.5 - 5.1 mmol/L 3.6    3.4 (L)   Chloride Latest Ref Range: 95 - 110 mmol/L 105    103   CO2 Latest Ref Range: 23 - 29 mmol/L 26    28   Anion Gap Latest Ref Range: 8 - 16 mmol/L 11    12   BUN, Bld Latest Ref Range: 6 - 20 mg/dL 12    12   Creatinine Latest Ref Range: 0.5 - 1.4 mg/dL 0.9    0.9   eGFR if non African American Latest Ref Range: >60 mL/min/1.73 m^2 >60.0    >60   eGFR if  Latest Ref Range: >60 mL/min/1.73 m^2 >60.0    >60   Glucose Latest Ref Range: 70 - 110 mg/dL 69 (L)    121 (H)   Calcium Latest Ref Range: 8.7 - 10.5 mg/dL 9.8    10.1   Alkaline Phosphatase Latest Ref Range: 55 - 135 U/L 112    135   PROTEIN TOTAL Latest Ref Range: 6.0 - 8.4 g/dL 7.5    8.2   Albumin Latest Ref Range: 3.5 - 5.2 g/dL 3.6    3.9   BILIRUBIN TOTAL Latest Ref Range: 0.1 - 1.0 mg/dL 0.4    0.5   AST Latest Ref Range: 10 - 40 U/L 15    14   ALT Latest Ref Range: 10 - 44 U/L 9 (L)    12   Triglycerides Latest Ref Range: 30 - 150 mg/dL 64    66   Cholesterol Latest Ref Range: 120 - 199 mg/dL 168    137   HDL Latest Ref Range: 40 - 75 mg/dL 53    50   Hdl/Cholesterol Ratio Latest Ref Range: 20.0 - 50.0 % 31.5    36.5   LDL Cholesterol External Latest Ref Range: 63.0 - 159.0 mg/dL 102.2    73.8   Non-HDL Cholesterol Latest Units: mg/dL 115    87   Total Cholesterol/HDL Ratio Latest Ref Range: 2.0 - 5.0  3.2    2.7   Vit D, 25-Hydroxy Latest Ref Range: 30 - 96 ng/mL     41   Hemoglobin A1C External Latest Ref Range: 4.0 - 5.6 % 7.1 (H)    7.2 (H)   Estimated Avg Glucose Latest Ref Range: 68 - 131 mg/dL 157 (H)    160 (H)       Assessment:       1. Uncontrolled type 2 diabetes mellitus with stage 3 chronic kidney disease, with long-term current use of insulin    2. Immunosuppressed status     3. -donor kidney transplant - 13    4. Hypertension associated with diabetes    5. Hyperlipidemia associated with type 2 diabetes mellitus    6. Anxiety and depression    7. Acquired hypothyroidism    8. Age-related osteoporosis without current pathological fracture    9. Preventive measure          Plan:     Uncontrolled type 2 diabetes mellitus with stage 3 chronic kidney disease, with long-term current use of insulin- doing well now with less hypoglycemics.    Immunosuppressed status, -donor kidney transplant - 13    Hypertension associated with diabetes- adeq range.    Hyperlipidemia associated with type 2 diabetes mellitus- better on statin, cont.    Anxiety and depression- try to get loraz by Pain provider, as I'm blocked but pt needs.    Acquired hypothyroidism- Clinically stable, continue present treatment.    Age-related osteoporosis without current pathological fracture- on prolia, to start soon.    Fluvax.

## 2019-10-28 RX ORDER — OXYCODONE AND ACETAMINOPHEN 10; 325 MG/1; MG/1
1 TABLET ORAL EVERY 8 HOURS PRN
Qty: 90 TABLET | Refills: 0 | Status: SHIPPED | OUTPATIENT
Start: 2019-11-25 | End: 2019-12-25

## 2019-10-28 RX ORDER — OXYCODONE AND ACETAMINOPHEN 10; 325 MG/1; MG/1
1 TABLET ORAL EVERY 8 HOURS PRN
Qty: 90 TABLET | Refills: 0 | Status: SHIPPED | OUTPATIENT
Start: 2019-12-24 | End: 2020-01-23

## 2019-10-30 ENCOUNTER — OFFICE VISIT (OUTPATIENT)
Dept: RHEUMATOLOGY | Facility: CLINIC | Age: 59
End: 2019-10-30
Payer: MEDICARE

## 2019-10-30 ENCOUNTER — HOSPITAL ENCOUNTER (OUTPATIENT)
Dept: RADIOLOGY | Facility: HOSPITAL | Age: 59
Discharge: HOME | End: 2019-10-30
Attending: INTERNAL MEDICINE
Payer: MEDICARE

## 2019-10-30 VITALS
BODY MASS INDEX: 37.3 KG/M2 | HEART RATE: 90 BPM | HEIGHT: 67 IN | BODY MASS INDEX: 37.54 KG/M2 | WEIGHT: 237.63 LBS | SYSTOLIC BLOOD PRESSURE: 139 MMHG | WEIGHT: 239.19 LBS | DIASTOLIC BLOOD PRESSURE: 88 MMHG | HEIGHT: 67 IN

## 2019-10-30 DIAGNOSIS — E66.01 SEVERE OBESITY (BMI 35.0-39.9) WITH COMORBIDITY: ICD-10-CM

## 2019-10-30 DIAGNOSIS — M81.0 AGE-RELATED OSTEOPOROSIS WITHOUT CURRENT PATHOLOGICAL FRACTURE: Primary | ICD-10-CM

## 2019-10-30 DIAGNOSIS — Z12.31 ENCOUNTER FOR SCREENING MAMMOGRAM FOR MALIGNANT NEOPLASM OF BREAST: ICD-10-CM

## 2019-10-30 DIAGNOSIS — Z00.00 ENCOUNTER FOR PREVENTIVE HEALTH EXAMINATION: ICD-10-CM

## 2019-10-30 DIAGNOSIS — M15.9 PRIMARY OSTEOARTHRITIS INVOLVING MULTIPLE JOINTS: ICD-10-CM

## 2019-10-30 PROBLEM — M15.0 PRIMARY OSTEOARTHRITIS INVOLVING MULTIPLE JOINTS: Status: ACTIVE | Noted: 2019-10-30

## 2019-10-30 PROCEDURE — 99205 OFFICE O/P NEW HI 60 MIN: CPT | Mod: S$PBB,,, | Performed by: INTERNAL MEDICINE

## 2019-10-30 PROCEDURE — 77067 SCR MAMMO BI INCL CAD: CPT | Mod: 26,,, | Performed by: RADIOLOGY

## 2019-10-30 PROCEDURE — 77063 MAMMO DIGITAL SCREENING BILAT WITH TOMOSYNTHESIS_CAD: ICD-10-PCS | Mod: 26,,, | Performed by: RADIOLOGY

## 2019-10-30 PROCEDURE — 99205 PR OFFICE/OUTPT VISIT, NEW, LEVL V, 60-74 MIN: ICD-10-PCS | Mod: S$PBB,,, | Performed by: INTERNAL MEDICINE

## 2019-10-30 PROCEDURE — 99999 PR PBB SHADOW E&M-EST. PATIENT-LVL III: ICD-10-PCS | Mod: PBBFAC,,, | Performed by: INTERNAL MEDICINE

## 2019-10-30 PROCEDURE — 77067 MAMMO DIGITAL SCREENING BILAT WITH TOMOSYNTHESIS_CAD: ICD-10-PCS | Mod: 26,,, | Performed by: RADIOLOGY

## 2019-10-30 PROCEDURE — 99213 OFFICE O/P EST LOW 20 MIN: CPT | Mod: PBBFAC | Performed by: INTERNAL MEDICINE

## 2019-10-30 PROCEDURE — 99999 PR PBB SHADOW E&M-EST. PATIENT-LVL III: CPT | Mod: PBBFAC,,, | Performed by: INTERNAL MEDICINE

## 2019-10-30 PROCEDURE — 77063 BREAST TOMOSYNTHESIS BI: CPT | Mod: 26,,, | Performed by: RADIOLOGY

## 2019-10-30 PROCEDURE — 77063 BREAST TOMOSYNTHESIS BI: CPT | Mod: TC

## 2019-10-30 RX ORDER — SODIUM CHLORIDE 0.9 % (FLUSH) 0.9 %
10 SYRINGE (ML) INJECTION
Status: CANCELLED | OUTPATIENT
Start: 2019-10-30

## 2019-10-30 RX ORDER — ZOLEDRONIC ACID 5 MG/100ML
5 INJECTION, SOLUTION INTRAVENOUS
Status: CANCELLED | OUTPATIENT
Start: 2019-10-30

## 2019-10-30 RX ORDER — HEPARIN 100 UNIT/ML
500 SYRINGE INTRAVENOUS
Status: CANCELLED | OUTPATIENT
Start: 2019-10-30

## 2019-10-30 NOTE — PATIENT INSTRUCTIONS
Preventing Osteoporosis: Meeting Your Calcium Needs    Your body needs calcium to build and repair bones. But it can't make calcium on its own. That's why it's important to eat calcium-rich foods. Some foods are naturally rich in calcium. Others have calcium added (fortified). It's best to get calcium from the foods you eat. But if you can't get enough, you may want to take calcium supplements. To meet your daily calcium needs, try the foods listed below.  Dairy Fish & beans Other sources      Source   Calcium (mg) per serving   Source   Calcium (mg) per serving   Source   Calcium (mg) per serving      Low-fat yogurt, plain   415 mg/8 oz.   Sardines, Atlantic, canned, with bones   351 mg/3 oz.   Oatmeal, instant, fortified   215 mg/1 cup   Nonfat milk   302 mg/1 cup   [place], sockeye, canned, with bones   239 mg/3 oz.   Tofu made with calcium sulfate   204 mg/3 oz.   Low-fat milk   297 mg/1 cup   Soybeans, fresh, boiled   131 mg/1/2 cup   Collards   179 mg/1/2 cup   Swiss cheese   272 mg/1 oz.   White beans, cooked   81 mg/1/2 cup   English muffin, whole wheat   175 mg/1 muffin   Cheddar cheese   205 mg/1 oz.   Navy beans, cooked   79 mg/1/2 cup   Kale   90 mg/1/2 cup   Ice cream strawberry   79 mg/1/2 cup           Orange, navel   56 mg/1 medium   Note: Calcium levels may vary depending on brand and size.  Daily calcium needs  14-18 years old: 1,300 mg  19-30 years old: 1,000 mg  31-50 years old: 1,000 mg  51-70 years old, women: 1,200 mg  51-70 years old, men: 1,000 mg  Pregnant or nursin-28 years old: 1,300 mg, 19-50 years old: 1,000 mg  Older than 70 (women and men): 1,200 mg   Date Last Reviewed: 10/17/2015  © 6617-0577 [employer]. [address] [place] [address], [place] [address]. All rights reserved. This information is not intended as a substitute for professional medical care. Always follow your healthcare professional's instructions.        What Is Osteoporosis?  Osteoporosis is a disease  that weakens the bones. Weakened bones are more likely to fracture (break). Osteoporosis affects men and women, but postmenopausal women are most at risk. To help prevent osteoporosis, you need to exercise and nourish your bones throughout your life.    Childhood  The body builds the most bone during these years. That's why boys and girls need foods rich in calcium. They also need plenty of exercise. A healthy diet and exercise helps bones grow strong.  Young adulthood to age 30  During young adulthood, bones become their strongest. This is called peak bone mass. The same good habits that kept bones healthy in childhood help keep bone healthy in adulthood.  Age 30 to menopause  Bone mass declines slightly during these years. Your body makes just enough new bone to maintain peak bone mass. To keep your bones at their peak mass, be sure to exercise and get plenty of calcium.  After menopause  Menopause is when a woman stops having monthly periods. After menopause, the body makes less estrogen (female hormone). This increases bone loss. At this point, treatment may be needed to reduce the risk for fracture. Exercise and calcium can also help keep your bones strong.  Later in life  In later years, both men and women need to take extra care of their bones. By this point, the body loses more bone than it makes. If too much bone is lost, you may be at risk for fractures. With age, the quality and quantity of bone declines. You can lessen bone loss by staying active and increasing your calcium intake. Calcium supplements and other osteoporosis treatments do have risks, so talk to your healthcare provider if you have concerns. If you have osteoporosis, you can also learn ways to increase everyday safety.  Date Last Reviewed: 10/17/2015  ©2007 [employer]. [address] [place] [address], [place] [address]. All Rights reserved. This information is not intended as a substitute for professional medical care. Always follow  your healthcare providers instructions.        Preventing Osteoporosis: Avoiding Bone Loss  Certain factors can speed up bone loss or decrease bone growth. For example, alcohol, cigarettes, and certain medicines reduce bone mass. Some foods make it hard for your body to absorb calcium.    Things to avoid  Here are things to avoid to help prevent osteoporosis:  · Alcohol is toxic to bones. It is a major cause of bone loss. Heavy drinking can cause osteoporosis even if you have no other risk factors.  · Smoking reduces bone mass. Smoking may also interfere with estrogen levels and cause early menopause.  · Inactivity makes your bones lose strength and become thinner. Over time, thin bones may break. Women who aren't active are at a high risk for osteoporosis.  · Certain medicines, such as cortisone, increase bone loss. They also decrease bone growth. Ask your healthcare provider about any side effects of your medicines, and how to prevent them.  · Protein-rich or salty foods eaten in large amounts may deplete calcium.  · Caffeine increases calcium loss. People who drink a lot of coffee, tea, or nancy lose more calcium than those who don't.  Date Last Reviewed: 10/17/2015  © 2989-6354 [employer]. [address] [place] [address]. All rights reserved. This information is not intended as a substitute for professional medical care. Always follow your healthcare professional's instructions.        Preventing Osteoporosis: Staying Active  Certain factors can speed up bone loss or decrease bone growth. For example, a lack of activity makes bones lose their strength. Exercise plays a big part in maintaining bone mass, no matter what your age. The amount and type of activity you do also play a part in keeping your bones strong. Weight-bearing and resistance exercises, such as walking, aerobic dancing, and bicycling, are just a few of the activities that are good for your bones.     Resistance  exercises, such as weight training, help maintain bones by strengthening the muscles around them. Swimming is also a good choice.     · Check with your healthcare provider before starting any new exercise program.  · Stop any exercise that causes pain.   Date Last Reviewed: 10/17/2015  © 6165-8720 [employer]. [address] [place] [address]. All rights reserved. This information is not intended as a substitute for professional medical care. Always follow your healthcare professional's instructions.        Osteoporosis: Screening for Bone Loss     Quantitative ultrasound (QUS)     The strength of bones is measured by their density (thickness). High bone density means bones are less likely to fracture. If you are at risk for bone loss, your healthcare provider may refer you for bone density testing.  Bone density testing  Bone density testing is safe, quick, easy, and painless. It can detect osteoporosis before a fracture happens. It can also measure the response to treatment. There are several types of tests that you may have, including:  · Central tests are used for screening and diagnosis. They measure density in the hip and spine. The main central test is the dual energy X-ray absorptiometry (DXA). The DXA is the standard bone density test.  · Peripheral tests are used for screening. They measure density in the finger, wrist, knee, shin, or heel. A common peripheral test is the quantitative ultrasound (QUS). However, QUS screening is not as accurate or widely accepted as DXA screening.   Who should be tested?  · All postmenopausal women under age 65, with one or more risk factors in addition to menopause.  · All women age 65 and older.  · Postmenopausal women with fractures.  · Women who are thinking about treatment for osteoporosis.  · Women who have been on hormone therapy for a long time.  · Men or women with certain medical conditions or who are taking certain medications (such as  glucocorticoids or prednisone) for a long period.  Common testing sites  Any bone can fracture, but with osteoporosis some bones fracture more easily. These include bones in the spine, wrist, shoulder, and hip. Thats why bone density testing may be done at one or more of these sites.  Understanding your results  The results of your test may seem confusing at first. Dont be afraid to ask your provider to explain. Your healthcare provider will compare your bone mineral density (BMD) with the BMD of young, healthy bone. The result is called a T-score. Bones remodel at different rates. So, a healthy T-score in the wrist doesnt mean the spine is also healthy. Thats why more than one site may be scanned.  Date Last Reviewed: 10/11/2015  © 3236-1511 [employer]. [address] [place] [address]. All rights reserved. This information is not intended as a substitute for professional medical care. Always follow your healthcare professional's instructions.        Living with Osteoporosis: Regular Exercise  If you have osteoporosis, exercise is vital for your health. It can prevent bone fractures and spine changes. It will slow bone loss. Exercise will strengthen your body. It can also be fun. A variety of exercises is best. See below for exercises that can help you. Before you start, though, talk to your healthcare provider to be sure these exercises are right for you.    Resistance exercises. These build muscle strength and maintain bone mass. They also make you less prone to injury. Exercises include lifting small weights, doing push-ups and sit-ups, using elastic exercise bands, and using weight machines.  Weight-bearing activities. These help your whole body. They also help you maintain bone mass. Activities include walking, dancing, and housework.  Nonweight-bearing exercises. These help prevent back strain and pain. They do this by building the trunk and leg muscles. Exercises that help with  flexibility can prevent falls. Examples include swimming, water exercise, and stretching.  Staying safe  Here are tips to stay safe:   · Always check with your healthcare provider before starting any new exercise program.  · Use weights only as instructed.  · Stop any exercise that causes pain.   Date Last Reviewed: 10/17/2015  © 3167-5160 [employer]. [address] [place] [address], [place] [address]. All rights reserved. This information is not intended as a substitute for professional medical care. Always follow your healthcare professional's instructions.

## 2019-10-30 NOTE — ASSESSMENT & PLAN NOTE
Severe obesity with worsening degenerative disc disease and knee osteoarthritis and multiple other medical problems.  Advised to reduce carbohydrate to not more than 60 g a day and sugars not more than 10 g a day.  She voiced understanding.

## 2019-10-30 NOTE — ASSESSMENT & PLAN NOTE
Osteoporosis without any history of fragility fracture.  Failing bisphosphonate.  Try Prolia.  Discussed in detail about all adverse effects of the medication including osteonecrosis of the jaw and atypical femoral fractures.  She voiced understanding.  Advised against any invasive dental procedures for at least 5 months after Prolia.  CMP and vitamin-D today.

## 2019-10-30 NOTE — ASSESSMENT & PLAN NOTE
Primary osteoarthritis involving multiple joints and spine.  Continue follow-up with interventional pain specialist.  No evidence of synovitis/dactylitis/enthesitis/effusion

## 2019-10-30 NOTE — LETTER
October 30, 2019      Karine Olmos MD  [address]  [place] [address]           [facility]  [address]  [place] [address]  Phone: [phone]  Fax: [phone]          Patient: Ana Maria Teague   MR Number: [id number]   YOB: 1960   Date of Visit: 10/30/2019       Dear Dr. Karine Olmos:    Thank you for referring Ana Maria Teague to me for evaluation. Attached you will find relevant portions of my assessment and plan of care.    If you have questions, please do not hesitate to call me. I look forward to following Ana Maria Teague along with you.    Sincerely,    Pedro Powell MD    Enclosure  CC:  No Recipients    If you would like to receive this communication electronically, please contact [email or web address] or [phone] to request more information on [employer] Link access.    For providers and/or their staff who would like to refer a patient to [facility], please contact us through our one-stop-shop provider referral line, [facility], at [phone].    If you feel you have received this communication in error or would no longer like to receive these types of communications, please e-mail [email or web address]

## 2019-10-30 NOTE — PROGRESS NOTES
RHEUMATOLOGY CLINIC INITIAL VISIT    Reason for referral:-  Referred for evaluation of osteoporosis.    Chief complaints:-  My joints hurt.    HPI:-  Ana Maria Faye a 59 y.o. pleasant female comes in for an initial visit with above chief complaints.  She has significant past medical history and past surgical history as reviewed below including undiagnosed renal failure status post kidney transplant after being on dialysis for 10 years.  Post renal transplant doing well on CellCept and Prograf.  Normal renal functions at this time.  She was recently diagnosed with osteoporosis and has been on oral bisphosphonate .  DEXA showed worsening results and was referred here for injectable treatments.  She has activity-related pain over multiple joints including her small, large joints and spine joints.  She follows up with interventional pain specialist for arthritic pain.  She denies personal or family history of psoriasis.    Review of Systems   Constitutional: Negative for chills and fever.   HENT: Negative for congestion and sore throat.    Eyes: Negative for blurred vision and redness.   Respiratory: Negative for cough and shortness of breath.    Cardiovascular: Negative for chest pain and leg swelling.   Gastrointestinal: Negative for abdominal pain.   Genitourinary: Negative for dysuria.   Musculoskeletal: Positive for back pain, joint pain, myalgias and neck pain. Negative for falls.   Skin: Negative for rash.   Neurological: Negative for headaches.   Endo/Heme/Allergies: Does not bruise/bleed easily.   Psychiatric/Behavioral: Negative for memory loss. The patient does not have insomnia.        Past Medical History:   Diagnosis Date    Abnormal glucose 12/30/2013    Acquired hypothyroidism     Acute bronchiolitis 10/15/2014    Anemia     Anemia of chronic renal failure 9/30/2013    Anxiety     Anxiety disorder     Avascular necrosis of bone of hip     Back pain     s/p steroid injections    Chronic  immunosuppression with Prograf and Cellcept 2013    CKD (chronic kidney disease) stage 2, GFR 60-89 ml/min     CKD (chronic kidney disease) stage 5, GFR less than 15 ml/min     -donor kidney transplant - 13    Depression     Hypertension, renal 2013    Hypothyroidism     Immunosuppressed status 10/15/2014    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 7 years ago.     SECTION      COLONOSCOPY N/A 3/9/2016    Procedure: COLONOSCOPY;  Surgeon: Douglas Daniel III, MD;  Location: [facility];  Service: Endoscopy;  Laterality: N/A;    cyst removed form chest wall      HYSTERECTOMY      KIDNEY TRANSPLANT  2013    SKIN GRAFT      revision    THYROIDECTOMY      vascular access surgeries      multiple. last time 2 weeks ago        Social History     Tobacco Use    Smoking status: Former Smoker     Packs/day: 1.00     Years: 10.00     Pack years: 10.00     Types: Cigarettes     Last attempt to quit: 2002     Years since quittin.9    Smokeless tobacco: Never Used    Tobacco comment: quit smoking approx 10-15 years ago    Substance Use Topics    Alcohol use: No    Drug use: No       Family History   Problem Relation Age of Onset    Diabetes Mother     Kidney disease Mother     Hyperlipidemia Mother     Hypertension Mother     Heart disease Mother     Arthritis Mother     Hypertension Father     Stroke Father     Hypertension Sister     Arthritis Sister     Asthma Sister     Heart disease Sister         heart attack       Review of patient's allergies indicates:   Allergen Reactions    Azithromycin Nausea And Vomiting    Adhesive Other  "(See Comments)     Skin tears    Nsaids (non-steroidal anti-inflammatory drug)      Kidney Transplant       Vitals:    10/30/19 0854   BP: 139/88   Pulse: 90   Weight: 107.8 kg (237 lb 10.5 oz)   Height: 5' 7" (1.702 m)   PainSc:   6   PainLoc: Back       Physical Exam   Constitutional: She is oriented to person, place, and time and well-developed, well-nourished, and in no distress. No distress.   HENT:   Head: Normocephalic.   Mouth/Throat: Oropharynx is clear and moist.   Eyes: Pupils are equal, round, and reactive to light. Conjunctivae and EOM are normal.   Neck: Normal range of motion. Neck supple.   Cardiovascular: Normal rate and intact distal pulses.   Pulmonary/Chest: Effort normal. No respiratory distress.   Abdominal: Soft. There is no tenderness.   Musculoskeletal:   Multiple tender points.  No synovitis over small joints of hands or feet.  No effusion over large joints.   Neurological: She is alert and oriented to person, place, and time. No cranial nerve deficit.   Skin: Skin is warm. No rash noted. No erythema.   Psychiatric: Mood and affect normal.   Nursing note and vitals reviewed.      Labs:-  Normal renal functions.    Radiographs:-  Independent visualization of images done.  Degenerative disease affecting multiple regions.  No erosions.    Old and Outside medical records :-  Reviewed old and all outside medical records available in Care Everywhere.  History of renal failure status post renal transplant doing well on CellCept and Prograf.    Medication List with Changes/Refills   Current Medications    ALBUTEROL 90 MCG/ACTUATION INHALER    Inhale 2 puffs into the lungs.    ALBUTEROL-IPRATROPIUM 2.5MG-0.5MG/3ML (DUO-NEB) 0.5 MG-3 MG(2.5 MG BASE)/3 ML NEBULIZER SOLUTION    Take 3 mLs by nebulization 3 (three) times daily.    AMLODIPINE (NORVASC) 5 MG TABLET    Take 1 tablet (5 mg total) by mouth once daily.    ATORVASTATIN (LIPITOR) 20 MG TABLET    Take 1 tablet (20 mg total) by mouth once daily. "    BLOOD SUGAR DIAGNOSTIC (FREESTYLE LITE STRIPS) STR    Use to check blood glucose 3 times a day.    CETIRIZINE (ZYRTEC) 10 MG TABLET    Take 1 tablet (10 mg total) by mouth once daily.    CYCLOBENZAPRINE (FLEXERIL) 10 MG TABLET    Take 1 tablet (10 mg total) by mouth 3 (three) times daily as needed for Muscle spasms.    ERGOCALCIFEROL (ERGOCALCIFEROL) 50,000 UNIT CAP    Take 1 capsule (50,000 Units total) by mouth every 7 days.    FLUTICASONE (FLONASE) 50 MCG/ACTUATION NASAL SPRAY    2 sprays (100 mcg total) by Each Nare route once daily.    FUROSEMIDE (LASIX) 20 MG TABLET    Take 1 tablet (20 mg total) by mouth daily as needed (swelling in legs).    GABAPENTIN (NEURONTIN) 300 MG CAPSULE    Take 1-2 capsules by mouth every night    INSULIN GLARGINE 100 UNITS/ML (3ML) SUBQ PEN    Inject 45 Units into the skin once daily.    INSULIN LISPRO (HUMALOG KWIKPEN INSULIN) 100 UNIT/ML PEN    INJECT UP TO 18 UNITS UNDER THE SKIN THREE TIMES DAILY 10 TO 15 MINUTES BEFORE MEALS AS DIRECTED IN AFTER VISIT SUMMAR Y    LANCETS (FREESTYLE LANCETS) 28 GAUGE MISC    1 lancet by Combination route 2 (two) times daily as needed. Qid prn    LANCETS MISC    1 strip by Misc.(Non-Drug; Combo Route) route 4 (four) times daily with meals and nightly.    LANTUS SOLOSTAR U-100 INSULIN GLARGINE 100 UNITS/ML (3ML) SUBQ PEN    ADMINISTER 50 UNITS UNDER THE SKIN EVERY DAY    LANTUS SOLOSTAR U-100 INSULIN GLARGINE 100 UNITS/ML (3ML) SUBQ PEN    ADMINISTER 50 UNITS UNDER THE SKIN EVERY DAY    LEVOTHYROXINE (SYNTHROID) 150 MCG TABLET    Take 1 tablet (150 mcg total) by mouth before breakfast.    LORAZEPAM (ATIVAN) 1 MG TABLET    Take 1 tablet (1 mg total) by mouth daily as needed for Anxiety.    MYCOPHENOLATE (CELLCEPT) 250 MG CAP    TAKE (2) CAPSULES TWICE DAILY.    ONDANSETRON (ZOFRAN) 4 MG TABLET    Take 1 tablet (4 mg total) by mouth every 12 (twelve) hours as needed for Nausea.    OXYCODONE-ACETAMINOPHEN (PERCOCET)  MG PER TABLET    Take  "1 tablet by mouth every 8 (eight) hours as needed for Pain. Greater than 7 day supply medically necessary.    OXYCODONE-ACETAMINOPHEN (PERCOCET)  MG PER TABLET    Take 1 tablet by mouth every 8 (eight) hours as needed for Pain. Greater than 7 day supply medically necessary.    OXYCODONE-ACETAMINOPHEN (PERCOCET)  MG PER TABLET    Take 1 tablet by mouth every 8 (eight) hours as needed for Pain. Greater than 7 day supply medically necessary.    PEN NEEDLE, DIABETIC (BD ULTRA-FINE RORY PEN NEEDLE) 32 GAUGE X 5/32" NDLE    USE AS DIRECTED WITH INSULIN PEN FOUR TIMES DAILY    PROMETHAZINE (PHENERGAN) 25 MG TABLET    Take 1 tablet (25 mg total) by mouth every 6 (six) hours as needed for Nausea.    TACROLIMUS (PROGRAF) 1 MG CAP    TAKE 5 CAPSULES IN THE MORNING, AND 4 IN THE EVENING    ZOLPIDEM (AMBIEN) 5 MG TAB    Take 1 tablet (5 mg total) by mouth nightly as needed.       Assessment/Plans:-  1. Age-related osteoporosis without current pathological fracture    2. Primary osteoarthritis involving multiple joints    3. Severe obesity (BMI 35.0-39.9) with comorbidity      Problem List Items Addressed This Visit        Endocrine    Severe obesity (BMI 35.0-39.9) with comorbidity    Current Assessment & Plan     Severe obesity with worsening degenerative disc disease and knee osteoarthritis and multiple other medical problems.  Advised to reduce carbohydrate to not more than 60 g a day and sugars not more than 10 g a day.  She voiced understanding.            Orthopedic    Age-related osteoporosis without current pathological fracture - Primary    Current Assessment & Plan     Osteoporosis without any history of fragility fracture.  Failing bisphosphonate.  Try Prolia.  Discussed in detail about all adverse effects of the medication including osteonecrosis of the jaw and atypical femoral fractures.  She voiced understanding.  Advised against any invasive dental procedures for at least 5 months after Prolia.  CMP " and vitamin-D today.         Relevant Orders    Vitamin D    Comprehensive metabolic panel    Primary osteoarthritis involving multiple joints    Current Assessment & Plan     Primary osteoarthritis involving multiple joints and spine.  Continue follow-up with interventional pain specialist.  No evidence of synovitis/dactylitis/enthesitis/effusion               Follow up in about 6 months (around 5/7/2020).    Thank you for allowing me to participate in the care ofAna Maria [facility].    Disclaimer: This note was prepared using voice recognition system and is likely to have sound alike errors and is not proof read.  Please call me with any questions.

## 2019-10-31 ENCOUNTER — PATIENT MESSAGE (OUTPATIENT)
Dept: FAMILY MEDICINE | Facility: CLINIC | Age: 59
End: 2019-10-31

## 2019-10-31 ENCOUNTER — EXTERNAL CHRONIC CARE MANAGEMENT (OUTPATIENT)
Dept: PRIMARY CARE CLINIC | Facility: CLINIC | Age: 59
End: 2019-10-31
Payer: MEDICARE

## 2019-10-31 ENCOUNTER — PATIENT MESSAGE (OUTPATIENT)
Dept: NEPHROLOGY | Facility: CLINIC | Age: 59
End: 2019-10-31

## 2019-10-31 PROCEDURE — 99490 CHRNC CARE MGMT STAFF 1ST 20: CPT | Mod: S$PBB,,, | Performed by: INTERNAL MEDICINE

## 2019-10-31 PROCEDURE — 99490 PR CHRONIC CARE MGMT, 1ST 20 MIN: ICD-10-PCS | Mod: S$PBB,,, | Performed by: INTERNAL MEDICINE

## 2019-10-31 PROCEDURE — 99490 CHRNC CARE MGMT STAFF 1ST 20: CPT | Mod: PBBFAC,PO | Performed by: INTERNAL MEDICINE

## 2019-11-06 ENCOUNTER — OFFICE VISIT (OUTPATIENT)
Dept: FAMILY MEDICINE | Facility: CLINIC | Age: 59
End: 2019-11-06
Payer: MEDICARE

## 2019-11-06 VITALS
HEIGHT: 67 IN | OXYGEN SATURATION: 96 % | BODY MASS INDEX: 37.3 KG/M2 | HEART RATE: 99 BPM | TEMPERATURE: 99 F | WEIGHT: 237.63 LBS

## 2019-11-06 DIAGNOSIS — D84.9 IMMUNOSUPPRESSED STATUS: ICD-10-CM

## 2019-11-06 DIAGNOSIS — M81.0 AGE-RELATED OSTEOPOROSIS WITHOUT CURRENT PATHOLOGICAL FRACTURE: ICD-10-CM

## 2019-11-06 DIAGNOSIS — Z29.9 PREVENTIVE MEASURE: ICD-10-CM

## 2019-11-06 DIAGNOSIS — I15.2 HYPERTENSION ASSOCIATED WITH DIABETES: ICD-10-CM

## 2019-11-06 DIAGNOSIS — F41.9 ANXIETY AND DEPRESSION: ICD-10-CM

## 2019-11-06 DIAGNOSIS — E78.5 HYPERLIPIDEMIA ASSOCIATED WITH TYPE 2 DIABETES MELLITUS: ICD-10-CM

## 2019-11-06 DIAGNOSIS — E11.69 HYPERLIPIDEMIA ASSOCIATED WITH TYPE 2 DIABETES MELLITUS: ICD-10-CM

## 2019-11-06 DIAGNOSIS — E11.59 HYPERTENSION ASSOCIATED WITH DIABETES: ICD-10-CM

## 2019-11-06 DIAGNOSIS — E03.9 ACQUIRED HYPOTHYROIDISM: ICD-10-CM

## 2019-11-06 DIAGNOSIS — F32.A ANXIETY AND DEPRESSION: ICD-10-CM

## 2019-11-06 DIAGNOSIS — Z94.0 DECEASED-DONOR KIDNEY TRANSPLANT: Chronic | ICD-10-CM

## 2019-11-06 PROCEDURE — 99213 OFFICE O/P EST LOW 20 MIN: CPT | Mod: PBBFAC,PO | Performed by: INTERNAL MEDICINE

## 2019-11-06 PROCEDURE — 99214 OFFICE O/P EST MOD 30 MIN: CPT | Mod: S$PBB,,, | Performed by: INTERNAL MEDICINE

## 2019-11-06 PROCEDURE — 99999 PR PBB SHADOW E&M-EST. PATIENT-LVL III: ICD-10-PCS | Mod: PBBFAC,,, | Performed by: INTERNAL MEDICINE

## 2019-11-06 PROCEDURE — 99999 PR PBB SHADOW E&M-EST. PATIENT-LVL III: CPT | Mod: PBBFAC,,, | Performed by: INTERNAL MEDICINE

## 2019-11-06 PROCEDURE — 90686 IIV4 VACC NO PRSV 0.5 ML IM: CPT | Mod: PBBFAC,PO

## 2019-11-06 PROCEDURE — 99214 PR OFFICE/OUTPT VISIT, EST, LEVL IV, 30-39 MIN: ICD-10-PCS | Mod: S$PBB,,, | Performed by: INTERNAL MEDICINE

## 2019-11-07 ENCOUNTER — PATIENT MESSAGE (OUTPATIENT)
Dept: PAIN MEDICINE | Facility: CLINIC | Age: 59
End: 2019-11-07

## 2019-11-07 ENCOUNTER — OFFICE VISIT (OUTPATIENT)
Dept: PAIN MEDICINE | Facility: CLINIC | Age: 59
End: 2019-11-07
Payer: MEDICARE

## 2019-11-07 VITALS
BODY MASS INDEX: 37.2 KG/M2 | HEART RATE: 74 BPM | DIASTOLIC BLOOD PRESSURE: 91 MMHG | HEIGHT: 67 IN | RESPIRATION RATE: 18 BRPM | WEIGHT: 237 LBS | SYSTOLIC BLOOD PRESSURE: 153 MMHG

## 2019-11-07 DIAGNOSIS — M51.36 DDD (DEGENERATIVE DISC DISEASE), LUMBAR: ICD-10-CM

## 2019-11-07 DIAGNOSIS — M47.816 LUMBAR SPONDYLOSIS: ICD-10-CM

## 2019-11-07 DIAGNOSIS — M54.16 LEFT LUMBAR RADICULOPATHY: ICD-10-CM

## 2019-11-07 DIAGNOSIS — M47.817 SPONDYLOSIS OF LUMBOSACRAL REGION WITHOUT MYELOPATHY OR RADICULOPATHY: ICD-10-CM

## 2019-11-07 DIAGNOSIS — M53.3 SACROILIAC JOINT PAIN: Primary | ICD-10-CM

## 2019-11-07 PROCEDURE — 99214 OFFICE O/P EST MOD 30 MIN: CPT | Mod: S$PBB,,, | Performed by: PHYSICIAN ASSISTANT

## 2019-11-07 PROCEDURE — 99213 OFFICE O/P EST LOW 20 MIN: CPT | Mod: PBBFAC | Performed by: PHYSICIAN ASSISTANT

## 2019-11-07 PROCEDURE — 99999 PR PBB SHADOW E&M-EST. PATIENT-LVL III: ICD-10-PCS | Mod: PBBFAC,,, | Performed by: PHYSICIAN ASSISTANT

## 2019-11-07 PROCEDURE — 99999 PR PBB SHADOW E&M-EST. PATIENT-LVL III: CPT | Mod: PBBFAC,,, | Performed by: PHYSICIAN ASSISTANT

## 2019-11-07 PROCEDURE — 99214 PR OFFICE/OUTPT VISIT, EST, LEVL IV, 30-39 MIN: ICD-10-PCS | Mod: S$PBB,,, | Performed by: PHYSICIAN ASSISTANT
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
## 2019-11-11 ENCOUNTER — INFUSION (OUTPATIENT)
Dept: RHEUMATOLOGY | Facility: HOSPITAL | Age: 59
End: 2019-11-11
Payer: MEDICARE

## 2019-11-11 VITALS
TEMPERATURE: 98 F | BODY MASS INDEX: 37.19 KG/M2 | HEART RATE: 86 BPM | SYSTOLIC BLOOD PRESSURE: 139 MMHG | RESPIRATION RATE: 18 BRPM | DIASTOLIC BLOOD PRESSURE: 89 MMHG | WEIGHT: 237.44 LBS

## 2019-11-11 DIAGNOSIS — M81.0 AGE-RELATED OSTEOPOROSIS WITHOUT CURRENT PATHOLOGICAL FRACTURE: Primary | ICD-10-CM

## 2019-11-11 PROCEDURE — 63600175 PHARM REV CODE 636 W HCPCS: Mod: JG | Performed by: INTERNAL MEDICINE

## 2019-11-11 PROCEDURE — 96372 THER/PROPH/DIAG INJ SC/IM: CPT

## 2019-11-11 RX ORDER — CETIRIZINE HYDROCHLORIDE 10 MG/1
TABLET ORAL
Qty: 30 TABLET | Refills: 9 | Status: SHIPPED | OUTPATIENT
Start: 2019-11-11 | End: 2020-08-19 | Stop reason: SDUPTHER

## 2019-11-11 RX ADMIN — DENOSUMAB 60 MG: 60 INJECTION SUBCUTANEOUS at 10:11

## 2019-11-11 NOTE — NURSING
Prolia 60 mg q 6 months  Last dose given-1st dose today    Any invasive dental procedures in past 3 months or upcoming 3 months: denies    Last Rheumatology provider visit- Seen by Dr. TEJEDA on 10/30/19    Recent labs? 10/30/19;  CKD pt needing repeat labs in 10 days-No   Lab Results   Component Value Date    CALCIUM 10.1 10/30/2019     Lab Results   Component Value Date    CREATININE 0.9 10/30/2019     Lab Results   Component Value Date    ESTGFRAFRICA >60 10/30/2019     Lab Results   Component Value Date    EGFRNONAA >60 10/30/2019     Lab Results   Component Value Date    [id number] 41 10/30/2019          Lot #- [id number]  Expiration Date- 02/22       Prolia 60 mg/ml administered SQ to Right upper, posterior arm. Tolerated without any complaints. No redness, swelling, or drainage noted to site. Instructed to remain in clinic 15 minutes after administration to monitor for any s/sx of reaction. Pt instructed on signs and symptoms of reaction to report. Verbalizes understanding.

## 2019-11-11 NOTE — PATIENT INSTRUCTIONS
Prolia (denosumab) injection  What is this medicine?  DENOSUMAB (den oh robin mab) slows bone breakdown. Prolia is used to treat osteoporosis in women after menopause and in men. Xgeva is used to prevent bone fractures and other bone problems caused by cancer bone metastases. Xgeva is also used to treat giant cell tumor of the bone.  How should I use this medicine?  This medicine is for injection under the skin. It is given by a health care professional in a hospital or clinic setting.  If you are getting Prolia, a special MedGuide will be given to you by the pharmacist with each prescription and refill. Be sure to read this information carefully each time.  For Prolia, talk to your pediatrician regarding the use of this medicine in children. Special care may be needed. For Xgeva, talk to your pediatrician regarding the use of this medicine in children. While this drug may be prescribed for children as young as 13 years for selected conditions, precautions do apply.  What side effects may I notice from receiving this medicine?  Side effects that you should report to your doctor or health care professional as soon as possible:  · allergic reactions like skin rash, itching or hives, swelling of the face, lips, or tongue  · breathing problems  · chest pain  · fast, irregular heartbeat  · feeling faint or lightheaded, falls  · fever, chills, or any other sign of infection  · muscle spasms, tightening, or twitches  · numbness or tingling  · skin blisters or bumps, or is dry, peels, or red  · slow healing or unexplained pain in the mouth or jaw  · unusual bleeding or bruising  Side effects that usually do not require medical attention (Report these to your doctor or health care professional if they continue or are bothersome.):  · muscle pain  · stomach upset, gas  What may interact with this medicine?  Do not take this medicine with any of the following medications:  · other medicines containing denosumab  This medicine  may also interact with the following medications:  · medicines that suppress the immune system  · medicines that treat cancer  · steroid medicines like prednisone or cortisone  What if I miss a dose?  It is important not to miss your dose. Call your doctor or health care professional if you are unable to keep an appointment.  Where should I keep my medicine?  This medicine is only given in a clinic, doctor's office, or other health care setting and will not be stored at home.  What should I tell my health care provider before I take this medicine?  They need to know if you have any of these conditions:  · dental disease  · eczema  · infection or history of infections  · kidney disease or on dialysis  · low blood calcium or vitamin D  · malabsorption syndrome  · scheduled to have surgery or tooth extraction  · taking medicine that contains denosumab  · thyroid or parathyroid disease  · an unusual reaction to denosumab, other medicines, foods, dyes, or preservatives  · pregnant or trying to get pregnant  · breast-feeding  What should I watch for while using this medicine?  Visit your doctor or health care professional for regular checks on your progress. Your doctor or health care professional may order blood tests and other tests to see how you are doing.  Call your doctor or health care professional if you get a cold or other infection while receiving this medicine. Do not treat yourself. This medicine may decrease your body's ability to fight infection.  You should make sure you get enough calcium and vitamin D while you are taking this medicine, unless your doctor tells you not to. Discuss the foods you eat and the vitamins you take with your health care professional.  See your dentist regularly. Brush and floss your teeth as directed. Before you have any dental work done, tell your dentist you are receiving this medicine.  Do not become pregnant while taking this medicine or for 5 months after stopping it. Women  should inform their doctor if they wish to become pregnant or think they might be pregnant. There is a potential for serious side effects to an unborn child. Talk to your health care professional or pharmacist for more information.  NOTE:This sheet is a summary. It may not cover all possible information. If you have questions about this medicine, talk to your doctor, pharmacist, or health care provider. Copyright© 2017 [employer]

## 2019-11-18 ENCOUNTER — PATIENT MESSAGE (OUTPATIENT)
Dept: NEPHROLOGY | Facility: CLINIC | Age: 59
End: 2019-11-18

## 2019-11-19 ENCOUNTER — PATIENT MESSAGE (OUTPATIENT)
Dept: NEPHROLOGY | Facility: CLINIC | Age: 59
End: 2019-11-19

## 2019-11-20 ENCOUNTER — LAB VISIT (OUTPATIENT)
Dept: LAB | Facility: HOSPITAL | Age: 59
End: 2019-11-20
Attending: INTERNAL MEDICINE
Payer: MEDICARE

## 2019-11-20 DIAGNOSIS — Z94.0 S/P KIDNEY TRANSPLANT: ICD-10-CM

## 2019-11-20 LAB
ALBUMIN SERPL BCP-MCNC: 3.5 G/DL (ref 3.5–5.2)
ANION GAP SERPL CALC-SCNC: 10 MMOL/L (ref 8–16)
BASOPHILS # BLD AUTO: 0.09 K/UL (ref 0–0.2)
BASOPHILS NFR BLD: 0.7 % (ref 0–1.9)
BUN SERPL-MCNC: 12 MG/DL (ref 6–20)
CALCIUM SERPL-MCNC: 8.7 MG/DL (ref 8.7–10.5)
CHLORIDE SERPL-SCNC: 107 MMOL/L (ref 95–110)
CO2 SERPL-SCNC: 25 MMOL/L (ref 23–29)
CREAT SERPL-MCNC: 0.9 MG/DL (ref 0.5–1.4)
DIFFERENTIAL METHOD: ABNORMAL
EOSINOPHIL # BLD AUTO: 0.3 K/UL (ref 0–0.5)
EOSINOPHIL NFR BLD: 2.1 % (ref 0–8)
ERYTHROCYTE [DISTWIDTH] IN BLOOD BY AUTOMATED COUNT: 15 % (ref 11.5–14.5)
EST. GFR  (AFRICAN AMERICAN): >60 ML/MIN/1.73 M^2
EST. GFR  (NON AFRICAN AMERICAN): >60 ML/MIN/1.73 M^2
GLUCOSE SERPL-MCNC: 86 MG/DL (ref 70–110)
HCT VFR BLD AUTO: 42 % (ref 37–48.5)
HGB BLD-MCNC: 12.5 G/DL (ref 12–16)
IMM GRANULOCYTES # BLD AUTO: 0.11 K/UL (ref 0–0.04)
IMM GRANULOCYTES NFR BLD AUTO: 0.9 % (ref 0–0.5)
LYMPHOCYTES # BLD AUTO: 3.3 K/UL (ref 1–4.8)
LYMPHOCYTES NFR BLD: 27.4 % (ref 18–48)
MCH RBC QN AUTO: 25.4 PG (ref 27–31)
MCHC RBC AUTO-ENTMCNC: 29.8 G/DL (ref 32–36)
MCV RBC AUTO: 85 FL (ref 82–98)
MONOCYTES # BLD AUTO: 0.5 K/UL (ref 0.3–1)
MONOCYTES NFR BLD: 4.4 % (ref 4–15)
NEUTROPHILS # BLD AUTO: 7.8 K/UL (ref 1.8–7.7)
NEUTROPHILS NFR BLD: 64.5 % (ref 38–73)
NRBC BLD-RTO: 0 /100 WBC
PHOSPHATE SERPL-MCNC: 2.4 MG/DL (ref 2.7–4.5)
PLATELET # BLD AUTO: 282 K/UL (ref 150–350)
PMV BLD AUTO: 11.8 FL (ref 9.2–12.9)
POTASSIUM SERPL-SCNC: 3.5 MMOL/L (ref 3.5–5.1)
PTH-INTACT SERPL-MCNC: 277 PG/ML (ref 9–77)
RBC # BLD AUTO: 4.93 M/UL (ref 4–5.4)
SODIUM SERPL-SCNC: 142 MMOL/L (ref 136–145)
URATE SERPL-MCNC: 6.3 MG/DL (ref 2.4–5.7)
WBC # BLD AUTO: 12.09 K/UL (ref 3.9–12.7)

## 2019-11-20 PROCEDURE — 83970 ASSAY OF PARATHORMONE: CPT

## 2019-11-20 PROCEDURE — 85025 COMPLETE CBC W/AUTO DIFF WBC: CPT

## 2019-11-20 PROCEDURE — 84550 ASSAY OF BLOOD/URIC ACID: CPT

## 2019-11-20 PROCEDURE — 80197 ASSAY OF TACROLIMUS: CPT

## 2019-11-20 PROCEDURE — 36415 COLL VENOUS BLD VENIPUNCTURE: CPT | Mod: PO

## 2019-11-20 PROCEDURE — 80069 RENAL FUNCTION PANEL: CPT

## 2019-11-21 ENCOUNTER — PATIENT MESSAGE (OUTPATIENT)
Dept: NEPHROLOGY | Facility: CLINIC | Age: 59
End: 2019-11-21

## 2019-11-21 LAB — TACROLIMUS BLD-MCNC: 5 NG/ML (ref 5–15)

## 2019-11-30 ENCOUNTER — EXTERNAL CHRONIC CARE MANAGEMENT (OUTPATIENT)
Dept: PRIMARY CARE CLINIC | Facility: CLINIC | Age: 59
End: 2019-11-30
Payer: MEDICARE

## 2019-11-30 PROCEDURE — 99490 PR CHRONIC CARE MGMT, 1ST 20 MIN: ICD-10-PCS | Mod: S$PBB,,, | Performed by: INTERNAL MEDICINE

## 2019-11-30 PROCEDURE — 99490 CHRNC CARE MGMT STAFF 1ST 20: CPT | Mod: PBBFAC,PO | Performed by: INTERNAL MEDICINE

## 2019-11-30 PROCEDURE — 99490 CHRNC CARE MGMT STAFF 1ST 20: CPT | Mod: S$PBB,,, | Performed by: INTERNAL MEDICINE

## 2019-12-23 ENCOUNTER — PATIENT MESSAGE (OUTPATIENT)
Dept: INTERNAL MEDICINE | Facility: CLINIC | Age: 59
End: 2019-12-23

## 2019-12-23 ENCOUNTER — OFFICE VISIT (OUTPATIENT)
Dept: INTERNAL MEDICINE | Facility: CLINIC | Age: 59
End: 2019-12-23
Payer: MEDICARE

## 2019-12-23 ENCOUNTER — HOSPITAL ENCOUNTER (OUTPATIENT)
Dept: RADIOLOGY | Facility: HOSPITAL | Age: 59
Discharge: HOME | End: 2019-12-23
Attending: NURSE PRACTITIONER
Payer: MEDICARE

## 2019-12-23 VITALS
SYSTOLIC BLOOD PRESSURE: 145 MMHG | OXYGEN SATURATION: 100 % | BODY MASS INDEX: 35.84 KG/M2 | TEMPERATURE: 98 F | DIASTOLIC BLOOD PRESSURE: 80 MMHG | HEART RATE: 96 BPM | WEIGHT: 228.38 LBS | HEIGHT: 67 IN

## 2019-12-23 DIAGNOSIS — Z29.89 NEED FOR PROPHYLACTIC IMMUNOTHERAPY: Chronic | ICD-10-CM

## 2019-12-23 DIAGNOSIS — Z94.0 DECEASED-DONOR KIDNEY TRANSPLANT: Chronic | ICD-10-CM

## 2019-12-23 DIAGNOSIS — R94.2 ABNORMAL PFTS (PULMONARY FUNCTION TESTS): ICD-10-CM

## 2019-12-23 DIAGNOSIS — J42 CHRONIC WHEEZY BRONCHITIS: ICD-10-CM

## 2019-12-23 DIAGNOSIS — E11.59 HYPERTENSION ASSOCIATED WITH DIABETES: ICD-10-CM

## 2019-12-23 DIAGNOSIS — J41.0 SIMPLE CHRONIC BRONCHITIS: ICD-10-CM

## 2019-12-23 DIAGNOSIS — R05.9 COUGH: Primary | ICD-10-CM

## 2019-12-23 DIAGNOSIS — I15.2 HYPERTENSION ASSOCIATED WITH DIABETES: ICD-10-CM

## 2019-12-23 PROCEDURE — 99214 OFFICE O/P EST MOD 30 MIN: CPT | Mod: S$PBB,,, | Performed by: NURSE PRACTITIONER

## 2019-12-23 PROCEDURE — 99999 PR PBB SHADOW E&M-EST. PATIENT-LVL V: ICD-10-PCS | Mod: PBBFAC,,, | Performed by: NURSE PRACTITIONER

## 2019-12-23 PROCEDURE — 99215 OFFICE O/P EST HI 40 MIN: CPT | Mod: PBBFAC,25 | Performed by: NURSE PRACTITIONER

## 2019-12-23 PROCEDURE — 71046 XR CHEST PA AND LATERAL: ICD-10-PCS | Mod: 26,,, | Performed by: RADIOLOGY

## 2019-12-23 PROCEDURE — 71046 X-RAY EXAM CHEST 2 VIEWS: CPT | Mod: 26,,, | Performed by: RADIOLOGY

## 2019-12-23 PROCEDURE — 71046 X-RAY EXAM CHEST 2 VIEWS: CPT | Mod: TC

## 2019-12-23 PROCEDURE — 99999 PR PBB SHADOW E&M-EST. PATIENT-LVL V: CPT | Mod: PBBFAC,,, | Performed by: NURSE PRACTITIONER

## 2019-12-23 PROCEDURE — 99214 PR OFFICE/OUTPT VISIT, EST, LEVL IV, 30-39 MIN: ICD-10-PCS | Mod: S$PBB,,, | Performed by: NURSE PRACTITIONER

## 2019-12-23 PROCEDURE — 96372 THER/PROPH/DIAG INJ SC/IM: CPT | Mod: PBBFAC

## 2019-12-23 RX ORDER — PROMETHAZINE HYDROCHLORIDE AND DEXTROMETHORPHAN HYDROBROMIDE 6.25; 15 MG/5ML; MG/5ML
5 SYRUP ORAL NIGHTLY PRN
Qty: 180 ML | Refills: 0 | Status: SHIPPED | OUTPATIENT
Start: 2019-12-23 | End: 2020-01-02

## 2019-12-23 RX ORDER — PREDNISONE 20 MG/1
20 TABLET ORAL 2 TIMES DAILY
Qty: 6 TABLET | Refills: 0 | Status: SHIPPED | OUTPATIENT
Start: 2019-12-23 | End: 2019-12-26

## 2019-12-23 RX ORDER — BETAMETHASONE SODIUM PHOSPHATE AND BETAMETHASONE ACETATE 3; 3 MG/ML; MG/ML
6 INJECTION, SUSPENSION INTRA-ARTICULAR; INTRALESIONAL; INTRAMUSCULAR; SOFT TISSUE
Status: COMPLETED | OUTPATIENT
Start: 2019-12-23 | End: 2019-12-23

## 2019-12-23 RX ORDER — IPRATROPIUM BROMIDE AND ALBUTEROL SULFATE 2.5; .5 MG/3ML; MG/3ML
3 SOLUTION RESPIRATORY (INHALATION) 3 TIMES DAILY
Qty: 270 ML | Refills: 5 | Status: SHIPPED | OUTPATIENT
Start: 2019-12-23 | End: 2021-03-31

## 2019-12-23 RX ORDER — DOXYCYCLINE 100 MG/1
100 CAPSULE ORAL EVERY 12 HOURS
Qty: 20 CAPSULE | Refills: 0 | Status: SHIPPED | OUTPATIENT
Start: 2019-12-23 | End: 2020-01-02

## 2019-12-23 RX ADMIN — BETAMETHASONE ACETATE AND BETAMETHASONE SODIUM PHOSPHATE 6 MG: 3; 3 INJECTION, SUSPENSION INTRA-ARTICULAR; INTRALESIONAL; INTRAMUSCULAR; SOFT TISSUE at 01:12

## 2019-12-23 NOTE — PROGRESS NOTES
Subjective:       Patient ID: Ana Maria Teague is a 59 y.o. female.    Chief Complaint: Cough (dry cough)    HPI    Cough x 2 weeks, getting worse. Chest tightness, now feels weaker, poor appetite     Past Medical History:   Diagnosis Date    Abnormal glucose 2013    Acquired hypothyroidism     Acute bronchiolitis 10/15/2014    Anemia     Anemia of chronic renal failure 2013    Anxiety     Anxiety disorder     Avascular necrosis of bone of hip     Back pain     s/p steroid injections    Chronic immunosuppression with Prograf and Cellcept 2013    CKD (chronic kidney disease) stage 2, GFR 60-89 ml/min     CKD (chronic kidney disease) stage 5, GFR less than 15 ml/min     -donor kidney transplant - 13    Depression     Hypertension, renal 2013    Hypothyroidism     Immunosuppressed status 10/15/2014    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 7 years ago.     SECTION      COLONOSCOPY N/A 3/9/2016    Procedure: COLONOSCOPY;  Surgeon: Douglas Daniel III, MD;  Location: [facility];  Service: Endoscopy;  Laterality: N/A;    cyst removed form chest wall      HYSTERECTOMY      KIDNEY TRANSPLANT  2013    SKIN GRAFT      revision    THYROIDECTOMY      vascular access surgeries      multiple. last time 2 weeks ago     Social History     Socioeconomic History    Marital status:      Spouse name: Not on file    Number of children: 3    Years of education: Not on file    Highest education level: Not on file   Occupational History    Occupation: disable     Comment: dept manager at [employer]   Social Needs    Financial resource strain:  Somewhat hard    Food insecurity:     Worry: Sometimes true     Inability: Never true    Transportation needs:     Medical: No     Non-medical: No   Tobacco Use    Smoking status: Former Smoker     Packs/day: 1.00     Years: 10.00     Pack years: 10.00     Types: Cigarettes     Last attempt to quit: 2002     Years since quittin.1    Smokeless tobacco: Never Used    Tobacco comment: quit smoking approx 10-15 years ago    Substance and Sexual Activity    Alcohol use: No     Frequency: Monthly or less     Drinks per session: 1 or 2     Binge frequency: Never    Drug use: No    Sexual activity: Never     Partners: Male   Lifestyle    Physical activity:     Days per week: 2 days     Minutes per session: 30 min    Stress: Not on file   Relationships    Social connections:     Talks on phone: More than three times a week     Gets together: Once a week     Attends Druze service: Not on file     Active member of club or organization: Yes     Attends meetings of clubs or organizations: More than 4 times per year     Relationship status:    Other Topics Concern    Not on file   Social History Narrative    Does housework at home.     Review of patient's allergies indicates:   Allergen Reactions    Azithromycin Nausea And Vomiting    Adhesive Other (See Comments)     Skin tears    Nsaids (non-steroidal anti-inflammatory drug)      Kidney Transplant     Current Outpatient Medications   Medication Sig    albuterol 90 mcg/actuation inhaler Inhale 2 puffs into the lungs.    albuterol-ipratropium (DUO-NEB) 2.5 mg-0.5 mg/3 mL nebulizer solution Take 3 mLs by nebulization 3 (three) times daily.    amLODIPine (NORVASC) 5 MG tablet Take 1 tablet (5 mg total) by mouth once daily.    atorvastatin (LIPITOR) 20 MG tablet Take 1 tablet (20 mg total) by mouth once daily.    blood sugar diagnostic (FREESTYLE LITE STRIPS) Strp Use to check blood glucose 3 times a day.    cetirizine (ZYRTEC) 10 MG tablet  Take 1 tablet (10 mg total) by mouth once daily.    cetirizine (ZYRTEC) 10 MG tablet TAKE 1 TABLET(10 MG) BY MOUTH EVERY DAY    cyclobenzaprine (FLEXERIL) 10 MG tablet Take 1 tablet (10 mg total) by mouth 3 (three) times daily as needed for Muscle spasms.    ergocalciferol (ERGOCALCIFEROL) 50,000 unit Cap Take 1 capsule (50,000 Units total) by mouth every 7 days.    fluticasone (FLONASE) 50 mcg/actuation nasal spray 2 sprays (100 mcg total) by Each Nare route once daily.    furosemide (LASIX) 20 MG tablet Take 1 tablet (20 mg total) by mouth daily as needed (swelling in legs).    gabapentin (NEURONTIN) 300 MG capsule Take 1-2 capsules by mouth every night    insulin glargine 100 units/mL (3mL) SubQ pen Inject 45 Units into the skin once daily.    insulin lispro (HUMALOG KWIKPEN INSULIN) 100 unit/mL pen INJECT UP TO 18 UNITS UNDER THE SKIN THREE TIMES DAILY 10 TO 15 MINUTES BEFORE MEALS AS DIRECTED IN AFTER VISIT SUMMAR Y    lancets (FREESTYLE LANCETS) 28 gauge Misc 1 lancet by Combination route 2 (two) times daily as needed. Qid prn    lancets Misc 1 strip by Misc.(Non-Drug; Combo Route) route 4 (four) times daily with meals and nightly.    LANTUS SOLOSTAR U-100 INSULIN glargine 100 units/mL (3mL) SubQ pen ADMINISTER 50 UNITS UNDER THE SKIN EVERY DAY    LANTUS SOLOSTAR U-100 INSULIN glargine 100 units/mL (3mL) SubQ pen ADMINISTER 50 UNITS UNDER THE SKIN EVERY DAY    levothyroxine (SYNTHROID) 150 MCG tablet Take 1 tablet (150 mcg total) by mouth before breakfast.    LORazepam (ATIVAN) 1 MG tablet Take 1 tablet (1 mg total) by mouth daily as needed for Anxiety.    mycophenolate (CELLCEPT) 250 mg Cap TAKE (2) CAPSULES TWICE DAILY.    oxyCODONE-acetaminophen (PERCOCET)  mg per tablet Take 1 tablet by mouth every 8 (eight) hours as needed for Pain. Greater than 7 day supply medically necessary.    [START ON 12/24/2019] oxyCODONE-acetaminophen (PERCOCET)  mg per tablet Take 1 tablet by mouth  "every 8 (eight) hours as needed for Pain. Greater than 7 day supply medically necessary.    pen needle, diabetic (BD ULTRA-FINE RORY PEN NEEDLE) 32 gauge x 5/32" Ndle USE AS DIRECTED WITH INSULIN PEN FOUR TIMES DAILY    tacrolimus (PROGRAF) 1 MG Cap TAKE 5 CAPSULES IN THE MORNING, AND 4 IN THE EVENING    zolpidem (AMBIEN) 5 MG Tab Take 1 tablet (5 mg total) by mouth nightly as needed.    doxycycline (MONODOX) 100 MG capsule Take 1 capsule (100 mg total) by mouth every 12 (twelve) hours. for 10 days    predniSONE (DELTASONE) 20 MG tablet Take 1 tablet (20 mg total) by mouth 2 (two) times daily. Start tomorrow for 3 days    promethazine-dextromethorphan (PROMETHAZINE-DM) 6.25-15 mg/5 mL Syrp Take 5 mLs by mouth nightly as needed.     No current facility-administered medications for this visit.            Review of Systems   Constitutional: Negative for activity change, appetite change, chills, diaphoresis, fatigue, fever and unexpected weight change.   HENT: Positive for postnasal drip and rhinorrhea. Negative for congestion, ear pain, sinus pressure, sinus pain, sneezing, sore throat, tinnitus, trouble swallowing and voice change.    Eyes: Negative for photophobia, pain and visual disturbance.   Respiratory: Positive for cough, chest tightness, shortness of breath and wheezing.    Cardiovascular: Negative for chest pain, palpitations and leg swelling.   Gastrointestinal: Negative for abdominal distention, abdominal pain, constipation, diarrhea, nausea and vomiting.   Genitourinary: Negative for decreased urine volume, difficulty urinating, dysuria, flank pain, frequency, hematuria and urgency.   Musculoskeletal: Negative for arthralgias, back pain, joint swelling, neck pain and neck stiffness.   Allergic/Immunologic: Negative for immunocompromised state.   Neurological: Negative for dizziness, tremors, seizures, syncope, facial asymmetry, speech difficulty, weakness, light-headedness, numbness and headaches. "   Hematological: Negative for adenopathy. Does not bruise/bleed easily.   Psychiatric/Behavioral: Negative for confusion and sleep disturbance.       Objective:      Physical Exam   Constitutional: She is oriented to person, place, and time.   HENT:   Right Ear: Tympanic membrane normal.   Left Ear: Tympanic membrane normal.   Nose: Rhinorrhea present.   Mouth/Throat: Uvula is midline, oropharynx is clear and moist and mucous membranes are normal.   Cardiovascular: Normal rate, regular rhythm and normal heart sounds.   Pulmonary/Chest: Effort normal. She has wheezes.   Abdominal: Soft. Bowel sounds are normal.   Musculoskeletal: Normal range of motion.   Neurological: She is alert and oriented to person, place, and time.   Skin: Skin is warm and dry.   Psychiatric: She has a normal mood and affect.       Assessment:     Vitals:    19 1307   BP: (!) 145/80   Pulse: 96   Temp: 97.6 °F (36.4 °C)         1. Cough    2. Chronic wheezy bronchitis    3. Uncontrolled type 2 diabetes mellitus with stage 3 chronic kidney disease, with long-term current use of insulin    4. -donor kidney transplant - 13    5. Chronic immunosuppression with Prograf and Cellcept    6. Hypertension associated with diabetes    7. Abnormal PFTs (pulmonary function tests)        Plan:   Cough    Chronic wheezy bronchitis  -     albuterol-ipratropium (DUO-NEB) 2.5 mg-0.5 mg/3 mL nebulizer solution; Take 3 mLs by nebulization 3 (three) times daily.  Dispense: 270 mL; Refill: 5  -     X-Ray Chest PA And Lateral; Future; Expected date: 2019  -     albuterol-ipratropium (DUO-NEB) 2.5 mg-0.5 mg/3 mL nebulizer solution; Take 3 mLs by nebulization 3 (three) times daily.  Dispense: 270 mL; Refill: 5  -     doxycycline (MONODOX) 100 MG capsule; Take 1 capsule (100 mg total) by mouth every 12 (twelve) hours. for 10 days  Dispense: 20 capsule; Refill: 0  -     predniSONE (DELTASONE) 20 MG tablet; Take 1 tablet (20 mg total) by mouth 2  (two) times daily. Start tomorrow for 3 days  Dispense: 6 tablet; Refill: 0  -     betamethasone acetate-betamethasone sodium phosphate injection 6 mg  -     promethazine-dextromethorphan (PROMETHAZINE-DM) 6.25-15 mg/5 mL Syrp; Take 5 mLs by mouth nightly as needed.  Dispense: 180 mL; Refill: 0    Uncontrolled type 2 diabetes mellitus with stage 3 chronic kidney disease, with long-term current use of insulin    -donor kidney transplant - 13    Chronic immunosuppression with Prograf and Cellcept    Hypertension associated with diabetes    Abnormal PFTs (pulmonary function tests)  -     albuterol-ipratropium (DUO-NEB) 2.5 mg-0.5 mg/3 mL nebulizer solution; Take 3 mLs by nebulization 3 (three) times daily.  Dispense: 270 mL; Refill: 5          As above   Monitor BP and BG  Return PRN

## 2019-12-24 RX ORDER — BENZONATATE 200 MG/1
200 CAPSULE ORAL 2 TIMES DAILY PRN
Qty: 20 CAPSULE | Refills: 0 | Status: SHIPPED | OUTPATIENT
Start: 2019-12-24 | End: 2020-01-03

## 2019-12-31 ENCOUNTER — EXTERNAL CHRONIC CARE MANAGEMENT (OUTPATIENT)
Dept: PRIMARY CARE CLINIC | Facility: CLINIC | Age: 59
End: 2019-12-31
Payer: MEDICARE

## 2019-12-31 PROCEDURE — 99490 CHRNC CARE MGMT STAFF 1ST 20: CPT | Mod: S$PBB,,, | Performed by: INTERNAL MEDICINE

## 2019-12-31 PROCEDURE — 99490 CHRNC CARE MGMT STAFF 1ST 20: CPT | Mod: PBBFAC,PO | Performed by: INTERNAL MEDICINE

## 2019-12-31 PROCEDURE — 99490 PR CHRONIC CARE MGMT, 1ST 20 MIN: ICD-10-PCS | Mod: S$PBB,,, | Performed by: INTERNAL MEDICINE

## 2020-01-13 ENCOUNTER — PES CALL (OUTPATIENT)
Dept: ADMINISTRATIVE | Facility: CLINIC | Age: 60
End: 2020-01-13

## 2020-01-13 RX ORDER — OXYCODONE AND ACETAMINOPHEN 10; 325 MG/1; MG/1
1 TABLET ORAL EVERY 8 HOURS PRN
Qty: 90 TABLET | Refills: 0 | Status: SHIPPED | OUTPATIENT
Start: 2020-01-13 | End: 2020-02-12

## 2020-01-13 RX ORDER — OXYCODONE AND ACETAMINOPHEN 10; 325 MG/1; MG/1
1 TABLET ORAL EVERY 8 HOURS PRN
Qty: 90 TABLET | Refills: 0 | Status: SHIPPED | OUTPATIENT
Start: 2020-02-13 | End: 2020-03-14

## 2020-01-16 ENCOUNTER — PATIENT MESSAGE (OUTPATIENT)
Dept: PAIN MEDICINE | Facility: CLINIC | Age: 60
End: 2020-01-16

## 2020-01-16 ENCOUNTER — OFFICE VISIT (OUTPATIENT)
Dept: PAIN MEDICINE | Facility: CLINIC | Age: 60
End: 2020-01-16
Payer: MEDICARE

## 2020-01-16 VITALS
SYSTOLIC BLOOD PRESSURE: 143 MMHG | WEIGHT: 228 LBS | HEART RATE: 89 BPM | RESPIRATION RATE: 18 BRPM | DIASTOLIC BLOOD PRESSURE: 91 MMHG | BODY MASS INDEX: 35.79 KG/M2 | HEIGHT: 67 IN

## 2020-01-16 DIAGNOSIS — M47.816 LUMBAR SPONDYLOSIS: ICD-10-CM

## 2020-01-16 DIAGNOSIS — M51.36 DDD (DEGENERATIVE DISC DISEASE), LUMBAR: ICD-10-CM

## 2020-01-16 DIAGNOSIS — M53.3 SACROILIAC JOINT PAIN: Primary | ICD-10-CM

## 2020-01-16 DIAGNOSIS — M47.817 SPONDYLOSIS OF LUMBOSACRAL REGION WITHOUT MYELOPATHY OR RADICULOPATHY: ICD-10-CM

## 2020-01-16 DIAGNOSIS — M54.16 LEFT LUMBAR RADICULOPATHY: ICD-10-CM

## 2020-01-16 PROCEDURE — 99999 PR PBB SHADOW E&M-EST. PATIENT-LVL V: ICD-10-PCS | Mod: PBBFAC,,, | Performed by: PHYSICIAN ASSISTANT

## 2020-01-16 PROCEDURE — 99214 OFFICE O/P EST MOD 30 MIN: CPT | Mod: S$PBB,,, | Performed by: PHYSICIAN ASSISTANT

## 2020-01-16 PROCEDURE — 99214 PR OFFICE/OUTPT VISIT, EST, LEVL IV, 30-39 MIN: ICD-10-PCS | Mod: S$PBB,,, | Performed by: PHYSICIAN ASSISTANT

## 2020-01-16 PROCEDURE — 99999 PR PBB SHADOW E&M-EST. PATIENT-LVL V: CPT | Mod: PBBFAC,,, | Performed by: PHYSICIAN ASSISTANT

## 2020-01-16 PROCEDURE — 99215 OFFICE O/P EST HI 40 MIN: CPT | Mod: PBBFAC | Performed by: PHYSICIAN ASSISTANT
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
## 2020-01-31 ENCOUNTER — EXTERNAL CHRONIC CARE MANAGEMENT (OUTPATIENT)
Dept: PRIMARY CARE CLINIC | Facility: CLINIC | Age: 60
End: 2020-01-31
Payer: MEDICARE

## 2020-01-31 PROCEDURE — 99490 PR CHRONIC CARE MGMT, 1ST 20 MIN: ICD-10-PCS | Mod: S$PBB,,, | Performed by: INTERNAL MEDICINE

## 2020-01-31 PROCEDURE — 99490 CHRNC CARE MGMT STAFF 1ST 20: CPT | Mod: PBBFAC,PO | Performed by: INTERNAL MEDICINE

## 2020-01-31 PROCEDURE — 99490 CHRNC CARE MGMT STAFF 1ST 20: CPT | Mod: S$PBB,,, | Performed by: INTERNAL MEDICINE

## 2020-02-18 ENCOUNTER — TELEPHONE (OUTPATIENT)
Dept: PAIN MEDICINE | Facility: CLINIC | Age: 60
End: 2020-02-18

## 2020-02-18 ENCOUNTER — PATIENT MESSAGE (OUTPATIENT)
Dept: PAIN MEDICINE | Facility: CLINIC | Age: 60
End: 2020-02-18

## 2020-02-18 NOTE — TELEPHONE ENCOUNTER
Ask patient to move her appointment with Marcella Haas from 3.12.2020 to 2.20 on 2.21 at her convenience. Left a message.

## 2020-02-19 ENCOUNTER — TELEPHONE (OUTPATIENT)
Dept: PAIN MEDICINE | Facility: CLINIC | Age: 60
End: 2020-02-19

## 2020-02-19 NOTE — TELEPHONE ENCOUNTER
Left message second time regarding rescheduling her appointment with Marcella from 3.12.2020 to 2.20 or 2.21.

## 2020-02-26 ENCOUNTER — PATIENT MESSAGE (OUTPATIENT)
Dept: PAIN MEDICINE | Facility: CLINIC | Age: 60
End: 2020-02-26

## 2020-02-29 ENCOUNTER — EXTERNAL CHRONIC CARE MANAGEMENT (OUTPATIENT)
Dept: PRIMARY CARE CLINIC | Facility: CLINIC | Age: 60
End: 2020-02-29
Payer: MEDICARE

## 2020-02-29 PROCEDURE — 99490 PR CHRONIC CARE MGMT, 1ST 20 MIN: ICD-10-PCS | Mod: S$PBB,,, | Performed by: INTERNAL MEDICINE

## 2020-02-29 PROCEDURE — 99490 CHRNC CARE MGMT STAFF 1ST 20: CPT | Mod: PBBFAC,PO | Performed by: INTERNAL MEDICINE

## 2020-02-29 PROCEDURE — 99490 CHRNC CARE MGMT STAFF 1ST 20: CPT | Mod: S$PBB,,, | Performed by: INTERNAL MEDICINE

## 2020-03-09 RX ORDER — OXYCODONE AND ACETAMINOPHEN 10; 325 MG/1; MG/1
1 TABLET ORAL EVERY 8 HOURS PRN
Qty: 90 TABLET | Refills: 0 | Status: SHIPPED | OUTPATIENT
Start: 2020-04-26 | End: 2020-05-26

## 2020-03-09 RX ORDER — OXYCODONE AND ACETAMINOPHEN 10; 325 MG/1; MG/1
1 TABLET ORAL EVERY 8 HOURS PRN
Qty: 90 TABLET | Refills: 0 | Status: SHIPPED | OUTPATIENT
Start: 2020-03-27 | End: 2020-04-26

## 2020-03-09 RX ORDER — OXYCODONE AND ACETAMINOPHEN 10; 325 MG/1; MG/1
1 TABLET ORAL EVERY 8 HOURS PRN
Qty: 90 TABLET | Refills: 0 | Status: SHIPPED | OUTPATIENT
Start: 2020-05-26 | End: 2020-06-25

## 2020-03-11 ENCOUNTER — PATIENT OUTREACH (OUTPATIENT)
Dept: ADMINISTRATIVE | Facility: OTHER | Age: 60
End: 2020-03-11

## 2020-03-12 ENCOUNTER — OFFICE VISIT (OUTPATIENT)
Dept: PAIN MEDICINE | Facility: CLINIC | Age: 60
End: 2020-03-12
Payer: MEDICARE

## 2020-03-12 VITALS
HEIGHT: 67 IN | HEART RATE: 79 BPM | WEIGHT: 228 LBS | DIASTOLIC BLOOD PRESSURE: 98 MMHG | RESPIRATION RATE: 20 BRPM | SYSTOLIC BLOOD PRESSURE: 149 MMHG | BODY MASS INDEX: 35.79 KG/M2

## 2020-03-12 DIAGNOSIS — G89.29 CHRONIC PAIN OF LEFT KNEE: ICD-10-CM

## 2020-03-12 DIAGNOSIS — M51.36 DDD (DEGENERATIVE DISC DISEASE), LUMBAR: ICD-10-CM

## 2020-03-12 DIAGNOSIS — M54.16 LEFT LUMBAR RADICULOPATHY: ICD-10-CM

## 2020-03-12 DIAGNOSIS — M47.816 LUMBAR SPONDYLOSIS: ICD-10-CM

## 2020-03-12 DIAGNOSIS — M25.562 CHRONIC PAIN OF LEFT KNEE: ICD-10-CM

## 2020-03-12 DIAGNOSIS — M53.3 SACROILIAC JOINT PAIN: ICD-10-CM

## 2020-03-12 DIAGNOSIS — M25.552 LEFT HIP PAIN: Primary | ICD-10-CM

## 2020-03-12 PROCEDURE — 99999 PR PBB SHADOW E&M-EST. PATIENT-LVL III: CPT | Mod: PBBFAC,,, | Performed by: PHYSICIAN ASSISTANT

## 2020-03-12 PROCEDURE — 99213 OFFICE O/P EST LOW 20 MIN: CPT | Mod: PBBFAC | Performed by: PHYSICIAN ASSISTANT

## 2020-03-12 PROCEDURE — 99999 PR PBB SHADOW E&M-EST. PATIENT-LVL III: ICD-10-PCS | Mod: PBBFAC,,, | Performed by: PHYSICIAN ASSISTANT

## 2020-03-12 PROCEDURE — 99214 PR OFFICE/OUTPT VISIT, EST, LEVL IV, 30-39 MIN: ICD-10-PCS | Mod: S$PBB,,, | Performed by: PHYSICIAN ASSISTANT

## 2020-03-12 PROCEDURE — 99214 OFFICE O/P EST MOD 30 MIN: CPT | Mod: S$PBB,,, | Performed by: PHYSICIAN ASSISTANT

## 2020-03-12 RX ORDER — GABAPENTIN 300 MG/1
CAPSULE ORAL
Qty: 60 CAPSULE | Refills: 1 | Status: SHIPPED | OUTPATIENT
Start: 2020-03-12 | End: 2020-04-28

## 2020-03-12 RX ORDER — CYCLOBENZAPRINE HCL 10 MG
10 TABLET ORAL 3 TIMES DAILY PRN
Qty: 30 TABLET | Refills: 1 | Status: SHIPPED | OUTPATIENT
Start: 2020-03-12 | End: 2020-06-25 | Stop reason: SDUPTHER
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
## 2020-03-17 RX ORDER — TACROLIMUS 1 MG/1
CAPSULE ORAL
Qty: 270 CAPSULE | Refills: 0 | Status: SHIPPED | OUTPATIENT
Start: 2020-03-17 | End: 2020-04-20

## 2020-03-19 ENCOUNTER — PATIENT MESSAGE (OUTPATIENT)
Dept: TRANSPLANT | Facility: CLINIC | Age: 60
End: 2020-03-19

## 2020-03-25 DIAGNOSIS — Z94.0 KIDNEY REPLACED BY TRANSPLANT: Primary | ICD-10-CM

## 2020-03-31 ENCOUNTER — EXTERNAL CHRONIC CARE MANAGEMENT (OUTPATIENT)
Dept: PRIMARY CARE CLINIC | Facility: CLINIC | Age: 60
End: 2020-03-31
Payer: MEDICARE

## 2020-03-31 PROCEDURE — 99490 PR CHRONIC CARE MGMT, 1ST 20 MIN: ICD-10-PCS | Mod: S$PBB,,, | Performed by: INTERNAL MEDICINE

## 2020-03-31 PROCEDURE — 99490 CHRNC CARE MGMT STAFF 1ST 20: CPT | Mod: S$PBB,,, | Performed by: INTERNAL MEDICINE

## 2020-03-31 PROCEDURE — 99490 CHRNC CARE MGMT STAFF 1ST 20: CPT | Mod: PBBFAC,PO | Performed by: INTERNAL MEDICINE

## 2020-04-01 ENCOUNTER — PATIENT MESSAGE (OUTPATIENT)
Dept: NEPHROLOGY | Facility: CLINIC | Age: 60
End: 2020-04-01

## 2020-04-01 DIAGNOSIS — I10 HTN (HYPERTENSION), BENIGN: Primary | ICD-10-CM

## 2020-04-01 DIAGNOSIS — K08.89 TOOTH ACHE: ICD-10-CM

## 2020-04-01 RX ORDER — AMOXICILLIN 500 MG/1
500 CAPSULE ORAL EVERY 12 HOURS
Qty: 14 CAPSULE | Refills: 0 | Status: SHIPPED | OUTPATIENT
Start: 2020-04-01 | End: 2020-04-08

## 2020-04-01 RX ORDER — NIFEDIPINE 60 MG/1
60 TABLET, EXTENDED RELEASE ORAL DAILY
Qty: 30 TABLET | Refills: 11 | Status: SHIPPED | OUTPATIENT
Start: 2020-04-01 | End: 2020-09-21 | Stop reason: DRUGHIGH

## 2020-04-01 NOTE — PROGRESS NOTES
Patient reports her blood pressures have been running high at home, amlodipine is not helping, she is currently taking 10 mg, will switch her to nifedipine 60 mg daily, prescription sent to her pharmacy,    Also she has been having some toothache, at this time she could not see a dentist due to COVID situation, will treat her with amoxicillin 500 mg 2 times a day for a week, prescription sent,    Yuval Medina MD

## 2020-04-20 RX ORDER — TACROLIMUS 1 MG/1
CAPSULE ORAL
Qty: 270 CAPSULE | Refills: 0 | Status: SHIPPED | OUTPATIENT
Start: 2020-04-20 | End: 2020-07-03

## 2020-04-26 DIAGNOSIS — M25.552 LEFT HIP PAIN: ICD-10-CM

## 2020-04-27 DIAGNOSIS — Z94.0 KIDNEY REPLACED BY TRANSPLANT: Primary | ICD-10-CM

## 2020-04-28 ENCOUNTER — LAB VISIT (OUTPATIENT)
Dept: LAB | Facility: HOSPITAL | Age: 60
End: 2020-04-28
Attending: INTERNAL MEDICINE
Payer: MEDICARE

## 2020-04-28 DIAGNOSIS — Z94.0 KIDNEY REPLACED BY TRANSPLANT: ICD-10-CM

## 2020-04-28 LAB
ALBUMIN SERPL BCP-MCNC: 3.6 G/DL (ref 3.5–5.2)
ALBUMIN SERPL BCP-MCNC: 3.6 G/DL (ref 3.5–5.2)
ALP SERPL-CCNC: 113 U/L (ref 55–135)
ALT SERPL W/O P-5'-P-CCNC: 9 U/L (ref 10–44)
ANION GAP SERPL CALC-SCNC: 11 MMOL/L (ref 8–16)
AST SERPL-CCNC: 13 U/L (ref 10–40)
BASOPHILS # BLD AUTO: 0.08 K/UL (ref 0–0.2)
BASOPHILS NFR BLD: 0.7 % (ref 0–1.9)
BILIRUB DIRECT SERPL-MCNC: 0.2 MG/DL (ref 0.1–0.3)
BILIRUB SERPL-MCNC: 0.3 MG/DL (ref 0.1–1)
BUN SERPL-MCNC: 15 MG/DL (ref 6–20)
CALCIUM SERPL-MCNC: 9.4 MG/DL (ref 8.7–10.5)
CHLORIDE SERPL-SCNC: 104 MMOL/L (ref 95–110)
CO2 SERPL-SCNC: 27 MMOL/L (ref 23–29)
CREAT SERPL-MCNC: 0.9 MG/DL (ref 0.5–1.4)
DIFFERENTIAL METHOD: ABNORMAL
EOSINOPHIL # BLD AUTO: 0.4 K/UL (ref 0–0.5)
EOSINOPHIL NFR BLD: 3.6 % (ref 0–8)
ERYTHROCYTE [DISTWIDTH] IN BLOOD BY AUTOMATED COUNT: 14.7 % (ref 11.5–14.5)
EST. GFR  (AFRICAN AMERICAN): >60 ML/MIN/1.73 M^2
EST. GFR  (NON AFRICAN AMERICAN): >60 ML/MIN/1.73 M^2
GLUCOSE SERPL-MCNC: 71 MG/DL (ref 70–110)
HCT VFR BLD AUTO: 39.3 % (ref 37–48.5)
HGB BLD-MCNC: 11.9 G/DL (ref 12–16)
IMM GRANULOCYTES # BLD AUTO: 0.12 K/UL (ref 0–0.04)
IMM GRANULOCYTES NFR BLD AUTO: 1 % (ref 0–0.5)
LYMPHOCYTES # BLD AUTO: 2.7 K/UL (ref 1–4.8)
LYMPHOCYTES NFR BLD: 22.2 % (ref 18–48)
MAGNESIUM SERPL-MCNC: 1.5 MG/DL (ref 1.6–2.6)
MCH RBC QN AUTO: 26.1 PG (ref 27–31)
MCHC RBC AUTO-ENTMCNC: 30.3 G/DL (ref 32–36)
MCV RBC AUTO: 86 FL (ref 82–98)
MONOCYTES # BLD AUTO: 0.6 K/UL (ref 0.3–1)
MONOCYTES NFR BLD: 4.7 % (ref 4–15)
NEUTROPHILS # BLD AUTO: 8.2 K/UL (ref 1.8–7.7)
NEUTROPHILS NFR BLD: 67.8 % (ref 38–73)
NRBC BLD-RTO: 0 /100 WBC
PHOSPHATE SERPL-MCNC: 3.7 MG/DL (ref 2.7–4.5)
PLATELET # BLD AUTO: 271 K/UL (ref 150–350)
PMV BLD AUTO: 11.8 FL (ref 9.2–12.9)
POTASSIUM SERPL-SCNC: 3.7 MMOL/L (ref 3.5–5.1)
PROT SERPL-MCNC: 7.6 G/DL (ref 6–8.4)
RBC # BLD AUTO: 4.56 M/UL (ref 4–5.4)
SODIUM SERPL-SCNC: 142 MMOL/L (ref 136–145)
WBC # BLD AUTO: 12.09 K/UL (ref 3.9–12.7)

## 2020-04-28 PROCEDURE — 83735 ASSAY OF MAGNESIUM: CPT

## 2020-04-28 PROCEDURE — 36415 COLL VENOUS BLD VENIPUNCTURE: CPT | Mod: PO

## 2020-04-28 PROCEDURE — 84075 ASSAY ALKALINE PHOSPHATASE: CPT

## 2020-04-28 PROCEDURE — 85025 COMPLETE CBC W/AUTO DIFF WBC: CPT

## 2020-04-28 PROCEDURE — 80069 RENAL FUNCTION PANEL: CPT

## 2020-04-28 PROCEDURE — 80197 ASSAY OF TACROLIMUS: CPT

## 2020-04-28 RX ORDER — GABAPENTIN 300 MG/1
CAPSULE ORAL
Qty: 60 CAPSULE | Refills: 1 | Status: SHIPPED | OUTPATIENT
Start: 2020-04-28 | End: 2020-12-08 | Stop reason: SDUPTHER

## 2020-04-29 LAB — TACROLIMUS BLD-MCNC: 3.3 NG/ML (ref 5–15)

## 2020-04-30 ENCOUNTER — EXTERNAL CHRONIC CARE MANAGEMENT (OUTPATIENT)
Dept: PRIMARY CARE CLINIC | Facility: CLINIC | Age: 60
End: 2020-04-30
Payer: MEDICARE

## 2020-04-30 PROCEDURE — 99490 CHRNC CARE MGMT STAFF 1ST 20: CPT | Mod: PBBFAC,PO | Performed by: INTERNAL MEDICINE

## 2020-04-30 PROCEDURE — 99490 CHRNC CARE MGMT STAFF 1ST 20: CPT | Mod: S$PBB,,, | Performed by: INTERNAL MEDICINE

## 2020-04-30 PROCEDURE — 99490 PR CHRONIC CARE MGMT, 1ST 20 MIN: ICD-10-PCS | Mod: S$PBB,,, | Performed by: INTERNAL MEDICINE

## 2020-05-01 RX ORDER — INSULIN GLARGINE 100 [IU]/ML
INJECTION, SOLUTION SUBCUTANEOUS
Qty: 15 ML | Refills: 4 | Status: SHIPPED | OUTPATIENT
Start: 2020-05-01 | End: 2020-10-19

## 2020-05-04 ENCOUNTER — LAB VISIT (OUTPATIENT)
Dept: LAB | Facility: HOSPITAL | Age: 60
End: 2020-05-04
Attending: INTERNAL MEDICINE
Payer: MEDICARE

## 2020-05-04 ENCOUNTER — TELEPHONE (OUTPATIENT)
Dept: RHEUMATOLOGY | Facility: CLINIC | Age: 60
End: 2020-05-04

## 2020-05-04 DIAGNOSIS — Z94.0 KIDNEY REPLACED BY TRANSPLANT: ICD-10-CM

## 2020-05-04 PROCEDURE — 36415 COLL VENOUS BLD VENIPUNCTURE: CPT | Mod: PO

## 2020-05-04 PROCEDURE — 80197 ASSAY OF TACROLIMUS: CPT

## 2020-05-04 NOTE — TELEPHONE ENCOUNTER
Spoke with pt and moved 5.13.20 appointment to a virtual visit on 5.12.20. Pt verbalized understanding

## 2020-05-05 LAB — TACROLIMUS BLD-MCNC: 7.2 NG/ML (ref 5–15)

## 2020-05-06 ENCOUNTER — OFFICE VISIT (OUTPATIENT)
Dept: TRANSPLANT | Facility: CLINIC | Age: 60
End: 2020-05-06
Payer: MEDICARE

## 2020-05-06 DIAGNOSIS — I15.2 HYPERTENSION ASSOCIATED WITH DIABETES: ICD-10-CM

## 2020-05-06 DIAGNOSIS — N25.81 SECONDARY HYPERPARATHYROIDISM OF RENAL ORIGIN: Chronic | ICD-10-CM

## 2020-05-06 DIAGNOSIS — D84.9 IMMUNOSUPPRESSED STATUS: ICD-10-CM

## 2020-05-06 DIAGNOSIS — Z94.0 DECEASED-DONOR KIDNEY TRANSPLANT: Primary | Chronic | ICD-10-CM

## 2020-05-06 DIAGNOSIS — I12.9 HYPERTENSION, RENAL: Chronic | ICD-10-CM

## 2020-05-06 DIAGNOSIS — N18.2 CKD (CHRONIC KIDNEY DISEASE) STAGE 2, GFR 60-89 ML/MIN: ICD-10-CM

## 2020-05-06 DIAGNOSIS — Z94.0 IMMUNOSUPPRESSIVE MANAGEMENT ENCOUNTER FOLLOWING KIDNEY TRANSPLANT: ICD-10-CM

## 2020-05-06 DIAGNOSIS — Z29.89 NEED FOR PROPHYLACTIC IMMUNOTHERAPY: Chronic | ICD-10-CM

## 2020-05-06 DIAGNOSIS — Z79.899 IMMUNOSUPPRESSIVE MANAGEMENT ENCOUNTER FOLLOWING KIDNEY TRANSPLANT: ICD-10-CM

## 2020-05-06 DIAGNOSIS — E11.59 HYPERTENSION ASSOCIATED WITH DIABETES: ICD-10-CM

## 2020-05-06 PROCEDURE — 99215 PR OFFICE/OUTPT VISIT, EST, LEVL V, 40-54 MIN: ICD-10-PCS | Mod: 95,,, | Performed by: INTERNAL MEDICINE

## 2020-05-06 PROCEDURE — 99215 OFFICE O/P EST HI 40 MIN: CPT | Mod: 95,,, | Performed by: INTERNAL MEDICINE

## 2020-05-06 PROCEDURE — 99211 OFF/OP EST MAY X REQ PHY/QHP: CPT

## 2020-05-06 PROCEDURE — G0463 HOSPITAL OUTPT CLINIC VISIT: HCPCS

## 2020-05-06 NOTE — PATIENT INSTRUCTIONS
1. Stay active/exercise   2. Continue following up with your general nephrologist, PCP, and rheumatologist  3. See a dermatologist at least once a year (can be done later in the year)  4. Stay well hydrated   5. Practice good hand hygiene and social distancing

## 2020-05-06 NOTE — PROGRESS NOTES
Kidney Post-Transplant Assessment    The patient location is: her home  The chief complaint leading to consultation is: post-kidney transplant follow-up  Visit type: audiovisual  Total time spent with patient: ~20 minutes  Each patient to whom he or she provides medical services by telemedicine is:  (1) informed of the relationship between the physician and patient and the respective role of any other health care provider with respect to management of the patient; and (2) notified that he or she may decline to receive medical services by telemedicine and may withdraw from such care at any time.    Notes: as below    Referring Physician: Lenora Dooley  Current Nephrologist: Yuval Medina    ORGAN: RIGHT KIDNEY  Donor Type: donation after brain death  PHS Increased Risk: no  Cold Ischemia: 789 mins  Induction Medications: campath - alemtuzumab (anti-cd52)    Subjective:     CC:  Reassessment of renal allograft function and management of chronic immunosuppression.    Kidney History:  Ms. Teague is a 59 y.o. year old Black or  female with history of HTN, ESRD presumed secondary to HTN who was on HD since 1/8/2004 and is s/p DDRT (Campath induction, KDPI 44%, WIT 28 minutes, CIT 13 hours and 9 minutes, CMV D+/R+) on 9/28/13.  She has CKD stage 2 - GFR 60-89 and her baseline creatinine is between 0.9 to 1.0. She takes mycophenolate mofetil and tacrolimus for maintenance immunosuppression.     Interval History: Patient last seen in transplant clinic on 4/23/18. States she is doing well.     Review of Systems  Constitutional: Negative for fever, appetite change and fatigue.   HENT: +dental issues;  Negative for hearing loss, sore throat and mouth sores.   Eyes: Negative for photophobia, pain and visual disturbance.   Respiratory: Negative for cough, chest tightness, shortness of breath and wheezing.   Cardiovascular: Negative for chest pain, palpitations, and leg swelling.   Gastrointestinal:  Negative for nausea, vomiting, abdominal pain, diarrhea, constipation, blood in stool and abdominal distention.   Genitourinary: Negative for dysuria, urgency, frequency, hematuria, decreased urine volume, difficulty urinating.   Musculoskeletal: +chronic joint pain  Skin: Negative for pallor, rash and wound.   Neurological: Negative for dizziness, tremors, syncope, weakness, light-headedness and headaches.   Hematological: Negative for adenopathy. Does not bruise/bleed easily.   Psychiatric/Behavioral: Negative for confusion, sleep disturbance and dysphoric mood. The patient is not nervous/anxious.     Medications:  Current Outpatient Medications   Medication Sig Dispense Refill    albuterol 90 mcg/actuation inhaler Inhale 2 puffs into the lungs.      albuterol-ipratropium (DUO-NEB) 2.5 mg-0.5 mg/3 mL nebulizer solution Take 3 mLs by nebulization 3 (three) times daily. 270 mL 5    atorvastatin (LIPITOR) 20 MG tablet Take 1 tablet (20 mg total) by mouth once daily. 30 tablet 11    blood sugar diagnostic (FREESTYLE LITE STRIPS) Strp Use to check blood glucose 3 times a day. 100 strip 11    cetirizine (ZYRTEC) 10 MG tablet Take 1 tablet (10 mg total) by mouth once daily. 30 tablet 11    cetirizine (ZYRTEC) 10 MG tablet TAKE 1 TABLET(10 MG) BY MOUTH EVERY DAY 30 tablet 9    cyclobenzaprine (FLEXERIL) 10 MG tablet Take 1 tablet (10 mg total) by mouth 3 (three) times daily as needed for Muscle spasms. 30 tablet 1    ergocalciferol (ERGOCALCIFEROL) 50,000 unit Cap Take 1 capsule (50,000 Units total) by mouth every 7 days. 4 capsule 6    fluticasone (FLONASE) 50 mcg/actuation nasal spray 2 sprays (100 mcg total) by Each Nare route once daily. 16 g 6    furosemide (LASIX) 20 MG tablet Take 1 tablet (20 mg total) by mouth daily as needed (swelling in legs). 30 tablet 11    gabapentin (NEURONTIN) 300 MG capsule TAKE 1 TO 2 CAPSULES BY MOUTH EVERY NIGHT 60 capsule 1    insulin glargine 100 units/mL (3mL) SubQ pen  "Inject 45 Units into the skin once daily. 15 mL 6    insulin lispro (HUMALOG KWIKPEN INSULIN) 100 unit/mL pen INJECT UP TO 18 UNITS UNDER THE SKIN THREE TIMES DAILY 10 TO 15 MINUTES BEFORE MEALS AS DIRECTED IN AFTER VISIT SUMMAR Y 45 mL 11    lancets (FREESTYLE LANCETS) 28 gauge Misc 1 lancet by Combination route 2 (two) times daily as needed. Qid prn 400 each 2    lancets Misc 1 strip by Misc.(Non-Drug; Combo Route) route 4 (four) times daily with meals and nightly. 150 each 6    LANTUS SOLOSTAR U-100 INSULIN glargine 100 units/mL (3mL) SubQ pen ADMINISTER 50 UNITS UNDER THE SKIN EVERY DAY 15 mL 6    LANTUS SOLOSTAR U-100 INSULIN glargine 100 units/mL (3mL) SubQ pen ADMINISTER 50 UNITS UNDER THE SKIN EVERY DAY 15 mL 4    levothyroxine (SYNTHROID) 150 MCG tablet Take 1 tablet (150 mcg total) by mouth before breakfast. 90 tablet 3    LORazepam (ATIVAN) 1 MG tablet Take 1 tablet (1 mg total) by mouth daily as needed for Anxiety. 30 tablet 0    mycophenolate (CELLCEPT) 250 mg Cap TAKE (2) CAPSULES TWICE DAILY. 120 capsule 11    NIFEdipine (ADALAT CC) 60 MG TbSR Take 1 tablet (60 mg total) by mouth once daily. 30 tablet 11    oxyCODONE-acetaminophen (PERCOCET)  mg per tablet Take 1 tablet by mouth every 8 (eight) hours as needed for Pain. Greater than 7 day supply medically necessary. 90 tablet 0    [START ON 5/26/2020] oxyCODONE-acetaminophen (PERCOCET)  mg per tablet Take 1 tablet by mouth every 8 (eight) hours as needed for Pain. Greater than 7 day supply medically necessary. 90 tablet 0    pen needle, diabetic (BD ULTRA-FINE RORY PEN NEEDLE) 32 gauge x 5/32" Ndle USE AS DIRECTED WITH INSULIN PEN FOUR TIMES DAILY 100 each 11    tacrolimus (PROGRAF) 1 MG Cap TAKE 5 CAPSULES IN THE MORNING, AND 4 IN THE EVENING 270 capsule 0    zolpidem (AMBIEN) 5 MG Tab Take 1 tablet (5 mg total) by mouth nightly as needed. 30 tablet 1     No current facility-administered medications for this visit.  "         Objective:   There were no vitals taken for this visit.body mass index is unknown because there is no height or weight on file.    Physical Exam  Unable to perform physical exam secondary to being a video visit      Labs:  Lab Results   Component Value Date    WBC 12.09 2020    HGB 11.9 (L) 2020    HCT 39.3 2020     2020    K 3.7 2020     2020    CO2 27 2020    BUN 15 2020    CREATININE 0.9 2020    EGFRNONAA >60.0 2020    CALCIUM 9.4 2020    PHOS 3.7 2020    MG 1.5 (L) 2020    ALBUMIN 3.6 2020    ALBUMIN 3.6 2020    AST 13 2020    ALT 9 (L) 2020       No results found for: EXTANC, EXTWBC, EXTSEGS, EXTPLATELETS, EXTHEMOGLOBI, EXTHEMATOCRI, EXTCREATININ, EXTSODIUM, EXTPOTASSIUM, EXTBUN, EXTCO2, EXTCALCIUM, EXTPHOSPHORU, EXTGLUCOSE, EXTALBUMIN, EXTAST, EXTALT, EXTBILITOTAL, EXTLIPASE, EXTAMYLASE    No results found for: EXTCYCLOSLVL, EXTSIROLIMUS, EXTTACROLVL, EXTPROTCRE, EXTPTHINTACT, EXTPROTEINUA, EXTWBCUA, EXTRBCUA    Labs were reviewed with the patient.    Assessment/Plan:     1. -donor kidney transplant - 13    2. Chronic immunosuppression with Prograf and Cellcept    3. Secondary hyperparathyroidism of renal origin    4. Uncontrolled type 2 diabetes mellitus with stage 3 chronic kidney disease, with long-term current use of insulin    5. Immunosuppressed status    6. Hypertension, renal    7. Hypertension associated with diabetes    8. CKD (chronic kidney disease) stage 2, GFR 60-89 ml/min    9. Immunosuppressive management encounter following kidney transplant        Ms. Teague is a 59 y.o. female with:     # History of ESRD presumed secondary to HTN who was on HD since 2004 and is s/p DDRT (Campath induction, KDPI 44%, WIT 28 minutes, CIT 13 hours and 9 minutes, CMV D+/R+) on 13: baseline Cr 0.9  to 1.0  - last Cr 0.9 from 20  - last UPC negative from 20  -  encouraged her to follow-up with her general nephrologist  - encouraged her to stay well hydrated    # Immunosuppression:   - continue Prograf 5/4, last FK-506 level 7.2 from 5/4/20  - continue  mg BID   - will monitor closely for toxicities    # Infectious Surveillance:   - last BK serum PCR negative from 4/16/18    # DM: fasting glucose 71 from 4/28/20  - encouraged her to continue working with her PCP/endocrinologist to optimize glycemic control    # Metabolic Bone Disease/Secondary Hyperparathyroidism: last calcium/phos normal  - will monitor PTH, calcium, and phosphorus as per our center protocol    # Anemia of chronic disease: Hb 11.9 from 4/28/20  - will continue monitoring as per our center guidelines. No indication for acute intervention today    # Healthcare Maintenance:   - advised patient to see a dermatologist annually given increased risk of skin cancers with immunosuppressive medications  - encouraged her regarding good hand hygiene and social distancing    Follow-up:   Annual follow-up with kidney transplant clinic with repeat labs, including renal function panel with UA, urine protein/creatinine ratio, and drug trough level q3 months.  Patient also reminded to follow-up with general nephrologist.    Heaven Cullen MD       Education:   Material provided to the patient.  Patient reminded to call with any health changes since these can be early signs of significant complications.  Also, I advised the patient to be sure any new medications or changes of old medications are discussed with either a pharmacist or physician knowledgeable with transplant to avoid rejection/drug toxicity related to significant drug interactions.    UNOS Patient Status  Functional Status: 100% - Normal, no complaints, no evidence of disease  Physical Capacity: No Limitations

## 2020-05-06 NOTE — LETTER
May 6, 2020        Yuval Medina  [address]  [place] [address]  Phone: [phone]  Fax: [phone]             [facility]  [address]  [facility] [address]  Phone: [phone]   Patient: Ana Maria Teague   MR Number: [id number]   YOB: 1960   Date of Visit: 5/6/2020       Dear Dr. Yuval Medina    Thank you for referring Ana Maria Teague to me for evaluation. Attached you will find relevant portions of my assessment and plan of care.    If you have questions, please do not hesitate to call me. I look forward to following Ana Maria Teague along with you.    Sincerely,    Heaven Cullen MD    Enclosure    If you would like to receive this communication electronically, please contact [email or web address] or [phone] to request [employer] access.    [employer] is a tool which provides read-only access to select patient information with whom you have a relationship. Its easy to use and provides real time access to review your patients record including encounter summaries, notes, results, and demographic information.    If you feel you have received this communication in error or would no longer like to receive these types of communications, please e-mail [email or web address]

## 2020-05-11 ENCOUNTER — PATIENT OUTREACH (OUTPATIENT)
Dept: ADMINISTRATIVE | Facility: OTHER | Age: 60
End: 2020-05-11

## 2020-05-11 ENCOUNTER — TELEPHONE (OUTPATIENT)
Dept: RHEUMATOLOGY | Facility: CLINIC | Age: 60
End: 2020-05-11

## 2020-05-11 DIAGNOSIS — E55.9 VITAMIN D DEFICIENCY: ICD-10-CM

## 2020-05-11 DIAGNOSIS — I10 HTN (HYPERTENSION), BENIGN: ICD-10-CM

## 2020-05-11 RX ORDER — AMLODIPINE BESYLATE 5 MG/1
TABLET ORAL
Qty: 90 TABLET | Refills: 3
Start: 2020-05-11 | End: 2020-05-11 | Stop reason: ALTCHOICE

## 2020-05-11 RX ORDER — ERGOCALCIFEROL 1.25 MG/1
CAPSULE ORAL
Qty: 4 CAPSULE | Refills: 11 | Status: SHIPPED | OUTPATIENT
Start: 2020-05-11 | End: 2021-05-20 | Stop reason: SDUPTHER

## 2020-05-11 NOTE — TELEPHONE ENCOUNTER
Spoke with pt and confirmed appointment with Dr. Powell for 5.12.20 at 1.15 pm for a virtual visit.

## 2020-05-12 ENCOUNTER — TELEPHONE (OUTPATIENT)
Dept: RHEUMATOLOGY | Facility: CLINIC | Age: 60
End: 2020-05-12

## 2020-05-12 ENCOUNTER — OFFICE VISIT (OUTPATIENT)
Dept: RHEUMATOLOGY | Facility: CLINIC | Age: 60
End: 2020-05-12
Payer: MEDICARE

## 2020-05-12 DIAGNOSIS — M81.0 AGE-RELATED OSTEOPOROSIS WITHOUT CURRENT PATHOLOGICAL FRACTURE: Primary | ICD-10-CM

## 2020-05-12 DIAGNOSIS — M15.9 PRIMARY OSTEOARTHRITIS INVOLVING MULTIPLE JOINTS: ICD-10-CM

## 2020-05-12 PROCEDURE — G0463 HOSPITAL OUTPT CLINIC VISIT: HCPCS

## 2020-05-12 PROCEDURE — 99211 OFF/OP EST MAY X REQ PHY/QHP: CPT

## 2020-05-12 PROCEDURE — 99214 OFFICE O/P EST MOD 30 MIN: CPT | Mod: 95,,, | Performed by: INTERNAL MEDICINE

## 2020-05-12 PROCEDURE — 99214 PR OFFICE/OUTPT VISIT, EST, LEVL IV, 30-39 MIN: ICD-10-PCS | Mod: 95,,, | Performed by: INTERNAL MEDICINE

## 2020-05-12 NOTE — PROGRESS NOTES
Patient's chart was reviewed.   Requested updates within Care Everywhere.  Immunizations reconciled.    Health Maintenance was updated.  Orders placed: Hemoglobin A1c

## 2020-05-12 NOTE — PROGRESS NOTES
RHEUMATOLOGY FOLLOW UP - TELE VISIT     The patient location is: [place]  The chief complaint leading to consultation is:   FOLLOW-UP FOR OSTEOPOROSIS AND ARTHRITIS.  Visit type: Virtual visit with synchronous audio and video  Total time spent with patient:   10 min  Each patient to whom he or she provides medical services by telemedicine is:  (1) informed of the relationship between the physician and patient and the respective role of any other health care provider with respect to management of the patient; and (2) notified that he or she may decline to receive medical services by telemedicine and may withdraw from such care at any time.    HPI:-  Ana Maria Faye a 59 y.o. pleasant female seen today through My chart video visit for follow up. She reports no falls or fractures visit.  She has been having pain in her right lower 2nd molar or for the past couple of weeks.  The dental filling intermittent bleeding.  She have not seen her dentist so far.  No joint pain today.    Review of Systems   Constitutional: Negative for chills and fever.   HENT: Negative for congestion and sore throat.    Eyes: Negative for blurred vision and redness.   Respiratory: Negative for cough and shortness of breath.    Cardiovascular: Negative for chest pain and leg swelling.   Gastrointestinal: Negative for abdominal pain.   Genitourinary: Negative for dysuria.   Musculoskeletal: Negative for back pain, falls, joint pain, myalgias and neck pain.   Skin: Negative for rash.   Neurological: Negative for headaches.   Endo/Heme/Allergies: Does not bruise/bleed easily.   Psychiatric/Behavioral: Negative for memory loss. The patient does not have insomnia.        Past Medical History:   Diagnosis Date    Abnormal glucose 12/30/2013    Acquired hypothyroidism     Acute bronchiolitis 10/15/2014    Anemia     Anemia of chronic renal failure 9/30/2013    Anxiety     Anxiety disorder     Avascular necrosis of bone of hip     Back pain     s/p  steroid injections    Chronic immunosuppression with Prograf and Cellcept 2013    CKD (chronic kidney disease) stage 2, GFR 60-89 ml/min     CKD (chronic kidney disease) stage 5, GFR less than 15 ml/min     -donor kidney transplant - 13    Depression     Hypertension, renal 2013    Hypothyroidism     Immunosuppressed status 10/15/2014    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 7 years ago.     SECTION      COLONOSCOPY N/A 3/9/2016    Procedure: COLONOSCOPY;  Surgeon: Douglas Daniel III, MD;  Location: [facility];  Service: Endoscopy;  Laterality: N/A;    cyst removed form chest wall      HYSTERECTOMY      KIDNEY TRANSPLANT  2013    SKIN GRAFT      revision    THYROIDECTOMY      vascular access surgeries      multiple. last time 2 weeks ago        Social History     Tobacco Use    Smoking status: Former Smoker     Packs/day: 1.00     Years: 10.00     Pack years: 10.00     Types: Cigarettes     Last attempt to quit: 2002     Years since quittin.5    Smokeless tobacco: Never Used    Tobacco comment: quit smoking approx 10-15 years ago    Substance Use Topics    Alcohol use: No     Frequency: Monthly or less     Drinks per session: 1 or 2     Binge frequency: Never    Drug use: No       Family History   Problem Relation Age of Onset    Diabetes Mother     Kidney disease Mother     Hyperlipidemia Mother     Hypertension Mother     Heart disease Mother     Arthritis Mother     Hypertension Father     Stroke Father     Hypertension Sister     Arthritis Sister     Asthma Sister     Heart disease Sister         heart attack        Review of patient's allergies indicates:   Allergen Reactions    Azithromycin Nausea And Vomiting    Adhesive Other (See Comments)     Skin tears    Nsaids (non-steroidal anti-inflammatory drug)      Kidney Transplant       Physical exam:-    GEN: awake, alert, non-diaphoretic, no psychomotor agitation, no acute distress    HEENT :Head: atraumatic, normocephalic, no rashes noted, no lesions noted;    Eyes: NO redness, discharge, swelling, or lesions    Nose: NO redness, swelling, discharge, deformity, or impetigo/crusting    Skin: no lesions, wounds, erythema, or cyanosis noted on face or hands    Cardiopulmonary: no increased respiratory effort, speaking in clear sentences    MSK: normal ROM in the neck, Upper extremities, Lower extremities  Good ROM of hands, fist formation 100% and , no obvious synovitis    Good ambulation in front of the camera    Neuro: cranial nerves grossly normal, speech normal rate and rhythm, orientation arrived to appointment on time with no prompting, moved both upper extremities equally    Pysch:  appearance, behavior, and attitude- well groomed, pleasant, cooperative    Medication List with Changes/Refills   Current Medications    ALBUTEROL 90 MCG/ACTUATION INHALER    Inhale 2 puffs into the lungs.    ALBUTEROL-IPRATROPIUM (DUO-NEB) 2.5 MG-0.5 MG/3 ML NEBULIZER SOLUTION    Take 3 mLs by nebulization 3 (three) times daily.    ATORVASTATIN (LIPITOR) 20 MG TABLET    Take 1 tablet (20 mg total) by mouth once daily.    BLOOD SUGAR DIAGNOSTIC (FREESTYLE LITE STRIPS) STRP    Use to check blood glucose 3 times a day.    CETIRIZINE (ZYRTEC) 10 MG TABLET    Take 1 tablet (10 mg total) by mouth once daily.    CETIRIZINE (ZYRTEC) 10 MG TABLET    TAKE 1 TABLET(10 MG) BY MOUTH EVERY DAY    CYCLOBENZAPRINE (FLEXERIL) 10 MG TABLET    Take 1 tablet (10 mg total) by mouth 3 (three) times daily as needed for Muscle spasms.    ERGOCALCIFEROL (ERGOCALCIFEROL) 50,000 UNIT CAP    TAKE 1 CAPSULE BY  "MOUTH EVERY 7 DAYS    FLUTICASONE (FLONASE) 50 MCG/ACTUATION NASAL SPRAY    2 sprays (100 mcg total) by Each Nare route once daily.    FUROSEMIDE (LASIX) 20 MG TABLET    Take 1 tablet (20 mg total) by mouth daily as needed (swelling in legs).    GABAPENTIN (NEURONTIN) 300 MG CAPSULE    TAKE 1 TO 2 CAPSULES BY MOUTH EVERY NIGHT    INSULIN GLARGINE 100 UNITS/ML (3ML) SUBQ PEN    Inject 45 Units into the skin once daily.    INSULIN LISPRO (HUMALOG KWIKPEN INSULIN) 100 UNIT/ML PEN    INJECT UP TO 18 UNITS UNDER THE SKIN THREE TIMES DAILY 10 TO 15 MINUTES BEFORE MEALS AS DIRECTED IN AFTER VISIT SUMMAR Y    LANCETS (FREESTYLE LANCETS) 28 GAUGE MISC    1 lancet by Combination route 2 (two) times daily as needed. Qid prn    LANCETS MISC    1 strip by Misc.(Non-Drug; Combo Route) route 4 (four) times daily with meals and nightly.    LANTUS SOLOSTAR U-100 INSULIN GLARGINE 100 UNITS/ML (3ML) SUBQ PEN    ADMINISTER 50 UNITS UNDER THE SKIN EVERY DAY    LANTUS SOLOSTAR U-100 INSULIN GLARGINE 100 UNITS/ML (3ML) SUBQ PEN    ADMINISTER 50 UNITS UNDER THE SKIN EVERY DAY    LEVOTHYROXINE (SYNTHROID) 150 MCG TABLET    Take 1 tablet (150 mcg total) by mouth before breakfast.    LORAZEPAM (ATIVAN) 1 MG TABLET    Take 1 tablet (1 mg total) by mouth daily as needed for Anxiety.    MYCOPHENOLATE (CELLCEPT) 250 MG CAP    TAKE (2) CAPSULES TWICE DAILY.    NIFEDIPINE (ADALAT CC) 60 MG TBSR    Take 1 tablet (60 mg total) by mouth once daily.    OXYCODONE-ACETAMINOPHEN (PERCOCET)  MG PER TABLET    Take 1 tablet by mouth every 8 (eight) hours as needed for Pain. Greater than 7 day supply medically necessary.    OXYCODONE-ACETAMINOPHEN (PERCOCET)  MG PER TABLET    Take 1 tablet by mouth every 8 (eight) hours as needed for Pain. Greater than 7 day supply medically necessary.    PEN NEEDLE, DIABETIC (BD ULTRA-FINE RORY PEN NEEDLE) 32 GAUGE X 5/32" NDLE    USE AS DIRECTED WITH INSULIN PEN FOUR TIMES DAILY    TACROLIMUS (PROGRAF) 1 MG CAP  "   TAKE 5 CAPSULES IN THE MORNING, AND 4 IN THE EVENING    ZOLPIDEM (AMBIEN) 5 MG TAB    Take 1 tablet (5 mg total) by mouth nightly as needed.       Assessment/Plans:-  1. Age-related osteoporosis without current pathological fracture    2. Primary osteoarthritis involving multiple joints      Problem List Items Addressed This Visit        Orthopedic    Age-related osteoporosis without current pathological fracture - Primary    Current Assessment & Plan     Osteoporosis without any history of fragility fracture on prolia. Reschedule prolia to at least 4 weeks from now since she will be getting invasive dental procedure probably in few days . Discussed in detail about all adverse effects of the medication including osteonecrosis of the jaw and atypical femoral fractures.  She voiced understanding.           Primary osteoarthritis involving multiple joints    Current Assessment & Plan     Stable.  Continue follow up with IP specialist.                Follow up in about 7 months (around 12/12/2020).    Disclaimer: This note was prepared using voice recognition system and is likely to have sound alike errors and is not proof read.  Please call me with any questions.

## 2020-05-12 NOTE — TELEPHONE ENCOUNTER
Spoke with pt and rescheduled apt to 6.16.20 at 10 am for labs and 10.30 for prolia. Pt verbalized understanding

## 2020-05-12 NOTE — Clinical Note
Please reschedule all visits scheduled for tomorrow to 4 weeks since she will be getting dental procedure.

## 2020-05-12 NOTE — TELEPHONE ENCOUNTER
----- Message from Pedro Powell MD sent at 5/12/2020  1:10 PM CDT -----  Please reschedule all visits scheduled for tomorrow to 4 weeks since she will be getting dental procedure.

## 2020-05-12 NOTE — ASSESSMENT & PLAN NOTE
Osteoporosis without any history of fragility fracture on prolia. Reschedule prolia to at least 4 weeks from now since she will be getting invasive dental procedure probably in few days . Discussed in detail about all adverse effects of the medication including osteonecrosis of the jaw and atypical femoral fractures.  She voiced understanding.

## 2020-05-15 DIAGNOSIS — E03.4 HYPOTHYROIDISM DUE TO ACQUIRED ATROPHY OF THYROID: ICD-10-CM

## 2020-05-15 RX ORDER — LEVOTHYROXINE SODIUM 150 UG/1
150 TABLET ORAL
Qty: 90 TABLET | Refills: 3 | Status: SHIPPED | OUTPATIENT
Start: 2020-05-15 | End: 2021-06-17 | Stop reason: SDUPTHER

## 2020-05-31 ENCOUNTER — EXTERNAL CHRONIC CARE MANAGEMENT (OUTPATIENT)
Dept: PRIMARY CARE CLINIC | Facility: CLINIC | Age: 60
End: 2020-05-31
Payer: MEDICARE

## 2020-05-31 PROCEDURE — G2058 CCM ADD 20MIN: HCPCS | Mod: PBBFAC,PO | Performed by: INTERNAL MEDICINE

## 2020-05-31 PROCEDURE — 99490 CHRNC CARE MGMT STAFF 1ST 20: CPT | Mod: PBBFAC,PO | Performed by: INTERNAL MEDICINE

## 2020-05-31 PROCEDURE — G2058 CCM ADD 20MIN: HCPCS | Mod: S$PBB,,, | Performed by: INTERNAL MEDICINE

## 2020-05-31 PROCEDURE — 99490 CHRNC CARE MGMT STAFF 1ST 20: CPT | Mod: S$PBB,,, | Performed by: INTERNAL MEDICINE

## 2020-05-31 PROCEDURE — G2058 PR CHRON CARE MGMT, EA ADDTL 20 MINS: ICD-10-PCS | Mod: S$PBB,,, | Performed by: INTERNAL MEDICINE

## 2020-05-31 PROCEDURE — 99490 PR CHRONIC CARE MGMT, 1ST 20 MIN: ICD-10-PCS | Mod: S$PBB,,, | Performed by: INTERNAL MEDICINE

## 2020-06-11 ENCOUNTER — PATIENT OUTREACH (OUTPATIENT)
Dept: ADMINISTRATIVE | Facility: OTHER | Age: 60
End: 2020-06-11

## 2020-06-11 RX ORDER — OXYCODONE AND ACETAMINOPHEN 10; 325 MG/1; MG/1
1 TABLET ORAL EVERY 8 HOURS PRN
Qty: 90 TABLET | Refills: 0 | Status: SHIPPED | OUTPATIENT
Start: 2020-07-14 | End: 2020-08-13

## 2020-06-11 RX ORDER — OXYCODONE AND ACETAMINOPHEN 10; 325 MG/1; MG/1
1 TABLET ORAL EVERY 8 HOURS PRN
Qty: 90 TABLET | Refills: 0 | Status: SHIPPED | OUTPATIENT
Start: 2020-06-15 | End: 2020-07-15

## 2020-06-11 NOTE — PROGRESS NOTES
Chart reviewed.   Immunizations: Triggered Imm Registry     Orders placed: n/a  Upcoming appts to satisfy URBAN topics: n/a

## 2020-06-16 ENCOUNTER — TELEPHONE (OUTPATIENT)
Dept: RHEUMATOLOGY | Facility: CLINIC | Age: 60
End: 2020-06-16

## 2020-06-24 ENCOUNTER — PATIENT OUTREACH (OUTPATIENT)
Dept: ADMINISTRATIVE | Facility: OTHER | Age: 60
End: 2020-06-24

## 2020-06-25 ENCOUNTER — OFFICE VISIT (OUTPATIENT)
Dept: PAIN MEDICINE | Facility: CLINIC | Age: 60
End: 2020-06-25
Payer: MEDICARE

## 2020-06-25 VITALS
RESPIRATION RATE: 18 BRPM | HEIGHT: 67 IN | SYSTOLIC BLOOD PRESSURE: 154 MMHG | BODY MASS INDEX: 36.88 KG/M2 | WEIGHT: 235 LBS | DIASTOLIC BLOOD PRESSURE: 94 MMHG | HEART RATE: 82 BPM

## 2020-06-25 DIAGNOSIS — M53.3 SACROILIAC JOINT PAIN: Primary | ICD-10-CM

## 2020-06-25 DIAGNOSIS — M47.816 LUMBAR SPONDYLOSIS: ICD-10-CM

## 2020-06-25 DIAGNOSIS — M25.552 LEFT HIP PAIN: ICD-10-CM

## 2020-06-25 DIAGNOSIS — G89.29 CHRONIC PAIN OF LEFT KNEE: ICD-10-CM

## 2020-06-25 DIAGNOSIS — M25.562 CHRONIC PAIN OF LEFT KNEE: ICD-10-CM

## 2020-06-25 PROCEDURE — 99215 OFFICE O/P EST HI 40 MIN: CPT | Mod: PBBFAC | Performed by: PHYSICIAN ASSISTANT

## 2020-06-25 PROCEDURE — 99214 OFFICE O/P EST MOD 30 MIN: CPT | Mod: S$PBB,,, | Performed by: PHYSICIAN ASSISTANT

## 2020-06-25 PROCEDURE — 99999 PR PBB SHADOW E&M-EST. PATIENT-LVL V: ICD-10-PCS | Mod: PBBFAC,,, | Performed by: PHYSICIAN ASSISTANT

## 2020-06-25 PROCEDURE — 99214 PR OFFICE/OUTPT VISIT, EST, LEVL IV, 30-39 MIN: ICD-10-PCS | Mod: S$PBB,,, | Performed by: PHYSICIAN ASSISTANT

## 2020-06-25 PROCEDURE — 99999 PR PBB SHADOW E&M-EST. PATIENT-LVL V: CPT | Mod: PBBFAC,,, | Performed by: PHYSICIAN ASSISTANT

## 2020-06-25 RX ORDER — DICLOFENAC SODIUM 10 MG/G
2 GEL TOPICAL 4 TIMES DAILY PRN
Qty: 5 TUBE | Refills: 0 | Status: SHIPPED | OUTPATIENT
Start: 2020-06-25 | End: 2020-12-08 | Stop reason: SDUPTHER

## 2020-06-25 RX ORDER — CYCLOBENZAPRINE HCL 10 MG
10 TABLET ORAL 3 TIMES DAILY PRN
Qty: 30 TABLET | Refills: 1 | Status: SHIPPED | OUTPATIENT
Start: 2020-06-25 | End: 2020-08-19 | Stop reason: SDUPTHER

## 2020-06-25 NOTE — TELEPHONE ENCOUNTER
Called patient to reschedule prolia. Pt would like a call back Monday after she can look at her calender.
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
## 2020-06-30 ENCOUNTER — EXTERNAL CHRONIC CARE MANAGEMENT (OUTPATIENT)
Dept: PRIMARY CARE CLINIC | Facility: CLINIC | Age: 60
End: 2020-06-30
Payer: MEDICARE

## 2020-06-30 PROCEDURE — 99490 PR CHRONIC CARE MGMT, 1ST 20 MIN: ICD-10-PCS | Mod: S$PBB,,, | Performed by: INTERNAL MEDICINE

## 2020-06-30 PROCEDURE — 99490 CHRNC CARE MGMT STAFF 1ST 20: CPT | Mod: PBBFAC,PO | Performed by: INTERNAL MEDICINE

## 2020-06-30 PROCEDURE — 99490 CHRNC CARE MGMT STAFF 1ST 20: CPT | Mod: S$PBB,,, | Performed by: INTERNAL MEDICINE

## 2020-07-06 NOTE — TELEPHONE ENCOUNTER
Spoke with pt and scheduled labs for 9.45 am on 7.10.20 and prolia injection for 10.30 am. Pt verbalized understanding

## 2020-07-09 NOTE — TELEPHONE ENCOUNTER
Patient states she has been out Prograf and Cellcept for 2 days.  I spoke with pharmacy and medication is on the [employer] truck scheduled for delivery before 9:00pm tonight. Informed patient and told her to call in the am if she doesn't receive the medication. We will call it in to [facility]. Also spoke with Stephanie and rescheduled her Prolia infusion until situation with medication is resolved.

## 2020-07-17 ENCOUNTER — INFUSION (OUTPATIENT)
Dept: INFUSION THERAPY | Facility: HOSPITAL | Age: 60
End: 2020-07-17
Attending: INTERNAL MEDICINE
Payer: MEDICARE

## 2020-07-17 VITALS
RESPIRATION RATE: 18 BRPM | SYSTOLIC BLOOD PRESSURE: 107 MMHG | TEMPERATURE: 98 F | WEIGHT: 237.63 LBS | OXYGEN SATURATION: 96 % | BODY MASS INDEX: 37.3 KG/M2 | HEART RATE: 80 BPM | HEIGHT: 67 IN | DIASTOLIC BLOOD PRESSURE: 67 MMHG

## 2020-07-17 DIAGNOSIS — M81.0 AGE-RELATED OSTEOPOROSIS WITHOUT CURRENT PATHOLOGICAL FRACTURE: Primary | ICD-10-CM

## 2020-07-17 PROCEDURE — 63600175 PHARM REV CODE 636 W HCPCS: Mod: JG | Performed by: INTERNAL MEDICINE

## 2020-07-17 PROCEDURE — 96372 THER/PROPH/DIAG INJ SC/IM: CPT

## 2020-07-17 RX ADMIN — DENOSUMAB 60 MG: 60 INJECTION SUBCUTANEOUS at 11:07

## 2020-07-26 ENCOUNTER — PATIENT MESSAGE (OUTPATIENT)
Dept: FAMILY MEDICINE | Facility: CLINIC | Age: 60
End: 2020-07-26

## 2020-07-27 RX ORDER — PEN NEEDLE, DIABETIC 30 GX3/16"
NEEDLE, DISPOSABLE MISCELLANEOUS
Qty: 100 EACH | Refills: 11 | Status: SHIPPED | OUTPATIENT
Start: 2020-07-27 | End: 2021-09-08

## 2020-07-27 NOTE — TELEPHONE ENCOUNTER
"Mike "Kiana De Jesus was seen and treated in our emergency department on 7/27/2020.     COVID-19 is present in our communities across the state. There is limited testing for COVID at this time, so not all patients can be tested. In this situation, your employee meets the following criteria:    Mike De Jesus has met the criteria for COVID-19 testing based upon symptoms, travel, and/or potential exposure. The test has been completed and is pending results at this time. During this time the employee is not able to work and should be quarantined per the Centers for Disease Control timelines.     If you have any questions or concerns, or if I can be of further assistance, please do not hesitate to contact me.    Sincerely,             Terrence Carney MD" ----- Message from Osmani Walker MD sent at 5/29/2017  5:59 PM CDT -----  Needs ONSAT for Night time Oxygen qualification, order placed, needs to complete for HMS

## 2020-07-31 ENCOUNTER — EXTERNAL CHRONIC CARE MANAGEMENT (OUTPATIENT)
Dept: PRIMARY CARE CLINIC | Facility: CLINIC | Age: 60
End: 2020-07-31
Payer: MEDICARE

## 2020-07-31 PROCEDURE — G2058 CCM ADD 20MIN: HCPCS | Mod: PBBFAC,PO | Performed by: INTERNAL MEDICINE

## 2020-07-31 PROCEDURE — G2058 PR CHRON CARE MGMT, EA ADDTL 20 MINS: ICD-10-PCS | Mod: S$PBB,,, | Performed by: INTERNAL MEDICINE

## 2020-07-31 PROCEDURE — 99490 CHRNC CARE MGMT STAFF 1ST 20: CPT | Mod: S$PBB,,, | Performed by: INTERNAL MEDICINE

## 2020-07-31 PROCEDURE — 99490 CHRNC CARE MGMT STAFF 1ST 20: CPT | Mod: PBBFAC,PO | Performed by: INTERNAL MEDICINE

## 2020-07-31 PROCEDURE — G2058 CCM ADD 20MIN: HCPCS | Mod: S$PBB,,, | Performed by: INTERNAL MEDICINE

## 2020-07-31 PROCEDURE — 99490 PR CHRONIC CARE MGMT, 1ST 20 MIN: ICD-10-PCS | Mod: S$PBB,,, | Performed by: INTERNAL MEDICINE

## 2020-08-03 ENCOUNTER — PATIENT MESSAGE (OUTPATIENT)
Dept: FAMILY MEDICINE | Facility: CLINIC | Age: 60
End: 2020-08-03

## 2020-08-03 ENCOUNTER — HOSPITAL ENCOUNTER (OUTPATIENT)
Dept: RADIOLOGY | Facility: HOSPITAL | Age: 60
Discharge: HOME | End: 2020-08-03
Attending: PHYSICIAN ASSISTANT
Payer: MEDICARE

## 2020-08-03 ENCOUNTER — TELEPHONE (OUTPATIENT)
Dept: PAIN MEDICINE | Facility: CLINIC | Age: 60
End: 2020-08-03

## 2020-08-03 ENCOUNTER — OFFICE VISIT (OUTPATIENT)
Dept: PAIN MEDICINE | Facility: CLINIC | Age: 60
End: 2020-08-03
Payer: MEDICARE

## 2020-08-03 ENCOUNTER — TELEPHONE (OUTPATIENT)
Dept: NEPHROLOGY | Facility: CLINIC | Age: 60
End: 2020-08-03

## 2020-08-03 VITALS
HEART RATE: 90 BPM | DIASTOLIC BLOOD PRESSURE: 70 MMHG | SYSTOLIC BLOOD PRESSURE: 114 MMHG | WEIGHT: 235.88 LBS | BODY MASS INDEX: 37.02 KG/M2 | HEIGHT: 67 IN

## 2020-08-03 DIAGNOSIS — M25.561 ACUTE PAIN OF RIGHT KNEE: ICD-10-CM

## 2020-08-03 DIAGNOSIS — M87.061: ICD-10-CM

## 2020-08-03 DIAGNOSIS — M79.641 RIGHT HAND PAIN: ICD-10-CM
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
## 2020-08-03 PROCEDURE — 73110 XR WRIST COMPLETE 3 VIEWS RIGHT: ICD-10-PCS | Mod: 26,RT,, | Performed by: RADIOLOGY

## 2020-08-03 PROCEDURE — 99999 PR PBB SHADOW E&M-EST. PATIENT-LVL V: CPT | Mod: PBBFAC,,, | Performed by: PHYSICIAN ASSISTANT

## 2020-08-03 PROCEDURE — 99999 PR PBB SHADOW E&M-EST. PATIENT-LVL V: ICD-10-PCS | Mod: PBBFAC,,, | Performed by: PHYSICIAN ASSISTANT

## 2020-08-03 PROCEDURE — 73560 X-RAY EXAM OF KNEE 1 OR 2: CPT | Mod: TC,LT

## 2020-08-03 PROCEDURE — 99214 OFFICE O/P EST MOD 30 MIN: CPT | Mod: S$PBB,,, | Performed by: PHYSICIAN ASSISTANT

## 2020-08-03 PROCEDURE — 73110 X-RAY EXAM OF WRIST: CPT | Mod: TC,RT

## 2020-08-03 PROCEDURE — 99215 OFFICE O/P EST HI 40 MIN: CPT | Mod: PBBFAC,25 | Performed by: PHYSICIAN ASSISTANT

## 2020-08-03 PROCEDURE — 73130 XR HAND COMPLETE 3 VIEW RIGHT: ICD-10-PCS | Mod: 26,RT,, | Performed by: RADIOLOGY

## 2020-08-03 PROCEDURE — 73130 X-RAY EXAM OF HAND: CPT | Mod: 26,RT,, | Performed by: RADIOLOGY

## 2020-08-03 PROCEDURE — 73562 X-RAY EXAM OF KNEE 3: CPT | Mod: TC,RT

## 2020-08-03 PROCEDURE — 99214 PR OFFICE/OUTPT VISIT, EST, LEVL IV, 30-39 MIN: ICD-10-PCS | Mod: S$PBB,,, | Performed by: PHYSICIAN ASSISTANT

## 2020-08-03 PROCEDURE — 73130 X-RAY EXAM OF HAND: CPT | Mod: TC,RT

## 2020-08-03 PROCEDURE — 73560 X-RAY EXAM OF KNEE 1 OR 2: CPT | Mod: 26,59,LT, | Performed by: RADIOLOGY

## 2020-08-03 PROCEDURE — 73562 X-RAY EXAM OF KNEE 3: CPT | Mod: 26,RT,, | Performed by: RADIOLOGY

## 2020-08-03 PROCEDURE — 73110 X-RAY EXAM OF WRIST: CPT | Mod: 26,RT,, | Performed by: RADIOLOGY

## 2020-08-03 PROCEDURE — 73562 XR KNEE ORTHO RIGHT: ICD-10-PCS | Mod: 26,RT,, | Performed by: RADIOLOGY

## 2020-08-03 PROCEDURE — 73560 XR KNEE ORTHO RIGHT: ICD-10-PCS | Mod: 26,59,LT, | Performed by: RADIOLOGY

## 2020-08-03 RX ORDER — AMOXICILLIN 875 MG/1
875 TABLET, FILM COATED ORAL 2 TIMES DAILY
Qty: 20 TABLET | Refills: 0 | Status: SHIPPED | OUTPATIENT
Start: 2020-08-03 | End: 2020-08-13

## 2020-08-03 NOTE — TELEPHONE ENCOUNTER
----- Message from Dada Gupta sent at 8/3/2020  9:14 AM CDT -----  Regarding: call back  Pt calling in regards to she had a fall and may need provider to check her to make sure she is ok and may need medication           Please advise pt can be contact [phone]

## 2020-08-03 NOTE — TELEPHONE ENCOUNTER
Pt fell and has bilateral knee pain . Made appt today with maria guadalupe . Pt understood. All questions answered.   Marv Brown MA  [facility]
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
## 2020-08-03 NOTE — TELEPHONE ENCOUNTER
Pt needed to be checked after falling on her face on concrete. She called to get appt with pain management and advised if she cant get pcp in the future to go to urgent care or ed. 8/3/2020 1050/sf

## 2020-08-03 NOTE — TELEPHONE ENCOUNTER
----- Message from Dada Gupta sent at 8/3/2020  9:18 AM CDT -----  Regarding: call back/appt  Pt calling in regards to she had a fall and she hurt her hand, mouth, right knee and needs to get check and medication         Please advise pt can be contact at [phone]

## 2020-08-04 ENCOUNTER — TELEPHONE (OUTPATIENT)
Dept: ORTHOPEDICS | Facility: CLINIC | Age: 60
End: 2020-08-04

## 2020-08-06 ENCOUNTER — PATIENT OUTREACH (OUTPATIENT)
Dept: ADMINISTRATIVE | Facility: OTHER | Age: 60
End: 2020-08-06

## 2020-08-07 ENCOUNTER — OFFICE VISIT (OUTPATIENT)
Dept: ORTHOPEDICS | Facility: CLINIC | Age: 60
End: 2020-08-07
Payer: MEDICARE

## 2020-08-07 ENCOUNTER — TELEPHONE (OUTPATIENT)
Dept: ORTHOPEDICS | Facility: CLINIC | Age: 60
End: 2020-08-07

## 2020-08-07 VITALS
DIASTOLIC BLOOD PRESSURE: 96 MMHG | BODY MASS INDEX: 36.88 KG/M2 | WEIGHT: 235 LBS | SYSTOLIC BLOOD PRESSURE: 152 MMHG | HEART RATE: 85 BPM | HEIGHT: 67 IN

## 2020-08-07 DIAGNOSIS — S63.91XA SPRAIN OF RIGHT HAND, INITIAL ENCOUNTER: ICD-10-CM

## 2020-08-07 DIAGNOSIS — M65.331 TRIGGER MIDDLE FINGER OF RIGHT HAND: ICD-10-CM

## 2020-08-07 DIAGNOSIS — M25.561 ACUTE PAIN OF RIGHT KNEE: ICD-10-CM

## 2020-08-07 DIAGNOSIS — S80.01XA CONTUSION OF RIGHT KNEE, INITIAL ENCOUNTER: Primary | ICD-10-CM

## 2020-08-07 DIAGNOSIS — M87.061: ICD-10-CM

## 2020-08-07 PROCEDURE — 99204 PR OFFICE/OUTPT VISIT, NEW, LEVL IV, 45-59 MIN: ICD-10-PCS | Mod: S$PBB,,, | Performed by: PHYSICAL MEDICINE & REHABILITATION

## 2020-08-07 PROCEDURE — 99999 PR PBB SHADOW E&M-EST. PATIENT-LVL V: CPT | Mod: PBBFAC,,, | Performed by: PHYSICAL MEDICINE & REHABILITATION

## 2020-08-07 PROCEDURE — 99204 OFFICE O/P NEW MOD 45 MIN: CPT | Mod: S$PBB,,, | Performed by: PHYSICAL MEDICINE & REHABILITATION

## 2020-08-07 PROCEDURE — 99215 OFFICE O/P EST HI 40 MIN: CPT | Mod: PBBFAC | Performed by: PHYSICAL MEDICINE & REHABILITATION

## 2020-08-07 PROCEDURE — 99999 PR PBB SHADOW E&M-EST. PATIENT-LVL V: ICD-10-PCS | Mod: PBBFAC,,, | Performed by: PHYSICAL MEDICINE & REHABILITATION

## 2020-08-07 NOTE — PROGRESS NOTES
SPORTS MEDICINE / PM&R NEW PATIENT H & P:    Referring Physician: Marcella Haas PA*    Chief Complaint   Patient presents with    Right Knee - Pain       HPI: This is a 59 y.o.  female being seen in clinic today for evaluation of Pain of the Right Knee   The problem first began she states she fell on Sunday (8/2/2020). She feels throbbing, aching, tender to the touch and constant pain on her right knee . The symptoms are worsening. She has tried ice, heat, and NSAID without improvement. She has not tried physical therapy.     History obtained from patient.    Past family, medical, social, surgical history, and vital signs reviewed in chart.    Review of Systems   Constitutional: Negative for chills, fever and weight loss.   HENT: Negative for hearing loss and sore throat.    Eyes: Negative for blurred vision, photophobia and pain.   Respiratory: Negative for shortness of breath.    Cardiovascular: Negative for chest pain.   Gastrointestinal: Negative for abdominal pain.   Genitourinary: Negative for dysuria.   Skin: Negative for rash.   Neurological: Negative for tingling and headaches.   Endo/Heme/Allergies: Does not bruise/bleed easily.   Psychiatric/Behavioral: Negative for depression.       General    Nursing note and vitals reviewed.  Constitutional: She is oriented to person, place, and time. She appears well-developed and well-nourished.   HENT:   Head: Normocephalic and atraumatic.   Eyes: Conjunctivae and EOM are normal. Pupils are equal, round, and reactive to light.   Neck: Neck supple.   Cardiovascular: Intact distal pulses.    Pulmonary/Chest: Effort normal. No respiratory distress.   Abdominal: She exhibits no distension.   Neurological: She is alert and oriented to person, place, and time.   Psychiatric: She has a normal mood and affect.     General Musculoskeletal Exam   Gait: antalgic       Right Knee Exam     Inspection   Swelling: present (slight)  Effusion: absent  Deformity:  absent  Bruising: present (resolving)    Tenderness   The patient is tender to palpation of the medial joint line, lateral joint line, patella and patellar tendon.    Range of Motion   Extension: normal   Flexion: normal     Tests   Meniscus   Nora:  Medial - negative Lateral - negative  Ligament Examination   Lachman: abnormal - grade IPCL-Posterior Drawer: normal (0 to 2mm)     MCL - Valgus: abnormal - grade I  LCL - Varus: normal  Patella   Patellar apprehension: negative    Other   Popliteal (Baker's) Cyst: absent  Sensation: normal    Left Knee Exam     Tests   Meniscus   Nora:  Medial - negative Lateral - negative  Stability Lachman: normal (-1 to 2mm)   MCL - Valgus: abnormal - grade I  LCL - Varus: normal (0 to 2mm)    Other   Sensation: normal    Right Hand/Wrist Exam     Inspection   Scars: Wrist - absent   Effusion: Wrist - present (slight dorsal swelling) Hand -  absent  Deformity: Wrist - deformity Hand -  deformity    Range of Motion     Wrist   Extension: normal   Flexion: normal   Pronation: normal   Supination: normal     Tests   Phalens Sign: negative  Tinel's sign (median nerve): negative  Carpal Tunnel Compression Test: negative  Cubital Tunnel Compression Test: negative    Atrophy   Thenar:  negative  Intrinsic:  negative    Other     Neuorologic Exam    Median Distribution: normal  Ulnar Distribution: normal  Radial Distribution: normal    Comments:  Normal OK sign. Negative Froment's. Negative Velázquez's. Complains of middle finger triggering, but not reproduced on today's exam. Tender at A1 pulley.        Left Hand/Wrist Exam     Inspection   Scars: Wrist - absent   Effusion: Wrist - absent   Deformity: Wrist - absent Hand -  absent    Range of Motion     Wrist   Extension: normal   Flexion: normal   Pronation: normal   Supination: normal     Tests   Phalens Sign: negative  Tinel's sign (median nerve): negative  Carpal Tunnel Compression Test: negative  Cubital Tunnel Compression  Test: negative    Atrophy  Thenar:  Negative  Intrinsic: negative    Other     Sensory Exam  Median Distribution: normal  Ulnar Distribution: normal  Radial Distribution: normal    Comments:  Normal OK sign. Negative Froment's. Negative Velázquez's.       Right Elbow Exam     Tests   Tinel's sign (cubital tunnel): negative      Left Elbow Exam     Tests   Tinel's sign (cubital tunnel): negative        Muscle Strength   Right Upper Extremity   Wrist extension: 5/5/5   Wrist flexion: 5/5/5   : 5/5/5   Thumb - APB: 5/5  Left Upper Extremity  Wrist extension: 5/5/5   Wrist flexion: 5/5/5   :  5/5/5   Thumb - APB: 5/5  Right Lower Extremity   Hip Abduction: 4/5   Quadriceps:  5/5   Hamstrin/5   Left Lower Extremity   Hip Abduction: 4/5   Quadriceps:  5/5   Hamstrin/5     Reflexes     Left Side  Quadriceps:  1+  Achilles:  1+    Right Side   Quadriceps:  1+  Achilles:  1+    Vascular Exam     Right Pulses  Dorsalis Pedis:      2+          Left Pulses  Dorsalis Pedis:      2+          Capillary Refill  Right Hand: normal capillary refill  Left Hand: normal capillary refill      IMPRESSION/PLAN: Ana Maria is a 59 y.o.  female with:    1. Contusion of right knee, initial encounter    2. Acute pain of right knee  - Ambulatory referral/consult to Orthopedics    3. Trigger middle finger of right hand    4. Sprain of right hand, initial encounter    5. Bone infarction of distal tibia, right  - Ambulatory referral/consult to Orthopedics       The findings were discussed with Ana Maria in detail. We reviewed her previous x-rays that were negative for fractures. I think the knee is mostly a self-limited contusion, but noted some laxity in ACL but with firm end point. She also has right hand sprain and trigger finger. Recommend Hand therapy, possibly at [facility] [place]. Continue current meds. F/u with ortho for bone infarct if pain doesn't resolve as contusion resolves. Was not having significant pain  prior to fall, so not sure bone infarct is relevant at this point. All of her questions were answered. She was provided this plan in writing. She will follow-up with me in 4 - 6 weeks.     Brianne Quintero M.D.  Sports Medicine

## 2020-08-07 NOTE — PATIENT INSTRUCTIONS
Clear Voltaren Gel with your transplant team and use it if able.    Tylenol 500 mg two pills three times a day as needed.

## 2020-08-07 NOTE — LETTER
August 7, 2020      Marcella Haas PA-C  [id number] [place] [address] [address] [place] [id number]           [facility]  [address]  [place] [address]  Phone: [phone]  Fax: [phone]          Patient: Ana Maria Teague   MR Number: [id number]   YOB: 1960   Date of Visit: 8/7/2020       Dear Marcella Haas:    Thank you for referring Ana Maria Teague to me for evaluation. Attached you will find relevant portions of my assessment and plan of care.    If you have questions, please do not hesitate to call me. I look forward to following Ana Maria Teague along with you.    Sincerely,    Brianne Quintero MD    Enclosure  CC:  No Recipients    If you would like to receive this communication electronically, please contact [email or web address] or [phone] to request more information on [employer] Link access.    For providers and/or their staff who would like to refer a patient to [facility], please contact us through our one-stop-shop provider referral line, [facility] , at [phone].    If you feel you have received this communication in error or would no longer like to receive these types of communications, please e-mail [email or web address]

## 2020-08-14 RX ORDER — OXYCODONE AND ACETAMINOPHEN 10; 325 MG/1; MG/1
1 TABLET ORAL EVERY 8 HOURS PRN
Qty: 90 TABLET | Refills: 0 | Status: SHIPPED | OUTPATIENT
Start: 2020-08-28 | End: 2020-09-27

## 2020-08-14 RX ORDER — OXYCODONE AND ACETAMINOPHEN 10; 325 MG/1; MG/1
1 TABLET ORAL EVERY 8 HOURS PRN
Qty: 90 TABLET | Refills: 0 | Status: SHIPPED | OUTPATIENT
Start: 2020-09-27 | End: 2020-10-27

## 2020-08-19 ENCOUNTER — OFFICE VISIT (OUTPATIENT)
Dept: PAIN MEDICINE | Facility: CLINIC | Age: 60
End: 2020-08-19
Payer: MEDICARE

## 2020-08-19 VITALS
WEIGHT: 235 LBS | HEART RATE: 80 BPM | RESPIRATION RATE: 18 BRPM | HEIGHT: 67 IN | SYSTOLIC BLOOD PRESSURE: 162 MMHG | DIASTOLIC BLOOD PRESSURE: 98 MMHG | BODY MASS INDEX: 36.88 KG/M2

## 2020-08-19 DIAGNOSIS — G89.29 CHRONIC PAIN OF LEFT KNEE: ICD-10-CM

## 2020-08-19 DIAGNOSIS — M25.552 LEFT HIP PAIN: Primary | ICD-10-CM
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
## 2020-08-19 DIAGNOSIS — M53.3 SACROILIAC JOINT PAIN: ICD-10-CM

## 2020-08-19 DIAGNOSIS — M87.061: ICD-10-CM

## 2020-08-19 DIAGNOSIS — M25.562 CHRONIC PAIN OF LEFT KNEE: ICD-10-CM

## 2020-08-19 PROCEDURE — 99999 PR PBB SHADOW E&M-EST. PATIENT-LVL IV: ICD-10-PCS | Mod: PBBFAC,,, | Performed by: PHYSICIAN ASSISTANT

## 2020-08-19 PROCEDURE — 99999 PR PBB SHADOW E&M-EST. PATIENT-LVL IV: CPT | Mod: PBBFAC,,, | Performed by: PHYSICIAN ASSISTANT

## 2020-08-19 PROCEDURE — 99214 OFFICE O/P EST MOD 30 MIN: CPT | Mod: PBBFAC | Performed by: PHYSICIAN ASSISTANT

## 2020-08-19 PROCEDURE — 99214 PR OFFICE/OUTPT VISIT, EST, LEVL IV, 30-39 MIN: ICD-10-PCS | Mod: S$PBB,,, | Performed by: PHYSICIAN ASSISTANT

## 2020-08-19 PROCEDURE — 99214 OFFICE O/P EST MOD 30 MIN: CPT | Mod: S$PBB,,, | Performed by: PHYSICIAN ASSISTANT

## 2020-08-19 RX ORDER — CYCLOBENZAPRINE HCL 10 MG
10 TABLET ORAL 2 TIMES DAILY PRN
Qty: 60 TABLET | Refills: 1 | Status: SHIPPED | OUTPATIENT
Start: 2020-08-19 | End: 2020-10-28 | Stop reason: ALTCHOICE
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
## 2020-08-31 ENCOUNTER — EXTERNAL CHRONIC CARE MANAGEMENT (OUTPATIENT)
Dept: PRIMARY CARE CLINIC | Facility: CLINIC | Age: 60
End: 2020-08-31
Payer: MEDICARE

## 2020-08-31 PROCEDURE — 99490 CHRNC CARE MGMT STAFF 1ST 20: CPT | Mod: PBBFAC,PO | Performed by: INTERNAL MEDICINE

## 2020-08-31 PROCEDURE — 99490 CHRNC CARE MGMT STAFF 1ST 20: CPT | Mod: S$PBB,,, | Performed by: INTERNAL MEDICINE

## 2020-08-31 PROCEDURE — 99490 PR CHRONIC CARE MGMT, 1ST 20 MIN: ICD-10-PCS | Mod: S$PBB,,, | Performed by: INTERNAL MEDICINE

## 2020-09-08 ENCOUNTER — PATIENT OUTREACH (OUTPATIENT)
Dept: ADMINISTRATIVE | Facility: HOSPITAL | Age: 60
End: 2020-09-08

## 2020-09-08 NOTE — PROGRESS NOTES
I have contacted patient to schedule over due diabetic eye exam. Patient's  requested a call back.

## 2020-09-09 ENCOUNTER — TELEPHONE (OUTPATIENT)
Dept: PULMONOLOGY | Facility: CLINIC | Age: 60
End: 2020-09-09

## 2020-09-09 DIAGNOSIS — U07.1 COVID-19: ICD-10-CM

## 2020-09-09 DIAGNOSIS — R06.02 SOB (SHORTNESS OF BREATH): Primary | ICD-10-CM

## 2020-09-15 ENCOUNTER — LAB VISIT (OUTPATIENT)
Dept: OTOLARYNGOLOGY | Facility: CLINIC | Age: 60
End: 2020-09-15
Payer: MEDICARE

## 2020-09-15 DIAGNOSIS — U07.1 COVID-19: ICD-10-CM

## 2020-09-15 PROCEDURE — U0003 INFECTIOUS AGENT DETECTION BY NUCLEIC ACID (DNA OR RNA); SEVERE ACUTE RESPIRATORY SYNDROME CORONAVIRUS 2 (SARS-COV-2) (CORONAVIRUS DISEASE [COVID-19]), AMPLIFIED PROBE TECHNIQUE, MAKING USE OF HIGH THROUGHPUT TECHNOLOGIES AS DESCRIBED BY CMS-2020-01-R: HCPCS

## 2020-09-16 LAB — SARS-COV-2 RNA RESP QL NAA+PROBE: NOT DETECTED

## 2020-09-17 ENCOUNTER — PATIENT OUTREACH (OUTPATIENT)
Dept: ADMINISTRATIVE | Facility: OTHER | Age: 60
End: 2020-09-17

## 2020-09-17 NOTE — PROGRESS NOTES
Health Maintenance Due   Topic Date Due    Complete Opioid Risk Tool  10/[birth year]/1977    Shingles Vaccine (1 of 2) 10/20/2009    Pneumococcal Vaccine (Highest Risk) (3 of 3 - PPSV23) 03/31/2019    Eye Exam  08/14/2019    Hemoglobin A1c  04/30/2020    Foot Exam  07/03/2020    Influenza Vaccine (1) 08/01/2020    Mammogram  10/30/2020     Updates were requested from care everywhere.  Chart was reviewed for overdue Proactive [facility] Encounters (URBAN) topics (CRS, Breast Cancer Screening, Eye exam)  Health Maintenance has been updated.  LINKS immunization registry triggered.  Immunizations were reconciled.

## 2020-09-18 ENCOUNTER — CLINICAL SUPPORT (OUTPATIENT)
Dept: PULMONOLOGY | Facility: CLINIC | Age: 60
End: 2020-09-18
Payer: MEDICARE

## 2020-09-18 ENCOUNTER — HOSPITAL ENCOUNTER (OUTPATIENT)
Dept: RADIOLOGY | Facility: HOSPITAL | Age: 60
Discharge: HOME | End: 2020-09-18
Attending: INTERNAL MEDICINE
Payer: MEDICARE

## 2020-09-18 ENCOUNTER — OFFICE VISIT (OUTPATIENT)
Dept: PULMONOLOGY | Facility: CLINIC | Age: 60
End: 2020-09-18
Payer: MEDICARE

## 2020-09-18 VITALS — WEIGHT: 229.5 LBS | BODY MASS INDEX: 36.88 KG/M2 | HEIGHT: 66 IN

## 2020-09-18 VITALS
OXYGEN SATURATION: 99 % | HEART RATE: 99 BPM | RESPIRATION RATE: 17 BRPM | DIASTOLIC BLOOD PRESSURE: 88 MMHG | SYSTOLIC BLOOD PRESSURE: 138 MMHG | WEIGHT: 229.5 LBS | BODY MASS INDEX: 36.88 KG/M2 | HEIGHT: 66 IN

## 2020-09-18 DIAGNOSIS — G47.34 SLEEP-RELATED NONOBSTRUCTIVE ALVEOLAR HYPOVENTILATION: ICD-10-CM

## 2020-09-18 DIAGNOSIS — I15.2 HYPERTENSION ASSOCIATED WITH DIABETES: ICD-10-CM

## 2020-09-18 DIAGNOSIS — R06.02 SOB (SHORTNESS OF BREATH): ICD-10-CM

## 2020-09-18 DIAGNOSIS — J41.0 SIMPLE CHRONIC BRONCHITIS: ICD-10-CM

## 2020-09-18 DIAGNOSIS — R94.2 DIFFUSION CAPACITY OF LUNG (DL), DECREASED: ICD-10-CM

## 2020-09-18 DIAGNOSIS — E66.01 SEVERE OBESITY (BMI 35.0-39.9) WITH COMORBIDITY: ICD-10-CM

## 2020-09-18 DIAGNOSIS — E11.69 HYPERLIPIDEMIA ASSOCIATED WITH TYPE 2 DIABETES MELLITUS: ICD-10-CM

## 2020-09-18 DIAGNOSIS — E11.59 HYPERTENSION ASSOCIATED WITH DIABETES: ICD-10-CM

## 2020-09-18 DIAGNOSIS — R94.2 ABNORMAL PFTS (PULMONARY FUNCTION TESTS): Primary | ICD-10-CM

## 2020-09-18 DIAGNOSIS — E78.5 HYPERLIPIDEMIA ASSOCIATED WITH TYPE 2 DIABETES MELLITUS: ICD-10-CM

## 2020-09-18 DIAGNOSIS — G47.01 INSOMNIA DUE TO MEDICAL CONDITION: ICD-10-CM

## 2020-09-18 LAB
BRPFT: ABNORMAL
DLCO ADJ PRE: 15.59 ML/(MIN*MMHG) (ref 18.34–29.81)
DLCO SINGLE BREATH LLN: 18.34
DLCO SINGLE BREATH PRE REF: 64.8 %
DLCO SINGLE BREATH REF: 24.07
DLCOC SBVA LLN: 3.17
DLCOC SBVA PRE REF: 86.2 %
DLCOC SBVA REF: 4.54
DLCOC SINGLE BREATH LLN: 18.34
DLCOC SINGLE BREATH PRE REF: 64.8 %
DLCOC SINGLE BREATH REF: 24.07
DLCOVA LLN: 3.17
DLCOVA PRE REF: 86.2 %
DLCOVA PRE: 3.92 ML/(MIN*MMHG*L) (ref 3.17–5.92)
DLCOVA REF: 4.54
DLVAADJ PRE: 3.92 ML/(MIN*MMHG*L) (ref 3.17–5.92)
ERV LLN: -16449.18
ERV PRE REF: 40.1 %
ERV REF: 0.82
FEF 25 75 LLN: 0.88
FEF 25 75 PRE REF: 121.1 %
FEF 25 75 REF: 2.07
FEV1 FVC LLN: 68
FEV1 FVC PRE REF: 101.4 %
FEV1 FVC REF: 79
FEV1 LLN: 1.66
FEV1 PRE REF: 97.7 %
FEV1 REF: 2.28
FRCPLETH LLN: 2
FRCPLETH PREREF: 72 %
FRCPLETH REF: 2.82
FVC LLN: 2.13
FVC PRE REF: 95.8 %
FVC REF: 2.9
IVC PRE: 2.56 L (ref 2.13–3.66)
IVC SINGLE BREATH LLN: 2.13
IVC SINGLE BREATH PRE REF: 88.5 %
IVC SINGLE BREATH REF: 2.9
MVV LLN: 86
MVV PRE REF: 58.2 %
MVV REF: 101
PEF LLN: 3.82
PEF PRE REF: 86.1 %
PEF REF: 5.96
PRE DLCO: 15.59 ML/(MIN*MMHG) (ref 18.34–29.81)
PRE ERV: 0.33 L (ref -16449.18–16450.82)
PRE FEF 25 75: 2.5 L/S (ref 0.88–3.25)
PRE FET 100: 6.72 SEC
PRE FEV1 FVC: 80.49 % (ref 67.65–91.13)
PRE FEV1: 2.23 L (ref 1.66–2.91)
PRE FRC PL: 2.03 L
PRE FVC: 2.77 L (ref 2.13–3.66)
PRE MVV: 59 L/MIN (ref 86.22–116.66)
PRE PEF: 5.13 L/S (ref 3.82–8.1)
PRE RV: 1.53 L (ref 1.43–2.58)
PRE TLC: 4.3 L (ref 4.31–6.29)
RAW LLN: 3.06
RAW PRE REF: 66.4 %
RAW PRE: 2.03 CMH2O*S/L (ref 3.06–3.06)
RAW REF: 3.06
RV LLN: 1.43
RV PRE REF: 76.5 %
RV REF: 2
RVTLC LLN: 30
RVTLC PRE REF: 90.3 %
RVTLC PRE: 35.56 % (ref 29.77–48.95)
RVTLC REF: 39
TLC LLN: 4.31
TLC PRE REF: 81.2 %
TLC REF: 5.3
VA PRE: 3.99 L (ref 5.15–5.15)
VA SINGLE BREATH LLN: 5.15
VA SINGLE BREATH PRE REF: 77.4 %
VA SINGLE BREATH REF: 5.15
VC LLN: 2.13
VC PRE REF: 95.8 %
VC PRE: 2.77 L (ref 2.13–3.66)
VC REF: 2.9
VTGRAWPRE: 2.79 L

## 2020-09-18 PROCEDURE — 99214 PR OFFICE/OUTPT VISIT, EST, LEVL IV, 30-39 MIN: ICD-10-PCS | Mod: 25,S$PBB,, | Performed by: INTERNAL MEDICINE

## 2020-09-18 PROCEDURE — 94729 DIFFUSING CAPACITY: CPT | Mod: 26,S$PBB,, | Performed by: INTERNAL MEDICINE

## 2020-09-18 PROCEDURE — 99215 OFFICE O/P EST HI 40 MIN: CPT | Mod: PBBFAC,25,27 | Performed by: INTERNAL MEDICINE

## 2020-09-18 PROCEDURE — 94010 BREATHING CAPACITY TEST: ICD-10-PCS | Mod: 26,59,S$PBB, | Performed by: INTERNAL MEDICINE

## 2020-09-18 PROCEDURE — 94726 PULM FUNCT TST PLETHYSMOGRAP: ICD-10-PCS | Mod: 26,S$PBB,, | Performed by: INTERNAL MEDICINE

## 2020-09-18 PROCEDURE — 71046 X-RAY EXAM CHEST 2 VIEWS: CPT | Mod: TC

## 2020-09-18 PROCEDURE — 71046 XR CHEST PA AND LATERAL: ICD-10-PCS | Mod: 26,,, | Performed by: RADIOLOGY

## 2020-09-18 PROCEDURE — 99999 PR PBB SHADOW E&M-EST. PATIENT-LVL V: ICD-10-PCS | Mod: PBBFAC,,, | Performed by: INTERNAL MEDICINE

## 2020-09-18 PROCEDURE — 99999 PR PBB SHADOW E&M-EST. PATIENT-LVL I: CPT | Mod: PBBFAC,,,

## 2020-09-18 PROCEDURE — 94726 PLETHYSMOGRAPHY LUNG VOLUMES: CPT | Mod: 26,S$PBB,, | Performed by: INTERNAL MEDICINE

## 2020-09-18 PROCEDURE — 99999 PR PBB SHADOW E&M-EST. PATIENT-LVL V: CPT | Mod: PBBFAC,,, | Performed by: INTERNAL MEDICINE

## 2020-09-18 PROCEDURE — 99214 OFFICE O/P EST MOD 30 MIN: CPT | Mod: 25,S$PBB,, | Performed by: INTERNAL MEDICINE

## 2020-09-18 PROCEDURE — 94010 BREATHING CAPACITY TEST: CPT | Mod: 26,59,S$PBB, | Performed by: INTERNAL MEDICINE

## 2020-09-18 PROCEDURE — 94729 DIFFUSING CAPACITY: CPT | Mod: PBBFAC

## 2020-09-18 PROCEDURE — 99211 OFF/OP EST MAY X REQ PHY/QHP: CPT | Mod: PBBFAC,25

## 2020-09-18 PROCEDURE — 90686 IIV4 VACC NO PRSV 0.5 ML IM: CPT | Mod: PBBFAC

## 2020-09-18 PROCEDURE — 94726 PLETHYSMOGRAPHY LUNG VOLUMES: CPT | Mod: PBBFAC

## 2020-09-18 PROCEDURE — 94010 BREATHING CAPACITY TEST: CPT | Mod: PBBFAC

## 2020-09-18 PROCEDURE — 94729 PR C02/MEMBANE DIFFUSE CAPACITY: ICD-10-PCS | Mod: 26,S$PBB,, | Performed by: INTERNAL MEDICINE

## 2020-09-18 PROCEDURE — 94618 PULMONARY STRESS TESTING: CPT | Mod: 26,S$PBB,, | Performed by: INTERNAL MEDICINE

## 2020-09-18 PROCEDURE — 94618 PULMONARY STRESS TESTING: CPT | Mod: PBBFAC

## 2020-09-18 PROCEDURE — 94618 PULMONARY STRESS TESTING: ICD-10-PCS | Mod: 26,S$PBB,, | Performed by: INTERNAL MEDICINE

## 2020-09-18 PROCEDURE — 99999 PR PBB SHADOW E&M-EST. PATIENT-LVL I: ICD-10-PCS | Mod: PBBFAC,,,

## 2020-09-18 PROCEDURE — 71046 X-RAY EXAM CHEST 2 VIEWS: CPT | Mod: 26,,, | Performed by: RADIOLOGY

## 2020-09-18 RX ORDER — ALBUTEROL SULFATE 1.25 MG/3ML
1.25 SOLUTION RESPIRATORY (INHALATION) 2 TIMES DAILY
Qty: 1 BOX | Refills: 3 | Status: SHIPPED | OUTPATIENT
Start: 2020-09-18 | End: 2021-03-31

## 2020-09-18 NOTE — PROCEDURES
"[facility] - Pulmonary Function S[facility]  Six Minute Walk     SUMMARY     Ordering Provider: Dr. Walker   Interpreting Provider: Dr. Walker  Performing nurse/tech/RT: LibbyCRT  Diagnosis: Shortness of Breath  Height: 5' 6" (167.6 cm)  Weight: 104.1 kg (229 lb 8 oz)  BMI (Calculated): 37.1   Patient Race:             Phase Oxygen Assessment Supplemental O2 Heart   Rate Blood Pressure Kyle Dyspnea Scale Rating   Resting 99 % Room Air 99 bpm 138/88 2   Exercise        Minute        1 96 % Room Air 124 bpm     2 96 % Room Air 128 bpm     3 96 % Room Air 128 bpm     4 96 % Room Air 127 bpm     5 96 % Room Air 127 bpm     6  96 % Room Air 128 bpm 117/69 3   Recovery        Minute        1 96 % Room Air 113 bpm     2 96 % Room Air 106 bpm     3 97 % Room Air 100 bpm     4 97 % Room Air 100 bpm 111/69 2     Six Minute Walk Summary  6MWT Status: completed without stopping  Patient Reported: No complaints     Interpretation:  Did the patient stop or pause?: No            Total Time Walked (Calculated): 360 seconds  Final Partial Lap Distance (feet): 0 feet  Total Distance Meters (Calculated): 426.72 meters  Predicted Distance Meters (Calculated): 435.45 meters  Percentage of Predicted (Calculated): 98  Peak VO2 (Calculated): 16.78  Mets: 4.79  Has The Patient Had a Previous Six Minute Walk Test?: Yes       Previous 6MWT Results  Has The Patient Had a Previous Six Minute Walk Test?: Yes  Date of Previous Test: 05/30/17  Total Time Walked: 360 seconds  Total Distance (meters): 373.38  Predicted Distance (meters): 444.25 meters  Percentage of Predicted: 84.05  Percent Change (Calculated): -0.14           CLINICAL INTERPRETATION:  Six minute walk distance is 426.72m (98 % predicted) with light dyspnea.  During exercise, there was no significant desaturation while breathing room air.  Heart rate increased significantly with walking. BP dropped slightly.  This may represent an abnormal cardiovascular response " to exercise.  The patient did not report non-pulmonary symptoms during exercise.  The patient did complete the study, walking 360 seconds of the 360 second test.  No previous study performed.  Based upon age and body mass index, exercise capacity is normal.

## 2020-09-18 NOTE — PROGRESS NOTES
Subjective:       Patient ID: Ana Maria Teague is a 59 y.o. female.  Patient Active Problem List   Diagnosis    Avascular necrosis of bone of hip    Acquired hypothyroidism    Osteoporosis with pathological fracture    Depression    Anxiety and depression    Vertigo    Abnormal findings on diagnostic imaging of breast    Renal cyst    -donor kidney transplant - 13    Chronic immunosuppression with Prograf and Cellcept    Hypertension, renal    Anemia of chronic renal failure    Secondary hyperparathyroidism of renal origin    Delayed graft function 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     Uncontrolled type II diabetes mellitus with hypoglycemia    CTS (carpal tunnel syndrome)    Cubital tunnel syndrome on right    CKD (chronic kidney disease) stage 2, GFR 60-89 ml/min    Immunosuppressed status    Abnormal PFTs (pulmonary function tests)    Diffusion capacity of lung (dl), decreased    Age-related osteoporosis without current pathological fracture    Hypertension associated with diabetes    Bilateral headache    Headache    Sleep-related nonobstructive alveolar hypoventilation    Uncontrolled type 2 diabetes mellitus with stage 3 chronic kidney disease, with long-term current use of insulin    Hyperlipidemia associated with type 2 diabetes mellitus    Allergic rhinitis    Chronic bronchitis    BMI 37.0-37.9, adult    Insomnia due to medical condition    Primary osteoarthritis involving multiple joints    Severe obesity (BMI 35.0-39.9) with comorbidity     Immunization History   Administered Date(s) Administered    Hepatitis A, Adult 2008, 2008, 2019    Influenza - High Dose - PF (65 years and older) 10/01/2015    Influenza - Quadrivalent 2014, 2016    Influenza - Quadrivalent - PF *Preferred* (6 months and older) 2018, 2019, 2020    Influenza Split 2010    Pneumococcal Conjugate - 13 Valent 2015    Pneumococcal  Polysaccharide - 23 Valent 2014    Tdap 2015     Social History     Tobacco Use   Smoking Status Former Smoker    Packs/day: 1.00    Years: 10.00    Pack years: 10.00    Types: Cigarettes    Quit date: 2002    Years since quittin.8   Smokeless Tobacco Never Used   Tobacco Comment    quit smoking approx 10-15 years ago          EPWORTH SLEEPINESS SCALE 2020   Sitting and reading 0   Watching TV 0   Sitting, inactive in a public place (e.g. a theatre or a meeting) 0   As a passenger in a car for an hour without a break 0   Lying down to rest in the afternoon when circumstances permit 1   Sitting and talking to someone 0   Sitting quietly after a lunch without alcohol 0   In a car, while stopped for a few minutes in traffic 0   Total score 1          Chief Complaint: Sleep Apnea    Ms. Ana Maria Teague is 59 y.o.   Follows for Chr bronchitis, Mild rediced DLCO, Nocturnal hypoxemia on Oxygen   Last visit was 2019,   Had bronchitis in Dec 2019: aske for nebulise meds;  Here to review CXR, PFT, 6MWD  exervise capacity improved, no Desat  PFT; Normal Spirometry and lung volumes  Will get flu shot  Shunt right arm removed, pending removal on left arm  No cough no wheezing no shortness of breath  Spirometry today FEV1 is 2. 23L( 97.7%)   predicted    Medications   rescue albuterol which she hardly ever uses:       Review of Systems   Constitutional: Positive for weight loss.        Lost 10 lbs since last visit   HENT: Negative.    Eyes: Negative.    Respiratory: Negative.  Negative for cough, sputum production, shortness of breath and wheezing.    Cardiovascular: Negative.    Genitourinary: Negative.    Endocrine: endocrine negative   Musculoskeletal: Negative.    Skin: Negative.    Gastrointestinal: Negative.    Neurological: Negative.    Psychiatric/Behavioral: Negative.    All other systems reviewed and are negative.      Objective:       Vitals:    20 1134   BP: 138/88   Pulse: 99  "  Resp: 17   SpO2: 99%   Weight: 104.1 kg (229 lb 8 oz)   Height: 5' 6" (1.676 m)     Physical Exam   Constitutional: She is oriented to person, place, and time. Vital signs are normal. She appears well-developed and well-nourished.       HENT:   Head: Normocephalic.   Nose: Nose normal.   Mouth/Throat: Oropharynx is clear and moist.   Neck: Normal range of motion. Neck supple. No JVD present.   Cardiovascular: Normal rate, regular rhythm and normal heart sounds.   Pulmonary/Chest: Normal expansion, symmetric chest wall expansion and effort normal.   Abdominal: Soft. Bowel sounds are normal.   Musculoskeletal: Normal range of motion.        Arms:    Lymphadenopathy:     She has no cervical adenopathy.   Neurological: She is alert and oriented to person, place, and time. She has normal reflexes.   Skin: Skin is warm and dry.   Psychiatric: She has a normal mood and affect. Her behavior is normal.   Nursing note and vitals reviewed.    Personal Diagnostic Review  No flowsheet data found.     CXR today    EXAMINATION:  XR CHEST PA AND LATERAL     CLINICAL HISTORY:  Shortness of breath     TECHNIQUE:  PA and lateral views of the chest were performed.     COMPARISON:  12/23/2019     FINDINGS:  The lungs are clear and free of infiltrate.  No pleural effusion or pneumothorax. The heart is not enlarged. Surgical clips seen overlying the expected location of the thyroid gland.  Vascular stent seen projecting over the right axilla.  Surgical clips seen projecting in the region of either axilla.     Impression:     1.  No acute cardiopulmonary process.       6MW Test  Height: 5' 6" (167.6 cm)  Weight: 104.1 kg (229 lb 8 oz)  BMI (Calculated): 37.1  Predicted Distance: 296.7  Interpretation  Predicted Distance Meters (Calculated): 435.45 meters       PFT:  FEV1: 2.23L( 97.7%), FVC 2.77L( 95.8%),  FEV1/FVC 80  TLC 4.30L( 81.2%)  DLCO 15.59( 64.8%)        Assessment:       Problem List Items Addressed This Visit     Abnormal PFTs " (pulmonary function tests) - Primary    Relevant Medications    albuterol (ACCUNEB) 1.25 mg/3 mL Nebu    Diffusion capacity of lung (dl), decreased     DLCO was 64.8%         Hypertension associated with diabetes     STABLE on ADALAT         Sleep-related nonobstructive alveolar hypoventilation     Nocturnal oxygen 2.0 LPM           Uncontrolled type 2 diabetes mellitus with stage 3 chronic kidney disease, with long-term current use of insulin     STABLE on LISPRO         Hyperlipidemia associated with type 2 diabetes mellitus     STABLE ON LIPITOR         Chronic bronchitis       Normal spirometry 2030  PRN albuterol HFA  Current immunisations: need influenza         Relevant Medications    albuterol (ACCUNEB) 1.25 mg/3 mL Nebu    Insomnia due to medical condition     Insomnia worse with chr pain and anxiety  Ambien stopped  Sedative pain meds: Gabapentin, Percocet and Flexeril for muscle spasms         Severe obesity (BMI 35.0-39.9) with comorbidity     General weight loss/lifestyle modification strategies discussed (elicit support from others; identify saboteurs; non-food rewards).  Diet interventions: low calorie (1000 kCal/d) deficit diet              Plan:      Follow up in about 1 year (around 9/18/2021), or PFT, FLU shot, cxr.    Requested Prescriptions     Signed Prescriptions Disp Refills    albuterol (ACCUNEB) 1.25 mg/3 mL Nebu 1 Box 3     Sig: Take 3 mLs (1.25 mg total) by nebulization 2 (two) times a day. Rescue       Orders Placed This Encounter   Procedures    Influenza - Quadrivalent (PF)       This note was prepared using voice recognition system and is likely to have sound alike errors that may have been overlooked even after proof reading.  Please call me with any questions    Discussed diagnosis, its evaluation, treatment and usual course. All questions answered.    Thank you for the courtesy of participating in the care of this patient    Osmani Walker MD        Need repeat Overnight Sat  for Night time Oxygen qualification    Osmani Walker MD    .    Osmani Walker MD

## 2020-09-18 NOTE — ASSESSMENT & PLAN NOTE
Insomnia worse with chr pain and anxiety  Ambien stopped  Sedative pain meds: Gabapentin, Percocet and Flexeril for muscle spasms

## 2020-09-19 ENCOUNTER — PATIENT MESSAGE (OUTPATIENT)
Dept: NEPHROLOGY | Facility: CLINIC | Age: 60
End: 2020-09-19

## 2020-09-21 DIAGNOSIS — I10 HTN (HYPERTENSION), BENIGN: Primary | ICD-10-CM

## 2020-09-21 RX ORDER — NIFEDIPINE 30 MG/1
30 TABLET, EXTENDED RELEASE ORAL DAILY
Qty: 30 TABLET | Refills: 11 | Status: SHIPPED | OUTPATIENT
Start: 2020-09-21 | End: 2021-09-17 | Stop reason: SDUPTHER

## 2020-09-21 NOTE — TELEPHONE ENCOUNTER
Dr. Medina,     Pt. Would like to decrease Procardia to 30 mg, states the 60mg makes her sleepy and tired. Please advise.

## 2020-09-28 ENCOUNTER — PATIENT MESSAGE (OUTPATIENT)
Dept: PAIN MEDICINE | Facility: CLINIC | Age: 60
End: 2020-09-28

## 2020-09-29 ENCOUNTER — PATIENT MESSAGE (OUTPATIENT)
Dept: OTHER | Facility: OTHER | Age: 60
End: 2020-09-29

## 2020-09-30 ENCOUNTER — EXTERNAL CHRONIC CARE MANAGEMENT (OUTPATIENT)
Dept: PRIMARY CARE CLINIC | Facility: CLINIC | Age: 60
End: 2020-09-30
Payer: MEDICARE

## 2020-09-30 PROCEDURE — 99490 PR CHRONIC CARE MGMT, 1ST 20 MIN: ICD-10-PCS | Mod: S$PBB,,, | Performed by: INTERNAL MEDICINE

## 2020-09-30 PROCEDURE — 99490 CHRNC CARE MGMT STAFF 1ST 20: CPT | Mod: PBBFAC,PO | Performed by: INTERNAL MEDICINE

## 2020-09-30 PROCEDURE — 99490 CHRNC CARE MGMT STAFF 1ST 20: CPT | Mod: S$PBB,,, | Performed by: INTERNAL MEDICINE

## 2020-10-01 ENCOUNTER — OFFICE VISIT (OUTPATIENT)
Dept: PAIN MEDICINE | Facility: CLINIC | Age: 60
End: 2020-10-01
Payer: MEDICARE

## 2020-10-01 VITALS
DIASTOLIC BLOOD PRESSURE: 86 MMHG | SYSTOLIC BLOOD PRESSURE: 139 MMHG | BODY MASS INDEX: 37.12 KG/M2 | HEIGHT: 66 IN | HEART RATE: 81 BPM | WEIGHT: 231 LBS | RESPIRATION RATE: 18 BRPM

## 2020-10-01 DIAGNOSIS — M47.817 SPONDYLOSIS OF LUMBOSACRAL REGION WITHOUT MYELOPATHY OR RADICULOPATHY: ICD-10-CM

## 2020-10-01 DIAGNOSIS — M79.18 CERVICAL MYOFASCIAL PAIN SYNDROME: ICD-10-CM

## 2020-10-01 DIAGNOSIS — M54.2 NECK PAIN ON RIGHT SIDE: Primary | ICD-10-CM

## 2020-10-01 DIAGNOSIS — M53.3 SACROILIAC JOINT PAIN: ICD-10-CM

## 2020-10-01 PROCEDURE — 99999 PR PBB SHADOW E&M-EST. PATIENT-LVL V: ICD-10-PCS | Mod: PBBFAC,,, | Performed by: PHYSICIAN ASSISTANT

## 2020-10-01 PROCEDURE — 99215 OFFICE O/P EST HI 40 MIN: CPT | Mod: PBBFAC | Performed by: PHYSICIAN ASSISTANT

## 2020-10-01 PROCEDURE — 20553 NJX 1/MLT TRIGGER POINTS 3/>: CPT | Mod: S$PBB,,, | Performed by: PHYSICIAN ASSISTANT

## 2020-10-01 PROCEDURE — 99999 PR PBB SHADOW E&M-EST. PATIENT-LVL V: CPT | Mod: PBBFAC,,, | Performed by: PHYSICIAN ASSISTANT

## 2020-10-01 PROCEDURE — 99214 OFFICE O/P EST MOD 30 MIN: CPT | Mod: 25,S$PBB,, | Performed by: PHYSICIAN ASSISTANT

## 2020-10-01 PROCEDURE — 99214 PR OFFICE/OUTPT VISIT, EST, LEVL IV, 30-39 MIN: ICD-10-PCS | Mod: 25,S$PBB,, | Performed by: PHYSICIAN ASSISTANT

## 2020-10-01 PROCEDURE — 20553 PR INJECT TRIGGER POINTS, > 3: ICD-10-PCS | Mod: S$PBB,,, | Performed by: PHYSICIAN ASSISTANT

## 2020-10-01 PROCEDURE — 20553 NJX 1/MLT TRIGGER POINTS 3/>: CPT | Mod: PBBFAC | Performed by: PHYSICIAN ASSISTANT

## 2020-10-01 RX ORDER — LIDOCAINE HYDROCHLORIDE 10 MG/ML
1 INJECTION INFILTRATION; PERINEURAL
Status: COMPLETED | OUTPATIENT
Start: 2020-10-01 | End: 2020-10-01

## 2020-10-01 RX ORDER — METHYLPREDNISOLONE ACETATE 40 MG/ML
40 INJECTION, SUSPENSION INTRA-ARTICULAR; INTRALESIONAL; INTRAMUSCULAR; SOFT TISSUE
Status: COMPLETED | OUTPATIENT
Start: 2020-10-01 | End: 2020-10-01

## 2020-10-01 RX ORDER — OXYCODONE AND ACETAMINOPHEN 10; 325 MG/1; MG/1
1 TABLET ORAL EVERY 8 HOURS PRN
Qty: 90 TABLET | Refills: 0 | Status: SHIPPED | OUTPATIENT
Start: 2020-10-01 | End: 2020-10-31

## 2020-10-01 RX ADMIN — LIDOCAINE HYDROCHLORIDE 1 ML: 10 INJECTION, SOLUTION INFILTRATION; PERINEURAL at 09:10

## 2020-10-01 RX ADMIN — METHYLPREDNISOLONE ACETATE 40 MG: 40 INJECTION, SUSPENSION INTRA-ARTICULAR; INTRALESIONAL; INTRAMUSCULAR; SOFT TISSUE at 09:10
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
## 2020-10-06 ENCOUNTER — PATIENT MESSAGE (OUTPATIENT)
Dept: ADMINISTRATIVE | Facility: HOSPITAL | Age: 60
End: 2020-10-06

## 2020-10-27 ENCOUNTER — PATIENT OUTREACH (OUTPATIENT)
Dept: ADMINISTRATIVE | Facility: OTHER | Age: 60
End: 2020-10-27

## 2020-10-28 ENCOUNTER — OFFICE VISIT (OUTPATIENT)
Dept: PAIN MEDICINE | Facility: CLINIC | Age: 60
End: 2020-10-28
Payer: MEDICARE

## 2020-10-28 VITALS
HEART RATE: 84 BPM | WEIGHT: 235.31 LBS | HEIGHT: 66 IN | SYSTOLIC BLOOD PRESSURE: 154 MMHG | BODY MASS INDEX: 37.82 KG/M2 | DIASTOLIC BLOOD PRESSURE: 86 MMHG

## 2020-10-28 DIAGNOSIS — Z02.89 PAIN MANAGEMENT CONTRACT AGREEMENT: ICD-10-CM

## 2020-10-28 DIAGNOSIS — M51.36 DDD (DEGENERATIVE DISC DISEASE), LUMBAR: ICD-10-CM

## 2020-10-28 DIAGNOSIS — M47.817 SPONDYLOSIS OF LUMBOSACRAL REGION WITHOUT MYELOPATHY OR RADICULOPATHY: ICD-10-CM

## 2020-10-28 DIAGNOSIS — M79.18 CERVICAL MYOFASCIAL PAIN SYNDROME: Primary | ICD-10-CM

## 2020-10-28 PROCEDURE — 99214 PR OFFICE/OUTPT VISIT, EST, LEVL IV, 30-39 MIN: ICD-10-PCS | Mod: S$PBB,,, | Performed by: PHYSICAL MEDICINE & REHABILITATION

## 2020-10-28 PROCEDURE — 99999 PR PBB SHADOW E&M-EST. PATIENT-LVL V: CPT | Mod: PBBFAC,,, | Performed by: PHYSICAL MEDICINE & REHABILITATION

## 2020-10-28 PROCEDURE — 99215 OFFICE O/P EST HI 40 MIN: CPT | Mod: PBBFAC | Performed by: PHYSICAL MEDICINE & REHABILITATION

## 2020-10-28 PROCEDURE — 99214 OFFICE O/P EST MOD 30 MIN: CPT | Mod: S$PBB,,, | Performed by: PHYSICAL MEDICINE & REHABILITATION

## 2020-10-28 PROCEDURE — 99999 PR PBB SHADOW E&M-EST. PATIENT-LVL V: ICD-10-PCS | Mod: PBBFAC,,, | Performed by: PHYSICAL MEDICINE & REHABILITATION

## 2020-10-28 RX ORDER — TIZANIDINE 4 MG/1
4 TABLET ORAL 2 TIMES DAILY PRN
Qty: 60 TABLET | Refills: 1 | Status: SHIPPED | OUTPATIENT
Start: 2020-10-28 | End: 2020-12-08 | Stop reason: SDUPTHER

## 2020-10-28 NOTE — PROGRESS NOTES
Health Maintenance Due   Topic Date Due    Complete Opioid Risk Tool  10/20/1977    Urine Drug Screen  10/20/1977    Naloxone Prescription  10/20/1977    Shingles Vaccine (1 of 2) 10/20/2009    Pneumococcal Vaccine (Highest Risk) (3 of 3 - PPSV23) 03/31/2019    Eye Exam  08/14/2019    Hemoglobin A1c  04/30/2020    Foot Exam  07/03/2020    Mammogram  10/30/2020     Updates were requested from care everywhere.  Chart was reviewed for overdue Proactive [facility] Encounters (URBAN) topics (CRS, Breast Cancer Screening, Eye exam)  Health Maintenance has been updated.  LINKS immunization registry triggered.  Immunizations were reconciled.
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
## 2020-10-31 ENCOUNTER — EXTERNAL CHRONIC CARE MANAGEMENT (OUTPATIENT)
Dept: PRIMARY CARE CLINIC | Facility: CLINIC | Age: 60
End: 2020-10-31
Payer: MEDICARE

## 2020-10-31 PROCEDURE — 99490 CHRNC CARE MGMT STAFF 1ST 20: CPT | Mod: S$PBB,,, | Performed by: INTERNAL MEDICINE

## 2020-10-31 PROCEDURE — 99490 PR CHRONIC CARE MGMT, 1ST 20 MIN: ICD-10-PCS | Mod: S$PBB,,, | Performed by: INTERNAL MEDICINE

## 2020-10-31 PROCEDURE — 99490 CHRNC CARE MGMT STAFF 1ST 20: CPT | Mod: PBBFAC,PO | Performed by: INTERNAL MEDICINE

## 2020-11-10 ENCOUNTER — PATIENT MESSAGE (OUTPATIENT)
Dept: FAMILY MEDICINE | Facility: CLINIC | Age: 60
End: 2020-11-10

## 2020-11-10 DIAGNOSIS — E11.69 HYPERLIPIDEMIA ASSOCIATED WITH TYPE 2 DIABETES MELLITUS: ICD-10-CM

## 2020-11-10 DIAGNOSIS — E78.5 HYPERLIPIDEMIA ASSOCIATED WITH TYPE 2 DIABETES MELLITUS: ICD-10-CM

## 2020-11-10 DIAGNOSIS — M81.0 AGE-RELATED OSTEOPOROSIS WITHOUT CURRENT PATHOLOGICAL FRACTURE: ICD-10-CM

## 2020-11-11 ENCOUNTER — TELEPHONE (OUTPATIENT)
Dept: FAMILY MEDICINE | Facility: CLINIC | Age: 60
End: 2020-11-11

## 2020-11-11 DIAGNOSIS — Z12.31 ENCOUNTER FOR SCREENING MAMMOGRAM FOR MALIGNANT NEOPLASM OF BREAST: ICD-10-CM

## 2020-11-11 DIAGNOSIS — Z29.9 PREVENTIVE MEASURE: Primary | ICD-10-CM

## 2020-11-11 NOTE — TELEPHONE ENCOUNTER
It has been a year since I've seen her, so we could do a video visit now and do the annual physical in 3 months if all is ok on labs.  SM

## 2020-11-13 ENCOUNTER — TELEPHONE (OUTPATIENT)
Dept: RHEUMATOLOGY | Facility: CLINIC | Age: 60
End: 2020-11-13

## 2020-11-13 ENCOUNTER — LAB VISIT (OUTPATIENT)
Dept: LAB | Facility: HOSPITAL | Age: 60
End: 2020-11-13
Attending: INTERNAL MEDICINE
Payer: MEDICARE

## 2020-11-13 DIAGNOSIS — M81.0 AGE-RELATED OSTEOPOROSIS WITHOUT CURRENT PATHOLOGICAL FRACTURE: ICD-10-CM

## 2020-11-13 DIAGNOSIS — E11.69 HYPERLIPIDEMIA ASSOCIATED WITH TYPE 2 DIABETES MELLITUS: ICD-10-CM

## 2020-11-13 DIAGNOSIS — E78.5 HYPERLIPIDEMIA ASSOCIATED WITH TYPE 2 DIABETES MELLITUS: ICD-10-CM

## 2020-11-13 LAB
ALBUMIN SERPL BCP-MCNC: 3.7 G/DL (ref 3.5–5.2)
ALP SERPL-CCNC: 119 U/L (ref 55–135)
ALT SERPL W/O P-5'-P-CCNC: 11 U/L (ref 10–44)
ANION GAP SERPL CALC-SCNC: 9 MMOL/L (ref 8–16)
AST SERPL-CCNC: 16 U/L (ref 10–40)
BASOPHILS # BLD AUTO: 0.08 K/UL (ref 0–0.2)
BASOPHILS NFR BLD: 0.6 % (ref 0–1.9)
BILIRUB SERPL-MCNC: 0.3 MG/DL (ref 0.1–1)
BUN SERPL-MCNC: 22 MG/DL (ref 8–23)
CALCIUM SERPL-MCNC: 9.4 MG/DL (ref 8.7–10.5)
CHLORIDE SERPL-SCNC: 105 MMOL/L (ref 95–110)
CHOLEST SERPL-MCNC: 156 MG/DL (ref 120–199)
CHOLEST/HDLC SERPL: 2.5 {RATIO} (ref 2–5)
CO2 SERPL-SCNC: 29 MMOL/L (ref 23–29)
CREAT SERPL-MCNC: 0.9 MG/DL (ref 0.5–1.4)
DIFFERENTIAL METHOD: ABNORMAL
EOSINOPHIL # BLD AUTO: 0.5 K/UL (ref 0–0.5)
EOSINOPHIL NFR BLD: 3.3 % (ref 0–8)
ERYTHROCYTE [DISTWIDTH] IN BLOOD BY AUTOMATED COUNT: 15.8 % (ref 11.5–14.5)
EST. GFR  (AFRICAN AMERICAN): >60 ML/MIN/1.73 M^2
EST. GFR  (NON AFRICAN AMERICAN): >60 ML/MIN/1.73 M^2
GLUCOSE SERPL-MCNC: 53 MG/DL (ref 70–110)
HCT VFR BLD AUTO: 39.9 % (ref 37–48.5)
HDLC SERPL-MCNC: 63 MG/DL (ref 40–75)
HDLC SERPL: 40.4 % (ref 20–50)
HGB BLD-MCNC: 11.6 G/DL (ref 12–16)
IMM GRANULOCYTES # BLD AUTO: 0.1 K/UL (ref 0–0.04)
IMM GRANULOCYTES NFR BLD AUTO: 0.7 % (ref 0–0.5)
LDLC SERPL CALC-MCNC: 84 MG/DL (ref 63–159)
LYMPHOCYTES # BLD AUTO: 3 K/UL (ref 1–4.8)
LYMPHOCYTES NFR BLD: 22.4 % (ref 18–48)
MCH RBC QN AUTO: 25.3 PG (ref 27–31)
MCHC RBC AUTO-ENTMCNC: 29.1 G/DL (ref 32–36)
MCV RBC AUTO: 87 FL (ref 82–98)
MONOCYTES # BLD AUTO: 0.6 K/UL (ref 0.3–1)
MONOCYTES NFR BLD: 4.4 % (ref 4–15)
NEUTROPHILS # BLD AUTO: 9.3 K/UL (ref 1.8–7.7)
NEUTROPHILS NFR BLD: 68.6 % (ref 38–73)
NONHDLC SERPL-MCNC: 93 MG/DL
NRBC BLD-RTO: 0 /100 WBC
PLATELET # BLD AUTO: 268 K/UL (ref 150–350)
PMV BLD AUTO: 12.1 FL (ref 9.2–12.9)
POTASSIUM SERPL-SCNC: 3.7 MMOL/L (ref 3.5–5.1)
PROT SERPL-MCNC: 7.5 G/DL (ref 6–8.4)
RBC # BLD AUTO: 4.59 M/UL (ref 4–5.4)
SODIUM SERPL-SCNC: 143 MMOL/L (ref 136–145)
TRIGL SERPL-MCNC: 45 MG/DL (ref 30–150)
TSH SERPL DL<=0.005 MIU/L-ACNC: 0.59 UIU/ML (ref 0.4–4)
WBC # BLD AUTO: 13.49 K/UL (ref 3.9–12.7)

## 2020-11-13 PROCEDURE — 83036 HEMOGLOBIN GLYCOSYLATED A1C: CPT

## 2020-11-13 PROCEDURE — 36415 COLL VENOUS BLD VENIPUNCTURE: CPT | Mod: PO

## 2020-11-13 PROCEDURE — 80053 COMPREHEN METABOLIC PANEL: CPT

## 2020-11-13 PROCEDURE — 85025 COMPLETE CBC W/AUTO DIFF WBC: CPT

## 2020-11-13 PROCEDURE — 80061 LIPID PANEL: CPT

## 2020-11-13 PROCEDURE — 82306 VITAMIN D 25 HYDROXY: CPT

## 2020-11-13 PROCEDURE — 84443 ASSAY THYROID STIM HORMONE: CPT

## 2020-11-13 NOTE — TELEPHONE ENCOUNTER
Called patient regarding appointment on 1.18.2021 needing to be rescheduled due to the clinic being closed. Pt requested message be sent through portal due to not being at home. Rescheduled appointment to 1.27.2021 at 9.15 for labs, 9.45 for Dr. TEJEDA and 10.15 for prolia. Will send [facility] message. Pt verbalized understanding.

## 2020-11-14 LAB
25(OH)D3+25(OH)D2 SERPL-MCNC: 38 NG/ML (ref 30–96)
ESTIMATED AVG GLUCOSE: 180 MG/DL (ref 68–131)
HBA1C MFR BLD HPLC: 7.9 % (ref 4–5.6)

## 2020-11-16 ENCOUNTER — PATIENT MESSAGE (OUTPATIENT)
Dept: FAMILY MEDICINE | Facility: CLINIC | Age: 60
End: 2020-11-16

## 2020-11-16 NOTE — PROGRESS NOTES
Primary Care Telemedicine Note  The patient location is:  Patient Home   The chief complaint leading to consultation is: Follow up of medical problems.    Visit type: Virtual visit with synchronous audio only and video  Each patient to whom he or she provides medical services by telemedicine is:  (1) informed of the relationship between the physician and patient and the respective role of any other health care provider with respect to management of the patient; and (2) notified that he or she may decline to receive medical services by telemedicine and may withdraw from such care at any time.    Updated/ annual due :  HM:  fluvax,  HAV, 6/15 [id number], 3/14 [id number], 6/15 TDaP, 10/19 MMG,  BMD with OP needs treatment rep 2y,  Cscope rep 5-10y, 11/10 HCV neg, Pain Dr. Anaya,  Eye Dr. Martin.     HPI:  Has been avoiding clinic due to covid.  AC breakfast sugars 77-140s.  Not checking other times during day.  MMG scheduled next month.  BP at home running 120-140/75-90. No f/c/sw/cough.  Occas albuterol less than once a week.  Uses O2 occasionally at night, when feels short of breath.  No cp/palp.  BMs and urine normal.  Taking vit D weekly.  Anxiety/depression doing well, no rx now.  No regular exercise.      Problem List Items Addressed This Visit     Acquired hypothyroidism    Age-related osteoporosis without current pathological fracture    Anxiety and depression    -donor kidney transplant - 13 (Chronic)    Overview     Induction with Campath 30mg and IV solumedrol to total 875mg.  Post op course complicated by delayed graft function with hemodialysis. Last HD treatment 10/2/13.   Post-op course also complicated by bleeding/oozing from abdominal incision. Pt received a total of 4 units of PRBC and DDAVP for blood loss anemia.   Returned to OR 10/713 where section was cauterized and NALLELY drain was placed,after which bleeding stopped.    CMV status: D+,R+         Hyperlipidemia  associated with type 2 diabetes mellitus    Hypertension associated with diabetes    Severe obesity (BMI 35.0-39.9) with comorbidity    Sleep-related nonobstructive alveolar hypoventilation    Uncontrolled type 2 diabetes mellitus with stage 3 chronic kidney disease, with long-term current use of insulin - Primary    Relevant Orders    Hemoglobin A1C      Other Visit Diagnoses     Preventive measure        Encounter for screening mammogram for malignant neoplasm of breast              The patient's Health Maintenance was reviewed and the following appears to be due at this time:  Health Maintenance Due   Topic Date Due    Shingles Vaccine (1 of 2) 10/20/2009    Pneumococcal Vaccine (Highest Risk) (3 of 3 - PPSV23) 03/31/2019    Eye Exam  08/14/2019    Foot Exam  07/03/2020    Mammogram  10/30/2020       [unfilled]  Outpatient Medications Prior to Visit   Medication Sig Dispense Refill    albuterol (ACCUNEB) 1.25 mg/3 mL Nebu Take 3 mLs (1.25 mg total) by nebulization 2 (two) times a day. Rescue 1 Box 3    albuterol 90 mcg/actuation inhaler Inhale 2 puffs into the lungs.      albuterol-ipratropium (DUO-NEB) 2.5 mg-0.5 mg/3 mL nebulizer solution Take 3 mLs by nebulization 3 (three) times daily. 270 mL 5    atorvastatin (LIPITOR) 20 MG tablet TAKE 1 TABLET(20 MG) BY MOUTH EVERY DAY 30 tablet 11    blood sugar diagnostic (FREESTYLE LITE STRIPS) Strp Use to check blood glucose 3 times a day. 100 strip 11    cetirizine (ZYRTEC) 10 MG tablet Take 1 tablet (10 mg total) by mouth once daily. 30 tablet 11    diclofenac sodium (VOLTAREN) 1 % Gel Apply 2 g topically 4 (four) times daily as needed. 5 Tube 0    ergocalciferol (ERGOCALCIFEROL) 50,000 unit Cap TAKE 1 CAPSULE BY MOUTH EVERY 7 DAYS 4 capsule 11    furosemide (LASIX) 20 MG tablet Take 1 tablet (20 mg total) by mouth daily as needed (swelling in legs). 30 tablet 11    gabapentin (NEURONTIN) 300 MG capsule TAKE 1 TO 2 CAPSULES BY MOUTH EVERY NIGHT 60  "capsule 1    insulin glargine 100 units/mL (3mL) SubQ pen Inject 45 Units into the skin once daily. 15 mL 6    insulin lispro (HUMALOG KWIKPEN INSULIN) 100 unit/mL pen INJECT UP TO 18 UNITS UNDER THE SKIN THREE TIMES DAILY 10 TO 15 MINUTES BEFORE MEALS AS DIRECTED IN AFTER VISIT SUMMAR Y 45 mL 11    lancets (FREESTYLE LANCETS) 28 gauge Misc 1 lancet by Combination route 2 (two) times daily as needed. Qid prn 400 each 2    lancets Misc 1 strip by Misc.(Non-Drug; Combo Route) route 4 (four) times daily with meals and nightly. 150 each 6    LANTUS SOLOSTAR U-100 INSULIN glargine 100 units/mL (3mL) SubQ pen ADMINISTER 50 UNITS UNDER THE SKIN EVERY DAY 15 mL 6    LANTUS SOLOSTAR U-100 INSULIN glargine 100 units/mL (3mL) SubQ pen ADMINISTER 50 UNITS UNDER THE SKIN EVERY DAY 15 mL 4    levothyroxine (SYNTHROID) 150 MCG tablet Take 1 tablet (150 mcg total) by mouth before breakfast. 90 tablet 3    mycophenolate (CELLCEPT) 250 mg Cap TAKE (2) CAPSULES TWICE DAILY. 120 capsule 0    NIFEdipine (PROCARDIA-XL) 30 MG (OSM) 24 hr tablet Take 1 tablet (30 mg total) by mouth once daily. 30 tablet 11    pen needle, diabetic (BD ULTRA-FINE RORY PEN NEEDLE) 32 gauge x 5/32" Ndle USE AS DIRECTED WITH INSULIN PEN FOUR TIMES DAILY 100 each 11    tacrolimus (PROGRAF) 1 MG Cap TAKE 5 CAPSULES IN THE MORNING, AND 4 IN THE EVENING 270 capsule 0    tiZANidine (ZANAFLEX) 4 MG tablet Take 1 tablet (4 mg total) by mouth 2 (two) times daily as needed. 60 tablet 1     No facility-administered medications prior to visit.         Physical Exam   No flowsheet data found.  No flowsheet data found.      Constitutional: The patient appears well-developed and well-nourished. No distress.   Psychiatric: The mood appears not anxious and the affect is not angry, not blunt, not labile and not inappropriate. Speech is not rapid and/or pressured, not delayed, not tangential and not slurred. The patient is not agitated, not aggressive, is not " hyperactive, not slowed, not withdrawn, not actively hallucinating and not combative. Thought content is not paranoid and not delusional. Cognition and memory are not impaired. The patient does not express impulsivity or inappropriate judgment and does not exhibit a depressed mood. The patient expresses no homicidal and no suicidal ideation and has no suicidal plans and no homicidal plans. The patient is communicative and exhibits a normal recent memory and normal remote memory. The patient is attentive.     Results for orders placed or performed in visit on 11/13/20   CBC Auto Differential   Result Value Ref Range    WBC 13.49 (H) 3.90 - 12.70 K/uL    RBC 4.59 4.00 - 5.40 M/uL    Hemoglobin 11.6 (L) 12.0 - 16.0 g/dL    Hematocrit 39.9 37.0 - 48.5 %    MCV 87 82 - 98 fL    MCH 25.3 (L) 27.0 - 31.0 pg    MCHC 29.1 (L) 32.0 - 36.0 g/dL    RDW 15.8 (H) 11.5 - 14.5 %    Platelets 268 150 - 350 K/uL    MPV 12.1 9.2 - 12.9 fL    Immature Granulocytes 0.7 (H) 0.0 - 0.5 %    Gran # (ANC) 9.3 (H) 1.8 - 7.7 K/uL    Immature Grans (Abs) 0.10 (H) 0.00 - 0.04 K/uL    Lymph # 3.0 1.0 - 4.8 K/uL    Mono # 0.6 0.3 - 1.0 K/uL    Eos # 0.5 0.0 - 0.5 K/uL    Baso # 0.08 0.00 - 0.20 K/uL    nRBC 0 0 /100 WBC    Gran % 68.6 38.0 - 73.0 %    Lymph % 22.4 18.0 - 48.0 %    Mono % 4.4 4.0 - 15.0 %    Eosinophil % 3.3 0.0 - 8.0 %    Basophil % 0.6 0.0 - 1.9 %    Differential Method Automated    Comprehensive Metabolic Panel   Result Value Ref Range    Sodium 143 136 - 145 mmol/L    Potassium 3.7 3.5 - 5.1 mmol/L    Chloride 105 95 - 110 mmol/L    CO2 29 23 - 29 mmol/L    Glucose 53 (L) 70 - 110 mg/dL    BUN 22 8 - 23 mg/dL    Creatinine 0.9 0.5 - 1.4 mg/dL    Calcium 9.4 8.7 - 10.5 mg/dL    Total Protein 7.5 6.0 - 8.4 g/dL    Albumin 3.7 3.5 - 5.2 g/dL    Total Bilirubin 0.3 0.1 - 1.0 mg/dL    Alkaline Phosphatase 119 55 - 135 U/L    AST 16 10 - 40 U/L    ALT 11 10 - 44 U/L    Anion Gap 9 8 - 16 mmol/L    eGFR if African American >60.0 >60  mL/min/1.73 m^2    eGFR if non African American >60.0 >60 mL/min/1.73 m^2   Lipid Panel   Result Value Ref Range    Cholesterol 156 120 - 199 mg/dL    Triglycerides 45 30 - 150 mg/dL    HDL 63 40 - 75 mg/dL    LDL Cholesterol 84.0 63.0 - 159.0 mg/dL    HDL/Cholesterol Ratio 40.4 20.0 - 50.0 %    Total Cholesterol/HDL Ratio 2.5 2.0 - 5.0    Non-HDL Cholesterol 93 mg/dL   TSH   Result Value Ref Range    TSH 0.592 0.400 - 4.000 uIU/mL   Vitamin D   Result Value Ref Range    Vit D, 25-Hydroxy 38 30 - 96 ng/mL   Hemoglobin A1C   Result Value Ref Range    Hemoglobin A1C 7.9 (H) 4.0 - 5.6 %    Estimated Avg Glucose 180 (H) 68 - 131 mg/dL     *Note: Due to a large number of results and/or encounters for the requested time period, some results have not been displayed. A complete set of results can be found in Results Review.     Results for KEVAN TEAGUE (MRN [id number]) as of 2020 16:01   Ref. Range 2020 07:53   Creatinine, Urine Latest Ref Range: 15.0 - 325.0 mg/dL 16.0   Microalbumin, Urine Latest Units: ug/mL 8.0   MICROALB/CREAT RATIO Latest Ref Range: 0.0 - 30.0 ug/mg 50.0 (H)       Encounter Diagnoses   Name Primary?    Uncontrolled type 2 diabetes mellitus with stage 3 chronic kidney disease, with long-term current use of insulin Yes    Hypertension associated with diabetes     Hyperlipidemia associated with type 2 diabetes mellitus     Anxiety and depression     Acquired hypothyroidism     Age-related osteoporosis without current pathological fracture     -donor kidney transplant - 13     Severe obesity (BMI 35.0-39.9) with comorbidity     Sleep-related nonobstructive alveolar hypoventilation     Preventive measure     Encounter for screening mammogram for malignant neoplasm of breast        PLAN:    I am having Kevan MAEKiko Teague maintain her lancets, albuterol, lancets, blood sugar diagnostic, cetirizine, insulin, furosemide, LANTUS SOLOSTAR U-100 INSULIN, insulin lispro,  albuterol-ipratropium, gabapentin, ergocalciferol, levothyroxine, atorvastatin, diclofenac sodium, (pen needle, diabetic), albuterol, NIFEdipine, LANTUS SOLOSTAR U-100 INSULIN, tiZANidine, tacrolimus, and mycophenolate.    Diagnoses and all orders for this visit:    Uncontrolled type 2 diabetes mellitus with stage 3 chronic kidney disease, with long-term current use of insulin- cont present regimen, add 15min exercise daily, recheck 4mo.  -     Hemoglobin A1C; Future    Hypertension associated with diabetes- stable, cont rx.    Hyperlipidemia associated with type 2 diabetes mellitus- doing well, cont statin.    Anxiety and depression- doing well off rx now.    Acquired hypothyroidism- Clinically stable, continue present treatment.    Age-related osteoporosis without current pathological fracture on prolia now.    -donor kidney transplant - 12    Severe obesity (BMI 35.0-39.9) with comorbidity- start walking for exercise.    Sleep-related nonobstructive alveolar hypoventilation- cont O2 prn.    Preventive measure- will come in next week for [id number] vaccine.                  Orders Placed This Encounter   Procedures    Hemoglobin A1C     Standing Status:   Future     Standing Expiration Date:   2022

## 2020-11-18 ENCOUNTER — OFFICE VISIT (OUTPATIENT)
Dept: FAMILY MEDICINE | Facility: CLINIC | Age: 60
End: 2020-11-18
Payer: MEDICARE

## 2020-11-18 VITALS — DIASTOLIC BLOOD PRESSURE: 82 MMHG | SYSTOLIC BLOOD PRESSURE: 130 MMHG

## 2020-11-18 DIAGNOSIS — E78.5 HYPERLIPIDEMIA ASSOCIATED WITH TYPE 2 DIABETES MELLITUS: ICD-10-CM

## 2020-11-18 DIAGNOSIS — Z94.0 DECEASED-DONOR KIDNEY TRANSPLANT: Chronic | ICD-10-CM

## 2020-11-18 DIAGNOSIS — E66.01 SEVERE OBESITY (BMI 35.0-39.9) WITH COMORBIDITY: ICD-10-CM

## 2020-11-18 DIAGNOSIS — I15.2 HYPERTENSION ASSOCIATED WITH DIABETES: ICD-10-CM

## 2020-11-18 DIAGNOSIS — F32.A ANXIETY AND DEPRESSION: ICD-10-CM

## 2020-11-18 DIAGNOSIS — G47.34 SLEEP-RELATED NONOBSTRUCTIVE ALVEOLAR HYPOVENTILATION: ICD-10-CM

## 2020-11-18 DIAGNOSIS — F41.9 ANXIETY AND DEPRESSION: ICD-10-CM

## 2020-11-18 DIAGNOSIS — E11.69 HYPERLIPIDEMIA ASSOCIATED WITH TYPE 2 DIABETES MELLITUS: ICD-10-CM

## 2020-11-18 DIAGNOSIS — E03.9 ACQUIRED HYPOTHYROIDISM: ICD-10-CM

## 2020-11-18 DIAGNOSIS — Z29.9 PREVENTIVE MEASURE: ICD-10-CM

## 2020-11-18 DIAGNOSIS — M81.0 AGE-RELATED OSTEOPOROSIS WITHOUT CURRENT PATHOLOGICAL FRACTURE: ICD-10-CM

## 2020-11-18 DIAGNOSIS — Z12.31 ENCOUNTER FOR SCREENING MAMMOGRAM FOR MALIGNANT NEOPLASM OF BREAST: ICD-10-CM

## 2020-11-18 DIAGNOSIS — E11.59 HYPERTENSION ASSOCIATED WITH DIABETES: ICD-10-CM

## 2020-11-18 PROCEDURE — 99214 PR OFFICE/OUTPT VISIT, EST, LEVL IV, 30-39 MIN: ICD-10-PCS | Mod: 95,,, | Performed by: INTERNAL MEDICINE

## 2020-11-18 PROCEDURE — 99214 OFFICE O/P EST MOD 30 MIN: CPT | Mod: 95,,, | Performed by: INTERNAL MEDICINE

## 2020-11-18 RX ORDER — CETIRIZINE HYDROCHLORIDE 10 MG/1
10 TABLET ORAL DAILY
Qty: 30 TABLET | Refills: 11 | Status: SHIPPED | OUTPATIENT
Start: 2020-11-18 | End: 2021-11-28 | Stop reason: SDUPTHER

## 2020-11-23 ENCOUNTER — PATIENT MESSAGE (OUTPATIENT)
Dept: FAMILY MEDICINE | Facility: CLINIC | Age: 60
End: 2020-11-23

## 2020-11-24 ENCOUNTER — PATIENT MESSAGE (OUTPATIENT)
Dept: PAIN MEDICINE | Facility: CLINIC | Age: 60
End: 2020-11-24

## 2020-11-30 ENCOUNTER — EXTERNAL CHRONIC CARE MANAGEMENT (OUTPATIENT)
Dept: PRIMARY CARE CLINIC | Facility: CLINIC | Age: 60
End: 2020-11-30
Payer: MEDICARE

## 2020-11-30 PROCEDURE — G2058 PR CHRON CARE MGMT, EA ADDTL 20 MINS: ICD-10-PCS | Mod: S$PBB,,, | Performed by: INTERNAL MEDICINE

## 2020-11-30 PROCEDURE — 99490 CHRNC CARE MGMT STAFF 1ST 20: CPT | Mod: PBBFAC,PO | Performed by: INTERNAL MEDICINE

## 2020-11-30 PROCEDURE — G2058 CCM ADD 20MIN: HCPCS | Mod: S$PBB,,, | Performed by: INTERNAL MEDICINE

## 2020-11-30 PROCEDURE — 99490 PR CHRONIC CARE MGMT, 1ST 20 MIN: ICD-10-PCS | Mod: S$PBB,,, | Performed by: INTERNAL MEDICINE

## 2020-11-30 PROCEDURE — 99490 CHRNC CARE MGMT STAFF 1ST 20: CPT | Mod: S$PBB,,, | Performed by: INTERNAL MEDICINE

## 2020-11-30 PROCEDURE — G2058 CCM ADD 20MIN: HCPCS | Mod: PBBFAC,PO,25,27 | Performed by: INTERNAL MEDICINE

## 2020-12-08 ENCOUNTER — OFFICE VISIT (OUTPATIENT)
Dept: PAIN MEDICINE | Facility: CLINIC | Age: 60
End: 2020-12-08
Payer: MEDICARE

## 2020-12-08 ENCOUNTER — PATIENT OUTREACH (OUTPATIENT)
Dept: ADMINISTRATIVE | Facility: OTHER | Age: 60
End: 2020-12-08

## 2020-12-08 VITALS
WEIGHT: 239.19 LBS | HEIGHT: 66 IN | DIASTOLIC BLOOD PRESSURE: 89 MMHG | HEART RATE: 76 BPM | SYSTOLIC BLOOD PRESSURE: 148 MMHG | BODY MASS INDEX: 38.44 KG/M2

## 2020-12-08 DIAGNOSIS — M47.817 SPONDYLOSIS OF LUMBOSACRAL REGION WITHOUT MYELOPATHY OR RADICULOPATHY: ICD-10-CM

## 2020-12-08 DIAGNOSIS — M79.18 CERVICAL MYOFASCIAL PAIN SYNDROME: Primary | ICD-10-CM

## 2020-12-08 DIAGNOSIS — M51.36 DDD (DEGENERATIVE DISC DISEASE), LUMBAR: ICD-10-CM

## 2020-12-08 DIAGNOSIS — M25.552 LEFT HIP PAIN: ICD-10-CM

## 2020-12-08 DIAGNOSIS — Z02.89 PAIN MANAGEMENT CONTRACT AGREEMENT: ICD-10-CM

## 2020-12-08 PROCEDURE — 99214 OFFICE O/P EST MOD 30 MIN: CPT | Mod: S$PBB,,, | Performed by: PHYSICIAN ASSISTANT

## 2020-12-08 PROCEDURE — 99214 OFFICE O/P EST MOD 30 MIN: CPT | Mod: PBBFAC | Performed by: PHYSICIAN ASSISTANT

## 2020-12-08 PROCEDURE — 99214 PR OFFICE/OUTPT VISIT, EST, LEVL IV, 30-39 MIN: ICD-10-PCS | Mod: S$PBB,,, | Performed by: PHYSICIAN ASSISTANT

## 2020-12-08 PROCEDURE — 99999 PR PBB SHADOW E&M-EST. PATIENT-LVL IV: CPT | Mod: PBBFAC,,, | Performed by: PHYSICIAN ASSISTANT

## 2020-12-08 PROCEDURE — 99999 PR PBB SHADOW E&M-EST. PATIENT-LVL IV: ICD-10-PCS | Mod: PBBFAC,,, | Performed by: PHYSICIAN ASSISTANT

## 2020-12-08 RX ORDER — OXYCODONE AND ACETAMINOPHEN 10; 325 MG/1; MG/1
1 TABLET ORAL EVERY 8 HOURS PRN
Qty: 90 TABLET | Refills: 0 | Status: SHIPPED | OUTPATIENT
Start: 2021-02-07 | End: 2021-03-05 | Stop reason: SDUPTHER

## 2020-12-08 RX ORDER — TIZANIDINE 4 MG/1
4 TABLET ORAL 2 TIMES DAILY PRN
Qty: 60 TABLET | Refills: 2 | Status: SHIPPED | OUTPATIENT
Start: 2020-12-08 | End: 2021-02-06

## 2020-12-08 RX ORDER — OXYCODONE AND ACETAMINOPHEN 10; 325 MG/1; MG/1
1 TABLET ORAL EVERY 4 HOURS PRN
COMMUNITY
End: 2021-03-05 | Stop reason: SDUPTHER

## 2020-12-08 RX ORDER — DICLOFENAC SODIUM 10 MG/G
2 GEL TOPICAL 4 TIMES DAILY PRN
Qty: 5 TUBE | Refills: 0 | Status: SHIPPED | OUTPATIENT
Start: 2020-12-08 | End: 2021-01-20

## 2020-12-08 RX ORDER — OXYCODONE AND ACETAMINOPHEN 10; 325 MG/1; MG/1
1 TABLET ORAL EVERY 8 HOURS PRN
Qty: 90 TABLET | Refills: 0 | Status: SHIPPED | OUTPATIENT
Start: 2020-12-09 | End: 2021-01-08

## 2020-12-08 RX ORDER — GABAPENTIN 300 MG/1
CAPSULE ORAL
Qty: 60 CAPSULE | Refills: 2 | Status: SHIPPED | OUTPATIENT
Start: 2020-12-08 | End: 2021-03-05 | Stop reason: SDUPTHER

## 2020-12-08 RX ORDER — OXYCODONE AND ACETAMINOPHEN 10; 325 MG/1; MG/1
1 TABLET ORAL EVERY 8 HOURS PRN
Qty: 90 TABLET | Refills: 0 | Status: SHIPPED | OUTPATIENT
Start: 2021-01-08 | End: 2021-02-07

## 2020-12-08 NOTE — PROGRESS NOTES
Health Maintenance Due   Topic Date Due    Shingles Vaccine (1 of 2) 10/20/2009    Pneumococcal Vaccine (Highest Risk) (3 of 3 - PPSV23) 03/31/2019    Eye Exam  08/14/2019    Foot Exam  07/03/2020     Updates were requested from care everywhere.  Chart was reviewed for overdue Proactive [facility] Encounters (URBAN) topics (CRS, Breast Cancer Screening, Eye exam)  Health Maintenance has been updated.  LINKS immunization registry triggered.  Immunizations were reconciled.
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
## 2020-12-09 ENCOUNTER — PATIENT MESSAGE (OUTPATIENT)
Dept: FAMILY MEDICINE | Facility: CLINIC | Age: 60
End: 2020-12-09

## 2020-12-10 ENCOUNTER — TELEPHONE (OUTPATIENT)
Dept: FAMILY MEDICINE | Facility: CLINIC | Age: 60
End: 2020-12-10

## 2020-12-10 ENCOUNTER — PATIENT MESSAGE (OUTPATIENT)
Dept: FAMILY MEDICINE | Facility: CLINIC | Age: 60
End: 2020-12-10

## 2020-12-10 DIAGNOSIS — J01.00 ACUTE NON-RECURRENT MAXILLARY SINUSITIS: Primary | ICD-10-CM

## 2020-12-10 RX ORDER — PREDNISONE 20 MG/1
TABLET ORAL
Qty: 6 TABLET | Refills: 0 | Status: SHIPPED | OUTPATIENT
Start: 2020-12-10 | End: 2021-03-17

## 2020-12-10 RX ORDER — AMOXICILLIN AND CLAVULANATE POTASSIUM 875; 125 MG/1; MG/1
1 TABLET, FILM COATED ORAL 2 TIMES DAILY
Qty: 20 TABLET | Refills: 0 | Status: SHIPPED | OUTPATIENT
Start: 2020-12-10 | End: 2020-12-20

## 2020-12-10 NOTE — TELEPHONE ENCOUNTER
Pt c/o low grade fever and severe sinus headache.    Denies sore throat, body aches, diarrhea, no loss of sense of taste or smell.    She would like an abx sent to pharmacy for a sinus infection.  Please advise./rpr

## 2020-12-10 NOTE — TELEPHONE ENCOUNTER
----- Message from Mac Ramirez sent at 12/10/2020  8:13 AM CST -----  Type:  Needs Medical Advice    Who Called: Lisa Teague - pt daughter   Symptoms (please be specific):  fever, bones hurting, severe headache   How long has patient had these symptoms:  overnight   Pharmacy name and phone #:    Would the patient rather a call back or a response via [facility]? call  Best Call Back Number: [phone] or [phone] (home)   Additional Information:   Pt daughter states that she is not feeling well and needing to know what to do. Please call back asap!

## 2020-12-14 ENCOUNTER — PES CALL (OUTPATIENT)
Dept: ADMINISTRATIVE | Facility: CLINIC | Age: 60
End: 2020-12-14

## 2020-12-16 ENCOUNTER — PATIENT MESSAGE (OUTPATIENT)
Dept: FAMILY MEDICINE | Facility: CLINIC | Age: 60
End: 2020-12-16

## 2020-12-16 RX ORDER — BENZONATATE 100 MG/1
100 CAPSULE ORAL 3 TIMES DAILY PRN
Qty: 50 CAPSULE | Refills: 1 | Status: SHIPPED | OUTPATIENT
Start: 2020-12-16 | End: 2021-03-31

## 2020-12-31 ENCOUNTER — EXTERNAL CHRONIC CARE MANAGEMENT (OUTPATIENT)
Dept: PRIMARY CARE CLINIC | Facility: CLINIC | Age: 60
End: 2020-12-31
Payer: MEDICARE

## 2020-12-31 PROCEDURE — 99490 CHRNC CARE MGMT STAFF 1ST 20: CPT | Mod: PBBFAC,PO | Performed by: INTERNAL MEDICINE

## 2020-12-31 PROCEDURE — 99490 CHRNC CARE MGMT STAFF 1ST 20: CPT | Mod: S$PBB,,, | Performed by: INTERNAL MEDICINE

## 2020-12-31 PROCEDURE — 99490 PR CHRONIC CARE MGMT, 1ST 20 MIN: ICD-10-PCS | Mod: S$PBB,,, | Performed by: INTERNAL MEDICINE

## 2021-01-04 ENCOUNTER — NURSE TRIAGE (OUTPATIENT)
Dept: ADMINISTRATIVE | Facility: CLINIC | Age: 61
End: 2021-01-04

## 2021-01-04 ENCOUNTER — HOSPITAL ENCOUNTER (INPATIENT)
Facility: HOSPITAL | Age: 61
LOS: 4 days | Discharge: HOME HEALTH | DRG: 177 | End: 2021-01-08
Attending: EMERGENCY MEDICINE | Admitting: FAMILY MEDICINE
Payer: MEDICARE

## 2021-01-04 ENCOUNTER — PATIENT MESSAGE (OUTPATIENT)
Dept: FAMILY MEDICINE | Facility: CLINIC | Age: 61
End: 2021-01-04

## 2021-01-04 ENCOUNTER — PATIENT MESSAGE (OUTPATIENT)
Dept: NEPHROLOGY | Facility: CLINIC | Age: 61
End: 2021-01-04

## 2021-01-04 DIAGNOSIS — B96.20 BACTEREMIA DUE TO ESCHERICHIA COLI: ICD-10-CM

## 2021-01-04 DIAGNOSIS — U07.1 COVID-19 VIRUS DETECTED: ICD-10-CM

## 2021-01-04 DIAGNOSIS — R73.9 HYPERGLYCEMIA: ICD-10-CM

## 2021-01-04 DIAGNOSIS — J18.9 PNEUMONIA OF RIGHT MIDDLE LOBE DUE TO INFECTIOUS ORGANISM: ICD-10-CM

## 2021-01-04 DIAGNOSIS — Z79.4 UNCONTROLLED TYPE 2 DIABETES MELLITUS WITH HYPERGLYCEMIA, WITH LONG-TERM CURRENT USE OF INSULIN: Chronic | ICD-10-CM

## 2021-01-04 DIAGNOSIS — Z94.0 DECEASED-DONOR KIDNEY TRANSPLANT: Chronic | ICD-10-CM

## 2021-01-04 DIAGNOSIS — R78.81 BACTEREMIA: ICD-10-CM

## 2021-01-04 DIAGNOSIS — E78.5 HYPERLIPIDEMIA ASSOCIATED WITH TYPE 2 DIABETES MELLITUS: ICD-10-CM

## 2021-01-04 DIAGNOSIS — R78.81 BACTEREMIA DUE TO ESCHERICHIA COLI: ICD-10-CM

## 2021-01-04 DIAGNOSIS — E11.69 HYPERLIPIDEMIA ASSOCIATED WITH TYPE 2 DIABETES MELLITUS: ICD-10-CM

## 2021-01-04 DIAGNOSIS — D84.9 IMMUNOSUPPRESSED STATUS: ICD-10-CM

## 2021-01-04 DIAGNOSIS — U07.1 COVID-19 VIRUS INFECTION: ICD-10-CM

## 2021-01-04 DIAGNOSIS — U07.1 COVID-19: ICD-10-CM

## 2021-01-04 DIAGNOSIS — E11.649 UNCONTROLLED TYPE 2 DIABETES MELLITUS WITH HYPOGLYCEMIA WITHOUT COMA: ICD-10-CM

## 2021-01-04 DIAGNOSIS — R53.1 WEAKNESS: ICD-10-CM

## 2021-01-04 DIAGNOSIS — E87.1 HYPONATREMIA: ICD-10-CM

## 2021-01-04 DIAGNOSIS — N39.0 URINARY TRACT INFECTION WITHOUT HEMATURIA, SITE UNSPECIFIED: Primary | ICD-10-CM

## 2021-01-04 DIAGNOSIS — E11.65 UNCONTROLLED TYPE 2 DIABETES MELLITUS WITH HYPERGLYCEMIA, WITH LONG-TERM CURRENT USE OF INSULIN: Chronic | ICD-10-CM

## 2021-01-04 LAB
ALBUMIN SERPL BCP-MCNC: 2.2 G/DL (ref 3.5–5.2)
ALP SERPL-CCNC: 113 U/L (ref 55–135)
ALT SERPL W/O P-5'-P-CCNC: 14 U/L (ref 10–44)
ANION GAP SERPL CALC-SCNC: 15 MMOL/L (ref 8–16)
ANION GAP SERPL CALC-SCNC: 16 MMOL/L (ref 8–16)
AST SERPL-CCNC: 10 U/L (ref 10–40)
B-OH-BUTYR BLD STRIP-SCNC: 0.3 MMOL/L (ref 0–0.5)
BACTERIA #/AREA URNS HPF: ABNORMAL /HPF
BASOPHILS # BLD AUTO: 0.09 K/UL (ref 0–0.2)
BASOPHILS NFR BLD: 0.5 % (ref 0–1.9)
BILIRUB SERPL-MCNC: 0.9 MG/DL (ref 0.1–1)
BILIRUB UR QL STRIP: NEGATIVE
BUN SERPL-MCNC: 49 MG/DL (ref 8–23)
BUN SERPL-MCNC: 51 MG/DL (ref 8–23)
CALCIUM SERPL-MCNC: 7.9 MG/DL (ref 8.7–10.5)
CALCIUM SERPL-MCNC: 8.3 MG/DL (ref 8.7–10.5)
CHLORIDE SERPL-SCNC: 85 MMOL/L (ref 95–110)
CHLORIDE SERPL-SCNC: 90 MMOL/L (ref 95–110)
CK SERPL-CCNC: 73 U/L (ref 20–180)
CLARITY UR: CLEAR
CO2 SERPL-SCNC: 21 MMOL/L (ref 23–29)
CO2 SERPL-SCNC: 21 MMOL/L (ref 23–29)
COLOR UR: YELLOW
CREAT SERPL-MCNC: 3.2 MG/DL (ref 0.5–1.4)
CREAT SERPL-MCNC: 3.3 MG/DL (ref 0.5–1.4)
DIFFERENTIAL METHOD: ABNORMAL
EOSINOPHIL # BLD AUTO: 0 K/UL (ref 0–0.5)
EOSINOPHIL NFR BLD: 0.1 % (ref 0–8)
ERYTHROCYTE [DISTWIDTH] IN BLOOD BY AUTOMATED COUNT: 14.7 % (ref 11.5–14.5)
EST. GFR  (AFRICAN AMERICAN): 17 ML/MIN/1.73 M^2
EST. GFR  (AFRICAN AMERICAN): 17 ML/MIN/1.73 M^2
EST. GFR  (NON AFRICAN AMERICAN): 14 ML/MIN/1.73 M^2
EST. GFR  (NON AFRICAN AMERICAN): 15 ML/MIN/1.73 M^2
GLUCOSE SERPL-MCNC: 780 MG/DL (ref 70–110)
GLUCOSE SERPL-MCNC: 963 MG/DL (ref 70–110)
GLUCOSE UR QL STRIP: ABNORMAL
HCT VFR BLD AUTO: 32.9 % (ref 37–48.5)
HCV AB SERPL QL IA: NEGATIVE
HGB BLD-MCNC: 9.9 G/DL (ref 12–16)
HGB UR QL STRIP: ABNORMAL
HIV 1+2 AB+HIV1 P24 AG SERPL QL IA: NEGATIVE
HYALINE CASTS #/AREA URNS LPF: 0 /LPF
IMM GRANULOCYTES # BLD AUTO: 0.35 K/UL (ref 0–0.04)
IMM GRANULOCYTES NFR BLD AUTO: 2 % (ref 0–0.5)
INR PPP: 1.1 (ref 0.8–1.2)
KETONES UR QL STRIP: NEGATIVE
LACTATE SERPL-SCNC: 1.1 MMOL/L (ref 0.5–2.2)
LEUKOCYTE ESTERASE UR QL STRIP: NEGATIVE
LYMPHOCYTES # BLD AUTO: 1 K/UL (ref 1–4.8)
LYMPHOCYTES NFR BLD: 5.5 % (ref 18–48)
MCH RBC QN AUTO: 25.4 PG (ref 27–31)
MCHC RBC AUTO-ENTMCNC: 30.1 G/DL (ref 32–36)
MCV RBC AUTO: 85 FL (ref 82–98)
MICROSCOPIC COMMENT: ABNORMAL
MONOCYTES # BLD AUTO: 0.9 K/UL (ref 0.3–1)
MONOCYTES NFR BLD: 5.1 % (ref 4–15)
NEUTROPHILS # BLD AUTO: 14.9 K/UL (ref 1.8–7.7)
NEUTROPHILS NFR BLD: 86.8 % (ref 38–73)
NITRITE UR QL STRIP: NEGATIVE
NRBC BLD-RTO: 0 /100 WBC
PH UR STRIP: 6 [PH] (ref 5–8)
PLATELET # BLD AUTO: 163 K/UL (ref 150–350)
PMV BLD AUTO: 13 FL (ref 9.2–12.9)
POCT GLUCOSE: >500 MG/DL (ref 70–110)
POTASSIUM SERPL-SCNC: 3.9 MMOL/L (ref 3.5–5.1)
POTASSIUM SERPL-SCNC: 4.8 MMOL/L (ref 3.5–5.1)
PROCALCITONIN SERPL IA-MCNC: 60.04 NG/ML
PROT SERPL-MCNC: 6.6 G/DL (ref 6–8.4)
PROT UR QL STRIP: ABNORMAL
PROTHROMBIN TIME: 11.8 SEC (ref 9–12.5)
RBC # BLD AUTO: 3.89 M/UL (ref 4–5.4)
RBC #/AREA URNS HPF: 3 /HPF (ref 0–4)
SARS-COV-2 RDRP RESP QL NAA+PROBE: POSITIVE
SODIUM SERPL-SCNC: 122 MMOL/L (ref 136–145)
SODIUM SERPL-SCNC: 126 MMOL/L (ref 136–145)
SP GR UR STRIP: <=1.005 (ref 1–1.03)
TROPONIN I SERPL DL<=0.01 NG/ML-MCNC: 0.02 NG/ML (ref 0–0.03)
URN SPEC COLLECT METH UR: ABNORMAL
UROBILINOGEN UR STRIP-ACNC: NEGATIVE EU/DL
WBC # BLD AUTO: 17.18 K/UL (ref 3.9–12.7)
WBC #/AREA URNS HPF: 20 /HPF (ref 0–5)
YEAST URNS QL MICRO: ABNORMAL

## 2021-01-04 PROCEDURE — 80048 BASIC METABOLIC PNL TOTAL CA: CPT

## 2021-01-04 PROCEDURE — 85025 COMPLETE CBC W/AUTO DIFF WBC: CPT

## 2021-01-04 PROCEDURE — 87186 SC STD MICRODIL/AGAR DIL: CPT

## 2021-01-04 PROCEDURE — 63600175 PHARM REV CODE 636 W HCPCS: Performed by: INTERNAL MEDICINE

## 2021-01-04 PROCEDURE — 99291 CRITICAL CARE FIRST HOUR: CPT | Mod: 25

## 2021-01-04 PROCEDURE — 83036 HEMOGLOBIN GLYCOSYLATED A1C: CPT

## 2021-01-04 PROCEDURE — 93005 ELECTROCARDIOGRAM TRACING: CPT

## 2021-01-04 PROCEDURE — 11000001 HC ACUTE MED/SURG PRIVATE ROOM

## 2021-01-04 PROCEDURE — 83605 ASSAY OF LACTIC ACID: CPT

## 2021-01-04 PROCEDURE — 86703 HIV-1/HIV-2 1 RESULT ANTBDY: CPT

## 2021-01-04 PROCEDURE — 96375 TX/PRO/DX INJ NEW DRUG ADDON: CPT

## 2021-01-04 PROCEDURE — 82550 ASSAY OF CK (CPK): CPT

## 2021-01-04 PROCEDURE — 96365 THER/PROPH/DIAG IV INF INIT: CPT

## 2021-01-04 PROCEDURE — 87040 BLOOD CULTURE FOR BACTERIA: CPT | Mod: 59

## 2021-01-04 PROCEDURE — U0002 COVID-19 LAB TEST NON-CDC: HCPCS

## 2021-01-04 PROCEDURE — 87088 URINE BACTERIA CULTURE: CPT

## 2021-01-04 PROCEDURE — 86803 HEPATITIS C AB TEST: CPT

## 2021-01-04 PROCEDURE — 96361 HYDRATE IV INFUSION ADD-ON: CPT

## 2021-01-04 PROCEDURE — 84484 ASSAY OF TROPONIN QUANT: CPT

## 2021-01-04 PROCEDURE — 93010 ELECTROCARDIOGRAM REPORT: CPT | Mod: ,,, | Performed by: INTERNAL MEDICINE

## 2021-01-04 PROCEDURE — 25000003 PHARM REV CODE 250: Performed by: EMERGENCY MEDICINE

## 2021-01-04 PROCEDURE — 80053 COMPREHEN METABOLIC PANEL: CPT

## 2021-01-04 PROCEDURE — 87077 CULTURE AEROBIC IDENTIFY: CPT | Mod: 59

## 2021-01-04 PROCEDURE — 93010 EKG 12-LEAD: ICD-10-PCS | Mod: ,,, | Performed by: INTERNAL MEDICINE

## 2021-01-04 PROCEDURE — 36415 COLL VENOUS BLD VENIPUNCTURE: CPT

## 2021-01-04 PROCEDURE — 81000 URINALYSIS NONAUTO W/SCOPE: CPT

## 2021-01-04 PROCEDURE — 63600175 PHARM REV CODE 636 W HCPCS: Performed by: EMERGENCY MEDICINE

## 2021-01-04 PROCEDURE — 84145 PROCALCITONIN (PCT): CPT

## 2021-01-04 PROCEDURE — 85610 PROTHROMBIN TIME: CPT

## 2021-01-04 PROCEDURE — 87086 URINE CULTURE/COLONY COUNT: CPT

## 2021-01-04 PROCEDURE — 82010 KETONE BODYS QUAN: CPT

## 2021-01-04 RX ORDER — TACROLIMUS 1 MG/1
5 CAPSULE ORAL EVERY MORNING
Status: DISCONTINUED | OUTPATIENT
Start: 2021-01-05 | End: 2021-01-06

## 2021-01-04 RX ORDER — MORPHINE SULFATE 4 MG/ML
4 INJECTION, SOLUTION INTRAMUSCULAR; INTRAVENOUS
Status: COMPLETED | OUTPATIENT
Start: 2021-01-04 | End: 2021-01-04

## 2021-01-04 RX ORDER — ACETAMINOPHEN 325 MG/1
650 TABLET ORAL EVERY 4 HOURS PRN
Status: DISCONTINUED | OUTPATIENT
Start: 2021-01-04 | End: 2021-01-08 | Stop reason: HOSPADM

## 2021-01-04 RX ORDER — DEXTROSE MONOHYDRATE AND SODIUM CHLORIDE 5; .45 G/100ML; G/100ML
125 INJECTION, SOLUTION INTRAVENOUS CONTINUOUS
Status: DISCONTINUED | OUTPATIENT
Start: 2021-01-04 | End: 2021-01-05

## 2021-01-04 RX ORDER — LEVOTHYROXINE SODIUM 150 UG/1
150 TABLET ORAL
Status: DISCONTINUED | OUTPATIENT
Start: 2021-01-05 | End: 2021-01-08 | Stop reason: HOSPADM

## 2021-01-04 RX ORDER — SODIUM CHLORIDE 0.9 % (FLUSH) 0.9 %
10 SYRINGE (ML) INJECTION
Status: DISCONTINUED | OUTPATIENT
Start: 2021-01-04 | End: 2021-01-08 | Stop reason: HOSPADM

## 2021-01-04 RX ORDER — DEXAMETHASONE SODIUM PHOSPHATE 4 MG/ML
INJECTION, SOLUTION INTRA-ARTICULAR; INTRALESIONAL; INTRAMUSCULAR; INTRAVENOUS; SOFT TISSUE
Status: DISPENSED
Start: 2021-01-04 | End: 2021-01-05

## 2021-01-04 RX ORDER — ONDANSETRON 2 MG/ML
4 INJECTION INTRAMUSCULAR; INTRAVENOUS EVERY 6 HOURS PRN
Status: DISCONTINUED | OUTPATIENT
Start: 2021-01-04 | End: 2021-01-08 | Stop reason: HOSPADM

## 2021-01-04 RX ORDER — ACETAMINOPHEN 500 MG
1000 TABLET ORAL
Status: COMPLETED | OUTPATIENT
Start: 2021-01-04 | End: 2021-01-04

## 2021-01-04 RX ORDER — ONDANSETRON 2 MG/ML
4 INJECTION INTRAMUSCULAR; INTRAVENOUS
Status: COMPLETED | OUTPATIENT
Start: 2021-01-04 | End: 2021-01-04

## 2021-01-04 RX ORDER — TACROLIMUS 1 MG/1
4 CAPSULE ORAL EVERY EVENING
Status: DISCONTINUED | OUTPATIENT
Start: 2021-01-05 | End: 2021-01-06

## 2021-01-04 RX ORDER — MYCOPHENOLATE MOFETIL 250 MG/1
500 CAPSULE ORAL 2 TIMES DAILY
Status: DISCONTINUED | OUTPATIENT
Start: 2021-01-04 | End: 2021-01-05

## 2021-01-04 RX ORDER — ALBUTEROL SULFATE 90 UG/1
2 AEROSOL, METERED RESPIRATORY (INHALATION) EVERY 6 HOURS PRN
Status: DISCONTINUED | OUTPATIENT
Start: 2021-01-04 | End: 2021-01-08 | Stop reason: HOSPADM

## 2021-01-04 RX ORDER — DEXAMETHASONE SODIUM PHOSPHATE 4 MG/ML
8 INJECTION, SOLUTION INTRA-ARTICULAR; INTRALESIONAL; INTRAMUSCULAR; INTRAVENOUS; SOFT TISSUE
Status: COMPLETED | OUTPATIENT
Start: 2021-01-04 | End: 2021-01-04

## 2021-01-04 RX ORDER — OXYCODONE AND ACETAMINOPHEN 10; 325 MG/1; MG/1
1 TABLET ORAL EVERY 4 HOURS PRN
Status: DISCONTINUED | OUTPATIENT
Start: 2021-01-04 | End: 2021-01-08 | Stop reason: HOSPADM

## 2021-01-04 RX ORDER — NIFEDIPINE 30 MG/1
30 TABLET, EXTENDED RELEASE ORAL DAILY
Status: DISCONTINUED | OUTPATIENT
Start: 2021-01-05 | End: 2021-01-08 | Stop reason: HOSPADM

## 2021-01-04 RX ORDER — TIZANIDINE 4 MG/1
4 TABLET ORAL 2 TIMES DAILY PRN
Status: DISCONTINUED | OUTPATIENT
Start: 2021-01-04 | End: 2021-01-08 | Stop reason: HOSPADM

## 2021-01-04 RX ORDER — PREDNISONE 10 MG/1
10 TABLET ORAL DAILY
Status: DISCONTINUED | OUTPATIENT
Start: 2021-01-05 | End: 2021-01-08 | Stop reason: HOSPADM

## 2021-01-04 RX ORDER — GABAPENTIN 300 MG/1
300 CAPSULE ORAL NIGHTLY
Status: DISCONTINUED | OUTPATIENT
Start: 2021-01-04 | End: 2021-01-08 | Stop reason: HOSPADM

## 2021-01-04 RX ORDER — SODIUM CHLORIDE 9 MG/ML
125 INJECTION, SOLUTION INTRAVENOUS CONTINUOUS
Status: DISCONTINUED | OUTPATIENT
Start: 2021-01-04 | End: 2021-01-06

## 2021-01-04 RX ADMIN — DEXAMETHASONE SODIUM PHOSPHATE 8 MG: 4 INJECTION INTRA-ARTICULAR; INTRALESIONAL; INTRAMUSCULAR; INTRAVENOUS; SOFT TISSUE at 09:01

## 2021-01-04 RX ADMIN — ACETAMINOPHEN 1000 MG: 500 TABLET ORAL at 05:01

## 2021-01-04 RX ADMIN — SODIUM CHLORIDE 1000 ML: 0.9 INJECTION, SOLUTION INTRAVENOUS at 10:01

## 2021-01-04 RX ADMIN — CEFTRIAXONE 1 G: 1 INJECTION, SOLUTION INTRAVENOUS at 09:01

## 2021-01-04 RX ADMIN — MORPHINE SULFATE 4 MG: 4 INJECTION INTRAVENOUS at 10:01

## 2021-01-04 RX ADMIN — INSULIN HUMAN 12 UNITS: 100 INJECTION, SOLUTION PARENTERAL at 09:01

## 2021-01-04 RX ADMIN — ONDANSETRON 4 MG: 2 INJECTION INTRAMUSCULAR; INTRAVENOUS at 10:01

## 2021-01-04 RX ADMIN — SODIUM CHLORIDE 1000 ML: 0.9 INJECTION, SOLUTION INTRAVENOUS at 08:01

## 2021-01-05 PROBLEM — E87.1 HYPONATREMIA: Status: ACTIVE | Noted: 2021-01-05

## 2021-01-05 PROBLEM — U07.1 COVID-19: Status: ACTIVE | Noted: 2021-01-05

## 2021-01-05 LAB
ANION GAP SERPL CALC-SCNC: 12 MMOL/L (ref 8–16)
ANION GAP SERPL CALC-SCNC: 12 MMOL/L (ref 8–16)
ANION GAP SERPL CALC-SCNC: 13 MMOL/L (ref 8–16)
ANION GAP SERPL CALC-SCNC: 13 MMOL/L (ref 8–16)
ANION GAP SERPL CALC-SCNC: 14 MMOL/L (ref 8–16)
ANION GAP SERPL CALC-SCNC: 17 MMOL/L (ref 8–16)
BASOPHILS # BLD AUTO: 0.09 K/UL (ref 0–0.2)
BASOPHILS NFR BLD: 0.6 % (ref 0–1.9)
BUN SERPL-MCNC: 43 MG/DL (ref 8–23)
BUN SERPL-MCNC: 43 MG/DL (ref 8–23)
BUN SERPL-MCNC: 44 MG/DL (ref 8–23)
BUN SERPL-MCNC: 45 MG/DL (ref 8–23)
BUN SERPL-MCNC: 45 MG/DL (ref 8–23)
BUN SERPL-MCNC: 46 MG/DL (ref 8–23)
CALCIUM SERPL-MCNC: 7.3 MG/DL (ref 8.7–10.5)
CALCIUM SERPL-MCNC: 7.6 MG/DL (ref 8.7–10.5)
CALCIUM SERPL-MCNC: 7.8 MG/DL (ref 8.7–10.5)
CALCIUM SERPL-MCNC: 7.8 MG/DL (ref 8.7–10.5)
CALCIUM SERPL-MCNC: 8 MG/DL (ref 8.7–10.5)
CALCIUM SERPL-MCNC: 8.1 MG/DL (ref 8.7–10.5)
CHLORIDE SERPL-SCNC: 100 MMOL/L (ref 95–110)
CHLORIDE SERPL-SCNC: 101 MMOL/L (ref 95–110)
CHLORIDE SERPL-SCNC: 101 MMOL/L (ref 95–110)
CHLORIDE SERPL-SCNC: 95 MMOL/L (ref 95–110)
CHLORIDE SERPL-SCNC: 99 MMOL/L (ref 95–110)
CHLORIDE SERPL-SCNC: 99 MMOL/L (ref 95–110)
CO2 SERPL-SCNC: 18 MMOL/L (ref 23–29)
CO2 SERPL-SCNC: 18 MMOL/L (ref 23–29)
CO2 SERPL-SCNC: 20 MMOL/L (ref 23–29)
CO2 SERPL-SCNC: 21 MMOL/L (ref 23–29)
CO2 SERPL-SCNC: 21 MMOL/L (ref 23–29)
CO2 SERPL-SCNC: 23 MMOL/L (ref 23–29)
CREAT SERPL-MCNC: 2.1 MG/DL (ref 0.5–1.4)
CREAT SERPL-MCNC: 2.2 MG/DL (ref 0.5–1.4)
CREAT SERPL-MCNC: 2.2 MG/DL (ref 0.5–1.4)
CREAT SERPL-MCNC: 2.3 MG/DL (ref 0.5–1.4)
CREAT SERPL-MCNC: 2.5 MG/DL (ref 0.5–1.4)
CREAT SERPL-MCNC: 2.8 MG/DL (ref 0.5–1.4)
DIFFERENTIAL METHOD: ABNORMAL
EOSINOPHIL # BLD AUTO: 0 K/UL (ref 0–0.5)
EOSINOPHIL NFR BLD: 0.1 % (ref 0–8)
ERYTHROCYTE [DISTWIDTH] IN BLOOD BY AUTOMATED COUNT: 14.1 % (ref 11.5–14.5)
EST. GFR  (AFRICAN AMERICAN): 20 ML/MIN/1.73 M^2
EST. GFR  (AFRICAN AMERICAN): 23 ML/MIN/1.73 M^2
EST. GFR  (AFRICAN AMERICAN): 26 ML/MIN/1.73 M^2
EST. GFR  (AFRICAN AMERICAN): 27 ML/MIN/1.73 M^2
EST. GFR  (AFRICAN AMERICAN): 27 ML/MIN/1.73 M^2
EST. GFR  (AFRICAN AMERICAN): 29 ML/MIN/1.73 M^2
EST. GFR  (NON AFRICAN AMERICAN): 18 ML/MIN/1.73 M^2
EST. GFR  (NON AFRICAN AMERICAN): 20 ML/MIN/1.73 M^2
EST. GFR  (NON AFRICAN AMERICAN): 22 ML/MIN/1.73 M^2
EST. GFR  (NON AFRICAN AMERICAN): 23 ML/MIN/1.73 M^2
EST. GFR  (NON AFRICAN AMERICAN): 23 ML/MIN/1.73 M^2
EST. GFR  (NON AFRICAN AMERICAN): 25 ML/MIN/1.73 M^2
ESTIMATED AVG GLUCOSE: 283 MG/DL (ref 68–131)
GLUCOSE SERPL-MCNC: 193 MG/DL (ref 70–110)
GLUCOSE SERPL-MCNC: 264 MG/DL (ref 70–110)
GLUCOSE SERPL-MCNC: 274 MG/DL (ref 70–110)
GLUCOSE SERPL-MCNC: 305 MG/DL (ref 70–110)
GLUCOSE SERPL-MCNC: 525 MG/DL (ref 70–110)
GLUCOSE SERPL-MCNC: 755 MG/DL (ref 70–110)
HBA1C MFR BLD HPLC: 11.5 % (ref 4–5.6)
HCT VFR BLD AUTO: 30.8 % (ref 37–48.5)
HGB BLD-MCNC: 9.6 G/DL (ref 12–16)
IMM GRANULOCYTES # BLD AUTO: 0.25 K/UL (ref 0–0.04)
IMM GRANULOCYTES NFR BLD AUTO: 1.5 % (ref 0–0.5)
LYMPHOCYTES # BLD AUTO: 1.4 K/UL (ref 1–4.8)
LYMPHOCYTES NFR BLD: 8.5 % (ref 18–48)
MCH RBC QN AUTO: 25.5 PG (ref 27–31)
MCHC RBC AUTO-ENTMCNC: 31.2 G/DL (ref 32–36)
MCV RBC AUTO: 82 FL (ref 82–98)
MONOCYTES # BLD AUTO: 0.2 K/UL (ref 0.3–1)
MONOCYTES NFR BLD: 1.2 % (ref 4–15)
NEUTROPHILS # BLD AUTO: 14.2 K/UL (ref 1.8–7.7)
NEUTROPHILS NFR BLD: 88.1 % (ref 38–73)
NRBC BLD-RTO: 0 /100 WBC
PHOSPHATE SERPL-MCNC: 1.8 MG/DL (ref 2.7–4.5)
PLATELET # BLD AUTO: 165 K/UL (ref 150–350)
PMV BLD AUTO: 12.8 FL (ref 9.2–12.9)
POCT GLUCOSE: 189 MG/DL (ref 70–110)
POCT GLUCOSE: 203 MG/DL (ref 70–110)
POCT GLUCOSE: 211 MG/DL (ref 70–110)
POCT GLUCOSE: 231 MG/DL (ref 70–110)
POCT GLUCOSE: 232 MG/DL (ref 70–110)
POCT GLUCOSE: 263 MG/DL (ref 70–110)
POCT GLUCOSE: 269 MG/DL (ref 70–110)
POCT GLUCOSE: 273 MG/DL (ref 70–110)
POCT GLUCOSE: 286 MG/DL (ref 70–110)
POCT GLUCOSE: 302 MG/DL (ref 70–110)
POCT GLUCOSE: 302 MG/DL (ref 70–110)
POCT GLUCOSE: 321 MG/DL (ref 70–110)
POCT GLUCOSE: 323 MG/DL (ref 70–110)
POCT GLUCOSE: 323 MG/DL (ref 70–110)
POCT GLUCOSE: 337 MG/DL (ref 70–110)
POCT GLUCOSE: 375 MG/DL (ref 70–110)
POCT GLUCOSE: 464 MG/DL (ref 70–110)
POCT GLUCOSE: >500 MG/DL (ref 70–110)
POTASSIUM SERPL-SCNC: 3.4 MMOL/L (ref 3.5–5.1)
POTASSIUM SERPL-SCNC: 3.5 MMOL/L (ref 3.5–5.1)
POTASSIUM SERPL-SCNC: 3.7 MMOL/L (ref 3.5–5.1)
POTASSIUM SERPL-SCNC: 3.7 MMOL/L (ref 3.5–5.1)
POTASSIUM SERPL-SCNC: 3.8 MMOL/L (ref 3.5–5.1)
POTASSIUM SERPL-SCNC: 4.2 MMOL/L (ref 3.5–5.1)
RBC # BLD AUTO: 3.76 M/UL (ref 4–5.4)
SODIUM SERPL-SCNC: 129 MMOL/L (ref 136–145)
SODIUM SERPL-SCNC: 132 MMOL/L (ref 136–145)
SODIUM SERPL-SCNC: 133 MMOL/L (ref 136–145)
SODIUM SERPL-SCNC: 134 MMOL/L (ref 136–145)
SODIUM SERPL-SCNC: 134 MMOL/L (ref 136–145)
SODIUM SERPL-SCNC: 135 MMOL/L (ref 136–145)
WBC # BLD AUTO: 16.17 K/UL (ref 3.9–12.7)

## 2021-01-05 PROCEDURE — 36415 COLL VENOUS BLD VENIPUNCTURE: CPT

## 2021-01-05 PROCEDURE — 63600175 PHARM REV CODE 636 W HCPCS: Performed by: INTERNAL MEDICINE

## 2021-01-05 PROCEDURE — 84100 ASSAY OF PHOSPHORUS: CPT

## 2021-01-05 PROCEDURE — 25000003 PHARM REV CODE 250: Performed by: INTERNAL MEDICINE

## 2021-01-05 PROCEDURE — 85025 COMPLETE CBC W/AUTO DIFF WBC: CPT

## 2021-01-05 PROCEDURE — 80048 BASIC METABOLIC PNL TOTAL CA: CPT | Mod: 91

## 2021-01-05 PROCEDURE — 99233 PR SUBSEQUENT HOSPITAL CARE,LEVL III: ICD-10-PCS | Mod: CR,,, | Performed by: FAMILY MEDICINE

## 2021-01-05 PROCEDURE — S5010 5% DEXTROSE AND 0.45% SALINE: HCPCS | Performed by: INTERNAL MEDICINE

## 2021-01-05 PROCEDURE — 99233 SBSQ HOSP IP/OBS HIGH 50: CPT | Mod: CR,,, | Performed by: FAMILY MEDICINE

## 2021-01-05 PROCEDURE — 25000003 PHARM REV CODE 250: Performed by: FAMILY MEDICINE

## 2021-01-05 PROCEDURE — 63600175 PHARM REV CODE 636 W HCPCS: Performed by: FAMILY MEDICINE

## 2021-01-05 PROCEDURE — 21400001 HC TELEMETRY ROOM

## 2021-01-05 PROCEDURE — C9399 UNCLASSIFIED DRUGS OR BIOLOG: HCPCS | Performed by: FAMILY MEDICINE

## 2021-01-05 PROCEDURE — 87040 BLOOD CULTURE FOR BACTERIA: CPT

## 2021-01-05 RX ORDER — INSULIN ASPART 100 [IU]/ML
1-10 INJECTION, SOLUTION INTRAVENOUS; SUBCUTANEOUS
Status: DISCONTINUED | OUTPATIENT
Start: 2021-01-05 | End: 2021-01-08 | Stop reason: HOSPADM

## 2021-01-05 RX ORDER — BUDESONIDE 0.5 MG/2ML
0.5 INHALANT ORAL EVERY 12 HOURS
Status: DISCONTINUED | OUTPATIENT
Start: 2021-01-05 | End: 2021-01-05

## 2021-01-05 RX ORDER — INSULIN ASPART 100 [IU]/ML
5 INJECTION, SOLUTION INTRAVENOUS; SUBCUTANEOUS ONCE
Status: COMPLETED | OUTPATIENT
Start: 2021-01-05 | End: 2021-01-05

## 2021-01-05 RX ORDER — BUDESONIDE 0.5 MG/2ML
0.5 INHALANT ORAL EVERY 12 HOURS
Status: DISCONTINUED | OUTPATIENT
Start: 2021-01-05 | End: 2021-01-06

## 2021-01-05 RX ORDER — DOXYCYCLINE HYCLATE 100 MG
100 TABLET ORAL EVERY 12 HOURS
Status: DISCONTINUED | OUTPATIENT
Start: 2021-01-05 | End: 2021-01-07

## 2021-01-05 RX ADMIN — SODIUM CHLORIDE 7 UNITS/HR: 9 INJECTION, SOLUTION INTRAVENOUS at 12:01

## 2021-01-05 RX ADMIN — PIPERACILLIN AND TAZOBACTAM 4.5 G: 4; .5 INJECTION, POWDER, LYOPHILIZED, FOR SOLUTION INTRAVENOUS; PARENTERAL at 05:01

## 2021-01-05 RX ADMIN — TACROLIMUS 5 MG: 1 CAPSULE ORAL at 07:01

## 2021-01-05 RX ADMIN — ONDANSETRON 4 MG: 2 INJECTION INTRAMUSCULAR; INTRAVENOUS at 12:01

## 2021-01-05 RX ADMIN — TACROLIMUS 4 MG: 1 CAPSULE ORAL at 05:01

## 2021-01-05 RX ADMIN — SODIUM CHLORIDE 125 ML/HR: 0.9 INJECTION, SOLUTION INTRAVENOUS at 12:01

## 2021-01-05 RX ADMIN — GABAPENTIN 300 MG: 300 CAPSULE ORAL at 09:01

## 2021-01-05 RX ADMIN — INSULIN ASPART 5 UNITS: 100 INJECTION, SOLUTION INTRAVENOUS; SUBCUTANEOUS at 09:01

## 2021-01-05 RX ADMIN — PIPERACILLIN AND TAZOBACTAM 4.5 G: 4; .5 INJECTION, POWDER, LYOPHILIZED, FOR SOLUTION INTRAVENOUS; PARENTERAL at 11:01

## 2021-01-05 RX ADMIN — DOXYCYCLINE HYCLATE 100 MG: 100 TABLET, COATED ORAL at 08:01

## 2021-01-05 RX ADMIN — INSULIN ASPART 6 UNITS: 100 INJECTION, SOLUTION INTRAVENOUS; SUBCUTANEOUS at 11:01

## 2021-01-05 RX ADMIN — NIFEDIPINE 30 MG: 30 TABLET, FILM COATED, EXTENDED RELEASE ORAL at 08:01

## 2021-01-05 RX ADMIN — PREDNISONE 10 MG: 10 TABLET ORAL at 08:01

## 2021-01-05 RX ADMIN — LEVOTHYROXINE SODIUM 150 MCG: 150 TABLET ORAL at 05:01

## 2021-01-05 RX ADMIN — SODIUM CHLORIDE 21 UNITS/HR: 9 INJECTION, SOLUTION INTRAVENOUS at 11:01

## 2021-01-05 RX ADMIN — OXYCODONE AND ACETAMINOPHEN 1 TABLET: 325; 10 TABLET ORAL at 05:01

## 2021-01-05 RX ADMIN — INSULIN DETEMIR 20 UNITS: 100 INJECTION, SOLUTION SUBCUTANEOUS at 09:01

## 2021-01-05 RX ADMIN — PIPERACILLIN AND TAZOBACTAM 4.5 G: 4; .5 INJECTION, POWDER, LYOPHILIZED, FOR SOLUTION INTRAVENOUS; PARENTERAL at 07:01

## 2021-01-05 RX ADMIN — DOXYCYCLINE HYCLATE 100 MG: 100 TABLET, COATED ORAL at 09:01

## 2021-01-05 RX ADMIN — GABAPENTIN 300 MG: 300 CAPSULE ORAL at 12:01

## 2021-01-05 RX ADMIN — DEXTROSE AND SODIUM CHLORIDE 125 ML/HR: 5; .45 INJECTION, SOLUTION INTRAVENOUS at 01:01

## 2021-01-05 RX ADMIN — REMDESIVIR 200 MG: 100 INJECTION, POWDER, LYOPHILIZED, FOR SOLUTION INTRAVENOUS at 04:01

## 2021-01-05 RX ADMIN — MYCOPHENOLATE MOFETIL 500 MG: 250 CAPSULE ORAL at 12:01

## 2021-01-05 RX ADMIN — SODIUM CHLORIDE 125 ML/HR: 0.9 INJECTION, SOLUTION INTRAVENOUS at 11:01

## 2021-01-06 ENCOUNTER — PATIENT MESSAGE (OUTPATIENT)
Dept: NEPHROLOGY | Facility: CLINIC | Age: 61
End: 2021-01-06

## 2021-01-06 PROBLEM — E83.51 HYPOCALCEMIA: Status: ACTIVE | Noted: 2021-01-06

## 2021-01-06 PROBLEM — R78.81 BACTEREMIA: Status: ACTIVE | Noted: 2021-01-06

## 2021-01-06 PROBLEM — B96.4 URINARY TRACT INFECTION DUE TO PROTEUS: Status: ACTIVE | Noted: 2021-01-04

## 2021-01-06 PROBLEM — E83.39 HYPOPHOSPHATEMIA: Status: ACTIVE | Noted: 2021-01-06

## 2021-01-06 LAB
ALBUMIN SERPL BCP-MCNC: 1.8 G/DL (ref 3.5–5.2)
ALP SERPL-CCNC: 109 U/L (ref 55–135)
ALT SERPL W/O P-5'-P-CCNC: 10 U/L (ref 10–44)
ANION GAP SERPL CALC-SCNC: 11 MMOL/L (ref 8–16)
ANION GAP SERPL CALC-SCNC: 12 MMOL/L (ref 8–16)
ANION GAP SERPL CALC-SCNC: 13 MMOL/L (ref 8–16)
ANION GAP SERPL CALC-SCNC: 13 MMOL/L (ref 8–16)
ANION GAP SERPL CALC-SCNC: 14 MMOL/L (ref 8–16)
AST SERPL-CCNC: 12 U/L (ref 10–40)
BASOPHILS # BLD AUTO: 0.1 K/UL (ref 0–0.2)
BASOPHILS NFR BLD: 0.6 % (ref 0–1.9)
BILIRUB SERPL-MCNC: 0.3 MG/DL (ref 0.1–1)
BUN SERPL-MCNC: 39 MG/DL (ref 8–23)
BUN SERPL-MCNC: 40 MG/DL (ref 8–23)
BUN SERPL-MCNC: 41 MG/DL (ref 8–23)
BUN SERPL-MCNC: 46 MG/DL (ref 8–23)
BUN SERPL-MCNC: 46 MG/DL (ref 8–23)
CALCIUM SERPL-MCNC: 7.3 MG/DL (ref 8.7–10.5)
CALCIUM SERPL-MCNC: 7.7 MG/DL (ref 8.7–10.5)
CALCIUM SERPL-MCNC: 7.9 MG/DL (ref 8.7–10.5)
CHLORIDE SERPL-SCNC: 103 MMOL/L (ref 95–110)
CHLORIDE SERPL-SCNC: 104 MMOL/L (ref 95–110)
CHLORIDE SERPL-SCNC: 105 MMOL/L (ref 95–110)
CHLORIDE SERPL-SCNC: 99 MMOL/L (ref 95–110)
CHLORIDE SERPL-SCNC: 99 MMOL/L (ref 95–110)
CO2 SERPL-SCNC: 16 MMOL/L (ref 23–29)
CO2 SERPL-SCNC: 19 MMOL/L (ref 23–29)
CO2 SERPL-SCNC: 21 MMOL/L (ref 23–29)
CREAT SERPL-MCNC: 1.6 MG/DL (ref 0.5–1.4)
CREAT SERPL-MCNC: 1.7 MG/DL (ref 0.5–1.4)
CREAT SERPL-MCNC: 1.8 MG/DL (ref 0.5–1.4)
CREAT SERPL-MCNC: 2.1 MG/DL (ref 0.5–1.4)
CREAT SERPL-MCNC: 2.1 MG/DL (ref 0.5–1.4)
DIFFERENTIAL METHOD: ABNORMAL
EOSINOPHIL # BLD AUTO: 0 K/UL (ref 0–0.5)
EOSINOPHIL NFR BLD: 0.2 % (ref 0–8)
ERYTHROCYTE [DISTWIDTH] IN BLOOD BY AUTOMATED COUNT: 14.2 % (ref 11.5–14.5)
EST. GFR  (AFRICAN AMERICAN): 29 ML/MIN/1.73 M^2
EST. GFR  (AFRICAN AMERICAN): 29 ML/MIN/1.73 M^2
EST. GFR  (AFRICAN AMERICAN): 35 ML/MIN/1.73 M^2
EST. GFR  (AFRICAN AMERICAN): 37 ML/MIN/1.73 M^2
EST. GFR  (AFRICAN AMERICAN): 40 ML/MIN/1.73 M^2
EST. GFR  (NON AFRICAN AMERICAN): 25 ML/MIN/1.73 M^2
EST. GFR  (NON AFRICAN AMERICAN): 25 ML/MIN/1.73 M^2
EST. GFR  (NON AFRICAN AMERICAN): 30 ML/MIN/1.73 M^2
EST. GFR  (NON AFRICAN AMERICAN): 32 ML/MIN/1.73 M^2
EST. GFR  (NON AFRICAN AMERICAN): 35 ML/MIN/1.73 M^2
GLUCOSE SERPL-MCNC: 248 MG/DL (ref 70–110)
GLUCOSE SERPL-MCNC: 256 MG/DL (ref 70–110)
GLUCOSE SERPL-MCNC: 262 MG/DL (ref 70–110)
GLUCOSE SERPL-MCNC: 331 MG/DL (ref 70–110)
GLUCOSE SERPL-MCNC: 331 MG/DL (ref 70–110)
HCT VFR BLD AUTO: 31.3 % (ref 37–48.5)
HGB BLD-MCNC: 9.8 G/DL (ref 12–16)
IMM GRANULOCYTES # BLD AUTO: 0.45 K/UL (ref 0–0.04)
IMM GRANULOCYTES NFR BLD AUTO: 2.5 % (ref 0–0.5)
LYMPHOCYTES # BLD AUTO: 1.2 K/UL (ref 1–4.8)
LYMPHOCYTES NFR BLD: 6.8 % (ref 18–48)
MCH RBC QN AUTO: 25.5 PG (ref 27–31)
MCHC RBC AUTO-ENTMCNC: 31.3 G/DL (ref 32–36)
MCV RBC AUTO: 82 FL (ref 82–98)
MONOCYTES # BLD AUTO: 0.6 K/UL (ref 0.3–1)
MONOCYTES NFR BLD: 3.1 % (ref 4–15)
NEUTROPHILS # BLD AUTO: 15.7 K/UL (ref 1.8–7.7)
NEUTROPHILS NFR BLD: 86.8 % (ref 38–73)
NRBC BLD-RTO: 0 /100 WBC
PHOSPHATE SERPL-MCNC: 1.6 MG/DL (ref 2.7–4.5)
PLATELET # BLD AUTO: 189 K/UL (ref 150–350)
PMV BLD AUTO: 13.3 FL (ref 9.2–12.9)
POCT GLUCOSE: 256 MG/DL (ref 70–110)
POCT GLUCOSE: 263 MG/DL (ref 70–110)
POCT GLUCOSE: 288 MG/DL (ref 70–110)
POCT GLUCOSE: 293 MG/DL (ref 70–110)
POCT GLUCOSE: 303 MG/DL (ref 70–110)
POCT GLUCOSE: 324 MG/DL (ref 70–110)
POTASSIUM SERPL-SCNC: 3.4 MMOL/L (ref 3.5–5.1)
POTASSIUM SERPL-SCNC: 3.6 MMOL/L (ref 3.5–5.1)
POTASSIUM SERPL-SCNC: 3.7 MMOL/L (ref 3.5–5.1)
POTASSIUM SERPL-SCNC: 3.7 MMOL/L (ref 3.5–5.1)
POTASSIUM SERPL-SCNC: 4 MMOL/L (ref 3.5–5.1)
PROCALCITONIN SERPL IA-MCNC: 23.1 NG/ML
PROT SERPL-MCNC: 6.1 G/DL (ref 6–8.4)
RBC # BLD AUTO: 3.84 M/UL (ref 4–5.4)
SODIUM SERPL-SCNC: 133 MMOL/L (ref 136–145)
SODIUM SERPL-SCNC: 133 MMOL/L (ref 136–145)
SODIUM SERPL-SCNC: 134 MMOL/L (ref 136–145)
SODIUM SERPL-SCNC: 135 MMOL/L (ref 136–145)
SODIUM SERPL-SCNC: 136 MMOL/L (ref 136–145)
TACROLIMUS BLD-MCNC: 23 NG/ML (ref 5–15)
WBC # BLD AUTO: 18.03 K/UL (ref 3.9–12.7)

## 2021-01-06 PROCEDURE — 84100 ASSAY OF PHOSPHORUS: CPT

## 2021-01-06 PROCEDURE — 63600175 PHARM REV CODE 636 W HCPCS: Performed by: INTERNAL MEDICINE

## 2021-01-06 PROCEDURE — 80053 COMPREHEN METABOLIC PANEL: CPT

## 2021-01-06 PROCEDURE — 63600175 PHARM REV CODE 636 W HCPCS: Performed by: FAMILY MEDICINE

## 2021-01-06 PROCEDURE — 25000003 PHARM REV CODE 250: Performed by: INTERNAL MEDICINE

## 2021-01-06 PROCEDURE — 25000003 PHARM REV CODE 250: Performed by: NURSE PRACTITIONER

## 2021-01-06 PROCEDURE — 80197 ASSAY OF TACROLIMUS: CPT

## 2021-01-06 PROCEDURE — 36415 COLL VENOUS BLD VENIPUNCTURE: CPT

## 2021-01-06 PROCEDURE — 84145 PROCALCITONIN (PCT): CPT

## 2021-01-06 PROCEDURE — 99233 SBSQ HOSP IP/OBS HIGH 50: CPT | Mod: ,,, | Performed by: FAMILY MEDICINE

## 2021-01-06 PROCEDURE — 85025 COMPLETE CBC W/AUTO DIFF WBC: CPT

## 2021-01-06 PROCEDURE — 25000003 PHARM REV CODE 250: Performed by: FAMILY MEDICINE

## 2021-01-06 PROCEDURE — 21400001 HC TELEMETRY ROOM

## 2021-01-06 PROCEDURE — 99233 PR SUBSEQUENT HOSPITAL CARE,LEVL III: ICD-10-PCS | Mod: ,,, | Performed by: FAMILY MEDICINE

## 2021-01-06 PROCEDURE — 80048 BASIC METABOLIC PNL TOTAL CA: CPT

## 2021-01-06 PROCEDURE — 96372 THER/PROPH/DIAG INJ SC/IM: CPT

## 2021-01-06 RX ORDER — SODIUM CHLORIDE 9 MG/ML
INJECTION, SOLUTION INTRAVENOUS CONTINUOUS
Status: DISCONTINUED | OUTPATIENT
Start: 2021-01-06 | End: 2021-01-07

## 2021-01-06 RX ORDER — INSULIN ASPART 100 [IU]/ML
6 INJECTION, SOLUTION INTRAVENOUS; SUBCUTANEOUS
Status: DISCONTINUED | OUTPATIENT
Start: 2021-01-07 | End: 2021-01-08 | Stop reason: HOSPADM

## 2021-01-06 RX ORDER — CALCIUM CARBONATE 200(500)MG
1000 TABLET,CHEWABLE ORAL EVERY 6 HOURS PRN
Status: DISCONTINUED | OUTPATIENT
Start: 2021-01-06 | End: 2021-01-08 | Stop reason: HOSPADM

## 2021-01-06 RX ORDER — TACROLIMUS 1 MG/1
5 CAPSULE ORAL EVERY MORNING
Status: DISCONTINUED | OUTPATIENT
Start: 2021-01-07 | End: 2021-01-06

## 2021-01-06 RX ORDER — TACROLIMUS 1 MG/1
4 CAPSULE ORAL EVERY EVENING
Status: DISCONTINUED | OUTPATIENT
Start: 2021-01-06 | End: 2021-01-06

## 2021-01-06 RX ORDER — PANTOPRAZOLE SODIUM 40 MG/1
40 TABLET, DELAYED RELEASE ORAL DAILY
Status: DISCONTINUED | OUTPATIENT
Start: 2021-01-06 | End: 2021-01-08 | Stop reason: HOSPADM

## 2021-01-06 RX ADMIN — TACROLIMUS 4 MG: 1 CAPSULE ORAL at 05:01

## 2021-01-06 RX ADMIN — PIPERACILLIN AND TAZOBACTAM 4.5 G: 4; .5 INJECTION, POWDER, LYOPHILIZED, FOR SOLUTION INTRAVENOUS; PARENTERAL at 03:01

## 2021-01-06 RX ADMIN — DOXYCYCLINE HYCLATE 100 MG: 100 TABLET, COATED ORAL at 08:01

## 2021-01-06 RX ADMIN — REMDESIVIR 100 MG: 100 INJECTION, POWDER, LYOPHILIZED, FOR SOLUTION INTRAVENOUS at 06:01

## 2021-01-06 RX ADMIN — INSULIN ASPART 8 UNITS: 100 INJECTION, SOLUTION INTRAVENOUS; SUBCUTANEOUS at 02:01

## 2021-01-06 RX ADMIN — PIPERACILLIN AND TAZOBACTAM 4.5 G: 4; .5 INJECTION, POWDER, LYOPHILIZED, FOR SOLUTION INTRAVENOUS; PARENTERAL at 08:01

## 2021-01-06 RX ADMIN — SODIUM CHLORIDE 500 ML: 0.9 INJECTION, SOLUTION INTRAVENOUS at 04:01

## 2021-01-06 RX ADMIN — ONDANSETRON 4 MG: 2 INJECTION INTRAMUSCULAR; INTRAVENOUS at 02:01

## 2021-01-06 RX ADMIN — ONDANSETRON 4 MG: 2 INJECTION INTRAMUSCULAR; INTRAVENOUS at 03:01

## 2021-01-06 RX ADMIN — PREDNISONE 10 MG: 10 TABLET ORAL at 08:01

## 2021-01-06 RX ADMIN — LEVOTHYROXINE SODIUM 150 MCG: 150 TABLET ORAL at 05:01

## 2021-01-06 RX ADMIN — DOXYCYCLINE HYCLATE 100 MG: 100 TABLET, COATED ORAL at 09:01

## 2021-01-06 RX ADMIN — PANTOPRAZOLE SODIUM 40 MG: 40 TABLET, DELAYED RELEASE ORAL at 03:01

## 2021-01-06 RX ADMIN — SODIUM CHLORIDE 150 ML/HR: 0.9 INJECTION, SOLUTION INTRAVENOUS at 05:01

## 2021-01-06 RX ADMIN — INSULIN ASPART 6 UNITS: 100 INJECTION, SOLUTION INTRAVENOUS; SUBCUTANEOUS at 05:01

## 2021-01-06 RX ADMIN — TACROLIMUS 5 MG: 1 CAPSULE ORAL at 08:01

## 2021-01-06 RX ADMIN — INSULIN ASPART 6 UNITS: 100 INJECTION, SOLUTION INTRAVENOUS; SUBCUTANEOUS at 10:01

## 2021-01-06 RX ADMIN — INSULIN ASPART 8 UNITS: 100 INJECTION, SOLUTION INTRAVENOUS; SUBCUTANEOUS at 05:01

## 2021-01-06 RX ADMIN — INSULIN ASPART 3 UNITS: 100 INJECTION, SOLUTION INTRAVENOUS; SUBCUTANEOUS at 09:01

## 2021-01-06 RX ADMIN — NIFEDIPINE 30 MG: 30 TABLET, FILM COATED, EXTENDED RELEASE ORAL at 08:01

## 2021-01-07 PROBLEM — B96.20 BACTEREMIA DUE TO ESCHERICHIA COLI: Status: ACTIVE | Noted: 2021-01-06

## 2021-01-07 LAB
ALBUMIN SERPL BCP-MCNC: 1.8 G/DL (ref 3.5–5.2)
ALP SERPL-CCNC: 95 U/L (ref 55–135)
ALT SERPL W/O P-5'-P-CCNC: 10 U/L (ref 10–44)
ANION GAP SERPL CALC-SCNC: 15 MMOL/L (ref 8–16)
AST SERPL-CCNC: 10 U/L (ref 10–40)
BACTERIA BLD CULT: ABNORMAL
BACTERIA UR CULT: ABNORMAL
BACTERIA UR CULT: ABNORMAL
BILIRUB SERPL-MCNC: 0.4 MG/DL (ref 0.1–1)
BUN SERPL-MCNC: 37 MG/DL (ref 8–23)
CALCIUM SERPL-MCNC: 7 MG/DL (ref 8.7–10.5)
CHLORIDE SERPL-SCNC: 106 MMOL/L (ref 95–110)
CO2 SERPL-SCNC: 17 MMOL/L (ref 23–29)
CREAT SERPL-MCNC: 1.6 MG/DL (ref 0.5–1.4)
CRP SERPL-MCNC: 120.8 MG/L (ref 0–8.2)
ERYTHROCYTE [DISTWIDTH] IN BLOOD BY AUTOMATED COUNT: 14.5 % (ref 11.5–14.5)
EST. GFR  (AFRICAN AMERICAN): 40 ML/MIN/1.73 M^2
EST. GFR  (NON AFRICAN AMERICAN): 35 ML/MIN/1.73 M^2
FERRITIN SERPL-MCNC: 3992 NG/ML (ref 20–300)
GLUCOSE SERPL-MCNC: 256 MG/DL (ref 70–110)
HCT VFR BLD AUTO: 30.3 % (ref 37–48.5)
HGB BLD-MCNC: 9.3 G/DL (ref 12–16)
LDH SERPL L TO P-CCNC: 263 U/L (ref 110–260)
MAGNESIUM SERPL-MCNC: 1.2 MG/DL (ref 1.6–2.6)
MCH RBC QN AUTO: 25.5 PG (ref 27–31)
MCHC RBC AUTO-ENTMCNC: 30.7 G/DL (ref 32–36)
MCV RBC AUTO: 83 FL (ref 82–98)
PHOSPHATE SERPL-MCNC: 1.8 MG/DL (ref 2.7–4.5)
PLATELET # BLD AUTO: 218 K/UL (ref 150–350)
PMV BLD AUTO: 12.6 FL (ref 9.2–12.9)
POCT GLUCOSE: 182 MG/DL (ref 70–110)
POCT GLUCOSE: 209 MG/DL (ref 70–110)
POCT GLUCOSE: 223 MG/DL (ref 70–110)
POCT GLUCOSE: 262 MG/DL (ref 70–110)
POTASSIUM SERPL-SCNC: 3.3 MMOL/L (ref 3.5–5.1)
PROT SERPL-MCNC: 5.5 G/DL (ref 6–8.4)
RBC # BLD AUTO: 3.65 M/UL (ref 4–5.4)
SODIUM SERPL-SCNC: 138 MMOL/L (ref 136–145)
TACROLIMUS BLD-MCNC: 11 NG/ML (ref 5–15)
WBC # BLD AUTO: 15.27 K/UL (ref 3.9–12.7)

## 2021-01-07 PROCEDURE — 83735 ASSAY OF MAGNESIUM: CPT

## 2021-01-07 PROCEDURE — 25000003 PHARM REV CODE 250: Performed by: FAMILY MEDICINE

## 2021-01-07 PROCEDURE — 99233 PR SUBSEQUENT HOSPITAL CARE,LEVL III: ICD-10-PCS | Mod: CR,,, | Performed by: FAMILY MEDICINE

## 2021-01-07 PROCEDURE — 25000003 PHARM REV CODE 250: Performed by: NURSE PRACTITIONER

## 2021-01-07 PROCEDURE — 99233 SBSQ HOSP IP/OBS HIGH 50: CPT | Mod: CR,,, | Performed by: FAMILY MEDICINE

## 2021-01-07 PROCEDURE — 83615 LACTATE (LD) (LDH) ENZYME: CPT

## 2021-01-07 PROCEDURE — 97165 OT EVAL LOW COMPLEX 30 MIN: CPT

## 2021-01-07 PROCEDURE — 97161 PT EVAL LOW COMPLEX 20 MIN: CPT

## 2021-01-07 PROCEDURE — 99223 PR INITIAL HOSPITAL CARE,LEVL III: ICD-10-PCS | Mod: CR,,, | Performed by: INTERNAL MEDICINE

## 2021-01-07 PROCEDURE — 99223 1ST HOSP IP/OBS HIGH 75: CPT | Mod: CR,,, | Performed by: INTERNAL MEDICINE

## 2021-01-07 PROCEDURE — 63600175 PHARM REV CODE 636 W HCPCS: Performed by: FAMILY MEDICINE

## 2021-01-07 PROCEDURE — 94761 N-INVAS EAR/PLS OXIMETRY MLT: CPT

## 2021-01-07 PROCEDURE — 85027 COMPLETE CBC AUTOMATED: CPT

## 2021-01-07 PROCEDURE — 84100 ASSAY OF PHOSPHORUS: CPT

## 2021-01-07 PROCEDURE — 82728 ASSAY OF FERRITIN: CPT

## 2021-01-07 PROCEDURE — 25000003 PHARM REV CODE 250: Performed by: INTERNAL MEDICINE

## 2021-01-07 PROCEDURE — 36415 COLL VENOUS BLD VENIPUNCTURE: CPT

## 2021-01-07 PROCEDURE — 99900035 HC TECH TIME PER 15 MIN (STAT)

## 2021-01-07 PROCEDURE — 86140 C-REACTIVE PROTEIN: CPT

## 2021-01-07 PROCEDURE — 21400001 HC TELEMETRY ROOM

## 2021-01-07 PROCEDURE — 63600175 PHARM REV CODE 636 W HCPCS: Performed by: INTERNAL MEDICINE

## 2021-01-07 PROCEDURE — 80053 COMPREHEN METABOLIC PANEL: CPT

## 2021-01-07 PROCEDURE — 27000221 HC OXYGEN, UP TO 24 HOURS

## 2021-01-07 PROCEDURE — 97530 THERAPEUTIC ACTIVITIES: CPT

## 2021-01-07 PROCEDURE — 80197 ASSAY OF TACROLIMUS: CPT

## 2021-01-07 RX ORDER — SODIUM BICARBONATE 650 MG/1
650 TABLET ORAL 3 TIMES DAILY
Status: DISCONTINUED | OUTPATIENT
Start: 2021-01-07 | End: 2021-01-08 | Stop reason: HOSPADM

## 2021-01-07 RX ORDER — LEVOFLOXACIN 750 MG/1
750 TABLET ORAL
Status: DISCONTINUED | OUTPATIENT
Start: 2021-01-07 | End: 2021-01-07

## 2021-01-07 RX ORDER — LEVOFLOXACIN 750 MG/1
750 TABLET ORAL EVERY OTHER DAY
Status: DISCONTINUED | OUTPATIENT
Start: 2021-01-07 | End: 2021-01-08 | Stop reason: HOSPADM

## 2021-01-07 RX ORDER — BENZONATATE 100 MG/1
200 CAPSULE ORAL 3 TIMES DAILY PRN
Status: DISCONTINUED | OUTPATIENT
Start: 2021-01-07 | End: 2021-01-08 | Stop reason: HOSPADM

## 2021-01-07 RX ORDER — MAGNESIUM SULFATE HEPTAHYDRATE 40 MG/ML
2 INJECTION, SOLUTION INTRAVENOUS ONCE
Status: COMPLETED | OUTPATIENT
Start: 2021-01-07 | End: 2021-01-07

## 2021-01-07 RX ORDER — POTASSIUM CHLORIDE 20 MEQ/1
20 TABLET, EXTENDED RELEASE ORAL 2 TIMES DAILY
Status: COMPLETED | OUTPATIENT
Start: 2021-01-07 | End: 2021-01-07

## 2021-01-07 RX ORDER — SODIUM,POTASSIUM PHOSPHATES 280-250MG
1 POWDER IN PACKET (EA) ORAL
Status: DISCONTINUED | OUTPATIENT
Start: 2021-01-07 | End: 2021-01-08 | Stop reason: HOSPADM

## 2021-01-07 RX ORDER — SODIUM,POTASSIUM PHOSPHATES 280-250MG
1 POWDER IN PACKET (EA) ORAL
Status: DISCONTINUED | OUTPATIENT
Start: 2021-01-07 | End: 2021-01-07

## 2021-01-07 RX ORDER — TACROLIMUS 1 MG/1
3 CAPSULE ORAL 2 TIMES DAILY
Status: DISCONTINUED | OUTPATIENT
Start: 2021-01-07 | End: 2021-01-08 | Stop reason: HOSPADM

## 2021-01-07 RX ADMIN — CALCIUM CARBONATE (ANTACID) CHEW TAB 500 MG 1000 MG: 500 CHEW TAB at 01:01

## 2021-01-07 RX ADMIN — PIPERACILLIN AND TAZOBACTAM 4.5 G: 4; .5 INJECTION, POWDER, LYOPHILIZED, FOR SOLUTION INTRAVENOUS; PARENTERAL at 09:01

## 2021-01-07 RX ADMIN — INSULIN ASPART 6 UNITS: 100 INJECTION, SOLUTION INTRAVENOUS; SUBCUTANEOUS at 11:01

## 2021-01-07 RX ADMIN — INSULIN ASPART 3 UNITS: 100 INJECTION, SOLUTION INTRAVENOUS; SUBCUTANEOUS at 08:01

## 2021-01-07 RX ADMIN — POTASSIUM & SODIUM PHOSPHATES POWDER PACK 280-160-250 MG 1 PACKET: 280-160-250 PACK at 09:01

## 2021-01-07 RX ADMIN — NIFEDIPINE 30 MG: 30 TABLET, FILM COATED, EXTENDED RELEASE ORAL at 08:01

## 2021-01-07 RX ADMIN — TACROLIMUS 3 MG: 1 CAPSULE ORAL at 07:01

## 2021-01-07 RX ADMIN — PANTOPRAZOLE SODIUM 40 MG: 40 TABLET, DELAYED RELEASE ORAL at 08:01

## 2021-01-07 RX ADMIN — SODIUM BICARBONATE 650 MG: 650 TABLET ORAL at 09:01

## 2021-01-07 RX ADMIN — POTASSIUM CHLORIDE 20 MEQ: 1500 TABLET, EXTENDED RELEASE ORAL at 09:01

## 2021-01-07 RX ADMIN — INSULIN ASPART 6 UNITS: 100 INJECTION, SOLUTION INTRAVENOUS; SUBCUTANEOUS at 05:01

## 2021-01-07 RX ADMIN — POTASSIUM & SODIUM PHOSPHATES POWDER PACK 280-160-250 MG 1 PACKET: 280-160-250 PACK at 08:01

## 2021-01-07 RX ADMIN — SODIUM CHLORIDE 150 ML/HR: 0.9 INJECTION, SOLUTION INTRAVENOUS at 02:01

## 2021-01-07 RX ADMIN — ONDANSETRON 4 MG: 2 INJECTION INTRAMUSCULAR; INTRAVENOUS at 09:01

## 2021-01-07 RX ADMIN — LEVOTHYROXINE SODIUM 150 MCG: 150 TABLET ORAL at 06:01

## 2021-01-07 RX ADMIN — INSULIN ASPART 4 UNITS: 100 INJECTION, SOLUTION INTRAVENOUS; SUBCUTANEOUS at 05:01

## 2021-01-07 RX ADMIN — BENZONATATE 200 MG: 100 CAPSULE ORAL at 09:01

## 2021-01-07 RX ADMIN — OXYCODONE AND ACETAMINOPHEN 1 TABLET: 325; 10 TABLET ORAL at 08:01

## 2021-01-07 RX ADMIN — MAGNESIUM SULFATE 2 G: 2 INJECTION INTRAVENOUS at 11:01

## 2021-01-07 RX ADMIN — ACETAMINOPHEN 650 MG: 325 TABLET ORAL at 02:01

## 2021-01-07 RX ADMIN — OXYCODONE AND ACETAMINOPHEN 1 TABLET: 325; 10 TABLET ORAL at 06:01

## 2021-01-07 RX ADMIN — INSULIN ASPART 2 UNITS: 100 INJECTION, SOLUTION INTRAVENOUS; SUBCUTANEOUS at 11:01

## 2021-01-07 RX ADMIN — PREDNISONE 10 MG: 10 TABLET ORAL at 08:01

## 2021-01-07 RX ADMIN — SODIUM BICARBONATE 650 MG: 650 TABLET ORAL at 03:01

## 2021-01-07 RX ADMIN — SODIUM BICARBONATE 650 MG: 650 TABLET ORAL at 08:01

## 2021-01-07 RX ADMIN — REMDESIVIR 100 MG: 100 INJECTION, POWDER, LYOPHILIZED, FOR SOLUTION INTRAVENOUS at 04:01

## 2021-01-07 RX ADMIN — POTASSIUM CHLORIDE 20 MEQ: 1500 TABLET, EXTENDED RELEASE ORAL at 08:01

## 2021-01-07 RX ADMIN — DOXYCYCLINE HYCLATE 100 MG: 100 TABLET, COATED ORAL at 08:01

## 2021-01-07 RX ADMIN — PIPERACILLIN AND TAZOBACTAM 4.5 G: 4; .5 INJECTION, POWDER, LYOPHILIZED, FOR SOLUTION INTRAVENOUS; PARENTERAL at 12:01

## 2021-01-07 RX ADMIN — INSULIN ASPART 6 UNITS: 100 INJECTION, SOLUTION INTRAVENOUS; SUBCUTANEOUS at 06:01

## 2021-01-07 RX ADMIN — POTASSIUM & SODIUM PHOSPHATES POWDER PACK 280-160-250 MG 1 PACKET: 280-160-250 PACK at 06:01

## 2021-01-07 RX ADMIN — LEVOFLOXACIN 750 MG: 750 TABLET, FILM COATED ORAL at 06:01

## 2021-01-08 ENCOUNTER — TELEPHONE (OUTPATIENT)
Dept: NEPHROLOGY | Facility: CLINIC | Age: 61
End: 2021-01-08

## 2021-01-08 ENCOUNTER — TELEPHONE (OUTPATIENT)
Dept: FAMILY MEDICINE | Facility: CLINIC | Age: 61
End: 2021-01-08

## 2021-01-08 ENCOUNTER — PATIENT MESSAGE (OUTPATIENT)
Dept: TRANSPLANT | Facility: CLINIC | Age: 61
End: 2021-01-08

## 2021-01-08 VITALS
HEIGHT: 66 IN | BODY MASS INDEX: 39.32 KG/M2 | SYSTOLIC BLOOD PRESSURE: 117 MMHG | RESPIRATION RATE: 16 BRPM | OXYGEN SATURATION: 97 % | TEMPERATURE: 97 F | DIASTOLIC BLOOD PRESSURE: 64 MMHG | HEART RATE: 88 BPM | WEIGHT: 244.69 LBS

## 2021-01-08 PROBLEM — E87.1 HYPONATREMIA: Status: RESOLVED | Noted: 2021-01-05 | Resolved: 2021-01-08

## 2021-01-08 LAB
ALBUMIN SERPL BCP-MCNC: 2 G/DL (ref 3.5–5.2)
ALP SERPL-CCNC: 93 U/L (ref 55–135)
ALT SERPL W/O P-5'-P-CCNC: 8 U/L (ref 10–44)
ANION GAP SERPL CALC-SCNC: 12 MMOL/L (ref 8–16)
AST SERPL-CCNC: 8 U/L (ref 10–40)
BILIRUB SERPL-MCNC: 0.4 MG/DL (ref 0.1–1)
BUN SERPL-MCNC: 32 MG/DL (ref 8–23)
CALCIUM SERPL-MCNC: 7.2 MG/DL (ref 8.7–10.5)
CHLORIDE SERPL-SCNC: 106 MMOL/L (ref 95–110)
CO2 SERPL-SCNC: 22 MMOL/L (ref 23–29)
CREAT SERPL-MCNC: 1.4 MG/DL (ref 0.5–1.4)
CRP SERPL-MCNC: 122.5 MG/L (ref 0–8.2)
D DIMER PPP IA.FEU-MCNC: 2.77 MG/L FEU
ERYTHROCYTE [DISTWIDTH] IN BLOOD BY AUTOMATED COUNT: 14.6 % (ref 11.5–14.5)
EST. GFR  (AFRICAN AMERICAN): 47 ML/MIN/1.73 M^2
EST. GFR  (NON AFRICAN AMERICAN): 41 ML/MIN/1.73 M^2
FERRITIN SERPL-MCNC: 2464 NG/ML (ref 20–300)
GLUCOSE SERPL-MCNC: 135 MG/DL (ref 70–110)
HCT VFR BLD AUTO: 31.4 % (ref 37–48.5)
HGB BLD-MCNC: 9.7 G/DL (ref 12–16)
LDH SERPL L TO P-CCNC: 276 U/L (ref 110–260)
MAGNESIUM SERPL-MCNC: 1.4 MG/DL (ref 1.6–2.6)
MCH RBC QN AUTO: 25.2 PG (ref 27–31)
MCHC RBC AUTO-ENTMCNC: 30.9 G/DL (ref 32–36)
MCV RBC AUTO: 82 FL (ref 82–98)
PHOSPHATE SERPL-MCNC: 2.2 MG/DL (ref 2.7–4.5)
PLATELET # BLD AUTO: 283 K/UL (ref 150–350)
PMV BLD AUTO: 11.9 FL (ref 9.2–12.9)
POCT GLUCOSE: 148 MG/DL (ref 70–110)
POCT GLUCOSE: 187 MG/DL (ref 70–110)
POTASSIUM SERPL-SCNC: 3.5 MMOL/L (ref 3.5–5.1)
PROT SERPL-MCNC: 5.8 G/DL (ref 6–8.4)
RBC # BLD AUTO: 3.85 M/UL (ref 4–5.4)
SODIUM SERPL-SCNC: 140 MMOL/L (ref 136–145)
WBC # BLD AUTO: 14.52 K/UL (ref 3.9–12.7)

## 2021-01-08 PROCEDURE — 25000003 PHARM REV CODE 250: Performed by: FAMILY MEDICINE

## 2021-01-08 PROCEDURE — 85379 FIBRIN DEGRADATION QUANT: CPT

## 2021-01-08 PROCEDURE — 36415 COLL VENOUS BLD VENIPUNCTURE: CPT

## 2021-01-08 PROCEDURE — 85027 COMPLETE CBC AUTOMATED: CPT

## 2021-01-08 PROCEDURE — 63600175 PHARM REV CODE 636 W HCPCS: Performed by: INTERNAL MEDICINE

## 2021-01-08 PROCEDURE — 86140 C-REACTIVE PROTEIN: CPT

## 2021-01-08 PROCEDURE — 27000221 HC OXYGEN, UP TO 24 HOURS

## 2021-01-08 PROCEDURE — 80053 COMPREHEN METABOLIC PANEL: CPT

## 2021-01-08 PROCEDURE — 25000003 PHARM REV CODE 250: Performed by: INTERNAL MEDICINE

## 2021-01-08 PROCEDURE — 83615 LACTATE (LD) (LDH) ENZYME: CPT

## 2021-01-08 PROCEDURE — 94761 N-INVAS EAR/PLS OXIMETRY MLT: CPT

## 2021-01-08 PROCEDURE — 80197 ASSAY OF TACROLIMUS: CPT

## 2021-01-08 PROCEDURE — 84100 ASSAY OF PHOSPHORUS: CPT

## 2021-01-08 PROCEDURE — 83735 ASSAY OF MAGNESIUM: CPT

## 2021-01-08 PROCEDURE — 99900035 HC TECH TIME PER 15 MIN (STAT)

## 2021-01-08 PROCEDURE — 82728 ASSAY OF FERRITIN: CPT

## 2021-01-08 RX ORDER — LEVOFLOXACIN 750 MG/1
750 TABLET ORAL EVERY OTHER DAY
Qty: 7 TABLET | Refills: 0 | Status: SHIPPED | OUTPATIENT
Start: 2021-01-09 | End: 2021-01-23

## 2021-01-08 RX ORDER — LANOLIN ALCOHOL/MO/W.PET/CERES
400 CREAM (GRAM) TOPICAL DAILY
Qty: 30 TABLET | Refills: 0 | Status: SHIPPED | OUTPATIENT
Start: 2021-01-08 | End: 2021-02-07

## 2021-01-08 RX ORDER — TACROLIMUS 1 MG/1
3 CAPSULE ORAL EVERY 12 HOURS
Qty: 180 CAPSULE | Refills: 2 | Status: SHIPPED | OUTPATIENT
Start: 2021-01-08 | End: 2021-02-23

## 2021-01-08 RX ORDER — MAGNESIUM SULFATE HEPTAHYDRATE 40 MG/ML
2 INJECTION, SOLUTION INTRAVENOUS ONCE
Status: COMPLETED | OUTPATIENT
Start: 2021-01-08 | End: 2021-01-08

## 2021-01-08 RX ORDER — SODIUM,POTASSIUM PHOSPHATES 280-250MG
1 POWDER IN PACKET (EA) ORAL
Qty: 12 PACKET | Refills: 0 | Status: SHIPPED | OUTPATIENT
Start: 2021-01-08 | End: 2021-01-11

## 2021-01-08 RX ADMIN — PREDNISONE 10 MG: 10 TABLET ORAL at 08:01

## 2021-01-08 RX ADMIN — INSULIN ASPART 6 UNITS: 100 INJECTION, SOLUTION INTRAVENOUS; SUBCUTANEOUS at 11:01

## 2021-01-08 RX ADMIN — NIFEDIPINE 30 MG: 30 TABLET, FILM COATED, EXTENDED RELEASE ORAL at 08:01

## 2021-01-08 RX ADMIN — MAGNESIUM SULFATE 2 G: 2 INJECTION INTRAVENOUS at 11:01

## 2021-01-08 RX ADMIN — PANTOPRAZOLE SODIUM 40 MG: 40 TABLET, DELAYED RELEASE ORAL at 08:01

## 2021-01-08 RX ADMIN — LEVOTHYROXINE SODIUM 150 MCG: 150 TABLET ORAL at 05:01

## 2021-01-08 RX ADMIN — CALCIUM CARBONATE (ANTACID) CHEW TAB 500 MG 1000 MG: 500 CHEW TAB at 01:01

## 2021-01-08 RX ADMIN — POTASSIUM & SODIUM PHOSPHATES POWDER PACK 280-160-250 MG 1 PACKET: 280-160-250 PACK at 08:01

## 2021-01-08 RX ADMIN — SODIUM BICARBONATE 650 MG: 650 TABLET ORAL at 08:01

## 2021-01-08 RX ADMIN — TACROLIMUS 3 MG: 1 CAPSULE ORAL at 08:01

## 2021-01-08 RX ADMIN — INSULIN ASPART 6 UNITS: 100 INJECTION, SOLUTION INTRAVENOUS; SUBCUTANEOUS at 06:01

## 2021-01-08 RX ADMIN — REMDESIVIR 100 MG: 100 INJECTION, POWDER, LYOPHILIZED, FOR SOLUTION INTRAVENOUS at 01:01

## 2021-01-08 RX ADMIN — INSULIN ASPART 2 UNITS: 100 INJECTION, SOLUTION INTRAVENOUS; SUBCUTANEOUS at 11:01

## 2021-01-09 LAB — TACROLIMUS BLD-MCNC: 6.6 NG/ML (ref 5–15)

## 2021-01-10 LAB — BACTERIA BLD CULT: NORMAL

## 2021-01-11 ENCOUNTER — PATIENT OUTREACH (OUTPATIENT)
Dept: ADMINISTRATIVE | Facility: CLINIC | Age: 61
End: 2021-01-11

## 2021-01-12 LAB
BACTERIA BLD CULT: NORMAL
BACTERIA BLD CULT: NORMAL

## 2021-01-13 ENCOUNTER — PATIENT MESSAGE (OUTPATIENT)
Dept: ADMINISTRATIVE | Facility: OTHER | Age: 61
End: 2021-01-13

## 2021-01-14 ENCOUNTER — PATIENT MESSAGE (OUTPATIENT)
Dept: FAMILY MEDICINE | Facility: CLINIC | Age: 61
End: 2021-01-14

## 2021-01-14 ENCOUNTER — PATIENT MESSAGE (OUTPATIENT)
Dept: ADMINISTRATIVE | Facility: OTHER | Age: 61
End: 2021-01-14

## 2021-01-15 ENCOUNTER — PATIENT MESSAGE (OUTPATIENT)
Dept: ADMINISTRATIVE | Facility: OTHER | Age: 61
End: 2021-01-15

## 2021-01-16 ENCOUNTER — PATIENT MESSAGE (OUTPATIENT)
Dept: ADMINISTRATIVE | Facility: OTHER | Age: 61
End: 2021-01-16

## 2021-01-17 ENCOUNTER — PATIENT MESSAGE (OUTPATIENT)
Dept: ADMINISTRATIVE | Facility: OTHER | Age: 61
End: 2021-01-17

## 2021-01-18 ENCOUNTER — PATIENT MESSAGE (OUTPATIENT)
Dept: ADMINISTRATIVE | Facility: OTHER | Age: 61
End: 2021-01-18

## 2021-01-19 ENCOUNTER — PATIENT MESSAGE (OUTPATIENT)
Dept: ADMINISTRATIVE | Facility: OTHER | Age: 61
End: 2021-01-19

## 2021-01-20 ENCOUNTER — PATIENT MESSAGE (OUTPATIENT)
Dept: ADMINISTRATIVE | Facility: OTHER | Age: 61
End: 2021-01-20

## 2021-01-20 ENCOUNTER — OFFICE VISIT (OUTPATIENT)
Dept: FAMILY MEDICINE | Facility: CLINIC | Age: 61
End: 2021-01-20
Payer: MEDICARE

## 2021-01-20 VITALS — DIASTOLIC BLOOD PRESSURE: 72 MMHG | SYSTOLIC BLOOD PRESSURE: 129 MMHG

## 2021-01-20 DIAGNOSIS — E11.59 HYPERTENSION ASSOCIATED WITH DIABETES: ICD-10-CM

## 2021-01-20 DIAGNOSIS — I15.2 HYPERTENSION ASSOCIATED WITH DIABETES: ICD-10-CM

## 2021-01-20 DIAGNOSIS — F41.9 ANXIETY AND DEPRESSION: ICD-10-CM

## 2021-01-20 DIAGNOSIS — Z94.0 DECEASED-DONOR KIDNEY TRANSPLANT: Chronic | ICD-10-CM

## 2021-01-20 DIAGNOSIS — D84.9 IMMUNOSUPPRESSED STATUS: ICD-10-CM

## 2021-01-20 DIAGNOSIS — E03.9 ACQUIRED HYPOTHYROIDISM: ICD-10-CM

## 2021-01-20 DIAGNOSIS — U07.1 COVID-19 VIRUS INFECTION: ICD-10-CM

## 2021-01-20 DIAGNOSIS — F32.A ANXIETY AND DEPRESSION: ICD-10-CM

## 2021-01-20 DIAGNOSIS — J18.9 PNEUMONIA, UNSPECIFIED ORGANISM: Primary | ICD-10-CM

## 2021-01-20 PROCEDURE — 99214 PR OFFICE/OUTPT VISIT, EST, LEVL IV, 30-39 MIN: ICD-10-PCS | Mod: CR,95,, | Performed by: INTERNAL MEDICINE

## 2021-01-20 PROCEDURE — 99214 OFFICE O/P EST MOD 30 MIN: CPT | Mod: CR,95,, | Performed by: INTERNAL MEDICINE

## 2021-01-20 RX ORDER — DICLOFENAC SODIUM 10 MG/G
GEL TOPICAL
COMMUNITY
Start: 2020-12-13 | End: 2021-04-20 | Stop reason: SDUPTHER

## 2021-01-21 ENCOUNTER — EXTERNAL HOME HEALTH (OUTPATIENT)
Dept: HOME HEALTH SERVICES | Facility: HOSPITAL | Age: 61
End: 2021-01-21
Payer: MEDICARE

## 2021-01-21 ENCOUNTER — PATIENT MESSAGE (OUTPATIENT)
Dept: ADMINISTRATIVE | Facility: OTHER | Age: 61
End: 2021-01-21

## 2021-01-21 ENCOUNTER — HOSPITAL ENCOUNTER (OUTPATIENT)
Dept: RADIOLOGY | Facility: HOSPITAL | Age: 61
Discharge: HOME | End: 2021-01-21
Attending: INTERNAL MEDICINE
Payer: MEDICARE

## 2021-01-21 DIAGNOSIS — J18.9 PNEUMONIA, UNSPECIFIED ORGANISM: ICD-10-CM

## 2021-01-21 PROCEDURE — 71046 XR CHEST PA AND LATERAL: ICD-10-PCS | Mod: 26,,, | Performed by: RADIOLOGY

## 2021-01-21 PROCEDURE — 71046 X-RAY EXAM CHEST 2 VIEWS: CPT | Mod: TC,FY,PO

## 2021-01-21 PROCEDURE — 71046 X-RAY EXAM CHEST 2 VIEWS: CPT | Mod: 26,,, | Performed by: RADIOLOGY

## 2021-01-22 ENCOUNTER — PATIENT MESSAGE (OUTPATIENT)
Dept: ADMINISTRATIVE | Facility: OTHER | Age: 61
End: 2021-01-22

## 2021-01-25 ENCOUNTER — TELEPHONE (OUTPATIENT)
Dept: PAIN MEDICINE | Facility: CLINIC | Age: 61
End: 2021-01-25

## 2021-01-26 ENCOUNTER — PATIENT OUTREACH (OUTPATIENT)
Dept: ADMINISTRATIVE | Facility: OTHER | Age: 61
End: 2021-01-26

## 2021-01-26 ENCOUNTER — TELEPHONE (OUTPATIENT)
Dept: RHEUMATOLOGY | Facility: CLINIC | Age: 61
End: 2021-01-26

## 2021-01-27 ENCOUNTER — OFFICE VISIT (OUTPATIENT)
Dept: RHEUMATOLOGY | Facility: CLINIC | Age: 61
End: 2021-01-27
Payer: MEDICARE

## 2021-01-27 ENCOUNTER — HOSPITAL ENCOUNTER (OUTPATIENT)
Dept: RADIOLOGY | Facility: HOSPITAL | Age: 61
Discharge: HOME | End: 2021-01-27
Attending: PHYSICIAN ASSISTANT
Payer: MEDICARE

## 2021-01-27 ENCOUNTER — PATIENT MESSAGE (OUTPATIENT)
Dept: FAMILY MEDICINE | Facility: CLINIC | Age: 61
End: 2021-01-27

## 2021-01-27 ENCOUNTER — INFUSION (OUTPATIENT)
Dept: INFUSION THERAPY | Facility: HOSPITAL | Age: 61
End: 2021-01-27
Attending: INTERNAL MEDICINE
Payer: MEDICARE

## 2021-01-27 VITALS
HEART RATE: 89 BPM | RESPIRATION RATE: 18 BRPM | OXYGEN SATURATION: 99 % | TEMPERATURE: 98 F | DIASTOLIC BLOOD PRESSURE: 88 MMHG | HEIGHT: 66 IN | BODY MASS INDEX: 36.84 KG/M2 | SYSTOLIC BLOOD PRESSURE: 141 MMHG | WEIGHT: 229.25 LBS

## 2021-01-27 VITALS
HEIGHT: 66 IN | BODY MASS INDEX: 36.84 KG/M2 | HEART RATE: 98 BPM | SYSTOLIC BLOOD PRESSURE: 131 MMHG | DIASTOLIC BLOOD PRESSURE: 77 MMHG | WEIGHT: 229.25 LBS

## 2021-01-27 DIAGNOSIS — M81.0 AGE-RELATED OSTEOPOROSIS WITHOUT CURRENT PATHOLOGICAL FRACTURE: Primary | ICD-10-CM

## 2021-01-27 DIAGNOSIS — Z79.899 HIGH RISK MEDICATION USE: ICD-10-CM

## 2021-01-27 DIAGNOSIS — M25.562 CHRONIC PAIN OF BOTH KNEES: ICD-10-CM

## 2021-01-27 DIAGNOSIS — G89.29 CHRONIC PAIN OF LEFT KNEE: ICD-10-CM

## 2021-01-27 DIAGNOSIS — M25.552 LEFT HIP PAIN: ICD-10-CM

## 2021-01-27 DIAGNOSIS — M25.562 CHRONIC PAIN OF LEFT KNEE: ICD-10-CM

## 2021-01-27 DIAGNOSIS — M25.561 CHRONIC PAIN OF BOTH KNEES: ICD-10-CM

## 2021-01-27 DIAGNOSIS — M15.9 PRIMARY OSTEOARTHRITIS INVOLVING MULTIPLE JOINTS: ICD-10-CM

## 2021-01-27 DIAGNOSIS — J18.9 PNEUMONIA, UNSPECIFIED ORGANISM: Primary | ICD-10-CM

## 2021-01-27 DIAGNOSIS — G89.29 CHRONIC PAIN OF BOTH KNEES: ICD-10-CM

## 2021-01-27 DIAGNOSIS — E66.01 SEVERE OBESITY (BMI 35.0-39.9) WITH COMORBIDITY: ICD-10-CM

## 2021-01-27 PROCEDURE — 99999 PR PBB SHADOW E&M-EST. PATIENT-LVL V: ICD-10-PCS | Mod: PBBFAC,,, | Performed by: INTERNAL MEDICINE

## 2021-01-27 PROCEDURE — 73502 X-RAY EXAM HIP UNI 2-3 VIEWS: CPT | Mod: 26,LT,, | Performed by: RADIOLOGY

## 2021-01-27 PROCEDURE — 63600175 PHARM REV CODE 636 W HCPCS: Mod: JG | Performed by: INTERNAL MEDICINE

## 2021-01-27 PROCEDURE — 96372 THER/PROPH/DIAG INJ SC/IM: CPT

## 2021-01-27 PROCEDURE — 73502 XR HIP 2 VIEW LEFT: ICD-10-PCS | Mod: 26,LT,, | Performed by: RADIOLOGY

## 2021-01-27 PROCEDURE — 99215 OFFICE O/P EST HI 40 MIN: CPT | Mod: PBBFAC,25 | Performed by: INTERNAL MEDICINE

## 2021-01-27 PROCEDURE — 99214 OFFICE O/P EST MOD 30 MIN: CPT | Mod: S$PBB,,, | Performed by: INTERNAL MEDICINE

## 2021-01-27 PROCEDURE — 73562 XR KNEE ORTHO LEFT WITH FLEXION: ICD-10-PCS | Mod: 26,RT,, | Performed by: RADIOLOGY

## 2021-01-27 PROCEDURE — 73564 X-RAY EXAM KNEE 4 OR MORE: CPT | Mod: 26,LT,, | Performed by: RADIOLOGY

## 2021-01-27 PROCEDURE — 73564 X-RAY EXAM KNEE 4 OR MORE: CPT | Mod: TC,LT

## 2021-01-27 PROCEDURE — 73502 X-RAY EXAM HIP UNI 2-3 VIEWS: CPT | Mod: TC,LT

## 2021-01-27 PROCEDURE — 73564 XR KNEE ORTHO LEFT WITH FLEXION: ICD-10-PCS | Mod: 26,LT,, | Performed by: RADIOLOGY

## 2021-01-27 PROCEDURE — 99999 PR PBB SHADOW E&M-EST. PATIENT-LVL V: CPT | Mod: PBBFAC,,, | Performed by: INTERNAL MEDICINE

## 2021-01-27 PROCEDURE — 99214 PR OFFICE/OUTPT VISIT, EST, LEVL IV, 30-39 MIN: ICD-10-PCS | Mod: S$PBB,,, | Performed by: INTERNAL MEDICINE

## 2021-01-27 PROCEDURE — 73562 X-RAY EXAM OF KNEE 3: CPT | Mod: 26,RT,, | Performed by: RADIOLOGY

## 2021-01-27 RX ADMIN — DENOSUMAB 60 MG: 60 INJECTION SUBCUTANEOUS at 10:01

## 2021-01-28 ENCOUNTER — PES CALL (OUTPATIENT)
Dept: ADMINISTRATIVE | Facility: CLINIC | Age: 61
End: 2021-01-28

## 2021-01-29 ENCOUNTER — OFFICE VISIT (OUTPATIENT)
Dept: NEPHROLOGY | Facility: CLINIC | Age: 61
End: 2021-01-29
Payer: MEDICARE

## 2021-01-29 ENCOUNTER — OFFICE VISIT (OUTPATIENT)
Dept: PAIN MEDICINE | Facility: CLINIC | Age: 61
End: 2021-01-29
Payer: MEDICARE

## 2021-01-29 VITALS — WEIGHT: 229.25 LBS | HEIGHT: 66 IN | BODY MASS INDEX: 36.84 KG/M2 | RESPIRATION RATE: 17 BRPM

## 2021-01-29 DIAGNOSIS — M25.561 CHRONIC PAIN OF BOTH KNEES: Primary | ICD-10-CM

## 2021-01-29 DIAGNOSIS — M25.562 CHRONIC PAIN OF BOTH KNEES: Primary | ICD-10-CM

## 2021-01-29 DIAGNOSIS — G89.29 CHRONIC PAIN OF BOTH KNEES: Primary | ICD-10-CM

## 2021-01-29 DIAGNOSIS — Z94.0 KIDNEY TRANSPLANTED: Primary | ICD-10-CM

## 2021-01-29 DIAGNOSIS — M17.0 PRIMARY OSTEOARTHRITIS OF BOTH KNEES: ICD-10-CM

## 2021-01-29 PROCEDURE — 99215 PR OFFICE/OUTPT VISIT, EST, LEVL V, 40-54 MIN: ICD-10-PCS | Mod: 95,,, | Performed by: INTERNAL MEDICINE

## 2021-01-29 PROCEDURE — 99215 OFFICE O/P EST HI 40 MIN: CPT | Mod: 95,,, | Performed by: INTERNAL MEDICINE

## 2021-01-29 PROCEDURE — 99214 PR OFFICE/OUTPT VISIT, EST, LEVL IV, 30-39 MIN: ICD-10-PCS | Mod: 95,,, | Performed by: PHYSICIAN ASSISTANT

## 2021-01-29 PROCEDURE — 99214 OFFICE O/P EST MOD 30 MIN: CPT | Mod: 95,,, | Performed by: PHYSICIAN ASSISTANT

## 2021-01-31 ENCOUNTER — EXTERNAL CHRONIC CARE MANAGEMENT (OUTPATIENT)
Dept: PRIMARY CARE CLINIC | Facility: CLINIC | Age: 61
End: 2021-01-31
Payer: MEDICARE

## 2021-01-31 PROCEDURE — 99490 CHRNC CARE MGMT STAFF 1ST 20: CPT | Mod: PBBFAC,PO | Performed by: INTERNAL MEDICINE

## 2021-01-31 PROCEDURE — 99490 PR CHRONIC CARE MGMT, 1ST 20 MIN: ICD-10-PCS | Mod: S$PBB,,, | Performed by: INTERNAL MEDICINE

## 2021-01-31 PROCEDURE — 99490 CHRNC CARE MGMT STAFF 1ST 20: CPT | Mod: S$PBB,,, | Performed by: INTERNAL MEDICINE

## 2021-02-01 ENCOUNTER — PATIENT MESSAGE (OUTPATIENT)
Dept: ORTHOPEDICS | Facility: CLINIC | Age: 61
End: 2021-02-01

## 2021-02-01 ENCOUNTER — PATIENT OUTREACH (OUTPATIENT)
Dept: INTERNAL MEDICINE | Facility: CLINIC | Age: 61
End: 2021-02-01

## 2021-02-03 ENCOUNTER — PATIENT MESSAGE (OUTPATIENT)
Dept: FAMILY MEDICINE | Facility: CLINIC | Age: 61
End: 2021-02-03

## 2021-02-03 ENCOUNTER — APPOINTMENT (OUTPATIENT)
Dept: RADIOLOGY | Facility: HOSPITAL | Age: 61
End: 2021-02-03
Attending: INTERNAL MEDICINE
Payer: MEDICARE

## 2021-02-03 DIAGNOSIS — J18.9 PNEUMONIA, UNSPECIFIED ORGANISM: ICD-10-CM

## 2021-02-03 PROCEDURE — 71046 X-RAY EXAM CHEST 2 VIEWS: CPT | Mod: TC,PO

## 2021-02-03 PROCEDURE — 71046 X-RAY EXAM CHEST 2 VIEWS: CPT | Mod: 26,,, | Performed by: RADIOLOGY

## 2021-02-03 PROCEDURE — 71046 XR CHEST PA AND LATERAL: ICD-10-PCS | Mod: 26,,, | Performed by: RADIOLOGY

## 2021-02-09 ENCOUNTER — LAB VISIT (OUTPATIENT)
Dept: LAB | Facility: HOSPITAL | Age: 61
End: 2021-02-09
Attending: INTERNAL MEDICINE
Payer: MEDICARE

## 2021-02-09 DIAGNOSIS — M81.0 AGE-RELATED OSTEOPOROSIS WITHOUT CURRENT PATHOLOGICAL FRACTURE: ICD-10-CM

## 2021-02-09 LAB
ALBUMIN SERPL BCP-MCNC: 3.3 G/DL (ref 3.5–5.2)
ALP SERPL-CCNC: 92 U/L (ref 55–135)
ALT SERPL W/O P-5'-P-CCNC: 14 U/L (ref 10–44)
ANION GAP SERPL CALC-SCNC: 10 MMOL/L (ref 8–16)
AST SERPL-CCNC: 15 U/L (ref 10–40)
BILIRUB SERPL-MCNC: 0.4 MG/DL (ref 0.1–1)
BUN SERPL-MCNC: 24 MG/DL (ref 8–23)
CALCIUM SERPL-MCNC: 8.9 MG/DL (ref 8.7–10.5)
CHLORIDE SERPL-SCNC: 106 MMOL/L (ref 95–110)
CO2 SERPL-SCNC: 25 MMOL/L (ref 23–29)
CREAT SERPL-MCNC: 1.3 MG/DL (ref 0.5–1.4)
EST. GFR  (AFRICAN AMERICAN): 51 ML/MIN/1.73 M^2
EST. GFR  (NON AFRICAN AMERICAN): 44 ML/MIN/1.73 M^2
GLUCOSE SERPL-MCNC: 188 MG/DL (ref 70–110)
POTASSIUM SERPL-SCNC: 3.8 MMOL/L (ref 3.5–5.1)
PROT SERPL-MCNC: 7.6 G/DL (ref 6–8.4)
SODIUM SERPL-SCNC: 141 MMOL/L (ref 136–145)

## 2021-02-09 PROCEDURE — 80053 COMPREHEN METABOLIC PANEL: CPT

## 2021-02-09 PROCEDURE — 36415 COLL VENOUS BLD VENIPUNCTURE: CPT | Mod: PO

## 2021-02-11 ENCOUNTER — PES CALL (OUTPATIENT)
Dept: ADMINISTRATIVE | Facility: CLINIC | Age: 61
End: 2021-02-11

## 2021-02-24 DIAGNOSIS — I15.2 HYPERTENSION ASSOCIATED WITH DIABETES: ICD-10-CM

## 2021-02-24 DIAGNOSIS — E11.59 HYPERTENSION ASSOCIATED WITH DIABETES: ICD-10-CM

## 2021-02-28 ENCOUNTER — EXTERNAL CHRONIC CARE MANAGEMENT (OUTPATIENT)
Dept: PRIMARY CARE CLINIC | Facility: CLINIC | Age: 61
End: 2021-02-28
Payer: MEDICARE

## 2021-02-28 PROCEDURE — 99490 CHRNC CARE MGMT STAFF 1ST 20: CPT | Mod: PBBFAC,PO | Performed by: INTERNAL MEDICINE

## 2021-02-28 PROCEDURE — 99490 CHRNC CARE MGMT STAFF 1ST 20: CPT | Mod: S$PBB,,, | Performed by: INTERNAL MEDICINE

## 2021-02-28 PROCEDURE — 99490 PR CHRONIC CARE MGMT, 1ST 20 MIN: ICD-10-PCS | Mod: S$PBB,,, | Performed by: INTERNAL MEDICINE

## 2021-03-01 ENCOUNTER — OFFICE VISIT (OUTPATIENT)
Dept: ORTHOPEDICS | Facility: CLINIC | Age: 61
End: 2021-03-01
Payer: MEDICARE

## 2021-03-01 ENCOUNTER — PATIENT OUTREACH (OUTPATIENT)
Dept: ADMINISTRATIVE | Facility: OTHER | Age: 61
End: 2021-03-01

## 2021-03-01 VITALS — HEIGHT: 66 IN | BODY MASS INDEX: 36.8 KG/M2 | WEIGHT: 229 LBS

## 2021-03-01 DIAGNOSIS — M17.0 PRIMARY OSTEOARTHRITIS OF BOTH KNEES: Primary | ICD-10-CM

## 2021-03-01 DIAGNOSIS — M25.562 CHRONIC PAIN OF BOTH KNEES: ICD-10-CM

## 2021-03-01 DIAGNOSIS — M25.561 CHRONIC PAIN OF BOTH KNEES: ICD-10-CM

## 2021-03-01 DIAGNOSIS — G89.29 CHRONIC PAIN OF BOTH KNEES: ICD-10-CM

## 2021-03-01 DIAGNOSIS — R26.9 GAIT ABNORMALITY: ICD-10-CM

## 2021-03-01 PROCEDURE — 99999 PR PBB SHADOW E&M-EST. PATIENT-LVL II: ICD-10-PCS | Mod: PBBFAC,,, | Performed by: PHYSICAL MEDICINE & REHABILITATION

## 2021-03-01 PROCEDURE — 20610 DRAIN/INJ JOINT/BURSA W/O US: CPT | Mod: 50,PBBFAC | Performed by: PHYSICAL MEDICINE & REHABILITATION

## 2021-03-01 PROCEDURE — 20610 LARGE JOINT ASPIRATION/INJECTION: BILATERAL KNEE: ICD-10-PCS | Mod: 50,S$PBB,, | Performed by: PHYSICAL MEDICINE & REHABILITATION

## 2021-03-01 PROCEDURE — 99999 PR PBB SHADOW E&M-EST. PATIENT-LVL II: CPT | Mod: PBBFAC,,, | Performed by: PHYSICAL MEDICINE & REHABILITATION

## 2021-03-01 PROCEDURE — 99499 UNLISTED E&M SERVICE: CPT | Mod: S$PBB,,, | Performed by: PHYSICAL MEDICINE & REHABILITATION

## 2021-03-01 PROCEDURE — 99499 NO LOS: ICD-10-PCS | Mod: S$PBB,,, | Performed by: PHYSICAL MEDICINE & REHABILITATION

## 2021-03-01 PROCEDURE — 99212 OFFICE O/P EST SF 10 MIN: CPT | Mod: PBBFAC,25 | Performed by: PHYSICAL MEDICINE & REHABILITATION

## 2021-03-01 RX ADMIN — Medication 48 MG: at 03:03

## 2021-03-02 ENCOUNTER — PATIENT MESSAGE (OUTPATIENT)
Dept: PAIN MEDICINE | Facility: CLINIC | Age: 61
End: 2021-03-02

## 2021-03-02 ENCOUNTER — TELEPHONE (OUTPATIENT)
Dept: INTERNAL MEDICINE | Facility: CLINIC | Age: 61
End: 2021-03-02

## 2021-03-03 ENCOUNTER — PATIENT MESSAGE (OUTPATIENT)
Dept: FAMILY MEDICINE | Facility: CLINIC | Age: 61
End: 2021-03-03

## 2021-03-05 ENCOUNTER — PATIENT MESSAGE (OUTPATIENT)
Dept: PAIN MEDICINE | Facility: CLINIC | Age: 61
End: 2021-03-05

## 2021-03-05 ENCOUNTER — OFFICE VISIT (OUTPATIENT)
Dept: PAIN MEDICINE | Facility: CLINIC | Age: 61
End: 2021-03-05
Payer: MEDICARE

## 2021-03-05 VITALS
SYSTOLIC BLOOD PRESSURE: 148 MMHG | HEART RATE: 73 BPM | HEIGHT: 66 IN | BODY MASS INDEX: 36.8 KG/M2 | WEIGHT: 229 LBS | RESPIRATION RATE: 18 BRPM | DIASTOLIC BLOOD PRESSURE: 84 MMHG

## 2021-03-05 DIAGNOSIS — Z79.891 OPIOID USE AGREEMENT EXISTS: ICD-10-CM

## 2021-03-05 DIAGNOSIS — M51.36 DDD (DEGENERATIVE DISC DISEASE), LUMBAR: ICD-10-CM

## 2021-03-05 DIAGNOSIS — M25.562 CHRONIC PAIN OF BOTH KNEES: Primary | ICD-10-CM

## 2021-03-05 DIAGNOSIS — M25.552 LEFT HIP PAIN: ICD-10-CM

## 2021-03-05 DIAGNOSIS — M25.561 CHRONIC PAIN OF BOTH KNEES: Primary | ICD-10-CM

## 2021-03-05 DIAGNOSIS — M17.0 PRIMARY OSTEOARTHRITIS OF BOTH KNEES: ICD-10-CM

## 2021-03-05 DIAGNOSIS — G89.29 CHRONIC PAIN OF BOTH KNEES: Primary | ICD-10-CM

## 2021-03-05 PROCEDURE — 99999 PR PBB SHADOW E&M-EST. PATIENT-LVL III: ICD-10-PCS | Mod: PBBFAC,,, | Performed by: PHYSICIAN ASSISTANT

## 2021-03-05 PROCEDURE — 99999 PR PBB SHADOW E&M-EST. PATIENT-LVL III: CPT | Mod: PBBFAC,,, | Performed by: PHYSICIAN ASSISTANT

## 2021-03-05 PROCEDURE — 99213 OFFICE O/P EST LOW 20 MIN: CPT | Mod: PBBFAC | Performed by: PHYSICIAN ASSISTANT

## 2021-03-05 PROCEDURE — 99214 PR OFFICE/OUTPT VISIT, EST, LEVL IV, 30-39 MIN: ICD-10-PCS | Mod: S$PBB,,, | Performed by: PHYSICIAN ASSISTANT

## 2021-03-05 PROCEDURE — 99214 OFFICE O/P EST MOD 30 MIN: CPT | Mod: S$PBB,,, | Performed by: PHYSICIAN ASSISTANT

## 2021-03-05 RX ORDER — OXYCODONE AND ACETAMINOPHEN 10; 325 MG/1; MG/1
1 TABLET ORAL EVERY 8 HOURS PRN
Qty: 90 TABLET | Refills: 0 | Status: CANCELLED | OUTPATIENT
Start: 2021-04-04 | End: 2021-05-04

## 2021-03-05 RX ORDER — OXYCODONE AND ACETAMINOPHEN 10; 325 MG/1; MG/1
1 TABLET ORAL EVERY 8 HOURS PRN
Qty: 90 TABLET | Refills: 0 | Status: SHIPPED | OUTPATIENT
Start: 2021-03-05 | End: 2021-03-08 | Stop reason: SDUPTHER

## 2021-03-05 RX ORDER — TIZANIDINE 4 MG/1
4 TABLET ORAL
COMMUNITY
End: 2021-04-27 | Stop reason: SDUPTHER

## 2021-03-05 RX ORDER — GABAPENTIN 300 MG/1
300 CAPSULE ORAL 3 TIMES DAILY
Qty: 90 CAPSULE | Refills: 1 | Status: SHIPPED | OUTPATIENT
Start: 2021-03-05 | End: 2021-04-27 | Stop reason: SDUPTHER

## 2021-03-08 RX ORDER — OXYCODONE AND ACETAMINOPHEN 10; 325 MG/1; MG/1
1 TABLET ORAL EVERY 8 HOURS PRN
Qty: 90 TABLET | Refills: 0 | Status: SHIPPED | OUTPATIENT
Start: 2021-04-04 | End: 2021-04-27 | Stop reason: SDUPTHER

## 2021-03-08 NOTE — ASSESSMENT & PLAN NOTE
General weight loss/lifestyle modification strategies discussed (elicit support from others; identify saboteurs; non-food rewards).  Diet interventions: low calorie (1000 kCal/d) deficit diet    
PSG 2015: AHI was 2.5  ONSAT on RA SpO2 cheli 76%  SpO2 was below 88%: 00:26:24  Oxygen 2.0 LPM  Sedative pain meds: Xanax, Klonopin, Percocet and Flexeril playing a role  
Spirometry : No obstruction: will hold BREO for now with option to switch to arnuity  TLC : 4.84L( 88%)  DLCO: 16.2( 59%)    
154.94

## 2021-03-10 ENCOUNTER — PATIENT MESSAGE (OUTPATIENT)
Dept: FAMILY MEDICINE | Facility: CLINIC | Age: 61
End: 2021-03-10

## 2021-03-12 ENCOUNTER — LAB VISIT (OUTPATIENT)
Dept: LAB | Facility: HOSPITAL | Age: 61
End: 2021-03-12
Attending: INTERNAL MEDICINE
Payer: MEDICARE

## 2021-03-12 PROCEDURE — 83036 HEMOGLOBIN GLYCOSYLATED A1C: CPT | Performed by: INTERNAL MEDICINE

## 2021-03-12 PROCEDURE — 36415 COLL VENOUS BLD VENIPUNCTURE: CPT | Mod: PO | Performed by: INTERNAL MEDICINE

## 2021-03-13 LAB
ESTIMATED AVG GLUCOSE: 137 MG/DL (ref 68–131)
HBA1C MFR BLD: 6.4 % (ref 4–5.6)

## 2021-03-15 ENCOUNTER — PATIENT MESSAGE (OUTPATIENT)
Dept: PAIN MEDICINE | Facility: CLINIC | Age: 61
End: 2021-03-15

## 2021-03-17 ENCOUNTER — OFFICE VISIT (OUTPATIENT)
Dept: FAMILY MEDICINE | Facility: CLINIC | Age: 61
End: 2021-03-17
Payer: MEDICARE

## 2021-03-17 VITALS
HEART RATE: 80 BPM | BODY MASS INDEX: 37.14 KG/M2 | OXYGEN SATURATION: 98 % | HEIGHT: 66 IN | WEIGHT: 231.06 LBS | TEMPERATURE: 98 F | SYSTOLIC BLOOD PRESSURE: 132 MMHG | DIASTOLIC BLOOD PRESSURE: 78 MMHG

## 2021-03-17 DIAGNOSIS — F33.0 MDD (MAJOR DEPRESSIVE DISORDER), RECURRENT EPISODE, MILD: ICD-10-CM

## 2021-03-17 DIAGNOSIS — E11.65 UNCONTROLLED TYPE 2 DIABETES MELLITUS WITH HYPERGLYCEMIA, WITH LONG-TERM CURRENT USE OF INSULIN: Chronic | ICD-10-CM

## 2021-03-17 DIAGNOSIS — E66.01 SEVERE OBESITY (BMI 35.0-39.9) WITH COMORBIDITY: ICD-10-CM

## 2021-03-17 DIAGNOSIS — Z00.00 ENCOUNTER FOR PREVENTIVE HEALTH EXAMINATION: ICD-10-CM

## 2021-03-17 DIAGNOSIS — H93.8X2 EAR PRESSURE, LEFT: ICD-10-CM

## 2021-03-17 DIAGNOSIS — E03.9 ACQUIRED HYPOTHYROIDISM: Primary | ICD-10-CM

## 2021-03-17 DIAGNOSIS — E78.5 HYPERLIPIDEMIA ASSOCIATED WITH TYPE 2 DIABETES MELLITUS: ICD-10-CM

## 2021-03-17 DIAGNOSIS — Z79.4 UNCONTROLLED TYPE 2 DIABETES MELLITUS WITH HYPERGLYCEMIA, WITH LONG-TERM CURRENT USE OF INSULIN: Chronic | ICD-10-CM

## 2021-03-17 DIAGNOSIS — F41.9 ANXIETY AND DEPRESSION: ICD-10-CM

## 2021-03-17 DIAGNOSIS — Z94.0 DECEASED-DONOR KIDNEY TRANSPLANT: Chronic | ICD-10-CM

## 2021-03-17 DIAGNOSIS — Z78.0 ASYMPTOMATIC POSTMENOPAUSAL STATE: ICD-10-CM

## 2021-03-17 DIAGNOSIS — I15.2 HYPERTENSION ASSOCIATED WITH DIABETES: ICD-10-CM

## 2021-03-17 DIAGNOSIS — D84.9 IMMUNOSUPPRESSED STATUS: ICD-10-CM

## 2021-03-17 DIAGNOSIS — M81.0 AGE-RELATED OSTEOPOROSIS WITHOUT CURRENT PATHOLOGICAL FRACTURE: ICD-10-CM

## 2021-03-17 DIAGNOSIS — J41.0 SIMPLE CHRONIC BRONCHITIS: ICD-10-CM

## 2021-03-17 DIAGNOSIS — Z12.31 ENCOUNTER FOR SCREENING MAMMOGRAM FOR MALIGNANT NEOPLASM OF BREAST: ICD-10-CM

## 2021-03-17 DIAGNOSIS — M46.96 UNSPECIFIED INFLAMMATORY SPONDYLOPATHY, LUMBAR REGION: ICD-10-CM

## 2021-03-17 DIAGNOSIS — F32.A ANXIETY AND DEPRESSION: ICD-10-CM

## 2021-03-17 DIAGNOSIS — E11.59 HYPERTENSION ASSOCIATED WITH DIABETES: ICD-10-CM

## 2021-03-17 DIAGNOSIS — E11.69 HYPERLIPIDEMIA ASSOCIATED WITH TYPE 2 DIABETES MELLITUS: ICD-10-CM

## 2021-03-17 DIAGNOSIS — E66.01 SEVERE OBESITY WITH BODY MASS INDEX (BMI) OF 35.0 TO 35.9 AND COMORBIDITY: ICD-10-CM

## 2021-03-17 DIAGNOSIS — N18.2 CKD (CHRONIC KIDNEY DISEASE) STAGE 2, GFR 60-89 ML/MIN: ICD-10-CM

## 2021-03-17 PROCEDURE — 99214 OFFICE O/P EST MOD 30 MIN: CPT | Mod: S$PBB,,, | Performed by: INTERNAL MEDICINE

## 2021-03-17 PROCEDURE — 99999 PR PBB SHADOW E&M-EST. PATIENT-LVL V: ICD-10-PCS | Mod: PBBFAC,,, | Performed by: INTERNAL MEDICINE

## 2021-03-17 PROCEDURE — 99215 OFFICE O/P EST HI 40 MIN: CPT | Mod: PBBFAC,PO | Performed by: INTERNAL MEDICINE

## 2021-03-17 PROCEDURE — 99214 PR OFFICE/OUTPT VISIT, EST, LEVL IV, 30-39 MIN: ICD-10-PCS | Mod: S$PBB,,, | Performed by: INTERNAL MEDICINE

## 2021-03-17 PROCEDURE — 99999 PR PBB SHADOW E&M-EST. PATIENT-LVL V: CPT | Mod: PBBFAC,,, | Performed by: INTERNAL MEDICINE

## 2021-03-17 RX ORDER — FLUTICASONE PROPIONATE 50 MCG
2 SPRAY, SUSPENSION (ML) NASAL DAILY
Qty: 16 G | Refills: 6 | Status: SHIPPED | OUTPATIENT
Start: 2021-03-17 | End: 2021-10-18 | Stop reason: SDUPTHER

## 2021-03-24 ENCOUNTER — OFFICE VISIT (OUTPATIENT)
Dept: HOME HEALTH SERVICES | Facility: CLINIC | Age: 61
End: 2021-03-24
Payer: MEDICARE

## 2021-03-24 VITALS
DIASTOLIC BLOOD PRESSURE: 86 MMHG | HEART RATE: 68 BPM | SYSTOLIC BLOOD PRESSURE: 134 MMHG | HEIGHT: 66 IN | WEIGHT: 228 LBS | BODY MASS INDEX: 36.64 KG/M2 | OXYGEN SATURATION: 97 % | TEMPERATURE: 98 F

## 2021-03-24 DIAGNOSIS — G47.34 SLEEP-RELATED NONOBSTRUCTIVE ALVEOLAR HYPOVENTILATION: ICD-10-CM

## 2021-03-24 DIAGNOSIS — Z94.0 DECEASED-DONOR KIDNEY TRANSPLANT: Chronic | ICD-10-CM

## 2021-03-24 DIAGNOSIS — E78.5 HYPERLIPIDEMIA ASSOCIATED WITH TYPE 2 DIABETES MELLITUS: ICD-10-CM

## 2021-03-24 DIAGNOSIS — Z79.899 HIGH RISK MEDICATION USE: ICD-10-CM

## 2021-03-24 DIAGNOSIS — D84.9 IMMUNOSUPPRESSED STATUS: ICD-10-CM

## 2021-03-24 DIAGNOSIS — M15.9 PRIMARY OSTEOARTHRITIS INVOLVING MULTIPLE JOINTS: ICD-10-CM

## 2021-03-24 DIAGNOSIS — N18.2 ANEMIA OF CHRONIC RENAL FAILURE, STAGE 2 (MILD): Chronic | ICD-10-CM

## 2021-03-24 DIAGNOSIS — N18.30 TYPE 2 DIABETES MELLITUS WITH STAGE 3 CHRONIC KIDNEY DISEASE, WITH LONG-TERM CURRENT USE OF INSULIN, UNSPECIFIED WHETHER STAGE 3A OR 3B CKD: ICD-10-CM

## 2021-03-24 DIAGNOSIS — Z00.00 ENCOUNTER FOR PREVENTIVE HEALTH EXAMINATION: Primary | ICD-10-CM

## 2021-03-24 DIAGNOSIS — N18.2 CKD (CHRONIC KIDNEY DISEASE) STAGE 2, GFR 60-89 ML/MIN: ICD-10-CM

## 2021-03-24 DIAGNOSIS — I15.2 HYPERTENSION ASSOCIATED WITH DIABETES: ICD-10-CM

## 2021-03-24 DIAGNOSIS — E11.22 TYPE 2 DIABETES MELLITUS WITH STAGE 3 CHRONIC KIDNEY DISEASE, WITH LONG-TERM CURRENT USE OF INSULIN, UNSPECIFIED WHETHER STAGE 3A OR 3B CKD: ICD-10-CM

## 2021-03-24 DIAGNOSIS — G47.01 INSOMNIA DUE TO MEDICAL CONDITION: ICD-10-CM

## 2021-03-24 DIAGNOSIS — E66.01 SEVERE OBESITY (BMI 35.0-39.9) WITH COMORBIDITY: ICD-10-CM

## 2021-03-24 DIAGNOSIS — N25.81 SECONDARY HYPERPARATHYROIDISM OF RENAL ORIGIN: Chronic | ICD-10-CM

## 2021-03-24 DIAGNOSIS — D63.1 ANEMIA OF CHRONIC RENAL FAILURE, STAGE 2 (MILD): Chronic | ICD-10-CM

## 2021-03-24 DIAGNOSIS — M46.96 UNSPECIFIED INFLAMMATORY SPONDYLOPATHY, LUMBAR REGION: ICD-10-CM

## 2021-03-24 DIAGNOSIS — J41.0 SIMPLE CHRONIC BRONCHITIS: ICD-10-CM

## 2021-03-24 DIAGNOSIS — Z79.4 TYPE 2 DIABETES MELLITUS WITH STAGE 3 CHRONIC KIDNEY DISEASE, WITH LONG-TERM CURRENT USE OF INSULIN, UNSPECIFIED WHETHER STAGE 3A OR 3B CKD: ICD-10-CM

## 2021-03-24 DIAGNOSIS — E11.59 HYPERTENSION ASSOCIATED WITH DIABETES: ICD-10-CM

## 2021-03-24 DIAGNOSIS — M81.0 AGE-RELATED OSTEOPOROSIS WITHOUT CURRENT PATHOLOGICAL FRACTURE: ICD-10-CM

## 2021-03-24 DIAGNOSIS — E11.69 HYPERLIPIDEMIA ASSOCIATED WITH TYPE 2 DIABETES MELLITUS: ICD-10-CM

## 2021-03-24 DIAGNOSIS — E03.9 ACQUIRED HYPOTHYROIDISM: ICD-10-CM

## 2021-03-24 DIAGNOSIS — R51.9 INTRACTABLE EPISODIC HEADACHE, UNSPECIFIED HEADACHE TYPE: ICD-10-CM

## 2021-03-24 PROBLEM — R78.81 BACTEREMIA DUE TO ESCHERICHIA COLI: Status: RESOLVED | Noted: 2021-01-06 | Resolved: 2021-03-24

## 2021-03-24 PROBLEM — U07.1 COVID-19 VIRUS INFECTION: Status: RESOLVED | Noted: 2021-01-05 | Resolved: 2021-03-24

## 2021-03-24 PROBLEM — E83.51 HYPOCALCEMIA: Status: RESOLVED | Noted: 2021-01-06 | Resolved: 2021-03-24

## 2021-03-24 PROBLEM — E83.39 HYPOPHOSPHATEMIA: Status: RESOLVED | Noted: 2021-01-06 | Resolved: 2021-03-24

## 2021-03-24 PROBLEM — B96.20 BACTEREMIA DUE TO ESCHERICHIA COLI: Status: RESOLVED | Noted: 2021-01-06 | Resolved: 2021-03-24

## 2021-03-24 PROBLEM — B96.4 URINARY TRACT INFECTION DUE TO PROTEUS: Status: RESOLVED | Noted: 2021-01-04 | Resolved: 2021-03-24

## 2021-03-24 PROBLEM — N39.0 URINARY TRACT INFECTION DUE TO PROTEUS: Status: RESOLVED | Noted: 2021-01-04 | Resolved: 2021-03-24

## 2021-03-24 PROBLEM — F33.0 MDD (MAJOR DEPRESSIVE DISORDER), RECURRENT EPISODE, MILD: Status: RESOLVED | Noted: 2021-03-17 | Resolved: 2021-03-24

## 2021-03-24 PROCEDURE — G0439 PR MEDICARE ANNUAL WELLNESS SUBSEQUENT VISIT: ICD-10-PCS | Mod: S$GLB,,, | Performed by: NURSE PRACTITIONER

## 2021-03-24 PROCEDURE — G0439 PPPS, SUBSEQ VISIT: HCPCS | Mod: S$GLB,,, | Performed by: NURSE PRACTITIONER

## 2021-03-31 ENCOUNTER — EXTERNAL CHRONIC CARE MANAGEMENT (OUTPATIENT)
Dept: PRIMARY CARE CLINIC | Facility: CLINIC | Age: 61
End: 2021-03-31
Payer: MEDICARE

## 2021-03-31 PROCEDURE — 99490 CHRNC CARE MGMT STAFF 1ST 20: CPT | Mod: PBBFAC,PO | Performed by: INTERNAL MEDICINE

## 2021-03-31 PROCEDURE — 99490 CHRNC CARE MGMT STAFF 1ST 20: CPT | Mod: S$PBB,,, | Performed by: INTERNAL MEDICINE

## 2021-03-31 PROCEDURE — 99490 PR CHRONIC CARE MGMT, 1ST 20 MIN: ICD-10-PCS | Mod: S$PBB,,, | Performed by: INTERNAL MEDICINE

## 2021-04-19 ENCOUNTER — PATIENT MESSAGE (OUTPATIENT)
Dept: PAIN MEDICINE | Facility: CLINIC | Age: 61
End: 2021-04-19

## 2021-04-19 ENCOUNTER — PATIENT MESSAGE (OUTPATIENT)
Dept: FAMILY MEDICINE | Facility: CLINIC | Age: 61
End: 2021-04-19

## 2021-04-20 ENCOUNTER — PATIENT MESSAGE (OUTPATIENT)
Dept: FAMILY MEDICINE | Facility: CLINIC | Age: 61
End: 2021-04-20

## 2021-04-20 RX ORDER — DICLOFENAC SODIUM 10 MG/G
2 GEL TOPICAL 4 TIMES DAILY PRN
Qty: 5 TUBE | Refills: 0 | Status: SHIPPED | OUTPATIENT
Start: 2021-04-20 | End: 2021-08-18

## 2021-04-25 ENCOUNTER — PATIENT OUTREACH (OUTPATIENT)
Dept: ADMINISTRATIVE | Facility: OTHER | Age: 61
End: 2021-04-25

## 2021-04-27 ENCOUNTER — OFFICE VISIT (OUTPATIENT)
Dept: PAIN MEDICINE | Facility: CLINIC | Age: 61
End: 2021-04-27
Payer: MEDICARE

## 2021-04-27 ENCOUNTER — PATIENT MESSAGE (OUTPATIENT)
Dept: PAIN MEDICINE | Facility: CLINIC | Age: 61
End: 2021-04-27

## 2021-04-27 ENCOUNTER — HOSPITAL ENCOUNTER (OUTPATIENT)
Dept: RADIOLOGY | Facility: HOSPITAL | Age: 61
Discharge: HOME | End: 2021-04-27
Attending: PHYSICIAN ASSISTANT
Payer: MEDICARE

## 2021-04-27 VITALS
DIASTOLIC BLOOD PRESSURE: 79 MMHG | HEART RATE: 71 BPM | SYSTOLIC BLOOD PRESSURE: 129 MMHG | BODY MASS INDEX: 37.77 KG/M2 | HEIGHT: 66 IN | WEIGHT: 235 LBS

## 2021-04-27 DIAGNOSIS — Z79.891 OPIOID USE AGREEMENT EXISTS: ICD-10-CM

## 2021-04-27 DIAGNOSIS — M25.552 LEFT HIP PAIN: Primary | ICD-10-CM

## 2021-04-27 DIAGNOSIS — M25.562 CHRONIC PAIN OF BOTH KNEES: ICD-10-CM

## 2021-04-27 DIAGNOSIS — M51.36 DDD (DEGENERATIVE DISC DISEASE), LUMBAR: ICD-10-CM

## 2021-04-27 DIAGNOSIS — G89.29 CHRONIC PAIN OF BOTH KNEES: ICD-10-CM

## 2021-04-27 DIAGNOSIS — M25.561 CHRONIC PAIN OF BOTH KNEES: ICD-10-CM

## 2021-04-27 DIAGNOSIS — M25.552 LEFT HIP PAIN: ICD-10-CM

## 2021-04-27 DIAGNOSIS — M54.16 LEFT LUMBAR RADICULOPATHY: ICD-10-CM

## 2021-04-27 DIAGNOSIS — M17.0 PRIMARY OSTEOARTHRITIS OF BOTH KNEES: ICD-10-CM

## 2021-04-27 PROCEDURE — 99214 PR OFFICE/OUTPT VISIT, EST, LEVL IV, 30-39 MIN: ICD-10-PCS | Mod: S$PBB,,, | Performed by: PHYSICIAN ASSISTANT

## 2021-04-27 PROCEDURE — 99999 PR PBB SHADOW E&M-EST. PATIENT-LVL V: CPT | Mod: PBBFAC,,, | Performed by: PHYSICIAN ASSISTANT

## 2021-04-27 PROCEDURE — 99214 OFFICE O/P EST MOD 30 MIN: CPT | Mod: S$PBB,,, | Performed by: PHYSICIAN ASSISTANT

## 2021-04-27 PROCEDURE — 72110 X-RAY EXAM L-2 SPINE 4/>VWS: CPT | Mod: 26,,, | Performed by: RADIOLOGY

## 2021-04-27 PROCEDURE — 99999 PR PBB SHADOW E&M-EST. PATIENT-LVL V: ICD-10-PCS | Mod: PBBFAC,,, | Performed by: PHYSICIAN ASSISTANT

## 2021-04-27 PROCEDURE — 72110 XR LUMBAR SPINE 5 VIEW WITH FLEX AND EXT: ICD-10-PCS | Mod: 26,,, | Performed by: RADIOLOGY

## 2021-04-27 PROCEDURE — 72110 X-RAY EXAM L-2 SPINE 4/>VWS: CPT | Mod: TC

## 2021-04-27 PROCEDURE — 99215 OFFICE O/P EST HI 40 MIN: CPT | Mod: PBBFAC,25 | Performed by: PHYSICIAN ASSISTANT

## 2021-04-27 RX ORDER — GABAPENTIN 300 MG/1
300 CAPSULE ORAL 3 TIMES DAILY
Qty: 90 CAPSULE | Refills: 1 | Status: SHIPPED | OUTPATIENT
Start: 2021-04-27 | End: 2021-12-22 | Stop reason: SDUPTHER

## 2021-04-27 RX ORDER — OXYCODONE AND ACETAMINOPHEN 10; 325 MG/1; MG/1
1 TABLET ORAL EVERY 8 HOURS PRN
Qty: 90 TABLET | Refills: 0 | Status: SHIPPED | OUTPATIENT
Start: 2021-05-19 | End: 2021-06-18

## 2021-04-27 RX ORDER — OXYCODONE AND ACETAMINOPHEN 10; 325 MG/1; MG/1
1 TABLET ORAL EVERY 8 HOURS PRN
Qty: 90 TABLET | Refills: 0 | Status: SHIPPED | OUTPATIENT
Start: 2021-06-18 | End: 2021-07-18

## 2021-04-27 RX ORDER — TIZANIDINE 4 MG/1
2-4 TABLET ORAL 2 TIMES DAILY PRN
Qty: 60 TABLET | Refills: 0 | Status: SHIPPED | OUTPATIENT
Start: 2021-04-27 | End: 2021-05-27

## 2021-04-30 ENCOUNTER — EXTERNAL CHRONIC CARE MANAGEMENT (OUTPATIENT)
Dept: PRIMARY CARE CLINIC | Facility: CLINIC | Age: 61
End: 2021-04-30
Payer: MEDICARE

## 2021-04-30 PROCEDURE — 99490 PR CHRONIC CARE MGMT, 1ST 20 MIN: ICD-10-PCS | Mod: S$PBB,,, | Performed by: INTERNAL MEDICINE

## 2021-04-30 PROCEDURE — 99490 CHRNC CARE MGMT STAFF 1ST 20: CPT | Mod: S$PBB,,, | Performed by: INTERNAL MEDICINE

## 2021-04-30 PROCEDURE — 99490 CHRNC CARE MGMT STAFF 1ST 20: CPT | Mod: PBBFAC,PO | Performed by: INTERNAL MEDICINE

## 2021-05-19 ENCOUNTER — PATIENT OUTREACH (OUTPATIENT)
Dept: ADMINISTRATIVE | Facility: HOSPITAL | Age: 61
End: 2021-05-19

## 2021-05-20 ENCOUNTER — PATIENT MESSAGE (OUTPATIENT)
Dept: NEPHROLOGY | Facility: CLINIC | Age: 61
End: 2021-05-20

## 2021-05-20 ENCOUNTER — PATIENT MESSAGE (OUTPATIENT)
Dept: INTERNAL MEDICINE | Facility: CLINIC | Age: 61
End: 2021-05-20

## 2021-05-20 DIAGNOSIS — E55.9 VITAMIN D DEFICIENCY: ICD-10-CM

## 2021-05-20 RX ORDER — AMOXICILLIN AND CLAVULANATE POTASSIUM 875; 125 MG/1; MG/1
1 TABLET, FILM COATED ORAL 2 TIMES DAILY
Qty: 20 TABLET | Refills: 0 | Status: SHIPPED | OUTPATIENT
Start: 2021-05-20 | End: 2021-05-30

## 2021-05-23 ENCOUNTER — PATIENT MESSAGE (OUTPATIENT)
Dept: PAIN MEDICINE | Facility: CLINIC | Age: 61
End: 2021-05-23

## 2021-05-24 RX ORDER — ERGOCALCIFEROL 1.25 MG/1
50000 CAPSULE ORAL
Qty: 4 CAPSULE | Refills: 11 | Status: SHIPPED | OUTPATIENT
Start: 2021-05-24 | End: 2021-05-27 | Stop reason: SDUPTHER

## 2021-05-27 ENCOUNTER — PATIENT OUTREACH (OUTPATIENT)
Dept: ADMINISTRATIVE | Facility: OTHER | Age: 61
End: 2021-05-27

## 2021-05-27 DIAGNOSIS — E55.9 VITAMIN D DEFICIENCY: ICD-10-CM

## 2021-05-28 RX ORDER — ERGOCALCIFEROL 1.25 MG/1
50000 CAPSULE ORAL
Qty: 4 CAPSULE | Refills: 11 | Status: SHIPPED | OUTPATIENT
Start: 2021-05-28

## 2021-05-31 ENCOUNTER — EXTERNAL CHRONIC CARE MANAGEMENT (OUTPATIENT)
Dept: PRIMARY CARE CLINIC | Facility: CLINIC | Age: 61
End: 2021-05-31
Payer: MEDICARE

## 2021-05-31 PROCEDURE — 99490 CHRNC CARE MGMT STAFF 1ST 20: CPT | Mod: S$PBB,,, | Performed by: INTERNAL MEDICINE

## 2021-05-31 PROCEDURE — 99490 CHRNC CARE MGMT STAFF 1ST 20: CPT | Mod: PBBFAC,PO | Performed by: INTERNAL MEDICINE

## 2021-05-31 PROCEDURE — 99490 PR CHRONIC CARE MGMT, 1ST 20 MIN: ICD-10-PCS | Mod: S$PBB,,, | Performed by: INTERNAL MEDICINE

## 2021-06-02 ENCOUNTER — PATIENT MESSAGE (OUTPATIENT)
Dept: INTERNAL MEDICINE | Facility: CLINIC | Age: 61
End: 2021-06-02

## 2021-06-02 ENCOUNTER — TELEPHONE (OUTPATIENT)
Dept: ADMINISTRATIVE | Facility: HOSPITAL | Age: 61
End: 2021-06-02

## 2021-06-02 DIAGNOSIS — H92.09 OTALGIA, UNSPECIFIED LATERALITY: Primary | ICD-10-CM

## 2021-06-09 ENCOUNTER — PATIENT MESSAGE (OUTPATIENT)
Dept: FAMILY MEDICINE | Facility: CLINIC | Age: 61
End: 2021-06-09

## 2021-06-09 DIAGNOSIS — E11.65 UNCONTROLLED TYPE 2 DIABETES MELLITUS WITH HYPERGLYCEMIA, WITH LONG-TERM CURRENT USE OF INSULIN: ICD-10-CM

## 2021-06-09 DIAGNOSIS — Z79.4 UNCONTROLLED TYPE 2 DIABETES MELLITUS WITH HYPERGLYCEMIA, WITH LONG-TERM CURRENT USE OF INSULIN: ICD-10-CM

## 2021-06-09 RX ORDER — INSULIN LISPRO 100 [IU]/ML
INJECTION, SOLUTION INTRAVENOUS; SUBCUTANEOUS
Qty: 45 ML | Refills: 11 | Status: SHIPPED | OUTPATIENT
Start: 2021-06-09 | End: 2021-06-17 | Stop reason: SDUPTHER

## 2021-06-10 ENCOUNTER — HOSPITAL ENCOUNTER (OUTPATIENT)
Dept: RADIOLOGY | Facility: HOSPITAL | Age: 61
Discharge: HOME | End: 2021-06-10
Attending: INTERNAL MEDICINE
Payer: MEDICARE

## 2021-06-10 DIAGNOSIS — Z12.31 ENCOUNTER FOR SCREENING MAMMOGRAM FOR MALIGNANT NEOPLASM OF BREAST: ICD-10-CM

## 2021-06-10 PROCEDURE — 77063 MAMMO DIGITAL SCREENING BILAT WITH TOMO: ICD-10-PCS | Mod: 26,,, | Performed by: RADIOLOGY

## 2021-06-10 PROCEDURE — 77067 SCR MAMMO BI INCL CAD: CPT | Mod: 26,,, | Performed by: RADIOLOGY

## 2021-06-10 PROCEDURE — 77063 BREAST TOMOSYNTHESIS BI: CPT | Mod: 26,,, | Performed by: RADIOLOGY

## 2021-06-10 PROCEDURE — 77067 SCR MAMMO BI INCL CAD: CPT | Mod: TC

## 2021-06-10 PROCEDURE — 77067 MAMMO DIGITAL SCREENING BILAT WITH TOMO: ICD-10-PCS | Mod: 26,,, | Performed by: RADIOLOGY

## 2021-06-16 ENCOUNTER — OFFICE VISIT (OUTPATIENT)
Dept: OTOLARYNGOLOGY | Facility: CLINIC | Age: 61
End: 2021-06-16
Payer: MEDICARE

## 2021-06-16 ENCOUNTER — PATIENT MESSAGE (OUTPATIENT)
Dept: FAMILY MEDICINE | Facility: CLINIC | Age: 61
End: 2021-06-16

## 2021-06-16 ENCOUNTER — CLINICAL SUPPORT (OUTPATIENT)
Dept: AUDIOLOGY | Facility: CLINIC | Age: 61
End: 2021-06-16
Payer: MEDICARE

## 2021-06-16 VITALS
BODY MASS INDEX: 36.7 KG/M2 | HEIGHT: 66 IN | HEART RATE: 69 BPM | WEIGHT: 228.38 LBS | SYSTOLIC BLOOD PRESSURE: 147 MMHG | DIASTOLIC BLOOD PRESSURE: 93 MMHG

## 2021-06-16 DIAGNOSIS — H92.03 OTALGIA OF BOTH EARS: ICD-10-CM

## 2021-06-16 DIAGNOSIS — H60.8X3 CHRONIC ECZEMATOUS OTITIS EXTERNA OF BOTH EARS: Primary | ICD-10-CM

## 2021-06-16 DIAGNOSIS — H92.09 OTALGIA, UNSPECIFIED LATERALITY: ICD-10-CM

## 2021-06-16 DIAGNOSIS — M26.621 ARTHRALGIA OF RIGHT TEMPOROMANDIBULAR JOINT: ICD-10-CM

## 2021-06-16 DIAGNOSIS — L29.9 EAR ITCHING: Primary | ICD-10-CM

## 2021-06-16 PROCEDURE — 99999 PR PBB SHADOW E&M-EST. PATIENT-LVL V: CPT | Mod: PBBFAC,,, | Performed by: ORTHOPAEDIC SURGERY

## 2021-06-16 PROCEDURE — 99215 OFFICE O/P EST HI 40 MIN: CPT | Mod: PBBFAC,25 | Performed by: ORTHOPAEDIC SURGERY

## 2021-06-16 PROCEDURE — 92567 TYMPANOMETRY: CPT | Mod: PBBFAC | Performed by: AUDIOLOGIST-HEARING AID FITTER

## 2021-06-16 PROCEDURE — 99203 PR OFFICE/OUTPT VISIT, NEW, LEVL III, 30-44 MIN: ICD-10-PCS | Mod: S$PBB,,, | Performed by: ORTHOPAEDIC SURGERY

## 2021-06-16 PROCEDURE — 99999 PR PBB SHADOW E&M-EST. PATIENT-LVL V: ICD-10-PCS | Mod: PBBFAC,,, | Performed by: ORTHOPAEDIC SURGERY

## 2021-06-16 PROCEDURE — 99203 OFFICE O/P NEW LOW 30 MIN: CPT | Mod: S$PBB,,, | Performed by: ORTHOPAEDIC SURGERY

## 2021-06-17 ENCOUNTER — OFFICE VISIT (OUTPATIENT)
Dept: FAMILY MEDICINE | Facility: CLINIC | Age: 61
End: 2021-06-17
Payer: MEDICARE

## 2021-06-17 VITALS — DIASTOLIC BLOOD PRESSURE: 82 MMHG | SYSTOLIC BLOOD PRESSURE: 138 MMHG

## 2021-06-17 DIAGNOSIS — E03.9 ACQUIRED HYPOTHYROIDISM: ICD-10-CM

## 2021-06-17 DIAGNOSIS — Z79.4 UNCONTROLLED TYPE 2 DIABETES MELLITUS WITH HYPERGLYCEMIA, WITH LONG-TERM CURRENT USE OF INSULIN: ICD-10-CM

## 2021-06-17 DIAGNOSIS — Z79.4 TYPE 2 DIABETES MELLITUS WITH HYPEROSMOLARITY WITHOUT COMA, WITH LONG-TERM CURRENT USE OF INSULIN: ICD-10-CM

## 2021-06-17 DIAGNOSIS — E11.59 HYPERTENSION ASSOCIATED WITH DIABETES: Primary | ICD-10-CM

## 2021-06-17 DIAGNOSIS — N18.31 STAGE 3A CHRONIC KIDNEY DISEASE: ICD-10-CM

## 2021-06-17 DIAGNOSIS — E11.65 UNCONTROLLED TYPE 2 DIABETES MELLITUS WITH HYPERGLYCEMIA, WITH LONG-TERM CURRENT USE OF INSULIN: ICD-10-CM

## 2021-06-17 DIAGNOSIS — E11.00 TYPE 2 DIABETES MELLITUS WITH HYPEROSMOLARITY WITHOUT COMA, WITH LONG-TERM CURRENT USE OF INSULIN: ICD-10-CM

## 2021-06-17 DIAGNOSIS — G47.34 SLEEP-RELATED NONOBSTRUCTIVE ALVEOLAR HYPOVENTILATION: ICD-10-CM

## 2021-06-17 DIAGNOSIS — I15.2 HYPERTENSION ASSOCIATED WITH DIABETES: Primary | ICD-10-CM

## 2021-06-17 DIAGNOSIS — M81.0 AGE-RELATED OSTEOPOROSIS WITHOUT CURRENT PATHOLOGICAL FRACTURE: ICD-10-CM

## 2021-06-17 DIAGNOSIS — J41.0 SIMPLE CHRONIC BRONCHITIS: ICD-10-CM

## 2021-06-17 PROCEDURE — 99214 PR OFFICE/OUTPT VISIT, EST, LEVL IV, 30-39 MIN: ICD-10-PCS | Mod: 95,,, | Performed by: INTERNAL MEDICINE

## 2021-06-17 PROCEDURE — 99214 OFFICE O/P EST MOD 30 MIN: CPT | Mod: 95,,, | Performed by: INTERNAL MEDICINE

## 2021-06-17 RX ORDER — INSULIN LISPRO 100 [IU]/ML
INJECTION, SOLUTION INTRAVENOUS; SUBCUTANEOUS
Qty: 45 ML | Refills: 11 | Status: SHIPPED | OUTPATIENT
Start: 2021-06-17 | End: 2022-02-02

## 2021-06-30 ENCOUNTER — EXTERNAL CHRONIC CARE MANAGEMENT (OUTPATIENT)
Dept: PRIMARY CARE CLINIC | Facility: CLINIC | Age: 61
End: 2021-06-30
Payer: MEDICARE

## 2021-06-30 PROCEDURE — 99490 CHRNC CARE MGMT STAFF 1ST 20: CPT | Mod: PBBFAC,PO | Performed by: INTERNAL MEDICINE

## 2021-06-30 PROCEDURE — 99490 PR CHRONIC CARE MGMT, 1ST 20 MIN: ICD-10-PCS | Mod: S$PBB,,, | Performed by: INTERNAL MEDICINE

## 2021-06-30 PROCEDURE — 99490 CHRNC CARE MGMT STAFF 1ST 20: CPT | Mod: S$PBB,,, | Performed by: INTERNAL MEDICINE

## 2021-07-07 ENCOUNTER — PATIENT MESSAGE (OUTPATIENT)
Dept: PAIN MEDICINE | Facility: CLINIC | Age: 61
End: 2021-07-07

## 2021-07-07 ENCOUNTER — TELEPHONE (OUTPATIENT)
Dept: TRANSPLANT | Facility: CLINIC | Age: 61
End: 2021-07-07

## 2021-07-07 DIAGNOSIS — Z79.899 ENCOUNTER FOR LONG-TERM (CURRENT) USE OF HIGH-RISK MEDICATION: ICD-10-CM

## 2021-07-07 DIAGNOSIS — R80.9 PROTEINURIA, UNSPECIFIED TYPE: ICD-10-CM

## 2021-07-07 DIAGNOSIS — Z94.0 KIDNEY REPLACED BY TRANSPLANT: Primary | ICD-10-CM

## 2021-07-08 DIAGNOSIS — Z94.0 KIDNEY REPLACED BY TRANSPLANT: Primary | ICD-10-CM

## 2021-07-26 ENCOUNTER — TELEPHONE (OUTPATIENT)
Dept: RHEUMATOLOGY | Facility: CLINIC | Age: 61
End: 2021-07-26

## 2021-07-27 ENCOUNTER — TELEPHONE (OUTPATIENT)
Dept: RHEUMATOLOGY | Facility: CLINIC | Age: 61
End: 2021-07-27

## 2021-07-31 ENCOUNTER — EXTERNAL CHRONIC CARE MANAGEMENT (OUTPATIENT)
Dept: PRIMARY CARE CLINIC | Facility: CLINIC | Age: 61
End: 2021-07-31
Payer: MEDICARE

## 2021-07-31 PROCEDURE — 99490 CHRNC CARE MGMT STAFF 1ST 20: CPT | Mod: PBBFAC,PO | Performed by: INTERNAL MEDICINE

## 2021-07-31 PROCEDURE — 99490 PR CHRONIC CARE MGMT, 1ST 20 MIN: ICD-10-PCS | Mod: S$PBB,,, | Performed by: INTERNAL MEDICINE

## 2021-07-31 PROCEDURE — 99490 CHRNC CARE MGMT STAFF 1ST 20: CPT | Mod: S$PBB,,, | Performed by: INTERNAL MEDICINE

## 2021-08-03 ENCOUNTER — PATIENT OUTREACH (OUTPATIENT)
Dept: ADMINISTRATIVE | Facility: OTHER | Age: 61
End: 2021-08-03

## 2021-08-04 ENCOUNTER — OFFICE VISIT (OUTPATIENT)
Dept: PAIN MEDICINE | Facility: CLINIC | Age: 61
End: 2021-08-04
Payer: MEDICARE

## 2021-08-04 ENCOUNTER — PATIENT MESSAGE (OUTPATIENT)
Dept: PAIN MEDICINE | Facility: CLINIC | Age: 61
End: 2021-08-04

## 2021-08-04 VITALS — BODY MASS INDEX: 36.7 KG/M2 | WEIGHT: 228.38 LBS | HEIGHT: 66 IN | RESPIRATION RATE: 17 BRPM

## 2021-08-04 DIAGNOSIS — M51.36 DDD (DEGENERATIVE DISC DISEASE), LUMBAR: ICD-10-CM

## 2021-08-04 DIAGNOSIS — M25.562 CHRONIC PAIN OF BOTH KNEES: ICD-10-CM

## 2021-08-04 DIAGNOSIS — M25.552 LEFT HIP PAIN: Primary | ICD-10-CM

## 2021-08-04 DIAGNOSIS — M54.59 LUMBAR FACET JOINT PAIN: ICD-10-CM

## 2021-08-04 DIAGNOSIS — G89.29 CHRONIC PAIN OF BOTH KNEES: ICD-10-CM

## 2021-08-04 DIAGNOSIS — M17.0 PRIMARY OSTEOARTHRITIS OF BOTH KNEES: ICD-10-CM

## 2021-08-04 DIAGNOSIS — M51.36 DDD (DEGENERATIVE DISC DISEASE), LUMBAR: Primary | ICD-10-CM

## 2021-08-04 DIAGNOSIS — Z79.891 OPIOID USE AGREEMENT EXISTS: ICD-10-CM

## 2021-08-04 DIAGNOSIS — M25.561 CHRONIC PAIN OF BOTH KNEES: ICD-10-CM

## 2021-08-04 PROCEDURE — 99214 PR OFFICE/OUTPT VISIT, EST, LEVL IV, 30-39 MIN: ICD-10-PCS | Mod: 95,,, | Performed by: PHYSICIAN ASSISTANT

## 2021-08-04 PROCEDURE — 99214 OFFICE O/P EST MOD 30 MIN: CPT | Mod: 95,,, | Performed by: PHYSICIAN ASSISTANT

## 2021-08-04 RX ORDER — OXYCODONE AND ACETAMINOPHEN 10; 325 MG/1; MG/1
1 TABLET ORAL EVERY 8 HOURS PRN
Qty: 90 TABLET | Refills: 0 | Status: SHIPPED | OUTPATIENT
Start: 2021-08-04 | End: 2021-09-03

## 2021-08-04 RX ORDER — OXYCODONE AND ACETAMINOPHEN 10; 325 MG/1; MG/1
1 TABLET ORAL EVERY 8 HOURS PRN
Qty: 90 TABLET | Refills: 0 | Status: SHIPPED | OUTPATIENT
Start: 2021-09-03 | End: 2021-10-12 | Stop reason: SDUPTHER

## 2021-08-31 ENCOUNTER — EXTERNAL CHRONIC CARE MANAGEMENT (OUTPATIENT)
Dept: PRIMARY CARE CLINIC | Facility: CLINIC | Age: 61
End: 2021-08-31
Payer: MEDICARE

## 2021-08-31 PROCEDURE — 99490 CHRNC CARE MGMT STAFF 1ST 20: CPT | Mod: PBBFAC,PO | Performed by: INTERNAL MEDICINE

## 2021-08-31 PROCEDURE — 99490 CHRNC CARE MGMT STAFF 1ST 20: CPT | Mod: S$PBB,,, | Performed by: INTERNAL MEDICINE

## 2021-08-31 PROCEDURE — 99490 PR CHRONIC CARE MGMT, 1ST 20 MIN: ICD-10-PCS | Mod: S$PBB,,, | Performed by: INTERNAL MEDICINE

## 2021-09-16 ENCOUNTER — PATIENT MESSAGE (OUTPATIENT)
Dept: TRANSPLANT | Facility: CLINIC | Age: 61
End: 2021-09-16

## 2021-09-16 RX ORDER — TACROLIMUS 1 MG/1
CAPSULE ORAL
Qty: 270 CAPSULE | Refills: 0 | Status: SHIPPED | OUTPATIENT
Start: 2021-09-16 | End: 2021-09-27 | Stop reason: SDUPTHER

## 2021-09-16 RX ORDER — MYCOPHENOLATE MOFETIL 250 MG/1
CAPSULE ORAL
Qty: 120 CAPSULE | Refills: 0 | Status: SHIPPED | OUTPATIENT
Start: 2021-09-16 | End: 2021-09-27 | Stop reason: SDUPTHER

## 2021-09-17 ENCOUNTER — TELEPHONE (OUTPATIENT)
Dept: TRANSPLANT | Facility: CLINIC | Age: 61
End: 2021-09-17

## 2021-09-17 DIAGNOSIS — I10 HTN (HYPERTENSION), BENIGN: ICD-10-CM

## 2021-09-19 RX ORDER — NIFEDIPINE 30 MG/1
30 TABLET, EXTENDED RELEASE ORAL DAILY
Qty: 30 TABLET | Refills: 11 | Status: SHIPPED | OUTPATIENT
Start: 2021-09-19 | End: 2022-08-09 | Stop reason: SDUPTHER

## 2021-09-22 ENCOUNTER — LAB VISIT (OUTPATIENT)
Dept: LAB | Facility: HOSPITAL | Age: 61
End: 2021-09-22
Attending: NURSE PRACTITIONER
Payer: MEDICARE

## 2021-09-22 DIAGNOSIS — Z94.0 KIDNEY REPLACED BY TRANSPLANT: ICD-10-CM

## 2021-09-22 LAB
ALBUMIN SERPL BCP-MCNC: 3.7 G/DL (ref 3.5–5.2)
ALBUMIN SERPL BCP-MCNC: 3.7 G/DL (ref 3.5–5.2)
ALP SERPL-CCNC: 129 U/L (ref 55–135)
ALT SERPL W/O P-5'-P-CCNC: 11 U/L (ref 10–44)
ANION GAP SERPL CALC-SCNC: 12 MMOL/L (ref 8–16)
AST SERPL-CCNC: 14 U/L (ref 10–40)
BASOPHILS # BLD AUTO: 0.06 K/UL (ref 0–0.2)
BASOPHILS NFR BLD: 0.6 % (ref 0–1.9)
BILIRUB DIRECT SERPL-MCNC: 0.2 MG/DL (ref 0.1–0.3)
BILIRUB SERPL-MCNC: 0.4 MG/DL (ref 0.1–1)
BUN SERPL-MCNC: 17 MG/DL (ref 8–23)
CALCIUM SERPL-MCNC: 10 MG/DL (ref 8.7–10.5)
CHLORIDE SERPL-SCNC: 103 MMOL/L (ref 95–110)
CO2 SERPL-SCNC: 25 MMOL/L (ref 23–29)
CREAT SERPL-MCNC: 0.9 MG/DL (ref 0.5–1.4)
DIFFERENTIAL METHOD: ABNORMAL
EOSINOPHIL # BLD AUTO: 0.3 K/UL (ref 0–0.5)
EOSINOPHIL NFR BLD: 3 % (ref 0–8)
ERYTHROCYTE [DISTWIDTH] IN BLOOD BY AUTOMATED COUNT: 14.5 % (ref 11.5–14.5)
EST. GFR  (AFRICAN AMERICAN): >60 ML/MIN/1.73 M^2
EST. GFR  (NON AFRICAN AMERICAN): >60 ML/MIN/1.73 M^2
GLUCOSE SERPL-MCNC: 66 MG/DL (ref 70–110)
HCT VFR BLD AUTO: 39.5 % (ref 37–48.5)
HGB BLD-MCNC: 12.1 G/DL (ref 12–16)
IMM GRANULOCYTES # BLD AUTO: 0.06 K/UL (ref 0–0.04)
IMM GRANULOCYTES NFR BLD AUTO: 0.6 % (ref 0–0.5)
LYMPHOCYTES # BLD AUTO: 3 K/UL (ref 1–4.8)
LYMPHOCYTES NFR BLD: 27.9 % (ref 18–48)
MAGNESIUM SERPL-MCNC: 1.5 MG/DL (ref 1.6–2.6)
MCH RBC QN AUTO: 26 PG (ref 27–31)
MCHC RBC AUTO-ENTMCNC: 30.6 G/DL (ref 32–36)
MCV RBC AUTO: 85 FL (ref 82–98)
MONOCYTES # BLD AUTO: 0.6 K/UL (ref 0.3–1)
MONOCYTES NFR BLD: 5.6 % (ref 4–15)
NEUTROPHILS # BLD AUTO: 6.6 K/UL (ref 1.8–7.7)
NEUTROPHILS NFR BLD: 62.3 % (ref 38–73)
NRBC BLD-RTO: 0 /100 WBC
PHOSPHATE SERPL-MCNC: 3.9 MG/DL (ref 2.7–4.5)
PLATELET # BLD AUTO: 233 K/UL (ref 150–450)
PMV BLD AUTO: 12.6 FL (ref 9.2–12.9)
POTASSIUM SERPL-SCNC: 3.6 MMOL/L (ref 3.5–5.1)
PROT SERPL-MCNC: 7.6 G/DL (ref 6–8.4)
RBC # BLD AUTO: 4.66 M/UL (ref 4–5.4)
SODIUM SERPL-SCNC: 140 MMOL/L (ref 136–145)
WBC # BLD AUTO: 10.59 K/UL (ref 3.9–12.7)

## 2021-09-22 PROCEDURE — 80069 RENAL FUNCTION PANEL: CPT | Performed by: NURSE PRACTITIONER

## 2021-09-22 PROCEDURE — 84075 ASSAY ALKALINE PHOSPHATASE: CPT | Performed by: NURSE PRACTITIONER

## 2021-09-22 PROCEDURE — 80197 ASSAY OF TACROLIMUS: CPT | Performed by: NURSE PRACTITIONER

## 2021-09-22 PROCEDURE — 82247 BILIRUBIN TOTAL: CPT | Performed by: NURSE PRACTITIONER

## 2021-09-22 PROCEDURE — 83735 ASSAY OF MAGNESIUM: CPT | Performed by: NURSE PRACTITIONER

## 2021-09-22 PROCEDURE — 84450 TRANSFERASE (AST) (SGOT): CPT | Performed by: NURSE PRACTITIONER

## 2021-09-22 PROCEDURE — 36415 COLL VENOUS BLD VENIPUNCTURE: CPT | Performed by: NURSE PRACTITIONER

## 2021-09-22 PROCEDURE — 85025 COMPLETE CBC W/AUTO DIFF WBC: CPT | Performed by: NURSE PRACTITIONER

## 2021-09-23 LAB
TACROLIMUS BLD-MCNC: 5.1 NG/ML (ref 5–15)
TACROLIMUS, NORMALIZED: 4.7 NG/ML (ref 5–15)

## 2021-09-24 ENCOUNTER — PATIENT MESSAGE (OUTPATIENT)
Dept: PAIN MEDICINE | Facility: CLINIC | Age: 61
End: 2021-09-24

## 2021-09-28 ENCOUNTER — PATIENT MESSAGE (OUTPATIENT)
Dept: PAIN MEDICINE | Facility: CLINIC | Age: 61
End: 2021-09-28

## 2021-09-28 ENCOUNTER — OFFICE VISIT (OUTPATIENT)
Dept: TRANSPLANT | Facility: CLINIC | Age: 61
End: 2021-09-28
Payer: MEDICARE

## 2021-09-28 DIAGNOSIS — E11.22 CONTROLLED TYPE 2 DIABETES MELLITUS WITH STAGE 2 CHRONIC KIDNEY DISEASE, WITH LONG-TERM CURRENT USE OF INSULIN: ICD-10-CM

## 2021-09-28 DIAGNOSIS — I12.9 HYPERTENSION, RENAL: Chronic | ICD-10-CM

## 2021-09-28 DIAGNOSIS — N18.2 CKD (CHRONIC KIDNEY DISEASE), STAGE II: ICD-10-CM

## 2021-09-28 DIAGNOSIS — Z79.4 CONTROLLED TYPE 2 DIABETES MELLITUS WITH STAGE 2 CHRONIC KIDNEY DISEASE, WITH LONG-TERM CURRENT USE OF INSULIN: ICD-10-CM

## 2021-09-28 DIAGNOSIS — N18.2 CONTROLLED TYPE 2 DIABETES MELLITUS WITH STAGE 2 CHRONIC KIDNEY DISEASE, WITH LONG-TERM CURRENT USE OF INSULIN: ICD-10-CM

## 2021-09-28 DIAGNOSIS — Z94.0 DECEASED-DONOR KIDNEY TRANSPLANT: Primary | Chronic | ICD-10-CM

## 2021-09-28 DIAGNOSIS — Z91.89 AT RISK FOR OPPORTUNISTIC INFECTIONS: ICD-10-CM

## 2021-09-28 DIAGNOSIS — D84.9 IMMUNOSUPPRESSED STATUS: ICD-10-CM

## 2021-09-28 PROCEDURE — 99214 PR OFFICE/OUTPT VISIT, EST, LEVL IV, 30-39 MIN: ICD-10-PCS | Mod: 95,,, | Performed by: NURSE PRACTITIONER

## 2021-09-28 PROCEDURE — 99214 OFFICE O/P EST MOD 30 MIN: CPT | Mod: 95,,, | Performed by: NURSE PRACTITIONER

## 2021-09-28 RX ORDER — MYCOPHENOLATE MOFETIL 250 MG/1
500 CAPSULE ORAL 2 TIMES DAILY
Qty: 120 CAPSULE | Refills: 11 | Status: SHIPPED | OUTPATIENT
Start: 2021-09-28 | End: 2022-10-13 | Stop reason: SDUPTHER

## 2021-09-28 RX ORDER — TACROLIMUS 1 MG/1
CAPSULE ORAL
Qty: 180 CAPSULE | Refills: 11 | Status: SHIPPED | OUTPATIENT
Start: 2021-09-28 | End: 2022-10-13 | Stop reason: SDUPTHER

## 2021-09-30 ENCOUNTER — EXTERNAL CHRONIC CARE MANAGEMENT (OUTPATIENT)
Dept: PRIMARY CARE CLINIC | Facility: CLINIC | Age: 61
End: 2021-09-30
Payer: MEDICARE

## 2021-09-30 PROCEDURE — 99490 PR CHRONIC CARE MGMT, 1ST 20 MIN: ICD-10-PCS | Mod: S$PBB,,, | Performed by: INTERNAL MEDICINE

## 2021-09-30 PROCEDURE — 99490 CHRNC CARE MGMT STAFF 1ST 20: CPT | Mod: S$PBB,,, | Performed by: INTERNAL MEDICINE

## 2021-09-30 PROCEDURE — 99439 CHRNC CARE MGMT STAF EA ADDL: CPT | Mod: S$PBB,,, | Performed by: INTERNAL MEDICINE

## 2021-09-30 PROCEDURE — 99490 CHRNC CARE MGMT STAFF 1ST 20: CPT | Mod: PBBFAC,25,PO | Performed by: INTERNAL MEDICINE

## 2021-09-30 PROCEDURE — 99439 PR CHRONIC CARE MGMT, EA ADDTL 20 MIN: ICD-10-PCS | Mod: S$PBB,,, | Performed by: INTERNAL MEDICINE

## 2021-09-30 PROCEDURE — 99439 CHRNC CARE MGMT STAF EA ADDL: CPT | Mod: PBBFAC,PO | Performed by: INTERNAL MEDICINE

## 2021-10-08 ENCOUNTER — TELEPHONE (OUTPATIENT)
Dept: RADIOLOGY | Facility: HOSPITAL | Age: 61
End: 2021-10-08

## 2021-10-09 ENCOUNTER — HOSPITAL ENCOUNTER (OUTPATIENT)
Dept: RADIOLOGY | Facility: HOSPITAL | Age: 61
Discharge: HOME | End: 2021-10-09
Attending: PHYSICIAN ASSISTANT
Payer: MEDICARE

## 2021-10-09 DIAGNOSIS — M54.16 LEFT LUMBAR RADICULOPATHY: ICD-10-CM

## 2021-10-09 PROCEDURE — 72148 MRI LUMBAR SPINE W/O DYE: CPT | Mod: TC

## 2021-10-09 PROCEDURE — 72148 MRI LUMBAR SPINE W/O DYE: CPT | Mod: 26,,, | Performed by: RADIOLOGY

## 2021-10-09 PROCEDURE — 72148 MRI LUMBAR SPINE WITHOUT CONTRAST: ICD-10-PCS | Mod: 26,,, | Performed by: RADIOLOGY

## 2021-10-11 ENCOUNTER — PATIENT OUTREACH (OUTPATIENT)
Dept: ADMINISTRATIVE | Facility: OTHER | Age: 61
End: 2021-10-11

## 2021-10-12 ENCOUNTER — TELEPHONE (OUTPATIENT)
Dept: ORTHOPEDICS | Facility: CLINIC | Age: 61
End: 2021-10-12

## 2021-10-12 ENCOUNTER — LAB VISIT (OUTPATIENT)
Dept: LAB | Facility: HOSPITAL | Age: 61
End: 2021-10-12
Attending: INTERNAL MEDICINE
Payer: MEDICARE

## 2021-10-12 ENCOUNTER — TELEPHONE (OUTPATIENT)
Dept: PAIN MEDICINE | Facility: CLINIC | Age: 61
End: 2021-10-12

## 2021-10-12 ENCOUNTER — OFFICE VISIT (OUTPATIENT)
Dept: PAIN MEDICINE | Facility: CLINIC | Age: 61
End: 2021-10-12
Payer: MEDICARE

## 2021-10-12 VITALS — BODY MASS INDEX: 37.77 KG/M2 | HEIGHT: 66 IN | WEIGHT: 235 LBS | RESPIRATION RATE: 17 BRPM

## 2021-10-12 DIAGNOSIS — E11.00 TYPE 2 DIABETES MELLITUS WITH HYPEROSMOLARITY WITHOUT COMA, WITH LONG-TERM CURRENT USE OF INSULIN: ICD-10-CM

## 2021-10-12 DIAGNOSIS — Z79.891 OPIOID USE AGREEMENT EXISTS: ICD-10-CM

## 2021-10-12 DIAGNOSIS — M51.36 DDD (DEGENERATIVE DISC DISEASE), LUMBAR: ICD-10-CM

## 2021-10-12 DIAGNOSIS — Z79.4 TYPE 2 DIABETES MELLITUS WITH HYPEROSMOLARITY WITHOUT COMA, WITH LONG-TERM CURRENT USE OF INSULIN: ICD-10-CM

## 2021-10-12 DIAGNOSIS — M25.561 CHRONIC PAIN OF BOTH KNEES: ICD-10-CM

## 2021-10-12 DIAGNOSIS — M25.562 CHRONIC PAIN OF BOTH KNEES: ICD-10-CM

## 2021-10-12 DIAGNOSIS — M54.16 BILATERAL LUMBAR RADICULOPATHY: ICD-10-CM

## 2021-10-12 DIAGNOSIS — G89.29 CHRONIC PAIN OF BOTH KNEES: ICD-10-CM

## 2021-10-12 DIAGNOSIS — M79.18 MUSCLE PAIN, MYOFASCIAL: Primary | ICD-10-CM

## 2021-10-12 LAB
ESTIMATED AVG GLUCOSE: 157 MG/DL (ref 68–131)
HBA1C MFR BLD: 7.1 % (ref 4–5.6)

## 2021-10-12 PROCEDURE — 99214 PR OFFICE/OUTPT VISIT, EST, LEVL IV, 30-39 MIN: ICD-10-PCS | Mod: 95,,, | Performed by: PHYSICIAN ASSISTANT

## 2021-10-12 PROCEDURE — 83036 HEMOGLOBIN GLYCOSYLATED A1C: CPT | Performed by: INTERNAL MEDICINE

## 2021-10-12 PROCEDURE — 36415 COLL VENOUS BLD VENIPUNCTURE: CPT | Mod: PO | Performed by: INTERNAL MEDICINE

## 2021-10-12 PROCEDURE — 99214 OFFICE O/P EST MOD 30 MIN: CPT | Mod: 95,,, | Performed by: PHYSICIAN ASSISTANT

## 2021-10-12 RX ORDER — OXYCODONE AND ACETAMINOPHEN 10; 325 MG/1; MG/1
1 TABLET ORAL EVERY 8 HOURS PRN
Qty: 90 TABLET | Refills: 0 | Status: SHIPPED | OUTPATIENT
Start: 2021-10-12 | End: 2021-12-22 | Stop reason: SDUPTHER

## 2021-10-12 RX ORDER — OXYCODONE AND ACETAMINOPHEN 10; 325 MG/1; MG/1
1 TABLET ORAL EVERY 8 HOURS PRN
Qty: 90 TABLET | Refills: 0 | Status: SHIPPED | OUTPATIENT
Start: 2021-11-11 | End: 2021-12-22 | Stop reason: SDUPTHER

## 2021-10-12 RX ORDER — TIZANIDINE 4 MG/1
2-4 TABLET ORAL 2 TIMES DAILY PRN
Qty: 60 TABLET | Refills: 0 | Status: SHIPPED | OUTPATIENT
Start: 2021-10-12 | End: 2021-11-11

## 2021-10-13 ENCOUNTER — PATIENT MESSAGE (OUTPATIENT)
Dept: ORTHOPEDICS | Facility: CLINIC | Age: 61
End: 2021-10-13
Payer: MEDICARE

## 2021-10-13 ENCOUNTER — TELEPHONE (OUTPATIENT)
Dept: ORTHOPEDICS | Facility: CLINIC | Age: 61
End: 2021-10-13

## 2021-10-14 ENCOUNTER — TELEPHONE (OUTPATIENT)
Dept: ORTHOPEDICS | Facility: CLINIC | Age: 61
End: 2021-10-14

## 2021-10-18 ENCOUNTER — PATIENT MESSAGE (OUTPATIENT)
Dept: FAMILY MEDICINE | Facility: CLINIC | Age: 61
End: 2021-10-18
Payer: MEDICARE

## 2021-10-25 ENCOUNTER — TELEPHONE (OUTPATIENT)
Dept: PAIN MEDICINE | Facility: CLINIC | Age: 61
End: 2021-10-25
Payer: MEDICARE

## 2021-10-25 ENCOUNTER — PATIENT MESSAGE (OUTPATIENT)
Dept: PAIN MEDICINE | Facility: CLINIC | Age: 61
End: 2021-10-25
Payer: MEDICARE

## 2021-10-25 ENCOUNTER — TELEPHONE (OUTPATIENT)
Dept: RHEUMATOLOGY | Facility: CLINIC | Age: 61
End: 2021-10-25
Payer: MEDICARE

## 2021-10-26 ENCOUNTER — INFUSION (OUTPATIENT)
Dept: INFUSION THERAPY | Facility: HOSPITAL | Age: 61
End: 2021-10-26
Attending: INTERNAL MEDICINE
Payer: MEDICARE

## 2021-10-26 ENCOUNTER — OFFICE VISIT (OUTPATIENT)
Dept: RHEUMATOLOGY | Facility: CLINIC | Age: 61
End: 2021-10-26
Payer: MEDICARE

## 2021-10-26 VITALS
SYSTOLIC BLOOD PRESSURE: 153 MMHG | TEMPERATURE: 98 F | RESPIRATION RATE: 16 BRPM | OXYGEN SATURATION: 99 % | DIASTOLIC BLOOD PRESSURE: 95 MMHG | HEART RATE: 65 BPM

## 2021-10-26 VITALS
HEIGHT: 66 IN | HEART RATE: 69 BPM | DIASTOLIC BLOOD PRESSURE: 94 MMHG | BODY MASS INDEX: 38.83 KG/M2 | WEIGHT: 241.63 LBS | SYSTOLIC BLOOD PRESSURE: 153 MMHG

## 2021-10-26 DIAGNOSIS — M81.0 AGE-RELATED OSTEOPOROSIS WITHOUT CURRENT PATHOLOGICAL FRACTURE: Primary | ICD-10-CM

## 2021-10-26 DIAGNOSIS — M15.9 PRIMARY OSTEOARTHRITIS INVOLVING MULTIPLE JOINTS: ICD-10-CM

## 2021-10-26 DIAGNOSIS — E66.01 SEVERE OBESITY (BMI 35.0-39.9) WITH COMORBIDITY: ICD-10-CM

## 2021-10-26 PROCEDURE — 99215 OFFICE O/P EST HI 40 MIN: CPT | Mod: PBBFAC,25 | Performed by: INTERNAL MEDICINE

## 2021-10-26 PROCEDURE — 99999 PR PBB SHADOW E&M-EST. PATIENT-LVL V: CPT | Mod: PBBFAC,,, | Performed by: INTERNAL MEDICINE

## 2021-10-26 PROCEDURE — 63600175 PHARM REV CODE 636 W HCPCS: Mod: JG | Performed by: INTERNAL MEDICINE

## 2021-10-26 PROCEDURE — 96372 THER/PROPH/DIAG INJ SC/IM: CPT

## 2021-10-26 PROCEDURE — 99214 OFFICE O/P EST MOD 30 MIN: CPT | Mod: S$PBB,,, | Performed by: INTERNAL MEDICINE

## 2021-10-26 PROCEDURE — 99999 PR PBB SHADOW E&M-EST. PATIENT-LVL V: ICD-10-PCS | Mod: PBBFAC,,, | Performed by: INTERNAL MEDICINE

## 2021-10-26 PROCEDURE — 99214 PR OFFICE/OUTPT VISIT, EST, LEVL IV, 30-39 MIN: ICD-10-PCS | Mod: S$PBB,,, | Performed by: INTERNAL MEDICINE

## 2021-10-26 RX ADMIN — DENOSUMAB 60 MG: 60 INJECTION SUBCUTANEOUS at 12:10

## 2021-10-31 ENCOUNTER — EXTERNAL CHRONIC CARE MANAGEMENT (OUTPATIENT)
Dept: PRIMARY CARE CLINIC | Facility: CLINIC | Age: 61
End: 2021-10-31
Payer: MEDICARE

## 2021-10-31 PROCEDURE — 99490 CHRNC CARE MGMT STAFF 1ST 20: CPT | Mod: S$PBB,,, | Performed by: INTERNAL MEDICINE

## 2021-10-31 PROCEDURE — 99490 CHRNC CARE MGMT STAFF 1ST 20: CPT | Mod: PBBFAC,PO | Performed by: INTERNAL MEDICINE

## 2021-10-31 PROCEDURE — 99490 PR CHRONIC CARE MGMT, 1ST 20 MIN: ICD-10-PCS | Mod: S$PBB,,, | Performed by: INTERNAL MEDICINE

## 2021-11-04 ENCOUNTER — PATIENT MESSAGE (OUTPATIENT)
Dept: PAIN MEDICINE | Facility: HOSPITAL | Age: 61
End: 2021-11-04
Payer: MEDICARE

## 2021-11-05 ENCOUNTER — LAB VISIT (OUTPATIENT)
Dept: LAB | Facility: HOSPITAL | Age: 61
End: 2021-11-05
Attending: INTERNAL MEDICINE
Payer: MEDICARE

## 2021-11-05 DIAGNOSIS — M81.0 AGE-RELATED OSTEOPOROSIS WITHOUT CURRENT PATHOLOGICAL FRACTURE: ICD-10-CM

## 2021-11-05 LAB
ALBUMIN SERPL BCP-MCNC: 3.5 G/DL (ref 3.5–5.2)
ALP SERPL-CCNC: 154 U/L (ref 55–135)
ALT SERPL W/O P-5'-P-CCNC: 12 U/L (ref 10–44)
ANION GAP SERPL CALC-SCNC: 9 MMOL/L (ref 8–16)
AST SERPL-CCNC: 15 U/L (ref 10–40)
BILIRUB SERPL-MCNC: 0.2 MG/DL (ref 0.1–1)
BUN SERPL-MCNC: 20 MG/DL (ref 8–23)
CALCIUM SERPL-MCNC: 9.4 MG/DL (ref 8.7–10.5)
CHLORIDE SERPL-SCNC: 105 MMOL/L (ref 95–110)
CO2 SERPL-SCNC: 27 MMOL/L (ref 23–29)
CREAT SERPL-MCNC: 1 MG/DL (ref 0.5–1.4)
EST. GFR  (AFRICAN AMERICAN): >60 ML/MIN/1.73 M^2
EST. GFR  (NON AFRICAN AMERICAN): >60 ML/MIN/1.73 M^2
GLUCOSE SERPL-MCNC: 160 MG/DL (ref 70–110)
POTASSIUM SERPL-SCNC: 4 MMOL/L (ref 3.5–5.1)
PROT SERPL-MCNC: 7.4 G/DL (ref 6–8.4)
SODIUM SERPL-SCNC: 141 MMOL/L (ref 136–145)

## 2021-11-05 PROCEDURE — 80053 COMPREHEN METABOLIC PANEL: CPT | Performed by: INTERNAL MEDICINE

## 2021-11-05 PROCEDURE — 36415 COLL VENOUS BLD VENIPUNCTURE: CPT | Mod: PO | Performed by: INTERNAL MEDICINE

## 2021-11-15 ENCOUNTER — PATIENT MESSAGE (OUTPATIENT)
Dept: PAIN MEDICINE | Facility: HOSPITAL | Age: 61
End: 2021-11-15
Payer: MEDICARE

## 2021-11-16 ENCOUNTER — HOSPITAL ENCOUNTER (OUTPATIENT)
Facility: HOSPITAL | Age: 61
Discharge: HOME | End: 2021-11-16
Attending: ANESTHESIOLOGY | Admitting: ANESTHESIOLOGY
Payer: MEDICARE

## 2021-11-16 VITALS
BODY MASS INDEX: 40.04 KG/M2 | DIASTOLIC BLOOD PRESSURE: 89 MMHG | TEMPERATURE: 98 F | RESPIRATION RATE: 18 BRPM | WEIGHT: 249.13 LBS | OXYGEN SATURATION: 96 % | HEART RATE: 72 BPM | SYSTOLIC BLOOD PRESSURE: 164 MMHG | HEIGHT: 66 IN

## 2021-11-16 DIAGNOSIS — M54.16 LUMBAR RADICULOPATHY: Primary | ICD-10-CM

## 2021-11-16 LAB — POCT GLUCOSE: 63 MG/DL (ref 70–110)

## 2021-11-16 PROCEDURE — 64483 PR EPIDURAL INJ, ANES/STEROID, TRANSFORAMINAL, LUMB/SACR, SNGL LEVL: ICD-10-PCS | Mod: 50,,, | Performed by: ANESTHESIOLOGY

## 2021-11-16 PROCEDURE — 63600175 PHARM REV CODE 636 W HCPCS: Performed by: ANESTHESIOLOGY

## 2021-11-16 PROCEDURE — 64483 NJX AA&/STRD TFRM EPI L/S 1: CPT | Mod: 50 | Performed by: ANESTHESIOLOGY

## 2021-11-16 PROCEDURE — 64483 NJX AA&/STRD TFRM EPI L/S 1: CPT | Mod: 50,,, | Performed by: ANESTHESIOLOGY

## 2021-11-16 PROCEDURE — 25000003 PHARM REV CODE 250: Performed by: ANESTHESIOLOGY

## 2021-11-16 PROCEDURE — 25500020 PHARM REV CODE 255: Performed by: ANESTHESIOLOGY

## 2021-11-16 RX ORDER — DEXAMETHASONE SODIUM PHOSPHATE 10 MG/ML
INJECTION INTRAMUSCULAR; INTRAVENOUS
Status: DISCONTINUED | OUTPATIENT
Start: 2021-11-16 | End: 2021-11-16 | Stop reason: HOSPADM

## 2021-11-16 RX ORDER — INDOMETHACIN 25 MG/1
CAPSULE ORAL
Status: DISCONTINUED | OUTPATIENT
Start: 2021-11-16 | End: 2021-11-16 | Stop reason: HOSPADM

## 2021-11-16 RX ORDER — MIDAZOLAM HYDROCHLORIDE 1 MG/ML
INJECTION, SOLUTION INTRAMUSCULAR; INTRAVENOUS
Status: DISCONTINUED | OUTPATIENT
Start: 2021-11-16 | End: 2021-11-16 | Stop reason: HOSPADM

## 2021-11-16 RX ORDER — FENTANYL CITRATE 50 UG/ML
INJECTION, SOLUTION INTRAMUSCULAR; INTRAVENOUS
Status: DISCONTINUED | OUTPATIENT
Start: 2021-11-16 | End: 2021-11-16 | Stop reason: HOSPADM

## 2021-11-16 RX ORDER — TIZANIDINE HYDROCHLORIDE 6 MG/1
6 CAPSULE, GELATIN COATED ORAL 3 TIMES DAILY
COMMUNITY
End: 2021-12-22 | Stop reason: SDUPTHER

## 2021-11-16 RX ORDER — LIDOCAINE HYDROCHLORIDE 10 MG/ML
INJECTION, SOLUTION EPIDURAL; INFILTRATION; INTRACAUDAL; PERINEURAL
Status: DISCONTINUED | OUTPATIENT
Start: 2021-11-16 | End: 2021-11-16 | Stop reason: HOSPADM

## 2021-11-30 ENCOUNTER — EXTERNAL CHRONIC CARE MANAGEMENT (OUTPATIENT)
Dept: PRIMARY CARE CLINIC | Facility: CLINIC | Age: 61
End: 2021-11-30
Payer: MEDICARE

## 2021-11-30 PROCEDURE — 99490 CHRNC CARE MGMT STAFF 1ST 20: CPT | Mod: PBBFAC,PO | Performed by: INTERNAL MEDICINE

## 2021-11-30 PROCEDURE — 99490 PR CHRONIC CARE MGMT, 1ST 20 MIN: ICD-10-PCS | Mod: S$PBB,,, | Performed by: INTERNAL MEDICINE

## 2021-11-30 PROCEDURE — 99490 CHRNC CARE MGMT STAFF 1ST 20: CPT | Mod: S$PBB,,, | Performed by: INTERNAL MEDICINE

## 2021-12-07 ENCOUNTER — PATIENT MESSAGE (OUTPATIENT)
Dept: PAIN MEDICINE | Facility: CLINIC | Age: 61
End: 2021-12-07
Payer: MEDICARE

## 2021-12-14 ENCOUNTER — PATIENT OUTREACH (OUTPATIENT)
Dept: ADMINISTRATIVE | Facility: HOSPITAL | Age: 61
End: 2021-12-14
Payer: MEDICARE

## 2021-12-22 ENCOUNTER — OFFICE VISIT (OUTPATIENT)
Dept: PAIN MEDICINE | Facility: CLINIC | Age: 61
End: 2021-12-22
Payer: MEDICARE

## 2021-12-22 ENCOUNTER — PATIENT MESSAGE (OUTPATIENT)
Dept: PAIN MEDICINE | Facility: CLINIC | Age: 61
End: 2021-12-22

## 2021-12-22 VITALS — BODY MASS INDEX: 40.02 KG/M2 | WEIGHT: 249 LBS | HEIGHT: 66 IN | RESPIRATION RATE: 17 BRPM

## 2021-12-22 DIAGNOSIS — M25.561 CHRONIC PAIN OF BOTH KNEES: ICD-10-CM

## 2021-12-22 DIAGNOSIS — M25.562 CHRONIC PAIN OF BOTH KNEES: ICD-10-CM

## 2021-12-22 DIAGNOSIS — M54.16 BILATERAL LUMBAR RADICULOPATHY: Primary | ICD-10-CM

## 2021-12-22 DIAGNOSIS — M51.36 DDD (DEGENERATIVE DISC DISEASE), LUMBAR: ICD-10-CM

## 2021-12-22 DIAGNOSIS — M25.552 LEFT HIP PAIN: ICD-10-CM

## 2021-12-22 DIAGNOSIS — G89.29 CHRONIC PAIN OF BOTH KNEES: ICD-10-CM

## 2021-12-22 DIAGNOSIS — M79.18 MUSCLE PAIN, MYOFASCIAL: ICD-10-CM

## 2021-12-22 DIAGNOSIS — Z79.891 OPIOID USE AGREEMENT EXISTS: ICD-10-CM

## 2021-12-22 PROCEDURE — 99214 OFFICE O/P EST MOD 30 MIN: CPT | Mod: 95,,, | Performed by: PHYSICIAN ASSISTANT

## 2021-12-22 PROCEDURE — 99214 PR OFFICE/OUTPT VISIT, EST, LEVL IV, 30-39 MIN: ICD-10-PCS | Mod: 95,,, | Performed by: PHYSICIAN ASSISTANT

## 2021-12-22 RX ORDER — TIZANIDINE 4 MG/1
2-4 TABLET ORAL 2 TIMES DAILY PRN
Qty: 60 TABLET | Refills: 0 | Status: SHIPPED | OUTPATIENT
Start: 2021-12-22 | End: 2022-01-21

## 2021-12-22 RX ORDER — OXYCODONE AND ACETAMINOPHEN 10; 325 MG/1; MG/1
1 TABLET ORAL EVERY 8 HOURS PRN
Qty: 90 TABLET | Refills: 0 | Status: SHIPPED | OUTPATIENT
Start: 2021-12-22 | End: 2022-02-07 | Stop reason: SDUPTHER

## 2021-12-22 RX ORDER — OXYCODONE AND ACETAMINOPHEN 10; 325 MG/1; MG/1
1 TABLET ORAL EVERY 8 HOURS PRN
Qty: 90 TABLET | Refills: 0 | Status: SHIPPED | OUTPATIENT
Start: 2022-01-21 | End: 2022-02-07 | Stop reason: SDUPTHER

## 2021-12-22 RX ORDER — GABAPENTIN 300 MG/1
300 CAPSULE ORAL 3 TIMES DAILY
Qty: 90 CAPSULE | Refills: 1 | Status: SHIPPED | OUTPATIENT
Start: 2021-12-22 | End: 2022-06-10 | Stop reason: DRUGHIGH

## 2021-12-28 ENCOUNTER — PATIENT MESSAGE (OUTPATIENT)
Dept: FAMILY MEDICINE | Facility: CLINIC | Age: 61
End: 2021-12-28
Payer: MEDICARE

## 2021-12-28 ENCOUNTER — IMMUNIZATION (OUTPATIENT)
Dept: FAMILY MEDICINE | Facility: CLINIC | Age: 61
End: 2021-12-28
Payer: MEDICARE

## 2021-12-28 PROCEDURE — 90686 IIV4 VACC NO PRSV 0.5 ML IM: CPT | Mod: PBBFAC,PO

## 2021-12-31 ENCOUNTER — EXTERNAL CHRONIC CARE MANAGEMENT (OUTPATIENT)
Dept: PRIMARY CARE CLINIC | Facility: CLINIC | Age: 61
End: 2021-12-31
Payer: MEDICARE

## 2021-12-31 PROCEDURE — 99490 CHRNC CARE MGMT STAFF 1ST 20: CPT | Mod: S$PBB,,, | Performed by: INTERNAL MEDICINE

## 2021-12-31 PROCEDURE — 99490 PR CHRONIC CARE MGMT, 1ST 20 MIN: ICD-10-PCS | Mod: S$PBB,,, | Performed by: INTERNAL MEDICINE

## 2021-12-31 PROCEDURE — 99490 CHRNC CARE MGMT STAFF 1ST 20: CPT | Mod: PBBFAC,PO | Performed by: INTERNAL MEDICINE

## 2022-01-10 ENCOUNTER — PATIENT MESSAGE (OUTPATIENT)
Dept: FAMILY MEDICINE | Facility: CLINIC | Age: 62
End: 2022-01-10
Payer: MEDICARE

## 2022-01-10 RX ORDER — FUROSEMIDE 20 MG/1
20 TABLET ORAL DAILY PRN
Qty: 30 TABLET | Refills: 11 | Status: SHIPPED | OUTPATIENT
Start: 2022-01-10 | End: 2023-03-20

## 2022-01-10 NOTE — TELEPHONE ENCOUNTER
No new care gaps identified.  Powered by [employer]. Reference number: [id number].   1/10/2022 8:21:38 AM CST

## 2022-01-11 NOTE — PROGRESS NOTES
"Subjective:      Patient ID: Ana Maria Teague is a 61 y.o. female.    Chief Complaint: Leg Swelling      HPI  Pt here for follow up of medical problems and leg swelling x 2 weeks.  Now taking lasix daily, and no more swelling.  Didn't take it today yet.  Lots of stressors, lots/caring for several family members.  Activity is restricted by leg pain.  Has gained about 20#.  No f/c/sw/cough.  No cp/palp.  Some short of breath, not new.      Updated/ annual due 3/22:  HM: 12/21 fluvax, 4/21 covid vaccines/booster, 7/19 HAV, 6/15 [id number], 3/14 [id number], 6/15 TDaP, 6/21 MMG/me, 6/21 BMD with OP on prolia rep 2y, 3/16 Cscope rep 10y, 11/10 HCV neg, Pain Dr. Anaya, 2018 Eye Dr. Martin.     Review of Systems   Constitutional: Negative for chills, diaphoresis and fever.   Respiratory: Negative for cough and shortness of breath.    Cardiovascular: Negative for chest pain, palpitations and leg swelling.   Gastrointestinal: Negative for blood in stool, constipation, diarrhea, nausea and vomiting.   Genitourinary: Negative for dysuria, frequency and hematuria.   Psychiatric/Behavioral: The patient is not nervous/anxious.          Objective:   BP (!) 142/84   Pulse 77   Temp 97.8 °F (36.6 °C) (Temporal)   Ht 5' 6" (1.676 m)   Wt 115.1 kg (253 lb 12 oz)   SpO2 95%   BMI 40.96 kg/m²     Physical Exam  Constitutional:       Appearance: She is well-developed.   HENT:      Mouth/Throat:      Mouth: Oropharynx is clear and moist.   Neck:      Thyroid: No thyroid mass.      Vascular: No carotid bruit.   Cardiovascular:      Rate and Rhythm: Normal rate and regular rhythm.      Pulses: Intact distal pulses.      Heart sounds: No murmur heard.  No friction rub. No gallop.    Pulmonary:      Effort: Pulmonary effort is normal.      Breath sounds: Normal breath sounds. No wheezing or rales.   Abdominal:      General: Bowel sounds are normal.      Palpations: Abdomen is soft. There is no hepatosplenomegaly or mass.      Tenderness: There is " no abdominal tenderness.   Musculoskeletal:         General: No edema.      Cervical back: Neck supple.   Lymphadenopathy:      Cervical: No cervical adenopathy.   Neurological:      Mental Status: She is alert and oriented to person, place, and time.   Psychiatric:         Mood and Affect: Mood and affect normal.           Results for KEVAN CARBONE (MRN [id number]) as of 2022 09:45   Ref. Range 10/12/2021 08:26 10/26/2021 11:19 2021 15:54   Sodium Latest Ref Range: 136 - 145 mmol/L  142 141   Potassium Latest Ref Range: 3.5 - 5.1 mmol/L  3.8 4.0   Chloride Latest Ref Range: 95 - 110 mmol/L  104 105   CO2 Latest Ref Range: 23 - 29 mmol/L  26 27   Anion Gap Latest Ref Range: 8 - 16 mmol/L  12 9   BUN Latest Ref Range: 8 - 23 mg/dL  17 20   Creatinine Latest Ref Range: 0.5 - 1.4 mg/dL  1.3 1.0   eGFR if non African American Latest Ref Range: >60 mL/min/1.73 m^2  44 (A) >60.0   eGFR if African American Latest Ref Range: >60 mL/min/1.73 m^2  51 (A) >60.0   Glucose Latest Ref Range: 70 - 110 mg/dL  188 (H) 160 (H)   Calcium Latest Ref Range: 8.7 - 10.5 mg/dL  9.8 9.4   Alkaline Phosphatase Latest Ref Range: 55 - 135 U/L  125 154 (H)   PROTEIN TOTAL Latest Ref Range: 6.0 - 8.4 g/dL  8.1 7.4   Albumin Latest Ref Range: 3.5 - 5.2 g/dL  3.8 3.5   BILIRUBIN TOTAL Latest Ref Range: 0.1 - 1.0 mg/dL  0.4 0.2   AST Latest Ref Range: 10 - 40 U/L  16 15   ALT Latest Ref Range: 10 - 44 U/L  11 12   Hemoglobin A1C External Latest Ref Range: 4.0 - 5.6 % 7.1 (H)     Estimated Avg Glucose Latest Ref Range: 68 - 131 mg/dL 157 (H)       Assessment:       1. Hypertension associated with diabetes    2. Acquired hypothyroidism    3. -donor kidney transplant - 13    4. Type 2 diabetes mellitus with hyperosmolarity without coma, with long-term current use of insulin    5. Severe obesity (BMI 35.0-39.9) with comorbidity    6. Preventive measure    7. Anxiety and depression    8. Leg swelling    9. Hyperlipidemia associated  with type 2 diabetes mellitus    10. Dyspnea, unspecified type          Plan:     Hypertension associated with diabetes    Acquired hypothyroidism  -     TSH; Future; Expected date: 2022    -donor kidney transplant - 13    Type 2 diabetes mellitus with hyperosmolarity without coma, with long-term current use of insulin  -     Hemoglobin A1C; Future; Expected date: 2022  -     Microalbumin/Creatinine Ratio, Urine; Future; Expected date: 2022    Severe obesity (BMI 35.0-39.9) with comorbidity    Preventive measure  -     CBC Auto Differential; Future; Expected date: 2022  -     Comprehensive Metabolic Panel; Future; Expected date: 2022  -     Lipid Panel; Future; Expected date: 2022  -     TSH; Future; Expected date: 2022  -     Hemoglobin A1C; Future; Expected date: 2022  -     Microalbumin/Creatinine Ratio, Urine; Future; Expected date: 2022    Anxiety and depression  -     citalopram (CELEXA) 10 MG tablet; Take 1 tablet (10 mg total) by mouth every evening.  Dispense: 30 tablet; Refill: 6    Leg swelling  -     Basic Metabolic Panel; Future; Expected date: 2022    Hyperlipidemia associated with type 2 diabetes mellitus  -     CBC Auto Differential; Future; Expected date: 2022  -     Comprehensive Metabolic Panel; Future; Expected date: 2022  -     Lipid Panel; Future; Expected date: 2022    Dyspnea, unspecified type  -     BNP; Future; Expected date: 2022    Lab now for BMP, BNP.  Monitor BPs, send to me.  Start citalopram.  Lab in 6wk with F/U.

## 2022-01-24 ENCOUNTER — LAB VISIT (OUTPATIENT)
Dept: LAB | Facility: HOSPITAL | Age: 62
End: 2022-01-24
Attending: INTERNAL MEDICINE
Payer: MEDICARE

## 2022-01-24 ENCOUNTER — OFFICE VISIT (OUTPATIENT)
Dept: FAMILY MEDICINE | Facility: CLINIC | Age: 62
End: 2022-01-24
Payer: MEDICARE

## 2022-01-24 VITALS
OXYGEN SATURATION: 95 % | BODY MASS INDEX: 40.78 KG/M2 | TEMPERATURE: 98 F | HEIGHT: 66 IN | SYSTOLIC BLOOD PRESSURE: 142 MMHG | WEIGHT: 253.75 LBS | DIASTOLIC BLOOD PRESSURE: 84 MMHG | HEART RATE: 77 BPM

## 2022-01-24 DIAGNOSIS — F32.A ANXIETY AND DEPRESSION: ICD-10-CM

## 2022-01-24 DIAGNOSIS — I15.2 HYPERTENSION ASSOCIATED WITH DIABETES: Primary | ICD-10-CM

## 2022-01-24 DIAGNOSIS — E78.5 HYPERLIPIDEMIA ASSOCIATED WITH TYPE 2 DIABETES MELLITUS: ICD-10-CM

## 2022-01-24 DIAGNOSIS — M79.89 LEG SWELLING: ICD-10-CM

## 2022-01-24 DIAGNOSIS — Z29.9 PREVENTIVE MEASURE: ICD-10-CM

## 2022-01-24 DIAGNOSIS — E11.59 HYPERTENSION ASSOCIATED WITH DIABETES: Primary | ICD-10-CM

## 2022-01-24 DIAGNOSIS — E11.69 HYPERLIPIDEMIA ASSOCIATED WITH TYPE 2 DIABETES MELLITUS: ICD-10-CM

## 2022-01-24 DIAGNOSIS — Z79.4 TYPE 2 DIABETES MELLITUS WITH HYPEROSMOLARITY WITHOUT COMA, WITH LONG-TERM CURRENT USE OF INSULIN: ICD-10-CM

## 2022-01-24 DIAGNOSIS — Z94.0 DECEASED-DONOR KIDNEY TRANSPLANT: Chronic | ICD-10-CM

## 2022-01-24 DIAGNOSIS — E66.01 SEVERE OBESITY (BMI 35.0-39.9) WITH COMORBIDITY: ICD-10-CM

## 2022-01-24 DIAGNOSIS — R06.00 DYSPNEA, UNSPECIFIED TYPE: ICD-10-CM

## 2022-01-24 DIAGNOSIS — F41.9 ANXIETY AND DEPRESSION: ICD-10-CM

## 2022-01-24 DIAGNOSIS — E03.9 ACQUIRED HYPOTHYROIDISM: ICD-10-CM

## 2022-01-24 DIAGNOSIS — E11.00 TYPE 2 DIABETES MELLITUS WITH HYPEROSMOLARITY WITHOUT COMA, WITH LONG-TERM CURRENT USE OF INSULIN: ICD-10-CM

## 2022-01-24 LAB
ANION GAP SERPL CALC-SCNC: 13 MMOL/L (ref 8–16)
BNP SERPL-MCNC: 28 PG/ML (ref 0–99)
BUN SERPL-MCNC: 18 MG/DL (ref 8–23)
CALCIUM SERPL-MCNC: 9.3 MG/DL (ref 8.7–10.5)
CHLORIDE SERPL-SCNC: 105 MMOL/L (ref 95–110)
CO2 SERPL-SCNC: 24 MMOL/L (ref 23–29)
CREAT SERPL-MCNC: 1.2 MG/DL (ref 0.5–1.4)
EST. GFR  (AFRICAN AMERICAN): 56 ML/MIN/1.73 M^2
EST. GFR  (NON AFRICAN AMERICAN): 48.6 ML/MIN/1.73 M^2
GLUCOSE SERPL-MCNC: 77 MG/DL (ref 70–110)
POTASSIUM SERPL-SCNC: 4.1 MMOL/L (ref 3.5–5.1)
SODIUM SERPL-SCNC: 142 MMOL/L (ref 136–145)

## 2022-01-24 PROCEDURE — 83880 ASSAY OF NATRIURETIC PEPTIDE: CPT | Performed by: INTERNAL MEDICINE

## 2022-01-24 PROCEDURE — 80048 BASIC METABOLIC PNL TOTAL CA: CPT | Performed by: INTERNAL MEDICINE

## 2022-01-24 PROCEDURE — 99214 OFFICE O/P EST MOD 30 MIN: CPT | Mod: S$PBB,,, | Performed by: INTERNAL MEDICINE

## 2022-01-24 PROCEDURE — 99999 PR PBB SHADOW E&M-EST. PATIENT-LVL V: CPT | Mod: PBBFAC,,, | Performed by: INTERNAL MEDICINE

## 2022-01-24 PROCEDURE — 99214 PR OFFICE/OUTPT VISIT, EST, LEVL IV, 30-39 MIN: ICD-10-PCS | Mod: S$PBB,,, | Performed by: INTERNAL MEDICINE

## 2022-01-24 PROCEDURE — 36415 COLL VENOUS BLD VENIPUNCTURE: CPT | Mod: PO | Performed by: INTERNAL MEDICINE

## 2022-01-24 PROCEDURE — 99215 OFFICE O/P EST HI 40 MIN: CPT | Mod: PBBFAC,PO | Performed by: INTERNAL MEDICINE

## 2022-01-24 PROCEDURE — 99999 PR PBB SHADOW E&M-EST. PATIENT-LVL V: ICD-10-PCS | Mod: PBBFAC,,, | Performed by: INTERNAL MEDICINE

## 2022-01-24 RX ORDER — CITALOPRAM 10 MG/1
10 TABLET ORAL NIGHTLY
Qty: 30 TABLET | Refills: 6 | Status: SHIPPED | OUTPATIENT
Start: 2022-01-24 | End: 2022-02-07

## 2022-01-25 ENCOUNTER — PATIENT MESSAGE (OUTPATIENT)
Dept: FAMILY MEDICINE | Facility: CLINIC | Age: 62
End: 2022-01-25
Payer: MEDICARE

## 2022-01-30 ENCOUNTER — PATIENT MESSAGE (OUTPATIENT)
Dept: FAMILY MEDICINE | Facility: CLINIC | Age: 62
End: 2022-01-30
Payer: MEDICARE

## 2022-01-31 ENCOUNTER — EXTERNAL CHRONIC CARE MANAGEMENT (OUTPATIENT)
Dept: PRIMARY CARE CLINIC | Facility: CLINIC | Age: 62
End: 2022-01-31
Payer: MEDICARE

## 2022-01-31 PROCEDURE — 99490 CHRNC CARE MGMT STAFF 1ST 20: CPT | Mod: PBBFAC,PO | Performed by: INTERNAL MEDICINE

## 2022-01-31 PROCEDURE — 99490 CHRNC CARE MGMT STAFF 1ST 20: CPT | Mod: S$PBB,,, | Performed by: INTERNAL MEDICINE

## 2022-01-31 PROCEDURE — 99490 PR CHRONIC CARE MGMT, 1ST 20 MIN: ICD-10-PCS | Mod: S$PBB,,, | Performed by: INTERNAL MEDICINE

## 2022-02-01 NOTE — PROGRESS NOTES
PCP: Karine Fernandez MD    Subjective:     Chief Complaint: Diabetes Consult    HPI: 61 y.o.  female for diabetes consult.   Previously managed by TRINITY Garza PA-C.   Last clinic visit 05/2019.   Type II and Post-Transplant diabetes since 2013 .  Comorbidities: HTN, HLD, CKD III and S/p Transplant - right in 2013  Diabetes complications: with diabetic neuropathy, unspecified  Denies recent hospital admissions or ER visits.   Voices having hypoglycemia. Over the last 3- 6 months BG reading drops 50-60, variable times during the day, overnight and postprandial.  Endorses diabetes related symptoms: Polyuria, Leg swelling and Intermittent tingling in Lower Extremities.    Blood glucose monitoring via fingerstick: 2-3 x/day, inconsistent  Per patient's recall, over the last 4 weeks   Fasting BG 55, 89,    PP breakfast 202 this am   AC dinner 130-160   Bedtime 130-160    Activity: Sedentary- no regular exercise  Diet:2-3 meals/day; infrequent snacks/day.    Medication compliance all of the time, diet compliance most of the time.   Sometimes stress eater.  Has attended diabetes education in the past.     Previous regimen: None    Past failed treatment: None    Current DM Medications:   - Lantus 25-45 units qd: self adjusting    - Humalog 8-15 units tid ac meals: self adjusting    Allergies  Review of patient's allergies indicates:   Allergen Reactions    Azithromycin Nausea And Vomiting    Adhesive Other (See Comments)     Skin tears    Nsaids (non-steroidal anti-inflammatory drug)      Kidney Transplant       Labs Reviewed:  Her most recent A1C is:  Lab Results   Component Value Date    HGBA1C 7.1 (H) 10/12/2021    HGBA1C 6.4 (H) 03/12/2021    HGBA1C 11.5 (H) 01/04/2021     Her most recent BMP is:  Lab Results   Component Value Date     01/24/2022    K 4.1 01/24/2022     01/24/2022    CO2 24 01/24/2022    BUN 18 01/24/2022    CREATININE 1.2 01/24/2022    CALCIUM 9.3 01/24/2022     ANIONGAP 13 2022    ESTGFRAFRICA 56.0 (A) 2022    EGFRNONAA 48.6 (A) 2022     No results found for: CPEPTIDE  No results found for: GLUTAMICACID    Body mass index is 40.17 kg/m².    Wt Readings from Last 3 Encounters:   22 1358 112.9 kg (248 lb 14.4 oz)   22 0956 115.1 kg (253 lb 12 oz)   21 1358 112.9 kg (249 lb)      Her blood sugar in clinic today is: 54    Lab Results   Component Value Date    POCGLU 54 (A) 2022       Diabetes Management Status  Statin: Taking  ACE/ARB: Not taking    Screening or Prevention Patient's value Goal Complete/Controlled?   HgA1C Testing and Control   Lab Results   Component Value Date    HGBA1C 7.1 (H) 10/12/2021      Annually/Less than 8% Yes   Lipid profile : 06/10/2021 Annually Yes   LDL control Lab Results   Component Value Date    LDLCALC 90.0 06/10/2021    Annually/Less than 100 mg/dl  Yes   Nephropathy screening Lab Results   Component Value Date    MICALBCREAT 50.0 (H) 2020     Lab Results   Component Value Date    PROTEINUA 1+ (A) 2021    Annually Yes   Blood pressure BP Readings from Last 1 Encounters:   22 (!) 143/80    Less than 140/90 No   Dilated retinal exam : 2018 Annually Yes    Foot exam   : 2021 Annually Yes     Social History     Socioeconomic History    Marital status:     Number of children: 3   Occupational History    Occupation: disable     Comment: dept manager at [employer]   Tobacco Use    Smoking status: Former Smoker     Packs/day: 1.00     Years: 10.00     Pack years: 10.00     Types: Cigarettes     Quit date: 2002     Years since quittin.2    Smokeless tobacco: Never Used    Tobacco comment: quit smoking approx 10-15 years ago    Substance and Sexual Activity    Alcohol use: No    Drug use: No    Sexual activity: Never     Partners: Male   Social History Narrative    Does housework at home.     Social Determinants of Health     Financial Resource Strain:  Medium Risk    Difficulty of Paying Living Expenses: Somewhat hard   Food Insecurity: Unknown    Worried About Running Out of Food in the Last Year: Patient refused    Ran Out of Food in the Last Year: Patient refused   Transportation Needs: Unknown    Lack of Transportation (Medical): Patient refused    Lack of Transportation (Non-Medical): Patient refused   Physical Activity: Unknown    Days of Exercise per Week: Patient refused   Stress: No Stress Concern Present    Feeling of Stress : Only a little   Social Connections: Unknown    Frequency of Communication with Friends and Family: More than three times a week    Frequency of Social Gatherings with Friends and Family: Once a week    Active Member of Clubs or Organizations: No    Attends Club or Organization Meetings: Never    Marital Status:    Housing Stability: Unknown    Unable to Pay for Housing in the Last Year: Patient refused    Number of Places Lived in the Last Year: 1    Unstable Housing in the Last Year: No     Past Medical History:   Diagnosis Date    Abnormal glucose 2013    Acquired hypothyroidism     Acute bronchiolitis 10/15/2014    Anemia     Anemia of chronic renal failure 2013    Anxiety     Anxiety disorder     Avascular necrosis of bone of hip     Back pain     s/p steroid injections    Bacteremia due to Escherichia coli 2021    Chronic immunosuppression with Prograf and Cellcept 2013    CKD (chronic kidney disease) stage 2, GFR 60-89 ml/min     CKD (chronic kidney disease) stage 5, GFR less than 15 ml/min     -donor kidney transplant - 13    Depression     Hypertension, renal 2013    Hypothyroidism     Immunosuppressed status 10/15/2014    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    Osteoporosis with pathological fracture     Renal disease due to hypertension 2013    Renal hypertension, non-vascular     Secondary hyperparathyroidism  of renal origin 9/30/2013    Vertigo      Review of Systems   Constitutional: Negative for activity change, appetite change, fatigue and unexpected weight change.   HENT: Negative for dental problem and trouble swallowing.    Eyes: Negative for visual disturbance.   Respiratory: Negative for chest tightness and shortness of breath.    Cardiovascular: Negative for chest pain, palpitations and leg swelling.   Gastrointestinal: Negative for abdominal pain, constipation, diarrhea, nausea and vomiting.   Endocrine: Positive for polyuria. Negative for polydipsia and polyphagia.   Genitourinary: Negative for difficulty urinating, dysuria, frequency and urgency.        Negative yeast infection   Musculoskeletal: Negative for gait problem, myalgias and neck pain.   Integumentary:  Negative for rash and wound.   Allergic/Immunologic: Negative.    Neurological: Negative for dizziness, syncope, weakness, numbness and headaches.        Int tingling BLE   Hematological: Negative.    Psychiatric/Behavioral: Negative for confusion and sleep disturbance.   All other systems reviewed and are negative.     Objective:      Physical Exam  Vitals reviewed.   Constitutional:       Appearance: Normal appearance.   HENT:      Head: Normocephalic and atraumatic.   Eyes:      General: Vision grossly intact.      Extraocular Movements: Extraocular movements intact.      Pupils: Pupils are equal, round, and reactive to light.   Neck:      Thyroid: No thyroid mass or thyroid tenderness.   Cardiovascular:      Rate and Rhythm: Normal rate and regular rhythm.      Pulses: Normal pulses.           Radial pulses are 2+ on the right side and 2+ on the left side.        Dorsalis pedis pulses are 2+ on the right side and 2+ on the left side.      Heart sounds: Normal heart sounds.   Pulmonary:      Effort: Pulmonary effort is normal.      Breath sounds: Normal breath sounds.   Abdominal:      General: Bowel sounds are normal.      Palpations: Abdomen  is soft.   Musculoskeletal:         General: Normal range of motion.      Cervical back: Normal range of motion and neck supple.      Right lower leg: No edema.      Left lower leg: No edema.      Right foot: No deformity or bunion.      Left foot: No deformity or bunion.   Feet:      Right foot:      Protective Sensation: 6 sites tested. 6 sites sensed.      Skin integrity: Skin integrity normal. No ulcer, skin breakdown, callus or dry skin.      Toenail Condition: Right toenails are normal.      Left foot:      Protective Sensation: 6 sites tested. 6 sites sensed.      Skin integrity: Skin integrity normal. No ulcer, skin breakdown, callus or dry skin.      Toenail Condition: Left toenails are normal.      Comments: Pinprick exam completed with 10-g monofilament. Vibration sensation intact.  Lymphadenopathy:      Cervical: No cervical adenopathy.   Skin:     General: Skin is warm and dry.      Findings: No rash or wound.      Comments: Negative for acanthosis nigricans. Well-healed SQ injection sites.   Neurological:      Mental Status: She is alert and oriented to person, place, and time.      Coordination: Coordination is intact.      Gait: Gait is intact.   Psychiatric:         Attention and Perception: Attention normal.         Mood and Affect: Mood normal.         Speech: Speech normal.         Behavior: Behavior normal. Behavior is cooperative.         Thought Content: Thought content normal.         Cognition and Memory: Cognition normal.         Assessment / Plan:     Diagnoses and all orders for this visit:    Type 2 diabetes mellitus with hyperglycemia, with long-term current use of insulin  -     POCT Glucose, Hand-Held Device  -     Hemoglobin A1C; Future  -     blood sugar diagnostic (FREESTYLE LITE STRIPS) Strp; Use to check blood glucose 3 times a day.  -     insulin (LANTUS SOLOSTAR U-100 INSULIN) glargine 100 units/mL (3mL) SubQ pen; Inject 30 Units into the skin every evening.  -     insulin  lispro (HUMALOG KWIKPEN INSULIN) 100 unit/mL pen; Inject 8 Units into the skin 3 (three) times daily before meals.  -     Hemoglobin A1C; Future  -     flash glucose sensor (FREESTYLE DEREK 2 SENSOR) Kit; 1 application by Misc.(Non-Drug; Combo Route) route every 14 (fourteen) days. Use to test blood glucose 4-6x/day.  -     flash glucose scanning reader (FREESTYLE DEREK 2 READER) Misc; 1 Device by Misc.(Non-Drug; Combo Route) route continuous. Use to test blood glucose with the Derek reader.    Hypertension associated with diabetes    Hyperlipidemia associated with type 2 diabetes mellitus    -donor kidney transplant - 13    Hypoglycemia associated with diabetes   - POCT Glucose, Hand-Held Device   - BG 54 in clinic this afternoon   - Pt states had sandwich for breakfast, had not eaten lunch.   - Denies any complaints, pt offered snacks, able to tolerate half pf 8 oz bottle of orange juice, refused mike crackers.   - Repeat BG 66 at the end of the visit.   - Pt verbalized will go home and eat a meal.    BMI 40.0-44.9, adult     Additional Plan Details:  - Condition: uncontrolled, most recent A1c of 7.1% was completed 3 months ago.   - Monitor blood glucose:  3-4x daily.Goals reviewed. Maintain BG log and bring to next visit for review. Notify if BG readings below 80. Derek Pro in clinic today to monitor BG readings closely. Pt would benefit from CGM use.  - CGM note: Patient is testing 4 times per day. Patient is injecting meal time insulin 3 times daily and is self adjusting insulin based on blood glucose reading. Patient requires CGM for blood sugar monitoring due to frequent insulin dose changes. Patient reports hypoglycemia, < 50.   - Hypoglycemia protocol: Reviewed and risk discussed. Protocol printout provided.   - Diet: Reviewed, limit carbohydrate intake to 30-45 grams per meal, 3x daily and 15 grams per snack with a limit of two daily.   - Activity: Recommend at least 150 minutes of exercise  in a week.  - BP and LDL: Recommend lifestyle modifications and follow up with PCP for management.   - Medication Changes:    - Decrease Lantus 30 units in the evening   - Decrease Humalog 8 units three times a day before each meal    - Medication consideration: Switch Lantus to Tresiba when able.   - Lab results: A1C discussed.  - Health Maintenance standards addressed today: Foot Exam - completed today and documented in physical exam with feedback to patient about proper diabetic foot care and findings, Eye Exam - will be completed outside of [facility] and patient will schedule and A1c to be scheduled.   - Risks of uncontrolled DM explained. Importance of routine foot and eye exams reviewed. Scheduled as appropriate.  -The patient was explained the above plan and given opportunity to ask questions.  She understands, chooses and consents to this plan and accepts all the risks, which include but are not limited to the risks mentioned above.   - Labs ordered as above.  - CDE follow up: For CGM training Nico once supplies are available.  - Nurse visit: Deferred  - Follow up: 2 weeks to CGM data download and interpretation and make insulin dose adjustments in necessary.       Aide Dougherty, EDWIN  [facility]    A total of 60 minutes was spent in face to face time, of which over 50 % was spent in counseling patient on disease process, complications, treatment, and side effects of medications.

## 2022-02-02 ENCOUNTER — OFFICE VISIT (OUTPATIENT)
Dept: DIABETES | Facility: CLINIC | Age: 62
End: 2022-02-02
Payer: MEDICARE

## 2022-02-02 ENCOUNTER — LAB VISIT (OUTPATIENT)
Dept: LAB | Facility: HOSPITAL | Age: 62
End: 2022-02-02
Attending: NURSE PRACTITIONER
Payer: MEDICARE

## 2022-02-02 VITALS
BODY MASS INDEX: 40 KG/M2 | DIASTOLIC BLOOD PRESSURE: 80 MMHG | SYSTOLIC BLOOD PRESSURE: 143 MMHG | WEIGHT: 248.88 LBS | HEIGHT: 66 IN | HEART RATE: 67 BPM

## 2022-02-02 DIAGNOSIS — I15.2 HYPERTENSION ASSOCIATED WITH DIABETES: ICD-10-CM

## 2022-02-02 DIAGNOSIS — E11.649 HYPOGLYCEMIA ASSOCIATED WITH DIABETES: ICD-10-CM

## 2022-02-02 DIAGNOSIS — E78.5 HYPERLIPIDEMIA ASSOCIATED WITH TYPE 2 DIABETES MELLITUS: ICD-10-CM

## 2022-02-02 DIAGNOSIS — Z79.4 TYPE 2 DIABETES MELLITUS WITH HYPERGLYCEMIA, WITH LONG-TERM CURRENT USE OF INSULIN: Primary | ICD-10-CM

## 2022-02-02 DIAGNOSIS — E11.65 TYPE 2 DIABETES MELLITUS WITH HYPERGLYCEMIA, WITH LONG-TERM CURRENT USE OF INSULIN: ICD-10-CM

## 2022-02-02 DIAGNOSIS — E11.69 HYPERLIPIDEMIA ASSOCIATED WITH TYPE 2 DIABETES MELLITUS: ICD-10-CM

## 2022-02-02 DIAGNOSIS — Z79.4 TYPE 2 DIABETES MELLITUS WITH HYPERGLYCEMIA, WITH LONG-TERM CURRENT USE OF INSULIN: ICD-10-CM

## 2022-02-02 DIAGNOSIS — Z94.0 DECEASED-DONOR KIDNEY TRANSPLANT: Chronic | ICD-10-CM

## 2022-02-02 DIAGNOSIS — E11.65 TYPE 2 DIABETES MELLITUS WITH HYPERGLYCEMIA, WITH LONG-TERM CURRENT USE OF INSULIN: Primary | ICD-10-CM

## 2022-02-02 DIAGNOSIS — E11.59 HYPERTENSION ASSOCIATED WITH DIABETES: ICD-10-CM

## 2022-02-02 LAB
ESTIMATED AVG GLUCOSE: 157 MG/DL (ref 68–131)
GLUCOSE SERPL-MCNC: 54 MG/DL (ref 70–110)
GLUCOSE SERPL-MCNC: 66 MG/DL (ref 70–110)
HBA1C MFR BLD: 7.1 % (ref 4–5.6)

## 2022-02-02 PROCEDURE — 99215 OFFICE O/P EST HI 40 MIN: CPT | Mod: S$PBB,,, | Performed by: NURSE PRACTITIONER

## 2022-02-02 PROCEDURE — 99215 OFFICE O/P EST HI 40 MIN: CPT | Mod: PBBFAC | Performed by: NURSE PRACTITIONER

## 2022-02-02 PROCEDURE — 99999 PR PBB SHADOW E&M-EST. PATIENT-LVL V: ICD-10-PCS | Mod: PBBFAC,,, | Performed by: NURSE PRACTITIONER

## 2022-02-02 PROCEDURE — 99215 PR OFFICE/OUTPT VISIT, EST, LEVL V, 40-54 MIN: ICD-10-PCS | Mod: S$PBB,,, | Performed by: NURSE PRACTITIONER

## 2022-02-02 PROCEDURE — 99999 PR PBB SHADOW E&M-EST. PATIENT-LVL V: CPT | Mod: PBBFAC,,, | Performed by: NURSE PRACTITIONER

## 2022-02-02 PROCEDURE — 82962 GLUCOSE BLOOD TEST: CPT | Mod: PBBFAC | Performed by: NURSE PRACTITIONER

## 2022-02-02 PROCEDURE — 36415 COLL VENOUS BLD VENIPUNCTURE: CPT | Performed by: NURSE PRACTITIONER

## 2022-02-02 PROCEDURE — 83036 HEMOGLOBIN GLYCOSYLATED A1C: CPT | Performed by: NURSE PRACTITIONER

## 2022-02-02 RX ORDER — FLASH GLUCOSE SENSOR
1 KIT MISCELLANEOUS
Qty: 2 KIT | Refills: 6 | Status: SHIPPED | OUTPATIENT
Start: 2022-02-02 | End: 2023-02-20 | Stop reason: SDUPTHER

## 2022-02-02 RX ORDER — INSULIN GLARGINE 100 [IU]/ML
30 INJECTION, SOLUTION SUBCUTANEOUS NIGHTLY
Qty: 15 ML | Refills: 6
Start: 2022-02-02 | End: 2022-02-16

## 2022-02-02 RX ORDER — INSULIN LISPRO 100 [IU]/ML
8 INJECTION, SOLUTION INTRAVENOUS; SUBCUTANEOUS
Qty: 15 ML | Refills: 4
Start: 2022-02-02 | End: 2022-02-16

## 2022-02-02 RX ORDER — FLASH GLUCOSE SCANNING READER
1 EACH MISCELLANEOUS CONTINUOUS
Qty: 1 EACH | Refills: 0 | Status: SHIPPED | OUTPATIENT
Start: 2022-02-02

## 2022-02-02 NOTE — PATIENT INSTRUCTIONS
Medication Regimen/Changes:   - Decrease Lantus 30 units in the evening  - Decrease Humalog 8 units three times a day before each meal    Patient Instructions:  - Carbohydrate Count: 30-45 grams/meal and 15 grams/snack with limit of 2 snacks per day.  - Exercise: Goal is 150 minutes or more per week.  - Bring meter and blood sugar log to each appointment.   - You should check your blood sugar at least two times daily. Once will always be in the morning before you have any food (known as your fasting blood sugar). One should be two hours after EITHER your breakfast, lunch or dinner (known as your post-prandial blood sugar).     Goals for Blood Sugars:   Fastin-130 mg/dl   2 hours after meal:  mg/dl   Before Bed: 100-150 mg/dl   If Blood sugar is below 70 mg/dl, DO NOT take diabetes medication. Eat first and then recheck blood sugar in 20 minutes.   Foods to eat if blood sugar is below 70 mg/dl: 1/2 glass of orange juice, 1/2 regular cola can, 3-4 hard candies, 3-4 small glucose tabs.    Foods to eat if blood sugar is below 50 mg/dl: 1 glass of orange juice, 1 regular cola can, 8 hard candies, 8 small glucose tabs.    If you have repeated eating/blood sugar recheck process 2 times and blood sugar still below 70 mg/dl, call 911.       - Call or DineGasmhart message with any questions or concerns: [facility] [phone]      Charlotte T. Delacruz, FNP-C [facility]

## 2022-02-02 NOTE — NURSING
Patient is here in clinic today for placement of continuous glucose monitoring system (CGMS) Freestyle Nico. Site on back of patient's left upper arm was cleaned and sensor was placed and started. Explained to patient that this is a blind procedure and will not actually see their BG in real time. Instructed pt. To continue to shower as normal and informed pt of need to check blood sugars as prescribed. Patient was also informed to avoid all imaging procedures and acetaminophen during any procedure. Voiced understanding of all instructions given. No further questions. Scheduled for a return to clinic visit  to have sensor downloaded.

## 2022-02-03 ENCOUNTER — PATIENT MESSAGE (OUTPATIENT)
Dept: DIABETES | Facility: CLINIC | Age: 62
End: 2022-02-03
Payer: MEDICARE

## 2022-02-04 ENCOUNTER — PATIENT OUTREACH (OUTPATIENT)
Dept: ADMINISTRATIVE | Facility: OTHER | Age: 62
End: 2022-02-04
Payer: MEDICARE
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
## 2022-02-07 ENCOUNTER — OFFICE VISIT (OUTPATIENT)
Dept: PAIN MEDICINE | Facility: CLINIC | Age: 62
End: 2022-02-07
Payer: MEDICARE

## 2022-02-07 VITALS
HEIGHT: 66 IN | SYSTOLIC BLOOD PRESSURE: 137 MMHG | BODY MASS INDEX: 40.18 KG/M2 | WEIGHT: 250 LBS | DIASTOLIC BLOOD PRESSURE: 81 MMHG | HEART RATE: 73 BPM

## 2022-02-07 DIAGNOSIS — M54.16 BILATERAL LUMBAR RADICULOPATHY: ICD-10-CM

## 2022-02-07 DIAGNOSIS — M25.552 LEFT HIP PAIN: Primary | ICD-10-CM

## 2022-02-07 DIAGNOSIS — M51.36 DDD (DEGENERATIVE DISC DISEASE), LUMBAR: ICD-10-CM

## 2022-02-07 DIAGNOSIS — M79.18 MUSCLE PAIN, MYOFASCIAL: ICD-10-CM

## 2022-02-07 DIAGNOSIS — Z79.891 OPIOID USE AGREEMENT EXISTS: ICD-10-CM

## 2022-02-07 PROCEDURE — 99214 OFFICE O/P EST MOD 30 MIN: CPT | Mod: PBBFAC | Performed by: PHYSICIAN ASSISTANT

## 2022-02-07 PROCEDURE — 99214 OFFICE O/P EST MOD 30 MIN: CPT | Mod: S$PBB,,, | Performed by: PHYSICIAN ASSISTANT

## 2022-02-07 PROCEDURE — 99999 PR PBB SHADOW E&M-EST. PATIENT-LVL IV: ICD-10-PCS | Mod: PBBFAC,,, | Performed by: PHYSICIAN ASSISTANT

## 2022-02-07 PROCEDURE — 99214 PR OFFICE/OUTPT VISIT, EST, LEVL IV, 30-39 MIN: ICD-10-PCS | Mod: S$PBB,,, | Performed by: PHYSICIAN ASSISTANT

## 2022-02-07 PROCEDURE — 99999 PR PBB SHADOW E&M-EST. PATIENT-LVL IV: CPT | Mod: PBBFAC,,, | Performed by: PHYSICIAN ASSISTANT

## 2022-02-07 RX ORDER — TIZANIDINE 4 MG/1
4 TABLET ORAL 2 TIMES DAILY PRN
Qty: 60 TABLET | Refills: 1 | Status: SHIPPED | OUTPATIENT
Start: 2022-02-07 | End: 2022-04-19 | Stop reason: SDUPTHER

## 2022-02-07 RX ORDER — TIZANIDINE 4 MG/1
4 TABLET ORAL 2 TIMES DAILY PRN
COMMUNITY
End: 2022-02-07 | Stop reason: SDUPTHER

## 2022-02-09 RX ORDER — OXYCODONE AND ACETAMINOPHEN 10; 325 MG/1; MG/1
1 TABLET ORAL EVERY 8 HOURS PRN
Qty: 90 TABLET | Refills: 0 | Status: SHIPPED | OUTPATIENT
Start: 2022-03-27 | End: 2022-04-12 | Stop reason: SDUPTHER

## 2022-02-09 RX ORDER — OXYCODONE AND ACETAMINOPHEN 10; 325 MG/1; MG/1
1 TABLET ORAL EVERY 8 HOURS PRN
Qty: 90 TABLET | Refills: 0 | Status: SHIPPED | OUTPATIENT
Start: 2022-02-26 | End: 2022-04-12

## 2022-02-16 ENCOUNTER — OFFICE VISIT (OUTPATIENT)
Dept: DIABETES | Facility: CLINIC | Age: 62
End: 2022-02-16
Payer: MEDICARE

## 2022-02-16 VITALS
DIASTOLIC BLOOD PRESSURE: 89 MMHG | WEIGHT: 254 LBS | BODY MASS INDEX: 40.82 KG/M2 | HEIGHT: 66 IN | SYSTOLIC BLOOD PRESSURE: 156 MMHG | HEART RATE: 71 BPM

## 2022-02-16 DIAGNOSIS — E11.69 HYPERLIPIDEMIA ASSOCIATED WITH TYPE 2 DIABETES MELLITUS: ICD-10-CM

## 2022-02-16 DIAGNOSIS — E11.65 TYPE 2 DIABETES MELLITUS WITH HYPERGLYCEMIA, WITH LONG-TERM CURRENT USE OF INSULIN: Primary | ICD-10-CM

## 2022-02-16 DIAGNOSIS — Z94.0 DECEASED-DONOR KIDNEY TRANSPLANT: ICD-10-CM

## 2022-02-16 DIAGNOSIS — E11.59 HYPERTENSION ASSOCIATED WITH DIABETES: ICD-10-CM

## 2022-02-16 DIAGNOSIS — Z79.4 TYPE 2 DIABETES MELLITUS WITH HYPERGLYCEMIA, WITH LONG-TERM CURRENT USE OF INSULIN: Primary | ICD-10-CM

## 2022-02-16 DIAGNOSIS — E78.5 HYPERLIPIDEMIA ASSOCIATED WITH TYPE 2 DIABETES MELLITUS: ICD-10-CM

## 2022-02-16 DIAGNOSIS — I15.2 HYPERTENSION ASSOCIATED WITH DIABETES: ICD-10-CM

## 2022-02-16 LAB — GLUCOSE SERPL-MCNC: 138 MG/DL (ref 70–110)

## 2022-02-16 PROCEDURE — 99214 PR OFFICE/OUTPT VISIT, EST, LEVL IV, 30-39 MIN: ICD-10-PCS | Mod: S$PBB,,, | Performed by: NURSE PRACTITIONER

## 2022-02-16 PROCEDURE — 95251 CONT GLUC MNTR ANALYSIS I&R: CPT | Mod: ,,, | Performed by: NURSE PRACTITIONER

## 2022-02-16 PROCEDURE — 95250 CONT GLUC MNTR PHYS/QHP EQP: CPT | Mod: PBBFAC | Performed by: NURSE PRACTITIONER

## 2022-02-16 PROCEDURE — 99214 OFFICE O/P EST MOD 30 MIN: CPT | Mod: S$PBB,,, | Performed by: NURSE PRACTITIONER

## 2022-02-16 PROCEDURE — 82962 GLUCOSE BLOOD TEST: CPT | Mod: PBBFAC | Performed by: NURSE PRACTITIONER

## 2022-02-16 PROCEDURE — 99999 PR PBB SHADOW E&M-EST. PATIENT-LVL V: ICD-10-PCS | Mod: PBBFAC,,, | Performed by: NURSE PRACTITIONER

## 2022-02-16 PROCEDURE — 99999 PR PBB SHADOW E&M-EST. PATIENT-LVL V: CPT | Mod: PBBFAC,,, | Performed by: NURSE PRACTITIONER

## 2022-02-16 PROCEDURE — 95251 PR GLUCOSE MONITOR, 72 HOUR, PHYS INTERP: ICD-10-PCS | Mod: ,,, | Performed by: NURSE PRACTITIONER

## 2022-02-16 PROCEDURE — 99215 OFFICE O/P EST HI 40 MIN: CPT | Mod: PBBFAC | Performed by: NURSE PRACTITIONER

## 2022-02-16 RX ORDER — INSULIN GLARGINE 100 [IU]/ML
24 INJECTION, SOLUTION SUBCUTANEOUS NIGHTLY
Qty: 15 ML | Refills: 6
Start: 2022-02-16 | End: 2022-03-01

## 2022-02-16 RX ORDER — INSULIN LISPRO 100 [IU]/ML
INJECTION, SOLUTION INTRAVENOUS; SUBCUTANEOUS
Qty: 15 ML | Refills: 4
Start: 2022-02-16 | End: 2022-03-01

## 2022-02-16 NOTE — PATIENT INSTRUCTIONS
Medication Regimen/Changes:   - Decrease Lantus 24 units in the evening  - Decrease Humalog 4 units before breakfast and  4 units before lunch. Increase Humalog to 10 units before dinner.     Patient Instructions:   Carbohydrate Count: 30-45 grams/meal and 15 grams/snack with limit of 2 snacks per day.   Exercise: Goal is 150 minutes or more per week.   Bring meter and blood sugar log to each appointment.     Goals for Blood Sugar:   Fastin-130 mg/dl   2 hours after meal:  mg/dl   Before Bed: 100-150 mg/dl   If Blood sugar is below 70 mg/dl, DO NOT take diabetes medication. Eat first and then recheck blood sugar in 20 minutes.   Foods to eat if blood sugar is below 70 mg/dl  o 1/2 glass of orange juice  o  1/2 regular cola can  o  3-4 hard candies  o  3-4 small glucose tabs.    Foods to eat if blood sugar is below 50 mg/dl  o 1 glass of orange juice  o 1 regular cola can  o 8 hard candies  o 8 small glucose tabs.    If you have repeated eating/blood sugar recheck process 2 times and blood sugar still below 70 mg/dl, call 911.

## 2022-02-16 NOTE — PROGRESS NOTES
..Patient is here in clinic today for placement of continuous glucose monitoring system (CGMS) Freestyle Libre2. Site on back of patient's right upper arm was cleaned and sensor was placed and started. Explained to patient that this is a blind procedure and that patient will not actually see their BG in real time. Instructed pt. continue to shower as normal and informed to continue to check blood sugars as prescribed. Patient was also informed to avoid all imaging procedures and acetaminophen during any procedure. Voiced understanding of all instructions given. No further questions. Patient will come back to have sensor downloaded.

## 2022-02-16 NOTE — PROGRESS NOTES
PCP: Karine Fernandez MD    Subjective:     Chief Complaint: Diabetes Follow up.    HPI: 61 y.o.  female for diabetes follow up.   Previously managed by TRINITY Garza PA-C.   Last clinic visit 05/2019.   Type II and Post-Transplant diabetes since 2013 .  Comorbidities: HTN, HLD, CKD III and S/p Transplant - right kidney in 2013  Known DM complications:   DKA/HHS: no   Retinopathy: no   Peripheral neuropathy: yes   Nephropathy: yes    Interval history:  Denies recent hospital admissions or ER visits.   Voices having overnight hypoglycemia symptoms, +shaking, +dizziness, did not check BG during those times.  Endorses diabetes related symptoms: Polyuria, Leg swelling and Intermittent tingling in Lower Extremities.    Blood glucose monitoring via CGM [employer].  Reporting period: Feb 2, 2022- Feb 16, 2022.  15 day report generated as follows:    Glucose Details  Average glucose: 135 mg/dL  Standard deviation: 35.6 mg/dL  GMI: 6.5  -----------------------------  Time in Range  Very High: 1%  High: 18%  In range: 72%  Low: 7%  Very Low: 2%  Target Range   mg/dL  -----------------------------  CGM Details  Sensor usage: 100%    There are 15 low glucose events with average duration of 144 min.  Overall there is a pattern of euglycemia with   - overnight hypoglycemia   - few episodes of postprandial breakfast and lunch hypoglycemia   - postprandial dinner hyperglycemia    Activity: Sedentary- no regular exercise  Diet:2-3 meals/day; infrequent snacks/day.    Medication compliance all of the time, diet compliance most of the time.   Sometimes stress eater.  Has attended diabetes education in the past.     Previous regimen: None    Past failed treatment: None    Current DM Medications:   - Lantus 30 units qhs   - Humalog 8 units tid ac meals    Allergies  Review of patient's allergies indicates:   Allergen Reactions    Azithromycin Nausea And Vomiting    Adhesive Other (See Comments)     Skin tears     Nsaids (non-steroidal anti-inflammatory drug)      Kidney Transplant       Labs Reviewed:  Her most recent A1C is:  Lab Results   Component Value Date    HGBA1C 7.1 (H) 02/02/2022    HGBA1C 7.1 (H) 10/12/2021    HGBA1C 6.4 (H) 03/12/2021     Her most recent BMP is:  Lab Results   Component Value Date     01/24/2022    K 4.1 01/24/2022     01/24/2022    CO2 24 01/24/2022    BUN 18 01/24/2022    CREATININE 1.2 01/24/2022    CALCIUM 9.3 01/24/2022    ANIONGAP 13 01/24/2022    ESTGFRAFRICA 56.0 (A) 01/24/2022    EGFRNONAA 48.6 (A) 01/24/2022       No results found for: CPEPTIDE  No results found for: GLUTAMICACID    Body mass index is 40.35 kg/m².    Weight gain 3 lbs since last visit.  Wt Readings from Last 3 Encounters:   02/07/22 1115 113.4 kg (250 lb)   02/02/22 1358 112.9 kg (248 lb 14.4 oz)   01/24/22 0956 115.1 kg (253 lb 12 oz)      Her blood sugar in clinic today is: 138    Lab Results   Component Value Date    POCGLU 66 (A) 02/02/2022     Diabetes Management Status  Statin: Taking  ACE/ARB: Not taking    Screening or Prevention Patient's value Goal Complete/Controlled?   HgA1C Testing and Control   Lab Results   Component Value Date    HGBA1C 7.1 (H) 02/02/2022      Annually/Less than 8% Yes   Lipid profile : 06/10/2021 Annually Yes   LDL control Lab Results   Component Value Date    LDLCALC 90.0 06/10/2021    Annually/Less than 100 mg/dl  Yes   Nephropathy screening Lab Results   Component Value Date    MICALBCREAT 50.0 (H) 11/13/2020     Lab Results   Component Value Date    PROTEINUA 1+ (A) 01/04/2021    Annually Yes   Blood pressure BP Readings from Last 1 Encounters:   02/07/22 137/81    Less than 140/90 Yes   Dilated retinal exam : 08/14/2018 Annually Yes    Foot exam   : 02/02/2022 Annually Yes     Social History     Socioeconomic History    Marital status:     Number of children: 3   Occupational History    Occupation: disable     Comment: dept manager at [employer]   Tobacco Use     Smoking status: Former Smoker     Packs/day: 1.00     Years: 10.00     Pack years: 10.00     Types: Cigarettes     Quit date: 2002     Years since quittin.2    Smokeless tobacco: Never Used    Tobacco comment: quit smoking approx 10-15 years ago    Substance and Sexual Activity    Alcohol use: No    Drug use: No    Sexual activity: Never     Partners: Male   Social History Narrative    Does housework at home.     Social Determinants of Health     Financial Resource Strain: Medium Risk    Difficulty of Paying Living Expenses: Somewhat hard   Food Insecurity: Unknown    Worried About Running Out of Food in the Last Year: Patient refused    Ran Out of Food in the Last Year: Patient refused   Transportation Needs: Unknown    Lack of Transportation (Medical): Patient refused    Lack of Transportation (Non-Medical): Patient refused   Physical Activity: Unknown    Days of Exercise per Week: Patient refused   Stress: No Stress Concern Present    Feeling of Stress : Only a little   Social Connections: Unknown    Frequency of Communication with Friends and Family: More than three times a week    Frequency of Social Gatherings with Friends and Family: Once a week    Active Member of Clubs or Organizations: No    Attends Club or Organization Meetings: Never    Marital Status:    Housing Stability: Unknown    Unable to Pay for Housing in the Last Year: Patient refused    Number of Places Lived in the Last Year: 1    Unstable Housing in the Last Year: No     Past Medical History:   Diagnosis Date    Abnormal glucose 2013    Acquired hypothyroidism     Acute bronchiolitis 10/15/2014    Anemia     Anemia of chronic renal failure 2013    Anxiety     Anxiety disorder     Avascular necrosis of bone of hip     Back pain     s/p steroid injections    Bacteremia due to Escherichia coli 2021    Chronic immunosuppression with Prograf and Cellcept 2013    CKD (chronic  kidney disease) stage 2, GFR 60-89 ml/min     CKD (chronic kidney disease) stage 5, GFR less than 15 ml/min     -donor kidney transplant - 13    Depression     Hypertension, renal 2013    Hypothyroidism     Immunosuppressed status 10/15/2014    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    Osteoporosis with pathological fracture     Renal disease due to hypertension 2013    Renal hypertension, non-vascular     Secondary hyperparathyroidism of renal origin 2013    Vertigo      Review of Systems   Constitutional: Negative for activity change, appetite change, fatigue and unexpected weight change.   HENT: Negative for dental problem and trouble swallowing.    Eyes: Negative for visual disturbance.   Respiratory: Negative for chest tightness and shortness of breath.    Cardiovascular: Negative for chest pain, palpitations and leg swelling.   Gastrointestinal: Negative for abdominal pain, constipation, diarrhea, nausea and vomiting.   Endocrine: Positive for polyuria. Negative for polydipsia and polyphagia.   Genitourinary: Negative for difficulty urinating, dysuria, frequency and urgency.        Negative yeast infection   Musculoskeletal: Negative for gait problem, myalgias and neck pain.   Integumentary:  Negative for rash and wound.   Allergic/Immunologic: Negative.    Neurological: Negative for dizziness, syncope, weakness, numbness and headaches.        Int tingling BLE   Hematological: Negative.    Psychiatric/Behavioral: Negative for confusion and sleep disturbance.   All other systems reviewed and are negative.     Objective:      Physical Exam  Vitals reviewed.   Constitutional:       Appearance: Normal appearance.   HENT:      Head: Normocephalic and atraumatic.   Eyes:      General: Vision grossly intact.      Extraocular Movements: Extraocular movements intact.      Pupils: Pupils are equal, round, and reactive to light.   Neck:      Thyroid: No  thyroid mass or thyroid tenderness.   Cardiovascular:      Rate and Rhythm: Normal rate and regular rhythm.      Pulses: Normal pulses.           Radial pulses are 2+ on the right side and 2+ on the left side.        Dorsalis pedis pulses are 2+ on the right side and 2+ on the left side.      Heart sounds: Normal heart sounds.   Pulmonary:      Effort: Pulmonary effort is normal.      Breath sounds: Normal breath sounds.   Abdominal:      General: Bowel sounds are normal.      Palpations: Abdomen is soft.   Musculoskeletal:         General: Normal range of motion.      Cervical back: Normal range of motion and neck supple.      Right lower leg: No edema.      Left lower leg: No edema.      Right foot: No deformity or bunion.      Left foot: No deformity or bunion.   Feet:      Right foot:      Skin integrity: Skin integrity normal. No ulcer, skin breakdown, callus or dry skin.      Toenail Condition: Right toenails are normal.      Left foot:      Skin integrity: Skin integrity normal. No ulcer, skin breakdown, callus or dry skin.      Toenail Condition: Left toenails are normal.   Lymphadenopathy:      Cervical: No cervical adenopathy.   Skin:     General: Skin is warm and dry.      Findings: No rash or wound.      Comments: Negative for acanthosis nigricans. Well-healed SQ injection sites.   Neurological:      Mental Status: She is alert and oriented to person, place, and time.      Coordination: Coordination is intact.      Gait: Gait is intact.   Psychiatric:         Attention and Perception: Attention normal.         Mood and Affect: Mood normal.         Speech: Speech normal.         Behavior: Behavior normal. Behavior is cooperative.         Thought Content: Thought content normal.         Cognition and Memory: Cognition normal.     Assessment / Plan:   Diagnoses and all orders for this visit:    Type 2 diabetes mellitus with hyperglycemia, with long-term current use of insulin  -     POCT Glucose, Hand-Held  Device  -     insulin (LANTUS SOLOSTAR U-100 INSULIN) glargine 100 units/mL (3mL) SubQ pen; Inject 24 Units into the skin every evening.  -     insulin lispro (HUMALOG KWIKPEN INSULIN) 100 unit/mL pen; Inject 4 units before breakfast, 4 units before lunch and 12 units before dinner.    Hypertension associated with diabetes    Hyperlipidemia associated with type 2 diabetes mellitus    -donor kidney transplant - 13    BMI 40.0-44.9, adult    Additional Plan Details:  - Condition: uncontrolled, most recent A1c of 7.1% was completed 2 weeks ago.   - Monitor blood glucose:  3-4x daily.Goals reviewed. Maintain BG log and bring to next visit for review. Notify if BG readings below 80. Pt would benefit from CGM use. Sample Nico 2 sensor in clinic today.  - CGM note: Patient is testing 4 times per day. Patient is injecting meal time insulin 3 times daily and is self adjusting insulin based on blood glucose reading. Patient requires CGM for blood sugar monitoring due to frequent insulin dose changes. Patient reports hypoglycemia, < 50.   - Hypoglycemia protocol: Reviewed and risk discussed. Protocol printout provided.   - Diet: Reviewed, limit carbohydrate intake to 30-45 grams per meal, 3x daily and 15 grams per snack with a limit of two daily.   - Activity: Recommend at least 150 minutes of exercise in a week.  - BP and LDL: Recommend lifestyle modifications and follow up with PCP for management.   - Medication Changes:    - Decrease Lantus 24 units in the evening   - Decrease Humalog 4 units ac breakfast, 4 units ac lunch and increase to 10 untis ac dinner.     - Medication consideration: Switch Lantus to Tresiba when able. Plan to add GLP1 RA weekly injectable once BG stable with no lows.  - Lab results: A1C discussed.  - Health Maintenance standards addressed today: A1c scheduled.   - Risks of uncontrolled DM explained. Importance of routine foot and eye exams reviewed. Scheduled as appropriate.  -The  patient was explained the above plan and given opportunity to ask questions.  She understands, chooses and consents to this plan and accepts all the risks, which include but are not limited to the risks mentioned above.   - Labs ordered as above.  - CDE follow up: For CGM training Nico once supplies are available.  - Nurse visit: Deferred  - Follow up: 2 weeks to CGM data download and interpretation and make insulin dose adjustments in necessary.       Aide Dougherty, FNP-C [facility]    A total of 30 minutes was spent in face to face time, of which over 50 % was spent in counseling patient on disease process, complications, treatment, and side effects of medications.

## 2022-02-28 ENCOUNTER — EXTERNAL CHRONIC CARE MANAGEMENT (OUTPATIENT)
Dept: PRIMARY CARE CLINIC | Facility: CLINIC | Age: 62
End: 2022-02-28
Payer: MEDICARE

## 2022-02-28 PROCEDURE — 99490 CHRNC CARE MGMT STAFF 1ST 20: CPT | Mod: PBBFAC,PO | Performed by: INTERNAL MEDICINE

## 2022-02-28 PROCEDURE — 99490 PR CHRONIC CARE MGMT, 1ST 20 MIN: ICD-10-PCS | Mod: S$PBB,,, | Performed by: INTERNAL MEDICINE

## 2022-02-28 PROCEDURE — 99490 CHRNC CARE MGMT STAFF 1ST 20: CPT | Mod: S$PBB,,, | Performed by: INTERNAL MEDICINE

## 2022-03-01 ENCOUNTER — OFFICE VISIT (OUTPATIENT)
Dept: DIABETES | Facility: CLINIC | Age: 62
End: 2022-03-01
Payer: MEDICARE

## 2022-03-01 VITALS
SYSTOLIC BLOOD PRESSURE: 133 MMHG | HEIGHT: 66 IN | HEART RATE: 73 BPM | BODY MASS INDEX: 40.74 KG/M2 | DIASTOLIC BLOOD PRESSURE: 81 MMHG | WEIGHT: 253.5 LBS

## 2022-03-01 DIAGNOSIS — E11.69 HYPERLIPIDEMIA ASSOCIATED WITH TYPE 2 DIABETES MELLITUS: ICD-10-CM

## 2022-03-01 DIAGNOSIS — E78.5 HYPERLIPIDEMIA ASSOCIATED WITH TYPE 2 DIABETES MELLITUS: ICD-10-CM

## 2022-03-01 DIAGNOSIS — E11.59 HYPERTENSION ASSOCIATED WITH DIABETES: ICD-10-CM

## 2022-03-01 DIAGNOSIS — I15.2 HYPERTENSION ASSOCIATED WITH DIABETES: ICD-10-CM

## 2022-03-01 DIAGNOSIS — Z79.4 TYPE 2 DIABETES MELLITUS WITH HYPERGLYCEMIA, WITH LONG-TERM CURRENT USE OF INSULIN: Primary | ICD-10-CM

## 2022-03-01 DIAGNOSIS — E11.65 TYPE 2 DIABETES MELLITUS WITH HYPERGLYCEMIA, WITH LONG-TERM CURRENT USE OF INSULIN: Primary | ICD-10-CM

## 2022-03-01 DIAGNOSIS — Z94.0 DECEASED-DONOR KIDNEY TRANSPLANT: ICD-10-CM

## 2022-03-01 PROCEDURE — 99214 PR OFFICE/OUTPT VISIT, EST, LEVL IV, 30-39 MIN: ICD-10-PCS | Mod: S$PBB,,, | Performed by: NURSE PRACTITIONER

## 2022-03-01 PROCEDURE — 95251 CONT GLUC MNTR ANALYSIS I&R: CPT | Mod: ,,, | Performed by: NURSE PRACTITIONER

## 2022-03-01 PROCEDURE — 95251 PR GLUCOSE MONITOR, 72 HOUR, PHYS INTERP: ICD-10-PCS | Mod: ,,, | Performed by: NURSE PRACTITIONER

## 2022-03-01 PROCEDURE — 99215 OFFICE O/P EST HI 40 MIN: CPT | Mod: PBBFAC | Performed by: NURSE PRACTITIONER

## 2022-03-01 PROCEDURE — 99214 OFFICE O/P EST MOD 30 MIN: CPT | Mod: S$PBB,,, | Performed by: NURSE PRACTITIONER

## 2022-03-01 PROCEDURE — 99999 PR PBB SHADOW E&M-EST. PATIENT-LVL V: ICD-10-PCS | Mod: PBBFAC,,, | Performed by: NURSE PRACTITIONER

## 2022-03-01 PROCEDURE — 99999 PR PBB SHADOW E&M-EST. PATIENT-LVL V: CPT | Mod: PBBFAC,,, | Performed by: NURSE PRACTITIONER

## 2022-03-01 RX ORDER — INSULIN LISPRO 100 [IU]/ML
INJECTION, SOLUTION INTRAVENOUS; SUBCUTANEOUS
Qty: 15 ML | Refills: 4
Start: 2022-03-01 | End: 2022-06-20

## 2022-03-01 RX ORDER — INSULIN GLARGINE 100 [IU]/ML
20 INJECTION, SOLUTION SUBCUTANEOUS NIGHTLY
Qty: 15 ML | Refills: 3
Start: 2022-03-01 | End: 2022-03-22

## 2022-03-01 NOTE — PROGRESS NOTES
Patient is here in clinic today for placement of continuous glucose monitoring system (CGMS) Freestyle Nico. Site on back of patient's left upper arm was cleaned and sensor was placed and started. Explained to patient that this is a blind procedure and that patient will not actually see their BG in real time. Instructed pt. continue to shower as normal and informed to continue to check blood sugars as prescribed. Patient was also informed to avoid all imaging procedures and acetaminophen during any procedure. Voiced understanding of all instructions given. No further questions. Patient will come back to have sensor downloaded.

## 2022-03-01 NOTE — PATIENT INSTRUCTIONS
Medication Regimen/Changes:   - Decrease Lantus 20 units in the evening  - Begin taking Humalog 2 units plus sliding scale three times a day before each meal   If blood glucose 150-200: +2 units   If blood glucose 201-250: +4 units              If blood glucose 251-300: +6 units   If blood glucose 301-350: +8 units   If blood glucose   >350: +10 units    Patient Instructions:  Carbohydrate Count: 30-45 grams/meal and 15 grams/snack with limit of 2 snacks per day.  Exercise: Goal is 150 minutes or more per week.  Bring meter and blood sugar log to each appointment.     Goals for Blood Sugar:  Fastin-130 mg/dl  2 hours after meal:  mg/dl  Before Bed: 100-150 mg/dl  If Blood sugar is below 70 mg/dl, DO NOT take diabetes medication. Eat first and then recheck blood sugar in 20 minutes.  Foods to eat if blood sugar is below 70 mg/dl  1/2 glass of orange juice   1/2 regular cola can   3-4 hard candies   3-4 small glucose tabs.   Foods to eat if blood sugar is below 50 mg/dl  1 glass of orange juice  1 regular cola can  8 hard candies  8 small glucose tabs.   If you have repeated eating/blood sugar recheck process 2 times and blood sugar still below 70 mg/dl, call 911.

## 2022-03-01 NOTE — PROGRESS NOTES
PCP: Karine Fernandez MD    Subjective:     Chief Complaint: Diabetes follow up.  Last visit with me: 2/16/2022.    HPI: 61 y.o.  female for diabetes follow up.   Previously managed by TRINITY Gazra PA-C.   Last clinic visit 05/2019.   Type II and Post-Transplant diabetes since 2013 .  Comorbidities: HTN, HLD, CKD III and S/p Transplant - right kidney in 2013.  Known DM complications:   DKA/HHS: no   Retinopathy: no   Peripheral neuropathy: yes   Nephropathy: yes    Interval history:  Denies recent hospital admissions or ER visits.   Voices having overnight hypoglycemia, +jittery, +dizziness  Endorses diabetes related symptoms: Polyuria, Leg swelling and Intermittent tingling in Lower Extremities.    Blood glucose monitoring via CGM Nico.  Reporting period: Feb 16, 2022- Mar 1, 2022.  14 day report generated as follows:    Glucose Details  Average glucose: 132 mg/dL  Standard deviation: 29.1%  GMI: 6.5  -----------------------------  Time in Range  Very High: 0%  High: 11%  In range: 88% ( increased by 16% from previous reading)  Low: 1% (decreased by 6% from previous reading)  Very Low: 0%  Target Range   mg/dL  -----------------------------  CGM Details  Sensor usage: 93%    There are 1 low glucose event with average duration of 30 min.  Overall there is a pattern of euglycemia with   - overnight hypoglycemia   - few episodes of postprandial breakfast, lunch and dinner hypoglycemia   - postprandial lunch and dinner hyperglycemia    Activity: Sedentary- no regular exercise  Diet:2-3 meals/day; infrequent snacks/day.    Medication compliance all of the time, diet compliance most of the time.   Sometimes stress eater.  Has attended diabetes education in the past.     Previous regimen: None    Past failed treatment: None    Current DM Medications:   - Lantus 24 units qhs   - Humalog tid ac meals    Breakfast 4-6 units     Lunch 4-6 units, 10 units if bigger meal    Dinner 4-6 units, 10 units  if bigger meal    Allergies  Review of patient's allergies indicates:   Allergen Reactions    Azithromycin Nausea And Vomiting    Adhesive Other (See Comments)     Skin tears    Nsaids (non-steroidal anti-inflammatory drug)      Kidney Transplant     Labs Reviewed:  Her most recent A1C is:  Lab Results   Component Value Date    HGBA1C 7.1 (H) 02/02/2022    HGBA1C 7.1 (H) 10/12/2021    HGBA1C 6.4 (H) 03/12/2021       Her most recent BMP is:  Lab Results   Component Value Date     01/24/2022    K 4.1 01/24/2022     01/24/2022    CO2 24 01/24/2022    BUN 18 01/24/2022    CREATININE 1.2 01/24/2022    CALCIUM 9.3 01/24/2022    ANIONGAP 13 01/24/2022    ESTGFRAFRICA 56.0 (A) 01/24/2022    EGFRNONAA 48.6 (A) 01/24/2022       No results found for: CPEPTIDE  No results found for: GLUTAMICACID    Body mass index is 40.92 kg/m².    Weight gain 0 lbs since last visit.  Wt Readings from Last 3 Encounters:   03/01/22 1049 115 kg (253 lb 8.5 oz)   02/16/22 1500 115.2 kg (253 lb 15.5 oz)   02/07/22 1115 113.4 kg (250 lb)      Her blood sugar in clinic today is: 128    Lab Results   Component Value Date    POCGLU 138 (A) 02/16/2022     Diabetes Management Status  Statin: Taking  ACE/ARB: Not taking    Screening or Prevention Patient's value Goal Complete/Controlled?   HgA1C Testing and Control   Lab Results   Component Value Date    HGBA1C 7.1 (H) 02/02/2022      Annually/Less than 8% Yes   Lipid profile : 06/10/2021 Annually Yes   LDL control Lab Results   Component Value Date    LDLCALC 90.0 06/10/2021    Annually/Less than 100 mg/dl  Yes   Nephropathy screening Lab Results   Component Value Date    MICALBCREAT 50.0 (H) 11/13/2020     Lab Results   Component Value Date    PROTEINUA 1+ (A) 01/04/2021    Annually Yes   Blood pressure BP Readings from Last 1 Encounters:   03/01/22 133/81    Less than 140/90 Yes   Dilated retinal exam : 08/14/2018 Annually Yes    Foot exam   : 02/02/2022 Annually Yes     Social History      Socioeconomic History    Marital status:     Number of children: 3   Occupational History    Occupation: disable     Comment: dept manager at [employer]   Tobacco Use    Smoking status: Former Smoker     Packs/day: 1.00     Years: 10.00     Pack years: 10.00     Types: Cigarettes     Quit date: 2002     Years since quittin.3    Smokeless tobacco: Never Used    Tobacco comment: quit smoking approx 10-15 years ago    Substance and Sexual Activity    Alcohol use: No    Drug use: No    Sexual activity: Never     Partners: Male   Social History Narrative    Does housework at home.     Social Determinants of Health     Financial Resource Strain: Medium Risk    Difficulty of Paying Living Expenses: Somewhat hard   Food Insecurity: Unknown    Worried About Running Out of Food in the Last Year: Patient refused    Ran Out of Food in the Last Year: Patient refused   Transportation Needs: Unknown    Lack of Transportation (Medical): Patient refused    Lack of Transportation (Non-Medical): Patient refused   Physical Activity: Unknown    Days of Exercise per Week: Patient refused   Stress: No Stress Concern Present    Feeling of Stress : Only a little   Social Connections: Unknown    Frequency of Communication with Friends and Family: More than three times a week    Frequency of Social Gatherings with Friends and Family: Once a week    Active Member of Clubs or Organizations: No    Attends Club or Organization Meetings: Never    Marital Status:    Housing Stability: Unknown    Unable to Pay for Housing in the Last Year: Patient refused    Number of Places Lived in the Last Year: 1    Unstable Housing in the Last Year: No     Past Medical History:   Diagnosis Date    Abnormal glucose 2013    Acquired hypothyroidism     Acute bronchiolitis 10/15/2014    Anemia     Anemia of chronic renal failure 2013    Anxiety     Anxiety disorder     Avascular necrosis of bone  of hip     Back pain     s/p steroid injections    Bacteremia due to Escherichia coli 2021    Chronic immunosuppression with Prograf and Cellcept 2013    CKD (chronic kidney disease) stage 2, GFR 60-89 ml/min     CKD (chronic kidney disease) stage 5, GFR less than 15 ml/min     -donor kidney transplant - 13    Depression     Hypertension, renal 2013    Hypothyroidism     Immunosuppressed status 10/15/2014    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    Osteoporosis with pathological fracture     Renal disease due to hypertension 2013    Renal hypertension, non-vascular     Secondary hyperparathyroidism of renal origin 2013    Vertigo      Review of Systems   Constitutional: Negative for activity change, appetite change, fatigue and unexpected weight change.   HENT: Negative for dental problem and trouble swallowing.    Eyes: Negative for visual disturbance.   Respiratory: Negative for chest tightness and shortness of breath.    Cardiovascular: Negative for chest pain, palpitations and leg swelling.   Gastrointestinal: Negative for abdominal pain, constipation, diarrhea, nausea and vomiting.   Endocrine: Positive for polyuria. Negative for polydipsia and polyphagia.   Genitourinary: Negative for difficulty urinating, dysuria, frequency and urgency.        Negative yeast infection   Musculoskeletal: Negative for gait problem, myalgias and neck pain.   Integumentary:  Negative for rash and wound.   Allergic/Immunologic: Negative.    Neurological: Negative for dizziness, syncope, weakness, numbness and headaches.        Int tingling BLE   Hematological: Negative.    Psychiatric/Behavioral: Negative for confusion and sleep disturbance.   All other systems reviewed and are negative.     Objective:      Physical Exam  Vitals reviewed.   Constitutional:       Appearance: Normal appearance.   HENT:      Head: Normocephalic and atraumatic.   Eyes:       General: Vision grossly intact.      Extraocular Movements: Extraocular movements intact.      Pupils: Pupils are equal, round, and reactive to light.   Neck:      Thyroid: No thyroid mass or thyroid tenderness.   Cardiovascular:      Rate and Rhythm: Normal rate and regular rhythm.      Pulses: Normal pulses.           Radial pulses are 2+ on the right side and 2+ on the left side.        Dorsalis pedis pulses are 2+ on the right side and 2+ on the left side.      Heart sounds: Normal heart sounds.   Pulmonary:      Effort: Pulmonary effort is normal.      Breath sounds: Normal breath sounds.   Abdominal:      General: Bowel sounds are normal.      Palpations: Abdomen is soft.   Musculoskeletal:         General: Normal range of motion.      Cervical back: Normal range of motion and neck supple.      Right lower leg: No edema.      Left lower leg: No edema.      Right foot: No deformity or bunion.      Left foot: No deformity or bunion.   Feet:      Right foot:      Skin integrity: Skin integrity normal. No ulcer, skin breakdown, callus or dry skin.      Toenail Condition: Right toenails are normal.      Left foot:      Skin integrity: Skin integrity normal. No ulcer, skin breakdown, callus or dry skin.      Toenail Condition: Left toenails are normal.   Lymphadenopathy:      Cervical: No cervical adenopathy.   Skin:     General: Skin is warm and dry.      Findings: No rash or wound.      Comments: Negative for acanthosis nigricans. Well-healed SQ injection sites.   Neurological:      Mental Status: She is alert and oriented to person, place, and time.      Coordination: Coordination is intact.      Gait: Gait is intact.   Psychiatric:         Attention and Perception: Attention normal.         Mood and Affect: Mood normal.         Speech: Speech normal.         Behavior: Behavior normal. Behavior is cooperative.         Thought Content: Thought content normal.         Cognition and Memory: Cognition normal.      Assessment / Plan:   Diagnoses and all orders for this visit:    Type 2 diabetes mellitus with hyperglycemia, with long-term current use of insulin  -     insulin lispro (HUMALOG KWIKPEN INSULIN) 100 unit/mL pen; Humalog 2 units plus sliding scale three times a day before each meal. If -200: +2 units; -250: +4 units; -300: +6 units; -350: +8 units; BG over 350: +10 units  -     insulin (LANTUS SOLOSTAR U-100 INSULIN) glargine 100 units/mL (3mL) SubQ pen; Inject 20 Units into the skin every evening.    Hypertension associated with diabetes    Hyperlipidemia associated with type 2 diabetes mellitus    -donor kidney transplant - 13    BMI 40.0-44.9, adult    Additional Plan Details:  - Condition: uncontrolled, most recent A1c of 7.1% was completed 2 weeks ago.   - Monitor blood glucose:  3-4x daily.Goals reviewed. Maintain BG log and bring to next visit for review. Notify if BG readings below 80. Pt would benefit from CGM use. Personal Nico 2 sensor applied in clinic today.  - CGM note: Patient is testing 4 times per day. Patient is injecting meal time insulin 3 times daily and is self adjusting insulin based on blood glucose reading. Patient requires CGM for blood sugar monitoring due to frequent insulin dose changes. Patient reports hypoglycemia, < 50.   - Hypoglycemia protocol: Reviewed and risk discussed. Protocol printout provided.   - Diet: Reviewed, limit carbohydrate intake to 30-45 grams per meal, 3x daily and 15 grams per snack with a limit of two daily.   - Activity: Recommend at least 150 minutes of exercise in a week.  - BP and LDL: Recommend lifestyle modifications and follow up with PCP for management.   - Medication Changes:    - Decrease Lantus 20 units in the evening   - Begin taking Humalog 2 units plus sliding scale three times a day before each meal    If -200: +2 units              201-250: +4 units              251-300: +6 units                          301-350: +8 units                         >350: +10 units    - Medication consideration: Humalog switched to fixed dose plus sliding scale. Switch Lantus to Tresiba when able. Plan to add GLP1 RA weekly injectable once BG stable with no lows.  - Lab results: A1C discussed.  - Health Maintenance standards addressed today: A1c scheduled.   - Risks of uncontrolled DM explained. Importance of routine foot and eye exams reviewed. Scheduled as appropriate.  -The patient was explained the above plan and given opportunity to ask questions.  She understands, chooses and consents to this plan and accepts all the risks, which include but are not limited to the risks mentioned above.   - Labs ordered as above.  - CDE follow up: For CGM training Nico once supplies are available.  - Nurse visit: Deferred  - Follow up: 2 weeks virtual.       Charlotte T. Delacruz, FNP-C [facility]    A total of 30 minutes was spent in face to face time, of which over 50 % was spent in counseling patient on disease process, complications, treatment, and side effects of medications.

## 2022-03-07 ENCOUNTER — OFFICE VISIT (OUTPATIENT)
Dept: FAMILY MEDICINE | Facility: CLINIC | Age: 62
End: 2022-03-07
Payer: MEDICARE

## 2022-03-07 ENCOUNTER — PATIENT MESSAGE (OUTPATIENT)
Dept: FAMILY MEDICINE | Facility: CLINIC | Age: 62
End: 2022-03-07

## 2022-03-07 VITALS — SYSTOLIC BLOOD PRESSURE: 139 MMHG | DIASTOLIC BLOOD PRESSURE: 80 MMHG

## 2022-03-07 DIAGNOSIS — N18.31 STAGE 3A CHRONIC KIDNEY DISEASE: ICD-10-CM

## 2022-03-07 DIAGNOSIS — Z12.31 ENCOUNTER FOR SCREENING MAMMOGRAM FOR MALIGNANT NEOPLASM OF BREAST: ICD-10-CM

## 2022-03-07 DIAGNOSIS — Z94.0 DECEASED-DONOR KIDNEY TRANSPLANT: Chronic | ICD-10-CM

## 2022-03-07 DIAGNOSIS — J41.0 SIMPLE CHRONIC BRONCHITIS: ICD-10-CM

## 2022-03-07 DIAGNOSIS — E11.69 HYPERLIPIDEMIA ASSOCIATED WITH TYPE 2 DIABETES MELLITUS: ICD-10-CM

## 2022-03-07 DIAGNOSIS — Z79.4 TYPE 2 DIABETES MELLITUS WITH HYPERGLYCEMIA, WITH LONG-TERM CURRENT USE OF INSULIN: ICD-10-CM

## 2022-03-07 DIAGNOSIS — D84.9 IMMUNOSUPPRESSED STATUS: ICD-10-CM

## 2022-03-07 DIAGNOSIS — E11.59 HYPERTENSION ASSOCIATED WITH DIABETES: ICD-10-CM

## 2022-03-07 DIAGNOSIS — F51.01 PRIMARY INSOMNIA: ICD-10-CM

## 2022-03-07 DIAGNOSIS — E03.9 ACQUIRED HYPOTHYROIDISM: ICD-10-CM

## 2022-03-07 DIAGNOSIS — J01.00 ACUTE NON-RECURRENT MAXILLARY SINUSITIS: ICD-10-CM

## 2022-03-07 DIAGNOSIS — F41.9 ANXIETY AND DEPRESSION: Primary | ICD-10-CM

## 2022-03-07 DIAGNOSIS — Z29.9 PREVENTIVE MEASURE: ICD-10-CM

## 2022-03-07 DIAGNOSIS — I15.2 HYPERTENSION ASSOCIATED WITH DIABETES: ICD-10-CM

## 2022-03-07 DIAGNOSIS — M81.0 AGE-RELATED OSTEOPOROSIS WITHOUT CURRENT PATHOLOGICAL FRACTURE: ICD-10-CM

## 2022-03-07 DIAGNOSIS — F32.A ANXIETY AND DEPRESSION: Primary | ICD-10-CM

## 2022-03-07 DIAGNOSIS — E11.65 TYPE 2 DIABETES MELLITUS WITH HYPERGLYCEMIA, WITH LONG-TERM CURRENT USE OF INSULIN: ICD-10-CM

## 2022-03-07 DIAGNOSIS — E78.5 HYPERLIPIDEMIA ASSOCIATED WITH TYPE 2 DIABETES MELLITUS: ICD-10-CM

## 2022-03-07 PROCEDURE — 99214 PR OFFICE/OUTPT VISIT, EST, LEVL IV, 30-39 MIN: ICD-10-PCS | Mod: 95,,, | Performed by: INTERNAL MEDICINE

## 2022-03-07 PROCEDURE — 99214 OFFICE O/P EST MOD 30 MIN: CPT | Mod: 95,,, | Performed by: INTERNAL MEDICINE

## 2022-03-07 RX ORDER — BENZONATATE 100 MG/1
100 CAPSULE ORAL 3 TIMES DAILY PRN
Qty: 40 CAPSULE | Refills: 1 | Status: SHIPPED | OUTPATIENT
Start: 2022-03-07 | End: 2023-10-06 | Stop reason: SDUPTHER

## 2022-03-07 RX ORDER — TRAZODONE HYDROCHLORIDE 50 MG/1
50 TABLET ORAL NIGHTLY
Qty: 30 TABLET | Refills: 11 | Status: SHIPPED | OUTPATIENT
Start: 2022-03-07 | End: 2022-10-17

## 2022-03-07 RX ORDER — AMOXICILLIN 875 MG/1
875 TABLET, FILM COATED ORAL 2 TIMES DAILY
Qty: 20 TABLET | Refills: 0 | Status: SHIPPED | OUTPATIENT
Start: 2022-03-07 | End: 2022-03-17

## 2022-03-07 RX ORDER — PREDNISONE 20 MG/1
TABLET ORAL
Qty: 6 TABLET | Refills: 0 | Status: SHIPPED | OUTPATIENT
Start: 2022-03-07 | End: 2022-04-12

## 2022-03-07 NOTE — PROGRESS NOTES
Primary Care Telemedicine Note  The patient location is:  Patient Home   The chief complaint leading to consultation is: Follow up of medical problems.      Visit type: Virtual visit with synchronous audio only and video  Each patient to whom he or she provides medical services by telemedicine is:  (1) informed of the relationship between the physician and patient and the respective role of any other health care provider with respect to management of the patient; and (2) notified that he or she may decline to receive medical services by telemedicine and may withdraw from such care at any time.    Updated/ annual due 3/22:  HM:  fluvax,  covid vaccines/booster,  HAV, 6/15 [id number], 3/14 [id number], 6/15 TDaP,  MMG/me,  BMD with OP on prolia rep 2y, 3/16 Cscope rep 10y, 11/10 HCV neg, Pain Dr. Anaya, 2018 Eye Dr. Martin.     HPI:  Stuffy nose, eyes running, some coughing due to sinus drainage x a week.  Not better taking zyrtec and benadryl, and flonase.  Citalopram caused BP high, and headaches, so stopped it.  Doing ok lately, but trouble sleeping.  No more low sugars, NP Ladonna working with her.  BP at home 139/80.  BMs normal.  No f/c/n/v.  No cp/sob/palp.  Knee pain, On percocet.        Problem List Items Addressed This Visit     -donor kidney transplant - 13 (Chronic)    Overview     Induction with Campath 30mg and IV solumedrol to total 875mg.  Post op course complicated by delayed graft function with hemodialysis. Last HD treatment 10/2/13.   Post-op course also complicated by bleeding/oozing from abdominal incision. Pt received a total of 4 units of PRBC and DDAVP for blood loss anemia.   Returned to OR 10/713 where section was cauterized and NALLELY drain was placed,after which bleeding stopped.    CMV status: D+,R+           Relevant Medications    predniSONE (DELTASONE) 20 MG tablet    Acquired hypothyroidism    Age-related osteoporosis without current pathological fracture     "Anxiety and depression - Primary    Chronic bronchitis    Hyperlipidemia associated with type 2 diabetes mellitus    Hypertension associated with diabetes    Immunosuppressed status    Stage 3a chronic kidney disease    Type 2 diabetes mellitus with hyperglycemia, with long-term current use of insulin    Relevant Orders    CBC Auto Differential    Comprehensive Metabolic Panel    TSH    Hemoglobin A1C    Microalbumin/Creatinine Ratio, Urine      Other Visit Diagnoses     Preventive measure        Relevant Orders    Mammo Digital Screening Bilat w/ Neptali    Acute non-recurrent maxillary sinusitis        Relevant Medications    amoxicillin (AMOXIL) 875 MG tablet    predniSONE (DELTASONE) 20 MG tablet    benzonatate (TESSALON) 100 MG capsule    Primary insomnia        Relevant Medications    traZODone (DESYREL) 50 MG tablet    Encounter for screening mammogram for malignant neoplasm of breast         Relevant Orders    Mammo Digital Screening Bilat w/ Neptali          The patient's Health Maintenance was reviewed and the following appears to be due at this time:  Health Maintenance Due   Topic Date Due    Complete Opioid Risk Tool  Never done    Urine Drug Screen  Never done    Naloxone Prescription  Never done    Shingles Vaccine (1 of 2) Never done    Pneumococcal Vaccines (Age 0-64) (3 of 4 - PPSV23) 03/31/2019    Eye Exam  08/14/2019    Diabetes Urine Screening  11/13/2021       [unfilled]  Outpatient Medications Prior to Visit   Medication Sig Dispense Refill    albuterol 90 mcg/actuation inhaler Inhale 2 puffs into the lungs every 6 (six) hours as needed.       atorvastatin (LIPITOR) 20 MG tablet TAKE 1 TABLET(20 MG) BY MOUTH EVERY DAY 30 tablet 11    BD RORY 2ND GEN PEN NEEDLE 32 gauge x 5/32" Ndle USE AS DIRECTED FOUR TIMES DAILY WITH INSULIN 100 each 11    blood sugar diagnostic (FREESTYLE LITE STRIPS) Strp Use to check blood glucose 3 times a day. 100 strip 11    cetirizine (ZYRTEC) 10 MG tablet " TAKE 1 TABLET BY MOUTH EVERY DAY 30 tablet 11    diclofenac sodium (VOLTAREN) 1 % Gel APPLY 2G TOPICALLY FOUR TIMES DAILY AS NEEDED 100 g 0    ergocalciferol (ERGOCALCIFEROL) 50,000 unit Cap Take 1 capsule (50,000 Units total) by mouth every 7 days. 4 capsule 11    flash glucose scanning reader (FREESTYLE DEREK 2 READER) Misc 1 Device by Misc.(Non-Drug; Combo Route) route continuous. Use to test blood glucose with the Derek reader. 1 each 0    flash glucose sensor (FREESTYLE DEREK 2 SENSOR) Kit 1 application by Misc.(Non-Drug; Combo Route) route every 14 (fourteen) days. Use to test blood glucose 4-6x/day. 2 kit 6    fluticasone propionate (FLONASE) 50 mcg/actuation nasal spray SHAKE LIQUID AND USE 2 SPRAYS(100 MCG) IN EACH NOSTRIL EVERY DAY 16 g 6    furosemide (LASIX) 20 MG tablet Take 1 tablet (20 mg total) by mouth daily as needed (swelling in legs). 30 tablet 11    gabapentin (NEURONTIN) 300 MG capsule Take 1 capsule (300 mg total) by mouth 3 (three) times daily. 90 capsule 1    insulin (LANTUS SOLOSTAR U-100 INSULIN) glargine 100 units/mL (3mL) SubQ pen Inject 20 Units into the skin every evening. 15 mL 3    insulin lispro (HUMALOG KWIKPEN INSULIN) 100 unit/mL pen Humalog 2 units plus sliding scale three times a day before each meal. If -200: +2 units; -250: +4 units; -300: +6 units; -350: +8 units; BG over 350: +10 units 15 mL 4    lancets (FREESTYLE LANCETS) 28 gauge Misc 1 lancet by Combination route 2 (two) times daily as needed. Qid prn 400 each 2    lancets Misc 1 strip by Misc.(Non-Drug; Combo Route) route 4 (four) times daily with meals and nightly. 150 each 6    levothyroxine (SYNTHROID) 150 MCG tablet TAKE 1 TABLET(150 MCG) BY MOUTH BEFORE BREAKFAST 90 tablet 3    mycophenolate (CELLCEPT) 250 mg Cap Take 2 capsules (500 mg total) by mouth 2 (two) times daily. 120 capsule 11    NIFEdipine (PROCARDIA-XL) 30 MG (OSM) 24 hr tablet Take 1 tablet (30 mg total) by mouth  once daily. 30 tablet 11    [START ON 3/27/2022] oxyCODONE-acetaminophen (PERCOCET)  mg per tablet Take 1 tablet by mouth every 8 (eight) hours as needed for Pain. 90 tablet 0    oxyCODONE-acetaminophen (PERCOCET)  mg per tablet Take 1 tablet by mouth every 8 (eight) hours as needed for Pain. 90 tablet 0    pulse oximeter (PULSE OXIMETER) device by Apply Externally route 2 (two) times a day. Use twice daily at 8 AM and 3 PM and record the value in [facility] as directed. 1 each 0    tacrolimus (PROGRAF) 1 MG Cap TAKE 3 CAPSULES IN THE MORNING, AND 3 IN THE EVENING 180 capsule 11    tiZANidine (ZANAFLEX) 4 MG tablet Take 1 tablet (4 mg total) by mouth 2 (two) times daily as needed (muscle spasms). 60 tablet 1     No facility-administered medications prior to visit.        Physical Exam   No flowsheet data found.  No flowsheet data found.      Constitutional: The patient appears well-developed and well-nourished. No distress.   Psychiatric: The mood appears not anxious and the affect is not angry, not blunt, not labile and not inappropriate. Speech is not rapid and/or pressured, not delayed, not tangential and not slurred. The patient is not agitated, not aggressive, is not hyperactive, not slowed, not withdrawn, not actively hallucinating and not combative. Thought content is not paranoid and not delusional. Cognition and memory are not impaired. The patient does not express impulsivity or inappropriate judgment and does not exhibit a depressed mood. The patient expresses no homicidal and no suicidal ideation and has no suicidal plans and no homicidal plans. The patient is communicative and exhibits a normal recent memory and normal remote memory. The patient is attentive.     Encounter Diagnoses   Name Primary?    Anxiety and depression Yes    Acquired hypothyroidism     Hypertension associated with diabetes     Type 2 diabetes mellitus with hyperglycemia, with long-term current use of insulin      Simple chronic bronchitis     Age-related osteoporosis without current pathological fracture     Hyperlipidemia associated with type 2 diabetes mellitus     Immunosuppressed status     -donor kidney transplant - 13     Stage 3a chronic kidney disease     Preventive measure     Acute non-recurrent maxillary sinusitis     Primary insomnia     Encounter for screening mammogram for malignant neoplasm of breast         PLAN:    I am having Ana Maria Teague start on amoxicillin, predniSONE, benzonatate, and traZODone. I am also having her maintain her lancets, albuterol, lancets, pulse oximeter, ergocalciferol, levothyroxine, diclofenac sodium, atorvastatin, BD RORY 2ND GEN PEN NEEDLE, NIFEdipine, mycophenolate, tacrolimus, fluticasone propionate, cetirizine, gabapentin, furosemide, blood sugar diagnostic, FREESTYLE DEREK 2 SENSOR, FREESTYLE DEREK 2 READER, tiZANidine, oxyCODONE-acetaminophen, oxyCODONE-acetaminophen, insulin lispro, and LANTUS SOLOSTAR U-100 INSULIN.    Diagnoses and all orders for this visit:    Anxiety and depression    Acquired hypothyroidism    Hypertension associated with diabetes    Type 2 diabetes mellitus with hyperglycemia, with long-term current use of insulin- cont DM specialist.  -     CBC Auto Differential; Future  -     Comprehensive Metabolic Panel; Future  -     TSH; Future  -     Hemoglobin A1C; Future  -     Microalbumin/Creatinine Ratio, Urine; Future    Simple chronic bronchitis- treat sinus now so no exacerbation.    Age-related osteoporosis without current pathological fracture    Hyperlipidemia associated with type 2 diabetes mellitus    Immunosuppressed status, -donor kidney transplant - 13    Stage 3a chronic kidney disease- lab in 3mo.    Preventive measure  -     Mammo Digital Screening Bilat w/ Neptali; Future    Acute non-recurrent maxillary sinusitis  -     amoxicillin (AMOXIL) 875 MG tablet; Take 1 tablet (875 mg total) by mouth 2 (two) times  daily. for 10 days  -     predniSONE (DELTASONE) 20 MG tablet; 1 po daily x 4d, then half po daily.  -     benzonatate (TESSALON) 100 MG capsule; Take 1 capsule (100 mg total) by mouth 3 (three) times daily as needed for Cough.    Primary insomnia  -     traZODone (DESYREL) 50 MG tablet; Take 1 tablet (50 mg total) by mouth every evening.    Encounter for screening mammogram for malignant neoplasm of breast   -     Mammo Digital Screening Bilat w/ Neptali; Future    RTC 3 mo with MMG/labs.    Medications Ordered This Encounter   Medications    amoxicillin (AMOXIL) 875 MG tablet     Sig: Take 1 tablet (875 mg total) by mouth 2 (two) times daily. for 10 days     Dispense:  20 tablet     Refill:  0    benzonatate (TESSALON) 100 MG capsule     Sig: Take 1 capsule (100 mg total) by mouth 3 (three) times daily as needed for Cough.     Dispense:  40 capsule     Refill:  1    predniSONE (DELTASONE) 20 MG tablet     Si po daily x 4d, then half po daily.     Dispense:  6 tablet     Refill:  0    traZODone (DESYREL) 50 MG tablet     Sig: Take 1 tablet (50 mg total) by mouth every evening.     Dispense:  30 tablet     Refill:  11     Medications Ordered This Encounter   Medications    amoxicillin (AMOXIL) 875 MG tablet     Sig: Take 1 tablet (875 mg total) by mouth 2 (two) times daily. for 10 days     Dispense:  20 tablet     Refill:  0    benzonatate (TESSALON) 100 MG capsule     Sig: Take 1 capsule (100 mg total) by mouth 3 (three) times daily as needed for Cough.     Dispense:  40 capsule     Refill:  1    predniSONE (DELTASONE) 20 MG tablet     Si po daily x 4d, then half po daily.     Dispense:  6 tablet     Refill:  0    traZODone (DESYREL) 50 MG tablet     Sig: Take 1 tablet (50 mg total) by mouth every evening.     Dispense:  30 tablet     Refill:  11     Orders Placed This Encounter   Procedures    Mammo Digital Screening Bilat w/ Neptali     Standing Status:   Future     Standing Expiration Date:   2023      Order Specific Question:   May the Radiologist modify the order per protocol to meet the clinical needs of the patient?     Answer:   Yes    CBC Auto Differential     Standing Status:   Future     Standing Expiration Date:   3/7/2023    Comprehensive Metabolic Panel     Standing Status:   Future     Standing Expiration Date:   3/7/2023    TSH     Standing Status:   Future     Standing Expiration Date:   3/7/2023    Hemoglobin A1C     Standing Status:   Future     Standing Expiration Date:   5/6/2023    Microalbumin/Creatinine Ratio, Urine     Standing Status:   Future     Standing Expiration Date:   5/6/2023     Order Specific Question:   Specimen Source     Answer:   Urine

## 2022-03-21 ENCOUNTER — PATIENT OUTREACH (OUTPATIENT)
Dept: ADMINISTRATIVE | Facility: OTHER | Age: 62
End: 2022-03-21
Payer: MEDICARE

## 2022-03-22 ENCOUNTER — OFFICE VISIT (OUTPATIENT)
Dept: DIABETES | Facility: CLINIC | Age: 62
End: 2022-03-22
Payer: MEDICARE

## 2022-03-22 DIAGNOSIS — I15.2 HYPERTENSION ASSOCIATED WITH DIABETES: ICD-10-CM

## 2022-03-22 DIAGNOSIS — E11.65 TYPE 2 DIABETES MELLITUS WITH HYPERGLYCEMIA, WITH LONG-TERM CURRENT USE OF INSULIN: Primary | ICD-10-CM

## 2022-03-22 DIAGNOSIS — Z94.0 DECEASED-DONOR KIDNEY TRANSPLANT: ICD-10-CM

## 2022-03-22 DIAGNOSIS — E78.5 HYPERLIPIDEMIA ASSOCIATED WITH TYPE 2 DIABETES MELLITUS: ICD-10-CM

## 2022-03-22 DIAGNOSIS — E11.69 HYPERLIPIDEMIA ASSOCIATED WITH TYPE 2 DIABETES MELLITUS: ICD-10-CM

## 2022-03-22 DIAGNOSIS — E11.59 HYPERTENSION ASSOCIATED WITH DIABETES: ICD-10-CM

## 2022-03-22 DIAGNOSIS — Z79.4 TYPE 2 DIABETES MELLITUS WITH HYPERGLYCEMIA, WITH LONG-TERM CURRENT USE OF INSULIN: Primary | ICD-10-CM

## 2022-03-22 PROCEDURE — 99214 PR OFFICE/OUTPT VISIT, EST, LEVL IV, 30-39 MIN: ICD-10-PCS | Mod: 95,,, | Performed by: NURSE PRACTITIONER

## 2022-03-22 PROCEDURE — 99214 OFFICE O/P EST MOD 30 MIN: CPT | Mod: 95,,, | Performed by: NURSE PRACTITIONER

## 2022-03-22 RX ORDER — INSULIN GLARGINE 100 [IU]/ML
18 INJECTION, SOLUTION SUBCUTANEOUS NIGHTLY
Qty: 15 ML | Refills: 3
Start: 2022-03-22 | End: 2022-06-20

## 2022-03-22 NOTE — PATIENT INSTRUCTIONS
Medication Regimen/Changes:   - Decrease Lantus 18 units in the evening  - Continue taking Humalog 2 units plus sliding scale three times a day before each meal   If -200: +2 units             201-250: +4 units             251-300: +6 units             301-350: +8 units             >350: +10 units    Patient Instructions:  Carbohydrate Count: 30-45 grams/meal and 15 grams/snack with limit of 2 snacks per day.  Exercise: Goal is 150 minutes or more per week.  Bring meter and blood sugar log to each appointment.     Goals for Blood Sugar:  Fastin-130 mg/dl  2 hours after meal:  mg/dl  Before Bed: 100-150 mg/dl  If Blood sugar is below 70 mg/dl, DO NOT take diabetes medication. Eat first and then recheck blood sugar in 20 minutes.  Foods to eat if blood sugar is below 70 mg/dl  1/2 glass of orange juice   1/2 regular cola can   3-4 hard candies   3-4 small glucose tabs.   Foods to eat if blood sugar is below 50 mg/dl  1 glass of orange juice  1 regular cola can  8 hard candies  8 small glucose tabs.   If you have repeated eating/blood sugar recheck process 2 times and blood sugar still below 70 mg/dl, call 911.

## 2022-03-22 NOTE — PROGRESS NOTES
The patient location is: [place]    Visit type: Virtual via Epic Haiku keren    Face to Face time with patient: 20 minutes of total time spent on the encounter, which includes face to face time and non-face to face time preparing to see the patient (eg, review of tests), Obtaining and/or reviewing separately obtained history, Documenting clinical information in the electronic or other health record, Independently interpreting results (not separately reported) and communicating results to the patient/family/caregiver, or Care coordination (not separately reported).     Each patient to whom he or she provides medical services by telemedicine is:  (1) informed of the relationship between the physician and patient and the respective role of any other health care provider with respect to management of the patient; and (2) notified that he or she may decline to receive medical services by telemedicine and may withdraw from such care at any time.    PCP: Karine Fernandez MD    Subjective:     Chief Complaint: Diabetes follow up.  Last visit with me: 3/1/2022    HPI: 61 y.o.  female for diabetes follow up.   Previously managed by TRINITY Garza PA-C.   Last clinic visit 05/2019.   Type II and Post-Transplant diabetes since 2013 .  Comorbidities: HTN, HLD, CKD III and S/p Transplant - right kidney in 2013.  Known DM complications:   DKA/HHS: no   Retinopathy: no   Peripheral neuropathy: yes   Nephropathy: yes    Interval history:  Denies recent hospital admissions or ER visits.   Voices having overnight hypoglycemia, +jittery, BG on the 60's  Endorses diabetes related symptoms: Polyuria, Leg swelling and Intermittent tingling in Lower Extremities.    Admits to recent sinus infection 3/7/2022.  Was on prednisone x 8 days.  Admits to giving extra doses of Humalog after meals due to high BG causing low readings after.    Blood glucose monitoring via CGM Nico.  Reporting period: Mar 8, 2022- Mar 21, 2022.  14  day report generated as follows:    Glucose Details  Average glucose: 151 mg/dL  Standard deviation: 33.5%  GMI: 6.9  -----------------------------  Time in Range  Very High: 0%  High: 21%  In range: 73% ( decreasd by 15% from previous reading)  Low: 1%   Very Low: 0%  Target Range   mg/dL  -----------------------------  CGM Details  Sensor usage: 98%    There are 4 low glucose events with average duration of 52 mins.  Overall there is a pattern of euglycemia with   - postprandial lunch and dinner hyperglycemia   - few overnight hypoglycemia   - few episodes of postprandial breakfast, lunch and dinner hypoglycemia    Postprandial hypoglycemia attributed to insulin stacking.     Activity: Sedentary- no regular exercise  Diet:2-3 meals/day; infrequent snacks/day.    Medication compliance all of the time, diet compliance most of the time.   Sometimes stress eater.  Has attended diabetes education in the past.     Previous regimen: None    Past failed treatment: None    Current DM Medications:   - Lantus 20 units qhs   - Humalog 2 units plus sliding scale three times a day before each meal: only taking the sliding scale, not with the base dose    If -200: +2 units    If -250: +4 units    If -300: +6 units               If -350: +8 units               If BG  >350: +10 units    Allergies  Review of patient's allergies indicates:   Allergen Reactions    Azithromycin Nausea And Vomiting    Adhesive Other (See Comments)     Skin tears    Nsaids (non-steroidal anti-inflammatory drug)      Kidney Transplant       Labs Reviewed:  Her most recent A1C is:  Lab Results   Component Value Date    HGBA1C 7.1 (H) 02/02/2022    HGBA1C 7.1 (H) 10/12/2021    HGBA1C 6.4 (H) 03/12/2021     Her most recent BMP is:  Lab Results   Component Value Date     01/24/2022    K 4.1 01/24/2022     01/24/2022    CO2 24 01/24/2022    BUN 18 01/24/2022    CREATININE 1.2 01/24/2022    CALCIUM 9.3 01/24/2022     ANIONGAP 13 2022    ESTGFRAFRICA 56.0 (A) 2022    EGFRNONAA 48.6 (A) 2022       No results found for: CPEPTIDE  No results found for: GLUTAMICACID    There is no height or weight on file to calculate BMI.    Wt Readings from Last 3 Encounters:   22 1049 115 kg (253 lb 8.5 oz)   22 1500 115.2 kg (253 lb 15.5 oz)   22 1115 113.4 kg (250 lb)      Diabetes Management Status  Statin: Taking  ACE/ARB: Not taking    Screening or Prevention Patient's value Goal Complete/Controlled?   HgA1C Testing and Control   Lab Results   Component Value Date    HGBA1C 7.1 (H) 2022      Annually/Less than 8% Yes   Lipid profile : 06/10/2021 Annually Yes   LDL control Lab Results   Component Value Date    LDLCALC 90.0 06/10/2021    Annually/Less than 100 mg/dl  Yes   Nephropathy screening Lab Results   Component Value Date    MICALBCREAT 50.0 (H) 2020     Lab Results   Component Value Date    PROTEINUA 1+ (A) 2021    Annually Yes   Blood pressure BP Readings from Last 1 Encounters:   22 139/80    Less than 140/90 Yes   Dilated retinal exam : 2018 Annually Yes    Foot exam   : 2022 Annually Yes     Social History     Socioeconomic History    Marital status:     Number of children: 3   Occupational History    Occupation: disable     Comment: dept manager at [employer]   Tobacco Use    Smoking status: Former Smoker     Packs/day: 1.00     Years: 10.00     Pack years: 10.00     Types: Cigarettes     Quit date: 2002     Years since quittin.3    Smokeless tobacco: Never Used    Tobacco comment: quit smoking approx 10-15 years ago    Substance and Sexual Activity    Alcohol use: No    Drug use: No    Sexual activity: Never     Partners: Male   Social History Narrative    Does housework at home.     Social Determinants of Health     Financial Resource Strain: Medium Risk    Difficulty of Paying Living Expenses: Somewhat hard   Food Insecurity: Unknown     Worried About Running Out of Food in the Last Year: Patient refused    Ran Out of Food in the Last Year: Patient refused   Transportation Needs: Unknown    Lack of Transportation (Medical): Patient refused    Lack of Transportation (Non-Medical): Patient refused   Physical Activity: Unknown    Days of Exercise per Week: Patient refused   Stress: No Stress Concern Present    Feeling of Stress : Only a little   Social Connections: Unknown    Frequency of Communication with Friends and Family: More than three times a week    Frequency of Social Gatherings with Friends and Family: Once a week    Active Member of Clubs or Organizations: No    Attends Club or Organization Meetings: Never    Marital Status:    Housing Stability: Unknown    Unable to Pay for Housing in the Last Year: Patient refused    Number of Places Lived in the Last Year: 1    Unstable Housing in the Last Year: No     Past Medical History:   Diagnosis Date    Abnormal glucose 2013    Acquired hypothyroidism     Acute bronchiolitis 10/15/2014    Anemia     Anemia of chronic renal failure 2013    Anxiety     Anxiety disorder     Avascular necrosis of bone of hip     Back pain     s/p steroid injections    Bacteremia due to Escherichia coli 2021    Chronic immunosuppression with Prograf and Cellcept 2013    CKD (chronic kidney disease) stage 2, GFR 60-89 ml/min     CKD (chronic kidney disease) stage 5, GFR less than 15 ml/min     -donor kidney transplant - 13    Depression     Hypertension, renal 2013    Hypothyroidism     Immunosuppressed status 10/15/2014    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    Osteoporosis with pathological fracture     Renal disease due to hypertension 2013    Renal hypertension, non-vascular     Secondary hyperparathyroidism of renal origin 2013    Vertigo      Review of Systems   Constitutional: Negative for  activity change, appetite change, fatigue and unexpected weight change.   HENT: Negative for dental problem and trouble swallowing.    Eyes: Negative for visual disturbance.   Respiratory: Negative for chest tightness and shortness of breath.    Cardiovascular: Negative for chest pain, palpitations and leg swelling.   Gastrointestinal: Negative for abdominal pain, constipation, diarrhea, nausea and vomiting.   Endocrine: Positive for polyuria. Negative for polydipsia and polyphagia.   Genitourinary: Negative for difficulty urinating, dysuria, frequency and urgency.        Negative yeast infection   Musculoskeletal: Negative for gait problem, myalgias and neck pain.   Integumentary:  Negative for rash and wound.   Allergic/Immunologic: Negative.    Neurological: Negative for dizziness, syncope, weakness, numbness and headaches.        Int tingling BLE   Hematological: Negative.    Psychiatric/Behavioral: Negative for confusion and sleep disturbance.   All other systems reviewed and are negative.    Objective:      Physical Exam  Constitutional:       General: She is awake.      Appearance: Normal appearance. She is well-developed and well-groomed.   HENT:      Head: Normocephalic and atraumatic.   Eyes:      General: Lids are normal. Gaze aligned appropriately.   Pulmonary:      Effort: Pulmonary effort is normal. No respiratory distress.   Neurological:      Mental Status: She is alert and oriented to person, place, and time.   Psychiatric:         Attention and Perception: Attention normal.         Mood and Affect: Mood and affect normal.         Speech: Speech normal.         Behavior: Behavior normal.         Thought Content: Thought content normal.         Cognition and Memory: Cognition normal.         Judgment: Judgment normal.         Assessment / Plan:     Diagnoses and all orders for this visit:    Type 2 diabetes mellitus with hyperglycemia, with long-term current use of insulin  -     insulin (LANTUS  SOLOSTAR U-100 INSULIN) glargine 100 units/mL (3mL) SubQ pen; Inject 18 Units into the skin every evening.    Hypertension associated with diabetes    Hyperlipidemia associated with type 2 diabetes mellitus    -donor kidney transplant - 13    BMI 40.0-44.9, adult    Additional Plan Details:  - Condition: uncontrolled, most recent A1C of 7.1% was completed 6 weeks ago.   - Monitor blood glucose:  3-4x daily.Goals reviewed. Maintain BG log and bring to next visit for review. Continue CGM use with Nico.  - CGM note: Patient is testing 4 times per day. Patient is injecting meal time insulin 3 times daily and is self adjusting insulin based on blood glucose reading. Patient requires CGM for blood sugar monitoring due to frequent insulin dose changes. Patient reports hypoglycemia, < 50.   - Hypoglycemia protocol: Reviewed and risk discussed. Protocol printout provided.   - Diet: Reviewed, limit carbohydrate intake to 30-45 grams per meal, 3x daily and 15 grams per snack with a limit of two daily.   - Activity: Recommend at least 150 minutes of exercise in a week.  - BP and LDL: Recommend lifestyle modifications and follow up with PCP for management.   - Medication Changes:    - Decrease Lantus 18 units in the evening   - Continue taking Humalog 2 units plus sliding scale three times a day before each meal    If -200: +2 units              201-250: +4 units              251-300: +6 units                         301-350: +8 units                         >350: +10 units    - Medication consideration:  Switch Lantus to Tresiba when able. Plan to add GLP1 RA weekly injectable once BG stable with no lows. Discussed how to use Humalog with base dose plus sliding scale.   - Lab results: A1C discussed.  - Health Maintenance standards addressed today: A1c scheduled.   - Risks of uncontrolled DM explained. Importance of routine foot and eye exams reviewed. Scheduled as appropriate.  -The patient was explained the  above plan and given opportunity to ask questions.  She understands, chooses and consents to this plan and accepts all the risks, which include but are not limited to the risks mentioned above.   - Labs ordered as above.  - Nurse visit: Deferred  - Follow up: 8 weeks.       Charlotte T. Delacruz, FNP-C [facility]    A total of 30 minutes was spent in face to face time, of which over 50 % was spent in counseling patient on disease process, complications, treatment, and side effects of medications.

## 2022-03-30 ENCOUNTER — TELEPHONE (OUTPATIENT)
Dept: ADMINISTRATIVE | Facility: HOSPITAL | Age: 62
End: 2022-03-30
Payer: MEDICARE

## 2022-03-31 ENCOUNTER — EXTERNAL CHRONIC CARE MANAGEMENT (OUTPATIENT)
Dept: PRIMARY CARE CLINIC | Facility: CLINIC | Age: 62
End: 2022-03-31
Payer: MEDICARE

## 2022-03-31 PROCEDURE — 99490 CHRNC CARE MGMT STAFF 1ST 20: CPT | Mod: PBBFAC,PO | Performed by: INTERNAL MEDICINE

## 2022-03-31 PROCEDURE — 99490 PR CHRONIC CARE MGMT, 1ST 20 MIN: ICD-10-PCS | Mod: S$PBB,,, | Performed by: INTERNAL MEDICINE

## 2022-03-31 PROCEDURE — 99490 CHRNC CARE MGMT STAFF 1ST 20: CPT | Mod: S$PBB,,, | Performed by: INTERNAL MEDICINE

## 2022-04-11 ENCOUNTER — TELEPHONE (OUTPATIENT)
Dept: ADMINISTRATIVE | Facility: CLINIC | Age: 62
End: 2022-04-11
Payer: MEDICARE

## 2022-04-12 ENCOUNTER — OFFICE VISIT (OUTPATIENT)
Dept: PAIN MEDICINE | Facility: CLINIC | Age: 62
End: 2022-04-12
Payer: MEDICARE

## 2022-04-12 ENCOUNTER — PATIENT MESSAGE (OUTPATIENT)
Dept: PAIN MEDICINE | Facility: CLINIC | Age: 62
End: 2022-04-12

## 2022-04-12 ENCOUNTER — PATIENT MESSAGE (OUTPATIENT)
Dept: ADMINISTRATIVE | Facility: CLINIC | Age: 62
End: 2022-04-12
Payer: MEDICARE

## 2022-04-12 ENCOUNTER — TELEPHONE (OUTPATIENT)
Dept: ADMINISTRATIVE | Facility: CLINIC | Age: 62
End: 2022-04-12
Payer: MEDICARE

## 2022-04-12 ENCOUNTER — OFFICE VISIT (OUTPATIENT)
Dept: INTERNAL MEDICINE | Facility: CLINIC | Age: 62
End: 2022-04-12
Payer: MEDICARE

## 2022-04-12 VITALS
WEIGHT: 253 LBS | SYSTOLIC BLOOD PRESSURE: 120 MMHG | HEIGHT: 66 IN | BODY MASS INDEX: 40.66 KG/M2 | DIASTOLIC BLOOD PRESSURE: 80 MMHG

## 2022-04-12 DIAGNOSIS — Z00.00 ENCOUNTER FOR PREVENTIVE HEALTH EXAMINATION: Primary | ICD-10-CM

## 2022-04-12 DIAGNOSIS — M46.96 UNSPECIFIED INFLAMMATORY SPONDYLOPATHY, LUMBAR REGION: ICD-10-CM

## 2022-04-12 DIAGNOSIS — M81.0 AGE-RELATED OSTEOPOROSIS WITHOUT CURRENT PATHOLOGICAL FRACTURE: ICD-10-CM

## 2022-04-12 DIAGNOSIS — Z79.4 TYPE 2 DIABETES MELLITUS WITH HYPERGLYCEMIA, WITH LONG-TERM CURRENT USE OF INSULIN: ICD-10-CM

## 2022-04-12 DIAGNOSIS — N18.2 ANEMIA OF CHRONIC RENAL FAILURE, STAGE 2 (MILD): Chronic | ICD-10-CM

## 2022-04-12 DIAGNOSIS — M54.16 BILATERAL LUMBAR RADICULOPATHY: ICD-10-CM

## 2022-04-12 DIAGNOSIS — Z94.0 DECEASED-DONOR KIDNEY TRANSPLANT: Chronic | ICD-10-CM

## 2022-04-12 DIAGNOSIS — E78.5 HYPERLIPIDEMIA ASSOCIATED WITH TYPE 2 DIABETES MELLITUS: ICD-10-CM

## 2022-04-12 DIAGNOSIS — E11.59 HYPERTENSION ASSOCIATED WITH DIABETES: ICD-10-CM

## 2022-04-12 DIAGNOSIS — M51.36 DDD (DEGENERATIVE DISC DISEASE), LUMBAR: ICD-10-CM

## 2022-04-12 DIAGNOSIS — N18.30 CKD STAGE 3 DUE TO TYPE 2 DIABETES MELLITUS: ICD-10-CM

## 2022-04-12 DIAGNOSIS — M87.059 AVASCULAR NECROSIS OF BONE OF HIP, UNSPECIFIED LATERALITY: ICD-10-CM

## 2022-04-12 DIAGNOSIS — I70.0 CALCIFICATION OF AORTA: ICD-10-CM

## 2022-04-12 DIAGNOSIS — E66.01 SEVERE OBESITY (BMI 35.0-39.9) WITH COMORBIDITY: ICD-10-CM

## 2022-04-12 DIAGNOSIS — F32.A ANXIETY AND DEPRESSION: ICD-10-CM

## 2022-04-12 DIAGNOSIS — E11.69 HYPERLIPIDEMIA ASSOCIATED WITH TYPE 2 DIABETES MELLITUS: ICD-10-CM

## 2022-04-12 DIAGNOSIS — M25.552 LEFT HIP PAIN: Primary | ICD-10-CM

## 2022-04-12 DIAGNOSIS — E03.9 ACQUIRED HYPOTHYROIDISM: ICD-10-CM

## 2022-04-12 DIAGNOSIS — E11.65 TYPE 2 DIABETES MELLITUS WITH HYPERGLYCEMIA, WITH LONG-TERM CURRENT USE OF INSULIN: ICD-10-CM

## 2022-04-12 DIAGNOSIS — D84.9 IMMUNOSUPPRESSED STATUS: ICD-10-CM

## 2022-04-12 DIAGNOSIS — I15.2 HYPERTENSION ASSOCIATED WITH DIABETES: ICD-10-CM

## 2022-04-12 DIAGNOSIS — N25.81 SECONDARY HYPERPARATHYROIDISM OF RENAL ORIGIN: ICD-10-CM

## 2022-04-12 DIAGNOSIS — D63.1 ANEMIA OF CHRONIC RENAL FAILURE, STAGE 2 (MILD): Chronic | ICD-10-CM

## 2022-04-12 DIAGNOSIS — M79.18 MUSCLE PAIN, MYOFASCIAL: ICD-10-CM

## 2022-04-12 DIAGNOSIS — E11.22 CKD STAGE 3 DUE TO TYPE 2 DIABETES MELLITUS: ICD-10-CM

## 2022-04-12 DIAGNOSIS — F41.9 ANXIETY AND DEPRESSION: ICD-10-CM

## 2022-04-12 DIAGNOSIS — Z79.891 OPIOID USE AGREEMENT EXISTS: ICD-10-CM

## 2022-04-12 PROCEDURE — 99214 PR OFFICE/OUTPT VISIT, EST, LEVL IV, 30-39 MIN: ICD-10-PCS | Mod: 95,,, | Performed by: PHYSICIAN ASSISTANT

## 2022-04-12 PROCEDURE — 99214 OFFICE O/P EST MOD 30 MIN: CPT | Mod: 95,,, | Performed by: PHYSICIAN ASSISTANT

## 2022-04-12 PROCEDURE — G0439 PPPS, SUBSEQ VISIT: HCPCS | Mod: 95,,, | Performed by: NURSE PRACTITIONER

## 2022-04-12 PROCEDURE — G0439 PR MEDICARE ANNUAL WELLNESS SUBSEQUENT VISIT: ICD-10-PCS | Mod: 95,,, | Performed by: NURSE PRACTITIONER

## 2022-04-12 RX ORDER — OXYCODONE AND ACETAMINOPHEN 10; 325 MG/1; MG/1
1 TABLET ORAL EVERY 8 HOURS PRN
Qty: 90 TABLET | Refills: 0 | Status: SHIPPED | OUTPATIENT
Start: 2022-05-12 | End: 2022-05-24 | Stop reason: SDUPTHER

## 2022-04-12 RX ORDER — OXYCODONE AND ACETAMINOPHEN 10; 325 MG/1; MG/1
1 TABLET ORAL EVERY 8 HOURS PRN
Qty: 90 TABLET | Refills: 0 | Status: SHIPPED | OUTPATIENT
Start: 2022-04-12 | End: 2022-05-11 | Stop reason: SDUPTHER

## 2022-04-12 NOTE — PROGRESS NOTES
"The patient location is: [place]  The chief complaint leading to consultation is:  Medicare AWV    Visit type: audiovisual    Face to Face time with patient: 30 min  45  minutes of total time spent on the encounter, which includes face to face time and non-face to face time preparing to see the patient (eg, review of tests), Obtaining and/or reviewing separately obtained history, Documenting clinical information in the electronic or other health record, Independently interpreting results (not separately reported) and communicating results to the patient/family/caregiver, or Care coordination (not separately reported).         Each patient to whom he or she provides medical services by telemedicine is:  (1) informed of the relationship between the physician and patient and the respective role of any other health care provider with respect to management of the patient; and (2) notified that he or she may decline to receive medical services by telemedicine and may withdraw from such care at any time.    Notes:  She will obtain her report from her optometry visit this year and bring it to her pcp      Ana Maria Teague presented for a  Medicare AWV and comprehensive Health Risk Assessment today. The following components were reviewed and updated:    · Medical history  · Family History  · Social history  · Allergies and Current Medications  · Health Risk Assessment  · Health Maintenance  · Care Team         ** See Completed Assessments for Annual Wellness Visit within the encounter summary.**         The following assessments were completed:  · Living Situation  · CAGE  · Depression Screening  · Fall Risk Assessment (MACH 10)  · Hearing Assessment(HHI)  · Cognitive Function Screening  · Nutrition Screening  · ADL Screening  · PAQ Screening      Vitals:    04/12/22 1426   BP: 120/80   Weight: 114.8 kg (253 lb)   Height: 5' 6" (1.676 m)     Body mass index is 40.84 kg/m².  Physical Exam  Constitutional:       General: She is " not in acute distress.     Appearance: Normal appearance. She is obese. She is not ill-appearing, toxic-appearing or diaphoretic.   Pulmonary:      Effort: Pulmonary effort is normal.   Neurological:      Mental Status: She is alert and oriented to person, place, and time.   Psychiatric:         Mood and Affect: Mood normal.         Behavior: Behavior normal.               Diagnoses and health risks identified today and associated recommendations/orders:    1. Encounter for preventive health examination  Screenings performed, as noted above.  Personal preventative testing needs reviewed.     2. Secondary hyperparathyroidism of renal origin  Monitored/treated on meds, continue the same tx, stable, follow up with pcp and nephrology    3. Hypertension associated with diabetes  Monitored/treated on meds, continue the same tx, stable, follow up with pcp    4. Hyperlipidemia associated with type 2 diabetes mellitus  Monitored/treated on meds, continue the same tx, stable, follow up with pcp    5. Calcification of aorta  Monitored/treated on meds, continue the same tx, stable, follow up with pcp    6. CKD stage 3 due to type 2 diabetes mellitus  Monitored/treated on meds, continue the same tx, stable, follow up with pcp, and nephrology    7. -donor kidney transplant - 13  Monitored/treated on meds, continue the same tx, stable, follow up with pcp, and nephrology    8. Acquired hypothyroidism  Monitored/treated on meds, continue the same tx, stable, follow up with pcp    9. Type 2 diabetes mellitus with hyperglycemia, with long-term current use of insulin  Monitored/treated on meds, continue the same tx, stable, follow up with pcp    10. Severe obesity (BMI 35.0-39.9) with comorbidity  Monitored/treated on meds, continue the same tx, stable, follow up with pcp    11. Avascular necrosis of bone of hip, unspecified laterality  Monitored/treated on meds, continue the same tx, stable, follow up with pcp    12.  Immunosuppressed status  Monitored/treated on meds, continue the same tx, stable, follow up with pcp, and nephrology, discussed 4th covid shot    13. Anemia of chronic renal failure, stage 2 (mild)  Monitored/treated on meds, continue the same tx, stable, follow up with pcp, and nephrology    14. Age-related osteoporosis without current pathological fracture  Monitored/treated on meds, continue the same tx, stable, follow up with pcp    15. Unspecified inflammatory spondylopathy, lumbar region  Monitored/treated on meds, continue the same tx, stable, follow up with pcp and pain management    16. Anxiety and depression  Monitored/treated on meds, continue the same tx, stable, follow up with pcp            Provided Ana Maria with a 5-10 year written screening schedule and personal prevention plan. Recommendations were developed using the USPSTF age appropriate recommendations. Education, counseling, and referrals were provided as needed. After Visit Summary printed and given to patient which includes a list of additional screenings\tests needed.    No follow-ups on file.    Phillip Fontenot, CHRISTINA  I offered to discuss advanced care planning, including how to pick a person who would make decisions for you if you were unable to make them for yourself, called a health care power of , and what kind of decisions you might make such as use of life sustaining treatments such as ventilators and tube feeding when faced with a life limiting illness recorded on a living will that they will need to know. (How you want to be cared for as you near the end of your natural life)     X Patient is interested in learning more about how to make advanced directives.  I provided them paperwork and offered to discuss this with them.

## 2022-04-12 NOTE — PATIENT INSTRUCTIONS
Counseling and Referral of Other Preventative  (Italic type indicates deductible and co-insurance are waived)    Patient Name: Ana Maria Teague  Today's Date: 4/12/2022    Health Maintenance       Date Due Completion Date    Complete Opioid Risk Tool Never done ---    Shingles Vaccine (1 of 2) Never done ---    Pneumococcal Vaccines (Age 0-64) (3 - PPSV23 or PCV20) 03/31/2019 6/29/2015    Eye Exam 08/14/2019 8/14/2018    Override on 10/28/2013: Done    Diabetes Urine Screening 11/13/2021 11/13/2020    Mammogram 06/10/2022 6/10/2021    Override on 4/22/2013: Done    Lipid Panel 06/10/2022 6/10/2021    Hemoglobin A1c 08/02/2022 2/2/2022    Foot Exam 02/02/2023 2/2/2022    Override on 1/8/2016: Done (per Karine Fernandez MD)    Override on 3/25/2014: Done    TETANUS VACCINE 06/29/2025 6/29/2015    Colorectal Cancer Screening 03/09/2026 3/9/2016        No orders of the defined types were placed in this encounter.    The following information is provided to all patients.  This information is to help you find resources for any of the problems found today that may be affecting your health:                Living healthy guide: www.[facility].[place].gov      Understanding Diabetes: www.diabetes.org      Eating healthy: www.cdc.gov/healthyweight      CDC home safety checklist: www.cdc.gov/steadi/patient.html      Agency on Aging: www.goea.[place].[facility]      Alcoholics anonymous (AA): www.aa.org      Physical Activity: www.karen.nih.gov/nn5xceg      Tobacco use: www.quitwithusla.org
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
## 2022-04-13 ENCOUNTER — TELEPHONE (OUTPATIENT)
Dept: PAIN MEDICINE | Facility: CLINIC | Age: 62
End: 2022-04-13
Payer: MEDICARE

## 2022-04-13 ENCOUNTER — PATIENT MESSAGE (OUTPATIENT)
Dept: PAIN MEDICINE | Facility: CLINIC | Age: 62
End: 2022-04-13
Payer: MEDICARE

## 2022-04-13 NOTE — TELEPHONE ENCOUNTER
Spoke with patient and pharmacy got her prescription straight and are going to fill it.  She thanked me for returning her call.

## 2022-04-18 LAB
LEFT EYE DM RETINOPATHY: NEGATIVE
RIGHT EYE DM RETINOPATHY: NEGATIVE

## 2022-04-19 RX ORDER — TIZANIDINE 4 MG/1
4 TABLET ORAL 2 TIMES DAILY PRN
Qty: 60 TABLET | Refills: 1 | Status: SHIPPED | OUTPATIENT
Start: 2022-04-19 | End: 2022-08-15 | Stop reason: SDUPTHER

## 2022-04-25 ENCOUNTER — LAB VISIT (OUTPATIENT)
Dept: LAB | Facility: HOSPITAL | Age: 62
End: 2022-04-25
Attending: INTERNAL MEDICINE
Payer: MEDICARE

## 2022-04-25 DIAGNOSIS — M81.0 AGE-RELATED OSTEOPOROSIS WITHOUT CURRENT PATHOLOGICAL FRACTURE: ICD-10-CM

## 2022-04-25 LAB
ALBUMIN SERPL BCP-MCNC: 3.5 G/DL (ref 3.5–5.2)
ALP SERPL-CCNC: 118 U/L (ref 55–135)
ALT SERPL W/O P-5'-P-CCNC: 10 U/L (ref 10–44)
ANION GAP SERPL CALC-SCNC: 9 MMOL/L (ref 8–16)
AST SERPL-CCNC: 19 U/L (ref 10–40)
BILIRUB SERPL-MCNC: 0.3 MG/DL (ref 0.1–1)
BUN SERPL-MCNC: 19 MG/DL (ref 8–23)
CALCIUM SERPL-MCNC: 9.5 MG/DL (ref 8.7–10.5)
CHLORIDE SERPL-SCNC: 105 MMOL/L (ref 95–110)
CO2 SERPL-SCNC: 29 MMOL/L (ref 23–29)
CREAT SERPL-MCNC: 1.1 MG/DL (ref 0.5–1.4)
EST. GFR  (AFRICAN AMERICAN): >60 ML/MIN/1.73 M^2
EST. GFR  (NON AFRICAN AMERICAN): 54 ML/MIN/1.73 M^2
GLUCOSE SERPL-MCNC: 119 MG/DL (ref 70–110)
LEFT EYE DM RETINOPATHY: NEGATIVE
POTASSIUM SERPL-SCNC: 4.2 MMOL/L (ref 3.5–5.1)
PROT SERPL-MCNC: 7.1 G/DL (ref 6–8.4)
RIGHT EYE DM RETINOPATHY: NEGATIVE
SODIUM SERPL-SCNC: 143 MMOL/L (ref 136–145)

## 2022-04-25 PROCEDURE — 80053 COMPREHEN METABOLIC PANEL: CPT | Performed by: INTERNAL MEDICINE

## 2022-04-25 PROCEDURE — 36415 COLL VENOUS BLD VENIPUNCTURE: CPT | Mod: PO | Performed by: INTERNAL MEDICINE

## 2022-04-26 ENCOUNTER — PATIENT MESSAGE (OUTPATIENT)
Dept: ADMINISTRATIVE | Facility: HOSPITAL | Age: 62
End: 2022-04-26
Payer: MEDICARE

## 2022-04-26 NOTE — LETTER
AUTHORIZATION FOR RELEASE OF   CONFIDENTIAL INFORMATION      We are seeing Ana Maria Teague, date of birth 1960, in the clinic at [facility]. Karine Fernandez MD is the patient's PCP. Ana Maria Teague has an outstanding lab/procedure at the time we reviewed her chart. In order to help keep her health information updated, she has authorized us to request the following medical record(s):        (  )  MAMMOGRAM                                      (  )  COLONOSCOPY      (  )  PAP SMEAR                                          (  )  OUTSIDE LAB RESULTS     (  )  DEXA SCAN                                          ( x ) DM EYE EXAM            (  )  FOOT EXAM                                          (  )  ENTIRE RECORD     (  )  OUTSIDE IMMUNIZATIONS                 (  )  _______________         Please fax records to [facility], Susan N Mcnamara, MD, [phone]     If you have any questions, please contact    [facility] at [phone]           Patient Name: Ana Maria Teague  : 1960  Patient Phone #: [phone]

## 2022-04-27 NOTE — TELEPHONE ENCOUNTER
ISABEL faxed to [facility] to request dm eye exam. Remind me set 1 week.

## 2022-05-02 ENCOUNTER — PATIENT MESSAGE (OUTPATIENT)
Dept: DIABETES | Facility: CLINIC | Age: 62
End: 2022-05-02
Payer: MEDICARE

## 2022-05-02 ENCOUNTER — PATIENT MESSAGE (OUTPATIENT)
Dept: ADMINISTRATIVE | Facility: HOSPITAL | Age: 62
End: 2022-05-02
Payer: MEDICARE

## 2022-05-03 ENCOUNTER — PATIENT MESSAGE (OUTPATIENT)
Dept: DIABETES | Facility: CLINIC | Age: 62
End: 2022-05-03
Payer: MEDICARE

## 2022-05-03 ENCOUNTER — LAB VISIT (OUTPATIENT)
Dept: LAB | Facility: HOSPITAL | Age: 62
End: 2022-05-03
Attending: NURSE PRACTITIONER
Payer: MEDICARE

## 2022-05-03 DIAGNOSIS — E11.65 TYPE 2 DIABETES MELLITUS WITH HYPERGLYCEMIA, WITH LONG-TERM CURRENT USE OF INSULIN: ICD-10-CM

## 2022-05-03 DIAGNOSIS — Z79.4 TYPE 2 DIABETES MELLITUS WITH HYPERGLYCEMIA, WITH LONG-TERM CURRENT USE OF INSULIN: ICD-10-CM

## 2022-05-03 LAB
ESTIMATED AVG GLUCOSE: 148 MG/DL (ref 68–131)
HBA1C MFR BLD: 6.8 % (ref 4–5.6)

## 2022-05-03 PROCEDURE — 83036 HEMOGLOBIN GLYCOSYLATED A1C: CPT | Performed by: NURSE PRACTITIONER

## 2022-05-03 PROCEDURE — 36415 COLL VENOUS BLD VENIPUNCTURE: CPT | Mod: PO | Performed by: NURSE PRACTITIONER

## 2022-05-04 NOTE — TELEPHONE ENCOUNTER
2nd attempt  ISABEL faxed to [facility] to request dm eye exam. Remind me set 1 week.

## 2022-05-09 ENCOUNTER — OFFICE VISIT (OUTPATIENT)
Dept: DIABETES | Facility: CLINIC | Age: 62
End: 2022-05-09
Payer: MEDICARE

## 2022-05-09 ENCOUNTER — PATIENT OUTREACH (OUTPATIENT)
Dept: ADMINISTRATIVE | Facility: OTHER | Age: 62
End: 2022-05-09
Payer: MEDICARE

## 2022-05-09 DIAGNOSIS — E11.69 HYPERLIPIDEMIA ASSOCIATED WITH TYPE 2 DIABETES MELLITUS: ICD-10-CM

## 2022-05-09 DIAGNOSIS — Z94.0 DECEASED-DONOR KIDNEY TRANSPLANT: ICD-10-CM

## 2022-05-09 DIAGNOSIS — I15.2 HYPERTENSION ASSOCIATED WITH DIABETES: ICD-10-CM

## 2022-05-09 DIAGNOSIS — E11.59 HYPERTENSION ASSOCIATED WITH DIABETES: ICD-10-CM

## 2022-05-09 DIAGNOSIS — E11.65 TYPE 2 DIABETES MELLITUS WITH HYPERGLYCEMIA, WITH LONG-TERM CURRENT USE OF INSULIN: Primary | ICD-10-CM

## 2022-05-09 DIAGNOSIS — E78.5 HYPERLIPIDEMIA ASSOCIATED WITH TYPE 2 DIABETES MELLITUS: ICD-10-CM

## 2022-05-09 DIAGNOSIS — Z79.4 TYPE 2 DIABETES MELLITUS WITH HYPERGLYCEMIA, WITH LONG-TERM CURRENT USE OF INSULIN: Primary | ICD-10-CM

## 2022-05-09 PROCEDURE — 99214 PR OFFICE/OUTPT VISIT, EST, LEVL IV, 30-39 MIN: ICD-10-PCS | Mod: 95,,, | Performed by: NURSE PRACTITIONER

## 2022-05-09 PROCEDURE — 99214 OFFICE O/P EST MOD 30 MIN: CPT | Mod: 95,,, | Performed by: NURSE PRACTITIONER

## 2022-05-09 NOTE — PROGRESS NOTES
The patient location is: Home/[place]    Visit type: Virtual via Epic [facility] keren    Face to Face time with patient: 30 minutes of total time spent on the encounter, which includes face to face time and non-face to face time preparing to see the patient (eg, review of tests), Obtaining and/or reviewing separately obtained history, Documenting clinical information in the electronic or other health record, Independently interpreting results (not separately reported) and communicating results to the patient/family/caregiver, or Care coordination (not separately reported).     Each patient to whom he or she provides medical services by telemedicine is:  (1) informed of the relationship between the physician and patient and the respective role of any other health care provider with respect to management of the patient; and (2) notified that he or she may decline to receive medical services by telemedicine and may withdraw from such care at any time.    PCP: Karine Fernandez MD    Subjective:     Chief Complaint: Diabetes follow up.  Last visit with me: 3/22/2022    HPI: 61 y.o.  female for diabetes follow up.   Previously managed by TRINITY Garza PA-C.   Last clinic visit 05/2019.   Type II and Post-Transplant diabetes since 2013 .  Comorbidities: HTN, HLD, CKD III and S/p Transplant - right kidney in 2013.  Known DM complications:   DKA/HHS: no   Retinopathy: no   Peripheral neuropathy: yes   Nephropathy: yes    Interval history:  Denies recent hospital admissions or ER visits.   Voices having hypoglycemia especially after meals.  Endorses diabetes related symptoms: Polyuria, Leg swelling and Intermittent tingling in Lower Extremities.    Since last visit, pt admits to not using sliding scale provided.  Switched to fixed dose insulin, dose determined by pt.    Blood glucose monitoring via CGM Nico.  Reporting period: 4/24 - 5/7/2022  14 day report generated as follows:    Glucose Details  Average glucose:  133 mg/dL  Standard deviation: 31.9%  GMI: 6.5  -----------------------------  Time in Range  Very High: 2%  High: 13%  In range: 82% (increased by 9% from previous reading)  Low: 3%   Very Low: 0%  Target Range   mg/dL  -----------------------------  CGM Details  Sensor usage: 96%    There are 10 low glucose events with average duration of 75 mins.    Overall there is a pattern of euglycemia with   - postprandial lunch and dinner hyperglycemia   - couple episodes of overnight hypoglycemia   - few episodes of postprandial breakfast, lunch and dinner hypoglycemia    Postprandial hypoglycemia attributed to strong insulin dose and insulin stacking to manage hyperglycemia.    Activity: Sedentary- no regular exercise  Diet:2-3 meals/day; infrequent snacks/day.    Medication compliance all of the time, diet compliance most of the time.   Sometimes stress eater.  Has attended diabetes education in the past.     Previous regimen: None    Past failed treatment: None    Current DM Medications:   - Lantus 15-18 units qhs: self adjusting   - Humalog 8 units tid ac meals: not using sliding scale    Allergies  Review of patient's allergies indicates:   Allergen Reactions    Azithromycin Nausea And Vomiting    Adhesive Other (See Comments)     Skin tears    Nsaids (non-steroidal anti-inflammatory drug)      Kidney Transplant       Labs Reviewed:  Her most recent A1C is:  Lab Results   Component Value Date    HGBA1C 6.8 (H) 05/03/2022    HGBA1C 7.1 (H) 02/02/2022    HGBA1C 7.1 (H) 10/12/2021     Her most recent BMP is:  Lab Results   Component Value Date     04/25/2022    K 4.2 04/25/2022     04/25/2022    CO2 29 04/25/2022    BUN 19 04/25/2022    CREATININE 1.1 04/25/2022    CALCIUM 9.5 04/25/2022    ANIONGAP 9 04/25/2022    ESTGFRAFRICA >60 04/25/2022    EGFRNONAA 54 (A) 04/25/2022       No results found for: CPEPTIDE  No results found for: GLUTAMICACID    There is no height or weight on file to calculate  BMI.    Wt Readings from Last 3 Encounters:   22 1426 114.8 kg (253 lb)   22 1049 115 kg (253 lb 8.5 oz)   22 1500 115.2 kg (253 lb 15.5 oz)      Diabetes Management Status  Statin: Taking  ACE/ARB: Not taking    Screening or Prevention Patient's value Goal Complete/Controlled?   HgA1C Testing and Control   Lab Results   Component Value Date    HGBA1C 6.8 (H) 2022      Annually/Less than 8% Yes   Lipid profile : 06/10/2021 Annually Yes   LDL control Lab Results   Component Value Date    LDLCALC 90.0 06/10/2021    Annually/Less than 100 mg/dl  Yes   Nephropathy screening Lab Results   Component Value Date    MICALBCREAT 50.0 (H) 2020     Lab Results   Component Value Date    PROTEINUA 1+ (A) 2021    Annually Yes   Blood pressure BP Readings from Last 1 Encounters:   22 120/80    Less than 140/90 Yes   Dilated retinal exam : 2022 Annually Yes    Foot exam   : 2022 Annually Yes     Social History     Socioeconomic History    Marital status:     Number of children: 3   Occupational History    Occupation: disable     Comment: dept manager at [employer]   Tobacco Use    Smoking status: Former Smoker     Packs/day: 1.00     Years: 10.00     Pack years: 10.00     Types: Cigarettes     Quit date: 2002     Years since quittin.4    Smokeless tobacco: Never Used    Tobacco comment: quit smoking approx 10-15 years ago    Substance and Sexual Activity    Alcohol use: No    Drug use: No    Sexual activity: Never     Partners: Male   Social History Narrative    Does housework at home.     Social Determinants of Health     Financial Resource Strain: Low Risk     Difficulty of Paying Living Expenses: Not very hard   Food Insecurity: No Food Insecurity    Worried About Running Out of Food in the Last Year: Never true    Ran Out of Food in the Last Year: Never true   Transportation Needs: No Transportation Needs    Lack of Transportation (Medical): No     Lack of Transportation (Non-Medical): No   Physical Activity: Inactive    Days of Exercise per Week: 0 days    Minutes of Exercise per Session: 0 min   Stress: No Stress Concern Present    Feeling of Stress : Only a little   Social Connections: Moderately Integrated    Frequency of Communication with Friends and Family: More than three times a week    Frequency of Social Gatherings with Friends and Family: More than three times a week    Attends Sabianist Services: 1 to 4 times per year    Active Member of Clubs or Organizations: No    Attends Club or Organization Meetings: Never    Marital Status:    Housing Stability: Low Risk     Unable to Pay for Housing in the Last Year: No    Number of Places Lived in the Last Year: 1    Unstable Housing in the Last Year: No     Past Medical History:   Diagnosis Date    Abnormal glucose 2013    Acquired hypothyroidism     Acute bronchiolitis 10/15/2014    Anemia     Anemia of chronic renal failure 2013    Anxiety     Anxiety disorder     Avascular necrosis of bone of hip     Back pain     s/p steroid injections    Bacteremia due to Escherichia coli 2021    Chronic immunosuppression with Prograf and Cellcept 2013    CKD (chronic kidney disease) stage 2, GFR 60-89 ml/min     CKD (chronic kidney disease) stage 5, GFR less than 15 ml/min     -donor kidney transplant - 13    Depression     Hypertension, renal 2013    Hypothyroidism     Immunosuppressed status 10/15/2014    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    Osteoporosis with pathological fracture     Renal disease due to hypertension 2013    Renal hypertension, non-vascular     Secondary hyperparathyroidism of renal origin 2013    Vertigo      Review of Systems   Constitutional: Negative for activity change, appetite change, fatigue and unexpected weight change.   HENT: Negative for dental problem and trouble  swallowing.    Eyes: Negative for visual disturbance.   Respiratory: Negative for chest tightness and shortness of breath.    Cardiovascular: Negative for chest pain, palpitations and leg swelling.   Gastrointestinal: Negative for abdominal pain, constipation, diarrhea, nausea and vomiting.   Endocrine: Positive for polyuria. Negative for polydipsia and polyphagia.   Genitourinary: Negative for difficulty urinating, dysuria, frequency and urgency.        Negative yeast infection   Musculoskeletal: Negative for gait problem, myalgias and neck pain.   Integumentary:  Negative for rash and wound.   Allergic/Immunologic: Negative.    Neurological: Negative for dizziness, syncope, weakness, numbness and headaches.        Int tingling BLE   Hematological: Negative.    Psychiatric/Behavioral: Negative for confusion and sleep disturbance.   All other systems reviewed and are negative.    Objective:      Physical Exam  Constitutional:       General: She is awake.      Appearance: Normal appearance. She is well-developed and well-groomed.   HENT:      Head: Normocephalic and atraumatic.   Eyes:      General: Lids are normal. Gaze aligned appropriately.   Pulmonary:      Effort: Pulmonary effort is normal. No respiratory distress.   Neurological:      Mental Status: She is alert and oriented to person, place, and time.   Psychiatric:         Attention and Perception: Attention normal.         Mood and Affect: Mood and affect normal.         Speech: Speech normal.         Behavior: Behavior normal.         Thought Content: Thought content normal.         Cognition and Memory: Cognition normal.         Judgment: Judgment normal.      Assessment / Plan:     Diagnoses and all orders for this visit:    Type 2 diabetes mellitus with hyperglycemia, with long-term current use of insulin  -     Hemoglobin A1C; Standing    Hypertension associated with diabetes    Hyperlipidemia associated with type 2 diabetes mellitus    -donor  kidney transplant - 9/28/13    BMI 40.0-44.9, adult    Additional Plan Details:  - Condition: controlled, most recent A1C of 6.8% was completed 1 week ago. Of note, pt having frequent low BG readings at previous visits which could skew A1c result.  - Monitor blood glucose:  3-4x daily.Goals reviewed. Maintain BG log and bring to next visit for review. Continue CGM use. Will process Dexcom application.  - CGM note: Patient is testing 4 times per day. Patient is injecting meal time insulin 3 times daily and is self adjusting insulin based on blood glucose reading. Patient requires CGM for blood sugar monitoring due to frequent insulin dose changes. Patient reports hypoglycemia, < 50.   - Hypoglycemia protocol: Reviewed and risk discussed. Protocol printout provided.   - Diet: Reviewed, limit carbohydrate intake to 30-45 grams per meal, 3x daily and 15 grams per snack with a limit of two daily.   - Activity: Recommend at least 150 minutes of exercise in a week.  - BP and LDL: Recommend lifestyle modifications and follow up with PCP for management.   - Medication Changes:    - Continue Lantus 18 units in the evening   - Decrease Humalog 4 units tid ac meals      - Medication consideration:  Switch Lantus to Tresiba once current supply is consumed. Plan to add GLP1 RA weekly injectable once BG stable with no lows.   - Lab results: A1C discussed.  - Health Maintenance standards addressed today: A1c scheduled.   - Risks of uncontrolled DM explained. Importance of routine foot and eye exams reviewed. Scheduled as appropriate.  -The patient was explained the above plan and given opportunity to ask questions.  She understands, chooses and consents to this plan and accepts all the risks, which include but are not limited to the risks mentioned above.   - Labs ordered as above.  - Follow up: 6 weeks virtual and in 3 months.       Charlotte T. Delacruz, FNP-C [facility]    A total of 30 minutes was spent in  face to face time, of which over 50 % was spent in counseling patient on disease process, complications, treatment, and side effects of medications.

## 2022-05-09 NOTE — PATIENT INSTRUCTIONS
Medication Regimen/Changes:   - Continue Lantus 18 units in the evening  - Decrease Humalog 4 units tid ac meals      Patient Instructions:  Carbohydrate Count: 30-45 grams/meal and 15 grams/snack with limit of 2 snacks per day.  Exercise: Goal is 150 minutes or more per week.  Bring meter and blood sugar log to each appointment.     Goals for Blood Sugar:  Fastin-130 mg/dl  2 hours after meal:  mg/dl  Before Bed: 100-150 mg/dl  If Blood sugar is below 70 mg/dl, DO NOT take diabetes medication. Eat first and then recheck blood sugar in 20 minutes.  Foods to eat if blood sugar is below 70 mg/dl  1/2 glass of orange juice   1/2 regular cola can   3-4 hard candies   3-4 small glucose tabs.   Foods to eat if blood sugar is below 50 mg/dl  1 glass of orange juice  1 regular cola can  8 hard candies  8 small glucose tabs.   If you have repeated eating/blood sugar recheck process 2 times and blood sugar still below 70 mg/dl, call 911.

## 2022-05-11 ENCOUNTER — PATIENT MESSAGE (OUTPATIENT)
Dept: RESEARCH | Facility: CLINIC | Age: 62
End: 2022-05-11
Payer: MEDICARE

## 2022-05-11 DIAGNOSIS — M51.36 DDD (DEGENERATIVE DISC DISEASE), LUMBAR: ICD-10-CM

## 2022-05-11 RX ORDER — OXYCODONE AND ACETAMINOPHEN 10; 325 MG/1; MG/1
1 TABLET ORAL EVERY 8 HOURS PRN
Qty: 90 TABLET | Refills: 0 | Status: SHIPPED | OUTPATIENT
Start: 2022-07-10 | End: 2022-06-22

## 2022-05-11 RX ORDER — OXYCODONE AND ACETAMINOPHEN 10; 325 MG/1; MG/1
1 TABLET ORAL EVERY 8 HOURS PRN
Qty: 90 TABLET | Refills: 0 | Status: SHIPPED | OUTPATIENT
Start: 2022-06-10 | End: 2022-06-22

## 2022-05-11 NOTE — TELEPHONE ENCOUNTER
Called and spoke with Tisha who states that the records were sent 5/4/22 and she will send again today.   Remind me set 1 week.

## 2022-05-17 ENCOUNTER — DOCUMENTATION ONLY (OUTPATIENT)
Dept: TRANSPLANT | Facility: CLINIC | Age: 62
End: 2022-05-17
Payer: MEDICARE

## 2022-05-20 DIAGNOSIS — M51.36 DDD (DEGENERATIVE DISC DISEASE), LUMBAR: ICD-10-CM

## 2022-05-24 ENCOUNTER — TELEPHONE (OUTPATIENT)
Dept: PAIN MEDICINE | Facility: CLINIC | Age: 62
End: 2022-05-24
Payer: MEDICARE

## 2022-05-24 DIAGNOSIS — M51.36 DDD (DEGENERATIVE DISC DISEASE), LUMBAR: ICD-10-CM

## 2022-05-24 RX ORDER — OXYCODONE AND ACETAMINOPHEN 10; 325 MG/1; MG/1
1 TABLET ORAL EVERY 8 HOURS PRN
Qty: 90 TABLET | Refills: 0 | Status: CANCELLED | OUTPATIENT
Start: 2022-08-08

## 2022-05-24 RX ORDER — OXYCODONE AND ACETAMINOPHEN 10; 325 MG/1; MG/1
1 TABLET ORAL EVERY 8 HOURS PRN
Qty: 90 TABLET | Refills: 0 | Status: CANCELLED | OUTPATIENT
Start: 2022-07-09

## 2022-05-24 NOTE — TELEPHONE ENCOUNTER
----- Message from Marcella Haas PA-C sent at 5/24/2022 12:56 PM CDT -----  She needs medication Refill appointment on or before 6/12

## 2022-05-25 RX ORDER — OXYCODONE AND ACETAMINOPHEN 10; 325 MG/1; MG/1
1 TABLET ORAL EVERY 8 HOURS PRN
Qty: 90 TABLET | Refills: 0 | Status: SHIPPED | OUTPATIENT
Start: 2022-08-08 | End: 2022-08-15 | Stop reason: SDUPTHER

## 2022-06-02 ENCOUNTER — PATIENT MESSAGE (OUTPATIENT)
Dept: RESEARCH | Facility: HOSPITAL | Age: 62
End: 2022-06-02
Payer: MEDICARE

## 2022-06-02 ENCOUNTER — PATIENT MESSAGE (OUTPATIENT)
Dept: FAMILY MEDICINE | Facility: CLINIC | Age: 62
End: 2022-06-02
Payer: MEDICARE

## 2022-06-03 ENCOUNTER — TELEPHONE (OUTPATIENT)
Dept: PAIN MEDICINE | Facility: CLINIC | Age: 62
End: 2022-06-03
Payer: MEDICARE

## 2022-06-03 NOTE — TELEPHONE ENCOUNTER
----- Message from Chapo Chacon sent at 6/3/2022  2:37 PM CDT -----  Contact: priti Hope is returning a call to let the staff know that it is ok to switch appt to virtual. Please reach out to her on my chart.        Thanks   DD

## 2022-06-06 ENCOUNTER — PATIENT MESSAGE (OUTPATIENT)
Dept: FAMILY MEDICINE | Facility: CLINIC | Age: 62
End: 2022-06-06
Payer: MEDICARE

## 2022-06-06 ENCOUNTER — PATIENT MESSAGE (OUTPATIENT)
Dept: PULMONOLOGY | Facility: CLINIC | Age: 62
End: 2022-06-06
Payer: MEDICARE

## 2022-06-06 DIAGNOSIS — J01.00 ACUTE NON-RECURRENT MAXILLARY SINUSITIS: Primary | ICD-10-CM

## 2022-06-06 RX ORDER — ALBUTEROL SULFATE 90 UG/1
2 AEROSOL, METERED RESPIRATORY (INHALATION) EVERY 6 HOURS PRN
Qty: 18 G | Refills: 0 | Status: SHIPPED | OUTPATIENT
Start: 2022-06-06 | End: 2022-07-03

## 2022-06-08 NOTE — PROGRESS NOTES
"Subjective:      Patient ID: Ana Maria Teague is a 61 y.o. female.    Chief Complaint: Follow-up      HPI  Here for f/u medical problems and preventive exam.  Missed mmg/labs.  No f/c/sw/cough.  No cp/sob/palp.  BMs normal.  Urine normal.    HM: 12/21 fluvax, 4/21 covid vaccines/booster, 7/19 HAV, 6/15 [id number], 3/14 [id number], 6/15 TDaP, 6/22 MMG/me, 6/21 BMD with OP on prolia rep 2y, 3/16 Cscope rep 10y, 11/10 HCV neg, Pain  Here, 4/22 [facility]     Review of Systems   Constitutional: Negative for appetite change, chills, diaphoresis and fever.   HENT: Negative for congestion, ear pain, rhinorrhea, sinus pressure and sore throat.    Respiratory: Negative for cough, chest tightness and shortness of breath.    Cardiovascular: Negative for chest pain and palpitations.   Gastrointestinal: Negative for blood in stool, constipation, diarrhea, nausea and vomiting.   Genitourinary: Negative for dysuria, frequency, hematuria, menstrual problem, urgency and vaginal discharge.   Musculoskeletal: Negative for arthralgias.   Skin: Negative for rash.   Neurological: Negative for dizziness and headaches.   Psychiatric/Behavioral: Negative for sleep disturbance. The patient is not nervous/anxious.          Objective:   /76   Pulse 89   Temp 97 °F (36.1 °C) (Temporal)   Ht 5' 6" (1.676 m)   Wt 115.5 kg (254 lb 11.9 oz)   SpO2 97%   BMI 41.12 kg/m²     Physical Exam  Constitutional:       Appearance: She is well-developed.   HENT:      Right Ear: External ear normal. Tympanic membrane is not injected.      Left Ear: External ear normal. Tympanic membrane is not injected.   Eyes:      Conjunctiva/sclera: Conjunctivae normal.   Neck:      Thyroid: No thyromegaly.   Cardiovascular:      Rate and Rhythm: Normal rate and regular rhythm.      Pulses:           Dorsalis pedis pulses are 2+ on the right side and 2+ on the left side.        Posterior tibial pulses are 2+ on the right side and 2+ on the left side.      " Heart sounds: No murmur heard.    No friction rub. No gallop.   Pulmonary:      Effort: Pulmonary effort is normal.      Breath sounds: Normal breath sounds. No wheezing or rales.   Chest:   Breasts:      Right: No mass, nipple discharge, skin change or tenderness.      Left: No mass, nipple discharge, skin change or tenderness.       Abdominal:      General: Bowel sounds are normal.      Palpations: Abdomen is soft. There is no mass.      Tenderness: There is no abdominal tenderness.   Musculoskeletal:      Cervical back: Normal range of motion and neck supple.   Feet:      Right foot:      Protective Sensation: 10 sites tested. 10 sites sensed.      Skin integrity: No ulcer, blister, skin breakdown, erythema, warmth, callus or dry skin.      Left foot:      Protective Sensation: 10 sites tested. 10 sites sensed.      Skin integrity: No ulcer, blister, skin breakdown, erythema, warmth, callus or dry skin.   Lymphadenopathy:      Cervical: No cervical adenopathy.   Skin:     General: Skin is warm.      Findings: No rash.   Neurological:      Mental Status: She is alert and oriented to person, place, and time.        Latest Reference Range & Units 22 10:07   Hemoglobin A1C External 4.0 - 5.6 % 6.8 (H)   Estimated Avg Glucose 68 - 131 mg/dL 148 (H)         Assessment:       1. Hypertension associated with diabetes    2. Type 2 diabetes mellitus with hyperglycemia, with long-term current use of insulin    3. Age-related osteoporosis without current pathological fracture    4. Acquired hypothyroidism    5. Anxiety and depression    6. Simple chronic bronchitis    7. Hyperlipidemia associated with type 2 diabetes mellitus    8. Unspecified inflammatory spondylopathy, lumbar region    9. Immunosuppressed status    10. Encounter for preventive health examination    11. Encounter for screening mammogram for malignant neoplasm of breast    12. -donor kidney transplant - 13          Plan:     Hypertension  associated with diabetes- stable, cont rx.    Type 2 diabetes mellitus with hyperglycemia, with long-term current use of insulin- stable, check labs.  -     CBC Auto Differential; Future; Expected date: 06/22/2022  -     Comprehensive Metabolic Panel; Future; Expected date: 06/22/2022  -     Lipid Panel; Future; Expected date: 06/22/2022  -     TSH; Future; Expected date: 06/22/2022  -     Microalbumin/Creatinine Ratio, Urine; Future; Expected date: 06/22/2022    Age-related osteoporosis without current pathological fracture on prolia.    Acquired hypothyroidism- check lab.    Anxiety and depression, no rx now.  Poss SNRI for chronic pain also, if pt wants will check interactions.    Simple chronic bronchitis, doing well.    Hyperlipidemia associated with type 2 diabetes mellitus- cont statin, check lab.    Unspecified inflammatory spondylopathy, lumbar region    Immunosuppressed status, kidney transplant.    Encounter for preventive health examination- mmg/labs.    Encounter for screening mammogram for malignant neoplasm of breast  -     Mammo Digital Screening Bilat w/ Neptali; Future; Expected date: 06/22/2022

## 2022-06-10 ENCOUNTER — PATIENT MESSAGE (OUTPATIENT)
Dept: PAIN MEDICINE | Facility: CLINIC | Age: 62
End: 2022-06-10

## 2022-06-10 ENCOUNTER — OFFICE VISIT (OUTPATIENT)
Dept: PAIN MEDICINE | Facility: CLINIC | Age: 62
End: 2022-06-10
Payer: MEDICARE

## 2022-06-10 ENCOUNTER — PATIENT MESSAGE (OUTPATIENT)
Dept: FAMILY MEDICINE | Facility: CLINIC | Age: 62
End: 2022-06-10
Payer: MEDICARE

## 2022-06-10 VITALS — RESPIRATION RATE: 17 BRPM

## 2022-06-10 DIAGNOSIS — M54.16 BILATERAL LUMBAR RADICULOPATHY: ICD-10-CM

## 2022-06-10 DIAGNOSIS — M25.552 LEFT HIP PAIN: Primary | ICD-10-CM

## 2022-06-10 DIAGNOSIS — Z79.891 OPIOID USE AGREEMENT EXISTS: ICD-10-CM

## 2022-06-10 DIAGNOSIS — M79.18 MUSCLE PAIN, MYOFASCIAL: ICD-10-CM

## 2022-06-10 PROCEDURE — 99214 OFFICE O/P EST MOD 30 MIN: CPT | Mod: 95,,, | Performed by: PHYSICIAN ASSISTANT

## 2022-06-10 PROCEDURE — 99214 PR OFFICE/OUTPT VISIT, EST, LEVL IV, 30-39 MIN: ICD-10-PCS | Mod: 95,,, | Performed by: PHYSICIAN ASSISTANT

## 2022-06-10 RX ORDER — PREDNISONE 20 MG/1
TABLET ORAL
Qty: 5 TABLET | Refills: 0 | Status: SHIPPED | OUTPATIENT
Start: 2022-06-10 | End: 2023-02-15

## 2022-06-10 RX ORDER — GABAPENTIN 400 MG/1
CAPSULE ORAL
Qty: 120 CAPSULE | Refills: 1 | Status: SHIPPED | OUTPATIENT
Start: 2022-06-10 | End: 2022-08-15 | Stop reason: SDUPTHER

## 2022-06-10 RX ORDER — AMOXICILLIN 875 MG/1
875 TABLET, FILM COATED ORAL 2 TIMES DAILY
Qty: 20 TABLET | Refills: 0 | Status: SHIPPED | OUTPATIENT
Start: 2022-06-10 | End: 2022-06-20
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
## 2022-06-20 ENCOUNTER — OFFICE VISIT (OUTPATIENT)
Dept: DIABETES | Facility: CLINIC | Age: 62
End: 2022-06-20
Payer: MEDICARE

## 2022-06-20 VITALS
BODY MASS INDEX: 41.45 KG/M2 | DIASTOLIC BLOOD PRESSURE: 89 MMHG | WEIGHT: 256.81 LBS | HEART RATE: 65 BPM | SYSTOLIC BLOOD PRESSURE: 139 MMHG

## 2022-06-20 DIAGNOSIS — E11.65 TYPE 2 DIABETES MELLITUS WITH HYPERGLYCEMIA, WITH LONG-TERM CURRENT USE OF INSULIN: Primary | ICD-10-CM

## 2022-06-20 DIAGNOSIS — Z79.4 TYPE 2 DIABETES MELLITUS WITH HYPERGLYCEMIA, WITH LONG-TERM CURRENT USE OF INSULIN: Primary | ICD-10-CM

## 2022-06-20 DIAGNOSIS — E78.5 HYPERLIPIDEMIA ASSOCIATED WITH TYPE 2 DIABETES MELLITUS: ICD-10-CM

## 2022-06-20 DIAGNOSIS — E11.59 HYPERTENSION ASSOCIATED WITH DIABETES: ICD-10-CM

## 2022-06-20 DIAGNOSIS — Z94.0 DECEASED-DONOR KIDNEY TRANSPLANT: ICD-10-CM

## 2022-06-20 DIAGNOSIS — I15.2 HYPERTENSION ASSOCIATED WITH DIABETES: ICD-10-CM

## 2022-06-20 DIAGNOSIS — E11.69 HYPERLIPIDEMIA ASSOCIATED WITH TYPE 2 DIABETES MELLITUS: ICD-10-CM

## 2022-06-20 LAB — GLUCOSE SERPL-MCNC: 90 MG/DL (ref 70–110)

## 2022-06-20 PROCEDURE — 99999 PR PBB SHADOW E&M-EST. PATIENT-LVL III: ICD-10-PCS | Mod: PBBFAC,,, | Performed by: NURSE PRACTITIONER

## 2022-06-20 PROCEDURE — 95251 PR GLUCOSE MONITOR, 72 HOUR, PHYS INTERP: ICD-10-PCS | Mod: ,,, | Performed by: NURSE PRACTITIONER

## 2022-06-20 PROCEDURE — 99214 PR OFFICE/OUTPT VISIT, EST, LEVL IV, 30-39 MIN: ICD-10-PCS | Mod: S$PBB,,, | Performed by: NURSE PRACTITIONER

## 2022-06-20 PROCEDURE — 99213 OFFICE O/P EST LOW 20 MIN: CPT | Mod: PBBFAC | Performed by: NURSE PRACTITIONER

## 2022-06-20 PROCEDURE — 82962 GLUCOSE BLOOD TEST: CPT | Mod: PBBFAC | Performed by: NURSE PRACTITIONER

## 2022-06-20 PROCEDURE — 99214 OFFICE O/P EST MOD 30 MIN: CPT | Mod: S$PBB,,, | Performed by: NURSE PRACTITIONER

## 2022-06-20 PROCEDURE — 99999 PR PBB SHADOW E&M-EST. PATIENT-LVL III: CPT | Mod: PBBFAC,,, | Performed by: NURSE PRACTITIONER

## 2022-06-20 PROCEDURE — 95251 CONT GLUC MNTR ANALYSIS I&R: CPT | Mod: ,,, | Performed by: NURSE PRACTITIONER

## 2022-06-20 RX ORDER — INSULIN GLARGINE 100 [IU]/ML
14 INJECTION, SOLUTION SUBCUTANEOUS NIGHTLY
Qty: 15 ML | Refills: 3
Start: 2022-06-20 | End: 2022-08-12 | Stop reason: SDUPTHER

## 2022-06-20 RX ORDER — INSULIN LISPRO 100 [IU]/ML
6 INJECTION, SOLUTION INTRAVENOUS; SUBCUTANEOUS
Qty: 15 ML | Refills: 4
Start: 2022-06-20 | End: 2022-08-04

## 2022-06-20 NOTE — PROGRESS NOTES
PCP: Karine Fernandez MD    Subjective:     Chief Complaint: Diabetes follow up.  Last visit (virtual) with me: 5/9/2022    HPI: 61 y.o.  female for diabetes follow up.   Previously managed by TRINITY Garza PA-C.   Last clinic visit 05/2019.   Type II and Post-Transplant diabetes since 2013 .  Comorbidities: HTN, HLD, CKD III and S/p Transplant - right kidney in 2013.  Known DM complications:   DKA/HHS: no   Retinopathy: no   Peripheral neuropathy: yes   Nephropathy: yes    Interval history:  Denies recent hospital admissions or ER visits.   Voices having hypoglycemia at variable times during the day.  Admits to not eating meals on time.  Endorses diabetes related symptoms: Polyuria, Leg swelling and Intermittent tingling in Lower Extremities.    Blood glucose monitoring via CGM [employer].  Reporting period: 6/7 - 6/20/2022  14 day report generated as follows:    Glucose Details  Average glucose: 135 mg/dL  Standard deviation: 29.9%  GMI: 6.5  -----------------------------  Time in Range  Very High: 1%  High: 12%  In range: 85% (increased by 3% from previous reading)  Low: 2%   Very Low: 0%  Target Range   mg/dL  -----------------------------  CGM Details  Sensor usage: 95%    There are 6 low glucose events with average duration of 65 mins.    Overall there is a pattern of euglycemia with   - postprandial lunch and dinner hyperglycemia followed by hypoglycemia    - couple episodes of overnight hypoglycemia    Admits to administering insulin before meals.  Postprandial hyperglycemia followed by hypoglycemia could be due to strong mealtime insulin dose.                Activity: Sedentary- no regular exercise  Diet:2-3 meals/day; infrequent snacks/day.  Irregular mealtime schedule.    Medication compliance all of the time, diet compliance most of the time.   Has attended diabetes education in the past.     Previous regimen: None    Past failed treatment: None    Current DM Medications:   - Lantus  15-18 units qhs: pt self adjusting   - Humalog 4 units tid ac meals: pt self adjusting, taking 6-10 units    Allergies  Review of patient's allergies indicates:   Allergen Reactions    Azithromycin Nausea And Vomiting    Adhesive Other (See Comments)     Skin tears    Nsaids (non-steroidal anti-inflammatory drug)      Kidney Transplant       Labs Reviewed:  Her most recent A1C is:  Lab Results   Component Value Date    HGBA1C 6.8 (H) 05/03/2022    HGBA1C 7.1 (H) 02/02/2022    HGBA1C 7.1 (H) 10/12/2021       Her most recent BMP is:  Lab Results   Component Value Date     04/25/2022    K 4.2 04/25/2022     04/25/2022    CO2 29 04/25/2022    BUN 19 04/25/2022    CREATININE 1.1 04/25/2022    CALCIUM 9.5 04/25/2022    ANIONGAP 9 04/25/2022    ESTGFRAFRICA >60 04/25/2022    EGFRNONAA 54 (A) 04/25/2022       No results found for: CPEPTIDE  No results found for: GLUTAMICACID    Body mass index is 41.45 kg/m².    Weight gain 3 lbs since last visit.  Wt Readings from Last 3 Encounters:   06/20/22 1330 116.5 kg (256 lb 13.4 oz)   04/12/22 1426 114.8 kg (253 lb)   03/01/22 1049 115 kg (253 lb 8.5 oz)        Her blood sugar in clinic today is:   Lab Results   Component Value Date    POCGLU 90 06/20/2022       Diabetes Management Status  Statin: Taking  ACE/ARB: Not taking    Screening or Prevention Patient's value Goal Complete/Controlled?   HgA1C Testing and Control   Lab Results   Component Value Date    HGBA1C 6.8 (H) 05/03/2022      Annually/Less than 8% Yes   Lipid profile : 06/10/2021 Annually No   LDL control Lab Results   Component Value Date    LDLCALC 90.0 06/10/2021    Annually/Less than 100 mg/dl  No   Nephropathy screening Lab Results   Component Value Date    MICALBCREAT 50.0 (H) 11/13/2020     Lab Results   Component Value Date    PROTEINUA 1+ (A) 01/04/2021    Annually Yes   Blood pressure BP Readings from Last 1 Encounters:   06/20/22 139/89    Less than 140/90 Yes   Dilated retinal exam :  2022 Annually Yes    Foot exam   : 2022 Annually Yes     Social History     Socioeconomic History    Marital status:     Number of children: 3   Occupational History    Occupation: disable     Comment: dept manager at [employer]   Tobacco Use    Smoking status: Former Smoker     Packs/day: 1.00     Years: 10.00     Pack years: 10.00     Types: Cigarettes     Quit date: 2002     Years since quittin.6    Smokeless tobacco: Never Used    Tobacco comment: quit smoking approx 10-15 years ago    Substance and Sexual Activity    Alcohol use: No    Drug use: No    Sexual activity: Never     Partners: Male   Social History Narrative    Does housework at home.     Social Determinants of Health     Financial Resource Strain: Low Risk     Difficulty of Paying Living Expenses: Not very hard   Food Insecurity: No Food Insecurity    Worried About Running Out of Food in the Last Year: Never true    Ran Out of Food in the Last Year: Never true   Transportation Needs: No Transportation Needs    Lack of Transportation (Medical): No    Lack of Transportation (Non-Medical): No   Physical Activity: Inactive    Days of Exercise per Week: 0 days    Minutes of Exercise per Session: 0 min   Stress: No Stress Concern Present    Feeling of Stress : Only a little   Social Connections: Moderately Integrated    Frequency of Communication with Friends and Family: More than three times a week    Frequency of Social Gatherings with Friends and Family: More than three times a week    Attends [employer] Services: 1 to 4 times per year    Active Member of Clubs or Organizations: No    Attends Club or Organization Meetings: Never    Marital Status:    Housing Stability: Low Risk     Unable to Pay for Housing in the Last Year: No    Number of Places Lived in the Last Year: 1    Unstable Housing in the Last Year: No     Past Medical History:   Diagnosis Date    Abnormal glucose 2013     Acquired hypothyroidism     Acute bronchiolitis 10/15/2014    Anemia     Anemia of chronic renal failure 2013    Anxiety     Anxiety disorder     Avascular necrosis of bone of hip     Back pain     s/p steroid injections    Bacteremia due to Escherichia coli 2021    Chronic immunosuppression with Prograf and Cellcept 2013    CKD (chronic kidney disease) stage 2, GFR 60-89 ml/min     CKD (chronic kidney disease) stage 5, GFR less than 15 ml/min     -donor kidney transplant - 13    Depression     Hypertension, renal 2013    Hypothyroidism     Immunosuppressed status 10/15/2014    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    Osteoporosis with pathological fracture     Renal disease due to hypertension 2013    Renal hypertension, non-vascular     Secondary hyperparathyroidism of renal origin 2013    Vertigo      Review of Systems   Constitutional: Negative for activity change, appetite change, fatigue and unexpected weight change.   HENT: Negative for dental problem and trouble swallowing.    Eyes: Negative for visual disturbance.   Respiratory: Negative for chest tightness and shortness of breath.    Cardiovascular: Negative for chest pain, palpitations and leg swelling.   Gastrointestinal: Negative for abdominal pain, constipation, diarrhea, nausea and vomiting.   Endocrine: Positive for polyuria. Negative for polydipsia and polyphagia.   Genitourinary: Negative for difficulty urinating, dysuria, frequency and urgency.        Negative yeast infection   Musculoskeletal: Negative for gait problem, myalgias and neck pain.   Integumentary:  Negative for rash and wound.   Allergic/Immunologic: Negative.    Neurological: Negative for dizziness, syncope, weakness, numbness and headaches.        Int tingling BLE   Hematological: Negative.    Psychiatric/Behavioral: Negative for confusion and sleep disturbance.   All other systems reviewed and  are negative.    Objective:      Physical Exam  Vitals reviewed.   Constitutional:       Appearance: Normal appearance.   HENT:      Head: Normocephalic and atraumatic.   Eyes:      General: Vision grossly intact.      Extraocular Movements: Extraocular movements intact.      Pupils: Pupils are equal, round, and reactive to light.   Neck:      Thyroid: No thyroid mass or thyroid tenderness.   Cardiovascular:      Rate and Rhythm: Normal rate and regular rhythm.      Pulses: Normal pulses.           Radial pulses are 2+ on the right side and 2+ on the left side.        Dorsalis pedis pulses are 2+ on the right side and 2+ on the left side.      Heart sounds: Normal heart sounds.   Pulmonary:      Effort: Pulmonary effort is normal.      Breath sounds: Normal breath sounds.   Abdominal:      General: Bowel sounds are normal.      Palpations: Abdomen is soft.   Musculoskeletal:         General: Normal range of motion.      Cervical back: Normal range of motion and neck supple.      Right lower leg: No edema.      Left lower leg: No edema.      Right foot: No deformity or bunion.      Left foot: No deformity or bunion.   Feet:      Right foot:      Skin integrity: Skin integrity normal. No ulcer, skin breakdown, callus or dry skin.      Toenail Condition: Right toenails are normal.      Left foot:      Skin integrity: Skin integrity normal. No ulcer, skin breakdown, callus or dry skin.      Toenail Condition: Left toenails are normal.   Lymphadenopathy:      Cervical: No cervical adenopathy.   Skin:     General: Skin is warm and dry.      Findings: No rash or wound.      Comments: Negative for acanthosis nigricans. Well-healed SQ injection sites.   Neurological:      Mental Status: She is alert and oriented to person, place, and time.      Coordination: Coordination is intact.      Gait: Gait is intact.   Psychiatric:         Attention and Perception: Attention normal.         Mood and Affect: Mood normal.          Speech: Speech normal.         Behavior: Behavior normal. Behavior is cooperative.         Thought Content: Thought content normal.         Cognition and Memory: Cognition normal.      Assessment / Plan:     Ana Maria was seen today for diabetes mellitus.    Diagnoses and all orders for this visit:    Type 2 diabetes mellitus with hyperglycemia, with long-term current use of insulin  -     POCT Glucose, Hand-Held Device  -     insulin (LANTUS SOLOSTAR U-100 INSULIN) glargine 100 units/mL (3mL) SubQ pen; Inject 14 Units into the skin every evening.    Hypertension associated with diabetes    Hyperlipidemia associated with type 2 diabetes mellitus    -donor kidney transplant - 13    BMI 40.0-44.9, adult    Additional Plan Details:  - Condition: controlled, most recent A1C of 6.8% was completed 1 week ago. Of note, pt having frequent low BG readings at previous visits which could skew A1c result.  - Monitor blood glucose:  3-4x daily.Goals reviewed. Maintain BG log and bring to next visit for review. Continue CGM use. Will process Dexcom application.  - CGM note: Patient is testing 4 times per day. Patient is injecting meal time insulin 3 times daily and is self adjusting insulin based on blood glucose reading. Patient requires CGM for blood sugar monitoring due to frequent insulin dose changes. Patient reports hypoglycemia, < 50.   - Hypoglycemia protocol: Reviewed and risk discussed. Protocol printout provided.   - Diet: Reviewed, limit carbohydrate intake to 30-45 grams per meal, 3x daily and 15 grams per snack with a limit of two daily.   - Activity: Recommend at least 150 minutes of exercise in a week.  - BP and LDL: Recommend lifestyle modifications and follow up with PCP for management.   - Medication Changes:    - Decrease Lantus 14 units in the evening   - Decrease Humalog 6 units tid ac meals     - Medication consideration:  Switch Lantus to Tresiba once current supply is consumed. Plan to add GLP1  RA weekly injectable once BG stable with no lows.  Discussed with patient the importance of consistency with regards to Lantus and Humalog dose.  We will be able to adjust dose appropriately.  - Lab results: A1C discussed.  - Health Maintenance standards addressed today: A1c scheduled.   - Risks of uncontrolled DM explained. Importance of routine foot and eye exams reviewed. Scheduled as appropriate.  -The patient was explained the above plan and given opportunity to ask questions.  She understands, chooses and consents to this plan and accepts all the risks, which include but are not limited to the risks mentioned above.   - Labs ordered as above.  - Follow up: 3-4 weeks virtual.       Charlotte T. Delacruz, FNP-C [facility]    A total of 30 minutes was spent in face to face time, of which over 50 % was spent in counseling patient on disease process, complications, treatment, and side effects of medications.

## 2022-06-20 NOTE — PATIENT INSTRUCTIONS
Medication Regimen/Changes:   - Decrease Lantus 14 units in the evening  - Decrease Humalog 6 units three times a day before meals     Patient Instructions:  Carbohydrate Count: 30-45 grams/meal and 15 grams/snack with limit of 2 snacks per day.  Exercise: Goal is 150 minutes or more per week.  Bring meter and blood sugar log to each appointment.     Goals for Blood Sugar:  Fastin-130 mg/dl  2 hours after meal:  mg/dl  Before Bed: 100-150 mg/dl  If Blood sugar is below 70 mg/dl, DO NOT take diabetes medication. Eat first and then recheck blood sugar in 20 minutes.  Foods to eat if blood sugar is below 70 mg/dl  1/2 glass of orange juice   1/2 regular cola can   3-4 hard candies   3-4 small glucose tabs.   Foods to eat if blood sugar is below 50 mg/dl  1 glass of orange juice  1 regular cola can  8 hard candies  8 small glucose tabs.   If you have repeated eating/blood sugar recheck process 2 times and blood sugar still below 70 mg/dl, call 911.

## 2022-06-22 ENCOUNTER — OFFICE VISIT (OUTPATIENT)
Dept: FAMILY MEDICINE | Facility: CLINIC | Age: 62
End: 2022-06-22
Payer: MEDICARE

## 2022-06-22 VITALS
TEMPERATURE: 97 F | WEIGHT: 254.75 LBS | OXYGEN SATURATION: 97 % | HEART RATE: 89 BPM | HEIGHT: 66 IN | SYSTOLIC BLOOD PRESSURE: 132 MMHG | DIASTOLIC BLOOD PRESSURE: 76 MMHG | BODY MASS INDEX: 40.94 KG/M2

## 2022-06-22 DIAGNOSIS — D84.9 IMMUNOSUPPRESSED STATUS: ICD-10-CM

## 2022-06-22 DIAGNOSIS — M46.96 UNSPECIFIED INFLAMMATORY SPONDYLOPATHY, LUMBAR REGION: ICD-10-CM

## 2022-06-22 DIAGNOSIS — Z79.4 TYPE 2 DIABETES MELLITUS WITH HYPERGLYCEMIA, WITH LONG-TERM CURRENT USE OF INSULIN: ICD-10-CM

## 2022-06-22 DIAGNOSIS — J41.0 SIMPLE CHRONIC BRONCHITIS: ICD-10-CM

## 2022-06-22 DIAGNOSIS — E11.65 TYPE 2 DIABETES MELLITUS WITH HYPERGLYCEMIA, WITH LONG-TERM CURRENT USE OF INSULIN: ICD-10-CM

## 2022-06-22 DIAGNOSIS — Z00.00 ENCOUNTER FOR PREVENTIVE HEALTH EXAMINATION: ICD-10-CM

## 2022-06-22 DIAGNOSIS — E11.69 HYPERLIPIDEMIA ASSOCIATED WITH TYPE 2 DIABETES MELLITUS: ICD-10-CM

## 2022-06-22 DIAGNOSIS — F32.A ANXIETY AND DEPRESSION: ICD-10-CM

## 2022-06-22 DIAGNOSIS — F41.9 ANXIETY AND DEPRESSION: ICD-10-CM

## 2022-06-22 DIAGNOSIS — I15.2 HYPERTENSION ASSOCIATED WITH DIABETES: Primary | ICD-10-CM

## 2022-06-22 DIAGNOSIS — Z94.0 DECEASED-DONOR KIDNEY TRANSPLANT: Chronic | ICD-10-CM

## 2022-06-22 DIAGNOSIS — Z12.31 ENCOUNTER FOR SCREENING MAMMOGRAM FOR MALIGNANT NEOPLASM OF BREAST: ICD-10-CM

## 2022-06-22 DIAGNOSIS — E11.59 HYPERTENSION ASSOCIATED WITH DIABETES: Primary | ICD-10-CM

## 2022-06-22 DIAGNOSIS — E78.5 HYPERLIPIDEMIA ASSOCIATED WITH TYPE 2 DIABETES MELLITUS: ICD-10-CM

## 2022-06-22 DIAGNOSIS — M81.0 AGE-RELATED OSTEOPOROSIS WITHOUT CURRENT PATHOLOGICAL FRACTURE: ICD-10-CM

## 2022-06-22 DIAGNOSIS — E03.9 ACQUIRED HYPOTHYROIDISM: ICD-10-CM

## 2022-06-22 PROCEDURE — 99999 PR PBB SHADOW E&M-EST. PATIENT-LVL V: ICD-10-PCS | Mod: PBBFAC,,, | Performed by: INTERNAL MEDICINE

## 2022-06-22 PROCEDURE — 99999 PR PBB SHADOW E&M-EST. PATIENT-LVL V: CPT | Mod: PBBFAC,,, | Performed by: INTERNAL MEDICINE

## 2022-06-22 PROCEDURE — 99215 OFFICE O/P EST HI 40 MIN: CPT | Mod: PBBFAC,PO | Performed by: INTERNAL MEDICINE

## 2022-06-22 PROCEDURE — 99214 OFFICE O/P EST MOD 30 MIN: CPT | Mod: S$PBB,,, | Performed by: INTERNAL MEDICINE

## 2022-06-22 PROCEDURE — 99214 PR OFFICE/OUTPT VISIT, EST, LEVL IV, 30-39 MIN: ICD-10-PCS | Mod: S$PBB,,, | Performed by: INTERNAL MEDICINE

## 2022-07-06 RX ORDER — ALBUTEROL SULFATE 90 UG/1
AEROSOL, METERED RESPIRATORY (INHALATION)
Qty: 18 G | Refills: 0 | Status: SHIPPED | OUTPATIENT
Start: 2022-07-06 | End: 2022-08-07

## 2022-07-10 ENCOUNTER — PATIENT MESSAGE (OUTPATIENT)
Dept: PAIN MEDICINE | Facility: CLINIC | Age: 62
End: 2022-07-10
Payer: MEDICARE

## 2022-07-12 ENCOUNTER — HOSPITAL ENCOUNTER (OUTPATIENT)
Dept: RADIOLOGY | Facility: HOSPITAL | Age: 62
Discharge: HOME | End: 2022-07-12
Attending: INTERNAL MEDICINE
Payer: MEDICARE

## 2022-07-12 VITALS — WEIGHT: 254.63 LBS | BODY MASS INDEX: 40.92 KG/M2 | HEIGHT: 66 IN

## 2022-07-12 DIAGNOSIS — Z12.31 ENCOUNTER FOR SCREENING MAMMOGRAM FOR MALIGNANT NEOPLASM OF BREAST: ICD-10-CM

## 2022-07-12 PROCEDURE — 77067 SCR MAMMO BI INCL CAD: CPT | Mod: 26,,, | Performed by: RADIOLOGY

## 2022-07-12 PROCEDURE — 77067 SCR MAMMO BI INCL CAD: CPT | Mod: TC

## 2022-07-12 PROCEDURE — 77067 MAMMO DIGITAL SCREENING BILAT WITH TOMO: ICD-10-PCS | Mod: 26,,, | Performed by: RADIOLOGY

## 2022-07-12 PROCEDURE — 77063 BREAST TOMOSYNTHESIS BI: CPT | Mod: TC

## 2022-07-12 PROCEDURE — 77063 MAMMO DIGITAL SCREENING BILAT WITH TOMO: ICD-10-PCS | Mod: 26,,, | Performed by: RADIOLOGY

## 2022-07-12 PROCEDURE — 77063 BREAST TOMOSYNTHESIS BI: CPT | Mod: 26,,, | Performed by: RADIOLOGY

## 2022-07-14 ENCOUNTER — PATIENT MESSAGE (OUTPATIENT)
Dept: FAMILY MEDICINE | Facility: CLINIC | Age: 62
End: 2022-07-14
Payer: MEDICARE

## 2022-07-14 DIAGNOSIS — E03.4 HYPOTHYROIDISM DUE TO ACQUIRED ATROPHY OF THYROID: ICD-10-CM

## 2022-07-15 RX ORDER — LEVOTHYROXINE SODIUM 175 UG/1
175 TABLET ORAL
Qty: 90 TABLET | Refills: 3 | Status: SHIPPED | OUTPATIENT
Start: 2022-07-15 | End: 2022-09-17

## 2022-07-31 ENCOUNTER — EXTERNAL CHRONIC CARE MANAGEMENT (OUTPATIENT)
Dept: PRIMARY CARE CLINIC | Facility: CLINIC | Age: 62
End: 2022-07-31
Payer: MEDICARE

## 2022-07-31 PROCEDURE — 99489 PR COMPLX CHRON CARE MGMT, EA ADDTL 30 MIN, PER MONTH: ICD-10-PCS | Mod: S$PBB,,, | Performed by: INTERNAL MEDICINE

## 2022-07-31 PROCEDURE — 99487 CPLX CHRNC CARE 1ST 60 MIN: CPT | Mod: S$PBB,,, | Performed by: INTERNAL MEDICINE

## 2022-07-31 PROCEDURE — 99487 PR COMPLX CHRON CARE MGMT, 1ST HR, PER MONTH: ICD-10-PCS | Mod: S$PBB,,, | Performed by: INTERNAL MEDICINE

## 2022-07-31 PROCEDURE — 99489 CPLX CHRNC CARE EA ADDL 30: CPT | Mod: PBBFAC,PO | Performed by: INTERNAL MEDICINE

## 2022-07-31 PROCEDURE — 99487 CPLX CHRNC CARE 1ST 60 MIN: CPT | Mod: PBBFAC,25,PO | Performed by: INTERNAL MEDICINE

## 2022-07-31 PROCEDURE — 99489 CPLX CHRNC CARE EA ADDL 30: CPT | Mod: S$PBB,,, | Performed by: INTERNAL MEDICINE

## 2022-08-04 ENCOUNTER — OFFICE VISIT (OUTPATIENT)
Dept: DIABETES | Facility: CLINIC | Age: 62
End: 2022-08-04
Payer: MEDICARE

## 2022-08-04 DIAGNOSIS — Z94.0 DECEASED-DONOR KIDNEY TRANSPLANT: ICD-10-CM

## 2022-08-04 DIAGNOSIS — E11.59 HYPERTENSION ASSOCIATED WITH DIABETES: ICD-10-CM

## 2022-08-04 DIAGNOSIS — I15.2 HYPERTENSION ASSOCIATED WITH DIABETES: ICD-10-CM

## 2022-08-04 DIAGNOSIS — E11.69 HYPERLIPIDEMIA ASSOCIATED WITH TYPE 2 DIABETES MELLITUS: ICD-10-CM

## 2022-08-04 DIAGNOSIS — E78.5 HYPERLIPIDEMIA ASSOCIATED WITH TYPE 2 DIABETES MELLITUS: ICD-10-CM

## 2022-08-04 DIAGNOSIS — Z79.4 TYPE 2 DIABETES MELLITUS WITH HYPERGLYCEMIA, WITH LONG-TERM CURRENT USE OF INSULIN: Primary | ICD-10-CM

## 2022-08-04 DIAGNOSIS — E11.65 TYPE 2 DIABETES MELLITUS WITH HYPERGLYCEMIA, WITH LONG-TERM CURRENT USE OF INSULIN: Primary | ICD-10-CM

## 2022-08-04 PROCEDURE — 99214 OFFICE O/P EST MOD 30 MIN: CPT | Mod: 95,,, | Performed by: NURSE PRACTITIONER

## 2022-08-04 PROCEDURE — 99214 PR OFFICE/OUTPT VISIT, EST, LEVL IV, 30-39 MIN: ICD-10-PCS | Mod: 95,,, | Performed by: NURSE PRACTITIONER

## 2022-08-04 RX ORDER — INSULIN LISPRO 100 [IU]/ML
4 INJECTION, SOLUTION INTRAVENOUS; SUBCUTANEOUS
Qty: 15 ML | Refills: 4
Start: 2022-08-04 | End: 2023-02-15 | Stop reason: SDUPTHER

## 2022-08-04 NOTE — PROGRESS NOTES
Established Patient - Virtual Telehealth Visit     The patient location is: Home ([place])  The chief complaint leading to consultation is: Diabetes Follow-up  Visit type: Virtual visit with synchronous audio and video via Epic Haiku keren  Total time spent with patient: 20 minutes     Each patient to whom I provide medical services by telemedicine is:  (1) informed of the relationship between the physician and patient and the respective role of any other health care provider with respect to management of the patient; and (2) notified that they may decline to receive medical services by telemedicine and may withdraw from such care at any time. Patient verbally consented to receive this service via voice-only telephone call.    PCP: Karine Fernandez MD    Subjective:     Chief Complaint: Diabetes follow up.  Last visit with me: 6/20/2022    HPI: 61 y.o.  female for diabetes follow up.   Previously managed by TRINITY Garza PA-C.   Last clinic visit 05/2019.   Type II and Post-Transplant diabetes since 2013 .  Comorbidities: HTN, HLD, CKD III and S/p Transplant - right kidney in 2013.  Known DM complications:   DKA/HHS: no   Retinopathy: no   Peripheral neuropathy: yes   Nephropathy: yes    Interval history:  Denies recent hospital admissions or ER visits.   Voices having hypoglycemia, BG readings 60-70's, variable times during the day.  Admits to not eating meals on time.  Endorses diabetes related symptoms: Polyuria, Leg swelling and Intermittent tingling in Lower Extremities.    Blood glucose monitoring via CGM Nico.  Reporting period: 7/22/2022- 8/4/2022        There are 2 low glucose events with average duration of 55 mins.    Overall there is a pattern of euglycemia with   - postprandial lunch and dinner hyperglycemia followed by hypoglycemia    - one episode of overnight hypoglycemia   - few postprandial lunch and dinner hyperglycemia    Admits to administering insulin before  meals.    Activity: Sedentary- no regular exercise  Diet:2-3 meals/day; infrequent snacks/day.  Irregular mealtime schedule.    Medication compliance all of the time, diet compliance most of the time.   Has attended diabetes education in the past.     Previous regimen: None    Past failed treatment: None    Current DM Medications:   - Lantus 14 units qhs   - Humalog 6 units tid ac meals: pt self adjusting, taking 6-8 units    Allergies  Review of patient's allergies indicates:   Allergen Reactions    Azithromycin Nausea And Vomiting    Adhesive Other (See Comments)     Skin tears    Nsaids (non-steroidal anti-inflammatory drug)      Kidney Transplant       Labs Reviewed:  Her most recent A1C is:  Lab Results   Component Value Date    HGBA1C 6.8 (H) 05/03/2022    HGBA1C 7.1 (H) 02/02/2022    HGBA1C 7.1 (H) 10/12/2021       Her most recent BMP is:  Lab Results   Component Value Date     07/12/2022    K 3.9 07/12/2022     07/12/2022    CO2 25 07/12/2022    BUN 20 07/12/2022    CREATININE 1.2 07/12/2022    CALCIUM 9.8 07/12/2022    ANIONGAP 13 07/12/2022    ESTGFRAFRICA 56.0 (A) 07/12/2022    EGFRNONAA 48.6 (A) 07/12/2022       No results found for: CPEPTIDE  No results found for: GLUTAMICACID    There is no height or weight on file to calculate BMI.    Wt Readings from Last 3 Encounters:   07/12/22 0914 115.5 kg (254 lb 10.1 oz)   06/22/22 1137 115.5 kg (254 lb 11.9 oz)   06/20/22 1330 116.5 kg (256 lb 13.4 oz)        Her blood sugar in clinic today is: Not assessed due to type of visit.    Diabetes Management Status  Statin: Taking  ACE/ARB: Not taking    Screening or Prevention Patient's value Goal Complete/Controlled?   HgA1C Testing and Control   Lab Results   Component Value Date    HGBA1C 6.8 (H) 05/03/2022      Annually/Less than 8% Yes   Lipid profile : 07/12/2022 Annually Yes   LDL control Lab Results   Component Value Date    LDLCALC 81.0 07/12/2022    Annually/Less than 100 mg/dl  Yes    Nephropathy screening Lab Results   Component Value Date    MICALBCREAT 120.5 (H) 2022     Lab Results   Component Value Date    PROTEINUA 1+ (A) 2021    Annually Yes   Blood pressure BP Readings from Last 1 Encounters:   22 132/76    Less than 140/90 Yes   Dilated retinal exam : 2022 Annually Yes    Foot exam   : 2022 Annually Yes     Social History     Socioeconomic History    Marital status:     Number of children: 3   Occupational History    Occupation: disable     Comment: dept manager at [employer]   Tobacco Use    Smoking status: Former Smoker     Packs/day: 1.00     Years: 10.00     Pack years: 10.00     Types: Cigarettes     Quit date: 2002     Years since quittin.7    Smokeless tobacco: Never Used    Tobacco comment: quit smoking approx 10-15 years ago    Substance and Sexual Activity    Alcohol use: No    Drug use: No    Sexual activity: Never     Partners: Male   Social History Narrative    Does housework at home.     Social Determinants of Health     Financial Resource Strain: Unknown    Difficulty of Paying Living Expenses: Patient refused   Food Insecurity: Unknown    Worried About Running Out of Food in the Last Year: Patient refused    Ran Out of Food in the Last Year: Patient refused   Transportation Needs: Unknown    Lack of Transportation (Medical): Patient refused    Lack of Transportation (Non-Medical): Patient refused   Physical Activity: Unknown    Days of Exercise per Week: Patient refused    Minutes of Exercise per Session: 0 min   Stress: No Stress Concern Present    Feeling of Stress : Only a little   Social Connections: Unknown    Frequency of Communication with Friends and Family: More than three times a week    Frequency of Social Gatherings with Friends and Family: Twice a week    Attends Judaism Services: 1 to 4 times per year    Active Member of Clubs or Organizations: Patient refused    Attends Club or  Organization Meetings: Patient refused    Marital Status:    Housing Stability: Unknown    Unable to Pay for Housing in the Last Year: Patient refused    Number of Places Lived in the Last Year: 1    Unstable Housing in the Last Year: No     Past Medical History:   Diagnosis Date    Abnormal glucose 2013    Acquired hypothyroidism     Acute bronchiolitis 10/15/2014    Anemia     Anemia of chronic renal failure 2013    Anxiety     Anxiety disorder     Avascular necrosis of bone of hip     Back pain     s/p steroid injections    Bacteremia due to Escherichia coli 2021    Chronic immunosuppression with Prograf and Cellcept 2013    CKD (chronic kidney disease) stage 2, GFR 60-89 ml/min     CKD (chronic kidney disease) stage 5, GFR less than 15 ml/min     -donor kidney transplant - 13    Depression     Hypertension, renal 2013    Hypothyroidism     Immunosuppressed status 10/15/2014    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    Osteoporosis with pathological fracture     Renal disease due to hypertension 2013    Renal hypertension, non-vascular     Secondary hyperparathyroidism of renal origin 2013    Vertigo      Review of Systems   Constitutional: Negative for activity change, appetite change, fatigue and unexpected weight change.   HENT: Negative for dental problem and trouble swallowing.    Eyes: Negative for visual disturbance.   Respiratory: Negative for chest tightness and shortness of breath.    Cardiovascular: Negative for chest pain, palpitations and leg swelling.   Gastrointestinal: Negative for abdominal pain, constipation, diarrhea, nausea and vomiting.   Endocrine: Positive for polyuria. Negative for polydipsia and polyphagia.   Genitourinary: Negative for difficulty urinating, dysuria, frequency and urgency.        Negative yeast infection   Musculoskeletal: Negative for gait problem, myalgias and neck  pain.   Integumentary:  Negative for rash and wound.   Allergic/Immunologic: Negative.    Neurological: Negative for dizziness, syncope, weakness, numbness and headaches.        Int tingling BLE   Hematological: Negative.    Psychiatric/Behavioral: Negative for confusion and sleep disturbance.   All other systems reviewed and are negative.    Objective:      Physical Exam  Constitutional:       General: She is awake.      Appearance: Normal appearance. She is well-developed and well-groomed.   HENT:      Head: Normocephalic and atraumatic.   Eyes:      General: Lids are normal. Gaze aligned appropriately.   Pulmonary:      Effort: Pulmonary effort is normal. No respiratory distress.   Neurological:      Mental Status: She is alert and oriented to person, place, and time.   Psychiatric:         Attention and Perception: Attention normal.         Mood and Affect: Mood and affect normal.         Speech: Speech normal.         Behavior: Behavior normal.         Thought Content: Thought content normal.         Cognition and Memory: Cognition normal.         Judgment: Judgment normal.     Assessment / Plan:     Diagnoses and all orders for this visit:    Type 2 diabetes mellitus with hyperglycemia, with long-term current use of insulin  -     insulin lispro (HUMALOG KWIKPEN INSULIN) 100 unit/mL pen; Inject 4 Units into the skin 3 (three) times daily before meals.    Hypertension associated with diabetes    Hyperlipidemia associated with type 2 diabetes mellitus    -donor kidney transplant - 13    BMI 40.0-44.9, adult    Additional Plan Details:  - Condition: controlled, most recent A1C of 6.8% was completed 11 weeks ago. Of note, pt having frequent low BG readings at previous visits which could skew A1c result.  - Monitor blood glucose:  3-4x daily.Goals reviewed. Maintain BG log and bring to next visit for review. Continue CGM use.   - CGM note: Patient is testing 4 times per day. Patient is injecting meal  time insulin 3 times daily and is self adjusting insulin based on blood glucose reading. Patient requires CGM for blood sugar monitoring due to frequent insulin dose changes. Patient reports hypoglycemia, < 50.   - Hypoglycemia protocol: Reviewed and risk discussed. Protocol printout provided.   - Diet: Reviewed, limit carbohydrate intake to 30-45 grams per meal, 3x daily and 15 grams per snack with a limit of two daily.   - Activity: Recommend at least 150 minutes of exercise in a week.  - BP and LDL: Recommend lifestyle modifications and follow up with PCP for management.   - Medication Changes:    - Continue Lantus 14 units in the evening   - Decrease Humalog 4 units tid ac meals     - Medication consideration:  Switch Lantus to Tresiba once current supply is consumed. Plan to add GLP1 RA weekly injectable once BG stable with no lows.    - Lab results: A1C discussed.  - Health Maintenance standards addressed today: A1c scheduled.   - Risks of uncontrolled DM explained. Importance of routine foot and eye exams reviewed. Scheduled as appropriate.  -The patient was explained the above plan and given opportunity to ask questions.  She understands, chooses and consents to this plan and accepts all the risks, which include but are not limited to the risks mentioned above.   - Labs ordered as above.  - Follow up: 6-8 weeks virtual.       Charlotte T. Delacruz, FNP-C [facility]    A total of 20 minutes was spent in face to face time, of which over 50 % was spent in counseling patient on disease process, complications, treatment, and side effects of medications.

## 2022-08-04 NOTE — PATIENT INSTRUCTIONS
Medication Regimen/Changes:   - Continue Lantus 14 units in the evening  - Decrease Humalog 4 units three times a day before meals     Patient Instructions:  Carbohydrate Count: 30-45 grams/meal and 15 grams/snack with limit of 2 snacks per day.  Exercise: Goal is 150 minutes or more per week.  Bring meter and blood sugar log to each appointment.     Goals for Blood Sugar:  Fastin-130 mg/dl  2 hours after meal:  mg/dl  Before Bed: 100-150 mg/dl  If Blood sugar is below 70 mg/dl, DO NOT take diabetes medication. Eat first and then recheck blood sugar in 20 minutes.  Foods to eat if blood sugar is below 70 mg/dl  1/2 glass of orange juice   1/2 regular cola can   3-4 hard candies   3-4 small glucose tabs.   Foods to eat if blood sugar is below 50 mg/dl  1 glass of orange juice  1 regular cola can  8 hard candies  8 small glucose tabs.   If you have repeated eating/blood sugar recheck process 2 times and blood sugar still below 70 mg/dl, call 911.

## 2022-08-10 ENCOUNTER — LAB VISIT (OUTPATIENT)
Dept: LAB | Facility: HOSPITAL | Age: 62
End: 2022-08-10
Attending: NURSE PRACTITIONER
Payer: MEDICARE

## 2022-08-10 DIAGNOSIS — Z79.4 TYPE 2 DIABETES MELLITUS WITH HYPERGLYCEMIA, WITH LONG-TERM CURRENT USE OF INSULIN: ICD-10-CM

## 2022-08-10 DIAGNOSIS — E11.65 TYPE 2 DIABETES MELLITUS WITH HYPERGLYCEMIA, WITH LONG-TERM CURRENT USE OF INSULIN: ICD-10-CM

## 2022-08-10 LAB
ESTIMATED AVG GLUCOSE: 148 MG/DL (ref 68–131)
HBA1C MFR BLD: 6.8 % (ref 4–5.6)

## 2022-08-10 PROCEDURE — 83036 HEMOGLOBIN GLYCOSYLATED A1C: CPT | Performed by: NURSE PRACTITIONER

## 2022-08-10 PROCEDURE — 36415 COLL VENOUS BLD VENIPUNCTURE: CPT | Mod: PO | Performed by: NURSE PRACTITIONER

## 2022-08-11 ENCOUNTER — PATIENT MESSAGE (OUTPATIENT)
Dept: FAMILY MEDICINE | Facility: CLINIC | Age: 62
End: 2022-08-11
Payer: MEDICARE

## 2022-08-12 DIAGNOSIS — E11.65 TYPE 2 DIABETES MELLITUS WITH HYPERGLYCEMIA, WITH LONG-TERM CURRENT USE OF INSULIN: ICD-10-CM

## 2022-08-12 DIAGNOSIS — I12.9 HYPERTENSION, RENAL: Primary | ICD-10-CM

## 2022-08-12 DIAGNOSIS — Z79.4 TYPE 2 DIABETES MELLITUS WITH HYPERGLYCEMIA, WITH LONG-TERM CURRENT USE OF INSULIN: ICD-10-CM

## 2022-08-12 DIAGNOSIS — Z94.0 DECEASED-DONOR KIDNEY TRANSPLANT: Primary | ICD-10-CM

## 2022-08-12 DIAGNOSIS — I10 HTN (HYPERTENSION), BENIGN: ICD-10-CM

## 2022-08-12 RX ORDER — INSULIN GLARGINE 100 [IU]/ML
14 INJECTION, SOLUTION SUBCUTANEOUS NIGHTLY
Qty: 15 ML | Refills: 3 | Status: SHIPPED | OUTPATIENT
Start: 2022-08-12 | End: 2022-09-15

## 2022-08-12 NOTE — TELEPHONE ENCOUNTER
Labs reviewed with patient. Advise to continue current medications.   Patient stated that she is on her last pen of Lantus and wanted to let provider know that to see if she can change her to something else to help her with weightloss. Please advise.       ----- Message from Aide Dougherty NP sent at 8/12/2022 10:39 AM CDT -----  Inform patient I have reviewed recent A1C lab result. It is 6.8, remains to be at goal.   Continue current diabetes medications.

## 2022-08-12 NOTE — TELEPHONE ENCOUNTER
----- Message from Urmila Rose sent at 8/12/2022 10:45 AM CDT -----  Contact: Patient  Type:  Sooner Apoointment Request    Caller is requesting a sooner appointment.  Caller declined first available appointment listed below.  Caller will not accept being placed on the waitlist and is requesting a message be sent to doctor.  Name of Caller:Ana Maria anjel  When is the first available appointment?12/28/2022  Symptoms:follow up  Would the patient rather a call back or a response via [facility]? Call back  Best Call Back Number:[phone]  Additional Information:

## 2022-08-12 NOTE — TELEPHONE ENCOUNTER
Called patient to inform her that provider did talk about starting her on a weekly injection before but would like to wait until next visit to re-evaluate her since she was having lots of lows. She voiced understanding.

## 2022-08-12 NOTE — TELEPHONE ENCOUNTER
----- Message from Urmila Rose sent at 8/12/2022 10:41 AM CDT -----  Contact: Patient  Type:  RX Refill Request    Who Called: Ana Maria Teague  Refill or New Rx:refill  RX Name and Strength:NIFEdipine (PROCARDIA-XL) 30 MG (OSM) 24 hr tablet  How is the patient currently taking it? (ex. 1XDay):1xday  Is this a 30 day or 90 day RX:90  Preferred Pharmacy with phone number[facility]  [id number] - [place], [place] - [address] AT [facility]  [address] [place] [address]  Phone: [phone] Fax: [phone]  Local or Mail Order:local  Ordering Provider:Dr. Pugh  Would the patient rather a call back or a response via [facility]? Call back  Best Call Back Number:[phone]  Additional Information:patient states she is completely out of medication.

## 2022-08-15 ENCOUNTER — OFFICE VISIT (OUTPATIENT)
Dept: PAIN MEDICINE | Facility: CLINIC | Age: 62
End: 2022-08-15
Payer: MEDICARE

## 2022-08-15 VITALS
WEIGHT: 248.44 LBS | BODY MASS INDEX: 39.93 KG/M2 | HEART RATE: 72 BPM | DIASTOLIC BLOOD PRESSURE: 94 MMHG | SYSTOLIC BLOOD PRESSURE: 176 MMHG | HEIGHT: 66 IN

## 2022-08-15 DIAGNOSIS — M47.816 LUMBAR SPONDYLOSIS: ICD-10-CM

## 2022-08-15 DIAGNOSIS — M51.36 DDD (DEGENERATIVE DISC DISEASE), LUMBAR: ICD-10-CM

## 2022-08-15 DIAGNOSIS — M54.16 LUMBAR RADICULOPATHY: Primary | ICD-10-CM

## 2022-08-15 DIAGNOSIS — E11.22 CKD STAGE 3 DUE TO TYPE 2 DIABETES MELLITUS: ICD-10-CM

## 2022-08-15 DIAGNOSIS — M54.16 BILATERAL LUMBAR RADICULOPATHY: ICD-10-CM

## 2022-08-15 DIAGNOSIS — E11.65 TYPE 2 DIABETES MELLITUS WITH HYPERGLYCEMIA, WITH LONG-TERM CURRENT USE OF INSULIN: ICD-10-CM

## 2022-08-15 DIAGNOSIS — M79.18 MUSCLE PAIN, MYOFASCIAL: ICD-10-CM

## 2022-08-15 DIAGNOSIS — F11.90 CHRONIC, CONTINUOUS USE OF OPIOIDS: ICD-10-CM

## 2022-08-15 DIAGNOSIS — N18.30 CKD STAGE 3 DUE TO TYPE 2 DIABETES MELLITUS: ICD-10-CM

## 2022-08-15 DIAGNOSIS — Z79.4 TYPE 2 DIABETES MELLITUS WITH HYPERGLYCEMIA, WITH LONG-TERM CURRENT USE OF INSULIN: ICD-10-CM

## 2022-08-15 PROCEDURE — 99213 PR OFFICE/OUTPT VISIT, EST, LEVL III, 20-29 MIN: ICD-10-PCS | Mod: S$PBB,,, | Performed by: ANESTHESIOLOGY

## 2022-08-15 PROCEDURE — 99999 PR PBB SHADOW E&M-EST. PATIENT-LVL III: ICD-10-PCS | Mod: PBBFAC,,, | Performed by: ANESTHESIOLOGY

## 2022-08-15 PROCEDURE — 99999 PR PBB SHADOW E&M-EST. PATIENT-LVL III: CPT | Mod: PBBFAC,,, | Performed by: ANESTHESIOLOGY

## 2022-08-15 PROCEDURE — 99213 OFFICE O/P EST LOW 20 MIN: CPT | Mod: PBBFAC | Performed by: ANESTHESIOLOGY

## 2022-08-15 PROCEDURE — 99213 OFFICE O/P EST LOW 20 MIN: CPT | Mod: S$PBB,,, | Performed by: ANESTHESIOLOGY

## 2022-08-15 RX ORDER — OXYCODONE AND ACETAMINOPHEN 10; 325 MG/1; MG/1
1 TABLET ORAL EVERY 8 HOURS PRN
Qty: 90 TABLET | Refills: 0 | Status: SHIPPED | OUTPATIENT
Start: 2022-08-15 | End: 2022-10-17 | Stop reason: SDUPTHER

## 2022-08-15 RX ORDER — NIFEDIPINE 30 MG/1
30 TABLET, EXTENDED RELEASE ORAL DAILY
Qty: 90 TABLET | Refills: 3 | Status: SHIPPED | OUTPATIENT
Start: 2022-08-15 | End: 2023-06-13 | Stop reason: SDUPTHER

## 2022-08-15 RX ORDER — OXYCODONE AND ACETAMINOPHEN 10; 325 MG/1; MG/1
1 TABLET ORAL EVERY 8 HOURS PRN
Qty: 90 TABLET | Refills: 0 | Status: SHIPPED | OUTPATIENT
Start: 2022-09-14 | End: 2022-10-17 | Stop reason: SDUPTHER

## 2022-08-15 RX ORDER — TIZANIDINE 4 MG/1
4 TABLET ORAL 2 TIMES DAILY PRN
Qty: 60 TABLET | Refills: 1 | Status: SHIPPED | OUTPATIENT
Start: 2022-08-15 | End: 2022-10-17 | Stop reason: SDUPTHER

## 2022-08-15 RX ORDER — GABAPENTIN 400 MG/1
CAPSULE ORAL
Qty: 120 CAPSULE | Refills: 1 | Status: SHIPPED | OUTPATIENT
Start: 2022-08-15 | End: 2022-10-17 | Stop reason: SDUPTHER
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
## 2022-08-31 ENCOUNTER — EXTERNAL CHRONIC CARE MANAGEMENT (OUTPATIENT)
Dept: PRIMARY CARE CLINIC | Facility: CLINIC | Age: 62
End: 2022-08-31
Payer: MEDICARE

## 2022-08-31 PROCEDURE — 99490 CHRNC CARE MGMT STAFF 1ST 20: CPT | Mod: S$PBB,,, | Performed by: INTERNAL MEDICINE

## 2022-08-31 PROCEDURE — 99490 PR CHRONIC CARE MGMT, 1ST 20 MIN: ICD-10-PCS | Mod: S$PBB,,, | Performed by: INTERNAL MEDICINE

## 2022-08-31 PROCEDURE — 99490 CHRNC CARE MGMT STAFF 1ST 20: CPT | Mod: PBBFAC,PO | Performed by: INTERNAL MEDICINE

## 2022-09-01 NOTE — NURSING
Printed information regarding Prolia given to pt. Instructed on s/s to report. Verbalized understanding.  Administered Prolia 60 mg/ml SQ in right arm.  Instructed to wait in clinic x 15 min. For monitoring.Ambulated out of Infusion unit without complication.    Detail Level: Detailed

## 2022-09-15 ENCOUNTER — LAB VISIT (OUTPATIENT)
Dept: LAB | Facility: HOSPITAL | Age: 62
End: 2022-09-15
Attending: INTERNAL MEDICINE
Payer: MEDICARE

## 2022-09-15 ENCOUNTER — OFFICE VISIT (OUTPATIENT)
Dept: DIABETES | Facility: CLINIC | Age: 62
End: 2022-09-15
Payer: MEDICARE

## 2022-09-15 DIAGNOSIS — E11.59 HYPERTENSION ASSOCIATED WITH DIABETES: ICD-10-CM

## 2022-09-15 DIAGNOSIS — Z79.4 TYPE 2 DIABETES MELLITUS WITH HYPERGLYCEMIA, WITH LONG-TERM CURRENT USE OF INSULIN: Primary | ICD-10-CM

## 2022-09-15 DIAGNOSIS — E03.4 HYPOTHYROIDISM DUE TO ACQUIRED ATROPHY OF THYROID: ICD-10-CM

## 2022-09-15 DIAGNOSIS — E11.65 TYPE 2 DIABETES MELLITUS WITH HYPERGLYCEMIA, WITH LONG-TERM CURRENT USE OF INSULIN: Primary | ICD-10-CM

## 2022-09-15 DIAGNOSIS — E78.5 HYPERLIPIDEMIA ASSOCIATED WITH TYPE 2 DIABETES MELLITUS: ICD-10-CM

## 2022-09-15 DIAGNOSIS — I15.2 HYPERTENSION ASSOCIATED WITH DIABETES: ICD-10-CM

## 2022-09-15 DIAGNOSIS — E11.69 HYPERLIPIDEMIA ASSOCIATED WITH TYPE 2 DIABETES MELLITUS: ICD-10-CM

## 2022-09-15 LAB
T4 FREE SERPL-MCNC: 1.38 NG/DL (ref 0.71–1.51)
TSH SERPL DL<=0.005 MIU/L-ACNC: 0.02 UIU/ML (ref 0.4–4)

## 2022-09-15 PROCEDURE — 84439 ASSAY OF FREE THYROXINE: CPT | Performed by: INTERNAL MEDICINE

## 2022-09-15 PROCEDURE — 36415 COLL VENOUS BLD VENIPUNCTURE: CPT | Mod: PO | Performed by: INTERNAL MEDICINE

## 2022-09-15 PROCEDURE — 99214 PR OFFICE/OUTPT VISIT, EST, LEVL IV, 30-39 MIN: ICD-10-PCS | Mod: 95,,, | Performed by: NURSE PRACTITIONER

## 2022-09-15 PROCEDURE — 99214 OFFICE O/P EST MOD 30 MIN: CPT | Mod: 95,,, | Performed by: NURSE PRACTITIONER

## 2022-09-15 PROCEDURE — 84443 ASSAY THYROID STIM HORMONE: CPT | Performed by: INTERNAL MEDICINE

## 2022-09-15 RX ORDER — INSULIN GLARGINE 100 [IU]/ML
12 INJECTION, SOLUTION SUBCUTANEOUS NIGHTLY
Qty: 15 ML | Refills: 3
Start: 2022-09-15 | End: 2022-10-26

## 2022-09-15 RX ORDER — DULAGLUTIDE 0.75 MG/.5ML
0.75 INJECTION, SOLUTION SUBCUTANEOUS
Qty: 4 PEN | Refills: 1 | Status: SHIPPED | OUTPATIENT
Start: 2022-09-15 | End: 2022-10-17

## 2022-09-15 NOTE — PROGRESS NOTES
Established Patient - Virtual Telehealth Visit     The patient location is: Home ([place])  The chief complaint leading to consultation is: Diabetes Follow-up  Visit type: Virtual visit with synchronous audio and video via Epic Haiku keren  Total time spent with patient: 20 minutes     Each patient to whom I provide medical services by telemedicine is:  (1) informed of the relationship between the physician and patient and the respective role of any other health care provider with respect to management of the patient; and (2) notified that they may decline to receive medical services by telemedicine and may withdraw from such care at any time. Patient verbally consented to receive this service via voice-only telephone call.    PCP: Karine Fernandez MD    Subjective:     Chief Complaint: Diabetes follow up.  Last visit with me: 8/4/2022    HPI: 61 y.o.  female for diabetes follow up.   Previously managed by TRINITY Garza PA-C.   Last clinic visit 05/2019.   Type II and Post-Transplant diabetes since 2013 .  Comorbidities: HTN, HLD, CKD III and S/p Transplant - right kidney in 2013.    Known DM complications:   DKA/HHS: no   Retinopathy: no   Peripheral neuropathy: yes   Nephropathy: yes    Interval history:  Denies recent hospital admissions or ER visits.   Admits having hypoglycemia, BG 60's overnight and during the day when she takes too long to eat.  Endorses diabetes related symptoms: Polyuria, Leg swelling and Intermittent tingling in Lower Extremities.    Since last visit, pt switched to fixed dose of mealtime insulin.  Self- adjusted insulin based on size of meals.    Blood glucose monitoring via CGM Nico.  Will be uploaded in media section.  Report generated as follows.        Patient's CGM report interpretation: Overall pattern of euglycemia with postprandial hyperglycemia.  No reported low glucose events however few overnight hypoglycemia noted with BG on the 60's.   Also noted with  hyperglycemia followed by hypoglycemia.  Pt denies administering insulin after meals.    Activity level: Sedentary- no regular exercise  Meal Plannin-3 meals/day; infrequent snacks/day.  Irregular mealtime schedule.    Medication compliance all of the time, diet compliance most of the time.   Has attended diabetes education in the past.     Previous regimen: None    Past failed treatment: None    Current DM Medications:  Diabetes Medications               insulin (LANTUS SOLOSTAR U-100 INSULIN) glargine 100 units/mL SubQ pen Inject 14 Units into the skin every evening.    insulin lispro (HUMALOG KWIKPEN INSULIN) 100 unit/mL pen Inject 4 Units into the skin 3 (three) times daily before meals. Taking 5 units with regular meal and 7 units with larger meal.     GLP-1 Therapy Initiation Considerations:   Family history of pancreatic cancer in first-degree relative: no  Personal history of pancreatitis: no  Family history of MTC/MEN/endocrine tumors: no  Personal history of Gastroparesis: no  Past use of GLP-1 Therapy with adverse effects: no  Past Bariatric Surgery: no  Personal history of Diabetic Retinopathy: no  Most Recent GFR: 56 completed on 2022  Need for Cardiovascular Risk Reduction: yes  Need for Potential Weight Loss: yes    Allergies  Review of patient's allergies indicates:   Allergen Reactions    Azithromycin Nausea And Vomiting    Adhesive Other (See Comments)     Skin tears    Nsaids (non-steroidal anti-inflammatory drug)      Kidney Transplant       Labs Reviewed:  Her most recent A1C is:  Lab Results   Component Value Date    HGBA1C 6.8 (H) 08/10/2022    HGBA1C 6.8 (H) 2022    HGBA1C 7.1 (H) 2022       Her most recent BMP is:  Lab Results   Component Value Date     2022    K 3.9 2022     2022    CO2 25 2022    BUN 20 2022    CREATININE 1.2 2022    CALCIUM 9.8 2022    ANIONGAP 13 2022    ESTGFRAFRICA 56.0 (A) 2022     EGFRNONAA 48.6 (A) 2022       No results found for: CPEPTIDE  No results found for: GLUTAMICACID    There is no height or weight on file to calculate BMI.    Wt Readings from Last 3 Encounters:   08/15/22 0906 112.7 kg (248 lb 7.3 oz)   22 0914 115.5 kg (254 lb 10.1 oz)   22 1137 115.5 kg (254 lb 11.9 oz)        Her blood sugar in clinic today is: Not assessed due to type of visit.    Diabetes Management Status  Statin: Taking  ACE/ARB: Not taking    Screening or Prevention Patient's value Goal Complete/Controlled?   HgA1C Testing and Control   Lab Results   Component Value Date    HGBA1C 6.8 (H) 08/10/2022      Annually/Less than 8% Yes     Lipid profile : 2022 Annually Yes     LDL control Lab Results   Component Value Date    LDLCALC 81.0 2022    Annually/Less than 100 mg/dl  Yes     Nephropathy screening Lab Results   Component Value Date    MICALBCREAT 120.5 (H) 2022     Lab Results   Component Value Date    PROTEINUA 1+ (A) 2021    Annually Yes     Blood pressure BP Readings from Last 1 Encounters:   08/15/22 (!) 176/94    Less than 140/90 No     Dilated retinal exam : 2022 Annually Yes    Foot exam   : 2022 Annually Yes       Social History     Socioeconomic History    Marital status:     Number of children: 3   Occupational History    Occupation: disable     Comment: dept manager at [employer]   Tobacco Use    Smoking status: Former     Packs/day: 1.00     Years: 10.00     Pack years: 10.00     Types: Cigarettes     Quit date: 2002     Years since quittin.8    Smokeless tobacco: Never    Tobacco comments:     quit smoking approx 10-15 years ago    Substance and Sexual Activity    Alcohol use: No    Drug use: No    Sexual activity: Never     Partners: Male   Social History Narrative    Does housework at home.     Social Determinants of Health     Financial Resource Strain: Unknown    Difficulty of Paying Living Expenses: Patient refused    Food Insecurity: Unknown    Worried About Running Out of Food in the Last Year: Patient refused    Ran Out of Food in the Last Year: Patient refused   Transportation Needs: Unknown    Lack of Transportation (Medical): Patient refused    Lack of Transportation (Non-Medical): Patient refused   Physical Activity: Unknown    Days of Exercise per Week: Patient refused    Minutes of Exercise per Session: 0 min   Stress: No Stress Concern Present    Feeling of Stress : Only a little   Social Connections: Unknown    Frequency of Communication with Friends and Family: More than three times a week    Frequency of Social Gatherings with Friends and Family: Twice a week    Attends Church Services: 1 to 4 times per year    Active Member of Clubs or Organizations: Patient refused    Attends Club or Organization Meetings: Patient refused    Marital Status:    Housing Stability: Unknown    Unable to Pay for Housing in the Last Year: Patient refused    Number of Places Lived in the Last Year: 1    Unstable Housing in the Last Year: No     Past Medical History:   Diagnosis Date    Abnormal glucose 2013    Acquired hypothyroidism     Acute bronchiolitis 10/15/2014    Anemia     Anemia of chronic renal failure 2013    Anxiety     Anxiety disorder     Avascular necrosis of bone of hip     Back pain     s/p steroid injections    Bacteremia due to Escherichia coli 2021    Chronic immunosuppression with Prograf and Cellcept 2013    CKD (chronic kidney disease) stage 2, GFR 60-89 ml/min     CKD (chronic kidney disease) stage 5, GFR less than 15 ml/min     -donor kidney transplant - 13    Depression     Hypertension, renal 2013    Hypothyroidism     Immunosuppressed status 10/15/2014    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    Osteoporosis with pathological fracture     Renal disease due to hypertension 2013    Renal hypertension, non-vascular     Secondary  hyperparathyroidism of renal origin 9/30/2013    Vertigo      Review of Systems   Constitutional: Negative for activity change, appetite change, fatigue and unexpected weight change.   HENT: Negative for dental problem and trouble swallowing.    Eyes: Negative for visual disturbance.   Respiratory: Negative for chest tightness and shortness of breath.    Cardiovascular: Negative for chest pain, palpitations and leg swelling.   Gastrointestinal: Negative for abdominal pain, constipation, diarrhea, nausea and vomiting.   Endocrine: Positive for polyuria. Negative for polydipsia and polyphagia.   Genitourinary: Negative for difficulty urinating, dysuria, frequency and urgency.        Negative yeast infection   Musculoskeletal: Negative for gait problem, myalgias and neck pain.   Integumentary:  Negative for rash and wound.   Allergic/Immunologic: Negative.    Neurological: Negative for dizziness, syncope, weakness, numbness and headaches.        Int tingling BLE   Hematological: Negative.    Psychiatric/Behavioral: Negative for confusion and sleep disturbance.   All other systems reviewed and are negative.    Objective:      Physical Exam  Constitutional:       General: She is awake.      Appearance: Normal appearance. She is well-developed and well-groomed.   HENT:      Head: Normocephalic and atraumatic.   Eyes:      General: Lids are normal. Gaze aligned appropriately.   Pulmonary:      Effort: Pulmonary effort is normal. No respiratory distress.   Neurological:      Mental Status: She is alert and oriented to person, place, and time.   Psychiatric:         Attention and Perception: Attention normal.         Mood and Affect: Mood and affect normal.         Speech: Speech normal.         Behavior: Behavior normal.         Thought Content: Thought content normal.         Cognition and Memory: Cognition normal.         Judgment: Judgment normal.     Assessment / Plan:     Diagnoses and all orders for this  visit:    Type 2 diabetes mellitus with hyperglycemia, with long-term current use of insulin  -     dulaglutide (TRULICITY) 0.75 mg/0.5 mL pen injector; Inject 0.75 mg into the skin every 7 days.  -     insulin (LANTUS SOLOSTAR U-100 INSULIN) glargine 100 units/mL SubQ pen; Inject 12 Units into the skin every evening.  -     Hemoglobin A1C; Future    Hypertension associated with diabetes    Hyperlipidemia associated with type 2 diabetes mellitus    BMI 40.0-44.9, adult     Additional Plan Details:  - Condition: controlled, most recent A1C of 6.8% was completed 11 weeks ago. Of note, pt having frequent low BG readings at previous visits which could skew A1c result.  - Monitor blood glucose:  3-4x daily.Goals reviewed. Maintain BG log and bring to next visit for review. Continue CGM use.   - CGM note: Patient is testing 4 times per day. Patient is injecting meal time insulin 3 times daily and is self adjusting insulin based on blood glucose reading. Patient requires CGM for blood sugar monitoring due to frequent insulin dose changes. Patient reports hypoglycemia, < 50.   - Hypoglycemia protocol: Reviewed and risk discussed. Protocol printout provided.   - Diet: Reviewed, limit carbohydrate intake to 30-45 grams per meal, 3x daily and 15 grams per snack with a limit of two daily.   - Activity: Recommend at least 150 minutes of exercise in a week.  - BP and LDL: Recommend lifestyle modifications and follow up with PCP for management.   - Medication Changes:    - Decrease Lantus 12 units in the evening   - Continue Humalog three times daily before each meal, 5 units for regular meal and 7 units for high carb meal.   - Begin Trulicity 0.75 mg weekly. Once started on Trulicity, decrease Lantus to 10 units every evening.    - Medication consideration: Switch Lantus to Tresiba once current supply is consumed. Added GLP for cardiac/renal benefit.  - Lab results: A1C discussed.  - Health Maintenance standards addressed today:  A1c scheduled.   - Risks of uncontrolled DM explained. Importance of routine foot and eye exams reviewed. Scheduled as appropriate.  -The patient was explained the above plan and given opportunity to ask questions.  She understands, chooses and consents to this plan and accepts all the risks, which include but are not limited to the risks mentioned above.   - Labs ordered as above.  - Follow up: 5-6 weeks virtual.        Charlotte T. Delacruz, FNP-C [facility]    A total of 20 minutes was spent in face to face time, of which over 50 % was spent in counseling patient on disease process, complications, treatment, and side effects of medications.

## 2022-09-15 NOTE — PATIENT INSTRUCTIONS
Medication Regimen:    - Decrease Lantus 12 units in the evening   - Continue Humalog three times daily before each meal, 5 units for regular meal and 7 units for high carb meal.   - Begin Trulicity 0.75 mg weekly. Once started on Trulicity, decrease Lantus to 10 units every evening.    Additional instructions:    Trulicity Training Video:   [email or web address]-[email or web address]-use/[email or web address]-[email or web address]    Please either copy and paste the above link in your internet search bar or click on it if linked.     Patient Instructions:  Carbohydrate Count: 30-45 grams/meal and 15 grams/snack with limit of 2 snacks per day.  Exercise: Goal is 150 minutes or more per week.  Bring meter and blood sugar log to each appointment.     Goals for Blood Sugar:  Fastin-130 mg/dl  2 hours after meal:  mg/dl  Before Bed: 100-150 mg/dl  If Blood sugar is below 70 mg/dl, DO NOT take diabetes medication. Eat first and then recheck blood sugar in 20 minutes.  Foods to eat if blood sugar is below 70 mg/dl  1/2 glass of orange juice   1/2 regular cola can   3-4 hard candies   3-4 small glucose tabs.   Foods to eat if blood sugar is below 50 mg/dl  1 glass of orange juice  1 regular cola can  8 hard candies  8 small glucose tabs.   If you have repeated eating/blood sugar recheck process 2 times and blood sugar still below 70 mg/dl, call 911.

## 2022-09-17 ENCOUNTER — PATIENT MESSAGE (OUTPATIENT)
Dept: FAMILY MEDICINE | Facility: CLINIC | Age: 62
End: 2022-09-17
Payer: MEDICARE

## 2022-09-17 DIAGNOSIS — E03.9 ACQUIRED HYPOTHYROIDISM: Primary | ICD-10-CM

## 2022-09-17 RX ORDER — LEVOTHYROXINE SODIUM 150 UG/1
150 TABLET ORAL
Qty: 90 TABLET | Refills: 3 | Status: SHIPPED | OUTPATIENT
Start: 2022-09-17 | End: 2023-09-17

## 2022-09-20 ENCOUNTER — PATIENT MESSAGE (OUTPATIENT)
Dept: DIABETES | Facility: CLINIC | Age: 62
End: 2022-09-20
Payer: MEDICARE

## 2022-09-27 ENCOUNTER — PATIENT MESSAGE (OUTPATIENT)
Dept: DIABETES | Facility: CLINIC | Age: 62
End: 2022-09-27
Payer: MEDICARE

## 2022-09-27 DIAGNOSIS — R11.0 NAUSEA: Primary | ICD-10-CM

## 2022-09-27 RX ORDER — ONDANSETRON 4 MG/1
4 TABLET, ORALLY DISINTEGRATING ORAL EVERY 8 HOURS PRN
Qty: 30 TABLET | Refills: 0 | Status: SHIPPED | OUTPATIENT
Start: 2022-09-27 | End: 2022-11-28

## 2022-09-30 ENCOUNTER — EXTERNAL CHRONIC CARE MANAGEMENT (OUTPATIENT)
Dept: PRIMARY CARE CLINIC | Facility: CLINIC | Age: 62
End: 2022-09-30
Payer: MEDICARE

## 2022-09-30 PROCEDURE — 99490 CHRNC CARE MGMT STAFF 1ST 20: CPT | Mod: S$PBB,,, | Performed by: INTERNAL MEDICINE

## 2022-09-30 PROCEDURE — 99490 PR CHRONIC CARE MGMT, 1ST 20 MIN: ICD-10-PCS | Mod: S$PBB,,, | Performed by: INTERNAL MEDICINE

## 2022-09-30 PROCEDURE — 99490 CHRNC CARE MGMT STAFF 1ST 20: CPT | Mod: PBBFAC,PO | Performed by: INTERNAL MEDICINE

## 2022-10-13 ENCOUNTER — PATIENT MESSAGE (OUTPATIENT)
Dept: NEPHROLOGY | Facility: CLINIC | Age: 62
End: 2022-10-13
Payer: MEDICARE

## 2022-10-13 DIAGNOSIS — D84.9 IMMUNOSUPPRESSED STATUS: ICD-10-CM

## 2022-10-13 DIAGNOSIS — Z94.0 DECEASED-DONOR KIDNEY TRANSPLANT: Chronic | ICD-10-CM

## 2022-10-14 ENCOUNTER — TELEPHONE (OUTPATIENT)
Dept: PAIN MEDICINE | Facility: CLINIC | Age: 62
End: 2022-10-14
Payer: MEDICARE

## 2022-10-14 DIAGNOSIS — Z79.4 TYPE 2 DIABETES MELLITUS WITH HYPERGLYCEMIA, WITH LONG-TERM CURRENT USE OF INSULIN: ICD-10-CM

## 2022-10-14 DIAGNOSIS — E11.65 TYPE 2 DIABETES MELLITUS WITH HYPERGLYCEMIA, WITH LONG-TERM CURRENT USE OF INSULIN: ICD-10-CM

## 2022-10-14 RX ORDER — TACROLIMUS 1 MG/1
CAPSULE ORAL
Qty: 180 CAPSULE | Refills: 11 | Status: SHIPPED | OUTPATIENT
Start: 2022-10-14 | End: 2023-09-06 | Stop reason: SDUPTHER

## 2022-10-14 RX ORDER — MYCOPHENOLATE MOFETIL 250 MG/1
500 CAPSULE ORAL 2 TIMES DAILY
Qty: 120 CAPSULE | Refills: 11 | Status: SHIPPED | OUTPATIENT
Start: 2022-10-14 | End: 2023-09-06 | Stop reason: SDUPTHER

## 2022-10-16 ENCOUNTER — PATIENT MESSAGE (OUTPATIENT)
Dept: DIABETES | Facility: CLINIC | Age: 62
End: 2022-10-16
Payer: MEDICARE

## 2022-10-16 ENCOUNTER — PATIENT MESSAGE (OUTPATIENT)
Dept: PRIMARY CARE CLINIC | Facility: CLINIC | Age: 62
End: 2022-10-16
Payer: MEDICARE

## 2022-10-17 ENCOUNTER — PATIENT MESSAGE (OUTPATIENT)
Dept: PAIN MEDICINE | Facility: CLINIC | Age: 62
End: 2022-10-17

## 2022-10-17 ENCOUNTER — OFFICE VISIT (OUTPATIENT)
Dept: PAIN MEDICINE | Facility: CLINIC | Age: 62
End: 2022-10-17
Payer: MEDICARE

## 2022-10-17 ENCOUNTER — HOSPITAL ENCOUNTER (OUTPATIENT)
Dept: RADIOLOGY | Facility: HOSPITAL | Age: 62
Discharge: HOME | End: 2022-10-17
Attending: PHYSICIAN ASSISTANT
Payer: MEDICARE

## 2022-10-17 VITALS
WEIGHT: 240.06 LBS | SYSTOLIC BLOOD PRESSURE: 157 MMHG | HEIGHT: 66 IN | BODY MASS INDEX: 38.58 KG/M2 | HEART RATE: 69 BPM | DIASTOLIC BLOOD PRESSURE: 98 MMHG

## 2022-10-17 DIAGNOSIS — R07.81 RIB PAIN ON RIGHT SIDE: ICD-10-CM

## 2022-10-17 DIAGNOSIS — M54.16 BILATERAL LUMBAR RADICULOPATHY: ICD-10-CM

## 2022-10-17 DIAGNOSIS — M47.816 LUMBAR SPONDYLOSIS: ICD-10-CM

## 2022-10-17 DIAGNOSIS — M79.18 MUSCLE PAIN, MYOFASCIAL: ICD-10-CM

## 2022-10-17 DIAGNOSIS — M51.36 DDD (DEGENERATIVE DISC DISEASE), LUMBAR: ICD-10-CM

## 2022-10-17 DIAGNOSIS — M51.36 DDD (DEGENERATIVE DISC DISEASE), LUMBAR: Primary | ICD-10-CM

## 2022-10-17 DIAGNOSIS — E11.65 TYPE 2 DIABETES MELLITUS WITH HYPERGLYCEMIA, WITH LONG-TERM CURRENT USE OF INSULIN: ICD-10-CM

## 2022-10-17 DIAGNOSIS — Z79.4 TYPE 2 DIABETES MELLITUS WITH HYPERGLYCEMIA, WITH LONG-TERM CURRENT USE OF INSULIN: ICD-10-CM

## 2022-10-17 PROCEDURE — 99215 OFFICE O/P EST HI 40 MIN: CPT | Mod: PBBFAC | Performed by: PHYSICIAN ASSISTANT

## 2022-10-17 PROCEDURE — 71100 X-RAY EXAM RIBS UNI 2 VIEWS: CPT | Mod: 26,RT,, | Performed by: RADIOLOGY

## 2022-10-17 PROCEDURE — 99214 PR OFFICE/OUTPT VISIT, EST, LEVL IV, 30-39 MIN: ICD-10-PCS | Mod: S$PBB,,, | Performed by: PHYSICIAN ASSISTANT

## 2022-10-17 PROCEDURE — 71100 XR RIBS 2 VIEW RIGHT: ICD-10-PCS | Mod: 26,RT,, | Performed by: RADIOLOGY

## 2022-10-17 PROCEDURE — 71100 X-RAY EXAM RIBS UNI 2 VIEWS: CPT | Mod: TC,RT

## 2022-10-17 PROCEDURE — 99214 OFFICE O/P EST MOD 30 MIN: CPT | Mod: S$PBB,,, | Performed by: PHYSICIAN ASSISTANT

## 2022-10-17 PROCEDURE — 99999 PR PBB SHADOW E&M-EST. PATIENT-LVL V: CPT | Mod: PBBFAC,,, | Performed by: PHYSICIAN ASSISTANT

## 2022-10-17 PROCEDURE — 99999 PR PBB SHADOW E&M-EST. PATIENT-LVL V: ICD-10-PCS | Mod: PBBFAC,,, | Performed by: PHYSICIAN ASSISTANT

## 2022-10-17 RX ORDER — LIDOCAINE 50 MG/G
PATCH TOPICAL
Qty: 90 PATCH | Refills: 0 | Status: SHIPPED | OUTPATIENT
Start: 2022-10-17 | End: 2022-12-12 | Stop reason: SDUPTHER

## 2022-10-17 RX ORDER — OXYCODONE AND ACETAMINOPHEN 10; 325 MG/1; MG/1
1 TABLET ORAL EVERY 8 HOURS PRN
Qty: 90 TABLET | Refills: 0 | Status: SHIPPED | OUTPATIENT
Start: 2022-11-16 | End: 2022-12-12 | Stop reason: SDUPTHER

## 2022-10-17 RX ORDER — GABAPENTIN 400 MG/1
CAPSULE ORAL
Qty: 120 CAPSULE | Refills: 1 | Status: SHIPPED | OUTPATIENT
Start: 2022-10-17 | End: 2023-02-02 | Stop reason: SDUPTHER

## 2022-10-17 RX ORDER — DULAGLUTIDE 0.75 MG/.5ML
INJECTION, SOLUTION SUBCUTANEOUS
Qty: 4 PEN | Refills: 1 | Status: SHIPPED | OUTPATIENT
Start: 2022-10-17 | End: 2022-10-26 | Stop reason: DRUGHIGH

## 2022-10-17 RX ORDER — OXYCODONE AND ACETAMINOPHEN 10; 325 MG/1; MG/1
1 TABLET ORAL EVERY 8 HOURS PRN
Qty: 90 TABLET | Refills: 0 | Status: SHIPPED | OUTPATIENT
Start: 2022-10-17 | End: 2022-12-12 | Stop reason: SDUPTHER

## 2022-10-17 RX ORDER — TIZANIDINE 4 MG/1
4 TABLET ORAL 2 TIMES DAILY PRN
Qty: 60 TABLET | Refills: 1 | Status: SHIPPED | OUTPATIENT
Start: 2022-10-17 | End: 2022-12-12 | Stop reason: SDUPTHER

## 2022-10-17 NOTE — PATIENT INSTRUCTIONS
- Start LIDOcaine (LIDODERM) 5 % topical patches to apply topically.  If Rx is not approved by insurance, try Salon Pas 4% lidocaine patches, available over-the-counter at pharmacy.

## 2022-10-17 NOTE — TELEPHONE ENCOUNTER
Spoke with Patient she states that she is seeing Marcella Haas this am. Patient states she will talk to her about getting Xray. Told Patient to let us know if she has any problems.
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
## 2022-10-26 ENCOUNTER — OFFICE VISIT (OUTPATIENT)
Dept: DIABETES | Facility: CLINIC | Age: 62
End: 2022-10-26
Payer: MEDICARE

## 2022-10-26 DIAGNOSIS — E11.65 TYPE 2 DIABETES MELLITUS WITH HYPERGLYCEMIA, WITH LONG-TERM CURRENT USE OF INSULIN: Primary | ICD-10-CM

## 2022-10-26 DIAGNOSIS — E11.59 HYPERTENSION ASSOCIATED WITH DIABETES: ICD-10-CM

## 2022-10-26 DIAGNOSIS — E78.5 HYPERLIPIDEMIA ASSOCIATED WITH TYPE 2 DIABETES MELLITUS: ICD-10-CM

## 2022-10-26 DIAGNOSIS — I15.2 HYPERTENSION ASSOCIATED WITH DIABETES: ICD-10-CM

## 2022-10-26 DIAGNOSIS — Z79.4 TYPE 2 DIABETES MELLITUS WITH HYPERGLYCEMIA, WITH LONG-TERM CURRENT USE OF INSULIN: Primary | ICD-10-CM

## 2022-10-26 DIAGNOSIS — E11.69 HYPERLIPIDEMIA ASSOCIATED WITH TYPE 2 DIABETES MELLITUS: ICD-10-CM

## 2022-10-26 PROCEDURE — 99214 OFFICE O/P EST MOD 30 MIN: CPT | Mod: 95,,, | Performed by: NURSE PRACTITIONER

## 2022-10-26 PROCEDURE — 99214 PR OFFICE/OUTPT VISIT, EST, LEVL IV, 30-39 MIN: ICD-10-PCS | Mod: 95,,, | Performed by: NURSE PRACTITIONER

## 2022-10-26 RX ORDER — INSULIN GLARGINE 100 [IU]/ML
7 INJECTION, SOLUTION SUBCUTANEOUS NIGHTLY
Qty: 15 ML | Refills: 3
Start: 2022-10-26 | End: 2023-02-15

## 2022-10-26 RX ORDER — DULAGLUTIDE 1.5 MG/.5ML
1.5 INJECTION, SOLUTION SUBCUTANEOUS
Qty: 4 PEN | Refills: 5 | Status: SHIPPED | OUTPATIENT
Start: 2022-10-26 | End: 2022-12-28 | Stop reason: SDUPTHER

## 2022-10-26 RX ORDER — DULAGLUTIDE 1.5 MG/.5ML
1.5 INJECTION, SOLUTION SUBCUTANEOUS
Qty: 4 PEN | Refills: 5 | Status: SHIPPED | OUTPATIENT
Start: 2022-10-26 | End: 2022-10-26

## 2022-10-26 NOTE — PROGRESS NOTES
Established Patient - Virtual Telehealth Visit     The patient location is: Home ([place])  The chief complaint leading to consultation is: Diabetes Follow-up  Visit type: Virtual visit with synchronous audio and video via Epic Haiku keren  Total time spent with patient: 15 minutes     Each patient to whom I provide medical services by telemedicine is:  (1) informed of the relationship between the physician and patient and the respective role of any other health care provider with respect to management of the patient; and (2) notified that they may decline to receive medical services by telemedicine and may withdraw from such care at any time. Patient verbally consented to receive this service via voice-only telephone call.    PCP: Karine Fernandez MD    Subjective:     Chief Complaint: Diabetes follow up.  Last visit with me:     HPI: 61 y.o.  female for diabetes follow up.   Previously managed by TRINITY Garza PA-C.   Last clinic visit 05/2019.   Type II and Post-Transplant diabetes since 2013 .  Comorbidities: HTN, HLD, CKD III and S/p Transplant - right kidney in 2013.    Known DM complications:   DKA/HHS: no   Retinopathy: no   Peripheral neuropathy: yes   Nephropathy: yes    Interval history:  Denies recent hospital admissions or ER visits.   Admits to having hypoglycemia, BG 60's about 2 days ago.  Forgot to take insulin and gave it after eating.  Endorses diabetes related symptoms: Polyuria, Leg swelling and Intermittent tingling in Lower Extremities.    Since last visit, pt started on Trulicity.  Had nausea initially, now tolerating well.    Blood glucose monitoring via CGM Nico.  Will be uploaded in media section.  Report generated as follows.      Patient's CGM report interpretation: Overall pattern of euglycemia with occasional postprandial breakfast and dinner hyperglycemia.  No reported low glucose events however few postprandial hyperglycemia followed by hypoglycemia.  Attributed  to administering insulin after meals.    Activity level: Sedentary  Meal Plannin-3 meals/day; infrequent snacks/day.  Irregular mealtime schedule.    Medication compliance all of the time, diet compliance most of the time.   Has attended diabetes education in the past.     Past failed treatment: None    Current DM Medications:  Diabetes Medications               dulaglutide (TRULICITY) 0.75 mg/0.5 mL pen injector ADMINISTER 0.75 MG UNDER THE SKIN EVERY 7 DAYS    insulin (LANTUS SOLOSTAR U-100 INSULIN) glargine 100 units/mL SubQ pen Inject 12 Units into the skin every evening.Taking 7 units    insulin lispro (HUMALOG KWIKPEN INSULIN) 100 unit/mL pen Inject 4 Units into the skin 3 (three) times daily before meals. Taking 4-7 units tid ac meals       Allergies  Review of patient's allergies indicates:   Allergen Reactions    Azithromycin Nausea And Vomiting    Adhesive Other (See Comments)     Skin tears    Nsaids (non-steroidal anti-inflammatory drug)      Kidney Transplant       Labs Reviewed:  Her most recent A1C is:  Lab Results   Component Value Date    HGBA1C 6.8 (H) 08/10/2022    HGBA1C 6.8 (H) 2022    HGBA1C 7.1 (H) 2022       Her most recent BMP is:  Lab Results   Component Value Date     2022    K 3.9 2022     2022    CO2 25 2022    BUN 20 2022    CREATININE 1.2 2022    CALCIUM 9.8 2022    ANIONGAP 13 2022    ESTGFRAFRICA 56.0 (A) 2022    EGFRNONAA 48.6 (A) 2022       No results found for: CPEPTIDE  No results found for: GLUTAMICACID    There is no height or weight on file to calculate BMI.      Wt Readings from Last 3 Encounters:   10/17/22 0954 108.9 kg (240 lb 1.3 oz)   08/15/22 0906 112.7 kg (248 lb 7.3 oz)   22 0914 115.5 kg (254 lb 10.1 oz)        Her blood sugar in clinic today is: Not assessed due to type of visit.    Diabetes Management Status  Statin: Taking  ACE/ARB: Not taking    Screening or Prevention  Patient's value Goal Complete/Controlled?   HgA1C Testing and Control   Lab Results   Component Value Date    HGBA1C 6.8 (H) 08/10/2022      Annually/Less than 8% Yes     Lipid profile : 2022 Annually Yes     LDL control Lab Results   Component Value Date    LDLCALC 81.0 2022    Annually/Less than 100 mg/dl  Yes     Nephropathy screening Lab Results   Component Value Date    MICALBCREAT 120.5 (H) 2022     Lab Results   Component Value Date    PROTEINUA 1+ (A) 2021    Annually Yes     Blood pressure BP Readings from Last 1 Encounters:   10/17/22 (!) 157/98    Less than 140/90 No     Dilated retinal exam : 2022 Annually Yes    Foot exam   : 2022 Annually Yes       Social History     Socioeconomic History    Marital status:     Number of children: 3   Occupational History    Occupation: disable     Comment: dept manager at [employer]   Tobacco Use    Smoking status: Former     Packs/day: 1.00     Years: 10.00     Pack years: 10.00     Types: Cigarettes     Quit date: 2002     Years since quittin.9    Smokeless tobacco: Never    Tobacco comments:     quit smoking approx 10-15 years ago    Substance and Sexual Activity    Alcohol use: No    Drug use: No    Sexual activity: Never     Partners: Male   Social History Narrative    Does housework at home.     Social Determinants of Health     Financial Resource Strain: Unknown    Difficulty of Paying Living Expenses: Patient refused   Food Insecurity: Unknown    Worried About Running Out of Food in the Last Year: Patient refused    Ran Out of Food in the Last Year: Patient refused   Transportation Needs: Unknown    Lack of Transportation (Medical): Patient refused    Lack of Transportation (Non-Medical): Patient refused   Physical Activity: Unknown    Days of Exercise per Week: Patient refused    Minutes of Exercise per Session: 0 min   Stress: No Stress Concern Present    Feeling of Stress : Only a little   Social  Connections: Unknown    Frequency of Communication with Friends and Family: More than three times a week    Frequency of Social Gatherings with Friends and Family: Twice a week    Attends Adventism Services: 1 to 4 times per year    Active Member of Clubs or Organizations: Patient refused    Attends Club or Organization Meetings: Patient refused    Marital Status:    Housing Stability: Unknown    Unable to Pay for Housing in the Last Year: Patient refused    Number of Places Lived in the Last Year: 1    Unstable Housing in the Last Year: No     Past Medical History:   Diagnosis Date    Abnormal glucose 2013    Acquired hypothyroidism     Acute bronchiolitis 10/15/2014    Anemia     Anemia of chronic renal failure 2013    Anxiety     Anxiety disorder     Avascular necrosis of bone of hip     Back pain     s/p steroid injections    Bacteremia due to Escherichia coli 2021    Chronic immunosuppression with Prograf and Cellcept 2013    CKD (chronic kidney disease) stage 2, GFR 60-89 ml/min     CKD (chronic kidney disease) stage 5, GFR less than 15 ml/min     -donor kidney transplant - 13    Depression     Hypertension, renal 2013    Hypothyroidism     Immunosuppressed status 10/15/2014    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    Osteoporosis with pathological fracture     Renal disease due to hypertension 2013    Renal hypertension, non-vascular     Secondary hyperparathyroidism of renal origin 2013    Vertigo        Review of Systems   Constitutional: Negative for activity change, appetite change, fatigue and unexpected weight change.   HENT: Negative for dental problem and trouble swallowing.    Eyes: Negative for visual disturbance.   Respiratory: Negative for chest tightness and shortness of breath.    Cardiovascular: Negative for chest pain, palpitations and leg swelling.   Gastrointestinal: Negative for abdominal pain, constipation,  diarrhea, nausea and vomiting.   Endocrine: Positive for polyuria. Negative for polydipsia and polyphagia.   Genitourinary: Negative for difficulty urinating, dysuria, frequency and urgency.        Negative yeast infection   Musculoskeletal: Negative for gait problem, myalgias and neck pain.   Integumentary:  Negative for rash and wound.   Allergic/Immunologic: Negative.    Neurological: Negative for dizziness, syncope, weakness, numbness and headaches.        Int tingling BLE   Hematological: Negative.    Psychiatric/Behavioral: Negative for confusion and sleep disturbance.   All other systems reviewed and are negative.    Objective:      Physical Exam  Constitutional:       General: She is awake.      Appearance: Normal appearance. She is well-developed and well-groomed.   HENT:      Head: Normocephalic and atraumatic.   Eyes:      General: Lids are normal. Gaze aligned appropriately.   Pulmonary:      Effort: Pulmonary effort is normal. No respiratory distress.   Neurological:      Mental Status: She is alert and oriented to person, place, and time.   Psychiatric:         Attention and Perception: Attention normal.         Mood and Affect: Mood and affect normal.         Speech: Speech normal.         Behavior: Behavior normal.         Thought Content: Thought content normal.         Cognition and Memory: Cognition normal.         Judgment: Judgment normal.     Assessment / Plan:     Diagnoses and all orders for this visit:    Type 2 diabetes mellitus with hyperglycemia, with long-term current use of insulin  -     insulin (LANTUS SOLOSTAR U-100 INSULIN) glargine 100 units/mL SubQ pen; Inject 7 Units into the skin every evening.  -     dulaglutide (TRULICITY) 1.5 mg/0.5 mL pen injector; Inject 1.5 mg into the skin every 7 days.    Hypertension associated with diabetes    Hyperlipidemia associated with type 2 diabetes mellitus      Additional Plan Details:  - Condition: controlled, most recent A1C of 6.8% was  completed 2 months ago.  - Monitor blood glucose:  3-4x daily.Goals reviewed. Maintain BG log and bring to next visit for review. Continue CGM Nico use.   - CGM note: Patient is testing 4 times per day. Patient is injecting meal time insulin 3 times daily and is self adjusting insulin based on blood glucose reading. Patient requires CGM for blood sugar monitoring due to frequent insulin dose changes. Patient reports hypoglycemia, < 50.   - Hypoglycemia protocol: Reviewed and risk discussed. Protocol printout provided.   - Diet: Reviewed, limit carbohydrate intake to 30-45 grams per meal, 3x daily and 15 grams per snack with a limit of two daily.   - Activity: Recommend at least 150 minutes of exercise in a week.  - BP and LDL: Recommend lifestyle modifications and follow up with PCP for management.   - Medication Changes:    - Increase Trulicity to 1.5 mg weekly   - Hold Lantus    - Continue Humalog three times daily before each meal, 4 units for regular meal and 7 units for high carb meal.     - Medication consideration: Tolerated GLP well, increased dose. Currently on low dose Lantus, will hold as Trulicity dose is increased. May resume if needed.   - Lab results: A1C discussed.  - Health Maintenance standards addressed today: A1c scheduled.   - Risks of uncontrolled DM explained. Importance of routine foot and eye exams reviewed. Scheduled as appropriate.  -The patient was explained the above plan and given opportunity to ask questions.  She understands, chooses and consents to this plan and accepts all the risks, which include but are not limited to the risks mentioned above.   - Labs ordered as above.  - Follow up: 3 months in clinic with lab prior.          Charlotte T. Delacruz, FNP-C [facility]    A total of 15 minutes was spent in face to face time, of which over 50 % was spent in counseling patient on disease process, complications, treatment, and side effects of  medications.

## 2022-10-26 NOTE — PATIENT INSTRUCTIONS
Medication Regimen:    - Increase Trulicity to 1.5 mg weekly   - Hold Lantus    - Continue Humalog three times daily before each meal, 4 units for regular meal and 7 units for high carb meal.    Patient Instructions:  Carbohydrate Count: 30-45 grams/meal and 15 grams/snack with limit of 2 snacks per day.  Exercise: Goal is 150 minutes or more per week.  Bring meter and blood sugar log to each appointment.     Goals for Blood Sugar:  Fastin-130 mg/dl  2 hours after meal:  mg/dl  Before Bed: 100-150 mg/dl  If Blood sugar is below 70 mg/dl, DO NOT take diabetes medication. Eat first and then recheck blood sugar in 20 minutes.  Foods to eat if blood sugar is below 70 mg/dl  1/2 glass of orange juice   1/2 regular cola can   3-4 hard candies   3-4 small glucose tabs.   Foods to eat if blood sugar is below 50 mg/dl  1 glass of orange juice  1 regular cola can  8 hard candies  8 small glucose tabs.   If you have repeated eating/blood sugar recheck process 2 times and blood sugar still below 70 mg/dl, call 911.

## 2022-10-28 ENCOUNTER — TELEPHONE (OUTPATIENT)
Dept: DIABETES | Facility: CLINIC | Age: 62
End: 2022-10-28
Payer: MEDICARE

## 2022-10-28 NOTE — TELEPHONE ENCOUNTER
Faxed requested chart notes to [facility] Medical Records @ [facility] [employer] [phone] from 05/09/2022 through 10/26/2022

## 2022-10-31 ENCOUNTER — EXTERNAL CHRONIC CARE MANAGEMENT (OUTPATIENT)
Dept: PRIMARY CARE CLINIC | Facility: CLINIC | Age: 62
End: 2022-10-31
Payer: MEDICARE

## 2022-10-31 PROCEDURE — 99490 PR CHRONIC CARE MGMT, 1ST 20 MIN: ICD-10-PCS | Mod: S$PBB,,, | Performed by: INTERNAL MEDICINE

## 2022-10-31 PROCEDURE — 99490 CHRNC CARE MGMT STAFF 1ST 20: CPT | Mod: PBBFAC,PO | Performed by: INTERNAL MEDICINE

## 2022-10-31 PROCEDURE — 99490 CHRNC CARE MGMT STAFF 1ST 20: CPT | Mod: S$PBB,,, | Performed by: INTERNAL MEDICINE

## 2022-11-25 ENCOUNTER — LAB VISIT (OUTPATIENT)
Dept: LAB | Facility: HOSPITAL | Age: 62
End: 2022-11-25
Attending: INTERNAL MEDICINE
Payer: MEDICARE

## 2022-11-25 DIAGNOSIS — I12.9 HYPERTENSION, RENAL: ICD-10-CM

## 2022-11-25 DIAGNOSIS — Z94.0 DECEASED-DONOR KIDNEY TRANSPLANT: ICD-10-CM

## 2022-11-25 LAB
ALBUMIN SERPL BCP-MCNC: 3.7 G/DL (ref 3.5–5.2)
ANION GAP SERPL CALC-SCNC: 16 MMOL/L (ref 8–16)
BASOPHILS # BLD AUTO: 0.1 K/UL (ref 0–0.2)
BASOPHILS NFR BLD: 1 % (ref 0–1.9)
BUN SERPL-MCNC: 17 MG/DL (ref 8–23)
CALCIUM SERPL-MCNC: 9.7 MG/DL (ref 8.7–10.5)
CHLORIDE SERPL-SCNC: 100 MMOL/L (ref 95–110)
CO2 SERPL-SCNC: 27 MMOL/L (ref 23–29)
CREAT SERPL-MCNC: 1.1 MG/DL (ref 0.5–1.4)
DIFFERENTIAL METHOD: ABNORMAL
EOSINOPHIL # BLD AUTO: 0.4 K/UL (ref 0–0.5)
EOSINOPHIL NFR BLD: 4.3 % (ref 0–8)
ERYTHROCYTE [DISTWIDTH] IN BLOOD BY AUTOMATED COUNT: 15.1 % (ref 11.5–14.5)
EST. GFR  (NO RACE VARIABLE): 56.5 ML/MIN/1.73 M^2
GLUCOSE SERPL-MCNC: 67 MG/DL (ref 70–110)
HCT VFR BLD AUTO: 39.7 % (ref 37–48.5)
HGB BLD-MCNC: 12.2 G/DL (ref 12–16)
IMM GRANULOCYTES # BLD AUTO: 0.07 K/UL (ref 0–0.04)
IMM GRANULOCYTES NFR BLD AUTO: 0.7 % (ref 0–0.5)
LYMPHOCYTES # BLD AUTO: 3.5 K/UL (ref 1–4.8)
LYMPHOCYTES NFR BLD: 36.3 % (ref 18–48)
MCH RBC QN AUTO: 25.7 PG (ref 27–31)
MCHC RBC AUTO-ENTMCNC: 30.7 G/DL (ref 32–36)
MCV RBC AUTO: 84 FL (ref 82–98)
MONOCYTES # BLD AUTO: 0.5 K/UL (ref 0.3–1)
MONOCYTES NFR BLD: 5.4 % (ref 4–15)
NEUTROPHILS # BLD AUTO: 5.1 K/UL (ref 1.8–7.7)
NEUTROPHILS NFR BLD: 52.3 % (ref 38–73)
NRBC BLD-RTO: 0 /100 WBC
PHOSPHATE SERPL-MCNC: 2.8 MG/DL (ref 2.7–4.5)
PLATELET # BLD AUTO: 269 K/UL (ref 150–450)
PMV BLD AUTO: 12.1 FL (ref 9.2–12.9)
POTASSIUM SERPL-SCNC: 3.4 MMOL/L (ref 3.5–5.1)
RBC # BLD AUTO: 4.74 M/UL (ref 4–5.4)
SODIUM SERPL-SCNC: 143 MMOL/L (ref 136–145)
WBC # BLD AUTO: 9.74 K/UL (ref 3.9–12.7)

## 2022-11-25 PROCEDURE — 36415 COLL VENOUS BLD VENIPUNCTURE: CPT | Mod: PO | Performed by: INTERNAL MEDICINE

## 2022-11-25 PROCEDURE — 85025 COMPLETE CBC W/AUTO DIFF WBC: CPT | Performed by: INTERNAL MEDICINE

## 2022-11-25 PROCEDURE — 80069 RENAL FUNCTION PANEL: CPT | Performed by: INTERNAL MEDICINE

## 2022-11-25 PROCEDURE — 80197 ASSAY OF TACROLIMUS: CPT | Performed by: INTERNAL MEDICINE

## 2022-11-26 DIAGNOSIS — H93.8X2 EAR PRESSURE, LEFT: ICD-10-CM

## 2022-11-26 LAB — TACROLIMUS BLD-MCNC: 5.4 NG/ML (ref 5–15)

## 2022-11-26 RX ORDER — FLUTICASONE PROPIONATE 50 MCG
SPRAY, SUSPENSION (ML) NASAL
Qty: 48 G | Refills: 1 | Status: SHIPPED | OUTPATIENT
Start: 2022-11-26

## 2022-11-27 NOTE — TELEPHONE ENCOUNTER
No new care gaps identified.  [facility] Embedded Care Gaps. Reference number: [id number]. 11/26/2022   6:25:47 PM CST

## 2022-11-27 NOTE — TELEPHONE ENCOUNTER
Refill Decision Note   Ana Maria Teague  is requesting a refill authorization.  Brief Assessment and Rationale for Refill:  Approve     Medication Therapy Plan:       Medication Reconciliation Completed: No   Comments:     No Care Gaps recommended.     Note composed:10:16 PM 11/26/2022

## 2022-11-30 ENCOUNTER — EXTERNAL CHRONIC CARE MANAGEMENT (OUTPATIENT)
Dept: PRIMARY CARE CLINIC | Facility: CLINIC | Age: 62
End: 2022-11-30
Payer: MEDICARE

## 2022-11-30 PROCEDURE — 99490 CHRNC CARE MGMT STAFF 1ST 20: CPT | Mod: PBBFAC,PN | Performed by: INTERNAL MEDICINE

## 2022-11-30 PROCEDURE — 99490 PR CHRONIC CARE MGMT, 1ST 20 MIN: ICD-10-PCS | Mod: S$PBB,,, | Performed by: INTERNAL MEDICINE

## 2022-11-30 PROCEDURE — 99490 CHRNC CARE MGMT STAFF 1ST 20: CPT | Mod: S$PBB,,, | Performed by: INTERNAL MEDICINE

## 2022-12-02 ENCOUNTER — OFFICE VISIT (OUTPATIENT)
Dept: NEPHROLOGY | Facility: CLINIC | Age: 62
End: 2022-12-02
Payer: MEDICARE

## 2022-12-02 DIAGNOSIS — Z94.0 KIDNEY TRANSPLANTED: Primary | ICD-10-CM

## 2022-12-02 PROCEDURE — 99215 PR OFFICE/OUTPT VISIT, EST, LEVL V, 40-54 MIN: ICD-10-PCS | Mod: 95,,, | Performed by: INTERNAL MEDICINE

## 2022-12-02 PROCEDURE — 99215 OFFICE O/P EST HI 40 MIN: CPT | Mod: 95,,, | Performed by: INTERNAL MEDICINE

## 2022-12-02 NOTE — PROGRESS NOTES
The patient location is: home  The chief complaint leading to consultation is: 15    Visit type: audiovisual    Face to Face time with patient: 15  40 minutes of total time spent on the encounter, which includes face to face time and non-face to face time preparing to see the patient (eg, review of tests), Obtaining and/or reviewing separately obtained history, Documenting clinical information in the electronic or other health record, Independently interpreting results (not separately reported) and communicating results to the patient/family/caregiver, or Care coordination (not separately reported).         Each patient to whom he or she provides medical services by telemedicine is:  (1) informed of the relationship between the physician and patient and the respective role of any other health care provider with respect to management of the patient; and (2) notified that he or she may decline to receive medical services by telemedicine and may withdraw from such care at any time.    Notes:     NEPHROLOGY CONSULTATION CLINIC F/U NOTE:  Date of clinic visit: 1/29/21  Virtual visit was made by video     REASON FOR CONSULTATION:  History of kidney transplant.     HISTORY OF PRESENT ILLNESS:  Ms. Teague is a 62-year-old female with h/o of kidney transplant in 2013 who present for f/u. Pt has a pertinent h/o of DM and HTN. The video part did not work. Pt was called at home. Pt feels well, no acute c/o's, no CP, no SOB, Pt says her urine is dark but has no abdominal or urinary c/o's. Labs and meds and the doses of the immunosuppressive meds reviewed with pt. Cellcept was placed on hold when pt had COVID infection in 2021, pt is now back on it.     PAST MEDICAL HISTORY:  1.  End-stage renal disease and was on chronic hemodialysis from 2014 until September of 2013, status post kidney transplant in 2013 at [facility] in [place].  2.  Hypertension.  3.  Diabetes mellitus post-transplant.  4.  Avascular necrosis of the bone  "hip.  5.  Depression and anxiety.  6.  Hypothyroidism.  7.  Secondary hyperparathyroidism.  8. Diastolic CHF     PAST SURGICAL HISTORY:  Kidney transplant in 2013, hysterectomy, thyroidectomy,   vascular access surgeries, , cyst removal from chest wall, skin graft   and breast surgery.     FAMILY HISTORY:  Reviewed.  Both parents with diabetes mellitus.     ALLERGIES:  ADHESIVE TAPE AND AZITHROMYCIN.     SOCIAL HISTORY:  Distant smoking in the very past.  No current smoker.  No   alcohol.     MEDICATIONS:  Reviewed.     Current Outpatient Medications:     albuterol (PROVENTIL/VENTOLIN HFA) 90 mcg/actuation inhaler, INHALE 2 PUFFS INTO THE LUNGS EVERY 6 HOURS AS NEEDED FOR SHORTNESS OF BREATH, Disp: 18 g, Rfl: 0    atorvastatin (LIPITOR) 20 MG tablet, TAKE 1 TABLET(20 MG) BY MOUTH EVERY DAY, Disp: 90 tablet, Rfl: 3    BD RORY 2ND GEN PEN NEEDLE 32 gauge x 5/32" Ndle, USE FOUR TIMES DAILY WITH INSULIN AS DIRECTED, Disp: 100 each, Rfl: 3    benzonatate (TESSALON) 100 MG capsule, Take 1 capsule (100 mg total) by mouth 3 (three) times daily as needed for Cough., Disp: 40 capsule, Rfl: 1    blood sugar diagnostic (FREESTYLE LITE STRIPS) Strp, Use to check blood glucose 3 times a day., Disp: 100 strip, Rfl: 11    cetirizine (ZYRTEC) 10 MG tablet, TAKE 1 TABLET BY MOUTH EVERY DAY, Disp: 30 tablet, Rfl: 11    diclofenac sodium (VOLTAREN) 1 % Gel, APPLY 2G TOPICALLY FOUR TIMES DAILY AS NEEDED, Disp: 100 g, Rfl: 0    dulaglutide (TRULICITY) 1.5 mg/0.5 mL pen injector, Inject 1.5 mg into the skin every 7 days., Disp: 4 pen, Rfl: 5    ergocalciferol (ERGOCALCIFEROL) 50,000 unit Cap, Take 1 capsule (50,000 Units total) by mouth every 7 days., Disp: 4 capsule, Rfl: 11    flash glucose scanning reader (FREESTYLE DEREK 2 READER) Misc, 1 Device by Misc.(Non-Drug; Combo Route) route continuous. Use to test blood glucose with the Derek reader., Disp: 1 each, Rfl: 0    flash glucose sensor (FREESTYLE DEREK 2 SENSOR) Kit, 1 " application by Misc.(Non-Drug; Combo Route) route every 14 (fourteen) days. Use to test blood glucose 4-6x/day., Disp: 2 kit, Rfl: 6    fluticasone propionate (FLONASE) 50 mcg/actuation nasal spray, SHAKE LIQUID AND USE 2 SPRAYS(100 MCG) IN EACH NOSTRIL EVERY DAY, Disp: 48 g, Rfl: 1    furosemide (LASIX) 20 MG tablet, Take 1 tablet (20 mg total) by mouth daily as needed (swelling in legs)., Disp: 30 tablet, Rfl: 11    gabapentin (NEURONTIN) 400 MG capsule, Take 1 capsule (400 mg total) by mouth every morning AND 1 capsule (400 mg total) with lunch AND 2 capsules (800 mg total) every evening., Disp: 120 capsule, Rfl: 1    insulin (LANTUS SOLOSTAR U-100 INSULIN) glargine 100 units/mL SubQ pen, Inject 7 Units into the skin every evening., Disp: 15 mL, Rfl: 3    insulin lispro (HUMALOG KWIKPEN INSULIN) 100 unit/mL pen, Inject 4 Units into the skin 3 (three) times daily before meals., Disp: 15 mL, Rfl: 4    lancets (FREESTYLE LANCETS) 28 gauge Misc, 1 lancet by Combination route 2 (two) times daily as needed. Qid prn, Disp: 400 each, Rfl: 2    lancets Misc, 1 strip by Misc.(Non-Drug; Combo Route) route 4 (four) times daily with meals and nightly., Disp: 150 each, Rfl: 6    levothyroxine (SYNTHROID) 150 MCG tablet, Take 1 tablet (150 mcg total) by mouth before breakfast., Disp: 90 tablet, Rfl: 3    LIDOcaine (LIDODERM) 5 %, Use 1-3 patches per day over recommended area. Remove & Discard patch within 12 hours or as directed by MD., Disp: 90 patch, Rfl: 0    mycophenolate (CELLCEPT) 250 mg Cap, Take 2 capsules (500 mg total) by mouth 2 (two) times daily., Disp: 120 capsule, Rfl: 11    NIFEdipine (PROCARDIA-XL) 30 MG (OSM) 24 hr tablet, Take 1 tablet (30 mg total) by mouth once daily., Disp: 90 tablet, Rfl: 3    ondansetron (ZOFRAN-ODT) 4 MG TbDL, DISSOLVE 1 TABLET(4 MG) ON THE TONGUE EVERY 8 HOURS AS NEEDED FOR NAUSEA, Disp: 30 tablet, Rfl: 1    oxyCODONE-acetaminophen (PERCOCET)  mg per tablet, Take 1 tablet by mouth  every 8 (eight) hours as needed for Pain., Disp: 90 tablet, Rfl: 0    oxyCODONE-acetaminophen (PERCOCET)  mg per tablet, Take 1 tablet by mouth every 8 (eight) hours as needed for Pain., Disp: 90 tablet, Rfl: 0    predniSONE (DELTASONE) 20 MG tablet, 1 po daily x 3d, then half po daily., Disp: 5 tablet, Rfl: 0    pulse oximeter (PULSE OXIMETER) device, by Apply Externally route 2 (two) times a day. Use twice daily at 8 AM and 3 PM and [facility]., Disp: 1 each, Rfl: 0    tacrolimus (PROGRAF) 1 MG Cap, TAKE 3 CAPSULES IN THE MORNING, AND 3 IN THE EVENING, Disp: 180 capsule, Rfl: 11    tiZANidine (ZANAFLEX) 4 MG tablet, Take 1 tablet (4 mg total) by mouth 2 (two) times daily as needed (muscle spasms)., Disp: 60 tablet, Rfl: 1     REVIEW OF SYSTEMS:  No recent hospitalizations.  GENERAL:  Negative.  HEAD, EYES, EARS, NOSE AND THROAT:  Negative.  CARDIAC:  Negative.  PULMONARY:  Negative.  GASTROINTESTINAL:  Negative other than fully reviewed above.  GENITOURINARY:  Negative, other than fully reviewed above.  PSYCHOLOGICAL:  Negative.  NEUROLOGICAL:  Negative.  ENDOCRINE:  As above, otherwise negative.  INFECTIOUS DISEASE:  Negative.  HEMATOLOGIC AND ONCOLOGIC:  Negative.  The rest of the review of systems negative.     PHYSICAL EXAMINATION: Unable to assess VS's  In NAD  Speech and thought process normal     LABORATORY VALUES:  Reviewed.    BMP  Lab Results   Component Value Date     11/25/2022    K 3.4 (L) 11/25/2022     11/25/2022    CO2 27 11/25/2022    BUN 17 11/25/2022    CREATININE 1.1 11/25/2022    CALCIUM 9.7 11/25/2022    ANIONGAP 16 11/25/2022    EGFRNORACEVR 56.5 (A) 11/25/2022     Lab Results   Component Value Date    WBC 9.74 11/25/2022    HGB 12.2 11/25/2022    HCT 39.7 11/25/2022    MCV 84 11/25/2022     11/25/2022     U/a: trace protein, no blood, no nitrites     Prograf 5.4     ASSESSMENT AND PLAN:  This is a 62-year-old female with past h/o of KTx  presents for f/u:     1.  Renal.  s Cr stable, in normal range, not worse.  Stable renal function. DOLORES resolving  Baseline noted was normal  K normal to slightly low.  Na normal  Ca normal  Acid base stable    Urine is dark per pt. Urinary specific gravity is high  No sign of UTI  Advised pt to drink more water  .  2. H/o of kidney transplant in 2013  Immunosuppressive treatment as reviewed above.  Prograf level is within the therapeutic range: no change in dose  On Cellcept now was previously on hold for about 4 weeks after the COVID infection     3.  Hypertension.  BP could not be assessed on virtaul visit     PLANS AND RECOMMENDATIONS:    As above for each individual problem.   Opportunity for questions provided  RTC 6 months  Total time spent 40 minutes including time needed to review the records, the   patient evaluation, documentation, discussion with the patient,   more than 50% of the time was spent on coordination of care and counseling.    Level V visit.     Jovanna Pugh MD
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
## 2022-12-12 ENCOUNTER — OFFICE VISIT (OUTPATIENT)
Dept: PAIN MEDICINE | Facility: CLINIC | Age: 62
End: 2022-12-12
Payer: MEDICARE

## 2022-12-12 ENCOUNTER — PATIENT MESSAGE (OUTPATIENT)
Dept: PAIN MEDICINE | Facility: CLINIC | Age: 62
End: 2022-12-12

## 2022-12-12 DIAGNOSIS — M51.36 DDD (DEGENERATIVE DISC DISEASE), LUMBAR: ICD-10-CM

## 2022-12-12 DIAGNOSIS — Z79.891 OPIOID USE AGREEMENT EXISTS: ICD-10-CM

## 2022-12-12 DIAGNOSIS — G89.29 CHRONIC PAIN OF RIGHT KNEE: ICD-10-CM

## 2022-12-12 DIAGNOSIS — M79.18 MUSCLE PAIN, MYOFASCIAL: ICD-10-CM

## 2022-12-12 DIAGNOSIS — M25.561 CHRONIC PAIN OF RIGHT KNEE: ICD-10-CM

## 2022-12-12 DIAGNOSIS — M47.816 LUMBAR SPONDYLOSIS: Primary | ICD-10-CM

## 2022-12-12 DIAGNOSIS — R07.81 RIB PAIN ON RIGHT SIDE: ICD-10-CM

## 2022-12-12 PROCEDURE — 99214 OFFICE O/P EST MOD 30 MIN: CPT | Mod: 95,,, | Performed by: PHYSICIAN ASSISTANT

## 2022-12-12 PROCEDURE — 99214 PR OFFICE/OUTPT VISIT, EST, LEVL IV, 30-39 MIN: ICD-10-PCS | Mod: 95,,, | Performed by: PHYSICIAN ASSISTANT

## 2022-12-12 RX ORDER — TIZANIDINE 4 MG/1
4 TABLET ORAL 2 TIMES DAILY PRN
Qty: 60 TABLET | Refills: 1 | Status: SHIPPED | OUTPATIENT
Start: 2022-12-12 | End: 2023-02-02 | Stop reason: SDUPTHER

## 2022-12-12 RX ORDER — LIDOCAINE 50 MG/G
PATCH TOPICAL
Qty: 90 PATCH | Refills: 0 | Status: SHIPPED | OUTPATIENT
Start: 2022-12-12 | End: 2023-03-20 | Stop reason: SDUPTHER

## 2022-12-13 RX ORDER — OXYCODONE AND ACETAMINOPHEN 10; 325 MG/1; MG/1
1 TABLET ORAL EVERY 8 HOURS PRN
Qty: 90 TABLET | Refills: 0 | Status: SHIPPED | OUTPATIENT
Start: 2023-01-19 | End: 2023-03-20 | Stop reason: SDUPTHER

## 2022-12-13 RX ORDER — OXYCODONE AND ACETAMINOPHEN 10; 325 MG/1; MG/1
1 TABLET ORAL EVERY 8 HOURS PRN
Qty: 90 TABLET | Refills: 0 | Status: SHIPPED | OUTPATIENT
Start: 2022-12-20 | End: 2023-02-02 | Stop reason: SDUPTHER

## 2022-12-28 ENCOUNTER — TELEPHONE (OUTPATIENT)
Dept: DIABETES | Facility: CLINIC | Age: 62
End: 2022-12-28
Payer: MEDICARE

## 2022-12-28 RX ORDER — DULAGLUTIDE 0.75 MG/.5ML
0.75 INJECTION, SOLUTION SUBCUTANEOUS
Qty: 4 PEN | Refills: 1 | Status: CANCELLED | OUTPATIENT
Start: 2022-12-28

## 2022-12-28 RX ORDER — DULAGLUTIDE 1.5 MG/.5ML
1.5 INJECTION, SOLUTION SUBCUTANEOUS
Qty: 4 PEN | Refills: 5 | Status: SHIPPED | OUTPATIENT
Start: 2022-12-28 | End: 2023-11-07 | Stop reason: SDUPTHER

## 2022-12-28 NOTE — TELEPHONE ENCOUNTER
----- Message from Sabi Bills NP sent at 12/28/2022  2:57 PM CST -----  Please confirm if patient is taking Trulicity 0.75 mg once weekly or 1.5 mg weekly? Both are on the chart.     Covering for GAYATRI Dougherty

## 2022-12-31 ENCOUNTER — EXTERNAL CHRONIC CARE MANAGEMENT (OUTPATIENT)
Dept: PRIMARY CARE CLINIC | Facility: CLINIC | Age: 62
End: 2022-12-31
Payer: MEDICARE

## 2022-12-31 PROCEDURE — 99490 CHRNC CARE MGMT STAFF 1ST 20: CPT | Mod: S$PBB,,, | Performed by: INTERNAL MEDICINE

## 2022-12-31 PROCEDURE — 99490 CHRNC CARE MGMT STAFF 1ST 20: CPT | Mod: PBBFAC,PN | Performed by: INTERNAL MEDICINE

## 2022-12-31 PROCEDURE — 99490 PR CHRONIC CARE MGMT, 1ST 20 MIN: ICD-10-PCS | Mod: S$PBB,,, | Performed by: INTERNAL MEDICINE

## 2023-01-11 ENCOUNTER — TELEPHONE (OUTPATIENT)
Dept: PRIMARY CARE CLINIC | Facility: CLINIC | Age: 63
End: 2023-01-11
Payer: MEDICARE

## 2023-01-12 ENCOUNTER — OFFICE VISIT (OUTPATIENT)
Dept: PRIMARY CARE CLINIC | Facility: CLINIC | Age: 63
End: 2023-01-12
Payer: MEDICARE

## 2023-01-12 VITALS — SYSTOLIC BLOOD PRESSURE: 127 MMHG | DIASTOLIC BLOOD PRESSURE: 75 MMHG

## 2023-01-12 DIAGNOSIS — E11.65 TYPE 2 DIABETES MELLITUS WITH HYPERGLYCEMIA, WITH LONG-TERM CURRENT USE OF INSULIN: ICD-10-CM

## 2023-01-12 DIAGNOSIS — N18.30 CKD STAGE 3 DUE TO TYPE 2 DIABETES MELLITUS: ICD-10-CM

## 2023-01-12 DIAGNOSIS — I15.2 HYPERTENSION ASSOCIATED WITH DIABETES: Primary | ICD-10-CM

## 2023-01-12 DIAGNOSIS — E11.59 HYPERTENSION ASSOCIATED WITH DIABETES: Primary | ICD-10-CM

## 2023-01-12 DIAGNOSIS — M46.96 UNSPECIFIED INFLAMMATORY SPONDYLOPATHY, LUMBAR REGION: ICD-10-CM

## 2023-01-12 DIAGNOSIS — E03.9 ACQUIRED HYPOTHYROIDISM: ICD-10-CM

## 2023-01-12 DIAGNOSIS — Z79.4 TYPE 2 DIABETES MELLITUS WITH HYPERGLYCEMIA, WITH LONG-TERM CURRENT USE OF INSULIN: ICD-10-CM

## 2023-01-12 DIAGNOSIS — F41.9 ANXIETY AND DEPRESSION: ICD-10-CM

## 2023-01-12 DIAGNOSIS — E11.22 CKD STAGE 3 DUE TO TYPE 2 DIABETES MELLITUS: ICD-10-CM

## 2023-01-12 DIAGNOSIS — D84.9 IMMUNOSUPPRESSED STATUS: ICD-10-CM

## 2023-01-12 DIAGNOSIS — F32.A ANXIETY AND DEPRESSION: ICD-10-CM

## 2023-01-12 DIAGNOSIS — Z94.0 DECEASED-DONOR KIDNEY TRANSPLANT: Chronic | ICD-10-CM

## 2023-01-12 DIAGNOSIS — J41.0 SIMPLE CHRONIC BRONCHITIS: ICD-10-CM

## 2023-01-12 PROCEDURE — 99214 OFFICE O/P EST MOD 30 MIN: CPT | Mod: 95,,, | Performed by: INTERNAL MEDICINE

## 2023-01-12 PROCEDURE — 99214 PR OFFICE/OUTPT VISIT, EST, LEVL IV, 30-39 MIN: ICD-10-PCS | Mod: 95,,, | Performed by: INTERNAL MEDICINE

## 2023-01-12 NOTE — PROGRESS NOTES
Primary Care Telemedicine Note  The patient location is:  Patient Home   The chief complaint leading to consultation is: Follow up of medical problems.      Visit type: Virtual visit with synchronous audio only and video  Each patient to whom he or she provides medical services by telemedicine is:  (1) informed of the relationship between the physician and patient and the respective role of any other health care provider with respect to management of the patient; and (2) notified that he or she may decline to receive medical services by telemedicine and may withdraw from such care at any time.      HPI:  In pain today, back and knees, on percocet/beatrice/zanaflex/lidocaine patches on back, per Pain management.  BP when not in pain 122-139/70s.  Sugar this morning 125.  Down about27# per her scale, since starting Trulicity.  No f/c/sw/cough.  BMs normal.  No cp/sob/palp.  Urine normal.  Energy ok.  Denies anxiety/depression lately.      Updated/ annual due :  HM: 10/22 fluvax,  covid vaccines/booster,  HAV, 6/15 [id number], 3/14 [id number], 6/15 TDaP,  MMG/me,  BMD with OP on prolia rep 2y, 3/16 Cscope rep 10y, 11/10 HCV neg, Pain DrKiko Anaya,  Eye DrKiko Gutierrez [place].         Problem List Items Addressed This Visit       -donor kidney transplant - 13 (Chronic)    Overview     Induction with Campath 30mg and IV solumedrol to total 875mg.  Post op course complicated by delayed graft function with hemodialysis. Last HD treatment 10/2/13.   Post-op course also complicated by bleeding/oozing from abdominal incision. Pt received a total of 4 units of PRBC and DDAVP for blood loss anemia.   Returned to OR 10/713 where section was cauterized and NALLELY drain was placed,after which bleeding stopped.    CMV status: D+,R+         Acquired hypothyroidism    Relevant Orders    TSH    Anxiety and depression    Chronic bronchitis    CKD stage 3 due to type 2 diabetes mellitus    Hypertension associated with  "diabetes - Primary    Immunosuppressed status    Type 2 diabetes mellitus with hyperglycemia, with long-term current use of insulin    Relevant Orders    Hemoglobin A1C    Unspecified inflammatory spondylopathy, lumbar region       The patient's Health Maintenance was reviewed and the following appears to be due at this time:  Health Maintenance Due   Topic Date Due    Complete Opioid Risk Tool  Never done    Urine Drug Screen  Never done    Naloxone Prescription  Never done    Shingles Vaccine (1 of 2) Never done    Pneumococcal Vaccines (Age 0-64) (3 - PPSV23 if available, else PCV20) 03/31/2019    COVID-19 Vaccine (4 - Booster for Pfizer series) 03/16/2022    Influenza Vaccine (1) 09/01/2022       [unfilled]  Outpatient Medications Prior to Visit   Medication Sig Dispense Refill    albuterol (PROVENTIL/VENTOLIN HFA) 90 mcg/actuation inhaler INHALE 2 PUFFS INTO THE LUNGS EVERY 6 HOURS AS NEEDED FOR SHORTNESS OF BREATH 18 g 0    atorvastatin (LIPITOR) 20 MG tablet TAKE 1 TABLET(20 MG) BY MOUTH EVERY DAY 90 tablet 3    BD RORY 2ND GEN PEN NEEDLE 32 gauge x 5/32" Ndle USE FOUR TIMES DAILY WITH INSULIN AS DIRECTED 100 each 3    benzonatate (TESSALON) 100 MG capsule Take 1 capsule (100 mg total) by mouth 3 (three) times daily as needed for Cough. 40 capsule 1    blood sugar diagnostic (FREESTYLE LITE STRIPS) Strp Use to check blood glucose 3 times a day. 100 strip 11    cetirizine (ZYRTEC) 10 MG tablet TAKE 1 TABLET BY MOUTH EVERY DAY 30 tablet 11    diclofenac sodium (VOLTAREN) 1 % Gel APPLY 2G TOPICALLY FOUR TIMES DAILY AS NEEDED 100 g 0    dulaglutide (TRULICITY) 1.5 mg/0.5 mL pen injector Inject 1.5 mg into the skin every 7 days. 4 pen 5    ergocalciferol (ERGOCALCIFEROL) 50,000 unit Cap Take 1 capsule (50,000 Units total) by mouth every 7 days. 4 capsule 11    flash glucose scanning reader (FREESTYLE DEREK 2 READER) Misc 1 Device by Misc.(Non-Drug; Combo Route) route continuous. Use to test blood glucose with " the Nico reader. 1 each 0    flash glucose sensor (FREESTYLE NICO 2 SENSOR) Kit 1 application by Misc.(Non-Drug; Combo Route) route every 14 (fourteen) days. Use to test blood glucose 4-6x/day. 2 kit 6    fluticasone propionate (FLONASE) 50 mcg/actuation nasal spray SHAKE LIQUID AND USE 2 SPRAYS(100 MCG) IN EACH NOSTRIL EVERY DAY 48 g 1    furosemide (LASIX) 20 MG tablet Take 1 tablet (20 mg total) by mouth daily as needed (swelling in legs). 30 tablet 11    gabapentin (NEURONTIN) 400 MG capsule Take 1 capsule (400 mg total) by mouth every morning AND 1 capsule (400 mg total) with lunch AND 2 capsules (800 mg total) every evening. 120 capsule 1    insulin (LANTUS SOLOSTAR U-100 INSULIN) glargine 100 units/mL SubQ pen Inject 7 Units into the skin every evening. 15 mL 3    insulin lispro (HUMALOG KWIKPEN INSULIN) 100 unit/mL pen Inject 4 Units into the skin 3 (three) times daily before meals. 15 mL 4    lancets (FREESTYLE LANCETS) 28 gauge Misc 1 lancet by Combination route 2 (two) times daily as needed. Qid prn 400 each 2    lancets Misc 1 strip by Misc.(Non-Drug; Combo Route) route 4 (four) times daily with meals and nightly. 150 each 6    levothyroxine (SYNTHROID) 150 MCG tablet Take 1 tablet (150 mcg total) by mouth before breakfast. 90 tablet 3    LIDOcaine (LIDODERM) 5 % Use 1-3 patches per day over recommended area. Remove & Discard patch within 12 hours or as directed by MD. 90 patch 0    mycophenolate (CELLCEPT) 250 mg Cap Take 2 capsules (500 mg total) by mouth 2 (two) times daily. 120 capsule 11    NIFEdipine (PROCARDIA-XL) 30 MG (OSM) 24 hr tablet Take 1 tablet (30 mg total) by mouth once daily. 90 tablet 3    ondansetron (ZOFRAN-ODT) 4 MG TbDL DISSOLVE 1 TABLET(4 MG) ON THE TONGUE EVERY 8 HOURS AS NEEDED FOR NAUSEA 30 tablet 1    [START ON 1/19/2023] oxyCODONE-acetaminophen (PERCOCET)  mg per tablet Take 1 tablet by mouth every 8 (eight) hours as needed for Pain. 90 tablet 0     oxyCODONE-acetaminophen (PERCOCET)  mg per tablet Take 1 tablet by mouth every 8 (eight) hours as needed for Pain. 90 tablet 0    predniSONE (DELTASONE) 20 MG tablet 1 po daily x 3d, then half po daily. 5 tablet 0    pulse oximeter (PULSE OXIMETER) device by Apply Externally route 2 (two) times a day. Use twice daily at 8 AM and 3 PM and [facility] as directed. 1 each 0    tacrolimus (PROGRAF) 1 MG Cap TAKE 3 CAPSULES IN THE MORNING, AND 3 IN THE EVENING 180 capsule 11    tiZANidine (ZANAFLEX) 4 MG tablet Take 1 tablet (4 mg total) by mouth 2 (two) times daily as needed (muscle spasms). 60 tablet 1     No facility-administered medications prior to visit.        Physical Exam   No flowsheet data found.  No flowsheet data found.      /75     Constitutional: The patient appears well-developed and well-nourished. No distress.   Psychiatric: The mood appears not anxious and the affect is not angry, not blunt, not labile and not inappropriate. Speech is not rapid and/or pressured, not delayed, not tangential and not slurred. The patient is not agitated, not aggressive, is not hyperactive, not slowed, not withdrawn, not actively hallucinating and not combative. Thought content is not paranoid and not delusional. Cognition and memory are not impaired. The patient does not express impulsivity or inappropriate judgment and does not exhibit a depressed mood. The patient expresses no homicidal and no suicidal ideation and has no suicidal plans and no homicidal plans. The patient is communicative and exhibits a normal recent memory and normal remote memory. The patient is attentive.        Latest Reference Range & Units 08/10/22 08:15 09/15/22 10:20 11/25/22 09:54   WBC 3.90 - 12.70 K/uL   9.74   RBC 4.00 - 5.40 M/uL   4.74   Hemoglobin 12.0 - 16.0 g/dL   12.2   Hematocrit 37.0 - 48.5 %   39.7   MCV 82 - 98 fL   84   MCH 27.0 - 31.0 pg   25.7 (L)   MCHC 32.0 - 36.0 g/dL   30.7 (L)   RDW 11.5 - 14.5 %    15.1 (H)   Platelets 150 - 450 K/uL   269   MPV 9.2 - 12.9 fL   12.1   Gran % 38.0 - 73.0 %   52.3   Lymph % 18.0 - 48.0 %   36.3   Mono % 4.0 - 15.0 %   5.4   Eosinophil % 0.0 - 8.0 %   4.3   Basophil % 0.0 - 1.9 %   1.0   Immature Granulocytes 0.0 - 0.5 %   0.7 (H)   Gran # (ANC) 1.8 - 7.7 K/uL   5.1   Lymph # 1.0 - 4.8 K/uL   3.5   Mono # 0.3 - 1.0 K/uL   0.5   Eos # 0.0 - 0.5 K/uL   0.4   Baso # 0.00 - 0.20 K/uL   0.10   Immature Grans (Abs) 0.00 - 0.04 K/uL   0.07 (H)   nRBC 0 /100 WBC   0   Differential Method    Automated   Sodium 136 - 145 mmol/L   143   Potassium 3.5 - 5.1 mmol/L   3.4 (L)   Chloride 95 - 110 mmol/L   100   CO2 23 - 29 mmol/L   27   Anion Gap 8 - 16 mmol/L   16   BUN 8 - 23 mg/dL   17   Creatinine 0.5 - 1.4 mg/dL   1.1   eGFR >60 mL/min/1.73 m^2   56.5 !   Glucose 70 - 110 mg/dL   67 (L)   Calcium 8.7 - 10.5 mg/dL   9.7   Phosphorus 2.7 - 4.5 mg/dL   2.8   Albumin 3.5 - 5.2 g/dL   3.7   Hemoglobin A1C External 4.0 - 5.6 % 6.8 (H)     Estimated Avg Glucose 68 - 131 mg/dL 148 (H)     TSH 0.400 - 4.000 uIU/mL  0.016 (L)    Free T4 0.71 - 1.51 ng/dL  1.38    Tacrolimus Lvl 5.0 - 15.0 ng/mL   5.4      Latest Reference Range & Units 19 12:18   Uric Acid 2.4 - 5.7 mg/dL 6.3 (H)       Encounter Diagnoses   Name Primary?    Hypertension associated with diabetes Yes    CKD stage 3 due to type 2 diabetes mellitus     Type 2 diabetes mellitus with hyperglycemia, with long-term current use of insulin     Simple chronic bronchitis     Anxiety and depression     Acquired hypothyroidism     -donor kidney transplant - 13     Immunosuppressed status     Unspecified inflammatory spondylopathy, lumbar region        PLAN:    I am having Ana Maria Teague maintain her lancets, lancets, pulse oximeter, ergocalciferol, diclofenac sodium, cetirizine, furosemide, blood sugar diagnostic, FREESTYLE DEREK 2 SENSOR, FREESTYLE DEREK 2 READER, benzonatate, predniSONE, insulin lispro, albuterol,  "NIFEdipine, atorvastatin, levothyroxine, BD RORY 2ND GEN PEN NEEDLE, mycophenolate, tacrolimus, gabapentin, insulin, fluticasone propionate, ondansetron, oxyCODONE-acetaminophen, oxyCODONE-acetaminophen, tiZANidine, LIDOcaine, and TRULICITY.  No problem-specific Assessment & Plan notes found for this encounter.      Follow up in about 2 months (around 3/12/2023).    Diagnoses and all orders for this visit:    Hypertension associated with diabetes- stable at home, cont med and monitoring.    CKD stage 3 due to type 2 diabetes mellitus, stable, cont to monitor.    Type 2 diabetes mellitus with hyperglycemia, with long-term current use of insulin- f/w DM NP, check lab.  -     Cancel: CBC Auto Differential; Future  -     Hemoglobin A1C; Future    Simple chronic bronchitis, doing well.    Anxiety and depression- set up for counseling, has not tolerated any rx.    Acquired hypothyroidism- recheck lab with NC.  -     TSH; Future    -donor kidney transplant - 13, Immunosuppressed status- will not be seeing Transplant anymore, PCP/Nephro.    Unspecified inflammatory spondylopathy, lumbar region- cont meds per Pain.    RTC 2 mo.       [unfilled]  Orders Placed This Encounter   Procedures    TSH     Standing Status:   Future     Standing Expiration Date:   2024    Hemoglobin A1C     Standing Status:   Future     Standing Expiration Date:   3/12/2024       Medication List with Changes/Refills   Current Medications    ALBUTEROL (PROVENTIL/VENTOLIN HFA) 90 MCG/ACTUATION INHALER    INHALE 2 PUFFS INTO THE LUNGS EVERY 6 HOURS AS NEEDED FOR SHORTNESS OF BREATH    ATORVASTATIN (LIPITOR) 20 MG TABLET    TAKE 1 TABLET(20 MG) BY MOUTH EVERY DAY    BD RORY 2ND GEN PEN NEEDLE 32 GAUGE X " NDLE    USE FOUR TIMES DAILY WITH INSULIN AS DIRECTED    BENZONATATE (TESSALON) 100 MG CAPSULE    Take 1 capsule (100 mg total) by mouth 3 (three) times daily as needed for Cough.    BLOOD SUGAR DIAGNOSTIC (FREESTYLE LITE STRIPS) " STRP    Use to check blood glucose 3 times a day.    CETIRIZINE (ZYRTEC) 10 MG TABLET    TAKE 1 TABLET BY MOUTH EVERY DAY    DICLOFENAC SODIUM (VOLTAREN) 1 % GEL    APPLY 2G TOPICALLY FOUR TIMES DAILY AS NEEDED    DULAGLUTIDE (TRULICITY) 1.5 MG/0.5 ML PEN INJECTOR    Inject 1.5 mg into the skin every 7 days.    ERGOCALCIFEROL (ERGOCALCIFEROL) 50,000 UNIT CAP    Take 1 capsule (50,000 Units total) by mouth every 7 days.    FLASH GLUCOSE SCANNING READER (FREESTYLE DEREK 2 READER) MISC    1 Device by Misc.(Non-Drug; Combo Route) route continuous. Use to test blood glucose with the Derek reader.    FLASH GLUCOSE SENSOR (FREESTYLE DEREK 2 SENSOR) KIT    1 application by Misc.(Non-Drug; Combo Route) route every 14 (fourteen) days. Use to test blood glucose 4-6x/day.    FLUTICASONE PROPIONATE (FLONASE) 50 MCG/ACTUATION NASAL SPRAY    SHAKE LIQUID AND USE 2 SPRAYS(100 MCG) IN EACH NOSTRIL EVERY DAY    FUROSEMIDE (LASIX) 20 MG TABLET    Take 1 tablet (20 mg total) by mouth daily as needed (swelling in legs).    GABAPENTIN (NEURONTIN) 400 MG CAPSULE    Take 1 capsule (400 mg total) by mouth every morning AND 1 capsule (400 mg total) with lunch AND 2 capsules (800 mg total) every evening.    INSULIN (LANTUS SOLOSTAR U-100 INSULIN) GLARGINE 100 UNITS/ML SUBQ PEN    Inject 7 Units into the skin every evening.    INSULIN LISPRO (HUMALOG KWIKPEN INSULIN) 100 UNIT/ML PEN    Inject 4 Units into the skin 3 (three) times daily before meals.    LANCETS (FREESTYLE LANCETS) 28 GAUGE MISC    1 lancet by Combination route 2 (two) times daily as needed. Qid prn    LANCETS MISC    1 strip by Misc.(Non-Drug; Combo Route) route 4 (four) times daily with meals and nightly.    LEVOTHYROXINE (SYNTHROID) 150 MCG TABLET    Take 1 tablet (150 mcg total) by mouth before breakfast.    LIDOCAINE (LIDODERM) 5 %    Use 1-3 patches per day over recommended area. Remove & Discard patch within 12 hours or as directed by MD.    MYCOPHENOLATE (CELLCEPT) 250  "MG CAP    Take 2 capsules (500 mg total) by mouth 2 (two) times daily.    NIFEDIPINE (PROCARDIA-XL) 30 MG (OSM) 24 HR TABLET    Take 1 tablet (30 mg total) by mouth once daily.    ONDANSETRON (ZOFRAN-ODT) 4 MG TBDL    DISSOLVE 1 TABLET(4 MG) ON THE TONGUE EVERY 8 HOURS AS NEEDED FOR NAUSEA    OXYCODONE-ACETAMINOPHEN (PERCOCET)  MG PER TABLET    Take 1 tablet by mouth every 8 (eight) hours as needed for Pain.    OXYCODONE-ACETAMINOPHEN (PERCOCET)  MG PER TABLET    Take 1 tablet by mouth every 8 (eight) hours as needed for Pain.    PREDNISONE (DELTASONE) 20 MG TABLET    1 po daily x 3d, then half po daily.    PULSE OXIMETER (PULSE OXIMETER) DEVICE    by Apply Externally route 2 (two) times a day. Use twice daily at 8 AM and 3 PM and record the value in MyChart as directed.    TACROLIMUS (PROGRAF) 1 MG CAP    TAKE 3 CAPSULES IN THE MORNING, AND 3 IN THE EVENING    TIZANIDINE (ZANAFLEX) 4 MG TABLET    Take 1 tablet (4 mg total) by mouth 2 (two) times daily as needed (muscle spasms).      Medication List with Changes/Refills   Current Medications    ALBUTEROL (PROVENTIL/VENTOLIN HFA) 90 MCG/ACTUATION INHALER    INHALE 2 PUFFS INTO THE LUNGS EVERY 6 HOURS AS NEEDED FOR SHORTNESS OF BREATH       Start Date: 8/7/2022  End Date: --    ATORVASTATIN (LIPITOR) 20 MG TABLET    TAKE 1 TABLET(20 MG) BY MOUTH EVERY DAY       Start Date: 8/30/2022 End Date: --    BD RORY 2ND GEN PEN NEEDLE 32 GAUGE X 5/32" NDLE    USE FOUR TIMES DAILY WITH INSULIN AS DIRECTED       Start Date: 9/23/2022 End Date: --    BENZONATATE (TESSALON) 100 MG CAPSULE    Take 1 capsule (100 mg total) by mouth 3 (three) times daily as needed for Cough.       Start Date: 3/7/2022  End Date: --    BLOOD SUGAR DIAGNOSTIC (FREESTYLE LITE STRIPS) STRP    Use to check blood glucose 3 times a day.       Start Date: 2/2/2022  End Date: --    CETIRIZINE (ZYRTEC) 10 MG TABLET    TAKE 1 TABLET BY MOUTH EVERY DAY       Start Date: 11/28/2021End Date: --    " DICLOFENAC SODIUM (VOLTAREN) 1 % GEL    APPLY 2G TOPICALLY FOUR TIMES DAILY AS NEEDED       Start Date: 8/18/2021 End Date: --    DULAGLUTIDE (TRULICITY) 1.5 MG/0.5 ML PEN INJECTOR    Inject 1.5 mg into the skin every 7 days.       Start Date: 12/28/2022End Date: --    ERGOCALCIFEROL (ERGOCALCIFEROL) 50,000 UNIT CAP    Take 1 capsule (50,000 Units total) by mouth every 7 days.       Start Date: 5/28/2021 End Date: --    FLASH GLUCOSE SCANNING READER (FREESTYLE DEREK 2 READER) MISC    1 Device by Misc.(Non-Drug; Combo Route) route continuous. Use to test blood glucose with the Derek reader.       Start Date: 2/2/2022  End Date: --    FLASH GLUCOSE SENSOR (FREESTYLE DEREK 2 SENSOR) KIT    1 application by Misc.(Non-Drug; Combo Route) route every 14 (fourteen) days. Use to test blood glucose 4-6x/day.       Start Date: 2/2/2022  End Date: --    FLUTICASONE PROPIONATE (FLONASE) 50 MCG/ACTUATION NASAL SPRAY    SHAKE LIQUID AND USE 2 SPRAYS(100 MCG) IN EACH NOSTRIL EVERY DAY       Start Date: 11/26/2022End Date: --    FUROSEMIDE (LASIX) 20 MG TABLET    Take 1 tablet (20 mg total) by mouth daily as needed (swelling in legs).       Start Date: 1/10/2022 End Date: 1/10/2023    GABAPENTIN (NEURONTIN) 400 MG CAPSULE    Take 1 capsule (400 mg total) by mouth every morning AND 1 capsule (400 mg total) with lunch AND 2 capsules (800 mg total) every evening.       Start Date: 10/17/2022End Date: --    INSULIN (LANTUS SOLOSTAR U-100 INSULIN) GLARGINE 100 UNITS/ML SUBQ PEN    Inject 7 Units into the skin every evening.       Start Date: 10/26/2022End Date: --    INSULIN LISPRO (HUMALOG KWIKPEN INSULIN) 100 UNIT/ML PEN    Inject 4 Units into the skin 3 (three) times daily before meals.       Start Date: 8/4/2022  End Date: --    LANCETS (FREESTYLE LANCETS) 28 GAUGE MISC    1 lancet by Combination route 2 (two) times daily as needed. Qid prn       Start Date: 12/4/2018 End Date: --    LANCETS MISC    1 strip by Misc.(Non-Drug; Combo  Route) route 4 (four) times daily with meals and nightly.       Start Date: 2/10/2015 End Date: --    LEVOTHYROXINE (SYNTHROID) 150 MCG TABLET    Take 1 tablet (150 mcg total) by mouth before breakfast.       Start Date: 9/17/2022 End Date: 9/17/2023    LIDOCAINE (LIDODERM) 5 %    Use 1-3 patches per day over recommended area. Remove & Discard patch within 12 hours or as directed by MD.       Start Date: 12/12/2022End Date: --    MYCOPHENOLATE (CELLCEPT) 250 MG CAP    Take 2 capsules (500 mg total) by mouth 2 (two) times daily.       Start Date: 10/14/2022End Date: --    NIFEDIPINE (PROCARDIA-XL) 30 MG (OSM) 24 HR TABLET    Take 1 tablet (30 mg total) by mouth once daily.       Start Date: 8/15/2022 End Date: --    ONDANSETRON (ZOFRAN-ODT) 4 MG TBDL    DISSOLVE 1 TABLET(4 MG) ON THE TONGUE EVERY 8 HOURS AS NEEDED FOR NAUSEA       Start Date: 11/28/2022End Date: --    OXYCODONE-ACETAMINOPHEN (PERCOCET)  MG PER TABLET    Take 1 tablet by mouth every 8 (eight) hours as needed for Pain.       Start Date: 1/19/2023 End Date: --    OXYCODONE-ACETAMINOPHEN (PERCOCET)  MG PER TABLET    Take 1 tablet by mouth every 8 (eight) hours as needed for Pain.       Start Date: 12/20/2022End Date: --    PREDNISONE (DELTASONE) 20 MG TABLET    1 po daily x 3d, then half po daily.       Start Date: 6/10/2022 End Date: --    PULSE OXIMETER (PULSE OXIMETER) DEVICE    by Apply Externally route 2 (two) times a day. Use twice daily at 8 AM and 3 PM and [facility]       Start Date: 1/8/2021  End Date: --    TACROLIMUS (PROGRAF) 1 MG CAP    TAKE 3 CAPSULES IN THE MORNING, AND 3 IN THE EVENING       Start Date: 10/14/2022End Date: --    TIZANIDINE (ZANAFLEX) 4 MG TABLET    Take 1 tablet (4 mg total) by mouth 2 (two) times daily as needed (muscle spasms).       Start Date: 12/12/2022End Date: --

## 2023-01-13 ENCOUNTER — PATIENT MESSAGE (OUTPATIENT)
Dept: PRIMARY CARE CLINIC | Facility: CLINIC | Age: 63
End: 2023-01-13
Payer: MEDICARE

## 2023-01-17 ENCOUNTER — PATIENT MESSAGE (OUTPATIENT)
Dept: PRIMARY CARE CLINIC | Facility: CLINIC | Age: 63
End: 2023-01-17
Payer: MEDICARE

## 2023-01-18 ENCOUNTER — LAB VISIT (OUTPATIENT)
Dept: LAB | Facility: HOSPITAL | Age: 63
End: 2023-01-18
Attending: INTERNAL MEDICINE
Payer: MEDICARE

## 2023-01-18 DIAGNOSIS — E11.65 TYPE 2 DIABETES MELLITUS WITH HYPERGLYCEMIA, WITH LONG-TERM CURRENT USE OF INSULIN: ICD-10-CM

## 2023-01-18 DIAGNOSIS — Z79.4 TYPE 2 DIABETES MELLITUS WITH HYPERGLYCEMIA, WITH LONG-TERM CURRENT USE OF INSULIN: ICD-10-CM

## 2023-01-18 DIAGNOSIS — E03.9 ACQUIRED HYPOTHYROIDISM: ICD-10-CM

## 2023-01-18 LAB
ESTIMATED AVG GLUCOSE: 131 MG/DL (ref 68–131)
HBA1C MFR BLD: 6.2 % (ref 4–5.6)
T4 FREE SERPL-MCNC: 0.88 NG/DL (ref 0.71–1.51)
TSH SERPL DL<=0.005 MIU/L-ACNC: 6 UIU/ML (ref 0.4–4)

## 2023-01-18 PROCEDURE — 84443 ASSAY THYROID STIM HORMONE: CPT | Performed by: INTERNAL MEDICINE

## 2023-01-18 PROCEDURE — 84439 ASSAY OF FREE THYROXINE: CPT | Performed by: INTERNAL MEDICINE

## 2023-01-18 PROCEDURE — 36415 COLL VENOUS BLD VENIPUNCTURE: CPT | Mod: PO | Performed by: INTERNAL MEDICINE

## 2023-01-18 PROCEDURE — 83036 HEMOGLOBIN GLYCOSYLATED A1C: CPT | Performed by: INTERNAL MEDICINE

## 2023-01-19 ENCOUNTER — PATIENT MESSAGE (OUTPATIENT)
Dept: PRIMARY CARE CLINIC | Facility: CLINIC | Age: 63
End: 2023-01-19
Payer: MEDICARE

## 2023-01-20 ENCOUNTER — DOCUMENTATION ONLY (OUTPATIENT)
Dept: PRIMARY CARE CLINIC | Facility: CLINIC | Age: 63
End: 2023-01-20
Payer: MEDICARE

## 2023-01-20 ENCOUNTER — PATIENT MESSAGE (OUTPATIENT)
Dept: PRIMARY CARE CLINIC | Facility: CLINIC | Age: 63
End: 2023-01-20
Payer: MEDICARE

## 2023-01-25 ENCOUNTER — PATIENT MESSAGE (OUTPATIENT)
Dept: PRIMARY CARE CLINIC | Facility: CLINIC | Age: 63
End: 2023-01-25
Payer: MEDICARE

## 2023-01-27 ENCOUNTER — PATIENT MESSAGE (OUTPATIENT)
Dept: PRIMARY CARE CLINIC | Facility: CLINIC | Age: 63
End: 2023-01-27
Payer: MEDICARE

## 2023-01-30 ENCOUNTER — PATIENT MESSAGE (OUTPATIENT)
Dept: PRIMARY CARE CLINIC | Facility: CLINIC | Age: 63
End: 2023-01-30
Payer: MEDICARE

## 2023-01-31 ENCOUNTER — EXTERNAL CHRONIC CARE MANAGEMENT (OUTPATIENT)
Dept: PRIMARY CARE CLINIC | Facility: CLINIC | Age: 63
End: 2023-01-31
Payer: MEDICARE

## 2023-01-31 PROCEDURE — 99490 CHRNC CARE MGMT STAFF 1ST 20: CPT | Mod: S$PBB,,, | Performed by: INTERNAL MEDICINE

## 2023-01-31 PROCEDURE — 99490 CHRNC CARE MGMT STAFF 1ST 20: CPT | Mod: PBBFAC,PN | Performed by: INTERNAL MEDICINE

## 2023-01-31 PROCEDURE — 99490 PR CHRONIC CARE MGMT, 1ST 20 MIN: ICD-10-PCS | Mod: S$PBB,,, | Performed by: INTERNAL MEDICINE

## 2023-02-02 ENCOUNTER — OFFICE VISIT (OUTPATIENT)
Dept: PAIN MEDICINE | Facility: CLINIC | Age: 63
End: 2023-02-02
Payer: MEDICARE

## 2023-02-02 ENCOUNTER — PATIENT MESSAGE (OUTPATIENT)
Dept: PAIN MEDICINE | Facility: CLINIC | Age: 63
End: 2023-02-02

## 2023-02-02 VITALS — RESPIRATION RATE: 17 BRPM

## 2023-02-02 DIAGNOSIS — G89.29 CHRONIC PAIN OF RIGHT KNEE: Primary | ICD-10-CM

## 2023-02-02 DIAGNOSIS — Z79.891 OPIOID USE AGREEMENT EXISTS: ICD-10-CM

## 2023-02-02 DIAGNOSIS — M51.36 DDD (DEGENERATIVE DISC DISEASE), LUMBAR: ICD-10-CM

## 2023-02-02 DIAGNOSIS — M54.16 BILATERAL LUMBAR RADICULOPATHY: ICD-10-CM

## 2023-02-02 DIAGNOSIS — M79.18 MUSCLE PAIN, MYOFASCIAL: ICD-10-CM

## 2023-02-02 DIAGNOSIS — M25.561 CHRONIC PAIN OF RIGHT KNEE: Primary | ICD-10-CM

## 2023-02-02 PROCEDURE — 99214 OFFICE O/P EST MOD 30 MIN: CPT | Mod: 95,,, | Performed by: PHYSICIAN ASSISTANT

## 2023-02-02 PROCEDURE — 99214 PR OFFICE/OUTPT VISIT, EST, LEVL IV, 30-39 MIN: ICD-10-PCS | Mod: 95,,, | Performed by: PHYSICIAN ASSISTANT

## 2023-02-02 RX ORDER — TIZANIDINE 4 MG/1
4 TABLET ORAL 2 TIMES DAILY PRN
Qty: 60 TABLET | Refills: 1 | Status: SHIPPED | OUTPATIENT
Start: 2023-02-02 | End: 2023-03-20 | Stop reason: SDUPTHER

## 2023-02-02 RX ORDER — OXYCODONE AND ACETAMINOPHEN 10; 325 MG/1; MG/1
1 TABLET ORAL EVERY 8 HOURS PRN
Qty: 90 TABLET | Refills: 0 | Status: SHIPPED | OUTPATIENT
Start: 2023-02-22 | End: 2023-03-20 | Stop reason: SDUPTHER

## 2023-02-02 RX ORDER — GABAPENTIN 400 MG/1
CAPSULE ORAL
Qty: 120 CAPSULE | Refills: 1 | Status: SHIPPED | OUTPATIENT
Start: 2023-02-02 | End: 2023-03-20
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
## 2023-02-09 ENCOUNTER — PATIENT MESSAGE (OUTPATIENT)
Dept: DIABETES | Facility: CLINIC | Age: 63
End: 2023-02-09
Payer: MEDICARE

## 2023-02-13 ENCOUNTER — LAB VISIT (OUTPATIENT)
Dept: LAB | Facility: HOSPITAL | Age: 63
End: 2023-02-13
Attending: NURSE PRACTITIONER
Payer: MEDICARE

## 2023-02-13 DIAGNOSIS — Z79.4 TYPE 2 DIABETES MELLITUS WITH HYPERGLYCEMIA, WITH LONG-TERM CURRENT USE OF INSULIN: ICD-10-CM

## 2023-02-13 DIAGNOSIS — E11.65 TYPE 2 DIABETES MELLITUS WITH HYPERGLYCEMIA, WITH LONG-TERM CURRENT USE OF INSULIN: ICD-10-CM

## 2023-02-13 LAB
ESTIMATED AVG GLUCOSE: 126 MG/DL (ref 68–131)
HBA1C MFR BLD: 6 % (ref 4–5.6)

## 2023-02-13 PROCEDURE — 36415 COLL VENOUS BLD VENIPUNCTURE: CPT | Mod: PO | Performed by: NURSE PRACTITIONER

## 2023-02-13 PROCEDURE — 83036 HEMOGLOBIN GLYCOSYLATED A1C: CPT | Performed by: NURSE PRACTITIONER

## 2023-02-14 ENCOUNTER — TELEPHONE (OUTPATIENT)
Dept: DIABETES | Facility: CLINIC | Age: 63
End: 2023-02-14
Payer: MEDICARE

## 2023-02-14 NOTE — TELEPHONE ENCOUNTER
Patient was notified and informed. Pt verbalized understanding. Pt needs a sample guerrero 2 sensor. Informed pt that she can pick one up from the [facility] tomorrow at her appt.      ----- Message from Aide Dougherty NP sent at 2/14/2023  1:38 PM CST -----  Reviewed recent A1C lab result.   It is 6.0, improved from previous reading.   Remains at goal.

## 2023-02-15 ENCOUNTER — OFFICE VISIT (OUTPATIENT)
Dept: DIABETES | Facility: CLINIC | Age: 63
End: 2023-02-15
Payer: MEDICARE

## 2023-02-15 VITALS
DIASTOLIC BLOOD PRESSURE: 85 MMHG | SYSTOLIC BLOOD PRESSURE: 140 MMHG | HEART RATE: 75 BPM | BODY MASS INDEX: 36.9 KG/M2 | WEIGHT: 228.63 LBS

## 2023-02-15 DIAGNOSIS — E11.65 TYPE 2 DIABETES MELLITUS WITH HYPERGLYCEMIA, WITH LONG-TERM CURRENT USE OF INSULIN: Primary | ICD-10-CM

## 2023-02-15 DIAGNOSIS — I15.2 HYPERTENSION ASSOCIATED WITH DIABETES: ICD-10-CM

## 2023-02-15 DIAGNOSIS — E78.5 HYPERLIPIDEMIA ASSOCIATED WITH TYPE 2 DIABETES MELLITUS: ICD-10-CM

## 2023-02-15 DIAGNOSIS — E11.69 HYPERLIPIDEMIA ASSOCIATED WITH TYPE 2 DIABETES MELLITUS: ICD-10-CM

## 2023-02-15 DIAGNOSIS — E11.59 HYPERTENSION ASSOCIATED WITH DIABETES: ICD-10-CM

## 2023-02-15 DIAGNOSIS — Z79.4 TYPE 2 DIABETES MELLITUS WITH HYPERGLYCEMIA, WITH LONG-TERM CURRENT USE OF INSULIN: Primary | ICD-10-CM

## 2023-02-15 LAB — GLUCOSE SERPL-MCNC: 125 MG/DL (ref 70–110)

## 2023-02-15 PROCEDURE — 99214 OFFICE O/P EST MOD 30 MIN: CPT | Mod: S$PBB,,, | Performed by: NURSE PRACTITIONER

## 2023-02-15 PROCEDURE — 99214 PR OFFICE/OUTPT VISIT, EST, LEVL IV, 30-39 MIN: ICD-10-PCS | Mod: S$PBB,,, | Performed by: NURSE PRACTITIONER

## 2023-02-15 PROCEDURE — 95251 PR GLUCOSE MONITOR, 72 HOUR, PHYS INTERP: ICD-10-PCS | Mod: ,,, | Performed by: NURSE PRACTITIONER

## 2023-02-15 PROCEDURE — 99214 OFFICE O/P EST MOD 30 MIN: CPT | Mod: PBBFAC | Performed by: NURSE PRACTITIONER

## 2023-02-15 PROCEDURE — 99999 PR PBB SHADOW E&M-EST. PATIENT-LVL IV: ICD-10-PCS | Mod: PBBFAC,,, | Performed by: NURSE PRACTITIONER

## 2023-02-15 PROCEDURE — 95251 CONT GLUC MNTR ANALYSIS I&R: CPT | Mod: ,,, | Performed by: NURSE PRACTITIONER

## 2023-02-15 PROCEDURE — 99999 PR PBB SHADOW E&M-EST. PATIENT-LVL IV: CPT | Mod: PBBFAC,,, | Performed by: NURSE PRACTITIONER

## 2023-02-15 PROCEDURE — 82962 GLUCOSE BLOOD TEST: CPT | Mod: PBBFAC | Performed by: NURSE PRACTITIONER

## 2023-02-15 RX ORDER — INSULIN LISPRO 100 [IU]/ML
5 INJECTION, SOLUTION INTRAVENOUS; SUBCUTANEOUS
Qty: 15 ML | Refills: 5 | Status: SHIPPED | OUTPATIENT
Start: 2023-02-15 | End: 2024-01-17

## 2023-02-15 NOTE — PROGRESS NOTES
PCP: Karine Fernandez MD    Subjective:     Chief Complaint: Diabetes follow up.  Last visit (virtual) with me: 10/26/2022    HPI: 62 y.o.  female for diabetes follow up.   Previously managed by TRINITY Garza PA-C.   Last clinic visit 2019.   Type II and Post-Transplant diabetes since  .  Comorbidities: HTN, HLD, CKD III and S/p Transplant - right kidney in .    Known DM complications:   DKA/HHS: no   Retinopathy: no   Peripheral neuropathy: yes   Nephropathy: yes    Interval history:  At last visit, low dose Lantus was on hold.  Admits to taking sporadically.  However, have not taken it over the last 2 weeks.    Denies recent hospital admissions or ER visits.   Admits to having hypoglycemia with BG reading on the high 60's.  Denies symptoms.  Endorses diabetes related symptoms: Intermittent tingling in Lower Extremities.    Blood glucose monitoring via CGM Nico.  Will be uploaded in media section.  Report generated as follows.      Patient's CGM report interpretation: Overall pattern of euglycemia with occasional postprandial hyperglycemia.  No reported low glucose events however few postprandial hyperglycemia followed by hypoglycemia.    Activity level: Sedentary  Meal Plannin-3 meals/day; infrequent snacks/day.  Irregular mealtime schedule.    Medication compliance all of the time, diet compliance most of the time.   Has attended diabetes education in the past.     Past failed treatment: None    Current DM Medications:  Diabetes Medications               dulaglutide (TRULICITY) 1.5 mg/0.5 mL pen injector Inject 1.5 mg into the skin every 7 days.    insulin (LANTUS SOLOSTAR U-100 INSULIN) glargine 100 units/mL SubQ pen Inject 7 Units into the skin every evening.  Sporadic use    insulin lispro (HUMALOG KWIKPEN INSULIN) 100 unit/mL pen Inject 4 Units into the skin 3 (three) times daily before meals.  Taking 7 units on the average ac meals       Allergies  Review of patient's  allergies indicates:   Allergen Reactions    Azithromycin Nausea And Vomiting    Adhesive Other (See Comments)     Skin tears    Nsaids (non-steroidal anti-inflammatory drug)      Kidney Transplant       Labs Reviewed:  Her most recent A1C is:  Lab Results   Component Value Date    HGBA1C 6.0 (H) 02/13/2023    HGBA1C 6.2 (H) 01/18/2023    HGBA1C 6.8 (H) 08/10/2022       Her most recent BMP is:  Lab Results   Component Value Date     11/25/2022    K 3.4 (L) 11/25/2022     11/25/2022    CO2 27 11/25/2022    BUN 17 11/25/2022    CREATININE 1.1 11/25/2022    CALCIUM 9.7 11/25/2022    ANIONGAP 16 11/25/2022    ESTGFRAFRICA 56.0 (A) 07/12/2022    EGFRNONAA 48.6 (A) 07/12/2022       No results found for: CPEPTIDE  No results found for: GLUTAMICACID    Body mass index is 36.9 kg/m².    Wt Readings from Last 3 Encounters:   02/15/23 0926 103.7 kg (228 lb 9.9 oz)   10/17/22 0954 108.9 kg (240 lb 1.3 oz)   08/15/22 0906 112.7 kg (248 lb 7.3 oz)        Her blood sugar in clinic today is: 125    Diabetes Management Status  Statin: Taking  ACE/ARB: Not taking    Screening or Prevention Patient's value Goal Complete/Controlled?   HgA1C Testing and Control   Lab Results   Component Value Date    HGBA1C 6.0 (H) 02/13/2023      Annually/Less than 8% Yes     Lipid profile : 07/12/2022 Annually Yes     LDL control Lab Results   Component Value Date    LDLCALC 81.0 07/12/2022    Annually/Less than 100 mg/dl  Yes     Nephropathy screening Lab Results   Component Value Date    MICALBCREAT 120.5 (H) 07/12/2022     Lab Results   Component Value Date    PROTEINUA Trace (A) 11/25/2022    Annually Yes     Blood pressure BP Readings from Last 1 Encounters:   02/15/23 (!) 140/85    Less than 140/90 Yes     Dilated retinal exam : 04/25/2022 Annually Yes    Foot exam   : 06/22/2022 Annually Yes       Social History     Socioeconomic History    Marital status:     Number of children: 3   Occupational History    Occupation:  disable     Comment: dept manager at [employer]   Tobacco Use    Smoking status: Former     Packs/day: 1.00     Years: 10.00     Pack years: 10.00     Types: Cigarettes     Quit date: 2002     Years since quittin.2    Smokeless tobacco: Never    Tobacco comments:     quit smoking approx 10-15 years ago    Substance and Sexual Activity    Alcohol use: No    Drug use: No    Sexual activity: Never     Partners: Male   Social History Narrative    Does housework at home.     Social Determinants of Health     Financial Resource Strain: Unknown    Difficulty of Paying Living Expenses: Patient refused   Food Insecurity: Unknown    Worried About Running Out of Food in the Last Year: Patient refused    Ran Out of Food in the Last Year: Patient refused   Transportation Needs: Unknown    Lack of Transportation (Medical): Patient refused    Lack of Transportation (Non-Medical): Patient refused   Physical Activity: Unknown    Days of Exercise per Week: Patient refused    Minutes of Exercise per Session: 0 min   Stress: No Stress Concern Present    Feeling of Stress : Only a little   Social Connections: Unknown    Frequency of Communication with Friends and Family: More than three times a week    Frequency of Social Gatherings with Friends and Family: Twice a week    Attends Yarsani Services: 1 to 4 times per year    Active Member of Clubs or Organizations: Patient refused    Attends Club or Organization Meetings: Patient refused    Marital Status:    Housing Stability: Unknown    Unable to Pay for Housing in the Last Year: Patient refused    Number of Places Lived in the Last Year: 1    Unstable Housing in the Last Year: No     Past Medical History:   Diagnosis Date    Abnormal glucose 2013    Acquired hypothyroidism     Acute bronchiolitis 10/15/2014    Anemia     Anemia of chronic renal failure 2013    Anxiety     Anxiety disorder     Avascular necrosis of bone of hip     Back pain     s/p steroid  injections    Bacteremia due to Escherichia coli 2021    Chronic immunosuppression with Prograf and Cellcept 2013    CKD (chronic kidney disease) stage 2, GFR 60-89 ml/min     CKD (chronic kidney disease) stage 5, GFR less than 15 ml/min     -donor kidney transplant - 13    Depression     Hypertension, renal 2013    Hypothyroidism     Immunosuppressed status 10/15/2014    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    Osteoporosis with pathological fracture     Renal disease due to hypertension 2013    Renal hypertension, non-vascular     Secondary hyperparathyroidism of renal origin 2013    Vertigo      Review of Systems   Constitutional: Negative for activity change, appetite change, fatigue and unexpected weight change.   HENT: Negative for dental problem and trouble swallowing.    Eyes: Negative for visual disturbance.   Respiratory: Negative for chest tightness and shortness of breath.    Cardiovascular: Negative for chest pain, palpitations and leg swelling.   Gastrointestinal: Negative for abdominal pain, constipation, diarrhea, nausea and vomiting.   Endocrine: Negative for polydipsia, polyuria and polyphagia.   Genitourinary: Negative for difficulty urinating, dysuria, frequency and urgency.        Negative yeast infection   Musculoskeletal: Negative for gait problem, myalgias and neck pain.   Integumentary:  Negative for rash and wound.   Allergic/Immunologic: Negative.    Neurological: Negative for dizziness, syncope, weakness, numbness and headaches.        Int tingling BLE   Hematological: Negative.    Psychiatric/Behavioral: Negative for confusion and sleep disturbance.   All other systems reviewed and are negative.    Objective:      Physical Exam  Vitals reviewed.   Constitutional:       Appearance: Normal appearance.   HENT:      Head: Normocephalic and atraumatic.   Eyes:      General: Vision grossly intact.      Extraocular Movements:  Extraocular movements intact.      Pupils: Pupils are equal, round, and reactive to light.   Neck:      Thyroid: No thyroid mass or thyroid tenderness.   Cardiovascular:      Rate and Rhythm: Normal rate and regular rhythm.      Pulses: Normal pulses.           Radial pulses are 2+ on the right side and 2+ on the left side.        Dorsalis pedis pulses are 2+ on the right side and 2+ on the left side.      Heart sounds: Normal heart sounds.   Pulmonary:      Effort: Pulmonary effort is normal.      Breath sounds: Normal breath sounds.   Abdominal:      General: Bowel sounds are normal.      Palpations: Abdomen is soft.   Musculoskeletal:         General: Normal range of motion.      Cervical back: Normal range of motion and neck supple.      Right lower leg: No edema.      Left lower leg: No edema.      Right foot: No deformity or bunion.      Left foot: No deformity or bunion.   Feet:      Right foot:      Protective Sensation: 6 sites tested. 6 sites sensed.      Skin integrity: Skin integrity normal. No ulcer, skin breakdown, callus or dry skin.      Toenail Condition: Right toenails are normal.      Left foot:      Protective Sensation: 6 sites tested. 6 sites sensed.      Skin integrity: Skin integrity normal. No ulcer, skin breakdown, callus or dry skin.      Toenail Condition: Left toenails are normal.      Comments: Pinprick exam completed with 10-g monofilament. Vibration sensation intact.  Lymphadenopathy:      Cervical: No cervical adenopathy.   Skin:     General: Skin is warm and dry.      Findings: No rash or wound.      Comments: Negative for acanthosis nigricans. Well-healed SQ injection sites.   Neurological:      Mental Status: She is alert and oriented to person, place, and time.      Coordination: Coordination is intact.      Gait: Gait is intact.   Psychiatric:         Attention and Perception: Attention normal.         Mood and Affect: Mood normal.         Speech: Speech normal.          Behavior: Behavior normal. Behavior is cooperative.         Thought Content: Thought content normal.         Cognition and Memory: Cognition normal.     Assessment / Plan:     Ana Maria was seen today for follow-up.    Diagnoses and all orders for this visit:    Type 2 diabetes mellitus with hyperglycemia, with long-term current use of insulin  -     insulin lispro (HUMALOG KWIKPEN INSULIN) 100 unit/mL pen; Inject 5 Units into the skin 3 (three) times daily before meals.  -     Hemoglobin A1C; Future  -     POCT Glucose, Hand-Held Device    Hypertension associated with diabetes    Hyperlipidemia associated with type 2 diabetes mellitus    Additional Plan Details:  - Condition: controlled, most recent A1C of 6.0% was completed 1 week ago.  - Monitor blood glucose:  3-4x daily.Goals reviewed. Maintain BG log and bring to next visit for review. Continue CGM Nico use.   - CGM note: Patient is testing 4 times per day. Patient is injecting meal time insulin 3 times daily and is self adjusting insulin based on blood glucose reading. Patient requires CGM for blood sugar monitoring due to frequent insulin dose changes. Patient reports hypoglycemia, < 50.   - Hypoglycemia protocol: Reviewed and risk discussed. Protocol printout provided.   - Diet: Reviewed, limit carbohydrate intake to 30-45 grams per meal, 3x daily and 15 grams per snack with a limit of two daily.   - Activity: Recommend at least 150 minutes of exercise in a week.  - BP and LDL: Recommend lifestyle modifications and follow up with PCP for management.   - Medication Regimen:     Continue Trulicity 1.5 mg once a week  Stop Lantus   Decrease Humalog 5 units three times daily before each meal     - Medication consideration: Stopped low dose Lantus, otherwise continue regimen.  - Lab results: A1C discussed.  - Health Maintenance standards addressed today: A1c scheduled.   - Risks of uncontrolled DM explained. Importance of routine foot and eye exams reviewed.  Scheduled as appropriate.  -The patient was explained the above plan and given opportunity to ask questions.  She understands, chooses and consents to this plan and accepts all the risks, which include but are not limited to the risks mentioned above.   - Labs ordered as above.  - Follow up: 6 months in clinic with lab prior.        Charlotte T. Delacruz, FNP-C [facility]    A total of 30 minutes was spent in face to face time, of which over 50 % was spent in counseling patient on disease process, complications, treatment, and side effects of medications.

## 2023-02-15 NOTE — PATIENT INSTRUCTIONS
Medication Regimen:   Continue Trulicity 1.5 mg once a week  Stop Lantus   Decrease Humalog 5 units three times daily before each meal    Patient Instructions:  Carbohydrate Count: 30-45 grams/meal and 15 grams/snack with limit of 2 snacks per day.  Exercise: Goal is 150 minutes or more per week.  Bring meter and blood sugar log to each appointment.     Goals for Blood Sugar:  Fastin-130 mg/dl  2 hours after meal:  mg/dl  Before Bed: 100-150 mg/dl  If Blood sugar is below 70 mg/dl, DO NOT take diabetes medication. Eat first and then recheck blood sugar in 20 minutes.  Foods to eat if blood sugar is below 70 mg/dl  1/2 glass of orange juice   1/2 regular cola can   3-4 hard candies   3-4 small glucose tabs.   Foods to eat if blood sugar is below 50 mg/dl  1 glass of orange juice  1 regular cola can  8 hard candies  8 small glucose tabs.   If you have repeated eating/blood sugar recheck process 2 times and blood sugar still below 70 mg/dl, call 911.                                                                PHYSICAL EXAMINATION FORM   Name: hTi Flores      EXAMINATION     Vitals: /70 (BP Location: LUE - Left upper extremity, Patient Position: Sitting, Cuff Size: Regular)   Pulse 70   Temp 98.6 °F (37 °C) (Temporal)   Ht 4' 9.75\" (1.467 m)   Wt 34.5 kg (76 lb)   BMI 16.02 kg/m²   BSA 1.2 m²   female  Vision: R 20/               L 20/                      Corrected   Y         N     MEDICAL NORMAL ABNORMAL FINDINGS   Appearance  · Marfan stigmata (kyphoscoliosis, high-arched palate, pectus excavatum, arachnodactyly, arm span > height, hyperlaxity, myopia, MVP, aortic insufficiency) Yes    Eyes/ears/nose/throat  · Pupils equal  · Hearing Yes    Lymph nodes Yes    Heart*  · Murmurs (auscultation standing, supine, +/- Valsalva)  · Location of point of maximal impulse (PMI) Yes    Pulses  · Simultaneous femoral and radial pulses Yes    Lungs Yes    Abdomen Yes    Genitourinary (males only)** NA    Skin  · HSV, lesions suggestive of MRSA, tinea corporis Yes    Neurologic# Yes    MUSCULOSKELETAL     Neck Yes    Back Yes asymmetry   Shoulder/arm Yes    Elbow/forearm Yes    Wrist/hand/fingers Yes    Hip/thigh Yes    Knee Yes    Leg/Ankle Yes    Foot/toes Yes    Functional  · Duck-walk, single leg hop Yes    * Consider ECG, echocardiogram, and referral to cardiology for abnormal cardiac history or exam  ** Consider  exam in private setting.  Having third party present is recommended.  # Consider cognitive evaluation or baseline neuropsychiatric testing if a history of significant concussion.    On the basic on the examination on this day, I approve this child's participation in interscholastic sports for 395 days from this date.  Yes  Examination Date: 6/23/2021    Additional Comments:                    Signature*: _______________________________________           Print Name:  Maranda Chahal MD                         * effective January 2003, the [facility] Board of Directors approved a recommendation, consistent with  the [place] [employer] Code, that allow Physician's Assistants or Advanced Nurse Probationers to sign off on physicals.             To be completed by athlete or parent prior to examination  Name: Thi Flores  School Year: ____________________   Address: ___________________________________________ City/State: ________________________________________________   Phone No: __________________________________________ Birthday: 2010    Age: 11 year old   Class: ________ Student ID No: _______   Parent's Name: ______________________________________ Phone No: ________________________________________________   Address: ___________________________________________ City/State: ________________________________________________   HISTORY FORM    Medicines and Allergies: Please list all of the prescription and over- the-counter medicines (herbal and nutritional) that your are currently taking   Do you have allergies?  __ Yes    __ No If yes, please identify specific allergy below.   __ Medicines                                              __Pollens                                              __Food                                              __Stinging Insects   Explain \"Yes\" answers below.  [place] questions you don't know the answer to.  GENERAL QUESTIONS Yes No    1  Has a doctor ever denied or restricted your participation in sports       for any reason?      2  Do you have any ongoing medical conditions? If so, please        identify below: __Asthma  __Anemia  __Diabetes  __Infections      Other: _________________________________________      3  Have you ever spent the night in the hospital?      4  Have you ever had surgery?      HEART HEALTH QUESTIONS ABOUT YOU Yes No    5  Have you ever passed out or nearly passes out DURING or        AFTER exercise?      6  Have you ever had discomfort, pain, tightness or pressure in       your chest during exercise?       7  Does your heart ever race or skip beats (irregular beats)        during exercise?      8  Has a doctor ever told you that you have any heart problems?       If so check all that apply: __High blood pressure       __Heart murmur  __High cholesterol  __ Heart infection      __Kawasaki disease  __Other_________________________      9   Has a doctor ever ordered a test for your heart? (for example        ECG/EKG, echocardiogram)      10  Do you every get lightheaded or feel more short of breath         than expected during exercise?       11  Have you ever had an unexplained seizure?      12  Do you get more tired or short of breath more quickly than         your friends during exercise?      HEART HEALTH QUESTIONS ABOUT YOUR FAMILY Yes No    13 Has any family member or relative  of heart problems or         had an unexpected or unexplained sudden death before age         50 (including drowning, unexplained car accident, or sudden        infant death syndrome)?      14 Does anyone in your family have hypertrophic        cardiomyopathy, Marfan syndrome, arrhythmogenic right        ventricular cardiomyopathy, long QT syndrome, short QT        syndrome, Brugada syndrome, or catecholaminergic        polymorphic ventricular tachycardia)?      15 Does anyone in your family have a heart problem,        pacemaker, or implanted defibrillator?      16 Has anyone in your family had unexplained fainting,        unexplained seizure, or near drowning?      BONE AND JOINT QUESTIONS Yes No    17 Have you ever had an injury to a bone, muscle, ligament or        tendon that caused you to miss a practice or a game?      18 Have you ever had any broken or fracture bones or         dislocated joints?      19 Have you ever had any injury that required x-rays, MRI, CT        scan, injections, therapy, brace, cast, or crutches?      20 Have you ever had a stress fracture?      21 Have you ever been told that you have or have had an x-ray        for neck instability or atlantoaxial instability? (Down            syndrome or dwarfism)      22 Do you regularly use a brace, orthotics, or other assistive         device?      23 Do you have a bone, muscle, or joint injury that bothers you?      24 Do any of your joints become painful, swollen, feel warm, or       look red?      25 Do you have any history of juvenile arthritis or connective       tissue disease?       MEDICAL QUESTIONS Yes No   26 Do you cough, wheeze, or have difficulty breathing during       or after exercise?     27 Have you ever used an inhaler or taken asthma medicine?     28. Is there anyone in your family who has asthma?     29 Were you born without or are you missing a kidney, an eye,         a testicle (males), your spleen, or any other organ?     30 Do you have groin pain or painful bulge or hernia in the groin area?     31 Have you had infectious mononucleosis (mono) within the last        month?     32 Do you have any rashes, pressure sores, or other skin problems?     33 Have you had a herpes or MRSA skin infection?     34 Have you ever had a head injury or concussion?     35 Have you ever had a hit or blow to the head that caused         confusion, prolonged headache, or memory problems?     36 Do you have a history of seizure disorder?     37 Do you have headaches with exercise?     38 Have you ever had numbness, tingling, or weakness in your arms        or legs after being hit or falling?     39 Have you ever been unable to move your or legs after being hit or        falling?     40 Have you ever become ill while exercising in the heat?     41 Do you get frequent muscle cramps when exercising?     42 Do you or someone in your family have sickle cell trait or disease?     43 Have you had any problems with your eyes or vision?     44 Have you had any eye injuries?     45 Do you wear glasses or contact lenses?     46 Do you wear protective eyewear, such as goggles or a face shield?     47 Do you worry about your weight?     48 Are you trying  to or has anyone recommended that you gain or         lose weight?     49 Are you on a special diet or do you avoid certain types of foods?     50 Have you ever had an eating disorder?     51 Have you or any family member or relative been diagnosed with        cancer?     52 Do you have any concerns that you would like to discuss with a        doctor?     FEMALES ONLY Yes No   53 Have you ever had a menstrual period?     54 How old were you when you had your first menstrual period?     55 How many periods have you had in the last 12 months?       Explain 'yes' answers here:  ________________________________________________________________    ________________________________________________________________    ________________________________________________________________    ________________________________________________________________    ________________________________________________________________    ________________________________________________________________    ________________________________________________________________    ________________________________________________________________     I hereby state that, to the best of my knowledge, my answers to the above questions are complete and correct.   Signature of athlete: _____________________________________________ Signature of parent/guardian: ________________________________________

## 2023-02-17 ENCOUNTER — PATIENT MESSAGE (OUTPATIENT)
Dept: DIABETES | Facility: CLINIC | Age: 63
End: 2023-02-17
Payer: MEDICARE

## 2023-02-20 DIAGNOSIS — E11.65 TYPE 2 DIABETES MELLITUS WITH HYPERGLYCEMIA, WITH LONG-TERM CURRENT USE OF INSULIN: ICD-10-CM

## 2023-02-20 DIAGNOSIS — Z79.4 TYPE 2 DIABETES MELLITUS WITH HYPERGLYCEMIA, WITH LONG-TERM CURRENT USE OF INSULIN: ICD-10-CM

## 2023-02-20 NOTE — TELEPHONE ENCOUNTER
Provider's instructions: Please send Nico sensors RX to [facility] [address] [place], [place] [address] # [phone]. Let me know if we need to print RX.     Please print.

## 2023-02-21 RX ORDER — FLASH GLUCOSE SENSOR
1 KIT MISCELLANEOUS
Qty: 2 KIT | Refills: 11 | Status: SHIPPED | OUTPATIENT
Start: 2023-02-21 | End: 2023-02-27 | Stop reason: SDUPTHER

## 2023-02-22 ENCOUNTER — TELEPHONE (OUTPATIENT)
Dept: DIABETES | Facility: CLINIC | Age: 63
End: 2023-02-22
Payer: MEDICARE

## 2023-02-27 DIAGNOSIS — Z79.4 TYPE 2 DIABETES MELLITUS WITH HYPERGLYCEMIA, WITH LONG-TERM CURRENT USE OF INSULIN: Primary | ICD-10-CM

## 2023-02-27 DIAGNOSIS — E11.65 TYPE 2 DIABETES MELLITUS WITH HYPERGLYCEMIA, WITH LONG-TERM CURRENT USE OF INSULIN: Primary | ICD-10-CM

## 2023-02-27 RX ORDER — FLASH GLUCOSE SENSOR
1 KIT MISCELLANEOUS
Qty: 2 KIT | Refills: 11 | Status: SHIPPED | OUTPATIENT
Start: 2023-02-27

## 2023-02-28 ENCOUNTER — EXTERNAL CHRONIC CARE MANAGEMENT (OUTPATIENT)
Dept: PRIMARY CARE CLINIC | Facility: CLINIC | Age: 63
End: 2023-02-28
Payer: MEDICARE

## 2023-02-28 PROCEDURE — 99490 CHRNC CARE MGMT STAFF 1ST 20: CPT | Mod: PBBFAC,PN | Performed by: INTERNAL MEDICINE

## 2023-02-28 PROCEDURE — 99490 CHRNC CARE MGMT STAFF 1ST 20: CPT | Mod: S$PBB,,, | Performed by: INTERNAL MEDICINE

## 2023-02-28 PROCEDURE — 99490 PR CHRONIC CARE MGMT, 1ST 20 MIN: ICD-10-PCS | Mod: S$PBB,,, | Performed by: INTERNAL MEDICINE

## 2023-03-08 DIAGNOSIS — F32.A ANXIETY AND DEPRESSION: Primary | ICD-10-CM

## 2023-03-08 DIAGNOSIS — F41.9 ANXIETY AND DEPRESSION: Primary | ICD-10-CM

## 2023-03-09 ENCOUNTER — DOCUMENTATION ONLY (OUTPATIENT)
Dept: PRIMARY CARE CLINIC | Facility: CLINIC | Age: 63
End: 2023-03-09
Payer: MEDICARE

## 2023-03-09 NOTE — PROGRESS NOTES
Spoke w/ patient on phone; she has an MD appointment here on 3/13/23. Scheduled LCSW appointment to follow after the MD appointment.

## 2023-03-13 ENCOUNTER — DOCUMENTATION ONLY (OUTPATIENT)
Dept: PRIMARY CARE CLINIC | Facility: CLINIC | Age: 63
End: 2023-03-13

## 2023-03-13 ENCOUNTER — CLINICAL SUPPORT (OUTPATIENT)
Dept: PRIMARY CARE CLINIC | Facility: CLINIC | Age: 63
End: 2023-03-13
Payer: MEDICARE

## 2023-03-13 ENCOUNTER — OFFICE VISIT (OUTPATIENT)
Dept: PRIMARY CARE CLINIC | Facility: CLINIC | Age: 63
End: 2023-03-13
Payer: MEDICARE

## 2023-03-13 VITALS
TEMPERATURE: 98 F | HEART RATE: 89 BPM | OXYGEN SATURATION: 100 % | HEIGHT: 66 IN | WEIGHT: 224.19 LBS | SYSTOLIC BLOOD PRESSURE: 130 MMHG | DIASTOLIC BLOOD PRESSURE: 78 MMHG | BODY MASS INDEX: 36.03 KG/M2

## 2023-03-13 DIAGNOSIS — Z79.4 TYPE 2 DIABETES MELLITUS WITH HYPERGLYCEMIA, WITH LONG-TERM CURRENT USE OF INSULIN: ICD-10-CM

## 2023-03-13 DIAGNOSIS — E78.5 HYPERLIPIDEMIA ASSOCIATED WITH TYPE 2 DIABETES MELLITUS: Primary | ICD-10-CM

## 2023-03-13 DIAGNOSIS — F41.9 ANXIETY AND DEPRESSION: Primary | ICD-10-CM

## 2023-03-13 DIAGNOSIS — F32.A ANXIETY AND DEPRESSION: Primary | ICD-10-CM

## 2023-03-13 DIAGNOSIS — N25.81 SECONDARY HYPERPARATHYROIDISM OF RENAL ORIGIN: ICD-10-CM

## 2023-03-13 DIAGNOSIS — E03.9 ACQUIRED HYPOTHYROIDISM: ICD-10-CM

## 2023-03-13 DIAGNOSIS — E11.69 HYPERLIPIDEMIA ASSOCIATED WITH TYPE 2 DIABETES MELLITUS: Primary | ICD-10-CM

## 2023-03-13 DIAGNOSIS — G47.00 INSOMNIA, UNSPECIFIED TYPE: ICD-10-CM

## 2023-03-13 DIAGNOSIS — I70.0 CALCIFICATION OF AORTA: ICD-10-CM

## 2023-03-13 DIAGNOSIS — E11.22 CKD STAGE 3 DUE TO TYPE 2 DIABETES MELLITUS: ICD-10-CM

## 2023-03-13 DIAGNOSIS — E11.65 TYPE 2 DIABETES MELLITUS WITH HYPERGLYCEMIA, WITH LONG-TERM CURRENT USE OF INSULIN: ICD-10-CM

## 2023-03-13 DIAGNOSIS — N18.30 CKD STAGE 3 DUE TO TYPE 2 DIABETES MELLITUS: ICD-10-CM

## 2023-03-13 DIAGNOSIS — E66.01 SEVERE OBESITY (BMI 35.0-39.9) WITH COMORBIDITY: ICD-10-CM

## 2023-03-13 DIAGNOSIS — Z23 NEED FOR VACCINATION: ICD-10-CM

## 2023-03-13 PROCEDURE — 99211 OFF/OP EST MAY X REQ PHY/QHP: CPT | Mod: PBBFAC,27,PN

## 2023-03-13 PROCEDURE — 99214 PR OFFICE/OUTPT VISIT, EST, LEVL IV, 30-39 MIN: ICD-10-PCS | Mod: S$PBB,,, | Performed by: FAMILY MEDICINE

## 2023-03-13 PROCEDURE — 99214 OFFICE O/P EST MOD 30 MIN: CPT | Mod: S$PBB,,, | Performed by: FAMILY MEDICINE

## 2023-03-13 PROCEDURE — 99999 PR PBB SHADOW E&M-EST. PATIENT-LVL I: ICD-10-PCS | Mod: PBBFAC,,,

## 2023-03-13 PROCEDURE — 99999 PR PBB SHADOW E&M-EST. PATIENT-LVL I: CPT | Mod: PBBFAC,,,

## 2023-03-13 PROCEDURE — 99499 NO LOS: ICD-10-PCS | Mod: S$PBB,,,

## 2023-03-13 PROCEDURE — 99999 PR PBB SHADOW E&M-EST. PATIENT-LVL V: CPT | Mod: PBBFAC,,, | Performed by: FAMILY MEDICINE

## 2023-03-13 PROCEDURE — 99999 PR PBB SHADOW E&M-EST. PATIENT-LVL V: ICD-10-PCS | Mod: PBBFAC,,, | Performed by: FAMILY MEDICINE

## 2023-03-13 PROCEDURE — 90677 PCV20 VACCINE IM: CPT | Mod: PBBFAC,PN

## 2023-03-13 PROCEDURE — 99499 UNLISTED E&M SERVICE: CPT | Mod: S$PBB,,,

## 2023-03-13 PROCEDURE — 99215 OFFICE O/P EST HI 40 MIN: CPT | Mod: PBBFAC,PN | Performed by: FAMILY MEDICINE

## 2023-03-13 RX ORDER — GABAPENTIN 100 MG/1
100-200 CAPSULE ORAL NIGHTLY PRN
Qty: 60 CAPSULE | Refills: 11 | Status: SHIPPED | OUTPATIENT
Start: 2023-03-13 | End: 2023-03-20
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
## 2023-03-13 NOTE — PROGRESS NOTES
Subjective:       Patient ID: Ana Maria Teague is a 62 y.o. female.    Chief Complaint: Follow-up (Two month follow up. No issues or concerns.)    HPI:     Updated/ annual due 6/23:  HM: 10/22 fluvax, 4/21 covid vaccines/booster, 7/19 HAV, 6/15 [id number], 3/14 [id number], 6/15 TDaP, 6/22 MMG/me, 6/21 BMD with OP on prolia rep 2y, 3/16 Cscope rep 10y, 11/10 HCV neg, [facility], 4[facility].     Ms Teague is here for 2 month f/u. She has had successful weight loss with diet change and Trulicity. Sees DM clinic for management and has had to reduce her insulin use since losing weight. Down about 20# since October 22. Was having some low BG readings and now off long-acting insulin. Taking 3-5 units before meals. Was having some lows 55-60 but has not since making insulin adjustments. Still seeing pain management with no adjustments in med regimen. On long-term opiate therapy, PRN gabapentin.     Health Maintenance Due   Topic Date Due    Complete Opioid Risk Tool  Never done    Urine Drug Screen  Never done    Naloxone Prescription  Never done    Shingles Vaccine (1 of 2) Never done    Pneumococcal Vaccines (Age 0-64) (3 - PPSV23 if available, else PCV20) 03/31/2019    COVID-19 Vaccine (4 - Booster for Pfizer series) 03/16/2022       Objective:     Vitals:    03/13/23 0914   BP: 130/78   Pulse: 89   Temp: 98.1 °F (36.7 °C)     Wt Readings from Last 3 Encounters:   03/13/23 101.7 kg (224 lb 3.2 oz)   02/15/23 103.7 kg (228 lb 9.9 oz)   10/17/22 108.9 kg (240 lb 1.3 oz)     BP Readings from Last 3 Encounters:   03/13/23 130/78   02/15/23 (!) 140/85   01/12/23 127/75        Physical Exam  Vitals and nursing note reviewed.   Constitutional:       Appearance: She is well-developed.   HENT:      Head: Normocephalic and atraumatic.   Eyes:      Pupils: Pupils are equal, round, and reactive to light.   Cardiovascular:      Rate and Rhythm: Normal rate and regular rhythm.   Pulmonary:      Effort: Pulmonary effort is  normal.      Breath sounds: Normal breath sounds.   Musculoskeletal:         General: No deformity. Normal range of motion.      Cervical back: Normal range of motion and neck supple.   Skin:     General: Skin is warm and dry.   Neurological:      Mental Status: She is alert and oriented to person, place, and time.   Psychiatric:         Behavior: Behavior normal.        Latest Reference Range & Units 07/12/22 09:17   Cholesterol 120 - 199 mg/dL 143   HDL 40 - 75 mg/dL 52   HDL/Cholesterol Ratio 20.0 - 50.0 % 36.4   LDL Cholesterol External 63.0 - 159.0 mg/dL 81.0   Non-HDL Cholesterol mg/dL 91   Total Cholesterol/HDL Ratio 2.0 - 5.0  2.8   Triglycerides 30 - 150 mg/dL 50      Latest Reference Range & Units 02/13/23 14:52   Hemoglobin A1C External 4.0 - 5.6 % 6.0 (H)   Estimated Avg Glucose 68 - 131 mg/dL 126   (H): Data is abnormally high  Assessment:       1. Hyperlipidemia associated with type 2 diabetes mellitus    2. Severe obesity (BMI 35.0-39.9) with comorbidity    3. Secondary hyperparathyroidism of renal origin    4. Calcification of aorta    5. CKD stage 3 due to type 2 diabetes mellitus    6. Acquired hypothyroidism    7. Type 2 diabetes mellitus with hyperglycemia, with long-term current use of insulin    8. Need for vaccination    9. Insomnia, unspecified type        Plan:           Problem List Items Addressed This Visit          Cardiac/Vascular    Hyperlipidemia associated with type 2 diabetes mellitus - Primary    Relevant Orders    Lipid Panel    Calcification of aorta    Overview     CT chest 10/29/15            Renal/    CKD stage 3 due to type 2 diabetes mellitus    Relevant Orders    CBC Auto Differential    Comprehensive Metabolic Panel       Endocrine    Secondary hyperparathyroidism of renal origin (Chronic)    Relevant Orders    PTH, Intact    Vitamin D    Phosphorus    Acquired hypothyroidism    Relevant Orders    TSH    T4, Free    Type 2 diabetes mellitus with hyperglycemia, with  long-term current use of insulin    Relevant Orders    Lipid Panel    Hemoglobin A1C    Ambulatory referral/consult to Ophthalmology    Severe obesity (BMI 35.0-39.9) with comorbidity     Other Visit Diagnoses       Need for vaccination        Relevant Orders    Pneumococcal Conjugate Vaccine (20 Valent) (IM)    Insomnia, unspecified type        Relevant Medications    gabapentin (NEURONTIN) 100 MG capsule          F/u with labs prior in May 23

## 2023-03-13 NOTE — PATIENT INSTRUCTIONS
Shingrix (shingles vaccine) and COVID19 booster dose #4 are both due and available at retail pharmacies.

## 2023-03-16 NOTE — PROGRESS NOTES
Health  Consult Note    Name: Ana Maria Teague  Clinic Number: [id number]  Physician: No ref. provider found  Past Medical History:   Diagnosis Date    Abnormal glucose 2013    Acquired hypothyroidism     Acute bronchiolitis 10/15/2014    Anemia     Anemia of chronic renal failure 2013    Anxiety     Anxiety disorder     Avascular necrosis of bone of hip     Back pain     s/p steroid injections    Bacteremia due to Escherichia coli 2021    Chronic immunosuppression with Prograf and Cellcept 2013    CKD (chronic kidney disease) stage 2, GFR 60-89 ml/min     CKD (chronic kidney disease) stage 5, GFR less than 15 ml/min     -donor kidney transplant - 13    Depression     Hypertension, renal 2013    Hypothyroidism     Immunosuppressed status 10/15/2014    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     Strengths:  motivated     Challenges:  under stress due to health concerns in family.     Support:  family, friends      INITIAL date 3/21/23  One a scale of 1-10, with 10 being 100% happy, how would you rate your happiness in each of the wellness areas below?    Happiness:         1     2     3     4     5     6     7     8     9     10    Initial Date: DC Date: +/- Total Change   Exercise/Movement 7     Physical Health 8     Stress Level 7     Nutrition x     Sleep x     Play x     Body Image x     Relationships x     Energy/Vitality x       Assessment:   Patient will complete assessment on her next HC visit.     Plan:  Patient goals for next consult include meeting with HC to finish assessment then go over some exercises afterwards.     Health : Claudia Bajwa,  MA  3/16/2023

## 2023-03-17 ENCOUNTER — TELEPHONE (OUTPATIENT)
Dept: PAIN MEDICINE | Facility: CLINIC | Age: 63
End: 2023-03-17
Payer: MEDICARE

## 2023-03-20 ENCOUNTER — OFFICE VISIT (OUTPATIENT)
Dept: PAIN MEDICINE | Facility: CLINIC | Age: 63
End: 2023-03-20
Payer: MEDICARE

## 2023-03-20 VITALS
BODY MASS INDEX: 36.35 KG/M2 | DIASTOLIC BLOOD PRESSURE: 78 MMHG | HEIGHT: 66 IN | SYSTOLIC BLOOD PRESSURE: 141 MMHG | HEART RATE: 78 BPM | WEIGHT: 226.19 LBS

## 2023-03-20 DIAGNOSIS — R07.81 RIB PAIN ON RIGHT SIDE: ICD-10-CM

## 2023-03-20 DIAGNOSIS — M51.36 DDD (DEGENERATIVE DISC DISEASE), LUMBAR: ICD-10-CM

## 2023-03-20 DIAGNOSIS — M54.16 BILATERAL LUMBAR RADICULOPATHY: ICD-10-CM

## 2023-03-20 DIAGNOSIS — G47.01 INSOMNIA SECONDARY TO CHRONIC PAIN: ICD-10-CM

## 2023-03-20 DIAGNOSIS — M79.18 MUSCLE PAIN, MYOFASCIAL: ICD-10-CM

## 2023-03-20 DIAGNOSIS — M25.561 CHRONIC PAIN OF RIGHT KNEE: Primary | ICD-10-CM

## 2023-03-20 DIAGNOSIS — Z79.891 OPIOID USE AGREEMENT EXISTS: ICD-10-CM

## 2023-03-20 DIAGNOSIS — G89.29 INSOMNIA SECONDARY TO CHRONIC PAIN: ICD-10-CM

## 2023-03-20 DIAGNOSIS — G89.29 CHRONIC PAIN OF RIGHT KNEE: Primary | ICD-10-CM

## 2023-03-20 PROCEDURE — 99999 PR PBB SHADOW E&M-EST. PATIENT-LVL IV: ICD-10-PCS | Mod: PBBFAC,,, | Performed by: PHYSICIAN ASSISTANT

## 2023-03-20 PROCEDURE — 99214 OFFICE O/P EST MOD 30 MIN: CPT | Mod: PBBFAC | Performed by: PHYSICIAN ASSISTANT

## 2023-03-20 PROCEDURE — 99999 PR PBB SHADOW E&M-EST. PATIENT-LVL IV: CPT | Mod: PBBFAC,,, | Performed by: PHYSICIAN ASSISTANT

## 2023-03-20 PROCEDURE — 99214 PR OFFICE/OUTPT VISIT, EST, LEVL IV, 30-39 MIN: ICD-10-PCS | Mod: S$PBB,,, | Performed by: PHYSICIAN ASSISTANT

## 2023-03-20 PROCEDURE — 99214 OFFICE O/P EST MOD 30 MIN: CPT | Mod: S$PBB,,, | Performed by: PHYSICIAN ASSISTANT

## 2023-03-20 RX ORDER — TIZANIDINE 4 MG/1
4 TABLET ORAL 2 TIMES DAILY PRN
Qty: 60 TABLET | Refills: 1 | Status: SHIPPED | OUTPATIENT
Start: 2023-03-20 | End: 2023-05-15 | Stop reason: SDUPTHER

## 2023-03-20 RX ORDER — OXYCODONE AND ACETAMINOPHEN 10; 325 MG/1; MG/1
1 TABLET ORAL EVERY 8 HOURS PRN
Qty: 90 TABLET | Refills: 0 | Status: SHIPPED | OUTPATIENT
Start: 2023-03-29 | End: 2023-05-15 | Stop reason: SDUPTHER

## 2023-03-20 RX ORDER — AMITRIPTYLINE HYDROCHLORIDE 10 MG/1
10 TABLET, FILM COATED ORAL NIGHTLY PRN
Qty: 30 TABLET | Refills: 1 | Status: SHIPPED | OUTPATIENT
Start: 2023-03-20 | End: 2024-02-22

## 2023-03-20 RX ORDER — LIDOCAINE 50 MG/G
PATCH TOPICAL
Qty: 90 PATCH | Refills: 0 | Status: SHIPPED | OUTPATIENT
Start: 2023-03-20 | End: 2023-05-15 | Stop reason: SDUPTHER

## 2023-03-20 RX ORDER — GABAPENTIN 400 MG/1
CAPSULE ORAL
Qty: 120 CAPSULE | Refills: 1 | Status: SHIPPED | OUTPATIENT
Start: 2023-03-20 | End: 2023-05-15 | Stop reason: SDUPTHER

## 2023-03-20 RX ORDER — OXYCODONE AND ACETAMINOPHEN 10; 325 MG/1; MG/1
1 TABLET ORAL EVERY 8 HOURS PRN
Qty: 90 TABLET | Refills: 0 | Status: SHIPPED | OUTPATIENT
Start: 2023-04-28 | End: 2023-05-15 | Stop reason: SDUPTHER
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
## 2023-03-21 ENCOUNTER — DOCUMENTATION ONLY (OUTPATIENT)
Dept: REHABILITATION | Facility: HOSPITAL | Age: 63
End: 2023-03-21
Payer: MEDICARE

## 2023-03-27 DIAGNOSIS — R11.0 NAUSEA: ICD-10-CM

## 2023-03-28 RX ORDER — ONDANSETRON 4 MG/1
TABLET, ORALLY DISINTEGRATING ORAL
Qty: 30 TABLET | Refills: 1 | Status: SHIPPED | OUTPATIENT
Start: 2023-03-28 | End: 2023-05-18

## 2023-03-31 ENCOUNTER — EXTERNAL CHRONIC CARE MANAGEMENT (OUTPATIENT)
Dept: PRIMARY CARE CLINIC | Facility: CLINIC | Age: 63
End: 2023-03-31
Payer: MEDICARE

## 2023-03-31 PROCEDURE — 99490 CHRNC CARE MGMT STAFF 1ST 20: CPT | Mod: S$PBB,,, | Performed by: INTERNAL MEDICINE

## 2023-03-31 PROCEDURE — 99490 PR CHRONIC CARE MGMT, 1ST 20 MIN: ICD-10-PCS | Mod: S$PBB,,, | Performed by: INTERNAL MEDICINE

## 2023-03-31 PROCEDURE — 99490 CHRNC CARE MGMT STAFF 1ST 20: CPT | Mod: PBBFAC,PN | Performed by: INTERNAL MEDICINE

## 2023-04-14 ENCOUNTER — PATIENT MESSAGE (OUTPATIENT)
Dept: PAIN MEDICINE | Facility: CLINIC | Age: 63
End: 2023-04-14
Payer: MEDICARE

## 2023-04-27 ENCOUNTER — PATIENT MESSAGE (OUTPATIENT)
Dept: ADMINISTRATIVE | Facility: HOSPITAL | Age: 63
End: 2023-04-27
Payer: MEDICARE

## 2023-04-30 ENCOUNTER — EXTERNAL CHRONIC CARE MANAGEMENT (OUTPATIENT)
Dept: PRIMARY CARE CLINIC | Facility: CLINIC | Age: 63
End: 2023-04-30
Payer: MEDICARE

## 2023-04-30 PROCEDURE — 99490 CHRNC CARE MGMT STAFF 1ST 20: CPT | Mod: PBBFAC,PN | Performed by: INTERNAL MEDICINE

## 2023-04-30 PROCEDURE — 99490 CHRNC CARE MGMT STAFF 1ST 20: CPT | Mod: S$PBB,,, | Performed by: INTERNAL MEDICINE

## 2023-04-30 PROCEDURE — 99490 PR CHRONIC CARE MGMT, 1ST 20 MIN: ICD-10-PCS | Mod: S$PBB,,, | Performed by: INTERNAL MEDICINE

## 2023-05-15 ENCOUNTER — OFFICE VISIT (OUTPATIENT)
Dept: PAIN MEDICINE | Facility: CLINIC | Age: 63
End: 2023-05-15
Payer: MEDICARE

## 2023-05-15 ENCOUNTER — PATIENT MESSAGE (OUTPATIENT)
Dept: PAIN MEDICINE | Facility: CLINIC | Age: 63
End: 2023-05-15

## 2023-05-15 VITALS — RESPIRATION RATE: 17 BRPM

## 2023-05-15 DIAGNOSIS — M25.561 CHRONIC PAIN OF RIGHT KNEE: Primary | ICD-10-CM

## 2023-05-15 DIAGNOSIS — M79.2 NEUROPATHIC PAIN: ICD-10-CM

## 2023-05-15 DIAGNOSIS — M51.36 DDD (DEGENERATIVE DISC DISEASE), LUMBAR: ICD-10-CM

## 2023-05-15 DIAGNOSIS — G89.29 CHRONIC PAIN OF RIGHT KNEE: Primary | ICD-10-CM

## 2023-05-15 DIAGNOSIS — M54.16 BILATERAL LUMBAR RADICULOPATHY: ICD-10-CM

## 2023-05-15 DIAGNOSIS — M79.18 MUSCLE PAIN, MYOFASCIAL: ICD-10-CM

## 2023-05-15 DIAGNOSIS — Z79.891 OPIOID USE AGREEMENT EXISTS: ICD-10-CM

## 2023-05-15 PROCEDURE — 99214 PR OFFICE/OUTPT VISIT, EST, LEVL IV, 30-39 MIN: ICD-10-PCS | Mod: 95,,, | Performed by: PHYSICIAN ASSISTANT

## 2023-05-15 PROCEDURE — 99214 OFFICE O/P EST MOD 30 MIN: CPT | Mod: 95,,, | Performed by: PHYSICIAN ASSISTANT

## 2023-05-15 RX ORDER — LIDOCAINE 50 MG/G
PATCH TOPICAL
Qty: 90 PATCH | Refills: 0 | Status: SHIPPED | OUTPATIENT
Start: 2023-05-15 | End: 2023-07-06 | Stop reason: SDUPTHER

## 2023-05-15 RX ORDER — TIZANIDINE 4 MG/1
4 TABLET ORAL 2 TIMES DAILY PRN
Qty: 60 TABLET | Refills: 1 | Status: SHIPPED | OUTPATIENT
Start: 2023-05-15 | End: 2023-07-06 | Stop reason: SDUPTHER

## 2023-05-15 RX ORDER — OXYCODONE AND ACETAMINOPHEN 10; 325 MG/1; MG/1
1 TABLET ORAL EVERY 8 HOURS PRN
Qty: 90 TABLET | Refills: 0 | Status: SHIPPED | OUTPATIENT
Start: 2023-06-28 | End: 2023-06-29 | Stop reason: SDUPTHER

## 2023-05-15 RX ORDER — OXYCODONE AND ACETAMINOPHEN 10; 325 MG/1; MG/1
1 TABLET ORAL EVERY 8 HOURS PRN
Qty: 90 TABLET | Refills: 0 | Status: SHIPPED | OUTPATIENT
Start: 2023-05-29 | End: 2023-07-06 | Stop reason: SDUPTHER

## 2023-05-15 RX ORDER — GABAPENTIN 400 MG/1
CAPSULE ORAL
Qty: 120 CAPSULE | Refills: 1 | Status: SHIPPED | OUTPATIENT
Start: 2023-05-15 | End: 2024-02-22 | Stop reason: SDUPTHER
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
## 2023-05-17 DIAGNOSIS — R11.0 NAUSEA: ICD-10-CM

## 2023-05-18 RX ORDER — ONDANSETRON 4 MG/1
TABLET, ORALLY DISINTEGRATING ORAL
Qty: 30 TABLET | Refills: 1 | Status: SHIPPED | OUTPATIENT
Start: 2023-05-18 | End: 2024-02-06

## 2023-05-19 LAB
LEFT EYE DM RETINOPATHY: NEGATIVE
RIGHT EYE DM RETINOPATHY: NEGATIVE

## 2023-05-24 ENCOUNTER — TELEPHONE (OUTPATIENT)
Dept: PRIMARY CARE CLINIC | Facility: CLINIC | Age: 63
End: 2023-05-24
Payer: MEDICARE

## 2023-06-06 ENCOUNTER — LAB VISIT (OUTPATIENT)
Dept: LAB | Facility: HOSPITAL | Age: 63
End: 2023-06-06
Attending: FAMILY MEDICINE
Payer: MEDICARE

## 2023-06-06 DIAGNOSIS — N25.81 SECONDARY HYPERPARATHYROIDISM OF RENAL ORIGIN: ICD-10-CM

## 2023-06-06 DIAGNOSIS — E11.69 HYPERLIPIDEMIA ASSOCIATED WITH TYPE 2 DIABETES MELLITUS: ICD-10-CM

## 2023-06-06 DIAGNOSIS — E11.65 TYPE 2 DIABETES MELLITUS WITH HYPERGLYCEMIA, WITH LONG-TERM CURRENT USE OF INSULIN: ICD-10-CM

## 2023-06-06 DIAGNOSIS — N18.30 CKD STAGE 3 DUE TO TYPE 2 DIABETES MELLITUS: ICD-10-CM

## 2023-06-06 DIAGNOSIS — Z79.4 TYPE 2 DIABETES MELLITUS WITH HYPERGLYCEMIA, WITH LONG-TERM CURRENT USE OF INSULIN: ICD-10-CM

## 2023-06-06 DIAGNOSIS — E78.5 HYPERLIPIDEMIA ASSOCIATED WITH TYPE 2 DIABETES MELLITUS: ICD-10-CM

## 2023-06-06 DIAGNOSIS — E11.22 CKD STAGE 3 DUE TO TYPE 2 DIABETES MELLITUS: ICD-10-CM

## 2023-06-06 DIAGNOSIS — E03.9 ACQUIRED HYPOTHYROIDISM: ICD-10-CM

## 2023-06-06 LAB
25(OH)D3+25(OH)D2 SERPL-MCNC: 33 NG/ML (ref 30–96)
BASOPHILS # BLD AUTO: 0.06 K/UL (ref 0–0.2)
BASOPHILS NFR BLD: 0.8 % (ref 0–1.9)
DIFFERENTIAL METHOD: ABNORMAL
EOSINOPHIL # BLD AUTO: 0.3 K/UL (ref 0–0.5)
EOSINOPHIL NFR BLD: 4.2 % (ref 0–8)
ERYTHROCYTE [DISTWIDTH] IN BLOOD BY AUTOMATED COUNT: 15 % (ref 11.5–14.5)
ESTIMATED AVG GLUCOSE: 131 MG/DL (ref 68–131)
HBA1C MFR BLD: 6.2 % (ref 4–5.6)
HCT VFR BLD AUTO: 36.6 % (ref 37–48.5)
HGB BLD-MCNC: 11.2 G/DL (ref 12–16)
IMM GRANULOCYTES # BLD AUTO: 0.06 K/UL (ref 0–0.04)
IMM GRANULOCYTES NFR BLD AUTO: 0.8 % (ref 0–0.5)
LYMPHOCYTES # BLD AUTO: 2.6 K/UL (ref 1–4.8)
LYMPHOCYTES NFR BLD: 36.4 % (ref 18–48)
MCH RBC QN AUTO: 25.9 PG (ref 27–31)
MCHC RBC AUTO-ENTMCNC: 30.6 G/DL (ref 32–36)
MCV RBC AUTO: 85 FL (ref 82–98)
MONOCYTES # BLD AUTO: 0.4 K/UL (ref 0.3–1)
MONOCYTES NFR BLD: 5.7 % (ref 4–15)
NEUTROPHILS # BLD AUTO: 3.7 K/UL (ref 1.8–7.7)
NEUTROPHILS NFR BLD: 52.1 % (ref 38–73)
NRBC BLD-RTO: 0 /100 WBC
PLATELET # BLD AUTO: 238 K/UL (ref 150–450)
PMV BLD AUTO: 11.8 FL (ref 9.2–12.9)
RBC # BLD AUTO: 4.33 M/UL (ref 4–5.4)
WBC # BLD AUTO: 7.14 K/UL (ref 3.9–12.7)

## 2023-06-06 PROCEDURE — 84443 ASSAY THYROID STIM HORMONE: CPT | Performed by: FAMILY MEDICINE

## 2023-06-06 PROCEDURE — 84439 ASSAY OF FREE THYROXINE: CPT | Performed by: FAMILY MEDICINE

## 2023-06-06 PROCEDURE — 83970 ASSAY OF PARATHORMONE: CPT | Performed by: FAMILY MEDICINE

## 2023-06-06 PROCEDURE — 36415 COLL VENOUS BLD VENIPUNCTURE: CPT | Mod: PO | Performed by: FAMILY MEDICINE

## 2023-06-06 PROCEDURE — 84100 ASSAY OF PHOSPHORUS: CPT | Performed by: FAMILY MEDICINE

## 2023-06-06 PROCEDURE — 83036 HEMOGLOBIN GLYCOSYLATED A1C: CPT | Performed by: FAMILY MEDICINE

## 2023-06-06 PROCEDURE — 80061 LIPID PANEL: CPT | Performed by: FAMILY MEDICINE

## 2023-06-06 PROCEDURE — 85025 COMPLETE CBC W/AUTO DIFF WBC: CPT | Performed by: FAMILY MEDICINE

## 2023-06-06 PROCEDURE — 80053 COMPREHEN METABOLIC PANEL: CPT | Performed by: FAMILY MEDICINE

## 2023-06-06 PROCEDURE — 82306 VITAMIN D 25 HYDROXY: CPT | Performed by: FAMILY MEDICINE

## 2023-06-07 LAB
ALBUMIN SERPL BCP-MCNC: 3.4 G/DL (ref 3.5–5.2)
ALP SERPL-CCNC: 146 U/L (ref 55–135)
ALT SERPL W/O P-5'-P-CCNC: 8 U/L (ref 10–44)
ANION GAP SERPL CALC-SCNC: 8 MMOL/L (ref 8–16)
AST SERPL-CCNC: 14 U/L (ref 10–40)
BILIRUB SERPL-MCNC: 0.3 MG/DL (ref 0.1–1)
BUN SERPL-MCNC: 19 MG/DL (ref 8–23)
CALCIUM SERPL-MCNC: 9.5 MG/DL (ref 8.7–10.5)
CHLORIDE SERPL-SCNC: 105 MMOL/L (ref 95–110)
CHOLEST SERPL-MCNC: 178 MG/DL (ref 120–199)
CHOLEST/HDLC SERPL: 3.6 {RATIO} (ref 2–5)
CO2 SERPL-SCNC: 26 MMOL/L (ref 23–29)
CREAT SERPL-MCNC: 1.1 MG/DL (ref 0.5–1.4)
EST. GFR  (NO RACE VARIABLE): 56.5 ML/MIN/1.73 M^2
GLUCOSE SERPL-MCNC: 99 MG/DL (ref 70–110)
HDLC SERPL-MCNC: 50 MG/DL (ref 40–75)
HDLC SERPL: 28.1 % (ref 20–50)
LDLC SERPL CALC-MCNC: 119.8 MG/DL (ref 63–159)
NONHDLC SERPL-MCNC: 128 MG/DL
PHOSPHATE SERPL-MCNC: 3.5 MG/DL (ref 2.7–4.5)
POTASSIUM SERPL-SCNC: 4 MMOL/L (ref 3.5–5.1)
PROT SERPL-MCNC: 6.8 G/DL (ref 6–8.4)
PTH-INTACT SERPL-MCNC: 83.5 PG/ML (ref 9–77)
SODIUM SERPL-SCNC: 139 MMOL/L (ref 136–145)
T4 FREE SERPL-MCNC: 1.02 NG/DL (ref 0.71–1.51)
TRIGL SERPL-MCNC: 41 MG/DL (ref 30–150)
TSH SERPL DL<=0.005 MIU/L-ACNC: 1.09 UIU/ML (ref 0.4–4)

## 2023-06-13 ENCOUNTER — OFFICE VISIT (OUTPATIENT)
Dept: PRIMARY CARE CLINIC | Facility: CLINIC | Age: 63
End: 2023-06-13
Payer: MEDICARE

## 2023-06-13 ENCOUNTER — TELEPHONE (OUTPATIENT)
Dept: PRIMARY CARE CLINIC | Facility: CLINIC | Age: 63
End: 2023-06-13

## 2023-06-13 VITALS — SYSTOLIC BLOOD PRESSURE: 158 MMHG | DIASTOLIC BLOOD PRESSURE: 92 MMHG

## 2023-06-13 DIAGNOSIS — Z12.31 ENCOUNTER FOR SCREENING MAMMOGRAM FOR MALIGNANT NEOPLASM OF BREAST: Primary | ICD-10-CM

## 2023-06-13 DIAGNOSIS — Z79.4 TYPE 2 DIABETES MELLITUS WITH HYPERGLYCEMIA, WITH LONG-TERM CURRENT USE OF INSULIN: ICD-10-CM

## 2023-06-13 DIAGNOSIS — I12.9 HYPERTENSION, RENAL: Chronic | ICD-10-CM

## 2023-06-13 DIAGNOSIS — E11.69 HYPERLIPIDEMIA ASSOCIATED WITH TYPE 2 DIABETES MELLITUS: ICD-10-CM

## 2023-06-13 DIAGNOSIS — E11.65 TYPE 2 DIABETES MELLITUS WITH HYPERGLYCEMIA, WITH LONG-TERM CURRENT USE OF INSULIN: ICD-10-CM

## 2023-06-13 DIAGNOSIS — I10 HTN (HYPERTENSION), BENIGN: ICD-10-CM

## 2023-06-13 DIAGNOSIS — M81.0 AGE-RELATED OSTEOPOROSIS WITHOUT CURRENT PATHOLOGICAL FRACTURE: ICD-10-CM

## 2023-06-13 DIAGNOSIS — I89.0 LYMPHEDEMA OF BOTH ARMS: ICD-10-CM

## 2023-06-13 DIAGNOSIS — E78.5 HYPERLIPIDEMIA ASSOCIATED WITH TYPE 2 DIABETES MELLITUS: ICD-10-CM

## 2023-06-13 PROCEDURE — 99214 PR OFFICE/OUTPT VISIT, EST, LEVL IV, 30-39 MIN: ICD-10-PCS | Mod: 95,,, | Performed by: FAMILY MEDICINE

## 2023-06-13 PROCEDURE — 99214 OFFICE O/P EST MOD 30 MIN: CPT | Mod: 95,,, | Performed by: FAMILY MEDICINE

## 2023-06-13 RX ORDER — NIFEDIPINE 30 MG/1
30 TABLET, EXTENDED RELEASE ORAL 2 TIMES DAILY
Qty: 180 TABLET | Refills: 3 | Status: SHIPPED | OUTPATIENT
Start: 2023-06-13 | End: 2024-06-12

## 2023-06-13 NOTE — ASSESSMENT & PLAN NOTE
LDL not at goal, but patient admits to poor compliance with statin, though denies any bothersome side effects

## 2023-06-13 NOTE — PROGRESS NOTES
"Subjective:       Patient ID: Ana Maria Teague is a 62 y.o. female.    Chief Complaint: Follow-up (Three month follow up. Pt is complaining of back pain, right knee and right shoulder pain. )    HPI:   The patient location is: [place]  The chief complaint leading to consultation is: f/u med problems    Visit type: audiovisual    Face to Face time with patient: 25  40 minutes of total time spent on the encounter, which includes face to face time and non-face to face time preparing to see the patient (eg, review of tests), Obtaining and/or reviewing separately obtained history, Documenting clinical information in the electronic or other health record, Independently interpreting results (not separately reported) and communicating results to the patient/family/caregiver, or Care coordination (not separately reported).         Each patient to whom he or she provides medical services by telemedicine is:  (1) informed of the relationship between the physician and patient and the respective role of any other health care provider with respect to management of the patient; and (2) notified that he or she may decline to receive medical services by telemedicine and may withdraw from such care at any time.    Notes:     Virtual visit done for 3 month f/u medical problems. Weight averaging 242; standstill on weight loss (about 20# total on Trulicity). Taking insulin 3-7 units prior to meals and Trulicity. Surgery on left arm to remove prior dialysis grafts 6/28/23 with Dr. Paz. Wants to meet with health  again after the surgery to start an exercise program. Concerned about chronic upper extremity edema which she believes is lymphedema. Still seeing pain management with no adjustments in med regimen. On long-term opiate therapy, PRN gabapentin. Has not been taking her cholesterol medication and LDL has increased. Denies SE from statin, just "thought I was doing well and didn't need it." Also takes vitamin D intermittently. " Reports good compliance with antihypertensive (nifedipine in setting of renal transplant).     Updated/ annual due 6/23:  HM: 10/22 fluvax, 4/21 covid vaccines/booster, 7/19 HAV, 6/15 [id number], 3/14 oiulsd79, 6/15 TDaP, 6/22 MMG/me (ordered for 7/23), 6/21 BMD with OP on prolia rep 2y, 3/16 Cscope rep 10y, 11/10 HCV neg, Pain Dr. Anaya, 4/23 [facility].     Objective:     Vitals:    06/13/23 0856   BP: (!) 158/92        Physical Exam  Constitutional:       Appearance: She is obese. She is not ill-appearing.   Skin:     Coloration: Skin is not pale.   Neurological:      General: No focal deficit present.      Mental Status: She is alert and oriented to person, place, and time.   Psychiatric:         Mood and Affect: Mood normal.         Behavior: Behavior normal.         Thought Content: Thought content normal.         Albumin   Date Value Ref Range Status   06/06/2023 3.4 (L) 3.5 - 5.2 g/dL Final     eGFR   Date Value Ref Range Status   06/06/2023 56.5 (A) >60 mL/min/1.73 m^2 Final       Assessment:       1. Encounter for screening mammogram for malignant neoplasm of breast    2. Age-related osteoporosis without current pathological fracture    3. Type 2 diabetes mellitus with hyperglycemia, with long-term current use of insulin    4. HTN (hypertension), benign    5. Lymphedema of both arms    6. Hyperlipidemia associated with type 2 diabetes mellitus    7. Hypertension, renal        Plan:           Problem List Items Addressed This Visit          Cardiac/Vascular    Hypertension, renal (Chronic)    Current Assessment & Plan     Last renal visit 12/22 with f/u upcoming  Increase nifedipine to 60 mg daily; BP currently uncontrolled. Will follow closely         Hyperlipidemia associated with type 2 diabetes mellitus    Current Assessment & Plan     LDL not at goal, but patient admits to poor compliance with statin, though denies any bothersome side effects            Endocrine    Type 2 diabetes mellitus  with hyperglycemia, with long-term current use of insulin    Relevant Orders    Microalbumin/Creatinine Ratio, Urine    Ambulatory referral/consult to Podiatry       Orthopedic    Age-related osteoporosis without current pathological fracture    Relevant Orders    DXA Bone Density Axial Skeleton 1 or more sites    Ambulatory referral/consult to Rheumatology     Other Visit Diagnoses       Encounter for screening mammogram for malignant neoplasm of breast    -  Primary    Relevant Orders    Mammo Digital Screening Bilat w/ Neptali    HTN (hypertension), benign        Relevant Medications    NIFEdipine (PROCARDIA-XL) 30 MG (OSM) 24 hr tablet    Lymphedema of both arms        Relevant Orders    Ambulatory referral/consult to Physical/Occupational Therapy          Former patient of Dr. TEJEDA in rheumatology; will get back in following dexa  Can schedule DEXA and MMG same day  Request eye exam from [facility] to update in health maintenance (sees Franklyn Anaya MD)  Tack on microalbumin urine to next lab draw.   Will schedule 6 mo f/u with Dr. Pugh (renal)   RN to check on her in 2-3 days for home BP reading on increased dose nifedipine.

## 2023-06-13 NOTE — ASSESSMENT & PLAN NOTE
Last renal visit 12/22 with f/u upcoming  Increase nifedipine to 60 mg daily; BP currently uncontrolled. Will follow closely

## 2023-06-13 NOTE — PATIENT INSTRUCTIONS
If you are feeling unwell, we'd like to be the first ones to know here at [facility]! Please give us a call. Same day appointments are our top priority to keep you well and out of the emergency rooms and hospitals. Call [phone] for our direct line. After hours advice is always available. Please call [phone] after hours to speak to the on-call team.       Increase nifedipine to 2 tablets daily (or 1 twice daily if you prefer). We'll call to check on you.    Be sure to take your atorvastatin and vitamin D daily.

## 2023-06-15 ENCOUNTER — PATIENT MESSAGE (OUTPATIENT)
Dept: PRIMARY CARE CLINIC | Facility: CLINIC | Age: 63
End: 2023-06-15
Payer: MEDICARE

## 2023-06-15 ENCOUNTER — TELEPHONE (OUTPATIENT)
Dept: PRIMARY CARE CLINIC | Facility: CLINIC | Age: 63
End: 2023-06-15
Payer: MEDICARE

## 2023-06-16 ENCOUNTER — PATIENT MESSAGE (OUTPATIENT)
Dept: PRIMARY CARE CLINIC | Facility: CLINIC | Age: 63
End: 2023-06-16
Payer: MEDICARE

## 2023-06-20 ENCOUNTER — DOCUMENTATION ONLY (OUTPATIENT)
Dept: PRIMARY CARE CLINIC | Facility: CLINIC | Age: 63
End: 2023-06-20
Payer: MEDICARE

## 2023-06-29 ENCOUNTER — PATIENT MESSAGE (OUTPATIENT)
Dept: PAIN MEDICINE | Facility: CLINIC | Age: 63
End: 2023-06-29
Payer: MEDICARE

## 2023-06-29 DIAGNOSIS — M51.36 DDD (DEGENERATIVE DISC DISEASE), LUMBAR: ICD-10-CM

## 2023-06-30 RX ORDER — OXYCODONE AND ACETAMINOPHEN 10; 325 MG/1; MG/1
1 TABLET ORAL EVERY 8 HOURS PRN
Qty: 90 TABLET | Refills: 0 | Status: SHIPPED | OUTPATIENT
Start: 2023-06-30 | End: 2023-09-08 | Stop reason: SDUPTHER

## 2023-07-06 ENCOUNTER — OFFICE VISIT (OUTPATIENT)
Dept: PAIN MEDICINE | Facility: CLINIC | Age: 63
End: 2023-07-06
Payer: MEDICARE

## 2023-07-06 ENCOUNTER — PATIENT MESSAGE (OUTPATIENT)
Dept: PAIN MEDICINE | Facility: CLINIC | Age: 63
End: 2023-07-06

## 2023-07-06 VITALS — RESPIRATION RATE: 17 BRPM

## 2023-07-06 DIAGNOSIS — Z79.891 OPIOID USE AGREEMENT EXISTS: ICD-10-CM

## 2023-07-06 DIAGNOSIS — M79.18 MUSCLE PAIN, MYOFASCIAL: Primary | ICD-10-CM

## 2023-07-06 DIAGNOSIS — M51.36 DDD (DEGENERATIVE DISC DISEASE), LUMBAR: ICD-10-CM

## 2023-07-06 DIAGNOSIS — M54.16 BILATERAL LUMBAR RADICULOPATHY: ICD-10-CM

## 2023-07-06 DIAGNOSIS — M79.2 NEUROPATHIC PAIN: ICD-10-CM

## 2023-07-06 PROCEDURE — 99214 PR OFFICE/OUTPT VISIT, EST, LEVL IV, 30-39 MIN: ICD-10-PCS | Mod: 95,,, | Performed by: PHYSICIAN ASSISTANT

## 2023-07-06 PROCEDURE — 99214 OFFICE O/P EST MOD 30 MIN: CPT | Mod: 95,,, | Performed by: PHYSICIAN ASSISTANT

## 2023-07-06 RX ORDER — TIZANIDINE 4 MG/1
4 TABLET ORAL 2 TIMES DAILY PRN
Qty: 60 TABLET | Refills: 1 | Status: SHIPPED | OUTPATIENT
Start: 2023-07-06 | End: 2024-02-22 | Stop reason: SDUPTHER

## 2023-07-06 RX ORDER — OXYCODONE AND ACETAMINOPHEN 10; 325 MG/1; MG/1
1 TABLET ORAL EVERY 8 HOURS PRN
Qty: 90 TABLET | Refills: 0 | Status: SHIPPED | OUTPATIENT
Start: 2023-07-28 | End: 2023-09-08 | Stop reason: SDUPTHER

## 2023-07-06 RX ORDER — OXYCODONE AND ACETAMINOPHEN 10; 325 MG/1; MG/1
1 TABLET ORAL EVERY 8 HOURS PRN
Qty: 90 TABLET | Refills: 0 | Status: SHIPPED | OUTPATIENT
Start: 2023-08-29 | End: 2023-09-08 | Stop reason: SDUPTHER

## 2023-07-06 RX ORDER — LIDOCAINE 50 MG/G
PATCH TOPICAL
Qty: 90 PATCH | Refills: 0 | Status: SHIPPED | OUTPATIENT
Start: 2023-07-06 | End: 2023-09-08 | Stop reason: SDUPTHER

## 2023-07-06 NOTE — PATIENT INSTRUCTIONS
You can  your pain medications on 7/30/23 (will allow to fill on 7/28/23 since pharmacy closed on weekend date) and 8/29/23.
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
## 2023-07-12 ENCOUNTER — PATIENT MESSAGE (OUTPATIENT)
Dept: PODIATRY | Facility: CLINIC | Age: 63
End: 2023-07-12
Payer: MEDICARE

## 2023-07-26 ENCOUNTER — OFFICE VISIT (OUTPATIENT)
Dept: PODIATRY | Facility: CLINIC | Age: 63
End: 2023-07-26
Payer: MEDICARE

## 2023-07-26 ENCOUNTER — HOSPITAL ENCOUNTER (OUTPATIENT)
Dept: RADIOLOGY | Facility: HOSPITAL | Age: 63
Discharge: HOME | End: 2023-07-26
Attending: PODIATRIST
Payer: MEDICARE

## 2023-07-26 DIAGNOSIS — M76.61 ACHILLES TENDONITIS, BILATERAL: ICD-10-CM

## 2023-07-26 DIAGNOSIS — G89.29 CHRONIC PAIN OF RIGHT KNEE: Primary | ICD-10-CM

## 2023-07-26 DIAGNOSIS — G89.29 CHRONIC PAIN OF RIGHT KNEE: ICD-10-CM

## 2023-07-26 DIAGNOSIS — M76.62 ACHILLES TENDONITIS, BILATERAL: ICD-10-CM

## 2023-07-26 DIAGNOSIS — E11.65 TYPE 2 DIABETES MELLITUS WITH HYPERGLYCEMIA, WITH LONG-TERM CURRENT USE OF INSULIN: ICD-10-CM

## 2023-07-26 DIAGNOSIS — M25.561 CHRONIC PAIN OF RIGHT KNEE: Primary | ICD-10-CM

## 2023-07-26 DIAGNOSIS — M25.561 CHRONIC PAIN OF RIGHT KNEE: ICD-10-CM

## 2023-07-26 DIAGNOSIS — Z79.4 TYPE 2 DIABETES MELLITUS WITH HYPERGLYCEMIA, WITH LONG-TERM CURRENT USE OF INSULIN: ICD-10-CM

## 2023-07-26 DIAGNOSIS — E66.01 SEVERE OBESITY (BMI 35.0-39.9) WITH COMORBIDITY: ICD-10-CM

## 2023-07-26 DIAGNOSIS — E11.9 COMPREHENSIVE DIABETIC FOOT EXAMINATION, TYPE 2 DM, ENCOUNTER FOR: ICD-10-CM

## 2023-07-26 PROCEDURE — 99999 PR PBB SHADOW E&M-EST. PATIENT-LVL III: CPT | Mod: PBBFAC,,, | Performed by: PODIATRIST

## 2023-07-26 PROCEDURE — 73630 X-RAY EXAM OF FOOT: CPT | Mod: TC,50

## 2023-07-26 PROCEDURE — 99999 PR PBB SHADOW E&M-EST. PATIENT-LVL III: ICD-10-PCS | Mod: PBBFAC,,, | Performed by: PODIATRIST

## 2023-07-26 PROCEDURE — 99204 PR OFFICE/OUTPT VISIT, NEW, LEVL IV, 45-59 MIN: ICD-10-PCS | Mod: S$PBB,,, | Performed by: PODIATRIST

## 2023-07-26 PROCEDURE — 73630 X-RAY EXAM OF FOOT: CPT | Mod: 26,50,, | Performed by: STUDENT IN AN ORGANIZED HEALTH CARE EDUCATION/TRAINING PROGRAM

## 2023-07-26 PROCEDURE — 99204 OFFICE O/P NEW MOD 45 MIN: CPT | Mod: S$PBB,,, | Performed by: PODIATRIST

## 2023-07-26 PROCEDURE — 73630 XR FOOT COMPLETE 3 VIEW BILATERAL: ICD-10-PCS | Mod: 26,50,, | Performed by: STUDENT IN AN ORGANIZED HEALTH CARE EDUCATION/TRAINING PROGRAM

## 2023-07-26 PROCEDURE — 99213 OFFICE O/P EST LOW 20 MIN: CPT | Mod: PBBFAC | Performed by: PODIATRIST

## 2023-07-26 NOTE — PROGRESS NOTES
[facility]  PODIATRIC MEDICINE AND SURGERY      CHIEF COMPLAINT   Chief Complaint   Patient presents with    Establish Care     Patient in to establish care, she is a diabetic and needs nail care, she was last seen by her pcp on 6/13/2023, pt states she is having pain in her feet that she rates as a five or six but the right one has swelling from the knee on down to her foot, swelling started months ago and the pain has been longer.         HPI  Ana Maria Teague is a 62 y.o. female w/ PMH of Kidney transplant, HTN, DM2, and other medical problems, who presents today complaining of painful achilles /  posterior aspect of the both heels.  Pt states she has had this problem for few months. She has been treated for this in the past which pain did resolve with rehab and boot. She is also here for DM foot care. She does have neuropathy. She is currently taking oral gabapentin.     Patient denies other pedal complaints at this time.  Hemoglobin A1C   Date Value Ref Range Status   06/06/2023 6.2 (H) 4.0 - 5.6 % Final     Comment:     ADA Screening Guidelines:  5.7-6.4%  Consistent with prediabetes  >or=6.5%  Consistent with diabetes    High levels of fetal hemoglobin interfere with the HbA1C  assay. Heterozygous hemoglobin variants (HbS, HgC, etc)do  not significantly interfere with this assay.   However, presence of multiple variants may affect accuracy.     02/13/2023 6.0 (H) 4.0 - 5.6 % Final     Comment:     ADA Screening Guidelines:  5.7-6.4%  Consistent with prediabetes  >or=6.5%  Consistent with diabetes    High levels of fetal hemoglobin interfere with the HbA1C  assay. Heterozygous hemoglobin variants (HbS, HgC, etc)do  not significantly interfere with this assay.   However, presence of multiple variants may affect accuracy.     01/18/2023 6.2 (H) 4.0 - 5.6 % Final     Comment:     ADA Screening Guidelines:  5.7-6.4%  Consistent with prediabetes  >or=6.5%  Consistent with diabetes    High levels of  fetal hemoglobin interfere with the HbA1C  assay. Heterozygous hemoglobin variants (HbS, HgC, etc)do  not significantly interfere with this assay.   However, presence of multiple variants may affect accuracy.           PMH  Past Medical History:   Diagnosis Date    Abnormal glucose 2013    Acquired hypothyroidism     Acute bronchiolitis 10/15/2014    Anemia     Anemia of chronic renal failure 2013    Anxiety     Anxiety disorder     Avascular necrosis of bone of hip     Back pain     s/p steroid injections    Bacteremia due to Escherichia coli 2021    Chronic immunosuppression with Prograf and Cellcept 2013    CKD (chronic kidney disease) stage 2, GFR 60-89 ml/min     CKD (chronic kidney disease) stage 5, GFR less than 15 ml/min     -donor kidney transplant - 13    Depression     Hypertension, renal 2013    Hypothyroidism     Immunosuppressed status 10/15/2014    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    Osteoporosis with pathological fracture     Renal disease due to hypertension 2013    Renal hypertension, non-vascular     Secondary hyperparathyroidism of renal origin 2013    Vertigo        PROBLEM LIST  Patient Active Problem List    Diagnosis Date Noted    Calcification of aorta 2022    Hypoglycemia associated with diabetes 2022    BMI 40.0-44.9, adult 2022    Unspecified inflammatory spondylopathy, lumbar region 2021    Chronic pain of both knees 2021    High risk medication use- PROLIA  2021    Hyperglycemia     Primary osteoarthritis involving multiple joints 10/30/2019    Severe obesity (BMI 35.0-39.9) with comorbidity 10/30/2019    Insomnia due to medical condition 2019    Allergic rhinitis 2019    Chronic bronchitis 2019    Hyperlipidemia associated with type 2 diabetes mellitus 2017    Sleep-related nonobstructive alveolar hypoventilation 2015    Headache 09/10/2015     "Hypertension associated with diabetes 2015    Age-related osteoporosis without current pathological fracture 2015    Abnormal PFTs (pulmonary function tests) 2015    Diffusion capacity of lung (dl), decreased 2015    Immunosuppressed status 10/15/2014    Cubital tunnel syndrome on right 2014    CTS (carpal tunnel syndrome) 2014    Type 2 diabetes mellitus with hyperglycemia, with long-term current use of insulin 2014    CKD stage 3 due to type 2 diabetes mellitus     Hypertension, renal 2013    Anemia of chronic renal failure 2013    Secondary hyperparathyroidism of renal origin 2013    -donor kidney transplant - 2013    Renal cyst 2013    Abnormal findings on diagnostic imaging of breast 2013    Avascular necrosis of bone of hip     Acquired hypothyroidism     Osteoporosis with pathological fracture     Anxiety and depression     Vertigo        MEDS  Current Outpatient Medications on File Prior to Visit   Medication Sig Dispense Refill    albuterol (PROVENTIL/VENTOLIN HFA) 90 mcg/actuation inhaler INHALE 2 PUFFS INTO THE LUNGS EVERY 6 HOURS AS NEEDED FOR SHORTNESS OF BREATH 18 g 0    amitriptyline (ELAVIL) 10 MG tablet Take 1 tablet (10 mg total) by mouth nightly as needed for Insomnia or Pain. 30 tablet 1    atorvastatin (LIPITOR) 20 MG tablet TAKE 1 TABLET(20 MG) BY MOUTH EVERY DAY 90 tablet 3    BD ORRY 2ND GEN PEN NEEDLE 32 gauge x 5/32" Ndle USE FOUR TIMES DAILY WITH INSULIN AS DIRECTED 400 each 3    benzonatate (TESSALON) 100 MG capsule Take 1 capsule (100 mg total) by mouth 3 (three) times daily as needed for Cough. 40 capsule 1    blood sugar diagnostic (FREESTYLE LITE STRIPS) Strp Use to check blood glucose 3 times a day. 100 strip 11    cetirizine (ZYRTEC) 10 MG tablet TAKE 1 TABLET BY MOUTH EVERY DAY 30 tablet 11    diclofenac sodium (VOLTAREN) 1 % Gel APPLY 2G TOPICALLY FOUR TIMES DAILY AS NEEDED 100 g 0    " dulaglutide (TRULICITY) 1.5 mg/0.5 mL pen injector Inject 1.5 mg into the skin every 7 days. 4 pen 5    ergocalciferol (ERGOCALCIFEROL) 50,000 unit Cap Take 1 capsule (50,000 Units total) by mouth every 7 days. 4 capsule 11    flash glucose scanning reader (FREESTYLE DEREK 2 READER) Misc 1 Device by Misc.(Non-Drug; Combo Route) route continuous. Use to test blood glucose with the Derek reader. 1 each 0    flash glucose sensor (FREESTYLE DEREK 2 SENSOR) Kit 1 application by Misc.(Non-Drug; Combo Route) route every 14 (fourteen) days. Use to test blood glucose 4-6x/day. 2 kit 11    fluticasone propionate (FLONASE) 50 mcg/actuation nasal spray SHAKE LIQUID AND USE 2 SPRAYS(100 MCG) IN EACH NOSTRIL EVERY DAY 48 g 1    gabapentin (NEURONTIN) 400 MG capsule Take 1 capsule (400 mg total) by mouth every morning AND 1 capsule (400 mg total) with lunch AND 1-2 capsules (400-800 mg total) every evening. 120 capsule 1    insulin lispro (HUMALOG KWIKPEN INSULIN) 100 unit/mL pen Inject 5 Units into the skin 3 (three) times daily before meals. 15 mL 5    lancets (FREESTYLE LANCETS) 28 gauge Misc 1 lancet by Combination route 2 (two) times daily as needed. Qid prn 400 each 2    lancets Misc 1 strip by Misc.(Non-Drug; Combo Route) route 4 (four) times daily with meals and nightly. 150 each 6    levothyroxine (SYNTHROID) 150 MCG tablet Take 1 tablet (150 mcg total) by mouth before breakfast. 90 tablet 3    LIDOcaine (LIDODERM) 5 % Use 1-3 patches per day over recommended area. Remove & Discard patch within 12 hours or as directed by MD. 90 patch 0    mycophenolate (CELLCEPT) 250 mg Cap Take 2 capsules (500 mg total) by mouth 2 (two) times daily. 120 capsule 11    NIFEdipine (PROCARDIA-XL) 30 MG (OSM) 24 hr tablet Take 1 tablet (30 mg total) by mouth 2 (two) times a day. 180 tablet 3    ondansetron (ZOFRAN-ODT) 4 MG TbDL DISSOLVE 1 TABLET(4 MG) ON THE TONGUE EVERY 8 HOURS AS NEEDED FOR NAUSEA 30 tablet 1    oxyCODONE-acetaminophen  (PERCOCET)  mg per tablet Take 1 tablet by mouth every 8 (eight) hours as needed for Pain. 90 tablet 0    [START ON 2023] oxyCODONE-acetaminophen (PERCOCET)  mg per tablet Take 1 tablet by mouth every 8 (eight) hours as needed for Pain. 90 tablet 0    [START ON 2023] oxyCODONE-acetaminophen (PERCOCET)  mg per tablet Take 1 tablet by mouth every 8 (eight) hours as needed for Pain. 90 tablet 0    pulse oximeter (PULSE OXIMETER) device by Apply Externally route 2 (two) times a day. Use twice daily at 8 AM and 3 PM and [facility] as directed. 1 each 0    tacrolimus (PROGRAF) 1 MG Cap TAKE 3 CAPSULES IN THE MORNING, AND 3 IN THE EVENING 180 capsule 11    tiZANidine (ZANAFLEX) 4 MG tablet Take 1 tablet (4 mg total) by mouth 2 (two) times daily as needed (muscle spasms). 60 tablet 1    furosemide (LASIX) 20 MG tablet Take 1 tablet (20 mg total) by mouth daily as needed (swelling in legs). 30 tablet 11     No current facility-administered medications on file prior to visit.       [facility]     Past Surgical History:   Procedure Laterality Date    BREAST BIOPSY Right     benign. 7 years ago.     SECTION      COLONOSCOPY N/A 3/9/2016    Procedure: COLONOSCOPY;  Surgeon: Douglas Daniel III, MD;  Location: [facility] ENDO;  Service: Endoscopy;  Laterality: N/A;    cyst removed form chest wall      HYSTERECTOMY      KIDNEY TRANSPLANT  2013    SELECTIVE INJECTION OF ANESTHETIC AGENT AROUND LUMBAR SPINAL NERVE ROOT BY TRANSFORAMINAL APPROACH Bilateral 2021    Procedure: Bilateral L5/S1 TF SERENITY;  Surgeon: Rafita Muniz MD;  Location: [facility] PAIN MGT;  Service: Pain Management;  Laterality: Bilateral;    SKIN GRAFT      revision    THYROIDECTOMY      vascular access surgeries      multiple. last time 2 weeks ago        ALL  Review of patient's allergies indicates:   Allergen Reactions    Azithromycin Nausea And Vomiting    Adhesive Other (See Comments)     Skin tears    Nsaids  (non-steroidal anti-inflammatory drug)      Kidney Transplant       SOC     Social History     Tobacco Use    Smoking status: Former     Packs/day: 1.00     Years: 10.00     Pack years: 10.00     Types: Cigarettes     Quit date: 2002     Years since quittin.7    Smokeless tobacco: Never    Tobacco comments:     quit smoking approx 10-15 years ago    Substance Use Topics    Alcohol use: No    Drug use: No         Family HX    Family History   Problem Relation Age of Onset    Diabetes Mother     Kidney disease Mother     Hyperlipidemia Mother     Hypertension Mother     Heart disease Mother     Arthritis Mother     Hypertension Father     Stroke Father     Hypertension Sister     Arthritis Sister     Asthma Sister     Heart disease Sister         heart attack            REVIEW OF SYSTEMS  General: Denies any fever or chills  Chest: Denies shortness of breath, wheezing, coughing, or sputum production  Heart: Denies chest pain, cold extremities, orthopenia, or reduced exercise tolerance  As noted above and per history of current illness above, otherwise negative in the remainder of the 14 systems.     PHYSICAL EXAM    General: This patient is well-developed, well-nourished and appears stated age, well-oriented to person, place and time, and cooperative and pleasant on today's visit    LOWER EXTREMITY PHYSICAL EXAM  VASCULAR  Dorsalis pedis and posterior tibial pulses palpable 2/4 bilaterally.   Capillary refill time immediate to the toes.   Feet are warm to the touch. Skin temperature warm to warm from proximally to distally   There are no varicosities, telangiectasias noted to bilateral foot and ankle regions.   There are no ecchymoses noted to bilateral foot and ankle regions.   There is gross lower extremity edema.    DERMATOLOGIC  Skin moist with healthy texture and turgor.  There are no open ulcerations, lacerations, or fissures to bilateral foot and ankle regions. There are no signs of infection as  there is no erythema, no proximal-extending lymphangiitis, no fluctuance, or crepitus noted on palpation to bilateral foot and ankle regions.   There is no interdigital maceration.   There are no hyperkeratotic lesions noted to feet. Nails are well-trimmed.    NEUROLOGIC  Epicritic sensation is intact as the patient is able to sense light touch to bilateral foot and ankle regions.   Achilles and patellar deep tendon reflexes intact  Babinski reflex absent    ORTHOPEDIC/BIOMECHANICAL  MILD TTP posterior aspect heel at insertion of achilles and TTP of achilles tendon proper on bilateral foot.  There is prominence noted at insertion, neg defect palpated, bilateral  foot  Muscle strength AT/EHL/EDL/PT: 5/5; Achilles/Gastroc/Soleus: 5/5; PB/PL: 5/5 Muscle tone is normal.  Ankle joint ROM  B/L supple DF/PF, non-crepitus  STJ ROM supple inv/ev, non crepitus b/l.  On stance/Gait: able to stand, heel to toe gait. Able to weightbear, ambulate without assistance    ASSESSMENT  Chronic pain of right knee  -     X-Ray Foot Complete 3 view Bilateral; Future; Expected date: 07/26/2023  -     Ambulatory referral/consult to Orthopedics; Future; Expected date: 08/02/2023    Type 2 diabetes mellitus with hyperglycemia, with long-term current use of insulin  -     Ambulatory referral/consult to Podiatry    Severe obesity (BMI 35.0-39.9) with comorbidity    Comprehensive diabetic foot examination, type 2 DM, encounter for    Achilles tendonitis, bilateral        PLAN  1. Patient was educated about clinical and imaging findings, and verbalizes understanding of above.  2. Treatment plan:   - rest and icex 3 times daily/20 minutes, eccentric exercises/at home rehab instructions  -heel lift provided   -Will order MRI or ultrasound for pathology of thickening of paratenon if no resolution of pain; physical therapy: cold therapy, ultrasound, stretching, extracorporal shockwave therapy  -if conservative treatment fails, will consider excision  of thickened or inflamed paratenon along with release of any associated adhesions and/or retrocalcaneal spur removal if applicable    I counseled the patient on his/her Diabetic Mellitus regarding today's clinical examination and objection findings. We did also discuss recent medication changes, pertinent labs and imaging evaluations and other medical consultation notes and progress notes. Greater than 50% of this visit was spent on counseling and coordination of care. Greater than 20 minutes of this appt. was spent on education about the diabetic foot, in relation to PVD and/or neuropathy, and the prevention of limb loss. Patient received these instructions in written format and discussed verbally.     Comprehensive in depth discussion provided in written format in regards to diabetic/at risk foot health and amputation prevention. Summary as noted below:  1. Anti-diabetic medications should be taken regularly to prevent complications.  2. Feet should be washed daily.  3. Lukewarm water should be used to wash feet.   4. The temperature of the water should be checked before washing feet.   5. Feet should be dried completely after washing and in between toes.  6. Talcum powder (Zeasorb ( should be used to keep the areas betweent he toes dry.   7. Lotion or moisturizing cream should be applied to the fet daily to prevent dryness of the skin. Examples provided to patient OTC.  8. Lotion should not be applied between the toes.  9. Socks should be changed daily. I recommend white socks in order to be able to note any drainage or bleeding if injury occurs.  10. Toenails should be trimmed straight across (if applicable).  11. Feet should be inspected at least once a day.  12. Diabetic patients should wear comfortable shoes. Examples provided.   13. The inside of the shoes should be inspected before wearing them.  14. Diabetic patients should not walk barefoot.   15. Diabetic patients should consult their podiatrist if their  feet have redness, blisters, cuts, or wounds.       Shoe gear is inspected and wear and proper fit/type. If applicable/covered, pt was provided prescription for diabetic shoes and/or custom molded inserts.  Shoe Fitting recommendations:  Have foot measured while standing.  Try on both shoes and walk around to be sure shoes are comfortable.  Allow at least a thumbs width of space at end of longest toe in shoes. Make sure you can wiggle toes inside shoe.  Try on shoes at end of the day due to foot swelling.   Look for shoes with a round and wide flexible  toe box, extra cushion, and thick/stiff heel. Check inside shoes and avoid thick seams.   Breathable anti-microbial or moisture wicking fabric is preferred. Leather uppers are optimal.     Patient is reminded of the importance of good nutrition and blood sugar control to help prevent podiatric complications of diabetes. I discussed proper blood glucose control and co-management with diabetes education team and patient's PCP and/or Endocrinology Advanced Practice Provider.  Patient  will continue to monitor the areas daily, inspect feet, wear protective shoe gear when ambulatory, moisturizer to maintain skin integrity.    Follow up as scheduled below or sooner if any problem arises with foot and/or ankle.    3. RTC  for follow up/evaluation as scheduled, or sooner if any issues, questions or concerns.    Future Appointments   Date Time Provider Department Center   8/8/2023 10:15 AM [facility] BMD1: BONE DENSITY SCAN [facility] DEXABMD [facility]   8/8/2023 10:30 AM [facility] MAMMO1-SCR [facility] MAMMO [facility]   8/8/2023 11:00 AM LABORATORY, [facility] LAB [facility]   8/10/2023 10:00 AM Kayleigh Shirley PT LYNETTE OP [facility] [place]   8/10/2023  1:45 PM Pedro Powell MD [facility] RHEUM [facility]   8/15/2023  9:00 AM Aide Dougherty NP [facility] DIABETE [facility]   8/30/2023 11:30 AM Claudia Skaggs PA-C [facility] RHEUM [facility]   8/31/2023 11:00 AM LABORATORY, [facility] LAB  [place]   9/6/2023  2:00 PM Jovanna Pugh MD [facility] NEPHRO [place]   9/8/2023  8:00 AM Marcella Haas PA-C [facility] INPN [place]   9/21/2023  1:40 PM Karine Olmos MD [id number]           Report Electronically Signed By:     Tawny Coleman DPM   Podiatry  [facility]  7/26/2023

## 2023-08-08 ENCOUNTER — HOSPITAL ENCOUNTER (OUTPATIENT)
Dept: RADIOLOGY | Facility: HOSPITAL | Age: 63
Discharge: HOME | End: 2023-08-08
Attending: FAMILY MEDICINE
Payer: MEDICARE

## 2023-08-08 VITALS — BODY MASS INDEX: 36.35 KG/M2 | HEIGHT: 66 IN | WEIGHT: 226.19 LBS

## 2023-08-08 DIAGNOSIS — Z12.31 ENCOUNTER FOR SCREENING MAMMOGRAM FOR MALIGNANT NEOPLASM OF BREAST: ICD-10-CM

## 2023-08-08 PROCEDURE — 77067 SCR MAMMO BI INCL CAD: CPT | Mod: 26,,, | Performed by: RADIOLOGY

## 2023-08-08 PROCEDURE — 77067 MAMMO DIGITAL SCREENING BILAT WITH TOMO: ICD-10-PCS | Mod: 26,,, | Performed by: RADIOLOGY

## 2023-08-08 PROCEDURE — 77067 SCR MAMMO BI INCL CAD: CPT | Mod: TC

## 2023-08-08 PROCEDURE — 77063 BREAST TOMOSYNTHESIS BI: CPT | Mod: 26,,, | Performed by: RADIOLOGY

## 2023-08-08 PROCEDURE — 77063 MAMMO DIGITAL SCREENING BILAT WITH TOMO: ICD-10-PCS | Mod: 26,,, | Performed by: RADIOLOGY

## 2023-08-09 ENCOUNTER — TELEPHONE (OUTPATIENT)
Dept: SPORTS MEDICINE | Facility: CLINIC | Age: 63
End: 2023-08-09
Payer: MEDICARE

## 2023-08-09 DIAGNOSIS — M25.561 CHRONIC PAIN OF RIGHT KNEE: Primary | ICD-10-CM

## 2023-08-09 DIAGNOSIS — G89.29 CHRONIC PAIN OF RIGHT KNEE: Primary | ICD-10-CM

## 2023-08-09 NOTE — TELEPHONE ENCOUNTER
Confirmed appt with patient and asked she arrive 30 minutes early for xray.  Patient verbalized understanding of appt date, time and location.

## 2023-08-10 ENCOUNTER — OFFICE VISIT (OUTPATIENT)
Dept: RHEUMATOLOGY | Facility: CLINIC | Age: 63
End: 2023-08-10
Payer: MEDICARE

## 2023-08-10 DIAGNOSIS — E66.01 SEVERE OBESITY (BMI 35.0-39.9) WITH COMORBIDITY: ICD-10-CM

## 2023-08-10 DIAGNOSIS — M15.9 PRIMARY OSTEOARTHRITIS INVOLVING MULTIPLE JOINTS: ICD-10-CM

## 2023-08-10 DIAGNOSIS — M81.0 AGE-RELATED OSTEOPOROSIS WITHOUT CURRENT PATHOLOGICAL FRACTURE: Primary | ICD-10-CM

## 2023-08-10 PROCEDURE — 99214 PR OFFICE/OUTPT VISIT, EST, LEVL IV, 30-39 MIN: ICD-10-PCS | Mod: 95,,, | Performed by: INTERNAL MEDICINE

## 2023-08-10 PROCEDURE — 99214 OFFICE O/P EST MOD 30 MIN: CPT | Mod: 95,,, | Performed by: INTERNAL MEDICINE

## 2023-08-10 RX ORDER — ALENDRONATE SODIUM 70 MG/1
70 TABLET ORAL
Qty: 4 TABLET | Refills: 11 | Status: SHIPPED | OUTPATIENT
Start: 2023-08-10 | End: 2024-08-09

## 2023-08-10 NOTE — PATIENT INSTRUCTIONS
Please advice patient on all precautions to be taken while taking weekly fosamax tablets:-  1. Take the tablet on empty stomach, first thing in the morning.   2. Take it with at least 6-8 ounces of water.   3. Sit upright or do any activities without laying down for at least 30 minutes after taking the medication.   4. Hold medication a month before any invasive dental procedure and restart only after healing well from the procedure.   5. Stop taking medication if any GERD symptoms and call us to report.

## 2023-08-10 NOTE — PROGRESS NOTES
RHEUMATOLOGY FOLLOW UP - TELE VISIT     The patient location is: [place]  The chief complaint leading to consultation is:  Osteoporosis  Visit type: Virtual visit with synchronous audio and video  Total time spent with patient:  15 minutes  Each patient to whom he or she provides medical services by telemedicine is:  (1) informed of the relationship between the physician and patient and the respective role of any other health care provider with respect to management of the patient; and (2) notified that he or she may decline to receive medical services by telemedicine and may withdraw from such care at any time.    Chief complaints, HPI, ROS, EXAM, Assessment & Plans:-  Ana Maria TU Yunier a 62 y.o. pleasant female seen today for osteoporosis.  History of osteoporosis treated with Prolia in the past.  Lost for follow-up here to reestablish care.  No falls or fragility fractures since last visit.  Activity-related large joint pain present.  Recent DEXA showed significant worsening and was referred by her primary care physician to reestablish care.  Plans for multiple invasive dental procedures in the near future.  No history of radiation therapy.  History of renal transplant on CellCept and Prograf.  Stable renal functions.  Rheumatological review of system negative otherwise.  Exam shows 100% fist formation bilateral hands..    1. Age-related osteoporosis without current pathological fracture    2. Primary osteoarthritis involving multiple joints    3. Severe obesity (BMI 35.0-39.9) with comorbidity      Problem List Items Addressed This Visit       Age-related osteoporosis without current pathological fracture - Primary    Relevant Medications    alendronate (FOSAMAX) 70 MG tablet    Primary osteoarthritis involving multiple joints    Severe obesity (BMI 35.0-39.9) with comorbidity     Personally reviewed bone density scan images from recent DEXA visit.  Shows significant worsening of osteoporosis.    Worsening  osteoporosis.  Since she has plans for multiple invasive dental procedures in the near future, start Fosamax.  Renal functions adequate to start Fosamax therapy.  Start Fosamax 4 weeks before invasive dental procedure and restart Fosamax 4 weeks after completion of dental procedures.  If any adverse effects, start Prolia.  I have explained all of the above in detail and the patient understands all of the current recommendation(s). I have answered all questions to the best of my ability and to their complete satisfaction.   # Follow up in about 1 year (around 8/10/2024).      Past Medical History:   Diagnosis Date    Abnormal glucose 2013    Acquired hypothyroidism     Acute bronchiolitis 10/15/2014    Anemia     Anemia of chronic renal failure 2013    Anxiety     Anxiety disorder     Avascular necrosis of bone of hip     Back pain     s/p steroid injections    Bacteremia due to Escherichia coli 2021    Chronic immunosuppression with Prograf and Cellcept 2013    CKD (chronic kidney disease) stage 2, GFR 60-89 ml/min     CKD (chronic kidney disease) stage 5, GFR less than 15 ml/min     -donor kidney transplant - 13    Depression     Hypertension, renal 2013    Hypothyroidism     Immunosuppressed status 10/15/2014    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 7 years ago.     SECTION      COLONOSCOPY N/A 3/9/2016    Procedure: COLONOSCOPY;  Surgeon: Douglas Daniel III, MD;  Location: [facility];  Service: Endoscopy;  Laterality: N/A;    cyst removed form chest wall      HYSTERECTOMY      KIDNEY TRANSPLANT  2013    SELECTIVE INJECTION OF ANESTHETIC AGENT AROUND  "LUMBAR SPINAL NERVE ROOT BY TRANSFORAMINAL APPROACH Bilateral 2021    Procedure: Bilateral L5/S1 TF SERENITY;  Surgeon: Rafita Muniz MD;  Location: [facility];  Service: Pain Management;  Laterality: Bilateral;    SKIN GRAFT      revision    THYROIDECTOMY      vascular access surgeries      multiple. last time 2 weeks ago        Social History     Tobacco Use    Smoking status: Former     Current packs/day: 0.00     Average packs/day: 1 pack/day for 10.0 years (10.0 ttl pk-yrs)     Types: Cigarettes     Start date: 1992     Quit date: 2002     Years since quittin.7    Smokeless tobacco: Never    Tobacco comments:     quit smoking approx 10-15 years ago    Substance Use Topics    Alcohol use: No    Drug use: No       Family History   Problem Relation Age of Onset    Diabetes Mother     Kidney disease Mother     Hyperlipidemia Mother     Hypertension Mother     Heart disease Mother     Arthritis Mother     Hypertension Father     Stroke Father     Hypertension Sister     Arthritis Sister     Asthma Sister     Heart disease Sister         heart attack       Review of patient's allergies indicates:   Allergen Reactions    Azithromycin Nausea And Vomiting    Adhesive Other (See Comments)     Skin tears    Nsaids (non-steroidal anti-inflammatory drug)      Kidney Transplant       Medication List with Changes/Refills   New Medications    ALENDRONATE (FOSAMAX) 70 MG TABLET    Take 1 tablet (70 mg total) by mouth every 7 days.   Current Medications    ALBUTEROL (PROVENTIL/VENTOLIN HFA) 90 MCG/ACTUATION INHALER    INHALE 2 PUFFS INTO THE LUNGS EVERY 6 HOURS AS NEEDED FOR SHORTNESS OF BREATH    AMITRIPTYLINE (ELAVIL) 10 MG TABLET    Take 1 tablet (10 mg total) by mouth nightly as needed for Insomnia or Pain.    ATORVASTATIN (LIPITOR) 20 MG TABLET    TAKE 1 TABLET(20 MG) BY MOUTH EVERY DAY    BD RORY 2ND GEN PEN NEEDLE 32 GAUGE X 5/32" NDLE    USE FOUR TIMES DAILY WITH INSULIN AS DIRECTED    BENZONATATE " (TESSALON) 100 MG CAPSULE    Take 1 capsule (100 mg total) by mouth 3 (three) times daily as needed for Cough.    BLOOD SUGAR DIAGNOSTIC (FREESTYLE LITE STRIPS) STRP    Use to check blood glucose 3 times a day.    CETIRIZINE (ZYRTEC) 10 MG TABLET    TAKE 1 TABLET BY MOUTH EVERY DAY    DICLOFENAC SODIUM (VOLTAREN) 1 % GEL    APPLY 2G TOPICALLY FOUR TIMES DAILY AS NEEDED    DULAGLUTIDE (TRULICITY) 1.5 MG/0.5 ML PEN INJECTOR    Inject 1.5 mg into the skin every 7 days.    ERGOCALCIFEROL (ERGOCALCIFEROL) 50,000 UNIT CAP    Take 1 capsule (50,000 Units total) by mouth every 7 days.    FLASH GLUCOSE SCANNING READER (FREESTYLE DEREK 2 READER) MISC    1 Device by Misc.(Non-Drug; Combo Route) route continuous. Use to test blood glucose with the Derek reader.    FLASH GLUCOSE SENSOR (FREESTYLE DEREK 2 SENSOR) KIT    1 application by Misc.(Non-Drug; Combo Route) route every 14 (fourteen) days. Use to test blood glucose 4-6x/day.    FLUTICASONE PROPIONATE (FLONASE) 50 MCG/ACTUATION NASAL SPRAY    SHAKE LIQUID AND USE 2 SPRAYS(100 MCG) IN EACH NOSTRIL EVERY DAY    FUROSEMIDE (LASIX) 20 MG TABLET    Take 1 tablet (20 mg total) by mouth daily as needed (swelling in legs).    GABAPENTIN (NEURONTIN) 400 MG CAPSULE    Take 1 capsule (400 mg total) by mouth every morning AND 1 capsule (400 mg total) with lunch AND 1-2 capsules (400-800 mg total) every evening.    INSULIN LISPRO (HUMALOG KWIKPEN INSULIN) 100 UNIT/ML PEN    Inject 5 Units into the skin 3 (three) times daily before meals.    LANCETS (FREESTYLE LANCETS) 28 GAUGE MISC    1 lancet by Combination route 2 (two) times daily as needed. Qid prn    LANCETS MISC    1 strip by Misc.(Non-Drug; Combo Route) route 4 (four) times daily with meals and nightly.    LEVOTHYROXINE (SYNTHROID) 150 MCG TABLET    Take 1 tablet (150 mcg total) by mouth before breakfast.    LIDOCAINE (LIDODERM) 5 %    Use 1-3 patches per day over recommended area. Remove & Discard patch within 12 hours or as  directed by MD.    MYCOPHENOLATE (CELLCEPT) 250 MG CAP    Take 2 capsules (500 mg total) by mouth 2 (two) times daily.    NIFEDIPINE (PROCARDIA-XL) 30 MG (OSM) 24 HR TABLET    Take 1 tablet (30 mg total) by mouth 2 (two) times a day.    ONDANSETRON (ZOFRAN-ODT) 4 MG TBDL    DISSOLVE 1 TABLET(4 MG) ON THE TONGUE EVERY 8 HOURS AS NEEDED FOR NAUSEA    OXYCODONE-ACETAMINOPHEN (PERCOCET)  MG PER TABLET    Take 1 tablet by mouth every 8 (eight) hours as needed for Pain.    OXYCODONE-ACETAMINOPHEN (PERCOCET)  MG PER TABLET    Take 1 tablet by mouth every 8 (eight) hours as needed for Pain.    OXYCODONE-ACETAMINOPHEN (PERCOCET)  MG PER TABLET    Take 1 tablet by mouth every 8 (eight) hours as needed for Pain.    PULSE OXIMETER (PULSE OXIMETER) DEVICE    by Apply Externally route 2 (two) times a day. Use twice daily at 8 AM and 3 PM and [facility] as [facility].    TACROLIMUS (PROGRAF) 1 MG CAP    TAKE 3 CAPSULES IN THE MORNING, AND 3 IN THE EVENING    TIZANIDINE (ZANAFLEX) 4 MG TABLET    Take 1 tablet (4 mg total) by mouth 2 (two) times daily as needed (muscle spasms).       Disclaimer: This note was prepared using voice recognition system and is likely to have sound alike errors and is not proof read.  Please call me with any questions.

## 2023-08-16 ENCOUNTER — OFFICE VISIT (OUTPATIENT)
Dept: SPORTS MEDICINE | Facility: CLINIC | Age: 63
End: 2023-08-16
Payer: MEDICARE

## 2023-08-16 ENCOUNTER — HOSPITAL ENCOUNTER (OUTPATIENT)
Dept: RADIOLOGY | Facility: HOSPITAL | Age: 63
Discharge: HOME | End: 2023-08-16
Attending: STUDENT IN AN ORGANIZED HEALTH CARE EDUCATION/TRAINING PROGRAM
Payer: MEDICARE

## 2023-08-16 DIAGNOSIS — G89.29 CHRONIC PAIN OF RIGHT KNEE: Primary | ICD-10-CM

## 2023-08-16 DIAGNOSIS — G89.29 CHRONIC PAIN OF RIGHT KNEE: ICD-10-CM

## 2023-08-16 DIAGNOSIS — M17.11 PRIMARY OSTEOARTHRITIS OF RIGHT KNEE: ICD-10-CM

## 2023-08-16 DIAGNOSIS — M25.561 CHRONIC PAIN OF RIGHT KNEE: Primary | ICD-10-CM

## 2023-08-16 DIAGNOSIS — M25.561 CHRONIC PAIN OF RIGHT KNEE: ICD-10-CM

## 2023-08-16 PROCEDURE — 73562 X-RAY EXAM OF KNEE 3: CPT | Mod: 26,LT,, | Performed by: RADIOLOGY

## 2023-08-16 PROCEDURE — 73562 XR KNEE ORTHO RIGHT WITH FLEXION: ICD-10-PCS | Mod: 26,LT,, | Performed by: RADIOLOGY

## 2023-08-16 PROCEDURE — 99214 OFFICE O/P EST MOD 30 MIN: CPT | Mod: S$PBB,25,, | Performed by: STUDENT IN AN ORGANIZED HEALTH CARE EDUCATION/TRAINING PROGRAM

## 2023-08-16 PROCEDURE — 76882 US LMTD JT/FCL EVL NVASC XTR: CPT | Mod: 26,S$PBB,, | Performed by: STUDENT IN AN ORGANIZED HEALTH CARE EDUCATION/TRAINING PROGRAM

## 2023-08-16 PROCEDURE — 73564 X-RAY EXAM KNEE 4 OR MORE: CPT | Mod: TC,RT

## 2023-08-16 PROCEDURE — 76882 US LMTD JT/FCL EVL NVASC XTR: CPT | Mod: PBBFAC | Performed by: STUDENT IN AN ORGANIZED HEALTH CARE EDUCATION/TRAINING PROGRAM

## 2023-08-16 PROCEDURE — 99999 PR PBB SHADOW E&M-EST. PATIENT-LVL II: CPT | Mod: PBBFAC,,, | Performed by: STUDENT IN AN ORGANIZED HEALTH CARE EDUCATION/TRAINING PROGRAM

## 2023-08-16 PROCEDURE — 99214 PR OFFICE/OUTPT VISIT, EST, LEVL IV, 30-39 MIN: ICD-10-PCS | Mod: S$PBB,25,, | Performed by: STUDENT IN AN ORGANIZED HEALTH CARE EDUCATION/TRAINING PROGRAM

## 2023-08-16 PROCEDURE — 73564 XR KNEE ORTHO RIGHT WITH FLEXION: ICD-10-PCS | Mod: 26,RT,, | Performed by: RADIOLOGY

## 2023-08-16 PROCEDURE — 97760 ORTHOTIC MGMT&TRAING 1ST ENC: CPT | Mod: PBBFAC | Performed by: STUDENT IN AN ORGANIZED HEALTH CARE EDUCATION/TRAINING PROGRAM

## 2023-08-16 PROCEDURE — 99212 OFFICE O/P EST SF 10 MIN: CPT | Mod: PBBFAC | Performed by: STUDENT IN AN ORGANIZED HEALTH CARE EDUCATION/TRAINING PROGRAM

## 2023-08-16 PROCEDURE — 99999 PR PBB SHADOW E&M-EST. PATIENT-LVL II: ICD-10-PCS | Mod: PBBFAC,,, | Performed by: STUDENT IN AN ORGANIZED HEALTH CARE EDUCATION/TRAINING PROGRAM

## 2023-08-16 PROCEDURE — 73564 X-RAY EXAM KNEE 4 OR MORE: CPT | Mod: 26,RT,, | Performed by: RADIOLOGY

## 2023-08-16 PROCEDURE — 76882 PR  US,EXTREMITY,NONVASCULAR,REAL-TIME IMAGE,LIMITED: ICD-10-PCS | Mod: 26,S$PBB,, | Performed by: STUDENT IN AN ORGANIZED HEALTH CARE EDUCATION/TRAINING PROGRAM

## 2023-08-16 NOTE — PROCEDURES
Sports Medicine US - Extremity Non-Vascular LIMITED Right    Date/Time: 8/16/2023 10:00 AM    Performed by: Adam Leach MD  Authorized by: Adam Leach MD  Preparation: Patient was prepped and draped in the usual sterile fashion.  Local anesthesia used: no    Anesthesia:  Local anesthesia used: no    Sedation:  Patient sedated: no    Patient tolerance: patient tolerated the procedure well with no immediate complications  Comments: Limited ultrasound of the right knee was utilized to map out and visualize the superficial sensory nerves in proximal thigh and medial knee that were targeted for the iovera procedure. The superior and inferior branches of the infrapatellar saphenous nerve, the medial femoral cutaneous nerve, the anterior femoral cutaneous nerve, and the lateral femoral cutaneous nerve were all visualized. Images saved in the EMR for documentation.

## 2023-08-16 NOTE — PATIENT INSTRUCTIONS
Assessment:  Ana Maria Teague is a 62 y.o. female No chief complaint on file.      Encounter Diagnosis   Name Primary?    Chronic pain of right knee Yes        Plan:  Limited diagnostic right ultrasound for Iovera consult  Please take Tylenol 1 Gram (2 extra-strength Tylenol tablets) up to 3 times a day.  Please to not take any extra doses of tylenol if you are scheduling in this manner.   Patient may use over the counter lidocaine patches as needed for pain.  Fitted and issued right hinged knee brace  Under the direction of Dr. Adam Leach, 15 minutes were spent sizing, fitting, and educating for durable medical equipment application today.  CPT 66363.  Follow up as needed      General Arthritis info:    -shiny white stuff at end of a chicken bones is cartilage    -arthritis is wearing away of the cartilage that lines the end of your bones    -osteoarthritis is thought to be a wear and tear phenomenon    -symptoms are due to inflammation of joint causing stiffness, aching, and sometimes swelling    -occasionally sharp pain will occur causing a give way sensation    -Risk factors: genetic, weight, female > male, age    Treatment options:    -maintain healthy weight (every pound is 4 pounds of pressure on the knee)    -daily moderate exercise (walk, bike, swim 30 minutes per day) to keep joints moving    -daily strengthening exercises (through therapy or on own) to keep muscles supporting joint healthy and strong    -glucosamine 1500mg daily (look for USP label on bottle)    -tylenol as needed for pain (follow directions on the bottle)    -anti-inflammatory medication such as alleve may be helpful- take 1-2 tabs twice daily for 7 days. If it helps your pain, continue. If you do not feel any change, you may stop and then take it as needed.    (you may be given a once daily anti-inflammatory such as MOBIC. If given, avoid other anti-inflammatory medications such as advil, ibuprofen, motrin, naprosyn, alleve,  etc)    -if swollen and painful, ice, decrease activity, and take anti-inflammatory daily for 5-7 days and if no relief call your doctor for further options    -consider cortisone injection (every 3-4 months at most)- anti- inflammatory steroid medication that can be injected directly into the joint to reduce inflammation    -consider hyaluronic acid injections (eufflexxa, hyalgan, synvisc, supartz) (every 6 months at most)- protein injection that helps decrease pain and irritability in the joint. It is best used to help prolong intervals between cortisone injections to minimize steroid injections. These are currently approved for knee injections. Discuss with your doctor if other joint involved. Call to seek approval prior to the injections.    -long-term treatment may include a total joint replacement (keep diary of good days and bad days, then evaluate as to when you are ready)            Follow-up: as needed or sooner if there are any problems between now and then.    Thank you for choosing [facility] and Dr. Adam Leach for your orthopedic & sports medicine care. It is our goal to provide you with exceptional care that will help keep you healthy, active, and get you back in the game.    Please do not hesitate to reach out to us via email, phone, or MyChart with any questions, concerns, or feedback.    If you felt that you received exemplary care today, please consider leaving us feedback on [employer] at:  [email or web address]    If you are experiencing pain/discomfort ,or have questions after 5pm and would like to be connected to the [facility] on-call team, please call this number and specify which Sports Medicine provider is treating you: [phone]

## 2023-08-16 NOTE — PROGRESS NOTES
"        Patient ID: Ana Maria Teague  YOB: 1960  MRN: [id number]    Chief Complaint: right knee pain      Referred By: Tawny Coleman DPM    History of Present Illness: Ana Maria Teague is a 62 y.o. female w/ PMH of Kidney transplant, HTN, DM2, and other medical problems, who presents today complaining of right knee pain. She states that it can feel painful and swollen sometimes.and hurts to bend or stretch. "Feels like a toothache".  Pt states she has had this problem for few months. She has been seen for this in the past by her PCP.  She is also going to pain management for her back, she is taking percocet, but this is not helping knee pain.  She does have neuropathy. She is currently taking oral gabapentin. Pain 7/10.    The patient is active in none.        Past Medical History:   Past Medical History:   Diagnosis Date    Abnormal glucose 2013    Acquired hypothyroidism     Acute bronchiolitis 10/15/2014    Anemia     Anemia of chronic renal failure 2013    Anxiety     Anxiety disorder     Avascular necrosis of bone of hip     Back pain     s/p steroid injections    Bacteremia due to Escherichia coli 2021    Chronic immunosuppression with Prograf and Cellcept 2013    CKD (chronic kidney disease) stage 2, GFR 60-89 ml/min     CKD (chronic kidney disease) stage 5, GFR less than 15 ml/min     -donor kidney transplant - 13    Depression     Hypertension, renal 2013    Hypothyroidism     Immunosuppressed status 10/15/2014    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 7 years ago.     SECTION      COLONOSCOPY N/A 3/9/2016    Procedure: COLONOSCOPY;  Surgeon: " Douglas Daniel III, MD;  Location: [facility] ENDO;  Service: Endoscopy;  Laterality: N/A;    cyst removed form chest wall      HYSTERECTOMY      KIDNEY TRANSPLANT  2013    SELECTIVE INJECTION OF ANESTHETIC AGENT AROUND LUMBAR SPINAL NERVE ROOT BY TRANSFORAMINAL APPROACH Bilateral 2021    Procedure: Bilateral L5/S1 TF SERENITY;  Surgeon: Rafita Muniz MD;  Location: [facility] PAIN MGT;  Service: Pain Management;  Laterality: Bilateral;    SKIN GRAFT      revision    THYROIDECTOMY      vascular access surgeries      multiple. last time 2 weeks ago     Family History   Problem Relation Age of Onset    Diabetes Mother     Kidney disease Mother     Hyperlipidemia Mother     Hypertension Mother     Heart disease Mother     Arthritis Mother     Hypertension Father     Stroke Father     Hypertension Sister     Arthritis Sister     Asthma Sister     Heart disease Sister         heart attack     Social History     Socioeconomic History    Marital status:     Number of children: 3   Occupational History    Occupation: disable     Comment: dept manager at [employer]   Tobacco Use    Smoking status: Former     Current packs/day: 0.00     Average packs/day: 1 pack/day for 10.0 years (10.0 ttl pk-yrs)     Types: Cigarettes     Start date: 1992     Quit date: 2002     Years since quittin.7    Smokeless tobacco: Never    Tobacco comments:     quit smoking approx 10-15 years ago    Substance and Sexual Activity    Alcohol use: No    Drug use: No    Sexual activity: Never     Partners: Male   Social History Narrative    Does housework at home.     Social Determinants of Health     Financial Resource Strain: Unknown (2022)    Overall Financial Resource Strain (CARDIA)     Difficulty of Paying Living Expenses: Patient refused   Food Insecurity: Unknown (2022)    Hunger Vital Sign     Worried About Running Out of Food in the Last Year: Patient refused     Ran Out of Food in the Last Year: Patient refused  "  Transportation Needs: Unknown (6/22/2022)    PRAPARE - Transportation     Lack of Transportation (Medical): Patient refused     Lack of Transportation (Non-Medical): Patient refused   Physical Activity: Unknown (6/22/2022)    Exercise Vital Sign     Days of Exercise per Week: Patient refused   Recent Concern: Physical Activity - Inactive (4/12/2022)    Exercise Vital Sign     Days of Exercise per Week: 0 days     Minutes of Exercise per Session: 0 min   Stress: No Stress Concern Present (6/22/2022)    [employer] - Occupational Stress Questionnaire     Feeling of Stress : Only a little   Social Connections: Unknown (6/22/2022)    Social Connection and Isolation Panel [NHANES]     Frequency of Communication with Friends and Family: More than three times a week     Frequency of Social Gatherings with Friends and Family: Twice a week     Active Member of Clubs or Organizations: Patient refused     Attends Club or Organization Meetings: Patient refused     Marital Status:    Housing Stability: Unknown (6/22/2022)    Housing Stability Vital Sign     Unable to Pay for Housing in the Last Year: Patient refused     Unstable Housing in the Last Year: No     Medication List with Changes/Refills   Current Medications    ALBUTEROL (PROVENTIL/VENTOLIN HFA) 90 MCG/ACTUATION INHALER    INHALE 2 PUFFS INTO THE LUNGS EVERY 6 HOURS AS NEEDED FOR SHORTNESS OF BREATH    ALENDRONATE (FOSAMAX) 70 MG TABLET    Take 1 tablet (70 mg total) by mouth every 7 days.    AMITRIPTYLINE (ELAVIL) 10 MG TABLET    Take 1 tablet (10 mg total) by mouth nightly as needed for Insomnia or Pain.    ATORVASTATIN (LIPITOR) 20 MG TABLET    TAKE 1 TABLET(20 MG) BY MOUTH EVERY DAY    BD RORY 2ND GEN PEN NEEDLE 32 GAUGE X 5/32" NDLE    USE FOUR TIMES DAILY WITH INSULIN AS DIRECTED    BENZONATATE (TESSALON) 100 MG CAPSULE    Take 1 capsule (100 mg total) by mouth 3 (three) times daily as needed for Cough.    BLOOD SUGAR " DIAGNOSTIC (FREESTYLE LITE STRIPS) STRP    Use to check blood glucose 3 times a day.    CETIRIZINE (ZYRTEC) 10 MG TABLET    TAKE 1 TABLET BY MOUTH EVERY DAY    DICLOFENAC SODIUM (VOLTAREN) 1 % GEL    APPLY 2G TOPICALLY FOUR TIMES DAILY AS NEEDED    DULAGLUTIDE (TRULICITY) 1.5 MG/0.5 ML PEN INJECTOR    Inject 1.5 mg into the skin every 7 days.    ERGOCALCIFEROL (ERGOCALCIFEROL) 50,000 UNIT CAP    Take 1 capsule (50,000 Units total) by mouth every 7 days.    FLASH GLUCOSE SCANNING READER (FREESTYLE DEREK 2 READER) MISC    1 Device by Misc.(Non-Drug; Combo Route) route continuous. Use to test blood glucose with the Derek reader.    FLASH GLUCOSE SENSOR (FREESTYLE DEREK 2 SENSOR) KIT    1 application by Misc.(Non-Drug; Combo Route) route every 14 (fourteen) days. Use to test blood glucose 4-6x/day.    FLUTICASONE PROPIONATE (FLONASE) 50 MCG/ACTUATION NASAL SPRAY    SHAKE LIQUID AND USE 2 SPRAYS(100 MCG) IN EACH NOSTRIL EVERY DAY    FUROSEMIDE (LASIX) 20 MG TABLET    Take 1 tablet (20 mg total) by mouth daily as needed (swelling in legs).    GABAPENTIN (NEURONTIN) 400 MG CAPSULE    Take 1 capsule (400 mg total) by mouth every morning AND 1 capsule (400 mg total) with lunch AND 1-2 capsules (400-800 mg total) every evening.    INSULIN LISPRO (HUMALOG KWIKPEN INSULIN) 100 UNIT/ML PEN    Inject 5 Units into the skin 3 (three) times daily before meals.    LANCETS (FREESTYLE LANCETS) 28 GAUGE MISC    1 lancet by Combination route 2 (two) times daily as needed. Qid prn    LANCETS MISC    1 strip by Misc.(Non-Drug; Combo Route) route 4 (four) times daily with meals and nightly.    LEVOTHYROXINE (SYNTHROID) 150 MCG TABLET    Take 1 tablet (150 mcg total) by mouth before breakfast.    LIDOCAINE (LIDODERM) 5 %    Use 1-3 patches per day over recommended area. Remove & Discard patch within 12 hours or as directed by MD.    MYCOPHENOLATE (CELLCEPT) 250 MG CAP    Take 2 capsules (500 mg total) by mouth 2 (two) times daily.     NIFEDIPINE (PROCARDIA-XL) 30 MG (OSM) 24 HR TABLET    Take 1 tablet (30 mg total) by mouth 2 (two) times a day.    ONDANSETRON (ZOFRAN-ODT) 4 MG TBDL    DISSOLVE 1 TABLET(4 MG) ON THE TONGUE EVERY 8 HOURS AS NEEDED FOR NAUSEA    OXYCODONE-ACETAMINOPHEN (PERCOCET)  MG PER TABLET    Take 1 tablet by mouth every 8 (eight) hours as needed for Pain.    OXYCODONE-ACETAMINOPHEN (PERCOCET)  MG PER TABLET    Take 1 tablet by mouth every 8 (eight) hours as needed for Pain.    OXYCODONE-ACETAMINOPHEN (PERCOCET)  MG PER TABLET    Take 1 tablet by mouth every 8 (eight) hours as needed for Pain.    PULSE OXIMETER (PULSE OXIMETER) DEVICE    by Apply Externally route 2 (two) times a day. Use twice daily at 8 AM and 3 PM and record the value in MyChart as directed.    TACROLIMUS (PROGRAF) 1 MG CAP    TAKE 3 CAPSULES IN THE MORNING, AND 3 IN THE EVENING    TIZANIDINE (ZANAFLEX) 4 MG TABLET    Take 1 tablet (4 mg total) by mouth 2 (two) times daily as needed (muscle spasms).     Review of patient's allergies indicates:   Allergen Reactions    Azithromycin Nausea And Vomiting    Adhesive Other (See Comments)     Skin tears    Nsaids (non-steroidal anti-inflammatory drug)      Kidney Transplant       Physical Exam:   There is no height or weight on file to calculate BMI.    GENERAL: Well appearing, in no acute distress.  HEAD: Normocephalic and atraumatic.  ENT: External ears and nose grossly normal.  EYES: EOMI bilaterally  PULMONARY: Respirations are grossly even and non-labored.  NEURO: Awake, alert, and oriented x 3.  SKIN: No obvious rashes appreciated.  PSYCH: Mood & affect are appropriate.    Detailed MSK exam:     Right knee exam:   -ROM: extension +5, flexion 120  -TTP: Medial joint line and Lateral joint line  -effusion: trace  -Patellar apprehension negative  -Nora test positive -  lateral  -stable to varus and valgus stress tests  -Lachman test negative, anterior drawer test negative, posterior drawer  test negative    Left knee exam:   -ROM: extension 0, flexion 130  -TTP: None  -effusion: none  -Patellar apprehension negative  -Nora test negative  -stable to varus and valgus stress tests  -Lachman test negative, anterior drawer test negative, posterior drawer test negative      Imaging:  X-ray Knee Ortho Right with Flexion  Narrative: EXAM: XR KNEE ORTHO RIGHT WITH FLEXION    CLINICAL INDICATION:   Pain in right knee    FINDINGS:  Comparisons are made to 08/03/2020.  AP standing views, AP flexion views and sunrise views of both knees, as well as a lateral view of the right knee were submitted for interpretation.  Negative for right knee joint effusion.    Alignment is satisfactory. No     fractures, dislocations, or erosive arthritic change.  Negative for radiopaque foreign bodies or air in the soft tissues.  Joint spaces are well-maintained.  Mild tricompartment osteophyte formation.  Right superior patella spurring.    Stable serpiginous sclerosis in the proximal right tibia, most likely a bone infarction.  Impression: 1.  Negative for acute process involving the visualized osseous structures.  2.  Stable findings as noted above.    Finalized on: 8/16/2023 9:45 AM By:  Higinio Parker MD  [facility]# [id number]      2023-08-16 09:[id number]    [facility]        Relevant imaging results were reviewed and interpreted by me and per my read shows mild arthritic changes.  This was discussed with the patient and / or family today.     Assessment:  Ana Maria Teague is a 62 y.o. female presenting with chronic right knee pain.   History, physical and radiographs are consistent with a likely diagnosis of OA, possible LMT.   Plan: patient has already tried gel injections which weren't effective. Steroid injections raise her blood sugar too high. Consider iovera procedure if not improving. Continue conservative management for pain. Knee brace given. Limited ultrasound performed today to map out nerves being targeted for iovera procedure.    Follow up as needed. All questions answered.      Chronic pain of right knee  -     Ambulatory referral/consult to Orthopedics  -     Sports Medicine US - Extremity Non-Vascular LIMITED Right  -     HME - OTHER    Primary osteoarthritis of right knee       At least 15 minutes were spent sizing, fitting, and educating for durable medical equipment application today. This service was performed under direction of Adam Leach MD. CPT 75964.    Time was spent discussing the cryoanalgesia procedure Iovera with the patient.  Risks and benefits were also discussed with patient.  X-rays were reviewed to use as a diagram to explain procedure. A detailed description of landmarks and placement of anesthetic used during procedure were also explained to patient.  Contraindications for procedure were discussed with patient.    More than 50% of the total time was spent face to face for counseling on diagnosis and treatment options. I also counseled patient  on common and most usual side effect of prescribed medications.  I reviewed Primary care, and other specialty's notes to better coordinate patient's care. Patient voiced understanding.        A copy of today's visit note has been sent to the referring provider.     Electronically signed:  Adam Leach MD, MPH  08/16/2023  10:00 AM

## 2023-08-30 ENCOUNTER — OFFICE VISIT (OUTPATIENT)
Dept: RHEUMATOLOGY | Facility: CLINIC | Age: 63
End: 2023-08-30
Payer: MEDICARE

## 2023-08-30 VITALS
BODY MASS INDEX: 35.01 KG/M2 | DIASTOLIC BLOOD PRESSURE: 91 MMHG | SYSTOLIC BLOOD PRESSURE: 155 MMHG | HEIGHT: 66 IN | HEART RATE: 69 BPM | WEIGHT: 217.81 LBS

## 2023-08-30 DIAGNOSIS — M81.0 AGE-RELATED OSTEOPOROSIS WITHOUT CURRENT PATHOLOGICAL FRACTURE: ICD-10-CM

## 2023-08-30 PROCEDURE — 99215 OFFICE O/P EST HI 40 MIN: CPT | Mod: PBBFAC | Performed by: PHYSICIAN ASSISTANT

## 2023-08-30 PROCEDURE — 99499 NO LOS: ICD-10-PCS | Mod: S$PBB,,, | Performed by: PHYSICIAN ASSISTANT

## 2023-08-30 PROCEDURE — 99499 UNLISTED E&M SERVICE: CPT | Mod: S$PBB,,, | Performed by: PHYSICIAN ASSISTANT

## 2023-08-30 PROCEDURE — 99999 PR PBB SHADOW E&M-EST. PATIENT-LVL V: CPT | Mod: PBBFAC,,, | Performed by: PHYSICIAN ASSISTANT

## 2023-08-30 PROCEDURE — 99999 PR PBB SHADOW E&M-EST. PATIENT-LVL V: ICD-10-PCS | Mod: PBBFAC,,, | Performed by: PHYSICIAN ASSISTANT

## 2023-08-30 NOTE — PROGRESS NOTES
A user error has taken place: encounter opened in error, closed for administrative reasons.     Duplicate appt made for rheum referral.  Pt saw Dr. TEJEDA 8/10/23.  No changes.  Follow his plan.  F/u me ion 6 mos w CMP/Vit D

## 2023-08-31 ENCOUNTER — TELEPHONE (OUTPATIENT)
Dept: PRIMARY CARE CLINIC | Facility: CLINIC | Age: 63
End: 2023-08-31
Payer: MEDICARE

## 2023-08-31 ENCOUNTER — LAB VISIT (OUTPATIENT)
Dept: LAB | Facility: HOSPITAL | Age: 63
End: 2023-08-31
Attending: INTERNAL MEDICINE
Payer: MEDICARE

## 2023-08-31 DIAGNOSIS — Z94.0 KIDNEY TRANSPLANTED: ICD-10-CM

## 2023-08-31 LAB
ALBUMIN SERPL BCP-MCNC: 3.4 G/DL (ref 3.5–5.2)
ANION GAP SERPL CALC-SCNC: 13 MMOL/L (ref 8–16)
BUN SERPL-MCNC: 16 MG/DL (ref 8–23)
CALCIUM SERPL-MCNC: 9.3 MG/DL (ref 8.7–10.5)
CHLORIDE SERPL-SCNC: 104 MMOL/L (ref 95–110)
CO2 SERPL-SCNC: 24 MMOL/L (ref 23–29)
CREAT SERPL-MCNC: 1.3 MG/DL (ref 0.5–1.4)
EST. GFR  (NO RACE VARIABLE): 46.2 ML/MIN/1.73 M^2
GLUCOSE SERPL-MCNC: 122 MG/DL (ref 70–110)
PHOSPHATE SERPL-MCNC: 2.9 MG/DL (ref 2.7–4.5)
POTASSIUM SERPL-SCNC: 4 MMOL/L (ref 3.5–5.1)
SODIUM SERPL-SCNC: 141 MMOL/L (ref 136–145)

## 2023-08-31 PROCEDURE — 80197 ASSAY OF TACROLIMUS: CPT | Performed by: INTERNAL MEDICINE

## 2023-08-31 PROCEDURE — 36415 COLL VENOUS BLD VENIPUNCTURE: CPT | Mod: PO | Performed by: INTERNAL MEDICINE

## 2023-08-31 PROCEDURE — 85025 COMPLETE CBC W/AUTO DIFF WBC: CPT | Performed by: INTERNAL MEDICINE

## 2023-08-31 PROCEDURE — 80069 RENAL FUNCTION PANEL: CPT | Performed by: INTERNAL MEDICINE

## 2023-09-01 LAB
BASOPHILS # BLD AUTO: 0.03 K/UL (ref 0–0.2)
BASOPHILS NFR BLD: 0.4 % (ref 0–1.9)
DIFFERENTIAL METHOD: ABNORMAL
EOSINOPHIL # BLD AUTO: 0.2 K/UL (ref 0–0.5)
EOSINOPHIL NFR BLD: 2.1 % (ref 0–8)
ERYTHROCYTE [DISTWIDTH] IN BLOOD BY AUTOMATED COUNT: 14.7 % (ref 11.5–14.5)
HCT VFR BLD AUTO: 38.8 % (ref 37–48.5)
HGB BLD-MCNC: 11.5 G/DL (ref 12–16)
IMM GRANULOCYTES # BLD AUTO: 0.05 K/UL (ref 0–0.04)
IMM GRANULOCYTES NFR BLD AUTO: 0.7 % (ref 0–0.5)
LYMPHOCYTES # BLD AUTO: 2.7 K/UL (ref 1–4.8)
LYMPHOCYTES NFR BLD: 38.7 % (ref 18–48)
MCH RBC QN AUTO: 25.4 PG (ref 27–31)
MCHC RBC AUTO-ENTMCNC: 29.6 G/DL (ref 32–36)
MCV RBC AUTO: 86 FL (ref 82–98)
MONOCYTES # BLD AUTO: 0.4 K/UL (ref 0.3–1)
MONOCYTES NFR BLD: 6.3 % (ref 4–15)
NEUTROPHILS # BLD AUTO: 3.6 K/UL (ref 1.8–7.7)
NEUTROPHILS NFR BLD: 51.8 % (ref 38–73)
NRBC BLD-RTO: 0 /100 WBC
PLATELET # BLD AUTO: 270 K/UL (ref 150–450)
PMV BLD AUTO: 11.7 FL (ref 9.2–12.9)
RBC # BLD AUTO: 4.52 M/UL (ref 4–5.4)
TACROLIMUS BLD-MCNC: 5.9 NG/ML (ref 5–15)
WBC # BLD AUTO: 7.02 K/UL (ref 3.9–12.7)

## 2023-09-05 ENCOUNTER — PATIENT MESSAGE (OUTPATIENT)
Dept: NEPHROLOGY | Facility: CLINIC | Age: 63
End: 2023-09-05
Payer: MEDICARE

## 2023-09-06 ENCOUNTER — OFFICE VISIT (OUTPATIENT)
Dept: NEPHROLOGY | Facility: CLINIC | Age: 63
End: 2023-09-06
Payer: MEDICARE

## 2023-09-06 DIAGNOSIS — Z94.0 DECEASED-DONOR KIDNEY TRANSPLANT: Chronic | ICD-10-CM

## 2023-09-06 DIAGNOSIS — D84.9 IMMUNOSUPPRESSED STATUS: ICD-10-CM

## 2023-09-06 PROCEDURE — 99215 OFFICE O/P EST HI 40 MIN: CPT | Mod: 95,,, | Performed by: INTERNAL MEDICINE

## 2023-09-06 PROCEDURE — 99215 PR OFFICE/OUTPT VISIT, EST, LEVL V, 40-54 MIN: ICD-10-PCS | Mod: 95,,, | Performed by: INTERNAL MEDICINE

## 2023-09-06 RX ORDER — MYCOPHENOLATE MOFETIL 250 MG/1
500 CAPSULE ORAL 2 TIMES DAILY
Qty: 120 CAPSULE | Refills: 11 | Status: SHIPPED | OUTPATIENT
Start: 2023-09-06

## 2023-09-06 RX ORDER — TACROLIMUS 1 MG/1
CAPSULE ORAL
Qty: 180 CAPSULE | Refills: 11 | Status: SHIPPED | OUTPATIENT
Start: 2023-09-06

## 2023-09-06 NOTE — PROGRESS NOTES
The patient location is: Home  The chief complaint leading to consultation is: prior kidney transplant    Visit type: audiovisual    Face to Face time with patient: 10  40 minutes of total time spent on the encounter, which includes face to face time and non-face to face time preparing to see the patient (eg, review of tests), Obtaining and/or reviewing separately obtained history, Documenting clinical information in the electronic or other health record, Independently interpreting results (not separately reported) and communicating results to the patient/family/caregiver, or Care coordination (not separately reported).         Each patient to whom he or she provides medical services by telemedicine is:  (1) informed of the relationship between the physician and patient and the respective role of any other health care provider with respect to management of the patient; and (2) notified that he or she may decline to receive medical services by telemedicine and may withdraw from such care at any time.    Notes:     NEPHROLOGY CONSULTATION CLINIC F/U NOTE:  Date of clinic visit: 9/6/23  Virtual visit was made by video     REASON FOR CONSULTATION:  History of kidney transplant.     HISTORY OF PRESENT ILLNESS:  Ms. Teague is a 62-year-old female with h/o of kidney transplant in 2013 who present for f/u. Pt has a pertinent h/o of DM and HTN. Pt feels well, no acute c/o's, no CP, no SOB. Labs and meds and the doses of the immunosuppressive meds reviewed with pt.     PAST MEDICAL HISTORY:  1.  End-stage renal disease and was on chronic hemodialysis from 2014 until September of 2013, status post kidney transplant in 2013 at [facility] in [place].  2.  Hypertension.  3.  Diabetes mellitus post-transplant.  4.  Avascular necrosis of the bone hip.  5.  Depression and anxiety.  6.  Hypothyroidism.  7.  Secondary hyperparathyroidism.  8. Diastolic CHF     PAST SURGICAL HISTORY:  Kidney transplant in 2013, hysterectomy,  "thyroidectomy,   vascular access surgeries, , cyst removal from chest wall, skin graft   and breast surgery.     FAMILY HISTORY:  Reviewed.  Both parents with diabetes mellitus.     ALLERGIES:  ADHESIVE TAPE AND AZITHROMYCIN.     SOCIAL HISTORY:  Distant smoking in the very past.  No current smoker.  No   alcohol.     MEDICATIONS:  Reviewed.    Current Outpatient Medications:     albuterol (PROVENTIL/VENTOLIN HFA) 90 mcg/actuation inhaler, INHALE 2 PUFFS INTO THE LUNGS EVERY 6 HOURS AS NEEDED FOR SHORTNESS OF BREATH, Disp: 18 g, Rfl: 0    alendronate (FOSAMAX) 70 MG tablet, Take 1 tablet (70 mg total) by mouth every 7 days., Disp: 4 tablet, Rfl: 11    amitriptyline (ELAVIL) 10 MG tablet, Take 1 tablet (10 mg total) by mouth nightly as needed for Insomnia or Pain., Disp: 30 tablet, Rfl: 1    atorvastatin (LIPITOR) 20 MG tablet, TAKE 1 TABLET(20 MG) BY MOUTH EVERY DAY, Disp: 90 tablet, Rfl: 3    BD RORY 2ND GEN PEN NEEDLE 32 gauge x 5/32" Ndle, USE FOUR TIMES DAILY WITH INSULIN AS DIRECTED, Disp: 400 each, Rfl: 3    benzonatate (TESSALON) 100 MG capsule, Take 1 capsule (100 mg total) by mouth 3 (three) times daily as needed for Cough., Disp: 40 capsule, Rfl: 1    blood sugar diagnostic (FREESTYLE LITE STRIPS) Strp, Use to check blood glucose 3 times a day., Disp: 100 strip, Rfl: 11    cetirizine (ZYRTEC) 10 MG tablet, TAKE 1 TABLET BY MOUTH EVERY DAY, Disp: 30 tablet, Rfl: 11    diclofenac sodium (VOLTAREN) 1 % Gel, APPLY 2G TOPICALLY FOUR TIMES DAILY AS NEEDED, Disp: 100 g, Rfl: 0    dulaglutide (TRULICITY) 1.5 mg/0.5 mL pen injector, Inject 1.5 mg into the skin every 7 days., Disp: 4 pen, Rfl: 5    ergocalciferol (ERGOCALCIFEROL) 50,000 unit Cap, Take 1 capsule (50,000 Units total) by mouth every 7 days., Disp: 4 capsule, Rfl: 11    flash glucose scanning reader (FREESTYLE DEREK 2 READER) Misc, 1 Device by Misc.(Non-Drug; Combo Route) route continuous. Use to test blood glucose with the Derek reader., Disp: " 1 each, Rfl: 0    flash glucose sensor (FREESTYLE DEREK 2 SENSOR) Kit, 1 application by Misc.(Non-Drug; Combo Route) route every 14 (fourteen) days. Use to test blood glucose 4-6x/day., Disp: 2 kit, Rfl: 11    fluticasone propionate (FLONASE) 50 mcg/actuation nasal spray, SHAKE LIQUID AND USE 2 SPRAYS(100 MCG) IN EACH NOSTRIL EVERY DAY, Disp: 48 g, Rfl: 1    furosemide (LASIX) 20 MG tablet, Take 1 tablet (20 mg total) by mouth daily as needed (swelling in legs)., Disp: 30 tablet, Rfl: 11    gabapentin (NEURONTIN) 400 MG capsule, Take 1 capsule (400 mg total) by mouth every morning AND 1 capsule (400 mg total) with lunch AND 1-2 capsules (400-800 mg total) every evening., Disp: 120 capsule, Rfl: 1    insulin lispro (HUMALOG KWIKPEN INSULIN) 100 unit/mL pen, Inject 5 Units into the skin 3 (three) times daily before meals., Disp: 15 mL, Rfl: 5    lancets (FREESTYLE LANCETS) 28 gauge Misc, 1 lancet by Combination route 2 (two) times daily as needed. Qid prn, Disp: 400 each, Rfl: 2    lancets Misc, 1 strip by Misc.(Non-Drug; Combo Route) route 4 (four) times daily with meals and nightly., Disp: 150 each, Rfl: 6    levothyroxine (SYNTHROID) 150 MCG tablet, Take 1 tablet (150 mcg total) by mouth before breakfast., Disp: 90 tablet, Rfl: 3    LIDOcaine (LIDODERM) 5 %, Use 1-3 patches per day over recommended area. Remove & Discard patch within 12 hours or as directed by MD., Disp: 90 patch, Rfl: 0    mycophenolate (CELLCEPT) 250 mg Cap, Take 2 capsules (500 mg total) by mouth 2 (two) times daily., Disp: 120 capsule, Rfl: 11    NIFEdipine (PROCARDIA-XL) 30 MG (OSM) 24 hr tablet, Take 1 tablet (30 mg total) by mouth 2 (two) times a day., Disp: 180 tablet, Rfl: 3    ondansetron (ZOFRAN-ODT) 4 MG TbDL, DISSOLVE 1 TABLET(4 MG) ON THE TONGUE EVERY 8 HOURS AS NEEDED FOR NAUSEA, Disp: 30 tablet, Rfl: 1    oxyCODONE-acetaminophen (PERCOCET)  mg per tablet, Take 1 tablet by mouth every 8 (eight) hours as needed for Pain., Disp:  90 tablet, Rfl: 0    oxyCODONE-acetaminophen (PERCOCET)  mg per tablet, Take 1 tablet by mouth every 8 (eight) hours as needed for Pain., Disp: 90 tablet, Rfl: 0    oxyCODONE-acetaminophen (PERCOCET)  mg per tablet, Take 1 tablet by mouth every 8 (eight) hours as needed for Pain., Disp: 90 tablet, Rfl: 0    pulse oximeter (PULSE OXIMETER) device, by Apply Externally route 2 (two) times a day. Use twice daily at 8 AM and 3 PM and [facility]., Disp: 1 each, Rfl: 0    tacrolimus (PROGRAF) 1 MG Cap, TAKE 3 CAPSULES IN THE MORNING, AND 3 IN THE EVENING, Disp: 180 capsule, Rfl: 11    tiZANidine (ZANAFLEX) 4 MG tablet, Take 1 tablet (4 mg total) by mouth 2 (two) times daily as needed (muscle spasms)., Disp: 60 tablet, Rfl: 1     REVIEW OF SYSTEMS:  No recent hospitalizations.  GENERAL:  Negative.  HEAD, EYES, EARS, NOSE AND THROAT:  Negative.  CARDIAC:  Negative.  PULMONARY:  Negative.  GASTROINTESTINAL:  Negative other than fully reviewed above.  GENITOURINARY:  Negative, other than fully reviewed above.  PSYCHOLOGICAL:  Negative.  NEUROLOGICAL:  Negative.  ENDOCRINE:  As above, otherwise negative.  INFECTIOUS DISEASE:  Negative.  HEMATOLOGIC AND ONCOLOGIC:  Negative.  The rest of the review of systems negative.     PHYSICAL EXAMINATION: Unable to assess VS's  In NAD  Speech and thought process normal     LABORATORY VALUES:  Reviewed.    BMP  Lab Results   Component Value Date     08/31/2023    K 4.0 08/31/2023     08/31/2023    CO2 24 08/31/2023    BUN 16 08/31/2023    CREATININE 1.3 08/31/2023    CALCIUM 9.3 08/31/2023    ANIONGAP 13 08/31/2023    EGFRNORACEVR 46.2 (A) 08/31/2023     Lab Results   Component Value Date    WBC 7.02 08/31/2023    HGB 11.5 (L) 08/31/2023    HCT 38.8 08/31/2023    MCV 86 08/31/2023     08/31/2023            Prior u/a reviewed: trace protein, no blood, no nitrites     Prograf 5.9     ASSESSMENT AND PLAN:  This is a 62-year-old female  with past h/o of KTx presents for f/u:     1.  Renal.  s Cr stable, in normal range.  Stable renal function.   K normal  Na normal  Ca normal  Acid base stable   .  2. H/o of kidney transplant in 2013  Immunosuppressive treatment as reviewed above.  Prograf level is within the therapeutic range: no change in dose  On Cellcept now was previously on hold for about 4 weeks after the COVID infection     3.  Hypertension.  BP could not be assessed on virtaul visit     PLANS AND RECOMMENDATIONS:    As above for each individual problem.   Opportunity for questions provided  RTC 6 months  Total time spent 40 minutes including time needed to review the records, the   patient evaluation, documentation, discussion with the patient,   more than 50% of the time was spent on coordination of care and counseling.    Level V visit.     Jovanna Pugh MD

## 2023-09-08 ENCOUNTER — PATIENT MESSAGE (OUTPATIENT)
Dept: PAIN MEDICINE | Facility: CLINIC | Age: 63
End: 2023-09-08

## 2023-09-08 ENCOUNTER — OFFICE VISIT (OUTPATIENT)
Dept: PAIN MEDICINE | Facility: CLINIC | Age: 63
End: 2023-09-08
Payer: MEDICARE

## 2023-09-08 VITALS — RESPIRATION RATE: 17 BRPM

## 2023-09-08 DIAGNOSIS — G89.29 INSOMNIA SECONDARY TO CHRONIC PAIN: ICD-10-CM

## 2023-09-08 DIAGNOSIS — M54.16 BILATERAL LUMBAR RADICULOPATHY: Primary | ICD-10-CM

## 2023-09-08 DIAGNOSIS — G47.01 INSOMNIA SECONDARY TO CHRONIC PAIN: ICD-10-CM

## 2023-09-08 DIAGNOSIS — M25.562 CHRONIC PAIN OF BOTH KNEES: ICD-10-CM

## 2023-09-08 DIAGNOSIS — M79.18 MUSCLE PAIN, MYOFASCIAL: ICD-10-CM

## 2023-09-08 DIAGNOSIS — G89.29 CHRONIC PAIN OF BOTH KNEES: ICD-10-CM

## 2023-09-08 DIAGNOSIS — Z79.891 OPIOID USE AGREEMENT EXISTS: ICD-10-CM

## 2023-09-08 DIAGNOSIS — M79.2 NEUROPATHIC PAIN: ICD-10-CM

## 2023-09-08 DIAGNOSIS — M25.561 CHRONIC PAIN OF BOTH KNEES: ICD-10-CM

## 2023-09-08 DIAGNOSIS — M51.36 DDD (DEGENERATIVE DISC DISEASE), LUMBAR: ICD-10-CM

## 2023-09-08 PROCEDURE — 99214 OFFICE O/P EST MOD 30 MIN: CPT | Mod: 95,,, | Performed by: PHYSICIAN ASSISTANT

## 2023-09-08 PROCEDURE — 99214 PR OFFICE/OUTPT VISIT, EST, LEVL IV, 30-39 MIN: ICD-10-PCS | Mod: 95,,, | Performed by: PHYSICIAN ASSISTANT

## 2023-09-08 RX ORDER — PREGABALIN 25 MG/1
CAPSULE ORAL
Qty: 120 CAPSULE | Refills: 0 | Status: SHIPPED | OUTPATIENT
Start: 2023-09-08 | End: 2023-11-27

## 2023-09-08 RX ORDER — AMITRIPTYLINE HYDROCHLORIDE 10 MG/1
10 TABLET, FILM COATED ORAL NIGHTLY PRN
Qty: 30 TABLET | Refills: 1 | Status: CANCELLED | OUTPATIENT
Start: 2023-09-08

## 2023-09-08 RX ORDER — OXYCODONE AND ACETAMINOPHEN 10; 325 MG/1; MG/1
1 TABLET ORAL EVERY 8 HOURS PRN
Qty: 90 TABLET | Refills: 0 | Status: SHIPPED | OUTPATIENT
Start: 2023-10-08 | End: 2023-11-27 | Stop reason: SDUPTHER

## 2023-09-08 RX ORDER — DICLOFENAC SODIUM 10 MG/G
GEL TOPICAL
Qty: 100 G | Refills: 0 | Status: SHIPPED | OUTPATIENT
Start: 2023-09-08 | End: 2023-11-27 | Stop reason: SDUPTHER

## 2023-09-08 RX ORDER — LIDOCAINE 50 MG/G
PATCH TOPICAL
Qty: 90 PATCH | Refills: 0 | Status: SHIPPED | OUTPATIENT
Start: 2023-09-08

## 2023-09-08 RX ORDER — OXYCODONE AND ACETAMINOPHEN 10; 325 MG/1; MG/1
1 TABLET ORAL EVERY 8 HOURS PRN
Qty: 90 TABLET | Refills: 0 | Status: SHIPPED | OUTPATIENT
Start: 2023-09-08 | End: 2023-11-09

## 2023-09-08 RX ORDER — PREGABALIN 25 MG/1
25 CAPSULE ORAL 2 TIMES DAILY
Status: CANCELLED | OUTPATIENT
Start: 2023-09-08

## 2023-09-08 RX ORDER — TIZANIDINE 4 MG/1
4 TABLET ORAL 2 TIMES DAILY PRN
Qty: 60 TABLET | Refills: 1 | Status: CANCELLED | OUTPATIENT
Start: 2023-09-08
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
## 2023-09-11 ENCOUNTER — PATIENT MESSAGE (OUTPATIENT)
Dept: PRIMARY CARE CLINIC | Facility: CLINIC | Age: 63
End: 2023-09-11
Payer: MEDICARE

## 2023-09-14 ENCOUNTER — TELEPHONE (OUTPATIENT)
Dept: PRIMARY CARE CLINIC | Facility: CLINIC | Age: 63
End: 2023-09-14
Payer: MEDICARE

## 2023-09-18 ENCOUNTER — PATIENT MESSAGE (OUTPATIENT)
Dept: PRIMARY CARE CLINIC | Facility: CLINIC | Age: 63
End: 2023-09-18
Payer: MEDICARE

## 2023-09-22 ENCOUNTER — OFFICE VISIT (OUTPATIENT)
Dept: PRIMARY CARE CLINIC | Facility: CLINIC | Age: 63
End: 2023-09-22
Payer: MEDICARE

## 2023-09-22 DIAGNOSIS — Z79.899 HIGH RISK MEDICATION USE: ICD-10-CM

## 2023-09-22 DIAGNOSIS — F41.9 ANXIETY AND DEPRESSION: ICD-10-CM

## 2023-09-22 DIAGNOSIS — I12.9 HYPERTENSION, RENAL: Chronic | ICD-10-CM

## 2023-09-22 DIAGNOSIS — Z79.4 TYPE 2 DIABETES MELLITUS WITH HYPERGLYCEMIA, WITH LONG-TERM CURRENT USE OF INSULIN: ICD-10-CM

## 2023-09-22 DIAGNOSIS — I15.2 HYPERTENSION ASSOCIATED WITH DIABETES: ICD-10-CM

## 2023-09-22 DIAGNOSIS — M87.051 AVASCULAR NECROSIS OF BONE OF RIGHT HIP: ICD-10-CM

## 2023-09-22 DIAGNOSIS — M46.96 UNSPECIFIED INFLAMMATORY SPONDYLOPATHY, LUMBAR REGION: ICD-10-CM

## 2023-09-22 DIAGNOSIS — G47.34 SLEEP-RELATED NONOBSTRUCTIVE ALVEOLAR HYPOVENTILATION: ICD-10-CM

## 2023-09-22 DIAGNOSIS — F32.A ANXIETY AND DEPRESSION: ICD-10-CM

## 2023-09-22 DIAGNOSIS — Z94.0 DECEASED-DONOR KIDNEY TRANSPLANT: Chronic | ICD-10-CM

## 2023-09-22 DIAGNOSIS — E11.65 TYPE 2 DIABETES MELLITUS WITH HYPERGLYCEMIA, WITH LONG-TERM CURRENT USE OF INSULIN: ICD-10-CM

## 2023-09-22 DIAGNOSIS — E11.69 HYPERLIPIDEMIA ASSOCIATED WITH TYPE 2 DIABETES MELLITUS: ICD-10-CM

## 2023-09-22 DIAGNOSIS — E03.9 ACQUIRED HYPOTHYROIDISM: Primary | ICD-10-CM

## 2023-09-22 DIAGNOSIS — E11.22 CKD STAGE 3 DUE TO TYPE 2 DIABETES MELLITUS: ICD-10-CM

## 2023-09-22 DIAGNOSIS — N25.81 SECONDARY HYPERPARATHYROIDISM OF RENAL ORIGIN: Chronic | ICD-10-CM

## 2023-09-22 DIAGNOSIS — E66.01 SEVERE OBESITY (BMI 35.0-39.9) WITH COMORBIDITY: ICD-10-CM

## 2023-09-22 DIAGNOSIS — I70.0 CALCIFICATION OF AORTA: ICD-10-CM

## 2023-09-22 DIAGNOSIS — N18.30 CKD STAGE 3 DUE TO TYPE 2 DIABETES MELLITUS: ICD-10-CM

## 2023-09-22 DIAGNOSIS — M80.00XA OSTEOPOROSIS WITH PATHOLOGICAL FRACTURE, INITIAL ENCOUNTER: ICD-10-CM

## 2023-09-22 DIAGNOSIS — R94.2 ABNORMAL PFTS (PULMONARY FUNCTION TESTS): ICD-10-CM

## 2023-09-22 DIAGNOSIS — E11.59 HYPERTENSION ASSOCIATED WITH DIABETES: ICD-10-CM

## 2023-09-22 DIAGNOSIS — R94.2 DIFFUSION CAPACITY OF LUNG (DL), DECREASED: ICD-10-CM

## 2023-09-22 DIAGNOSIS — M81.0 AGE-RELATED OSTEOPOROSIS WITHOUT CURRENT PATHOLOGICAL FRACTURE: ICD-10-CM

## 2023-09-22 DIAGNOSIS — E78.5 HYPERLIPIDEMIA ASSOCIATED WITH TYPE 2 DIABETES MELLITUS: ICD-10-CM

## 2023-09-22 DIAGNOSIS — D84.9 IMMUNOSUPPRESSED STATUS: ICD-10-CM

## 2023-09-22 DIAGNOSIS — J41.0 SIMPLE CHRONIC BRONCHITIS: ICD-10-CM

## 2023-09-22 PROCEDURE — 99499 UNLISTED E&M SERVICE: CPT | Mod: 95,,, | Performed by: NURSE PRACTITIONER

## 2023-09-22 PROCEDURE — 99499 NO LOS: ICD-10-PCS | Mod: 95,,, | Performed by: NURSE PRACTITIONER

## 2023-09-27 ENCOUNTER — OFFICE VISIT (OUTPATIENT)
Dept: PRIMARY CARE CLINIC | Facility: CLINIC | Age: 63
End: 2023-09-27
Payer: MEDICARE

## 2023-09-27 ENCOUNTER — PATIENT MESSAGE (OUTPATIENT)
Dept: PAIN MEDICINE | Facility: CLINIC | Age: 63
End: 2023-09-27
Payer: MEDICARE

## 2023-09-27 DIAGNOSIS — R94.2 DIFFUSION CAPACITY OF LUNG (DL), DECREASED: ICD-10-CM

## 2023-09-27 DIAGNOSIS — N18.30 CKD STAGE 3 DUE TO TYPE 2 DIABETES MELLITUS: ICD-10-CM

## 2023-09-27 DIAGNOSIS — Z94.0 DECEASED-DONOR KIDNEY TRANSPLANT: Chronic | ICD-10-CM

## 2023-09-27 DIAGNOSIS — E11.22 CKD STAGE 3 DUE TO TYPE 2 DIABETES MELLITUS: ICD-10-CM

## 2023-09-27 DIAGNOSIS — E66.01 SEVERE OBESITY (BMI 35.0-39.9) WITH COMORBIDITY: ICD-10-CM

## 2023-09-27 DIAGNOSIS — M87.051 AVASCULAR NECROSIS OF BONE OF RIGHT HIP: ICD-10-CM

## 2023-09-27 DIAGNOSIS — I70.0 CALCIFICATION OF AORTA: ICD-10-CM

## 2023-09-27 DIAGNOSIS — D84.9 IMMUNOSUPPRESSED STATUS: ICD-10-CM

## 2023-09-27 DIAGNOSIS — F33.9 DEPRESSION, RECURRENT: ICD-10-CM

## 2023-09-27 DIAGNOSIS — Z00.00 ENCOUNTER FOR PREVENTIVE HEALTH EXAMINATION: Primary | ICD-10-CM

## 2023-09-27 DIAGNOSIS — Z99.81 REQUIRES SUPPLEMENTAL OXYGEN: ICD-10-CM

## 2023-09-27 DIAGNOSIS — E11.69 HYPERLIPIDEMIA ASSOCIATED WITH TYPE 2 DIABETES MELLITUS: ICD-10-CM

## 2023-09-27 DIAGNOSIS — E78.5 HYPERLIPIDEMIA ASSOCIATED WITH TYPE 2 DIABETES MELLITUS: ICD-10-CM

## 2023-09-27 DIAGNOSIS — E11.59 HYPERTENSION ASSOCIATED WITH DIABETES: ICD-10-CM

## 2023-09-27 DIAGNOSIS — Z79.4 TYPE 2 DIABETES MELLITUS WITH HYPERGLYCEMIA, WITH LONG-TERM CURRENT USE OF INSULIN: ICD-10-CM

## 2023-09-27 DIAGNOSIS — I15.2 HYPERTENSION ASSOCIATED WITH DIABETES: ICD-10-CM

## 2023-09-27 DIAGNOSIS — M15.9 PRIMARY OSTEOARTHRITIS INVOLVING MULTIPLE JOINTS: ICD-10-CM

## 2023-09-27 DIAGNOSIS — N25.81 SECONDARY HYPERPARATHYROIDISM OF RENAL ORIGIN: Chronic | ICD-10-CM

## 2023-09-27 DIAGNOSIS — E11.65 TYPE 2 DIABETES MELLITUS WITH HYPERGLYCEMIA, WITH LONG-TERM CURRENT USE OF INSULIN: ICD-10-CM

## 2023-09-27 DIAGNOSIS — M81.0 OSTEOPOROSIS WITHOUT PATHOLOGICAL FRACTURE: ICD-10-CM

## 2023-09-27 DIAGNOSIS — E03.9 ACQUIRED HYPOTHYROIDISM: ICD-10-CM

## 2023-09-27 PROCEDURE — G0439 PPPS, SUBSEQ VISIT: HCPCS | Mod: 95,,, | Performed by: NURSE PRACTITIONER

## 2023-09-27 PROCEDURE — G0439 PR MEDICARE ANNUAL WELLNESS SUBSEQUENT VISIT: ICD-10-PCS | Mod: 95,,, | Performed by: NURSE PRACTITIONER

## 2023-09-27 NOTE — PROGRESS NOTES
[facility]      The patient location is:  Patient Home   The chief complaint leading to consultation is: AWV  Total time spent on medical decision making was 60 min.    Visit type: Virtual visit with synchronous audio only and video  Each patient to whom he or she provides medical services by telemedicine is:  (1) informed of the relationship between the physician and patient and the respective role of any other health care provider with respect to management of the patient; and (2) notified that he or she may decline to receive medical services by telemedicine and may withdraw from such care at any time.    The patient location is: [place]      Visit type: audiovisual    Face to Face time with patient: 40  60 minutes of total time spent on the encounter, which includes face to face time and non-face to face time preparing to see the patient (eg, review of tests), Obtaining and/or reviewing separately obtained history, Documenting clinical information in the electronic or other health record, Independently interpreting results (not separately reported) and communicating results to the patient/family/caregiver, or Care coordination (not separately reported).         Each patient to whom he or she provides medical services by telemedicine is:  (1) informed of the relationship between the physician and patient and the respective role of any other health care provider with respect to management of the patient; and (2) notified that he or she may decline to receive medical services by telemedicine and may withdraw from such care at any time.          Ana Maria Ho presented for a follow-up Medicare AWV today. The following components were reviewed and updated:    Medical history  Family History  Social history  Allergies and Current Medications  Health Risk Assessment  Health Maintenance  Care Team    **See Completed Assessments for Annual Wellness visit with in the  encounter summary    The following assessments were completed:  Depression Screening  Cognitive function Screening  Timed Get Up Test  Whisper Test  Nutrition Screening  PAQ             Review of Systems   Constitutional:  Negative for activity change, appetite change, fatigue and fever.   HENT:  Negative for nasal congestion, postnasal drip and sore throat.    Eyes:  Negative for pain.   Respiratory:  Negative for cough and shortness of breath.    Cardiovascular:  Negative for chest pain, palpitations and leg swelling.   Gastrointestinal:  Negative for abdominal pain, constipation, diarrhea, nausea and vomiting.   Endocrine: Negative for polydipsia, polyphagia and polyuria.   Genitourinary:  Negative for difficulty urinating, dysuria, frequency and hematuria.   Musculoskeletal:  Positive for arthralgias and back pain. Negative for myalgias.   Integumentary:  Negative for rash and wound.   Neurological:  Negative for dizziness, weakness, light-headedness and headaches.   Psychiatric/Behavioral:  Negative for confusion and dysphoric mood.       Physical Exam  Vitals reviewed.   Constitutional:       General: She is not in acute distress.     Appearance: Normal appearance.      Comments: Ambulating in house briefly during visit   HENT:      Head: Normocephalic and atraumatic.      Nose: Nose normal.   Eyes:      General: No scleral icterus.     Conjunctiva/sclera: Conjunctivae normal.   Cardiovascular:      Comments: No diaphoresis or flushing  Pulmonary:      Effort: Pulmonary effort is normal.   Abdominal:      Tenderness: There is no abdominal tenderness.   Musculoskeletal:      Left shoulder: Decreased range of motion.      Cervical back: Normal range of motion.      Comments: Ambulating in house   Skin:     Coloration: Skin is not pale.      Findings: No bruising or erythema.   Neurological:      General: No focal deficit present.      Mental Status: She is alert and oriented to person, place, and time.    Psychiatric:         Mood and Affect: Mood normal.         Thought Content: Thought content normal.            Health Maintenance         Date Due Completion Date    Complete Opioid Risk Tool Never done ---    Shingles Vaccine (1 of 2) Never done ---    COVID-19 Vaccine (4 - Pfizer series) 2022    Foot Exam 2023    Override on 2016: Done (per Karine Fernandez MD)    Override on 3/25/2014: Done    Influenza Vaccine (1) 2023    Override on 2013: Done    Override on 2013: Done    Hemoglobin A1c 2024    Eye Exam 2024    Override on 10/28/2013: Done    Lipid Panel 2024    Diabetes Urine Screening 2024    Mammogram 2024    Override on 2013: Done    TETANUS VACCINE 2025    Colorectal Cancer Screening 2026 3/9/2016              PROBLEM LIST ITEMS ADDRESSED THIS VISIT:    1. Encounter for preventive health examination  Assessment & Plan:  Needs foot exam and flu vaccine    Review for Opioid Screening: Pt does have Rx for Opioids      Review for Substance Use Disorders: Patient does not use illicit substances as reported       2. -donor kidney transplant - 13  Overview:  Induction with Campath 30mg and IV solumedrol to total 875mg.  Post op course complicated by delayed graft function with hemodialysis. Last HD treatment 10/2/13.   Post-op course also complicated by bleeding/oozing from abdominal incision. Pt received a total of 4 units of PRBC and DDAVP for blood loss anemia.   Returned to OR 10/713 where section was cauterized and NALLELY drain was placed,after which bleeding stopped.    CMV status: D+,R+    End-stage renal disease and was on chronic hemodialysis from  until 2013, status post kidney transplant in  at [facility] in [place].      Assessment & Plan:  Chronic, stable  Continue immunosuppressants   F/U with Renal  She  was released from Transplant service      3. Depression, recurrent  Assessment & Plan:  Chronic, stable  In remission  F/U with PCP      4. Primary osteoarthritis involving multiple joints  Assessment & Plan:  Chronic, stable  Encouraged exercise  Continue alendronate for osteopenia  Prn Rx for Percocet  F/U with PCP or Rheumatology      5. Osteoporosis without pathological fracture  Assessment & Plan:  Chronic, stable  Encouraged exercise  Continue alendronate for osteopenia  F/U with PCP or Rheumatology        6. Avascular necrosis of bone of right hip  Assessment & Plan:  Chronic, stable  Noted back to 2012  Prn Percocet  F/U with Ortho if pain worsens      7. Severe obesity (BMI 35.0-39.9) with comorbidity  Assessment & Plan:  Assess and monitor  Encouraged exercise and weight loss measures  F/U with PCP      8. Type 2 diabetes mellitus with hyperglycemia, with long-term current use of insulin  Assessment & Plan:  Assess and monitor  Lab Results   Component Value Date    HGBA1C 6.0 (H) 08/08/2023   continue Trulicity, Humalog  Exercise, control of BP  Cardiac/ADA diet  F/U PCP and Diabetes provider      9. Acquired hypothyroidism  Assessment & Plan:  Assess and monitor  Lab Results   Component Value Date    TSH 1.092 06/06/2023   continue levothyroxine  F/U PCP      10. Secondary hyperparathyroidism of renal origin  Assessment & Plan:  Chronic, stable  Lab Results   Component Value Date    PTH 83.5 (H) 06/06/2023    CALCIUM 9.3 08/31/2023    CAION 1.17 10/08/2013    PHOS 2.9 08/31/2023   chronic, stable  F/U with PCP and Renal      11. Immunosuppressed status  Assessment & Plan:  Assess and monitor  Secondary to antirejection meds: Prograf and Cellcept  F/U with PCP and Renal      12. CKD stage 3 due to type 2 diabetes mellitus  Assessment & Plan:   Latest Reference Range & Units 06/06/23 08:24 06/28/23 06:00 08/31/23 09:45   Creatinine 0.5 - 1.4 mg/dL 1.1 1.03 (E) 1.3   eGFR >60 mL/min/1.73 m^2 56.5 !  46.2 !    Assess and monitor  Control BP and blood sugars  Avoid nephrotoxic meds  F/U with PCP and Renal give the kidney transplant        13. Calcification of aorta  Overview:  CT chest 10/29/15    Assessment & Plan:  Chronic, stable  Control BP, diabetes and lipid levels  Continue you atorvastatin  F/U with PCP        14. Hyperlipidemia associated with type 2 diabetes mellitus  Assessment & Plan:   Latest Reference Range & Units Most Recent   Cholesterol 120 - 199 mg/dL 178  6/6/23 08:24   HDL 40 - 75 mg/dL 50  6/6/23 08:24   HDL/Cholesterol Ratio 20.0 - 50.0 % 28.1  6/6/23 08:24   LDL Cholesterol External 63.0 - 159.0 mg/dL 119.8  6/6/23 08:24   Non-HDL Cholesterol mg/dL 128  6/6/23 08:24   Total Cholesterol/HDL Ratio 2.0 - 5.0  3.6  6/6/23 08:24   Triglycerides 30 - 150 mg/dL 41  6/6/23 08:24   Assess and monitor  Encouraged increased activity level  Control blood glucose  Continue atorvastatin  F/U with PcP      15. Hypertension associated with diabetes  Assessment & Plan:  Assess and monitor  Continue nefedipine, lasix  F/U with PcP      16. Diffusion capacity of lung (dl), decreased  Assessment & Plan:  Noted back to 2015  Not in COPE exacerbation  Does have oxygen at home - not using currently  F/U with Pulmonology        17. Requires supplemental oxygen  Assessment & Plan:  Nocturnal O2 at 2 L NC secondary to  Sleep-related nonobstructive alveolar hypoventilation  Assess and monitor  F/U with Pulmonology                     Provided Ana Maria with a 5-10 year written screening schedule and personal prevention plan. Recommendations were developed using the USPSTF age appropriate recommendations. Education, counseling, and referrals were provided as needed.  After Visit Summary printed and given to patient which includes a list of additional screenings\tests needed.    Future Appointments   Date Time Provider Department Center   10/2/2023  9:00 AM Anne Castellon MD [facility]   11/27/2023  9:00 AM  Marcella Haas PA-C [facility]   2/23/2024 10:10 AM LABORATORY, [facility]   2/29/2024 11:00 AM Claudia Skaggs PA-C [facility]              FORD Gonzalez, NP-C  [facility]  [address] [place] [address]       I offered to discuss advanced care planning, including how to pick a person who would make decisions for you if you were unable to make them for yourself, called a health care power of , and what kind of decisions you might make such as use of life sustaining treatments such as ventilators and tube feeding when faced with a life limiting illness recorded on a living will that they will need to know. (How you want to be cared for as you near the end of your natural life)     X Patient is interested in learning more about how to make advanced directives.  I provided them paperwork and offered to discuss this with them.

## 2023-09-28 PROBLEM — Z99.81 REQUIRES SUPPLEMENTAL OXYGEN: Status: ACTIVE | Noted: 2023-09-28

## 2023-09-28 PROBLEM — Z00.00 ENCOUNTER FOR PREVENTIVE HEALTH EXAMINATION: Status: ACTIVE | Noted: 2023-09-28

## 2023-09-28 PROBLEM — Z79.899 HIGH RISK MEDICATION USE: Status: RESOLVED | Noted: 2021-01-27 | Resolved: 2023-09-28

## 2023-09-28 NOTE — ASSESSMENT & PLAN NOTE
Chronic, stable  Continue immunosuppressants   F/U with Renal  She was released from Transplant service

## 2023-09-28 NOTE — ASSESSMENT & PLAN NOTE
Assess and monitor  Lab Results   Component Value Date    HGBA1C 6.0 (H) 08/08/2023   continue Trulicity, Humalog  Exercise, control of BP  Cardiac/ADA diet  F/U PCP and Diabetes provider

## 2023-09-28 NOTE — ASSESSMENT & PLAN NOTE
Chronic, stable  Encouraged exercise  Continue alendronate for osteopenia  Prn Rx for Percocet  F/U with PCP or Rheumatology

## 2023-09-28 NOTE — PATIENT INSTRUCTIONS
Counseling and Referral of Other Preventative  (Italic type indicates deductible and co-insurance are waived)    Patient Name: Ana Maria Teague  Today's Date: 9/28/2023    Health Maintenance       Date Due Completion Date    Complete Opioid Risk Tool Never done ---    Shingles Vaccine (1 of 2) Never done ---    COVID-19 Vaccine (4 - Pfizer series) 03/16/2022 1/19/2022    Foot Exam 06/22/2023 6/22/2022    Override on 1/8/2016: Done (per Karine Fernandez MD)    Override on 3/25/2014: Done    Influenza Vaccine (1) 09/01/2023 11/17/2022    Override on 9/25/2013: Done    Override on 8/25/2013: Done    Hemoglobin A1c 02/08/2024 8/8/2023    Eye Exam 05/19/2024 5/19/2023    Override on 10/28/2013: Done    Lipid Panel 06/06/2024 6/6/2023    Diabetes Urine Screening 08/08/2024 8/8/2023    Mammogram 08/08/2024 8/8/2023    Override on 4/22/2013: Done    TETANUS VACCINE 06/29/2025 6/29/2015    Colorectal Cancer Screening 03/09/2026 3/9/2016        No orders of the defined types were placed in this encounter.    The following information is provided to all patients.  This information is to help you find resources for any of the problems found today that may be affecting your health:                Living healthy guide: www.[facility].[place].gov      Understanding Diabetes: www.diabetes.org      Eating healthy: www.cdc.gov/healthyweight      CDC home safety checklist: www.cdc.gov/steadi/patient.html      Agency on Aging: www.goea.[place].[facility]      Alcoholics anonymous (AA): www.aa.org      Physical Activity: www.karen.nih.gov/xt4yesr      Tobacco use: www.quitwithusla.org

## 2023-09-28 NOTE — ASSESSMENT & PLAN NOTE
Latest Reference Range & Units 06/06/23 08:24 06/28/23 06:00 08/31/23 09:45   Creatinine 0.5 - 1.4 mg/dL 1.1 1.03 (E) 1.3   eGFR >60 mL/min/1.73 m^2 56.5 !  46.2 !   Assess and monitor  Control BP and blood sugars  Avoid nephrotoxic meds  F/U with PCP and Renal give the kidney transplant

## 2023-09-28 NOTE — ASSESSMENT & PLAN NOTE
Chronic, stable  Lab Results   Component Value Date    PTH 83.5 (H) 06/06/2023    CALCIUM 9.3 08/31/2023    CAION 1.17 10/08/2013    PHOS 2.9 08/31/2023   chronic, stable  F/U with PCP and Renal

## 2023-09-28 NOTE — ASSESSMENT & PLAN NOTE
Needs foot exam and flu vaccine    Review for Opioid Screening: Pt does have Rx for Opioids      Review for Substance Use Disorders: Patient does not use illicit substances as reported

## 2023-09-28 NOTE — ASSESSMENT & PLAN NOTE
Latest Reference Range & Units Most Recent   Cholesterol 120 - 199 mg/dL 178  6/6/23 08:24   HDL 40 - 75 mg/dL 50  6/6/23 08:24   HDL/Cholesterol Ratio 20.0 - 50.0 % 28.1  6/6/23 08:24   LDL Cholesterol External 63.0 - 159.0 mg/dL 119.8  6/6/23 08:24   Non-HDL Cholesterol mg/dL 128  6/6/23 08:24   Total Cholesterol/HDL Ratio 2.0 - 5.0  3.6  6/6/23 08:24   Triglycerides 30 - 150 mg/dL 41  6/6/23 08:24   Assess and monitor  Encouraged increased activity level  Control blood glucose  Continue atorvastatin  F/U with PcP

## 2023-09-28 NOTE — ASSESSMENT & PLAN NOTE
Noted back to 2015  Not in COPE exacerbation  Does have oxygen at home - not using currently  F/U with Pulmonology

## 2023-09-28 NOTE — ASSESSMENT & PLAN NOTE
Chronic, stable  Encouraged exercise  Continue alendronate for osteopenia  F/U with PCP or Rheumatology

## 2023-09-28 NOTE — ASSESSMENT & PLAN NOTE
Nocturnal O2 at 2 L NC secondary to  Sleep-related nonobstructive alveolar hypoventilation  Assess and monitor  F/U with Pulmonology

## 2023-09-28 NOTE — ASSESSMENT & PLAN NOTE
Assess and monitor  Lab Results   Component Value Date    TSH 1.092 06/06/2023   continue levothyroxine  F/U PCP

## 2023-10-02 ENCOUNTER — OFFICE VISIT (OUTPATIENT)
Dept: PRIMARY CARE CLINIC | Facility: CLINIC | Age: 63
End: 2023-10-02
Payer: MEDICARE

## 2023-10-02 VITALS
DIASTOLIC BLOOD PRESSURE: 78 MMHG | HEIGHT: 66 IN | TEMPERATURE: 98 F | WEIGHT: 215.5 LBS | SYSTOLIC BLOOD PRESSURE: 130 MMHG | HEART RATE: 90 BPM | BODY MASS INDEX: 34.63 KG/M2 | OXYGEN SATURATION: 98 %

## 2023-10-02 DIAGNOSIS — E11.69 HYPERLIPIDEMIA ASSOCIATED WITH TYPE 2 DIABETES MELLITUS: Primary | ICD-10-CM

## 2023-10-02 DIAGNOSIS — Z79.4 TYPE 2 DIABETES MELLITUS WITH HYPERGLYCEMIA, WITH LONG-TERM CURRENT USE OF INSULIN: ICD-10-CM

## 2023-10-02 DIAGNOSIS — E66.01 SEVERE OBESITY (BMI 35.0-39.9) WITH COMORBIDITY: ICD-10-CM

## 2023-10-02 DIAGNOSIS — J42 CHRONIC BRONCHITIS, UNSPECIFIED CHRONIC BRONCHITIS TYPE: ICD-10-CM

## 2023-10-02 DIAGNOSIS — E78.5 HYPERLIPIDEMIA ASSOCIATED WITH TYPE 2 DIABETES MELLITUS: Primary | ICD-10-CM

## 2023-10-02 DIAGNOSIS — I51.89 DIASTOLIC DYSFUNCTION: ICD-10-CM

## 2023-10-02 DIAGNOSIS — R00.2 HEART PALPITATIONS: ICD-10-CM

## 2023-10-02 DIAGNOSIS — E11.65 TYPE 2 DIABETES MELLITUS WITH HYPERGLYCEMIA, WITH LONG-TERM CURRENT USE OF INSULIN: ICD-10-CM

## 2023-10-02 DIAGNOSIS — Z99.81 REQUIRES SUPPLEMENTAL OXYGEN: ICD-10-CM

## 2023-10-02 PROCEDURE — 99214 PR OFFICE/OUTPT VISIT, EST, LEVL IV, 30-39 MIN: ICD-10-PCS | Mod: S$PBB,,, | Performed by: FAMILY MEDICINE

## 2023-10-02 PROCEDURE — 99214 OFFICE O/P EST MOD 30 MIN: CPT | Mod: S$PBB,,, | Performed by: FAMILY MEDICINE

## 2023-10-02 PROCEDURE — 99999 PR PBB SHADOW E&M-EST. PATIENT-LVL V: ICD-10-PCS | Mod: PBBFAC,,, | Performed by: FAMILY MEDICINE

## 2023-10-02 PROCEDURE — 99215 OFFICE O/P EST HI 40 MIN: CPT | Mod: PBBFAC,PN,25 | Performed by: FAMILY MEDICINE

## 2023-10-02 PROCEDURE — 99999PBSHW FLU VACCINE - QUADRIVALENT - ADJUVANTED: ICD-10-PCS | Mod: PBBFAC,,,

## 2023-10-02 PROCEDURE — G0008 ADMIN INFLUENZA VIRUS VAC: HCPCS | Mod: PBBFAC,PN

## 2023-10-02 PROCEDURE — 99999PBSHW FLU VACCINE - QUADRIVALENT - ADJUVANTED: Mod: PBBFAC,,,

## 2023-10-02 PROCEDURE — 99999 PR PBB SHADOW E&M-EST. PATIENT-LVL V: CPT | Mod: PBBFAC,,, | Performed by: FAMILY MEDICINE

## 2023-10-02 NOTE — PATIENT INSTRUCTIONS
If you are feeling unwell, we'd like to be the first ones to know here at [facility]! Please give us a call. Same day appointments are our top priority to keep you well and out of the emergency rooms and hospitals. Call [phone] for our direct line. After hours advice is always available. Please call [phone] after hours to speak to the on-call team.     Get the Shingrix vaccine at the pharmacy as well as the new COVID vaccine

## 2023-10-02 NOTE — PROGRESS NOTES
Subjective:       Patient ID: Ana Maria Teague is a 62 y.o. female.    Chief Complaint: Follow-up (Three month follow up. Pt has been having heart palpitations for two weeks. )    HPI:     Ms. Teague returns for 3 month f/u of medical problems which include DM2, hypothyroidism, CKD 3 in donor kidney (transplant 9/28/13), obesity, lumbar spondylopathy, nocturnal hypoventilation, and depression.    Sees pain management for back pain. Weight down 11 pounds since 8/8/23 on Trulicity. August a1c was 6.0. Home -130/80.   Taking insulin 3-7 units prior to meals and Trulicity. Wants to meet with health  to start an exercise program.   On long-term opiate therapy, PRN gabapentin.   Has still been intermittently taking statin. Denies SE from statin, just inconsistent with taking.  Also takes vitamin D intermittently.   Reports good compliance with antihypertensive (nifedipine in setting of renal transplant). Noticing occasional palpitations; does have increased anxiety; 42 year old son has schizophrenia and is homeless; creates lots of stress. She hasn't followed up in with pulmonology in several years; uses albuterol occasionally; no longer wearing nocturnal oxygen.    Updated/ annual due 6/23:  HM: 10/23 fluvax, 4/21 covid vaccines/booster, 7/19 HAV, 6/15 [id number], 3/14 [id number], 6/15 TDaP, 8/23 MMG, s/p ROSA, 8/23 BMD with OP on prolia now on fosomax due to dental disease, 3/16 Cscope rep 10y, 11/10 HCV neg, Pain DrKiko Anaya, 4/23 Eye DrKiko Gutierrez [place].    Objective:     Vitals:    10/02/23 0902   BP: 130/78   Pulse: 90   Temp: 98.3 °F (36.8 °C)     Wt Readings from Last 3 Encounters:   10/02/23 97.8 kg (215 lb 8 oz)   08/30/23 98.8 kg (217 lb 13 oz)   08/08/23 102.6 kg (226 lb 3.1 oz)     Temp Readings from Last 3 Encounters:   10/02/23 98.3 °F (36.8 °C) (Oral)   03/13/23 98.1 °F (36.7 °C) (Oral)   06/22/22 97 °F (36.1 °C) (Temporal)     BP Readings from Last 3 Encounters:   10/02/23 130/78   08/30/23 (!) 155/91    06/13/23 (!) 158/92     Pulse Readings from Last 3 Encounters:   10/02/23 90   08/30/23 69   03/20/23 78          Physical Exam  Vitals and nursing note reviewed.   Constitutional:       Appearance: She is well-developed. She is obese.   HENT:      Head: Normocephalic and atraumatic.   Eyes:      Pupils: Pupils are equal, round, and reactive to light.   Cardiovascular:      Rate and Rhythm: Normal rate and regular rhythm.      Heart sounds: Normal heart sounds.   Pulmonary:      Effort: Pulmonary effort is normal.      Breath sounds: Normal breath sounds.   Abdominal:      Comments: Excess abdominal adiposity     Musculoskeletal:         General: No deformity. Normal range of motion.      Cervical back: Normal range of motion and neck supple.   Skin:     General: Skin is warm and dry.   Neurological:      Mental Status: She is alert and oriented to person, place, and time.   Psychiatric:         Behavior: Behavior normal.           Albumin   Date Value Ref Range Status   08/31/2023 3.4 (L) 3.5 - 5.2 g/dL Final     eGFR   Date Value Ref Range Status   08/31/2023 46.2 (A) >60 mL/min/1.73 m^2 Final       Assessment:       1. Hyperlipidemia associated with type 2 diabetes mellitus    2. Type 2 diabetes mellitus with hyperglycemia, with long-term current use of insulin    3. Diastolic dysfunction    4. Heart palpitations    5. Chronic bronchitis, unspecified chronic bronchitis type    6. Requires supplemental oxygen    7. Severe obesity (BMI 35.0-39.9) with comorbidity        Plan:           Problem List Items Addressed This Visit          Pulmonary    Chronic bronchitis    Relevant Orders    Ambulatory referral/consult to Pulmonology    Requires supplemental oxygen    Current Assessment & Plan     Schedule pulmonary f/u; reports no longer using nocturnal O2            Cardiac/Vascular    Hyperlipidemia associated with type 2 diabetes mellitus - Primary    Relevant Orders    Lipid Panel       Endocrine    Type 2  diabetes mellitus with hyperglycemia, with long-term current use of insulin    Current Assessment & Plan     August a1c at goal; continue quarterly monitoring         Relevant Orders    HEMOGLOBIN A1C    Severe obesity (BMI 35.0-39.9) with comorbidity    Current Assessment & Plan     Successful weight loss with trulicity; continue to monitor; lifestyle changes discussed today          Other Visit Diagnoses       Diastolic dysfunction        Relevant Orders    EKG 12-lead    Echo    Heart palpitations        Relevant Orders    EKG 12-lead          ACP at next visit; Shingles and COVID vax at pharmacy; flu shot today  Schedule a1c and lipid for November with f/u after   Schedule Dr. Pugh's labs for March prior to her next appt with him Schedule with pulmonology  Ask health  to call to arrange gym visit at [facility]

## 2023-10-05 ENCOUNTER — TELEPHONE (OUTPATIENT)
Dept: PRIMARY CARE CLINIC | Facility: CLINIC | Age: 63
End: 2023-10-05
Payer: MEDICARE

## 2023-10-05 NOTE — TELEPHONE ENCOUNTER
Pt received flu shot on 10/2 - states Tuesday she started with mild sx, yesterday started with bad headache, coughing- denies any fever or sore throat.     Pt states took Zyrtec    Verified pt had home Covid test and advised pt to take  test and call the clinic with results.

## 2023-10-05 NOTE — TELEPHONE ENCOUNTER
----- Message from Dawood Maurice sent at 10/5/2023  2:19 PM CDT -----  Contact: [phone]  Pt returning a phone call; covid test was negative

## 2023-10-06 ENCOUNTER — OFFICE VISIT (OUTPATIENT)
Dept: PRIMARY CARE CLINIC | Facility: CLINIC | Age: 63
End: 2023-10-06
Payer: MEDICARE

## 2023-10-06 VITALS — WEIGHT: 215 LBS | TEMPERATURE: 99 F | BODY MASS INDEX: 34.7 KG/M2

## 2023-10-06 DIAGNOSIS — R94.2 DIFFUSION CAPACITY OF LUNG (DL), DECREASED: ICD-10-CM

## 2023-10-06 DIAGNOSIS — D84.9 IMMUNOSUPPRESSED STATUS: Primary | ICD-10-CM

## 2023-10-06 DIAGNOSIS — R94.2 ABNORMAL PFTS (PULMONARY FUNCTION TESTS): ICD-10-CM

## 2023-10-06 DIAGNOSIS — J01.00 ACUTE NON-RECURRENT MAXILLARY SINUSITIS: ICD-10-CM

## 2023-10-06 DIAGNOSIS — J20.8 ACUTE BRONCHITIS DUE TO OTHER SPECIFIED ORGANISMS: ICD-10-CM

## 2023-10-06 PROCEDURE — 99213 PR OFFICE/OUTPT VISIT, EST, LEVL III, 20-29 MIN: ICD-10-PCS | Mod: 95,,, | Performed by: NURSE PRACTITIONER

## 2023-10-06 PROCEDURE — 99213 OFFICE O/P EST LOW 20 MIN: CPT | Mod: 95,,, | Performed by: NURSE PRACTITIONER

## 2023-10-06 RX ORDER — AMOXICILLIN AND CLAVULANATE POTASSIUM 875; 125 MG/1; MG/1
1 TABLET, FILM COATED ORAL EVERY 12 HOURS
Qty: 14 TABLET | Refills: 0 | Status: SHIPPED | OUTPATIENT
Start: 2023-10-06 | End: 2023-10-13

## 2023-10-06 RX ORDER — BENZONATATE 100 MG/1
100 CAPSULE ORAL 3 TIMES DAILY PRN
Qty: 40 CAPSULE | Refills: 1 | Status: SHIPPED | OUTPATIENT
Start: 2023-10-06

## 2023-10-06 NOTE — PROGRESS NOTES
Primary Care Telemedicine Appointment      The patient location is:  Patient Home     Total time spent on medical decision making was 10 min.    Visit type: Virtual visit with synchronous audio only and video  Each patient to whom he or she provides medical services by telemedicine is:  (1) informed of the relationship between the physician and patient and the respective role of any other health care provider with respect to management of the patient; and (2) notified that he or she may decline to receive medical services by telemedicine and may withdraw from such care at any time.      Subjective:      Patient ID: Ana Maria Teague is a 62 y.o. female     Chief Complaint: Establish Care, Cough, and Fever    Pt received flu shot on 10/2, the next day, she developed a cough that is now productive with yellow mucus. She reports frontal headache and Temp max 101.8 last night which improved with Tylenol. Associated symptoms nasal congestion, frequent cough, rhinorrhea and post nasal drip. Has been taking Zyrtec and Tessalon perle which helped the cough. Home COVID test was negative.      Denies otalgia, SOB, LANCE, wheezing and pleuritic chest pain.        Past Surgical History:   Procedure Laterality Date    BREAST BIOPSY Right     benign. 7 years ago.     SECTION      COLONOSCOPY N/A 3/9/2016    Procedure: COLONOSCOPY;  Surgeon: Douglas Daniel III, MD;  Location: [facility];  Service: Endoscopy;  Laterality: N/A;    cyst removed form chest wall      HYSTERECTOMY      KIDNEY TRANSPLANT  2013    SELECTIVE INJECTION OF ANESTHETIC AGENT AROUND LUMBAR SPINAL NERVE ROOT BY TRANSFORAMINAL APPROACH Bilateral 2021    Procedure: Bilateral L5/S1 TF SERENITY;  Surgeon: Rafita Muniz MD;  Location: [facility];  Service: Pain Management;  Laterality: Bilateral;    SKIN GRAFT      revision    THYROIDECTOMY      vascular access surgeries      multiple. last time 2 weeks ago       Past Medical History:   Diagnosis Date     Abnormal glucose 2013    Acquired hypothyroidism     Acute bronchiolitis 10/15/2014    Anemia     Anemia of chronic renal failure 2013    Anxiety     Anxiety disorder     Avascular necrosis of bone of hip     Back pain     s/p steroid injections    Bacteremia due to Escherichia coli 2021    Chronic immunosuppression with Prograf and Cellcept 2013    CKD (chronic kidney disease) stage 2, GFR 60-89 ml/min     CKD (chronic kidney disease) stage 5, GFR less than 15 ml/min     -donor kidney transplant - 13    Depression     Hypertension, renal 2013    Hypothyroidism     Immunosuppressed status 10/15/2014    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   HENT:  Positive for congestion, postnasal drip, rhinorrhea and sinus pain. Negative for ear pain and sore throat.    Eyes:  Negative for pain and visual disturbance.   Respiratory:  Positive for cough. Negative for shortness of breath and wheezing.    Cardiovascular:  Positive for chest pain.   Gastrointestinal:  Negative for diarrhea, nausea and vomiting.   Genitourinary:  Negative for dysuria and frequency.   Musculoskeletal:  Positive for neck pain. Negative for arthralgias and myalgias.   Skin:  Negative for rash and wound.   Neurological:  Positive for headaches. Negative for weakness.   Psychiatric/Behavioral:  Negative for confusion, dysphoric mood and sleep disturbance. The patient is not nervous/anxious.        Objective:     Vitals:    10/06/23 0943   Temp: 99.2 °F (37.3 °C)     Weight: 97.5 kg (215 lb)   Body mass index is 34.7 kg/m².  BP Readings from Last 3 Encounters:   10/02/23 130/78   23 (!) 155/91    06/13/23 (!) 158/92      Wt Readings from Last 3 Encounters:   10/06/23 0943 97.5 kg (215 lb)   10/02/23 0902 97.8 kg (215 lb 8 oz)   08/30/23 1127 98.8 kg (217 lb 13 oz)        Physical Exam  Constitutional:       General: She is not in acute distress.     Appearance: She is ill-appearing.   HENT:      Head: Normocephalic and atraumatic.      Nose: Congestion present.      Mouth/Throat:      Comments: Pt's voice sounds raspy  Eyes:      Conjunctiva/sclera: Conjunctivae normal.   Pulmonary:      Effort: Pulmonary effort is normal.      Comments: No audible wheezing, no increased work of breathing  Musculoskeletal:      Left shoulder: Decreased range of motion.      Cervical back: Normal range of motion.   Skin:     Coloration: Skin is not pale.      Findings: No erythema.   Neurological:      General: No focal deficit present.      Mental Status: She is alert and oriented to person, place, and time.   Psychiatric:         Mood and Affect: Mood normal.         Thought Content: Thought content normal.         Lab Results   Component Value Date    WBC 7.02 08/31/2023    HGB 11.5 (L) 08/31/2023    HCT 38.8 08/31/2023     08/31/2023    CHOL 178 06/06/2023    TRIG 41 06/06/2023    HDL 50 06/06/2023    ALT 8 (L) 06/06/2023    AST 14 06/06/2023     08/31/2023    K 4.0 08/31/2023     08/31/2023    CREATININE 1.3 08/31/2023    BUN 16 08/31/2023    CO2 24 08/31/2023    TSH 1.092 06/06/2023    INR 1.1 01/04/2021    HGBA1C 6.0 (H) 08/08/2023         Assessment:   62 y.o. female with multiple co-morbid illnesses reports upper respiratory symptoms.     Plan:     Problem List Items Addressed This Visit       Abnormal PFTs (pulmonary function tests)    Acute bronchitis due to other specified organisms     Rest  Drink plenty of clear fluids  Nasal saline spray to clear nasal drainage and help with nasal congestion  Flonase  Zyrtec or Claritin to help dry mucus and post nasal drip  Tylenol  for fever, headache and  body aches  Warm salt water gargles for throat comfort  Chloraseptic spray or lozenges for throat comfort  Continue to use albuterol inhaler  Pt reports intolerance to steroids - HTN and DM           Relevant Medications    amoxicillin-clavulanate 875-125mg (AUGMENTIN) 875-125 mg per tablet    Diffusion capacity of lung (dl), decreased    Immunosuppressed status - Primary     To prescribe empiric ABX         Relevant Medications    amoxicillin-clavulanate 875-125mg (AUGMENTIN) 875-125 mg per tablet     Other Visit Diagnoses       Acute non-recurrent maxillary sinusitis        Relevant Medications    benzonatate (TESSALON) 100 MG capsule            Health Maintenance         Date Due Completion Date    Complete Opioid Risk Tool Never done ---    Shingles Vaccine (1 of 2) Never done ---    Foot Exam 06/22/2023 6/22/2022    Override on 1/8/2016: Done (per Karine Fernandez MD)    Override on 3/25/2014: Done    COVID-19 Vaccine (4 - 2023-24 season) 09/01/2023 1/19/2022    Hemoglobin A1c 02/08/2024 8/8/2023    Eye Exam 05/19/2024 5/19/2023    Override on 10/28/2013: Done    Lipid Panel 06/06/2024 6/6/2023    Diabetes Urine Screening 08/08/2024 8/8/2023    Mammogram 08/08/2024 8/8/2023    Override on 4/22/2013: Done    TETANUS VACCINE 06/29/2025 6/29/2015    Colorectal Cancer Screening 03/09/2026 3/9/2016            1. Immunosuppressed status  Assessment & Plan:  To prescribe empiric ABX    Orders:  -     amoxicillin-clavulanate 875-125mg (AUGMENTIN) 875-125 mg per tablet; Take 1 tablet by mouth every 12 (twelve) hours. for 7 days  Dispense: 14 tablet; Refill: 0    2. Acute bronchitis due to other specified organisms  Assessment & Plan:  Rest  Drink plenty of clear fluids  Nasal saline spray to clear nasal drainage and help with nasal congestion  Flonase  Zyrtec or Claritin to help dry mucus and post nasal drip  Tylenol  for fever, headache and body aches  Warm salt water gargles for throat comfort  Chloraseptic spray or  lozenges for throat comfort  Continue to use albuterol inhaler  Pt reports intolerance to steroids - HTN and DM      Orders:  -     amoxicillin-clavulanate 875-125mg (AUGMENTIN) 875-125 mg per tablet; Take 1 tablet by mouth every 12 (twelve) hours. for 7 days  Dispense: 14 tablet; Refill: 0      -     benzonatate (TESSALON) 100 MG capsule; Take 1 capsule (100 mg total) by mouth 3 (three) times daily as needed for Cough.  Dispense: 40 capsule; Refill: 1    3.  Diffusion capacity of lung (dl), decreased    4. Abnormal PFTs (pulmonary function tests)         No follow-ups on file.  28 minutes spent on medical decision making today.             Sondra Teague, APRN, NP-C  [facility] [address] [place] [address]  [phone] ph  [phone] fax

## 2023-10-06 NOTE — ASSESSMENT & PLAN NOTE
Rest  Drink plenty of clear fluids  Nasal saline spray to clear nasal drainage and help with nasal congestion  Flonase  Zyrtec or Claritin to help dry mucus and post nasal drip  Tylenol  for fever, headache and body aches  Warm salt water gargles for throat comfort  Chloraseptic spray or lozenges for throat comfort  Continue to use albuterol inhaler  Pt reports intolerance to steroids - HTN and DM

## 2023-10-12 ENCOUNTER — PATIENT MESSAGE (OUTPATIENT)
Dept: PRIMARY CARE CLINIC | Facility: CLINIC | Age: 63
End: 2023-10-12
Payer: MEDICARE

## 2023-10-12 DIAGNOSIS — J41.0 SIMPLE CHRONIC BRONCHITIS: ICD-10-CM

## 2023-10-12 DIAGNOSIS — J20.8 ACUTE BRONCHITIS DUE TO OTHER SPECIFIED ORGANISMS: Primary | ICD-10-CM

## 2023-10-12 RX ORDER — PREDNISONE 20 MG/1
40 TABLET ORAL DAILY
Qty: 8 TABLET | Refills: 0 | Status: SHIPPED | OUTPATIENT
Start: 2023-10-12 | End: 2023-10-16

## 2023-10-12 RX ORDER — AMOXICILLIN AND CLAVULANATE POTASSIUM 875; 125 MG/1; MG/1
1 TABLET, FILM COATED ORAL EVERY 12 HOURS
Qty: 6 TABLET | Refills: 0 | Status: SHIPPED | OUTPATIENT
Start: 2023-10-12 | End: 2023-10-15

## 2023-10-23 NOTE — PROGRESS NOTES
Health  Consult Note    Name: Ana Maria Teague  Clinic Number: [id number]  Physician: No ref. provider found  Past Medical History:   Diagnosis Date    Abnormal glucose 2013    Acquired hypothyroidism     Acute bronchiolitis 10/15/2014    Anemia     Anemia of chronic renal failure 2013    Anxiety     Anxiety disorder     Avascular necrosis of bone of hip     Back pain     s/p steroid injections    Bacteremia due to Escherichia coli 2021    Chronic immunosuppression with Prograf and Cellcept 2013    CKD (chronic kidney disease) stage 2, GFR 60-89 ml/min     CKD (chronic kidney disease) stage 5, GFR less than 15 ml/min     -donor kidney transplant - 13    Depression     Hypertension, renal 2013    Hypothyroidism     Immunosuppressed status 10/15/2014    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     Strengths:  motivated     Challenges:  under stress due to health concerns in family.     Support:  family, friends      INITIAL date 3/21/23  One a scale of 1-10, with 10 being 100% happy, how would you rate your happiness in each of the wellness areas below?    Happiness:         1     2     3     4     5     6     7     8     9     10    Initial Date: DC Date: +/- Total Change   Exercise/Movement 7 Full body     Physical Health 8     Stress Level 7     Nutrition x     Sleep About 5 hours      Play x     Body Image x     Relationships x     Energy/Vitality Low to medium        Assessment:   Patient will complete assessment on her next HC visit.     Plan:  Patient goals for next consult include meeting with HC to finish assessment then go over some exercises  afterwards.     Health : Claudia Bajwa MA  10/24/2023

## 2023-10-24 ENCOUNTER — DOCUMENTATION ONLY (OUTPATIENT)
Dept: REHABILITATION | Facility: HOSPITAL | Age: 63
End: 2023-10-24
Payer: MEDICARE

## 2023-10-30 ENCOUNTER — HOSPITAL ENCOUNTER (OUTPATIENT)
Dept: CARDIOLOGY | Facility: HOSPITAL | Age: 63
Discharge: HOME | End: 2023-10-30
Attending: FAMILY MEDICINE
Payer: MEDICARE

## 2023-10-30 VITALS
BODY MASS INDEX: 34.55 KG/M2 | SYSTOLIC BLOOD PRESSURE: 130 MMHG | WEIGHT: 215 LBS | DIASTOLIC BLOOD PRESSURE: 78 MMHG | HEIGHT: 66 IN

## 2023-10-30 DIAGNOSIS — I51.89 DIASTOLIC DYSFUNCTION: ICD-10-CM

## 2023-10-30 LAB
AORTIC ROOT ANNULUS: 3.29 CM
ASCENDING AORTA: 2.82 CM
AV INDEX (PROSTH): 0.83
AV MEAN GRADIENT: 5 MMHG
AV PEAK GRADIENT: 9 MMHG
AV VALVE AREA BY VELOCITY RATIO: 2.69 CM²
AV VALVE AREA: 2.78 CM²
AV VELOCITY RATIO: 0.8
BSA FOR ECHO PROCEDURE: 2.13 M2
CV ECHO LV RWT: 0.54 CM
DOP CALC AO PEAK VEL: 1.5 M/S
DOP CALC AO VTI: 31.9 CM
DOP CALC LVOT AREA: 3.4 CM2
DOP CALC LVOT DIAMETER: 2.07 CM
DOP CALC LVOT PEAK VEL: 1.2 M/S
DOP CALC LVOT STROKE VOLUME: 88.8 CM3
DOP CALC RVOT PEAK VEL: 0.74 M/S
DOP CALC RVOT VTI: 15.6 CM
DOP CALCLVOT PEAK VEL VTI: 26.4 CM
E WAVE DECELERATION TIME: 238.73 MSEC
E/A RATIO: 0.73
E/E' RATIO: 6.36 M/S
ECHO LV POSTERIOR WALL: 1.18 CM (ref 0.6–1.1)
FRACTIONAL SHORTENING: 36 % (ref 28–44)
INTERVENTRICULAR SEPTUM: 1.24 CM (ref 0.6–1.1)
IVC DIAMETER: 1.18 CM
IVRT: 85.63 MSEC
LA MAJOR: 4.96 CM
LA MINOR: 4.63 CM
LA WIDTH: 3.7 CM
LEFT ATRIUM SIZE: 3.59 CM
LEFT ATRIUM VOLUME INDEX MOD: 29.6 ML/M2
LEFT ATRIUM VOLUME INDEX: 26.2 ML/M2
LEFT ATRIUM VOLUME MOD: 60.95 CM3
LEFT ATRIUM VOLUME: 54.07 CM3
LEFT INTERNAL DIMENSION IN SYSTOLE: 2.8 CM (ref 2.1–4)
LEFT VENTRICLE DIASTOLIC VOLUME INDEX: 41.67 ML/M2
LEFT VENTRICLE DIASTOLIC VOLUME: 85.85 ML
LEFT VENTRICLE MASS INDEX: 93 G/M2
LEFT VENTRICLE SYSTOLIC VOLUME INDEX: 14.3 ML/M2
LEFT VENTRICLE SYSTOLIC VOLUME: 29.46 ML
LEFT VENTRICULAR INTERNAL DIMENSION IN DIASTOLE: 4.36 CM (ref 3.5–6)
LEFT VENTRICULAR MASS: 190.95 G
LV LATERAL E/E' RATIO: 6.36 M/S
LV SEPTAL E/E' RATIO: 6.36 M/S
LVOT MG: 3.13 MMHG
LVOT MV: 0.82 CM/S
MV PEAK A VEL: 0.96 M/S
MV PEAK E VEL: 0.7 M/S
MV STENOSIS PRESSURE HALF TIME: 69.23 MS
MV VALVE AREA P 1/2 METHOD: 3.18 CM2
PISA MRMAX VEL: 3.73 M/S
PISA TR MAX VEL: 3 M/S
PV MEAN GRADIENT: 1 MMHG
RA MAJOR: 4.4 CM
RA PRESSURE ESTIMATED: 3 MMHG
RA WIDTH: 3.45 CM
RV TB RVSP: 6 MMHG
SINUS: 3.09 CM
STJ: 2.69 CM
TDI LATERAL: 0.11 M/S
TDI SEPTAL: 0.11 M/S
TDI: 0.11 M/S
TR MAX PG: 36 MMHG
TRICUSPID ANNULAR PLANE SYSTOLIC EXCURSION: 2.3 CM
TV REST PULMONARY ARTERY PRESSURE: 39 MMHG
Z-SCORE OF LEFT VENTRICULAR DIMENSION IN END DIASTOLE: -3.55
Z-SCORE OF LEFT VENTRICULAR DIMENSION IN END SYSTOLE: -2.43

## 2023-10-30 PROCEDURE — 93306 ECHO (CUPID ONLY): ICD-10-PCS | Mod: 26,,, | Performed by: INTERNAL MEDICINE

## 2023-10-30 PROCEDURE — 93306 TTE W/DOPPLER COMPLETE: CPT | Mod: 26,,, | Performed by: INTERNAL MEDICINE

## 2023-10-30 PROCEDURE — 93306 TTE W/DOPPLER COMPLETE: CPT

## 2023-11-01 ENCOUNTER — PATIENT MESSAGE (OUTPATIENT)
Dept: PRIMARY CARE CLINIC | Facility: CLINIC | Age: 63
End: 2023-11-01
Payer: MEDICARE

## 2023-11-01 NOTE — PROGRESS NOTES
5 min walk  Forward raises 3x10 with weight  Marches against wall 3x30 sec  Side raises 3x10 with weight  March walks 2x  Side walks 2x  Twists 3x30 sec with weight  Sit to stands 3x8

## 2023-11-02 ENCOUNTER — DOCUMENTATION ONLY (OUTPATIENT)
Dept: REHABILITATION | Facility: HOSPITAL | Age: 63
End: 2023-11-02
Attending: INTERNAL MEDICINE
Payer: MEDICARE

## 2023-11-02 ENCOUNTER — LAB VISIT (OUTPATIENT)
Dept: LAB | Facility: HOSPITAL | Age: 63
End: 2023-11-02
Attending: INTERNAL MEDICINE
Payer: MEDICARE

## 2023-11-02 DIAGNOSIS — E11.65 TYPE 2 DIABETES MELLITUS WITH HYPERGLYCEMIA, WITH LONG-TERM CURRENT USE OF INSULIN: ICD-10-CM

## 2023-11-02 DIAGNOSIS — E78.5 HYPERLIPIDEMIA ASSOCIATED WITH TYPE 2 DIABETES MELLITUS: ICD-10-CM

## 2023-11-02 DIAGNOSIS — Z79.4 TYPE 2 DIABETES MELLITUS WITH HYPERGLYCEMIA, WITH LONG-TERM CURRENT USE OF INSULIN: ICD-10-CM

## 2023-11-02 DIAGNOSIS — E11.69 HYPERLIPIDEMIA ASSOCIATED WITH TYPE 2 DIABETES MELLITUS: ICD-10-CM

## 2023-11-02 LAB
CHOLEST SERPL-MCNC: 135 MG/DL (ref 120–199)
CHOLEST/HDLC SERPL: 2.6 {RATIO} (ref 2–5)
ESTIMATED AVG GLUCOSE: 146 MG/DL (ref 68–131)
HBA1C MFR BLD: 6.7 % (ref 4–5.6)
HDLC SERPL-MCNC: 51 MG/DL (ref 40–75)
HDLC SERPL: 37.8 % (ref 20–50)
LDLC SERPL CALC-MCNC: 72.8 MG/DL (ref 63–159)
NONHDLC SERPL-MCNC: 84 MG/DL
TRIGL SERPL-MCNC: 56 MG/DL (ref 30–150)

## 2023-11-02 PROCEDURE — 83036 HEMOGLOBIN GLYCOSYLATED A1C: CPT | Performed by: FAMILY MEDICINE

## 2023-11-02 PROCEDURE — 36415 COLL VENOUS BLD VENIPUNCTURE: CPT | Mod: PN | Performed by: FAMILY MEDICINE

## 2023-11-02 PROCEDURE — 80061 LIPID PANEL: CPT | Performed by: FAMILY MEDICINE

## 2023-11-03 ENCOUNTER — TELEPHONE (OUTPATIENT)
Dept: PRIMARY CARE CLINIC | Facility: CLINIC | Age: 63
End: 2023-11-03
Payer: MEDICARE

## 2023-11-03 ENCOUNTER — HOSPITAL ENCOUNTER (OUTPATIENT)
Dept: RADIOLOGY | Facility: HOSPITAL | Age: 63
Discharge: HOME | End: 2023-11-03
Attending: FAMILY MEDICINE
Payer: MEDICARE

## 2023-11-03 ENCOUNTER — PATIENT MESSAGE (OUTPATIENT)
Dept: PRIMARY CARE CLINIC | Facility: CLINIC | Age: 63
End: 2023-11-03
Payer: MEDICARE

## 2023-11-03 DIAGNOSIS — R05.9 COUGH, UNSPECIFIED TYPE: Primary | ICD-10-CM

## 2023-11-03 DIAGNOSIS — R05.9 COUGH, UNSPECIFIED TYPE: ICD-10-CM

## 2023-11-03 PROCEDURE — 71046 X-RAY EXAM CHEST 2 VIEWS: CPT | Mod: TC,PN

## 2023-11-03 PROCEDURE — 71046 XR CHEST PA AND LATERAL: ICD-10-PCS | Mod: 26,,, | Performed by: RADIOLOGY

## 2023-11-03 PROCEDURE — 71046 X-RAY EXAM CHEST 2 VIEWS: CPT | Mod: 26,,, | Performed by: RADIOLOGY

## 2023-11-03 NOTE — TELEPHONE ENCOUNTER
Spoke with patient and informed of chest x-ray scheduled at [facility] today. FELICITY Malin RN

## 2023-11-06 ENCOUNTER — LAB VISIT (OUTPATIENT)
Dept: LAB | Facility: HOSPITAL | Age: 63
End: 2023-11-06
Attending: FAMILY MEDICINE
Payer: MEDICARE

## 2023-11-06 ENCOUNTER — OFFICE VISIT (OUTPATIENT)
Dept: PRIMARY CARE CLINIC | Facility: CLINIC | Age: 63
End: 2023-11-06
Payer: MEDICARE

## 2023-11-06 VITALS
SYSTOLIC BLOOD PRESSURE: 136 MMHG | TEMPERATURE: 98 F | HEART RATE: 79 BPM | BODY MASS INDEX: 34.87 KG/M2 | HEIGHT: 66 IN | OXYGEN SATURATION: 96 % | WEIGHT: 217 LBS | DIASTOLIC BLOOD PRESSURE: 78 MMHG

## 2023-11-06 DIAGNOSIS — J42 CHRONIC BRONCHITIS, UNSPECIFIED CHRONIC BRONCHITIS TYPE: ICD-10-CM

## 2023-11-06 DIAGNOSIS — R93.89 ABNORMAL FINDING ON CHEST XRAY: ICD-10-CM

## 2023-11-06 DIAGNOSIS — R05.2 SUBACUTE COUGH: Primary | ICD-10-CM

## 2023-11-06 DIAGNOSIS — I27.21 HIGH PULMONARY ARTERIAL PRESSURE: ICD-10-CM

## 2023-11-06 LAB
BASOPHILS # BLD AUTO: 0.11 K/UL (ref 0–0.2)
BASOPHILS NFR BLD: 0.8 % (ref 0–1.9)
DIFFERENTIAL METHOD: ABNORMAL
EOSINOPHIL # BLD AUTO: 1.6 K/UL (ref 0–0.5)
EOSINOPHIL NFR BLD: 11.9 % (ref 0–8)
ERYTHROCYTE [DISTWIDTH] IN BLOOD BY AUTOMATED COUNT: 15.2 % (ref 11.5–14.5)
HCT VFR BLD AUTO: 36.4 % (ref 37–48.5)
HGB BLD-MCNC: 10.9 G/DL (ref 12–16)
IMM GRANULOCYTES # BLD AUTO: 0.11 K/UL (ref 0–0.04)
IMM GRANULOCYTES NFR BLD AUTO: 0.8 % (ref 0–0.5)
LYMPHOCYTES # BLD AUTO: 2.8 K/UL (ref 1–4.8)
LYMPHOCYTES NFR BLD: 21.7 % (ref 18–48)
MCH RBC QN AUTO: 25.9 PG (ref 27–31)
MCHC RBC AUTO-ENTMCNC: 29.9 G/DL (ref 32–36)
MCV RBC AUTO: 87 FL (ref 82–98)
MONOCYTES # BLD AUTO: 0.6 K/UL (ref 0.3–1)
MONOCYTES NFR BLD: 4.6 % (ref 4–15)
NEUTROPHILS # BLD AUTO: 7.9 K/UL (ref 1.8–7.7)
NEUTROPHILS NFR BLD: 60.2 % (ref 38–73)
NRBC BLD-RTO: 0 /100 WBC
PLATELET # BLD AUTO: 291 K/UL (ref 150–450)
PMV BLD AUTO: 11.3 FL (ref 9.2–12.9)
RBC # BLD AUTO: 4.21 M/UL (ref 4–5.4)
WBC # BLD AUTO: 13.08 K/UL (ref 3.9–12.7)

## 2023-11-06 PROCEDURE — 36415 COLL VENOUS BLD VENIPUNCTURE: CPT | Mod: PN | Performed by: FAMILY MEDICINE

## 2023-11-06 PROCEDURE — 85025 COMPLETE CBC W/AUTO DIFF WBC: CPT | Performed by: FAMILY MEDICINE

## 2023-11-06 PROCEDURE — 99999 PR PBB SHADOW E&M-EST. PATIENT-LVL V: ICD-10-PCS | Mod: PBBFAC,,, | Performed by: FAMILY MEDICINE

## 2023-11-06 PROCEDURE — 99215 OFFICE O/P EST HI 40 MIN: CPT | Mod: PBBFAC,PN | Performed by: FAMILY MEDICINE

## 2023-11-06 PROCEDURE — 99214 OFFICE O/P EST MOD 30 MIN: CPT | Mod: S$PBB,,, | Performed by: FAMILY MEDICINE

## 2023-11-06 PROCEDURE — 99214 PR OFFICE/OUTPT VISIT, EST, LEVL IV, 30-39 MIN: ICD-10-PCS | Mod: S$PBB,,, | Performed by: FAMILY MEDICINE

## 2023-11-06 PROCEDURE — 99999 PR PBB SHADOW E&M-EST. PATIENT-LVL V: CPT | Mod: PBBFAC,,, | Performed by: FAMILY MEDICINE

## 2023-11-06 NOTE — PATIENT INSTRUCTIONS
If you are feeling unwell, we'd like to be the first ones to know here at [facility]! Please give us a call. Same day appointments are our top priority to keep you well and out of the emergency rooms and hospitals. Call [phone] for our direct line. After hours advice is always available. Please call [phone] after hours to speak to the on-call team.

## 2023-11-06 NOTE — Clinical Note
Kind of worried about an ER visit for her. Bronchitis is worse; possible nodule on CT. Will you check in with her daily?

## 2023-11-06 NOTE — PROGRESS NOTES
"Subjective:       Patient ID: Ana Maria Teague is a 63 y.o. female.    Chief Complaint: Follow-up (Pt is here to go over test results. )    HPI:     Ms. Teague returns for review of test results and with 1 month history of worsening non-productive cough, increased fatigue, insomnia, and poor activity tolerance. +nighttime symptoms of cough. Reports poor sleep quality at night, frequent wakings. Medical problems include DM2, hypothyroidism, CKD 3 in donor kidney (transplant 9/28/13), obesity, lumbar spondylopathy, nocturnal hypoventilation, and depression. She has seen Dr. Walker in the past and has been on Breo inhaler and nocturnal oxygen, but has not used either in several years. Has upcoming appt with pulmonology. She had flu immunization 10/2/23. On 10/6 was seen for acute care visit for fever and URI:  "Pt received flu shot on 10/2, the next day, she developed a cough that is now productive with yellow mucus. She reports frontal headache and Temp max 101.8 last night which improved with Tylenol. Associated symptoms nasal congestion, frequent cough, rhinorrhea and post nasal drip. Has been taking Zyrtec and Tessalon perle which helped the cough. Home COVID test was negative."      Prescribed prednisone 20 mg tabs at acute care visit but took only 1 due to BG spike. Does not tolerate oral steroids well due to elevations in blood glucose and blood pressure.  Discussed adjusting insulin but she'd prefer not do that if it can be avoided.  this AM.     Since then has been afebrile, no chills or rigors. No nasal congestion, sore throat.     Sees pain management for back pain. Weight down 11 pounds since 8/8/23 on Trulicity. August a1c was 6.0. Home -130/80.   Taking insulin 3-7 units prior to meals and Trulicity. Wants to meet with health  to start an exercise program.   On long-term opiate therapy, PRN gabapentin.   Has still been intermittently taking statin. Denies SE from statin, just inconsistent " with taking.  Also takes vitamin D intermittently.   Reports good compliance with antihypertensive (nifedipine in setting of renal transplant). Noticing occasional palpitations; does have increased anxiety; 42 year old son has schizophrenia and is homeless; creates lots of stress. She hasn't followed up in with pulmonology in several years; uses albuterol occasionally; no longer wearing nocturnal oxygen.    Updated/ annual due 6/23:  HM: 10/23 fluvax, 4/21 covid vaccines/booster, 7/19 HAV, 6/15 [id number], 3/14 [id number], 6/15 TDaP, 8/23 MMG, s/p ROSA, 8/23 BMD with OP on prolia now on fosomax due to dental disease, 3/16 Cscope rep 10y, 11/10 HCV neg, Pain Dr. Anaya, 4/23 Eye DrKiko Gutierrez Manti    Objective:     Vitals:    11/06/23 0847   BP: 136/78   Pulse: 79   Temp: 98.3 °F (36.8 °C)     Wt Readings from Last 3 Encounters:   11/06/23 98.4 kg (217 lb)   10/30/23 97.5 kg (215 lb)   10/06/23 97.5 kg (215 lb)     Temp Readings from Last 3 Encounters:   11/06/23 98.3 °F (36.8 °C) (Oral)   10/06/23 99.2 °F (37.3 °C) (Oral)   10/02/23 98.3 °F (36.8 °C) (Oral)     BP Readings from Last 3 Encounters:   11/06/23 136/78   10/30/23 130/78   10/02/23 130/78     Pulse Readings from Last 3 Encounters:   11/06/23 79   10/02/23 90   08/30/23 69          Physical Exam  Vitals and nursing note reviewed.   Constitutional:       General: She is not in acute distress.     Appearance: She is well-developed. She is ill-appearing. She is not toxic-appearing or diaphoretic.      Comments: Fatigued appearance   HENT:      Head: Normocephalic and atraumatic.      Nose: No congestion or rhinorrhea.      Mouth/Throat:      Pharynx: No oropharyngeal exudate.   Eyes:      General: No scleral icterus.     Pupils: Pupils are equal, round, and reactive to light.   Cardiovascular:      Rate and Rhythm: Normal rate and regular rhythm.   Pulmonary:      Effort: Pulmonary effort is normal.      Breath sounds: Normal breath sounds. No wheezing, rhonchi or  rales.      Comments: Took albuterol at home around 4 am  Abdominal:      Palpations: Abdomen is soft.      Tenderness: There is no abdominal tenderness.   Musculoskeletal:         General: No deformity. Normal range of motion.      Cervical back: Normal range of motion and neck supple.   Skin:     General: Skin is warm and dry.   Neurological:      Mental Status: She is alert and oriented to person, place, and time.   Psychiatric:         Mood and Affect: Mood normal.         Behavior: Behavior normal.         Thought Content: Thought content normal.         Judgment: Judgment normal.           Albumin   Date Value Ref Range Status   08/31/2023 3.4 (L) 3.5 - 5.2 g/dL Final     eGFR   Date Value Ref Range Status   08/31/2023 46.2 (A) >60 mL/min/1.73 m^2 Final       Assessment:       1. Subacute cough    2. Chronic bronchitis, unspecified chronic bronchitis type    3. Abnormal finding on chest xray    4. High pulmonary arterial pressure        Plan:           Problem List Items Addressed This Visit          Pulmonary    Chronic bronchitis    Relevant Medications    fluticasone-umeclidin-vilanter (TRELEGY ELLIPTA) 100-62.5-25 mcg DsDv    inhalation spacing device    Other Relevant Orders    CBC Auto Differential       Cardiac/Vascular    High pulmonary arterial pressure    Overview     Echo 10/2/23:    Left Ventricle: The left ventricle is normal in size. Normal wall thickness. There is mild concentric hypertrophy. Normal wall motion. There is normal systolic function with a visually estimated ejection fraction of 60 - 65%. Grade I diastolic dysfunction.    Right Ventricle: Normal right ventricular cavity size. Wall thickness is normal. Right ventricle wall motion  is normal. Systolic function is normal.    Tricuspid Valve: There is mild regurgitation.    Pulmonary Artery: There is mild pulmonary hypertension. The estimated pulmonary artery systolic pressure is 39 mmHg.    IVC/SVC: Normal venous pressure at 3  mmHg.           Current Assessment & Plan     Referral to pulmonology for chronic bronchitis and sleep eval          Other Visit Diagnoses       Subacute cough    -  Primary    Relevant Orders    CT Chest Without Contrast    Abnormal finding on chest xray        Relevant Orders    CT Chest Without Contrast          Will see back in 1 week and ask RN manager to follow. Will schedule CT and message pulmonology about seeing her sooner. Otherwise, has existing appointment with Kali Kennedy 11/28

## 2023-11-07 ENCOUNTER — PATIENT MESSAGE (OUTPATIENT)
Dept: PRIMARY CARE CLINIC | Facility: CLINIC | Age: 63
End: 2023-11-07
Payer: MEDICARE

## 2023-11-07 ENCOUNTER — TELEPHONE (OUTPATIENT)
Dept: PRIMARY CARE CLINIC | Facility: CLINIC | Age: 63
End: 2023-11-07
Payer: MEDICARE

## 2023-11-07 ENCOUNTER — PATIENT MESSAGE (OUTPATIENT)
Dept: DIABETES | Facility: CLINIC | Age: 63
End: 2023-11-07
Payer: MEDICARE

## 2023-11-07 DIAGNOSIS — E11.65 TYPE 2 DIABETES MELLITUS WITH HYPERGLYCEMIA, WITH LONG-TERM CURRENT USE OF INSULIN: ICD-10-CM

## 2023-11-07 DIAGNOSIS — Z79.4 TYPE 2 DIABETES MELLITUS WITH HYPERGLYCEMIA, WITH LONG-TERM CURRENT USE OF INSULIN: ICD-10-CM

## 2023-11-07 DIAGNOSIS — J18.9 COMMUNITY ACQUIRED PNEUMONIA, UNSPECIFIED LATERALITY: Primary | ICD-10-CM

## 2023-11-07 RX ORDER — DOXYCYCLINE HYCLATE 100 MG
100 TABLET ORAL EVERY 12 HOURS
Qty: 14 TABLET | Refills: 0 | Status: SHIPPED | OUTPATIENT
Start: 2023-11-07 | End: 2023-11-14

## 2023-11-07 RX ORDER — DULAGLUTIDE 1.5 MG/.5ML
1.5 INJECTION, SOLUTION SUBCUTANEOUS
Qty: 4 PEN | Refills: 3 | Status: SHIPPED | OUTPATIENT
Start: 2023-11-07 | End: 2024-01-17 | Stop reason: DRUGHIGH

## 2023-11-07 RX ORDER — AMOXICILLIN AND CLAVULANATE POTASSIUM 875; 125 MG/1; MG/1
1 TABLET, FILM COATED ORAL EVERY 12 HOURS
Qty: 14 TABLET | Refills: 0 | Status: SHIPPED | OUTPATIENT
Start: 2023-11-07 | End: 2023-11-14

## 2023-11-07 RX ORDER — ALBUTEROL SULFATE 90 UG/1
AEROSOL, METERED RESPIRATORY (INHALATION)
Qty: 18 G | Refills: 2 | Status: SHIPPED | OUTPATIENT
Start: 2023-11-07

## 2023-11-07 NOTE — TELEPHONE ENCOUNTER
ADELE on AM at [facility]. Pharmacy needed directions on Albuterol inhaler Rx. Instructions are to take one to two inhalations every 6 hours as needed for SOB/cough. FELICITY Malin RN

## 2023-11-08 ENCOUNTER — PATIENT MESSAGE (OUTPATIENT)
Dept: PRIMARY CARE CLINIC | Facility: CLINIC | Age: 63
End: 2023-11-08
Payer: MEDICARE

## 2023-11-08 ENCOUNTER — TELEPHONE (OUTPATIENT)
Dept: PRIMARY CARE CLINIC | Facility: CLINIC | Age: 63
End: 2023-11-08
Payer: MEDICARE

## 2023-11-08 NOTE — TELEPHONE ENCOUNTER
Spoke with patient to f/u from OV Monday. Patient is doing a little better. Offered for her to come to office for another breathing treatment. She declined and will come tomorrow if no more improvement after today. Will f/u with patient tomorrow. FELICITY Malin RN

## 2023-11-09 ENCOUNTER — TELEPHONE (OUTPATIENT)
Dept: PRIMARY CARE CLINIC | Facility: CLINIC | Age: 63
End: 2023-11-09
Payer: MEDICARE

## 2023-11-09 NOTE — TELEPHONE ENCOUNTER
Spoke with patient to f/u from OV this week. Patient is still coughing. Offered appt once again to come in for [facility] tx. Patient stated she wanted to continue home meds and will see how she is doing tomorrow. FELICITY Malin RN

## 2023-11-10 ENCOUNTER — TELEPHONE (OUTPATIENT)
Dept: PRIMARY CARE CLINIC | Facility: CLINIC | Age: 63
End: 2023-11-10
Payer: MEDICARE

## 2023-11-10 ENCOUNTER — PATIENT MESSAGE (OUTPATIENT)
Dept: PRIMARY CARE CLINIC | Facility: CLINIC | Age: 63
End: 2023-11-10
Payer: MEDICARE

## 2023-11-10 DIAGNOSIS — J20.8 ACUTE BRONCHITIS DUE TO OTHER SPECIFIED ORGANISMS: Primary | ICD-10-CM

## 2023-11-10 RX ORDER — ALBUTEROL SULFATE 0.83 MG/ML
2.5 SOLUTION RESPIRATORY (INHALATION) EVERY 6 HOURS PRN
Qty: 90 ML | Refills: 3 | Status: SHIPPED | OUTPATIENT
Start: 2023-11-10 | End: 2024-01-08

## 2023-11-10 NOTE — TELEPHONE ENCOUNTER
----- Message from Chanelle Hinton sent at 11/10/2023  9:31 AM CST -----  Regarding: Medication needed  Pt. Is calling in reference to her calls that she states she gets everyday. She is wanting to see if breathing treatment can be had at home and because she does not want to come in. Pt wants to know can the medication be called in and pt. States she needs a nebulizer the pharmacy today please. [phone] The Pharmacy she wants to use is the [facility]

## 2023-11-10 NOTE — TELEPHONE ENCOUNTER
Pt called and asked if she can get a home nebulizer- states she does not want to come to the clinic for breathing treatment. States that she is still coughing with mild SOB, states that she has 2 inhalers she is using and antibiotics.      Pt states she uses [facility]

## 2023-11-14 ENCOUNTER — PATIENT MESSAGE (OUTPATIENT)
Dept: PRIMARY CARE CLINIC | Facility: CLINIC | Age: 63
End: 2023-11-14

## 2023-11-14 ENCOUNTER — OFFICE VISIT (OUTPATIENT)
Dept: PRIMARY CARE CLINIC | Facility: CLINIC | Age: 63
End: 2023-11-14
Payer: MEDICARE

## 2023-11-14 DIAGNOSIS — R94.2 ABNORMAL PFTS (PULMONARY FUNCTION TESTS): Primary | ICD-10-CM

## 2023-11-14 DIAGNOSIS — R91.1 LEFT LOWER LOBE PULMONARY NODULE: ICD-10-CM

## 2023-11-14 DIAGNOSIS — D84.9 IMMUNOSUPPRESSED STATUS: ICD-10-CM

## 2023-11-14 DIAGNOSIS — J20.8 ACUTE BRONCHITIS DUE TO OTHER SPECIFIED ORGANISMS: ICD-10-CM

## 2023-11-14 PROCEDURE — 99215 PR OFFICE/OUTPT VISIT, EST, LEVL V, 40-54 MIN: ICD-10-PCS | Mod: 95,,, | Performed by: NURSE PRACTITIONER

## 2023-11-14 PROCEDURE — 99215 OFFICE O/P EST HI 40 MIN: CPT | Mod: 95,,, | Performed by: NURSE PRACTITIONER

## 2023-11-14 NOTE — PROGRESS NOTES
Primary Care Telemedicine Appointment      The patient location is:  Patient Home   The chief complaint leading to consultation is: persistent cough  Total time spent on medical decision making was 20 min.    Visit type: Virtual visit with synchronous audio only and video  Each patient to whom he or she provides medical services by telemedicine is:  (1) informed of the relationship between the physician and patient and the respective role of any other health care provider with respect to management of the patient; and (2) notified that he or she may decline to receive medical services by telemedicine and may withdraw from such care at any time.      Subjective:      Patient ID: Ana Maria Teague is a 63 y.o. female     Chief Complaint: Follow-up (Pt is following up from cough, sob, and fatigue. )      Ms. Teague returns for F/U for bronchitis/PNA  10/6/23  Augmentin & Tessalon Perle  Hx of asthma  Coughing, weakness, still with SOB/LANCE>>>>> 2 D ECHO - heart failure with preserved EF  Trelegy - albuterol prescribed by Dr. Castellon  Just got nebulizer on 11/13/23   11/3/23 CXR - Lungs demonstrate small opacity within the lower left lung   Pulmonology appointment on 23  Feeling slightly better, still with weakness and SOB/LANCE. Describes productive cough, bronchospasms and chest tightness.     Past Surgical History:   Procedure Laterality Date    BREAST BIOPSY Right     benign. 7 years ago.     SECTION      COLONOSCOPY N/A 3/9/2016    Procedure: COLONOSCOPY;  Surgeon: Douglas Daniel III, MD;  Location: [facility];  Service: Endoscopy;  Laterality: N/A;    cyst removed form chest wall      HYSTERECTOMY      KIDNEY TRANSPLANT  2013    SELECTIVE INJECTION OF ANESTHETIC AGENT AROUND LUMBAR SPINAL NERVE ROOT BY TRANSFORAMINAL APPROACH Bilateral 2021    Procedure: Bilateral L5/S1 TF SERENITY;  Surgeon: Rafita Muniz MD;  Location: [facility];  Service: Pain Management;  Laterality: Bilateral;    SKIN GRAFT       revision    THYROIDECTOMY      vascular access surgeries      multiple. last time 2 weeks ago       Past Medical History:   Diagnosis Date    Abnormal glucose 2013    Acquired hypothyroidism     Acute bronchiolitis 10/15/2014    Anemia     Anemia of chronic renal failure 2013    Anxiety     Anxiety disorder     Avascular necrosis of bone of hip     Back pain     s/p steroid injections    Bacteremia due to Escherichia coli 2021    Chronic immunosuppression with Prograf and Cellcept 2013    CKD (chronic kidney disease) stage 2, GFR 60-89 ml/min     CKD (chronic kidney disease) stage 5, GFR less than 15 ml/min     -donor kidney transplant - 13    Depression     Hypertension, renal 2013    Hypothyroidism     Immunosuppressed status 10/15/2014    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   HENT:  Negative for congestion, ear pain, postnasal drip, rhinorrhea, sore throat and trouble swallowing.    Respiratory:  Positive for cough and shortness of breath. Negative for wheezing.    Cardiovascular:  Negative for chest pain, palpitations and leg swelling.   Gastrointestinal:  Negative for diarrhea, nausea and vomiting.   Genitourinary:  Negative for dysuria, frequency and hematuria.   Musculoskeletal:  Negative for arthralgias and myalgias.   Neurological:  Positive for weakness. Negative for headaches.   Psychiatric/Behavioral:  Negative for dysphoric mood and sleep disturbance. The patient is not nervous/anxious.        Objective:   There were no vitals filed for this visit.      There is no height or weight on file to calculate BMI.  BP Readings from  Last 3 Encounters:   11/06/23 136/78   10/30/23 130/78   10/02/23 130/78      Wt Readings from Last 3 Encounters:   11/06/23 0847 98.4 kg (217 lb)   10/30/23 1041 97.5 kg (215 lb)   10/06/23 0943 97.5 kg (215 lb)        Physical Exam  Constitutional:       General: She is not in acute distress.     Appearance: Normal appearance.   HENT:      Head: Normocephalic and atraumatic.      Nose: Nose normal.   Eyes:      General: No scleral icterus.     Conjunctiva/sclera: Conjunctivae normal.   Pulmonary:      Effort: Pulmonary effort is normal. No respiratory distress.   Musculoskeletal:      Cervical back: Normal range of motion.   Skin:     Coloration: Skin is not pale.      Findings: No erythema.   Neurological:      General: No focal deficit present.      Mental Status: She is alert and oriented to person, place, and time.   Psychiatric:         Mood and Affect: Mood normal.         Thought Content: Thought content normal.         Lab Results   Component Value Date    WBC 13.08 (H) 11/06/2023    HGB 10.9 (L) 11/06/2023    HCT 36.4 (L) 11/06/2023     11/06/2023    CHOL 135 11/02/2023    TRIG 56 11/02/2023    HDL 51 11/02/2023    ALT 8 (L) 06/06/2023    AST 14 06/06/2023     08/31/2023    K 4.0 08/31/2023     08/31/2023    CREATININE 1.3 08/31/2023    BUN 16 08/31/2023    CO2 24 08/31/2023    TSH 1.092 06/06/2023    INR 1.1 01/04/2021    HGBA1C 6.7 (H) 11/02/2023         Assessment:   63 y.o. female with multiple co-morbid illnesses here to Follow up bronchitis/PNA and immunocompromised state    Plan:     Problem List Items Addressed This Visit       Abnormal PFTs (pulmonary function tests) - Primary    Acute bronchitis due to other specified organisms     Continue YEN and LABA,   Deep breathing,coughing exercise  Complete course of Doxy and Augmentin  Nebulizer treatments  Pt cannot tolerated corticosteroids due to HTN and hyperglycemia  F/U in 1 week          Immunosuppressed status     Per  Nephrology - hold Cellcept during active infection  Resume after resolution of symptoms         Left lower lobe pulmonary nodule     Noted on CXR 11/23  Completed tx for bronchitis/PNA  CT of Chest  F/U with Pulmonology            Health Maintenance         Date Due Completion Date    Complete Opioid Risk Tool Never done ---    Shingles Vaccine (1 of 2) Never done ---    RSV Vaccine (Age 60+) (1 - 1-dose 60+ series) Never done ---    Foot Exam 06/22/2023 6/22/2022    Override on 1/8/2016: Done (per Karine Fernandez MD)    Override on 3/25/2014: Done    COVID-19 Vaccine (4 - 2023-24 season) 09/01/2023 1/19/2022    Hemoglobin A1c 05/02/2024 11/2/2023    Eye Exam 05/19/2024 5/19/2023    Override on 10/28/2013: Done    Diabetes Urine Screening 08/08/2024 8/8/2023    Mammogram 08/08/2024 8/8/2023    Override on 4/22/2013: Done    Lipid Panel 11/02/2024 11/2/2023    TETANUS VACCINE 06/29/2025 6/29/2015    Colorectal Cancer Screening 03/09/2026 3/9/2016            1. Abnormal PFTs (pulmonary function tests)    2. Acute bronchitis due to other specified organisms  Assessment & Plan:  Continue YEN and LABA,   Deep breathing,coughing exercise  Complete course of Doxy and Augmentin  Nebulizer treatments  Pt cannot tolerated corticosteroids due to HTN and hyperglycemia  F/U in 1 week       3. Immunosuppressed status  Assessment & Plan:  Per Nephrology - hold Cellcept during active infection  Resume after resolution of symptoms      4. Left lower lobe pulmonary nodule  Assessment & Plan:  Noted on CXR 11/23  Completed tx for bronchitis/PNA  CT of Chest  F/U with Pulmonology           No follow-ups on file.  20 minutes spent on medical decision making today.             Sondra Teague, APRN, NP-C  [facility] [place] [address] [place] [address]  [phone] ph  [phone] fax

## 2023-11-14 NOTE — ASSESSMENT & PLAN NOTE
Continue YEN and LABA,   Deep breathing,coughing exercise  Complete course of Doxy and Augmentin  Nebulizer treatments  Pt cannot tolerated corticosteroids due to HTN and hyperglycemia  F/U in 1 week

## 2023-11-17 ENCOUNTER — HOSPITAL ENCOUNTER (OUTPATIENT)
Dept: RADIOLOGY | Facility: HOSPITAL | Age: 63
Discharge: HOME | End: 2023-11-17
Attending: FAMILY MEDICINE
Payer: MEDICARE

## 2023-11-17 ENCOUNTER — OFFICE VISIT (OUTPATIENT)
Dept: DIABETES | Facility: CLINIC | Age: 63
End: 2023-11-17
Payer: MEDICARE

## 2023-11-17 DIAGNOSIS — R93.89 ABNORMAL FINDING ON CHEST XRAY: ICD-10-CM

## 2023-11-17 DIAGNOSIS — I15.2 HYPERTENSION ASSOCIATED WITH DIABETES: ICD-10-CM

## 2023-11-17 DIAGNOSIS — E11.65 TYPE 2 DIABETES MELLITUS WITH HYPERGLYCEMIA, WITH LONG-TERM CURRENT USE OF INSULIN: Primary | ICD-10-CM

## 2023-11-17 DIAGNOSIS — Z79.4 TYPE 2 DIABETES MELLITUS WITH HYPERGLYCEMIA, WITH LONG-TERM CURRENT USE OF INSULIN: Primary | ICD-10-CM

## 2023-11-17 DIAGNOSIS — E78.5 HYPERLIPIDEMIA ASSOCIATED WITH TYPE 2 DIABETES MELLITUS: ICD-10-CM

## 2023-11-17 DIAGNOSIS — R05.2 SUBACUTE COUGH: ICD-10-CM

## 2023-11-17 DIAGNOSIS — E11.59 HYPERTENSION ASSOCIATED WITH DIABETES: ICD-10-CM

## 2023-11-17 DIAGNOSIS — E11.69 HYPERLIPIDEMIA ASSOCIATED WITH TYPE 2 DIABETES MELLITUS: ICD-10-CM

## 2023-11-17 PROCEDURE — 99214 PR OFFICE/OUTPT VISIT, EST, LEVL IV, 30-39 MIN: ICD-10-PCS | Mod: 95,,, | Performed by: NURSE PRACTITIONER

## 2023-11-17 PROCEDURE — 71250 CT THORAX DX C-: CPT | Mod: 26,,, | Performed by: RADIOLOGY

## 2023-11-17 PROCEDURE — 99214 OFFICE O/P EST MOD 30 MIN: CPT | Mod: 95,,, | Performed by: NURSE PRACTITIONER

## 2023-11-17 PROCEDURE — 95251 CONT GLUC MNTR ANALYSIS I&R: CPT | Mod: S$PBB,NDTC,, | Performed by: NURSE PRACTITIONER

## 2023-11-17 PROCEDURE — 71250 CT CHEST WITHOUT CONTRAST: ICD-10-PCS | Mod: 26,,, | Performed by: RADIOLOGY

## 2023-11-17 PROCEDURE — 71250 CT THORAX DX C-: CPT | Mod: TC

## 2023-11-17 PROCEDURE — 95251 PR GLUCOSE MONITOR, 72 HOUR, PHYS INTERP: ICD-10-PCS | Mod: S$PBB,NDTC,, | Performed by: NURSE PRACTITIONER

## 2023-11-17 NOTE — PATIENT INSTRUCTIONS
Medication Regimen:   Continue Trulicity 1.5 mg once a week  Decrease Humalog 5 units three times daily before each meal    Patient Instructions:  Carbohydrate Count: 30-45 grams/meal and 15 grams/snack with limit of 2 snacks per day.  Exercise: Goal is 150 minutes or more per week.  Bring meter and blood sugar log to each appointment.     Goals for Blood Sugar:  Fastin-130 mg/dl  2 hours after meal:  mg/dl  Before Bed: 100-150 mg/dl  If Blood sugar is below 70 mg/dl, DO NOT take diabetes medication. Eat first and then recheck blood sugar in 20 minutes.  Foods to eat if blood sugar is below 70 mg/dl  1/2 glass of orange juice   1/2 regular cola can   3-4 hard candies   3-4 small glucose tabs.   Foods to eat if blood sugar is below 50 mg/dl  1 glass of orange juice  1 regular cola can  8 hard candies  8 small glucose tabs.   If you have repeated eating/blood sugar recheck process 2 times and blood sugar still below 70 mg/dl, call 911.

## 2023-11-17 NOTE — PROGRESS NOTES
Established Patient - Virtual Telehealth Visit     The patient location is: Home ([place])  The chief complaint leading to consultation is: Diabetes Follow-up  Visit type: Virtual visit with synchronous audio and video via Epic Haiku keren  Total time spent with patient: 14 minutes     Each patient to whom I provide medical services by telemedicine is:  (1) informed of the relationship between the physician and patient and the respective role of any other health care provider with respect to management of the patient; and (2) notified that they may decline to receive medical services by telemedicine and may withdraw from such care at any time. Patient verbally consented to receive this service via voice-only telephone call.    PCP: Karine Olmos MD    Subjective:     Chief Complaint: Diabetes follow up.  Last visit with me: 2/15/23    HPI: 63 y.o.  female for diabetes follow up.   Previously managed by TRINITY Garza PA-C.   Last clinic visit 05/2019.   Type II and Post-Transplant diabetes since 2013 .  Comorbidities: HTN, HLD, CKD III and S/p Transplant - right kidney in 2013.    Personal history of pancreatitis: denies  Family history of pancreatic cancer in first-degree relative: denies  Family history of MTC/MEN/endocrine tumors: denies     Known DM complications:   DKA/HHS: no   Retinopathy: no   Peripheral neuropathy: yes   Nephropathy: yes    Interval history:  At last visit, low dose Lantus was stopped.  Admits to fasting BG readings to be within normal range.    Of note, has been sick lately with flu-like symptoms.  Appetite decreased.    Denies recent hospital admissions or ER visits.   Admits to having hypoglycemia, sometimes after meals.  Endorses diabetes related symptoms: Intermittent tingling in Lower Extremities.    Blood glucose monitoring via CGM Nico.  Will be uploaded in media section.  Report generated as follows.      Patient's CGM report interpretation: 14 day report   "of CGM includes Avg  mg/dl. The target range for this patient was  mg/dL. Time in range was 90%, time above range was 9%, and time below ranges was 1%.   Overall pattern of euglycemia with few postprandial hyperglycemia.  No reported low glucose events however occasional postprandial hypoglycemia.    Activity level: Sedentary  Meal Plannin-3 meals/day; infrequent snacks/day.  Irregular mealtime schedule.    Medication compliance all of the time, diet compliance most of the time.   Has attended diabetes education in the past.     Previous regimen: Lantus    Past failed treatment: None    Current DM Medications:  Diabetes Medications               dulaglutide (TRULICITY) 1.5 mg/0.5 mL pen injector Inject 1.5 mg into the skin every 7 days.    insulin lispro (HUMALOG KWIKPEN INSULIN) 100 unit/mL pen Inject 5 Units into the skin 3 (three) times daily before meals.  Taking 7 units       Allergies  Review of patient's allergies indicates:   Allergen Reactions    Azithromycin Nausea And Vomiting    Adhesive Other (See Comments)     Skin tears    Nsaids (non-steroidal anti-inflammatory drug)      Kidney Transplant       Labs Reviewed:  Her most recent A1C is:  Lab Results   Component Value Date    HGBA1C 6.7 (H) 2023    HGBA1C 6.0 (H) 2023    HGBA1C 6.2 (H) 2023       Her most recent BMP is:  Lab Results   Component Value Date     2023    K 4.0 2023     2023    CO2 24 2023    BUN 16 2023    CREATININE 1.3 2023    CALCIUM 9.3 2023    ANIONGAP 13 2023    ESTGFRAFRICA 61 2023    EGFRNONAA 48.6 (A) 2022       No results found for: "CPEPTIDE"  No results found for: "GLUTAMICACID"    There is no height or weight on file to calculate BMI.    Wt Readings from Last 3 Encounters:   23 0847 98.4 kg (217 lb)   10/30/23 1041 97.5 kg (215 lb)   10/06/23 0943 97.5 kg (215 lb)        Her blood sugar in clinic today is: Not assessed " due to type of visit.    Diabetes Management Status  Statin: Taking  ACE/ARB: Not taking    Screening or Prevention Patient's value Goal Complete/Controlled?   HgA1C Testing and Control   Lab Results   Component Value Date    HGBA1C 6.7 (H) 2023      Annually/Less than 8% Yes   Lipid profile : 2023 Annually Yes   LDL control Lab Results   Component Value Date    LDLCALC 72.8 2023    Annually/Less than 100 mg/dl  Yes   Nephropathy screening Lab Results   Component Value Date    MICALBCREAT 39.9 (H) 2023     Lab Results   Component Value Date    PROTEINUA Trace (A) 2022    Annually Yes   Blood pressure BP Readings from Last 1 Encounters:   23 136/78    Less than 140/90 Yes   Dilated retinal exam : 2023 Annually Yes    Foot exam   : 02/15/2023 Annually No     Social History     Socioeconomic History    Marital status:     Number of children: 3   Occupational History    Occupation: disable     Comment: dept manager at [employer]   Tobacco Use    Smoking status: Former     Current packs/day: 0.00     Average packs/day: 1 pack/day for 10.0 years (10.0 ttl pk-yrs)     Types: Cigarettes     Start date: 1992     Quit date: 2002     Years since quittin.0    Smokeless tobacco: Never    Tobacco comments:     quit smoking approx 10-15 years ago    Substance and Sexual Activity    Alcohol use: No    Drug use: No    Sexual activity: Never     Partners: Male   Social History Narrative    Does housework at home.     Social Determinants of Health     Financial Resource Strain: High Risk (2023)    Overall Financial Resource Strain (CARDIA)     Difficulty of Paying Living Expenses: Hard   Food Insecurity: Food Insecurity Present (2023)    Hunger Vital Sign     Worried About Running Out of Food in the Last Year: Never true     Ran Out of Food in the Last Year: Sometimes true   Transportation Needs: No Transportation Needs (2023)    PRAPARE - Transportation      Lack of Transportation (Medical): No     Lack of Transportation (Non-Medical): No   Physical Activity: Inactive (2023)    Exercise Vital Sign     Days of Exercise per Week: 0 days     Minutes of Exercise per Session: 0 min   Stress: No Stress Concern Present (2023)    [employer] [place] [employer] - Occupational Stress Questionnaire     Feeling of Stress : Only a little   Social Connections: Socially Integrated (2023)    Social Connection and Isolation Panel [NHANES]     Frequency of Communication with Friends and Family: More than three times a week     Frequency of Social Gatherings with Friends and Family: Three times a week     Attends Rastafarian Services: More than 4 times per year     Active Member of Clubs or Organizations: No     Attends Club or Organization Meetings: 1 to 4 times per year     Marital Status:    Housing Stability: Low Risk  (2023)    Housing Stability Vital Sign     Unable to Pay for Housing in the Last Year: No     Number of Places Lived in the Last Year: 1     Unstable Housing in the Last Year: No     Past Medical History:   Diagnosis Date    Abnormal glucose 2013    Acquired hypothyroidism     Acute bronchiolitis 10/15/2014    Anemia     Anemia of chronic renal failure 2013    Anxiety     Anxiety disorder     Avascular necrosis of bone of hip     Back pain     s/p steroid injections    Bacteremia due to Escherichia coli 2021    Chronic immunosuppression with Prograf and Cellcept 2013    CKD (chronic kidney disease) stage 2, GFR 60-89 ml/min     CKD (chronic kidney disease) stage 5, GFR less than 15 ml/min     -donor kidney transplant - 13    Depression     Hypertension, renal 2013    Hypothyroidism     Immunosuppressed status 10/15/2014    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   HENT: Negative for dental problem and trouble swallowing.    Eyes: Negative for visual disturbance.   Respiratory: Negative for chest tightness and shortness of breath.    Cardiovascular: Negative for chest pain, palpitations and leg swelling.   Gastrointestinal: Negative for abdominal pain, constipation, diarrhea, nausea and vomiting.   Endocrine: Negative for polydipsia, polyuria and polyphagia.   Genitourinary: Negative for difficulty urinating, dysuria, frequency and urgency.        Negative yeast infection   Musculoskeletal: Negative for gait problem, myalgias and neck pain.   Integumentary:  Negative for rash and wound.   Allergic/Immunologic: Negative.    Neurological: Negative for dizziness, syncope, weakness, numbness and headaches.        Int tingling BLE   Hematological: Negative.    Psychiatric/Behavioral: Negative for confusion and sleep disturbance.   All other systems reviewed and are negative.    Objective:      Physical Exam  Constitutional:       General: Awake.      Appearance: Normal appearance. Well-developed and well-groomed.   HENT:      Head: Normocephalic and atraumatic.   Eyes:      General: Lids are normal. Gaze aligned appropriately.   Pulmonary:      Effort: Pulmonary effort is normal. No respiratory distress.   Neurological:      Mental Status: Alert and oriented to person, place, and time.   Psychiatric:         Attention and Perception: Attention normal.         Mood and Affect: Mood and affect normal.         Speech: Speech normal.         Behavior: Behavior normal.         Thought Content: Thought content normal.         Cognition and Memory: Cognition normal.         Judgment: Judgment normal.     Assessment / Plan:     Diagnoses and all orders for this visit:    Type 2  diabetes mellitus with hyperglycemia, with long-term current use of insulin    Hypertension associated with diabetes    Hyperlipidemia associated with type 2 diabetes mellitus    Additional Plan Details:  - Condition: controlled, most recent A1C of 6.7% was completed 2 weeks ago.  - Monitor blood glucose:  3-4x daily.Goals reviewed. Maintain BG log and bring to next visit for review. Continue CGM Nico use.   - CGM note: Patient is testing 4 times per day. Patient is injecting meal time insulin 3 times daily and is self adjusting insulin based on blood glucose reading. Patient requires CGM for blood sugar monitoring due to frequent insulin dose changes. Patient reports hypoglycemia, < 50.   - Hypoglycemia protocol: Reviewed and risk discussed. Protocol printout provided.   - Diet: Reviewed, limit carbohydrate intake to 30-45 grams per meal, 3x daily and 15 grams per snack with a limit of two daily.   - Activity: Recommend at least 150 minutes of exercise in a week.  - BP and LDL: Recommend lifestyle modifications and follow up with PCP for management.   - Medication Regimen:     Continue Trulicity 1.5 mg once a week  Decrease Humalog 5 units three times daily before each meal     - Medication consideration: May consider increasing Trulicity dose at next visit if able.  - Lab results: A1C discussed.  - Health Maintenance standards addressed today: A1c scheduled.   - Risks of uncontrolled DM explained. Importance of routine foot and eye exams reviewed. Scheduled as appropriate.  -The patient was explained the above plan and given opportunity to ask questions.  She understands, chooses and consents to this plan and accepts all the risks, which include but are not limited to the risks mentioned above.   - Labs ordered as above.  - Follow up: 2 months in clinic.        Charlotte T. Delacruz, FNP-C [facility]    A total of 14 minutes was spent in face to face time, of which over 50 % was spent in  counseling patient on disease process, complications, treatment, and side effects of medications.

## 2023-11-27 ENCOUNTER — OFFICE VISIT (OUTPATIENT)
Dept: PAIN MEDICINE | Facility: CLINIC | Age: 63
End: 2023-11-27
Payer: MEDICARE

## 2023-11-27 VITALS
WEIGHT: 216.06 LBS | BODY MASS INDEX: 34.72 KG/M2 | HEART RATE: 87 BPM | DIASTOLIC BLOOD PRESSURE: 80 MMHG | SYSTOLIC BLOOD PRESSURE: 122 MMHG | HEIGHT: 66 IN

## 2023-11-27 DIAGNOSIS — M25.562 CHRONIC PAIN OF BOTH KNEES: ICD-10-CM

## 2023-11-27 DIAGNOSIS — Z79.891 OPIOID USE AGREEMENT EXISTS: ICD-10-CM

## 2023-11-27 DIAGNOSIS — M79.18 MUSCLE PAIN, MYOFASCIAL: Primary | ICD-10-CM

## 2023-11-27 DIAGNOSIS — M25.561 CHRONIC PAIN OF BOTH KNEES: ICD-10-CM

## 2023-11-27 DIAGNOSIS — M79.2 NEUROPATHIC PAIN: ICD-10-CM

## 2023-11-27 DIAGNOSIS — G89.29 CHRONIC PAIN OF BOTH KNEES: ICD-10-CM

## 2023-11-27 DIAGNOSIS — M54.16 BILATERAL LUMBAR RADICULOPATHY: ICD-10-CM

## 2023-11-27 DIAGNOSIS — M51.36 DDD (DEGENERATIVE DISC DISEASE), LUMBAR: ICD-10-CM

## 2023-11-27 PROCEDURE — 99214 OFFICE O/P EST MOD 30 MIN: CPT | Mod: S$PBB,,, | Performed by: PHYSICIAN ASSISTANT

## 2023-11-27 PROCEDURE — 99215 OFFICE O/P EST HI 40 MIN: CPT | Mod: PBBFAC | Performed by: PHYSICIAN ASSISTANT

## 2023-11-27 PROCEDURE — 99999 PR PBB SHADOW E&M-EST. PATIENT-LVL V: ICD-10-PCS | Mod: PBBFAC,,, | Performed by: PHYSICIAN ASSISTANT

## 2023-11-27 PROCEDURE — 99214 PR OFFICE/OUTPT VISIT, EST, LEVL IV, 30-39 MIN: ICD-10-PCS | Mod: S$PBB,,, | Performed by: PHYSICIAN ASSISTANT

## 2023-11-27 PROCEDURE — 99999 PR PBB SHADOW E&M-EST. PATIENT-LVL V: CPT | Mod: PBBFAC,,, | Performed by: PHYSICIAN ASSISTANT

## 2023-11-27 RX ORDER — OXYCODONE AND ACETAMINOPHEN 10; 325 MG/1; MG/1
1 TABLET ORAL EVERY 8 HOURS PRN
Qty: 90 TABLET | Refills: 0 | Status: SHIPPED | OUTPATIENT
Start: 2024-02-08 | End: 2023-12-11 | Stop reason: SDUPTHER

## 2023-11-27 RX ORDER — OXYCODONE AND ACETAMINOPHEN 10; 325 MG/1; MG/1
1 TABLET ORAL EVERY 8 HOURS PRN
Qty: 90 TABLET | Refills: 0 | Status: SHIPPED | OUTPATIENT
Start: 2023-12-10 | End: 2023-12-11 | Stop reason: SDUPTHER

## 2023-11-27 RX ORDER — OXYCODONE AND ACETAMINOPHEN 10; 325 MG/1; MG/1
1 TABLET ORAL EVERY 8 HOURS PRN
Qty: 90 TABLET | Refills: 0 | Status: SHIPPED | OUTPATIENT
Start: 2024-01-09 | End: 2023-12-11 | Stop reason: SDUPTHER

## 2023-11-27 RX ORDER — DICLOFENAC SODIUM 10 MG/G
GEL TOPICAL
Qty: 100 G | Refills: 0 | Status: SHIPPED | OUTPATIENT
Start: 2023-11-27
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
## 2023-11-28 ENCOUNTER — OFFICE VISIT (OUTPATIENT)
Dept: PULMONOLOGY | Facility: CLINIC | Age: 63
End: 2023-11-28
Payer: MEDICARE

## 2023-11-28 VITALS
RESPIRATION RATE: 16 BRPM | OXYGEN SATURATION: 96 % | HEIGHT: 66 IN | BODY MASS INDEX: 34.86 KG/M2 | WEIGHT: 216.94 LBS | DIASTOLIC BLOOD PRESSURE: 78 MMHG | SYSTOLIC BLOOD PRESSURE: 124 MMHG | HEART RATE: 81 BPM

## 2023-11-28 DIAGNOSIS — J42 CHRONIC BRONCHITIS, UNSPECIFIED CHRONIC BRONCHITIS TYPE: ICD-10-CM

## 2023-11-28 PROCEDURE — 99999 PR PBB SHADOW E&M-EST. PATIENT-LVL V: ICD-10-PCS | Mod: PBBFAC,,, | Performed by: INTERNAL MEDICINE

## 2023-11-28 PROCEDURE — 99204 PR OFFICE/OUTPT VISIT, NEW, LEVL IV, 45-59 MIN: ICD-10-PCS | Mod: S$PBB,,, | Performed by: INTERNAL MEDICINE

## 2023-11-28 PROCEDURE — 99999 PR PBB SHADOW E&M-EST. PATIENT-LVL V: CPT | Mod: PBBFAC,,, | Performed by: INTERNAL MEDICINE

## 2023-11-28 PROCEDURE — 99204 OFFICE O/P NEW MOD 45 MIN: CPT | Mod: S$PBB,,, | Performed by: INTERNAL MEDICINE

## 2023-11-28 PROCEDURE — 99215 OFFICE O/P EST HI 40 MIN: CPT | Mod: PBBFAC | Performed by: INTERNAL MEDICINE

## 2023-11-28 NOTE — PROGRESS NOTES
Subjective:      Patient ID: Ana Maria Teague is a 63 y.o. female.    Chief Complaint: Bronchitis    HPI    63-year-old female former smoker with obesity, hypertension and type 2 diabetes here for evaluation of cough.  She states that she is been coughing for most of the month of November and illness started with a viral upper respiratory type illness.  She saw her primary care physician and had a chest radiograph which was interpreted as abnormal.  Subsequent to that she had a CTOf the chest done 2 weeks ago which was normal.  She states that currently her cough symptoms are beginning to resolve and are not too bothersome.  She states overall she is feeling better but just wanted to come get checked out to make sure she was okay.  No ongoing fevers, chills, productive sputum, chest pain or hemoptysis.    Past Medical History:   Diagnosis Date    Abnormal glucose 2013    Acquired hypothyroidism     Acute bronchiolitis 10/15/2014    Anemia     Anemia of chronic renal failure 2013    Anxiety     Anxiety disorder     Avascular necrosis of bone of hip     Back pain     s/p steroid injections    Bacteremia due to Escherichia coli 2021    Chronic immunosuppression with Prograf and Cellcept 2013    CKD (chronic kidney disease) stage 2, GFR 60-89 ml/min     CKD (chronic kidney disease) stage 5, GFR less than 15 ml/min     -donor kidney transplant - 13    Depression     Hypertension, renal 2013    Hypothyroidism     Immunosuppressed status 10/15/2014    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 7 years ago.     SECTION      COLONOSCOPY N/A 3/9/2016    Procedure: COLONOSCOPY;   Surgeon: Douglas Daniel III, MD;  Location: [facility] ENDO;  Service: Endoscopy;  Laterality: N/A;    cyst removed form chest wall      HYSTERECTOMY      KIDNEY TRANSPLANT  2013    SELECTIVE INJECTION OF ANESTHETIC AGENT AROUND LUMBAR SPINAL NERVE ROOT BY TRANSFORAMINAL APPROACH Bilateral 2021    Procedure: Bilateral L5/S1 TF SERENITY;  Surgeon: Rafita Muniz MD;  Location: [facility] PAIN MGT;  Service: Pain Management;  Laterality: Bilateral;    SKIN GRAFT      revision    THYROIDECTOMY      vascular access surgeries      multiple. last time 2 weeks ago     Social History     Tobacco Use    Smoking status: Former     Current packs/day: 0.00     Average packs/day: 1 pack/day for 10.0 years (10.0 ttl pk-yrs)     Types: Cigarettes     Start date: 1992     Quit date: 2002     Years since quittin.0    Smokeless tobacco: Never    Tobacco comments:     quit smoking approx 10-15 years ago    Substance Use Topics    Alcohol use: No    Drug use: No     Family History   Problem Relation Age of Onset    Diabetes Mother     Kidney disease Mother     Hyperlipidemia Mother     Hypertension Mother     Heart disease Mother     Arthritis Mother     Hypertension Father     Stroke Father     Hypertension Sister     Arthritis Sister     Asthma Sister     Heart disease Sister         heart attack         Review of Systems as per hPI otherwise negative    Objective:     Physical Exam   Constitutional: She is oriented to person, place, and time. She appears well-developed. No distress. She is obese.   HENT:   Head: Normocephalic.   Cardiovascular: Normal rate and regular rhythm.   Pulmonary/Chest: Normal expansion, symmetric chest wall expansion, effort normal and breath sounds normal.   Musculoskeletal:         General: No edema.      Cervical back: Neck supple.   Neurological: She is alert and oriented to person, place, and time.   Psychiatric: She has a normal mood and affect.   Nursing note and vitals reviewed.           "11/28/2023     9:56 AM 11/27/2023     8:52 AM 11/6/2023     8:47 AM 10/30/2023    10:41 AM 10/6/2023     9:43 AM 10/2/2023     9:02 AM 8/30/2023    11:27 AM   Pulmonary Function Tests   SpO2 96 %  96 %   98 %    Height 5' 6" (1.676 m) 5' 6" (1.676 m) 5' 6" (1.676 m) 5' 6" (1.676 m)  5' 6" (1.676 m) 5' 6" (1.676 m)   Weight 98.4 kg (216 lb 14.9 oz) 98 kg (216 lb 0.8 oz) 98.4 kg (217 lb) 97.5 kg (215 lb) 97.5 kg (215 lb) 97.8 kg (215 lb 8 oz) 98.8 kg (217 lb 13 oz)   BMI (Calculated) 35 34.9 35 34.7 34.7 34.8 35.2        Assessment:     1. Chronic bronchitis, unspecified chronic bronchitis type         I personally reviewed CT chest images.  I agree with the radiologist's interpretation as below    FINDINGS:  Lungs/pleura: No acute finding.  No pulmonary nodule.  No pleural effusion or pneumothorax.     Coronary artery calcification: Present.     Aorta: Atherosclerosis. No Aneurysm.     Heart: Normal size. No pericardial effusion.     Bones/joints: No acute osseous finding.     Other: No mediastinal or axillary lymphadenopathy.  Right upper extremity vascular stents.  Thyroidectomy changes.  Intramuscular lipoma right posterior scapula.  No acute finding in the visualized upper abdomen.     Impression:     1. No pulmonary nodule.  No suspicious finding in the chest.  2. Chronic, postsurgical, and incidental findings as above.    I personally reviewed pulmonary function test data from 2020.  My interpretation is:  Normal study    Plan:     Likely postviral cough syndrome which is resolving  Normal CT is reassuring  No ongoing worrisome signs or symptoms  Discussed with the patient that current illness should resolve  We will see her back p.r.n.     "

## 2023-12-11 ENCOUNTER — TELEPHONE (OUTPATIENT)
Dept: PAIN MEDICINE | Facility: CLINIC | Age: 63
End: 2023-12-11
Payer: MEDICARE

## 2023-12-11 DIAGNOSIS — M51.36 DDD (DEGENERATIVE DISC DISEASE), LUMBAR: ICD-10-CM

## 2023-12-11 RX ORDER — OXYCODONE AND ACETAMINOPHEN 10; 325 MG/1; MG/1
1 TABLET ORAL EVERY 8 HOURS PRN
Qty: 90 TABLET | Refills: 0 | Status: SHIPPED | OUTPATIENT
Start: 2024-02-08 | End: 2024-02-22

## 2023-12-11 RX ORDER — OXYCODONE AND ACETAMINOPHEN 10; 325 MG/1; MG/1
1 TABLET ORAL EVERY 8 HOURS PRN
Qty: 90 TABLET | Refills: 0 | Status: SHIPPED | OUTPATIENT
Start: 2023-12-11 | End: 2024-02-22

## 2023-12-11 RX ORDER — OXYCODONE AND ACETAMINOPHEN 10; 325 MG/1; MG/1
1 TABLET ORAL EVERY 8 HOURS PRN
Qty: 90 TABLET | Refills: 0 | Status: SHIPPED | OUTPATIENT
Start: 2024-01-09 | End: 2024-02-22 | Stop reason: SDUPTHER

## 2023-12-11 NOTE — TELEPHONE ENCOUNTER
Spoke with patient she wants medication sent [facility].  Sent request to provider.

## 2023-12-11 NOTE — TELEPHONE ENCOUNTER
----- Message from Cece Banks sent at 12/11/2023 12:11 PM CST -----  Regarding: Pharmacy Change  Contact: Ana Maria Rodgers called in to say that her pain medicine went to the wrong pharmacy for Oxycodone-acetaminophen (PERCOCET)  mg per tablet she needs it to be moved the [facility], Please call her at [phone].    [facility]  Ph: [phone]

## 2023-12-17 ENCOUNTER — PATIENT MESSAGE (OUTPATIENT)
Dept: PRIMARY CARE CLINIC | Facility: CLINIC | Age: 63
End: 2023-12-17
Payer: MEDICARE

## 2023-12-17 DIAGNOSIS — H10.33 ACUTE CONJUNCTIVITIS OF BOTH EYES, UNSPECIFIED ACUTE CONJUNCTIVITIS TYPE: Primary | ICD-10-CM

## 2023-12-18 RX ORDER — POLYMYXIN B SULFATE AND TRIMETHOPRIM 1; 10000 MG/ML; [USP'U]/ML
1 SOLUTION OPHTHALMIC EVERY 6 HOURS
Qty: 10 ML | Refills: 0 | Status: SHIPPED | OUTPATIENT
Start: 2023-12-18

## 2023-12-21 ENCOUNTER — OFFICE VISIT (OUTPATIENT)
Dept: PRIMARY CARE CLINIC | Facility: CLINIC | Age: 63
End: 2023-12-21
Payer: MEDICARE

## 2023-12-21 DIAGNOSIS — H10.13 ALLERGIC CONJUNCTIVITIS OF BOTH EYES: ICD-10-CM

## 2023-12-21 DIAGNOSIS — H10.13 ALLERGIC CONJUNCTIVITIS OF BOTH EYES: Primary | ICD-10-CM

## 2023-12-21 PROCEDURE — 99213 OFFICE O/P EST LOW 20 MIN: CPT | Mod: 95,,, | Performed by: NURSE PRACTITIONER

## 2023-12-21 PROCEDURE — 99213 PR OFFICE/OUTPT VISIT, EST, LEVL III, 20-29 MIN: ICD-10-PCS | Mod: 95,,, | Performed by: NURSE PRACTITIONER

## 2023-12-21 RX ORDER — AZELASTINE HYDROCHLORIDE 0.5 MG/ML
1 SOLUTION/ DROPS OPHTHALMIC 2 TIMES DAILY
Qty: 6 ML | Refills: 0 | Status: SHIPPED | OUTPATIENT
Start: 2023-12-21 | End: 2024-12-20

## 2023-12-21 RX ORDER — AZELASTINE HYDROCHLORIDE 0.5 MG/ML
1 SOLUTION/ DROPS OPHTHALMIC 2 TIMES DAILY
Qty: 18 ML | OUTPATIENT
Start: 2023-12-21

## 2023-12-21 RX ORDER — AZELASTINE HYDROCHLORIDE 0.5 MG/ML
1 SOLUTION/ DROPS OPHTHALMIC 2 TIMES DAILY
Qty: 6 ML | Refills: 0 | Status: CANCELLED | OUTPATIENT
Start: 2023-12-21 | End: 2024-12-20

## 2023-12-21 NOTE — PROGRESS NOTES
Ana Maria Teague  2023  [id number]    Karine Olmos MD  Patient Care Team:  Karine Olmos MD as PCP - General (Internal Medicine)  Jovanna Pugh MD as Consulting Physician (Nephrology)  Aide Dougherty NP as Nurse Practitioner (Endocrinology)      [facility]      Chief Complaint:  Chief Complaint   Patient presents with    Conjunctivitis       History of Present Illness:    Ms. Teague presents with c/o red bilateral eyes. Onset 6 days ago. No improvement with ABX drops.   Redness, swelling, matted shut in the AM yellowish  Eyes feel dry, itching, burning  Hx of seasonal allergies  No changes in vision - clear fluid  Nasal congestion  Sore throat this AM  Takes a daily antihistamine            The following were reviewed: Active problem list, medication list, allergies, family history, social history, and Health Maintenance.     History:  Past Medical History:   Diagnosis Date    Abnormal glucose 2013    Acquired hypothyroidism     Acute bronchiolitis 10/15/2014    Anemia     Anemia of chronic renal failure 2013    Anxiety     Anxiety disorder     Avascular necrosis of bone of hip     Back pain     s/p steroid injections    Bacteremia due to Escherichia coli 2021    Chronic immunosuppression with Prograf and Cellcept 2013    CKD (chronic kidney disease) stage 2, GFR 60-89 ml/min     CKD (chronic kidney disease) stage 5, GFR less than 15 ml/min     -donor kidney transplant - 13    Depression     Hypertension, renal 2013    Hypothyroidism     Immunosuppressed status 10/15/2014    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 7 years ago.      SECTION      COLONOSCOPY N/A 3/9/2016    Procedure: COLONOSCOPY;  Surgeon: Douglas Daniel III, MD;  Location: [facility] ENDO;  Service: Endoscopy;  Laterality: N/A;    cyst removed form chest wall      HYSTERECTOMY      KIDNEY TRANSPLANT  2013    SELECTIVE INJECTION OF ANESTHETIC AGENT AROUND LUMBAR SPINAL NERVE ROOT BY TRANSFORAMINAL APPROACH Bilateral 2021    Procedure: Bilateral L5/S1 TF SERENITY;  Surgeon: Rafita Muniz MD;  Location: [facility] PAIN MGT;  Service: Pain Management;  Laterality: Bilateral;    SKIN GRAFT      revision    THYROIDECTOMY      vascular access surgeries      multiple. last time 2 weeks ago     Family History   Problem Relation Age of Onset    Diabetes Mother     Kidney disease Mother     Hyperlipidemia Mother     Hypertension Mother     Heart disease Mother     Arthritis Mother     Hypertension Father     Stroke Father     Hypertension Sister     Arthritis Sister     Asthma Sister     Heart disease Sister         heart attack     Patient Active Problem List   Diagnosis    Avascular necrosis of bone of right hip    Acquired hypothyroidism    Osteoporosis without pathological fracture    Depression, recurrent    Anxiety and depression    Vertigo    Abnormal findings on diagnostic imaging of breast    Renal cyst    -donor kidney transplant - 13    Hypertension, renal    Anemia of chronic renal failure    Secondary hyperparathyroidism of renal origin    CKD stage 3 due to type 2 diabetes mellitus    Type 2 diabetes mellitus with hyperglycemia, with long-term current use of insulin    CTS (carpal tunnel syndrome)    Cubital tunnel syndrome on right    Immunosuppressed status    Abnormal PFTs (pulmonary function tests)    Diffusion capacity of lung (dl), decreased    Age-related osteoporosis without current pathological fracture    Hypertension associated with diabetes    Headache    Sleep-related nonobstructive alveolar hypoventilation    Hyperlipidemia associated with type 2  "diabetes mellitus    Allergic rhinitis    Chronic bronchitis    Insomnia due to medical condition    Primary osteoarthritis involving multiple joints    Severe obesity (BMI 35.0-39.9) with comorbidity    Hyperglycemia    Chronic pain of both knees    Unspecified inflammatory spondylopathy, lumbar region    Hypoglycemia associated with diabetes    Calcification of aorta    Encounter for preventive health examination    Requires supplemental oxygen    Acute bronchitis due to other specified organisms    High pulmonary arterial pressure    Left lower lobe pulmonary nodule     Review of patient's allergies indicates:   Allergen Reactions    Azithromycin Nausea And Vomiting    Adhesive Other (See Comments)     Skin tears    Nsaids (non-steroidal anti-inflammatory drug)      Kidney Transplant       Medications:  Current Outpatient Medications on File Prior to Visit   Medication Sig Dispense Refill    albuterol (PROVENTIL) 2.5 mg /3 mL (0.083 %) nebulizer solution Take 3 mLs (2.5 mg total) by nebulization every 6 (six) hours as needed for Wheezing. Rescue 90 mL 3    albuterol (PROVENTIL/VENTOLIN HFA) 90 mcg/actuation inhaler Rescue 18 g 2    alendronate (FOSAMAX) 70 MG tablet Take 1 tablet (70 mg total) by mouth every 7 days. 4 tablet 11    amitriptyline (ELAVIL) 10 MG tablet Take 1 tablet (10 mg total) by mouth nightly as needed for Insomnia or Pain. 30 tablet 1    atorvastatin (LIPITOR) 20 MG tablet TAKE 1 TABLET(20 MG) BY MOUTH EVERY DAY 90 tablet 3    BD RORY 2ND GEN PEN NEEDLE 32 gauge x 5/32" Ndle USE FOUR TIMES DAILY WITH INSULIN AS DIRECTED 400 each 3    benzonatate (TESSALON) 100 MG capsule Take 1 capsule (100 mg total) by mouth 3 (three) times daily as needed for Cough. 40 capsule 1    blood sugar diagnostic (FREESTYLE LITE STRIPS) Strp Use to check blood glucose 3 times a day. 100 strip 11    cetirizine (ZYRTEC) 10 MG tablet TAKE 1 TABLET BY MOUTH EVERY DAY 30 tablet 11    diclofenac sodium (VOLTAREN) 1 % Gel " APPLY 2 Grams TOPICALLY FOUR TIMES DAILY AS NEEDED 100 g 0    dulaglutide (TRULICITY) 1.5 mg/0.5 mL pen injector Inject 1.5 mg into the skin every 7 days. 4 pen 1    ergocalciferol (ERGOCALCIFEROL) 50,000 unit Cap Take 1 capsule (50,000 Units total) by mouth every 7 days. 4 capsule 11    flash glucose scanning reader (FREESTYLE DEREK 2 READER) Misc 1 Device by Misc.(Non-Drug; Combo Route) route continuous. Use to test blood glucose with the Derek reader. 1 each 0    flash glucose sensor (FREESTYLE DEREK 2 SENSOR) Kit 1 application by Misc.(Non-Drug; Combo Route) route every 14 (fourteen) days. Use to test blood glucose 4-6x/day. 2 kit 11    fluticasone propionate (FLONASE) 50 mcg/actuation nasal spray SHAKE LIQUID AND USE 2 SPRAYS(100 MCG) IN EACH NOSTRIL EVERY DAY 48 g 1    fluticasone-umeclidin-vilanter (TRELEGY ELLIPTA) 100-62.5-25 mcg DsDv Inhale 1 puff into the lungs once daily. 60 each 1    furosemide (LASIX) 20 MG tablet Take 1 tablet (20 mg total) by mouth daily as needed (swelling in legs). 30 tablet 11    gabapentin (NEURONTIN) 400 MG capsule Take 1 capsule (400 mg total) by mouth every morning AND 1 capsule (400 mg total) with lunch AND 1-2 capsules (400-800 mg total) every evening. 120 capsule 1    inhalation spacing device Use as directed for inhalation. 1 each 0    insulin lispro (HUMALOG KWIKPEN INSULIN) 100 unit/mL pen Inject 5 Units into the skin 3 (three) times daily before meals. 15 mL 5    lancets (FREESTYLE LANCETS) 28 gauge Misc 1 lancet by Combination route 2 (two) times daily as needed. Qid prn 400 each 2    lancets Misc 1 strip by Misc.(Non-Drug; Combo Route) route 4 (four) times daily with meals and nightly. 150 each 6    levothyroxine (SYNTHROID) 150 MCG tablet Take 1 tablet (150 mcg total) by mouth before breakfast. 90 tablet 3    LIDOcaine (LIDODERM) 5 % Use 1-3 patches per day over recommended area. Remove & Discard patch within 12 hours or as directed by MD. 90 patch 0    mycophenolate  (CELLCEPT) 250 mg Cap Take 2 capsules (500 mg total) by mouth 2 (two) times daily. 120 capsule 11    nebulizer and compressor Ewelina Use as directed 1 each 0    NIFEdipine (PROCARDIA-XL) 30 MG (OSM) 24 hr tablet Take 1 tablet (30 mg total) by mouth 2 (two) times a day. 180 tablet 3    ondansetron (ZOFRAN-ODT) 4 MG TbDL DISSOLVE 1 TABLET(4 MG) ON THE TONGUE EVERY 8 HOURS AS NEEDED FOR NAUSEA 30 tablet 1    [START ON 1/9/2024] oxyCODONE-acetaminophen (PERCOCET)  mg per tablet Take 1 tablet by mouth every 8 (eight) hours as needed for Pain. 90 tablet 0    [START ON 2/8/2024] oxyCODONE-acetaminophen (PERCOCET)  mg per tablet Take 1 tablet by mouth every 8 (eight) hours as needed for Pain. 90 tablet 0    oxyCODONE-acetaminophen (PERCOCET)  mg per tablet Take 1 tablet by mouth every 8 (eight) hours as needed for Pain. 90 tablet 0    polymyxin B sulf-trimethoprim (POLYTRIM) 10,000 unit- 1 mg/mL Drop Place 1 drop into both eyes every 6 (six) hours. 10 mL 0    pulse oximeter (PULSE OXIMETER) device by Apply Externally route 2 (two) times a day. Use twice daily at 8 AM and 3 PM and [facility]. 1 each 0    tacrolimus (PROGRAF) 1 MG Cap TAKE 3 CAPSULES IN THE MORNING, AND 3 IN THE EVENING 180 capsule 11    tiZANidine (ZANAFLEX) 4 MG tablet Take 1 tablet (4 mg total) by mouth 2 (two) times daily as needed (muscle spasms). 60 tablet 1     No current facility-administered medications on file prior to visit.       Medications have been reviewed and reconciled with patient at visit today.      Exam:  There were no vitals filed for this visit.      There is no height or weight on file to calculate BMI.      BP Readings from Last 3 Encounters:   11/28/23 124/78   11/27/23 122/80   11/06/23 136/78     Wt Readings from Last 3 Encounters:   11/28/23 0956 98.4 kg (216 lb 14.9 oz)   11/27/23 0852 98 kg (216 lb 0.8 oz)   11/06/23 0847 98.4 kg (217 lb)        REVIEW OF SYSTEMS  Review of Systems    Constitutional:  Negative for chills, fever and malaise/fatigue.   HENT:  Positive for congestion and sore throat. Negative for ear discharge.    Eyes:  Positive for discharge and redness. Negative for blurred vision, double vision, photophobia and pain.   Respiratory:  Negative for cough and shortness of breath.    Cardiovascular:  Negative for chest pain and leg swelling.   Gastrointestinal:  Negative for nausea and vomiting.   Genitourinary:  Negative for dysuria and frequency.   Musculoskeletal:  Negative for back pain and myalgias.   Neurological:  Negative for dizziness, focal weakness and headaches.   Psychiatric/Behavioral:  Negative for depression. The patient is not nervous/anxious.        Physical Exam  Constitutional:       General: She is not in acute distress.     Appearance: Normal appearance.   HENT:      Head: Normocephalic and atraumatic.      Nose: Nose normal.   Eyes:      General: No scleral icterus.     Conjunctiva/sclera:      Right eye: Right conjunctiva is injected. No chemosis, exudate or hemorrhage.     Left eye: Left conjunctiva is injected. No chemosis, exudate or hemorrhage.     Comments: Eyelids puffy   Pulmonary:      Effort: Pulmonary effort is normal.   Abdominal:      Tenderness: There is no abdominal tenderness.   Musculoskeletal:      Left shoulder: Decreased range of motion.      Cervical back: Normal range of motion.   Skin:     Coloration: Skin is not pale.      Findings: No bruising or erythema.   Neurological:      General: No focal deficit present.      Mental Status: She is alert and oriented to person, place, and time.   Psychiatric:         Mood and Affect: Mood normal.         Thought Content: Thought content normal.         Laboratory Reviewed:     Lab Results   Component Value Date    WBC 13.08 (H) 11/06/2023    HGB 10.9 (L) 11/06/2023    HCT 36.4 (L) 11/06/2023     11/06/2023    CHOL 135 11/02/2023    TRIG 56 11/02/2023    HDL 51 11/02/2023    ALT 8 (L)  06/06/2023    AST 14 06/06/2023     08/31/2023    K 4.0 08/31/2023     08/31/2023    CREATININE 1.3 08/31/2023    BUN 16 08/31/2023    CO2 24 08/31/2023    TSH 1.092 06/06/2023    INR 1.1 01/04/2021    HGBA1C 6.7 (H) 11/02/2023       Screening or Prevention Patient's value Goal Complete/Controlled?   HgA1C Testing and Control   Lab Results   Component Value Date    HGBA1C 6.7 (H) 11/02/2023      Annually/Less than 8% Yes   Lipid profile : 11/02/2023 Annually Yes   LDL control Lab Results   Component Value Date    LDLCALC 72.8 11/02/2023    Annually/Less than 100 mg/dl  Yes   Nephropathy screening Lab Results   Component Value Date    LABMICR 81.0 08/08/2023     Lab Results   Component Value Date    PROTEINUA Trace (A) 11/25/2022    Annually Yes   Blood pressure BP Readings from Last 1 Encounters:   11/28/23 124/78    Less than 140/90 Yes   Dilated retinal exam : 05/19/2023 Annually Yes   Foot exam   : 02/15/2023 Annually Yes       Health Maintenance  Health Maintenance Topics with due status: Not Due       Topic Last Completion Date    TETANUS VACCINE 06/29/2015    Colorectal Cancer Screening 03/09/2016    Eye Exam 05/19/2023    Diabetes Urine Screening 08/08/2023    Mammogram 08/08/2023    Lipid Panel 11/02/2023    Hemoglobin A1c 11/02/2023     Health Maintenance Due   Topic Date Due    Complete Opioid Risk Tool  Never done    Urine Drug Screen  Never done    Naloxone Prescription  Never done    Shingles Vaccine (1 of 2) Never done    RSV Vaccine (Age 60+ and Pregnant patients) (1 - 1-dose 60+ series) Never done    COVID-19 Vaccine (4 - 2023-24 season) 09/01/2023    Foot Exam  02/15/2024       Assessment and Plan:  1. Allergic conjunctivitis of both eyes  -     azelastine (OPTIVAR) 0.05 % ophthalmic solution; Place 1 drop into both eyes 2 (two) times daily.  Dispense: 6 mL; Refill: 0     Cool compresses  Zyrtec daily  Eye moistening drops  No improvement will need to see  Opthalmology              -Patient's lab results were reviewed and discussed with patient  -Treatment options and alternatives were discussed with the patient. Patient expressed understanding. Patient was given the opportunity to ask questions and be an active participant in their medical care. Patient had no further questions or concerns at this time.         Future Appointments   Date Time Provider Department Center   1/17/2024  2:00 PM Aide Dougherty NP [facility] DIABETE [facility]   2/22/2024  9:00 AM Oliver Pedro MD [facility] INT FLORIAN [facility]   2/23/2024 10:10 AM LABORATORY, CENTRAL [facility]   2/29/2024 11:00 AM Claudia Skaggs PA-C [facility] RHEUM [facility]   4/18/2024 11:00 AM Marcella Haas PA-C [facility]          After visit summary printed and given to patient upon discharge.  Patient goals and care plan are included in After visit summary.      The following issues were discussed: The encounter diagnosis was Allergic conjunctivitis of both eyes.    Health maintenance needs, recent test results and goals of care discussed with pt and questions answered.           FORD Gonzalez, NP-C  [facility] [address] [place] [address]

## 2023-12-26 ENCOUNTER — TELEPHONE (OUTPATIENT)
Dept: PRIMARY CARE CLINIC | Facility: CLINIC | Age: 63
End: 2023-12-26
Payer: MEDICARE

## 2023-12-26 ENCOUNTER — PATIENT MESSAGE (OUTPATIENT)
Dept: PRIMARY CARE CLINIC | Facility: CLINIC | Age: 63
End: 2023-12-26
Payer: MEDICARE

## 2023-12-26 DIAGNOSIS — H01.009 BLEPHARITIS, UNSPECIFIED LATERALITY, UNSPECIFIED TYPE: Primary | ICD-10-CM

## 2023-12-26 NOTE — TELEPHONE ENCOUNTER
Spoke with Patient about scheduling Ophthalmology appointment . Patient stated she will go to [facility].

## 2024-01-01 PROBLEM — Z00.00 ENCOUNTER FOR PREVENTIVE HEALTH EXAMINATION: Status: RESOLVED | Noted: 2023-09-28 | Resolved: 2024-01-01

## 2024-01-06 ENCOUNTER — PATIENT MESSAGE (OUTPATIENT)
Dept: PRIMARY CARE CLINIC | Facility: CLINIC | Age: 64
End: 2024-01-06
Payer: MEDICARE

## 2024-01-08 ENCOUNTER — TELEPHONE (OUTPATIENT)
Dept: PRIMARY CARE CLINIC | Facility: CLINIC | Age: 64
End: 2024-01-08
Payer: MEDICARE

## 2024-01-08 ENCOUNTER — PATIENT MESSAGE (OUTPATIENT)
Dept: PRIMARY CARE CLINIC | Facility: CLINIC | Age: 64
End: 2024-01-08

## 2024-01-08 ENCOUNTER — OFFICE VISIT (OUTPATIENT)
Dept: PRIMARY CARE CLINIC | Facility: CLINIC | Age: 64
End: 2024-01-08
Payer: MEDICARE

## 2024-01-08 VITALS
HEART RATE: 67 BPM | OXYGEN SATURATION: 96 % | SYSTOLIC BLOOD PRESSURE: 118 MMHG | HEIGHT: 66 IN | WEIGHT: 215.25 LBS | BODY MASS INDEX: 34.59 KG/M2 | DIASTOLIC BLOOD PRESSURE: 76 MMHG | TEMPERATURE: 99 F

## 2024-01-08 DIAGNOSIS — B34.9 VIRAL ILLNESS: Primary | ICD-10-CM

## 2024-01-08 DIAGNOSIS — J02.9 PHARYNGITIS, UNSPECIFIED ETIOLOGY: ICD-10-CM

## 2024-01-08 DIAGNOSIS — D84.9 IMMUNOSUPPRESSED STATUS: ICD-10-CM

## 2024-01-08 LAB
CTP QC/QA: YES
CTP QC/QA: YES
MOLECULAR STREP A: NEGATIVE
SARS-COV-2 AG RESP QL IA.RAPID: NEGATIVE

## 2024-01-08 PROCEDURE — 87811 SARS-COV-2 COVID19 W/OPTIC: CPT | Mod: PBBFAC,PN | Performed by: NURSE PRACTITIONER

## 2024-01-08 PROCEDURE — 87651 STREP A DNA AMP PROBE: CPT | Mod: PBBFAC,PN | Performed by: NURSE PRACTITIONER

## 2024-01-08 PROCEDURE — 99999 PR PBB SHADOW E&M-EST. PATIENT-LVL V: CPT | Mod: PBBFAC,,, | Performed by: NURSE PRACTITIONER

## 2024-01-08 PROCEDURE — 87811 SARS-COV-2 COVID19 W/OPTIC: CPT | Mod: QW,S$GLB,, | Performed by: NURSE PRACTITIONER

## 2024-01-08 PROCEDURE — 99215 OFFICE O/P EST HI 40 MIN: CPT | Mod: PBBFAC,PN | Performed by: NURSE PRACTITIONER

## 2024-01-08 PROCEDURE — 87651 STREP A DNA AMP PROBE: CPT | Mod: QW,S$GLB,, | Performed by: NURSE PRACTITIONER

## 2024-01-08 PROCEDURE — 99214 OFFICE O/P EST MOD 30 MIN: CPT | Mod: S$GLB,,, | Performed by: NURSE PRACTITIONER

## 2024-01-08 NOTE — TELEPHONE ENCOUNTER
Rx's were called in to [facility]from [facility] today. Prednisone 20mg 2 tabs qd x 3 days and Augmentin 875mg bid x 7 days. FELICITY Malin RN

## 2024-01-08 NOTE — PROGRESS NOTES
Ana Maria Teague  2024  [id number]    Karine Olmos MD  Patient Care Team:  Karine Olmos MD as PCP - General (Internal Medicine)  Jovanna Pugh MD as Consulting Physician (Nephrology)  Aide Dougherty NP as Nurse Practitioner (Endocrinology)      [facility]      Chief Complaint:  Chief Complaint   Patient presents with    Sore Throat       History of Present Illness:    Pt returns today still with conjunctivitis symptoms to bilateral eyes that has been waxing and waning. Did see Ophthalmology and prescribed steroid gtts. Was getting better then erythema/inflammation returned.  Matting of the eyes occur. She will see Ophthalmology again. Denies eye pain and visual disturbance.    In the meantime, she notes pharyngitis mostly on the left and feeling a full sensation in anterior neck. Left sided otalgia. Denies SOB or muffled voice  Chronic bronchitis has improved    She denies fever, chills, cough, ear drainage.       The following were reviewed: Active problem list, medication list, allergies, family history, social history, and Health Maintenance.     History:  Past Medical History:   Diagnosis Date    Abnormal glucose 2013    Acquired hypothyroidism     Acute bronchiolitis 10/15/2014    Anemia     Anemia of chronic renal failure 2013    Anxiety     Anxiety disorder     Avascular necrosis of bone of hip     Back pain     s/p steroid injections    Bacteremia due to Escherichia coli 2021    Chronic immunosuppression with Prograf and Cellcept 2013    CKD (chronic kidney disease) stage 2, GFR 60-89 ml/min     CKD (chronic kidney disease) stage 5, GFR less than 15 ml/min     -donor kidney transplant - 13    Depression     Hypertension, renal 2013    Hypothyroidism     Immunosuppressed status 10/15/2014    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    Osteoporosis with pathological fracture     Renal disease due to hypertension  2013    Renal hypertension, non-vascular     Secondary hyperparathyroidism of renal origin 2013    Vertigo      Past Surgical History:   Procedure Laterality Date    BREAST BIOPSY Right     benign. 7 years ago.     SECTION      COLONOSCOPY N/A 3/9/2016    Procedure: COLONOSCOPY;  Surgeon: Douglas Daniel III, MD;  Location: [facility] ENDO;  Service: Endoscopy;  Laterality: N/A;    cyst removed form chest wall      HYSTERECTOMY      KIDNEY TRANSPLANT  2013    SELECTIVE INJECTION OF ANESTHETIC AGENT AROUND LUMBAR SPINAL NERVE ROOT BY TRANSFORAMINAL APPROACH Bilateral 2021    Procedure: Bilateral L5/S1 TF SERENITY;  Surgeon: Rafita Muniz MD;  Location: [facility] PAIN MGT;  Service: Pain Management;  Laterality: Bilateral;    SKIN GRAFT      revision    THYROIDECTOMY      vascular access surgeries      multiple. last time 2 weeks ago     Family History   Problem Relation Age of Onset    Diabetes Mother     Kidney disease Mother     Hyperlipidemia Mother     Hypertension Mother     Heart disease Mother     Arthritis Mother     Hypertension Father     Stroke Father     Hypertension Sister     Arthritis Sister     Asthma Sister     Heart disease Sister         heart attack     Patient Active Problem List   Diagnosis    Avascular necrosis of bone of right hip    Acquired hypothyroidism    Osteoporosis without pathological fracture    Depression, recurrent    Anxiety and depression    Vertigo    Abnormal findings on diagnostic imaging of breast    Renal cyst    -donor kidney transplant - 13    Hypertension, renal    Anemia of chronic renal failure    Secondary hyperparathyroidism of renal origin    CKD stage 3 due to type 2 diabetes mellitus    Type 2 diabetes mellitus with hyperglycemia, with long-term current use of insulin    CTS (carpal tunnel syndrome)    Cubital tunnel syndrome on right    Immunosuppressed status    Abnormal PFTs (pulmonary function tests)    Diffusion capacity of lung  "(dl), decreased    Age-related osteoporosis without current pathological fracture    Hypertension associated with diabetes    Headache    Sleep-related nonobstructive alveolar hypoventilation    Hyperlipidemia associated with type 2 diabetes mellitus    Allergic rhinitis    Chronic bronchitis    Insomnia due to medical condition    Primary osteoarthritis involving multiple joints    Severe obesity (BMI 35.0-39.9) with comorbidity    Hyperglycemia    Chronic pain of both knees    Unspecified inflammatory spondylopathy, lumbar region    Hypoglycemia associated with diabetes    Calcification of aorta    Requires supplemental oxygen    Acute bronchitis due to other specified organisms    High pulmonary arterial pressure    Left lower lobe pulmonary nodule    Viral illness    Pharyngitis     Review of patient's allergies indicates:   Allergen Reactions    Azithromycin Nausea And Vomiting    Adhesive Other (See Comments)     Skin tears    Nsaids (non-steroidal anti-inflammatory drug)      Kidney Transplant       Medications:  Current Outpatient Medications on File Prior to Visit   Medication Sig Dispense Refill    albuterol (PROVENTIL/VENTOLIN HFA) 90 mcg/actuation inhaler Rescue 18 g 2    alendronate (FOSAMAX) 70 MG tablet Take 1 tablet (70 mg total) by mouth every 7 days. 4 tablet 11    amitriptyline (ELAVIL) 10 MG tablet Take 1 tablet (10 mg total) by mouth nightly as needed for Insomnia or Pain. 30 tablet 1    atorvastatin (LIPITOR) 20 MG tablet TAKE 1 TABLET(20 MG) BY MOUTH EVERY DAY 90 tablet 3    azelastine (OPTIVAR) 0.05 % ophthalmic solution Place 1 drop into both eyes 2 (two) times daily. 6 mL 0    BD RORY 2ND GEN PEN NEEDLE 32 gauge x 5/32" Ndle USE FOUR TIMES DAILY WITH INSULIN AS DIRECTED 400 each 3    benzonatate (TESSALON) 100 MG capsule Take 1 capsule (100 mg total) by mouth 3 (three) times daily as needed for Cough. 40 capsule 1    blood sugar diagnostic (FREESTYLE LITE STRIPS) Strp Use to check blood " glucose 3 times a day. 100 strip 11    cetirizine (ZYRTEC) 10 MG tablet TAKE 1 TABLET BY MOUTH EVERY DAY 30 tablet 11    diclofenac sodium (VOLTAREN) 1 % Gel APPLY 2 Grams TOPICALLY FOUR TIMES DAILY AS NEEDED 100 g 0    dulaglutide (TRULICITY) 1.5 mg/0.5 mL pen injector Inject 1.5 mg into the skin every 7 days. 4 pen 3    ergocalciferol (ERGOCALCIFEROL) 50,000 unit Cap Take 1 capsule (50,000 Units total) by mouth every 7 days. 4 capsule 11    flash glucose scanning reader (FREESTYLE DEREK 2 READER) Misc 1 Device by Misc.(Non-Drug; Combo Route) route continuous. Use to test blood glucose with the Derek reader. 1 each 0    flash glucose sensor (FREESTYLE DEREK 2 SENSOR) Kit 1 application by Misc.(Non-Drug; Combo Route) route every 14 (fourteen) days. Use to test blood glucose 4-6x/day. 2 kit 11    fluticasone propionate (FLONASE) 50 mcg/actuation nasal spray SHAKE LIQUID AND USE 2 SPRAYS(100 MCG) IN EACH NOSTRIL EVERY DAY 48 g 1    fluticasone-umeclidin-vilanter (TRELEGY ELLIPTA) 100-62.5-25 mcg DsDv Inhale 1 puff into the lungs once daily. 60 each 1    gabapentin (NEURONTIN) 400 MG capsule Take 1 capsule (400 mg total) by mouth every morning AND 1 capsule (400 mg total) with lunch AND 1-2 capsules (400-800 mg total) every evening. 120 capsule 1    inhalation spacing device Use as directed for inhalation. 1 each 0    insulin lispro (HUMALOG KWIKPEN INSULIN) 100 unit/mL pen Inject 5 Units into the skin 3 (three) times daily before meals. 15 mL 5    lancets (FREESTYLE LANCETS) 28 gauge Misc 1 lancet by Combination route 2 (two) times daily as needed. Qid prn 400 each 2    lancets Misc 1 strip by Misc.(Non-Drug; Combo Route) route 4 (four) times daily with meals and nightly. 150 each 6    LIDOcaine (LIDODERM) 5 % Use 1-3 patches per day over recommended area. Remove & Discard patch within 12 hours or as directed by MD. 90 patch 0    mycophenolate (CELLCEPT) 250 mg Cap Take 2 capsules (500 mg total) by mouth 2 (two) times  daily. 120 capsule 11    nebulizer and compressor Ewelina Use as directed 1 each 0    NIFEdipine (PROCARDIA-XL) 30 MG (OSM) 24 hr tablet Take 1 tablet (30 mg total) by mouth 2 (two) times a day. 180 tablet 3    ondansetron (ZOFRAN-ODT) 4 MG TbDL DISSOLVE 1 TABLET(4 MG) ON THE TONGUE EVERY 8 HOURS AS NEEDED FOR NAUSEA 30 tablet 1    oxyCODONE-acetaminophen (PERCOCET)  mg per tablet Take 1 tablet by mouth every 8 (eight) hours as needed for Pain. 90 tablet 0    [START ON 2/8/2024] oxyCODONE-acetaminophen (PERCOCET)  mg per tablet Take 1 tablet by mouth every 8 (eight) hours as needed for Pain. 90 tablet 0    oxyCODONE-acetaminophen (PERCOCET)  mg per tablet Take 1 tablet by mouth every 8 (eight) hours as needed for Pain. 90 tablet 0    polymyxin B sulf-trimethoprim (POLYTRIM) 10,000 unit- 1 mg/mL Drop Place 1 drop into both eyes every 6 (six) hours. 10 mL 0    pulse oximeter (PULSE OXIMETER) device by Apply Externally route 2 (two) times a day. Use twice daily at 8 AM and 3 PM and [facility] as directed. 1 each 0    tacrolimus (PROGRAF) 1 MG Cap TAKE 3 CAPSULES IN THE MORNING, AND 3 IN THE EVENING 180 capsule 11    tiZANidine (ZANAFLEX) 4 MG tablet Take 1 tablet (4 mg total) by mouth 2 (two) times daily as needed (muscle spasms). 60 tablet 1    furosemide (LASIX) 20 MG tablet Take 1 tablet (20 mg total) by mouth daily as needed (swelling in legs). 30 tablet 11    levothyroxine (SYNTHROID) 150 MCG tablet Take 1 tablet (150 mcg total) by mouth before breakfast. 90 tablet 3     No current facility-administered medications on file prior to visit.       Medications have been reviewed and reconciled with patient at visit today.      Exam:  Vitals:    01/08/24 1031   BP: 118/76   Pulse: 67   Temp: 98.6 °F (37 °C)     Weight: 97.7 kg (215 lb 4.5 oz)   Body mass index is 34.75 kg/m².      BP Readings from Last 3 Encounters:   01/08/24 118/76   11/28/23 124/78   11/27/23 122/80     Wt Readings  from Last 3 Encounters:   01/08/24 1031 97.7 kg (215 lb 4.5 oz)   11/28/23 0956 98.4 kg (216 lb 14.9 oz)   11/27/23 0852 98 kg (216 lb 0.8 oz)        REVIEW OF SYSTEMS  Review of Systems   Constitutional:  Positive for malaise/fatigue. Negative for chills and fever.   HENT:  Positive for congestion, ear pain and sore throat. Negative for ear discharge.    Eyes:  Positive for redness. Negative for pain.   Respiratory:  Negative for cough and shortness of breath.    Cardiovascular:  Negative for chest pain, palpitations and leg swelling.   Gastrointestinal:  Negative for abdominal pain, constipation, diarrhea, nausea and vomiting.   Genitourinary:  Negative for dysuria and frequency.   Musculoskeletal:  Negative for back pain and myalgias.   Neurological:  Negative for dizziness, focal weakness and headaches.   Psychiatric/Behavioral:  Negative for depression. The patient is not nervous/anxious.        Physical Exam  Vitals reviewed.   Constitutional:       General: She is not in acute distress.     Appearance: She is ill-appearing.   HENT:      Head: Normocephalic and atraumatic.      Right Ear: Tympanic membrane normal.      Left Ear: Tympanic membrane normal.      Nose: Nose normal. No congestion.      Mouth/Throat:      Mouth: Mucous membranes are moist.      Pharynx: Pharyngeal swelling present. No oropharyngeal exudate, posterior oropharyngeal erythema or uvula swelling.      Tonsils: 4+ on the left.      Comments: Denies trismus and dysphagia  Eyes:      General: No scleral icterus.     Conjunctiva/sclera:      Right eye: Right conjunctiva is injected.      Left eye: Left conjunctiva is injected.      Comments: No swelling to eyes   Cardiovascular:      Rate and Rhythm: Normal rate and regular rhythm.      Heart sounds: No murmur heard.  Pulmonary:      Effort: Pulmonary effort is normal. No respiratory distress.      Breath sounds: Normal breath sounds.   Abdominal:      Palpations: Abdomen is soft. There is  no mass.      Tenderness: There is no abdominal tenderness.   Musculoskeletal:         General: No swelling or deformity.      Cervical back: Normal range of motion and neck supple.      Right lower leg: No edema.      Left lower leg: No edema.   Lymphadenopathy:      Cervical: No cervical adenopathy.   Skin:     General: Skin is warm and dry.   Neurological:      Mental Status: She is alert and oriented to person, place, and time. Mental status is at baseline.   Psychiatric:         Mood and Affect: Mood normal.         Thought Content: Thought content normal.         Laboratory Reviewed:     Lab Results   Component Value Date    WBC 13.08 (H) 11/06/2023    HGB 10.9 (L) 11/06/2023    HCT 36.4 (L) 11/06/2023     11/06/2023    CHOL 135 11/02/2023    TRIG 56 11/02/2023    HDL 51 11/02/2023    ALT 8 (L) 06/06/2023    AST 14 06/06/2023     08/31/2023    K 4.0 08/31/2023     08/31/2023    CREATININE 1.3 08/31/2023    BUN 16 08/31/2023    CO2 24 08/31/2023    TSH 1.092 06/06/2023    INR 1.1 01/04/2021    HGBA1C 6.7 (H) 11/02/2023       Screening or Prevention Patient's value Goal Complete/Controlled?   HgA1C Testing and Control   Lab Results   Component Value Date    HGBA1C 6.7 (H) 11/02/2023      Annually/Less than 8% Yes   Lipid profile : 11/02/2023 Annually Yes   LDL control Lab Results   Component Value Date    LDLCALC 72.8 11/02/2023    Annually/Less than 100 mg/dl  Yes   Nephropathy screening Lab Results   Component Value Date    LABMICR 81.0 08/08/2023     Lab Results   Component Value Date    PROTEINUA Trace (A) 11/25/2022    Annually Yes   Blood pressure BP Readings from Last 1 Encounters:   01/08/24 118/76    Less than 140/90 Yes   Dilated retinal exam : 05/19/2023 Annually Yes   Foot exam   : 02/15/2023 Annually Yes       Health Maintenance  Health Maintenance Topics with due status: Not Due       Topic Last Completion Date    TETANUS VACCINE 06/29/2015    Colorectal Cancer Screening  03/09/2016    Eye Exam 05/19/2023    Diabetes Urine Screening 08/08/2023    Mammogram 08/08/2023    Lipid Panel 11/02/2023    Hemoglobin A1c 11/02/2023     Health Maintenance Due   Topic Date Due    Complete Opioid Risk Tool  Never done    Urine Drug Screen  Never done    Naloxone Prescription  Never done    Shingles Vaccine (1 of 2) Never done    RSV Vaccine (Age 60+ and Pregnant patients) (1 - 1-dose 60+ series) Never done    COVID-19 Vaccine (4 - 2023-24 season) 09/01/2023    Foot Exam  02/15/2024       Assessment and Plan:  1. Viral illness  -     Cancel: SARS Coronavirus 2 Antigen, POCT Manual Read  -     Cancel: POCT Influenza A/B Molecular  -     SARS Coronavirus 2 Antigen, POCT Manual Read    2. Pharyngitis, unspecified etiology  -     POCT Strep A, Molecular - negative  Strep negative  Given immunosuppression and concerns for tonsillitis, abscess treat to cover Streptococcus  Able to swallow and no drooling from mouth   Rx for Augmentin and prednisone called to pharmacy  Please call office with no improvement by Thursday            -Patient's lab results were reviewed and discussed with patient  -Treatment options and alternatives were discussed with the patient. Patient expressed understanding. Patient was given the opportunity to ask questions and be an active participant in their medical care. Patient had no further questions or concerns at this time.         Future Appointments   Date Time Provider Department Center   1/17/2024  2:00 PM Aide Dougherty NP [facility] DIABETE [facility]   2/22/2024  9:00 AM Oliver Pedro MD [facility] INT FLORIAN [facility]   2/23/2024 10:10 AM LABORATORY, CENTRAL [facility]   2/29/2024 11:00 AM Claudia Skaggs PA-C [facility] RHEUM [facility]   4/18/2024 11:00 AM Marcella Haas PA-C [facility]          After visit summary printed and given to patient upon discharge.  Patient goals and care plan are included in After visit summary.      The  following issues were discussed: The primary encounter diagnosis was Viral illness. Diagnoses of Pharyngitis, unspecified etiology and Immunosuppressed status were also pertinent to this visit.    Health maintenance needs, recent test results and goals of care discussed with pt and questions answered.           FORD Gonzalez, NP-C  [facility] [address] [place] [address]

## 2024-01-08 NOTE — PATIENT INSTRUCTIONS
Rest  Drink plenty of clear fluids  Warm fluids  Nasal saline spray to clear nasal drainage and help with nasal congestion  Zyrtec or Claritin to help dry mucus and post nasal drip  Flonase nasal spray - nasal congestion  Mucinex or Mucinex DM for cough and chest congestion  Tylenol for fever, headache and body aches  Warm salt water gargles for throat comfort  Chloraseptic spray or lozenges for throat comfort  RTC with no improvement or worsening

## 2024-01-11 ENCOUNTER — TELEPHONE (OUTPATIENT)
Dept: PRIMARY CARE CLINIC | Facility: CLINIC | Age: 64
End: 2024-01-11
Payer: MEDICARE

## 2024-01-11 ENCOUNTER — PATIENT MESSAGE (OUTPATIENT)
Dept: PRIMARY CARE CLINIC | Facility: CLINIC | Age: 64
End: 2024-01-11
Payer: MEDICARE

## 2024-01-11 PROBLEM — B34.9 VIRAL ILLNESS: Status: ACTIVE | Noted: 2024-01-11

## 2024-01-11 PROBLEM — J02.9 PHARYNGITIS: Status: ACTIVE | Noted: 2024-01-11

## 2024-01-11 NOTE — TELEPHONE ENCOUNTER
Please check with Ms. Teague today and assess her sore throat and eye redness.  I started her on ABX earlier this week. Her left tonsil was swollen but did not look like a tonsillar abscess.    [place]

## 2024-01-11 NOTE — ASSESSMENT & PLAN NOTE
Strep negative. Given immunocompromised state and swelling of right tonsil will prescribe Augmentin and prednisone  Able to swallow and no drooling from mouth

## 2024-01-12 LAB
LEFT EYE DM RETINOPATHY: NEGATIVE
RIGHT EYE DM RETINOPATHY: NEGATIVE

## 2024-01-16 ENCOUNTER — TELEPHONE (OUTPATIENT)
Dept: DIABETES | Facility: CLINIC | Age: 64
End: 2024-01-16
Payer: MEDICARE

## 2024-01-17 ENCOUNTER — OFFICE VISIT (OUTPATIENT)
Dept: DIABETES | Facility: CLINIC | Age: 64
End: 2024-01-17
Payer: MEDICARE

## 2024-01-17 DIAGNOSIS — E11.59 HYPERTENSION ASSOCIATED WITH DIABETES: ICD-10-CM

## 2024-01-17 DIAGNOSIS — E78.5 HYPERLIPIDEMIA ASSOCIATED WITH TYPE 2 DIABETES MELLITUS: ICD-10-CM

## 2024-01-17 DIAGNOSIS — Z79.4 TYPE 2 DIABETES MELLITUS WITH HYPERGLYCEMIA, WITH LONG-TERM CURRENT USE OF INSULIN: Primary | ICD-10-CM

## 2024-01-17 DIAGNOSIS — E11.69 HYPERLIPIDEMIA ASSOCIATED WITH TYPE 2 DIABETES MELLITUS: ICD-10-CM

## 2024-01-17 DIAGNOSIS — E11.65 TYPE 2 DIABETES MELLITUS WITH HYPERGLYCEMIA, WITH LONG-TERM CURRENT USE OF INSULIN: Primary | ICD-10-CM

## 2024-01-17 DIAGNOSIS — I15.2 HYPERTENSION ASSOCIATED WITH DIABETES: ICD-10-CM

## 2024-01-17 PROCEDURE — 99214 OFFICE O/P EST MOD 30 MIN: CPT | Mod: 95,,, | Performed by: NURSE PRACTITIONER

## 2024-01-17 PROCEDURE — 1160F RVW MEDS BY RX/DR IN RCRD: CPT | Mod: CPTII,95,, | Performed by: NURSE PRACTITIONER

## 2024-01-17 PROCEDURE — 1159F MED LIST DOCD IN RCRD: CPT | Mod: CPTII,95,, | Performed by: NURSE PRACTITIONER

## 2024-01-17 PROCEDURE — 95251 CONT GLUC MNTR ANALYSIS I&R: CPT | Mod: NDTC,S$GLB,, | Performed by: NURSE PRACTITIONER

## 2024-01-17 RX ORDER — DULAGLUTIDE 3 MG/.5ML
3 INJECTION, SOLUTION SUBCUTANEOUS
Qty: 4 PEN | Refills: 5 | Status: SHIPPED | OUTPATIENT
Start: 2024-01-17 | End: 2024-05-21 | Stop reason: ALTCHOICE

## 2024-01-17 RX ORDER — PEN NEEDLE, DIABETIC 30 GX3/16"
NEEDLE, DISPOSABLE MISCELLANEOUS
Qty: 300 EACH | Refills: 3 | Status: SHIPPED | OUTPATIENT
Start: 2024-01-17 | End: 2024-05-21 | Stop reason: SDUPTHER

## 2024-01-17 RX ORDER — INSULIN LISPRO 100 [IU]/ML
4 INJECTION, SOLUTION INTRAVENOUS; SUBCUTANEOUS
Qty: 10.8 ML | Refills: 1 | Status: SHIPPED | OUTPATIENT
Start: 2024-01-17 | End: 2024-05-21 | Stop reason: SDUPTHER

## 2024-01-17 NOTE — PATIENT INSTRUCTIONS
Medication Regimen:   Increase Trulicity 3 mg once a week  Decrease Humalog 4 units three times daily before each meal       Patient Instructions:  Carbohydrate Count: 30-45 grams/meal and 15 grams/snack with limit of 2 snacks per day.  Exercise: Goal is 150 minutes or more per week.  Bring meter and blood sugar log to each appointment.     Goals for Blood Sugar:  Fastin-130 mg/dl  2 hours after meal:  mg/dl  Before Bed: 100-150 mg/dl  If Blood sugar is below 70 mg/dl, DO NOT take diabetes medication. Eat first and then recheck blood sugar in 20 minutes.  Foods to eat if blood sugar is below 70 mg/dl  1/2 glass of orange juice   1/2 regular cola can   3-4 hard candies   3-4 small glucose tabs.   Foods to eat if blood sugar is below 50 mg/dl  1 glass of orange juice  1 regular cola can  8 hard candies  8 small glucose tabs.   If you have repeated eating/blood sugar recheck process 2 times and blood sugar still below 70 mg/dl, call 911.

## 2024-01-17 NOTE — PROGRESS NOTES
Established Patient - Virtual Telehealth Visit     The patient location is: Home ([place])  The chief complaint leading to consultation is: Diabetes Follow-up  Visit type: Virtual visit with synchronous audio and video via Epic Haiku keren  Total time spent with patient: 14 minutes     Each patient to whom I provide medical services by telemedicine is:  (1) informed of the relationship between the physician and patient and the respective role of any other health care provider with respect to management of the patient; and (2) notified that they may decline to receive medical services by telemedicine and may withdraw from such care at any time. Patient verbally consented to receive this service via voice-only telephone call.    PCP: Karine Olmos MD    Subjective:     Chief Complaint: Diabetes follow up.  Last visit with me: 11/17/23    HPI: 63 y.o.  female for diabetes follow up.   Previously managed by TRINITY Garza PA-C.   Last clinic visit 05/2019.   Type II and Post-Transplant diabetes since 2013 .  Comorbidities: HTN, HLD, CKD III and S/p Transplant - right kidney in 2013.    Personal history of pancreatitis: denies  Family history of pancreatic cancer in first-degree relative: denies  Family history of MTC/MEN/endocrine tumors: denies     Known DM complications:   DKA/HHS: no   Retinopathy: no   Peripheral neuropathy: yes   Nephropathy: yes    Interval history:  Since last visit, admits to recent po steroid use.  BG readings higher than usual.  Self correcting elevated readings with Novolog.  Taking extra 5-7 units, with a consequential drop in BG.    Also reports appetite remain unchanged.  Desires increase in Trulicity dose to promote weight loss.    Denies recent hospital admissions or ER visits.   Admits to having hypoglycemia, as noted above.  Endorses diabetes related symptoms: Intermittent tingling in Lower Extremities.    Blood glucose monitoring via CGM Nico.  Will be uploaded  "in media section.  Report generated as follows.      Patient's CGM report interpretation: 14 day report  of CGM includes Avg  mg/dl. The target range for this patient was  mg/dL. Time in range was 78%, time above range was 20%, and time below ranges was 2%.   Overall pattern of euglycemia with few postprandial hyperglycemia.  Few postprandial hypoglycemia.    Activity level: Sedentary  Meal Plannin-3 meals/day; infrequent snacks/day.  Irregular mealtime schedule.    Medication compliance all of the time, diet compliance most of the time.   Has attended diabetes education in the past.     Previous regimen: Lantus    Past failed treatment: None    Current DM Medications:  Diabetes Medications               dulaglutide (TRULICITY) 1.5 mg/0.5 mL pen injector Inject 1.5 mg into the skin every 7 days.    insulin lispro (HUMALOG KWIKPEN INSULIN) 100 unit/mL pen Inject 5 Units into the skin 3 (three) times daily before meals.  Self-adjusting 5-7 units ac meals     Allergies  Review of patient's allergies indicates:   Allergen Reactions    Azithromycin Nausea And Vomiting    Adhesive Other (See Comments)     Skin tears    Nsaids (non-steroidal anti-inflammatory drug)      Kidney Transplant       Labs Reviewed:  Her most recent A1C is:  Lab Results   Component Value Date    HGBA1C 6.7 (H) 2023    HGBA1C 6.0 (H) 2023    HGBA1C 6.2 (H) 2023       Her most recent BMP is:  Lab Results   Component Value Date     2023    K 4.0 2023     2023    CO2 24 2023    BUN 16 2023    CREATININE 1.3 2023    CALCIUM 9.3 2023    ANIONGAP 13 2023    ESTGFRAFRICA 61 2023    EGFRNONAA 48.6 (A) 2022       No results found for: "CPEPTIDE"  No results found for: "GLUTAMICACID"    There is no height or weight on file to calculate BMI.    Wt Readings from Last 3 Encounters:   24 1031 97.7 kg (215 lb 4.5 oz)   23 0956 98.4 kg (216 lb 14.9 " oz)   23 0852 98 kg (216 lb 0.8 oz)        Her blood sugar in clinic today is: Not assessed due to type of visit.    Diabetes Management Status  Statin: Taking  ACE/ARB: Not taking    Screening or Prevention Patient's value Goal Complete/Controlled?   HgA1C Testing and Control   Lab Results   Component Value Date    HGBA1C 6.7 (H) 2023      Annually/Less than 8% Yes   Lipid profile : 2023 Annually Yes   LDL control Lab Results   Component Value Date    LDLCALC 72.8 2023    Annually/Less than 100 mg/dl  Yes   Nephropathy screening Lab Results   Component Value Date    MICALBCREAT 39.9 (H) 2023     Lab Results   Component Value Date    PROTEINUA Trace (A) 2022    Annually Yes   Blood pressure BP Readings from Last 1 Encounters:   24 118/76    Less than 140/90 Yes   Dilated retinal exam : 2023 Annually Yes    Foot exam   : 02/15/2023 Annually Yes     Social History     Socioeconomic History    Marital status:     Number of children: 3   Occupational History    Occupation: disable     Comment: dept manager at [employer]   Tobacco Use    Smoking status: Former     Current packs/day: 0.00     Average packs/day: 1 pack/day for 10.0 years (10.0 ttl pk-yrs)     Types: Cigarettes     Start date: 1992     Quit date: 2002     Years since quittin.1    Smokeless tobacco: Never    Tobacco comments:     quit smoking approx 10-15 years ago    Substance and Sexual Activity    Alcohol use: No    Drug use: No    Sexual activity: Never     Partners: Male   Social History Narrative    Does housework at home.     Social Determinants of Health     Financial Resource Strain: High Risk (2023)    Overall Financial Resource Strain (CARDIA)     Difficulty of Paying Living Expenses: Hard   Food Insecurity: Food Insecurity Present (2023)    Hunger Vital Sign     Worried About Running Out of Food in the Last Year: Never true     Ran Out of Food in the Last Year:  Sometimes true   Transportation Needs: No Transportation Needs (2023)    PRAPARE - Transportation     Lack of Transportation (Medical): No     Lack of Transportation (Non-Medical): No   Physical Activity: Inactive (2023)    Exercise Vital Sign     Days of Exercise per Week: 0 days     Minutes of Exercise per Session: 0 min   Stress: No Stress Concern Present (2023)    [employer] - Occupational Stress Questionnaire     Feeling of Stress : Only a little   Social Connections: Socially Integrated (2023)    Social Connection and Isolation Panel [NHANES]     Frequency of Communication with Friends and Family: More than three times a week     Frequency of Social Gatherings with Friends and Family: Three times a week     Attends Mu-ism Services: More than 4 times per year     Active Member of Clubs or Organizations: No     Attends Club or Organization Meetings: 1 to 4 times per year     Marital Status:    Housing Stability: Low Risk  (2023)    Housing Stability Vital Sign     Unable to Pay for Housing in the Last Year: No     Number of Places Lived in the Last Year: 1     Unstable Housing in the Last Year: No     Past Medical History:   Diagnosis Date    Abnormal glucose 2013    Acquired hypothyroidism     Acute bronchiolitis 10/15/2014    Anemia     Anemia of chronic renal failure 2013    Anxiety     Anxiety disorder     Avascular necrosis of bone of hip     Back pain     s/p steroid injections    Bacteremia due to Escherichia coli 2021    Chronic immunosuppression with Prograf and Cellcept 2013    CKD (chronic kidney disease) stage 2, GFR 60-89 ml/min     CKD (chronic kidney disease) stage 5, GFR less than 15 ml/min     -donor kidney transplant - 13    Depression     Hypertension, renal 2013    Hypothyroidism     Immunosuppressed status 10/15/2014    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     Osteoporosis with pathological fracture     Renal disease due to hypertension 11/11/2013    Renal hypertension, non-vascular     Secondary hyperparathyroidism of renal origin 9/30/2013    Vertigo      Review of Systems   Constitutional: Negative for activity change, appetite change, fatigue and unexpected weight change.   HENT: Negative for dental problem and trouble swallowing.    Eyes: Negative for visual disturbance.   Respiratory: Negative for chest tightness and shortness of breath.    Cardiovascular: Negative for chest pain, palpitations and leg swelling.   Gastrointestinal: Negative for abdominal pain, constipation, diarrhea, nausea and vomiting.   Endocrine: Negative for polydipsia, polyuria and polyphagia.   Genitourinary: Negative for difficulty urinating, dysuria, frequency and urgency.        Negative yeast infection   Musculoskeletal: Negative for gait problem, myalgias and neck pain.   Integumentary:  Negative for rash and wound.   Allergic/Immunologic: Negative.    Neurological: Negative for dizziness, syncope, weakness, numbness and headaches.        Int tingling BLE   Hematological: Negative.    Psychiatric/Behavioral: Negative for confusion and sleep disturbance.   All other systems reviewed and are negative.    Objective:      Physical Exam  Constitutional:       General: Awake.      Appearance: Normal appearance. Well-developed and well-groomed.   HENT:      Head: Normocephalic and atraumatic.   Eyes:      General: Lids are normal. Gaze aligned appropriately.   Pulmonary:      Effort: Pulmonary effort is normal. No respiratory distress.   Neurological:      Mental Status: Alert and oriented to person, place, and time.   Psychiatric:         Attention and Perception: Attention normal.         Mood and Affect: Mood and affect normal.         Speech: Speech normal.         Behavior: Behavior normal.         Thought Content: Thought content normal.         Cognition and Memory: Cognition normal.     "     Judgment: Judgment normal.     Assessment / Plan:     Diagnoses and all orders for this visit:    Type 2 diabetes mellitus with hyperglycemia, with long-term current use of insulin  -     dulaglutide (TRULICITY) 3 mg/0.5 mL pen injector; Inject 3 mg into the skin every 7 days.  -     insulin lispro (HUMALOG KWIKPEN INSULIN) 100 unit/mL pen; Inject 4 Units into the skin 3 (three) times daily before meals.  -     pen needle, diabetic (BD RORY 2ND GEN PEN NEEDLE) 32 gauge x 5/32" Ndle; USE FOUR TIMES DAILY WITH INSULIN AS DIRECTED  -     Hemoglobin A1C; Future    Hyperlipidemia associated with type 2 diabetes mellitus    Hypertension associated with diabetes    Additional Plan Details:  - Condition: controlled, most recent A1C of 6.7% was completed 2 months ago.  - Monitor blood glucose:  3-4x daily.Goals reviewed. Maintain BG log and bring to next visit for review. Continue CGM Nico use.   - CGM note: Patient is testing 4 times per day. Patient is injecting meal time insulin 3 times daily and is self adjusting insulin based on blood glucose reading. Patient requires CGM for blood sugar monitoring due to frequent insulin dose changes. Patient reports hypoglycemia, < 50.   - Hypoglycemia protocol: Reviewed and risk discussed. Protocol printout provided.   - Diet: Reviewed, limit carbohydrate intake to 30-45 grams per meal, 3x daily and 15 grams per snack with a limit of two daily.   - Activity: Recommend at least 150 minutes of exercise in a week.  - BP and LDL: Recommend lifestyle modifications and follow up with PCP for management.   - Medication Regimen:     Increase Trulicity 3 mg once a week  Decrease Humalog 4 units three times daily before each meal     - Medication consideration: Increase Trulicity dose to promote weight loss, otherwise continue regimen.  - Lab results: A1C discussed.  - Health Maintenance standards addressed today: A1c scheduled.   - Risks of uncontrolled DM explained. Importance of " routine foot and eye exams reviewed. Scheduled as appropriate.  -The patient was explained the above plan and given opportunity to ask questions.  She understands, chooses and consents to this plan and accepts all the risks, which include but are not limited to the risks mentioned above.   - Labs ordered as above.  - Follow up: 4 months in clinic, [facility].        Aide Dougherty, FNP-C [facility]    A total of 14 minutes was spent in face to face time, of which over 50 % was spent in counseling patient on disease process, complications, treatment, and side effects of medications.

## 2024-01-23 ENCOUNTER — TELEPHONE (OUTPATIENT)
Dept: DIABETES | Facility: CLINIC | Age: 64
End: 2024-01-23
Payer: MEDICARE

## 2024-02-06 DIAGNOSIS — R11.0 NAUSEA: ICD-10-CM

## 2024-02-06 RX ORDER — ONDANSETRON 4 MG/1
TABLET, ORALLY DISINTEGRATING ORAL
Qty: 30 TABLET | Refills: 0 | Status: SHIPPED | OUTPATIENT
Start: 2024-02-06 | End: 2024-02-19 | Stop reason: SDUPTHER

## 2024-02-16 DIAGNOSIS — R11.0 NAUSEA: ICD-10-CM

## 2024-02-16 RX ORDER — ONDANSETRON 4 MG/1
TABLET, ORALLY DISINTEGRATING ORAL
Qty: 30 TABLET | Refills: 0 | OUTPATIENT
Start: 2024-02-16

## 2024-02-17 ENCOUNTER — PATIENT MESSAGE (OUTPATIENT)
Dept: PRIMARY CARE CLINIC | Facility: CLINIC | Age: 64
End: 2024-02-17
Payer: MEDICARE

## 2024-02-19 DIAGNOSIS — R11.0 NAUSEA: ICD-10-CM

## 2024-02-19 RX ORDER — ONDANSETRON 4 MG/1
4 TABLET, ORALLY DISINTEGRATING ORAL EVERY 8 HOURS PRN
Qty: 15 TABLET | Refills: 1 | Status: SHIPPED | OUTPATIENT
Start: 2024-02-19

## 2024-02-22 ENCOUNTER — OFFICE VISIT (OUTPATIENT)
Dept: PAIN MEDICINE | Facility: CLINIC | Age: 64
End: 2024-02-22
Payer: MEDICARE

## 2024-02-22 ENCOUNTER — LAB VISIT (OUTPATIENT)
Dept: LAB | Facility: HOSPITAL | Age: 64
End: 2024-02-22
Attending: INTERNAL MEDICINE
Payer: MEDICARE

## 2024-02-22 VITALS
RESPIRATION RATE: 18 BRPM | HEIGHT: 66 IN | BODY MASS INDEX: 33.73 KG/M2 | SYSTOLIC BLOOD PRESSURE: 144 MMHG | HEART RATE: 79 BPM | WEIGHT: 209.88 LBS | DIASTOLIC BLOOD PRESSURE: 90 MMHG

## 2024-02-22 DIAGNOSIS — M51.36 DDD (DEGENERATIVE DISC DISEASE), LUMBAR: ICD-10-CM

## 2024-02-22 DIAGNOSIS — M79.18 MUSCLE PAIN, MYOFASCIAL: ICD-10-CM

## 2024-02-22 DIAGNOSIS — M54.16 BILATERAL LUMBAR RADICULOPATHY: ICD-10-CM

## 2024-02-22 DIAGNOSIS — M81.0 AGE-RELATED OSTEOPOROSIS WITHOUT CURRENT PATHOLOGICAL FRACTURE: ICD-10-CM

## 2024-02-22 LAB
25(OH)D3+25(OH)D2 SERPL-MCNC: 35 NG/ML (ref 30–96)
ALBUMIN SERPL BCP-MCNC: 3.9 G/DL (ref 3.5–5.2)
ALP SERPL-CCNC: 127 U/L (ref 55–135)
ALT SERPL W/O P-5'-P-CCNC: 13 U/L (ref 10–44)
ANION GAP SERPL CALC-SCNC: 12 MMOL/L (ref 8–16)
AST SERPL-CCNC: 14 U/L (ref 10–40)
BILIRUB SERPL-MCNC: 0.4 MG/DL (ref 0.1–1)
BUN SERPL-MCNC: 16 MG/DL (ref 8–23)
CALCIUM SERPL-MCNC: 9.6 MG/DL (ref 8.7–10.5)
CHLORIDE SERPL-SCNC: 103 MMOL/L (ref 95–110)
CO2 SERPL-SCNC: 27 MMOL/L (ref 23–29)
CREAT SERPL-MCNC: 1.2 MG/DL (ref 0.5–1.4)
EST. GFR  (NO RACE VARIABLE): 51 ML/MIN/1.73 M^2
GLUCOSE SERPL-MCNC: 111 MG/DL (ref 70–110)
POTASSIUM SERPL-SCNC: 4.1 MMOL/L (ref 3.5–5.1)
PROT SERPL-MCNC: 7.8 G/DL (ref 6–8.4)
SODIUM SERPL-SCNC: 142 MMOL/L (ref 136–145)

## 2024-02-22 PROCEDURE — 3080F DIAST BP >= 90 MM HG: CPT | Mod: CPTII,S$GLB,, | Performed by: ANESTHESIOLOGY

## 2024-02-22 PROCEDURE — 99999 PR PBB SHADOW E&M-EST. PATIENT-LVL III: CPT | Mod: PBBFAC,,, | Performed by: ANESTHESIOLOGY

## 2024-02-22 PROCEDURE — 80053 COMPREHEN METABOLIC PANEL: CPT | Performed by: PHYSICIAN ASSISTANT

## 2024-02-22 PROCEDURE — 1159F MED LIST DOCD IN RCRD: CPT | Mod: CPTII,S$GLB,, | Performed by: ANESTHESIOLOGY

## 2024-02-22 PROCEDURE — 82306 VITAMIN D 25 HYDROXY: CPT | Performed by: PHYSICIAN ASSISTANT

## 2024-02-22 PROCEDURE — 3077F SYST BP >= 140 MM HG: CPT | Mod: CPTII,S$GLB,, | Performed by: ANESTHESIOLOGY

## 2024-02-22 PROCEDURE — 99214 OFFICE O/P EST MOD 30 MIN: CPT | Mod: S$GLB,,, | Performed by: ANESTHESIOLOGY

## 2024-02-22 PROCEDURE — 3008F BODY MASS INDEX DOCD: CPT | Mod: CPTII,S$GLB,, | Performed by: ANESTHESIOLOGY

## 2024-02-22 PROCEDURE — 36415 COLL VENOUS BLD VENIPUNCTURE: CPT | Performed by: PHYSICIAN ASSISTANT

## 2024-02-22 RX ORDER — OXYCODONE AND ACETAMINOPHEN 10; 325 MG/1; MG/1
1 TABLET ORAL EVERY 8 HOURS PRN
Qty: 90 TABLET | Refills: 0 | Status: SHIPPED | OUTPATIENT
Start: 2024-04-20 | End: 2024-03-26 | Stop reason: SDUPTHER

## 2024-02-22 RX ORDER — TIZANIDINE 4 MG/1
4 TABLET ORAL 2 TIMES DAILY PRN
Qty: 60 TABLET | Refills: 1 | Status: SHIPPED | OUTPATIENT
Start: 2024-02-22 | End: 2024-05-22 | Stop reason: SDUPTHER

## 2024-02-22 RX ORDER — OXYCODONE AND ACETAMINOPHEN 10; 325 MG/1; MG/1
1 TABLET ORAL EVERY 8 HOURS PRN
Qty: 90 TABLET | Refills: 0 | Status: SHIPPED | OUTPATIENT
Start: 2024-02-22 | End: 2024-04-23 | Stop reason: SDUPTHER

## 2024-02-22 RX ORDER — OXYCODONE AND ACETAMINOPHEN 10; 325 MG/1; MG/1
1 TABLET ORAL EVERY 8 HOURS PRN
Qty: 90 TABLET | Refills: 0 | Status: SHIPPED | OUTPATIENT
Start: 2024-03-21 | End: 2024-03-26 | Stop reason: SDUPTHER

## 2024-02-22 RX ORDER — GABAPENTIN 400 MG/1
CAPSULE ORAL
Qty: 120 CAPSULE | Refills: 1 | Status: SHIPPED | OUTPATIENT
Start: 2024-02-22
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
## 2024-02-29 ENCOUNTER — TELEPHONE (OUTPATIENT)
Dept: RHEUMATOLOGY | Facility: CLINIC | Age: 64
End: 2024-02-29
Payer: MEDICARE

## 2024-02-29 ENCOUNTER — OFFICE VISIT (OUTPATIENT)
Dept: RHEUMATOLOGY | Facility: CLINIC | Age: 64
End: 2024-02-29
Payer: MEDICARE

## 2024-02-29 ENCOUNTER — TELEPHONE (OUTPATIENT)
Dept: RHEUMATOLOGY | Facility: CLINIC | Age: 64
End: 2024-02-29

## 2024-02-29 DIAGNOSIS — M81.0 AGE-RELATED OSTEOPOROSIS WITHOUT CURRENT PATHOLOGICAL FRACTURE: Primary | ICD-10-CM

## 2024-02-29 PROCEDURE — 99499 UNLISTED E&M SERVICE: CPT | Mod: 95,,, | Performed by: PHYSICIAN ASSISTANT

## 2024-02-29 NOTE — TELEPHONE ENCOUNTER
Valentine Barron Staff  Caller: Unspecified (Today, 12:17 PM)  Patient stated she had to leave before virtual appointment. She was at the hospital with sister in law who is on hospice. She would like a call back at [phone]. [email or web address]

## 2024-02-29 NOTE — PROGRESS NOTES
Pt signed in for visit on my chart, but left the visit, wo being seen.  By the time I signed in, she had left.  I was unable to reach her via phone to get reconnected.  Pt later sent message stating she was at the hospital w her FRANCO, who was in hospice.  I asked staff to get her rescheduled.

## 2024-02-29 NOTE — TELEPHONE ENCOUNTER
----- Message from Valentine Barron sent at 2/29/2024 12:17 PM CST -----  Patient stated she had to leave before virtual appointment. She was at the hospital with sister in law who is on hospice. She would like a call back at [phone]. [email or web address]

## 2024-03-11 ENCOUNTER — TELEPHONE (OUTPATIENT)
Dept: DIABETES | Facility: CLINIC | Age: 64
End: 2024-03-11
Payer: MEDICARE

## 2024-03-11 NOTE — TELEPHONE ENCOUNTER
Spoke with sheryl in regards to physician order for patient, sheryl stated they was able to fixed the issue on their end and nothing was needed.  ----- Message from Lobo Land MA sent at 3/11/2024 12:10 PM CDT -----  Contact: [phone]  Sheryl ([facility]) called                  In regards to speak with staff or  provider to confirm/verify if staff received a physician order.

## 2024-03-15 ENCOUNTER — OFFICE VISIT (OUTPATIENT)
Dept: RHEUMATOLOGY | Facility: CLINIC | Age: 64
End: 2024-03-15
Payer: MEDICARE

## 2024-03-15 DIAGNOSIS — M81.0 AGE-RELATED OSTEOPOROSIS WITHOUT CURRENT PATHOLOGICAL FRACTURE: Primary | ICD-10-CM

## 2024-03-15 DIAGNOSIS — Z51.81 MEDICATION MONITORING ENCOUNTER: ICD-10-CM

## 2024-03-15 PROCEDURE — 1159F MED LIST DOCD IN RCRD: CPT | Mod: CPTII,95,, | Performed by: PHYSICIAN ASSISTANT

## 2024-03-15 PROCEDURE — 1160F RVW MEDS BY RX/DR IN RCRD: CPT | Mod: CPTII,95,, | Performed by: PHYSICIAN ASSISTANT

## 2024-03-15 PROCEDURE — 99214 OFFICE O/P EST MOD 30 MIN: CPT | Mod: 95,,, | Performed by: PHYSICIAN ASSISTANT

## 2024-03-15 NOTE — PROGRESS NOTES
The patient location is: home  The chief complaint leading to consultation is: OP    Visit type: audiovisual    Face to Face time with patient: 8 min   15 minutes of total time spent on the encounter, which includes face to face time and non-face to face time preparing to see the patient (eg, review of tests), Obtaining and/or reviewing separately obtained history, Documenting clinical information in the electronic or other health record, Independently interpreting results (not separately reported) and communicating results to the patient/family/caregiver, or Care coordination (not separately reported).         Each patient to whom he or she provides medical services by telemedicine is:  (1) informed of the relationship between the physician and patient and the respective role of any other health care provider with respect to management of the patient; and (2) notified that he or she may decline to receive medical services by telemedicine and may withdraw from such care at any time.    Subjective:       Patient ID: Ana Maria Teague is a 63 y.o. female.    Chief Complaint: No chief complaint on file.    Ana Maria Teague  is a 63 y.o. female seen today for osteoporosis.  She was advised to start Fosamax back in August 2023 by Dr. TEJEDA.  She had a lot of things going on with her personal life and she has not yet started it.  She is also planning some invasive dental work.  Has not been able to complete that due to personal reasons.  She is planning on moving forward with that hopefully sometime later this year.  Previously Dr. TEJEDA had recommended she stop Fosamax 4 weeks prior to any invasive dental procedures and resuming 4 weeks after completion of the invasive procedures.      Denies severe reflux.  No known esophageal dysmotility.  Has undergone kidney transplant in the past.  However kidney function has remained adequate for oral bisphosphonates based on Dr. TEJEDA's prior recommendations.  Patient denies history of fragility  fracture.    Current Tx:  Vit D3 10k units daily  Previous Tx:  na  GERD: no  Gait::  steady  Dental:  planning invasive dental work but not for at least a few mos  H/o fragility fx:  Pt denies    Rheumatologic systems otherwise negative.      Current Outpatient Medications:     albuterol (PROVENTIL/VENTOLIN HFA) 90 mcg/actuation inhaler, Rescue, Disp: 18 g, Rfl: 2    alendronate (FOSAMAX) 70 MG tablet, Take 1 tablet (70 mg total) by mouth every 7 days., Disp: 4 tablet, Rfl: 11    atorvastatin (LIPITOR) 20 MG tablet, TAKE 1 TABLET(20 MG) BY MOUTH EVERY DAY, Disp: 90 tablet, Rfl: 3    azelastine (OPTIVAR) 0.05 % ophthalmic solution, Place 1 drop into both eyes 2 (two) times daily., Disp: 6 mL, Rfl: 0    benzonatate (TESSALON) 100 MG capsule, Take 1 capsule (100 mg total) by mouth 3 (three) times daily as needed for Cough., Disp: 40 capsule, Rfl: 1    blood sugar diagnostic (FREESTYLE LITE STRIPS) Strp, Use to check blood glucose 3 times a day., Disp: 100 strip, Rfl: 11    cetirizine (ZYRTEC) 10 MG tablet, TAKE 1 TABLET BY MOUTH EVERY DAY, Disp: 30 tablet, Rfl: 11    diclofenac sodium (VOLTAREN) 1 % Gel, APPLY 2 Grams TOPICALLY FOUR TIMES DAILY AS NEEDED, Disp: 100 g, Rfl: 0    dulaglutide (TRULICITY) 3 mg/0.5 mL pen injector, Inject 3 mg into the skin every 7 days., Disp: 4 pen , Rfl: 5    ergocalciferol (ERGOCALCIFEROL) 50,000 unit Cap, Take 1 capsule (50,000 Units total) by mouth every 7 days., Disp: 4 capsule, Rfl: 11    flash glucose scanning reader (FREESTYLE DEREK 2 READER) Misc, 1 Device by Misc.(Non-Drug; Combo Route) route continuous. Use to test blood glucose with the Derek reader., Disp: 1 each, Rfl: 0    flash glucose sensor (FREESTYLE DEREK 2 SENSOR) Kit, 1 application by Misc.(Non-Drug; Combo Route) route every 14 (fourteen) days. Use to test blood glucose 4-6x/day., Disp: 2 kit, Rfl: 11    fluticasone propionate (FLONASE) 50 mcg/actuation nasal spray, SHAKE LIQUID AND USE 2 SPRAYS(100 MCG) IN EACH  NOSTRIL EVERY DAY, Disp: 48 g, Rfl: 1    fluticasone-umeclidin-vilanter (TRELEGY ELLIPTA) 100-62.5-25 mcg DsDv, Inhale 1 puff into the lungs once daily., Disp: 60 each, Rfl: 1    furosemide (LASIX) 20 MG tablet, Take 1 tablet (20 mg total) by mouth daily as needed (swelling in legs)., Disp: 30 tablet, Rfl: 11    gabapentin (NEURONTIN) 400 MG capsule, Take 1 capsule (400 mg total) by mouth every morning AND 1 capsule (400 mg total) with lunch AND 1-2 capsules (400-800 mg total) every evening., Disp: 120 capsule, Rfl: 1    inhalation spacing device, Use as directed for inhalation., Disp: 1 each, Rfl: 0    insulin lispro (HUMALOG KWIKPEN INSULIN) 100 unit/mL pen, Inject 4 Units into the skin 3 (three) times daily before meals., Disp: 10.8 mL, Rfl: 1    lancets (FREESTYLE LANCETS) 28 gauge Misc, 1 lancet by Combination route 2 (two) times daily as needed. Qid prn, Disp: 400 each, Rfl: 2    lancets Misc, 1 strip by Misc.(Non-Drug; Combo Route) route 4 (four) times daily with meals and nightly., Disp: 150 each, Rfl: 6    levothyroxine (SYNTHROID) 150 MCG tablet, Take 1 tablet (150 mcg total) by mouth before breakfast., Disp: 90 tablet, Rfl: 3    LIDOcaine (LIDODERM) 5 %, Use 1-3 patches per day over recommended area. Remove & Discard patch within 12 hours or as directed by MD., Disp: 90 patch, Rfl: 0    mycophenolate (CELLCEPT) 250 mg Cap, Take 2 capsules (500 mg total) by mouth 2 (two) times daily., Disp: 120 capsule, Rfl: 11    nebulizer and compressor Ewelina, Use as directed, Disp: 1 each, Rfl: 0    NIFEdipine (PROCARDIA-XL) 30 MG (OSM) 24 hr tablet, Take 1 tablet (30 mg total) by mouth 2 (two) times a day., Disp: 180 tablet, Rfl: 3    ondansetron (ZOFRAN-ODT) 4 MG TbDL, Take 1 tablet (4 mg total) by mouth every 8 (eight) hours as needed., Disp: 15 tablet, Rfl: 1    oxyCODONE-acetaminophen (PERCOCET)  mg per tablet, Take 1 tablet by mouth every 8 (eight) hours as needed for Pain., Disp: 90 tablet, Rfl: 0    [START  "ON 3/21/2024] oxyCODONE-acetaminophen (PERCOCET)  mg per tablet, Take 1 tablet by mouth every 8 (eight) hours as needed for Pain., Disp: 90 tablet, Rfl: 0    [START ON 2024] oxyCODONE-acetaminophen (PERCOCET)  mg per tablet, Take 1 tablet by mouth every 8 (eight) hours as needed for Pain., Disp: 90 tablet, Rfl: 0    pen needle, diabetic (BD RORY 2ND GEN PEN NEEDLE) 32 gauge x 5/32" Ndle, USE FOUR TIMES DAILY WITH INSULIN AS DIRECTED, Disp: 300 each, Rfl: 3    polymyxin B sulf-trimethoprim (POLYTRIM) 10,000 unit- 1 mg/mL Drop, Place 1 drop into both eyes every 6 (six) hours., Disp: 10 mL, Rfl: 0    pulse oximeter (PULSE OXIMETER) device, by Apply Externally route 2 (two) times a day. Use twice daily at 8 AM and 3 PM and record the value in [employer] as directed., Disp: 1 each, Rfl: 0    tacrolimus (PROGRAF) 1 MG Cap, TAKE 3 CAPSULES IN THE MORNING, AND 3 IN THE EVENING, Disp: 180 capsule, Rfl: 11    tiZANidine (ZANAFLEX) 4 MG tablet, Take 1 tablet (4 mg total) by mouth 2 (two) times daily as needed (muscle spasms)., Disp: 60 tablet, Rfl: 1  Past Medical History:   Diagnosis Date    Abnormal glucose 2013    Acquired hypothyroidism     Acute bronchiolitis 10/15/2014    Anemia     Anemia of chronic renal failure 2013    Anxiety     Anxiety disorder     Avascular necrosis of bone of hip     Back pain     s/p steroid injections    Bacteremia due to Escherichia coli 2021    Chronic immunosuppression with Prograf and Cellcept 2013    CKD (chronic kidney disease) stage 2, GFR 60-89 ml/min     CKD (chronic kidney disease) stage 5, GFR less than 15 ml/min     -donor kidney transplant - 13    Depression     Hypertension, renal 2013    Hypothyroidism     Immunosuppressed status 10/15/2014    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    Osteoporosis with pathological fracture     Renal disease due to hypertension 2013    Renal hypertension, non-vascular  "    Secondary hyperparathyroidism of renal origin 2013    Vertigo      Family History   Problem Relation Age of Onset    Diabetes Mother     Kidney disease Mother     Hyperlipidemia Mother     Hypertension Mother     Heart disease Mother     Arthritis Mother     Hypertension Father     Stroke Father     Hypertension Sister     Arthritis Sister     Asthma Sister     Heart disease Sister         heart attack     Social History     Socioeconomic History    Marital status:     Number of children: 3   Occupational History    Occupation: disable     Comment: dept manager at [employer]   Tobacco Use    Smoking status: Former     Current packs/day: 0.00     Average packs/day: 1 pack/day for 10.0 years (10.0 ttl pk-yrs)     Types: Cigarettes     Start date: 1992     Quit date: 2002     Years since quittin.3    Smokeless tobacco: Never    Tobacco comments:     quit smoking approx 10-15 years ago    Substance and Sexual Activity    Alcohol use: No    Drug use: No    Sexual activity: Never     Partners: Male   Social History Narrative    Does housework at home.     Social Determinants of Health     Financial Resource Strain: High Risk (2023)    Overall Financial Resource Strain (CARDIA)     Difficulty of Paying Living Expenses: Hard   Food Insecurity: Food Insecurity Present (2023)    Hunger Vital Sign     Worried About Running Out of Food in the Last Year: Never true     Ran Out of Food in the Last Year: Sometimes true   Transportation Needs: No Transportation Needs (2023)    PRAPARE - Transportation     Lack of Transportation (Medical): No     Lack of Transportation (Non-Medical): No   Physical Activity: Inactive (2023)    Exercise Vital Sign     Days of Exercise per Week: 0 days     Minutes of Exercise per Session: 0 min   Stress: No Stress Concern Present (2023)    [employer] - Occupational Stress Questionnaire     Feeling of Stress : Only a  little   Social Connections: Socially Integrated (9/27/2023)    Social Connection and Isolation Panel [NHANES]     Frequency of Communication with Friends and Family: More than three times a week     Frequency of Social Gatherings with Friends and Family: Three times a week     Attends Synagogue Services: More than 4 times per year     Active Member of Clubs or Organizations: No     Attends Club or Organization Meetings: 1 to 4 times per year     Marital Status:    Housing Stability: Low Risk  (9/27/2023)    Housing Stability Vital Sign     Unable to Pay for Housing in the Last Year: No     Number of Places Lived in the Last Year: 1     Unstable Housing in the Last Year: No     Review of patient's allergies indicates:   Allergen Reactions    Azithromycin Nausea And Vomiting    Adhesive Other (See Comments)     Skin tears    Nsaids (non-steroidal anti-inflammatory drug)      Kidney Transplant       Objective:   There were no vitals taken for this visit.  Immunization History   Administered Date(s) Administered    COVID-19, MRNA, LN-S, PF (Pfizer) (Purple Cap) 03/19/2021, 04/09/2021, 01/19/2022    Hepatitis A, Adult 05/21/2008, 12/05/2008, 07/03/2019    Influenza 11/17/2022    Influenza (FLUAD) - Quadrivalent - Adjuvanted - PF *Preferred* (65+) 10/02/2023    Influenza - High Dose - PF (65 years and older) 10/01/2015    Influenza - Quadrivalent 11/20/2014, 11/28/2016    Influenza - Quadrivalent - PF *Preferred* (6 months and older) 11/26/2018, 11/06/2019, 09/18/2020, 12/28/2021    Influenza Split 11/24/2010    Pneumococcal Conjugate - 13 Valent 06/29/2015    Pneumococcal Conjugate - 20 Valent 03/13/2023    Pneumococcal Polysaccharide - 23 Valent 03/31/2014    Tdap 06/29/2015       Physical Exam   Constitutional: She is oriented to person, place, and time. No distress.   HENT:   Head: Normocephalic and atraumatic.   Pulmonary/Chest: Effort normal.   Musculoskeletal:      Cervical back: Normal range of motion.  "  Neurological: She is alert and oriented to person, place, and time.   Psychiatric: Mood normal.        No results found for this or any previous visit (from the past 336 hour(s)).    No results found for: "TBGOLDPLUS"   Lab Results   Component Value Date    HEPAIGM Negative 11/08/2004    HEPBIGM Negative 11/08/2004    HEPBCAB Negative 11/29/2010    HEPCAB Negative 01/04/2021        DEXA - I have personally reviewed the results from 8/8/23    FINDINGS:  The L1 to L4 vertebral bone mineral density is equal to 0.994 g/cm squared with a T score of -1.6.  There has been a -6.0% statistically significant change relative to the prior study.     The left femoral neck bone mineral density is equal to 0.685 g/cm squared with a T score of -2.5.  There has been  no significant change relative to the prior study.    Assessment:     1. Age-related osteoporosis without current pathological fracture    2. Medication monitoring encounter          Plan:     Diagnoses and all orders for this visit:    Age-related osteoporosis without current pathological fracture    Medication monitoring encounter      Osteoporosis  Calcium 9.9 - WNL  GFR 51  CrCl 71 mL/min  No need to adjust dose of fosamax  No contra indication to oral bisphosphonates  Will continue to monitor kidney function  Patient instructed to notify the office if he/she has any new falls or new fractures  Discussed risks associated w oral bisphosphonates  DEXA due 8/2025  Return to clinic:  6mos - labs/DEXA(if due) 1 week prior    No follow-ups on file.    The patient understands, chooses and consents to this plan and accepts all   the risks which include but are not limited to the risks mentioned above.     Disclaimer: This note was prepared using a voice recognition system and is likely to have sound alike errors within the text.         "

## 2024-03-25 ENCOUNTER — PATIENT MESSAGE (OUTPATIENT)
Dept: PRIMARY CARE CLINIC | Facility: CLINIC | Age: 64
End: 2024-03-25
Payer: MEDICARE

## 2024-03-26 ENCOUNTER — OFFICE VISIT (OUTPATIENT)
Dept: PRIMARY CARE CLINIC | Facility: CLINIC | Age: 64
End: 2024-03-26
Payer: MEDICARE

## 2024-03-26 VITALS — SYSTOLIC BLOOD PRESSURE: 120 MMHG | DIASTOLIC BLOOD PRESSURE: 76 MMHG

## 2024-03-26 DIAGNOSIS — J06.9 VIRAL URI WITH COUGH: ICD-10-CM

## 2024-03-26 DIAGNOSIS — J40 BRONCHITIS: ICD-10-CM

## 2024-03-26 DIAGNOSIS — N30.00 ACUTE CYSTITIS WITHOUT HEMATURIA: Primary | ICD-10-CM

## 2024-03-26 DIAGNOSIS — R09.81 NASAL CONGESTION: ICD-10-CM

## 2024-03-26 PROCEDURE — 1159F MED LIST DOCD IN RCRD: CPT | Mod: CPTII,95,, | Performed by: FAMILY MEDICINE

## 2024-03-26 PROCEDURE — 3078F DIAST BP <80 MM HG: CPT | Mod: CPTII,95,, | Performed by: FAMILY MEDICINE

## 2024-03-26 PROCEDURE — 99214 OFFICE O/P EST MOD 30 MIN: CPT | Mod: 95,,, | Performed by: FAMILY MEDICINE

## 2024-03-26 PROCEDURE — 1160F RVW MEDS BY RX/DR IN RCRD: CPT | Mod: CPTII,95,, | Performed by: FAMILY MEDICINE

## 2024-03-26 PROCEDURE — 3074F SYST BP LT 130 MM HG: CPT | Mod: CPTII,95,, | Performed by: FAMILY MEDICINE

## 2024-03-26 RX ORDER — PSEUDOEPHEDRINE HCL 30 MG
30 TABLET ORAL EVERY 4 HOURS PRN
Qty: 20 TABLET | Refills: 0 | Status: SHIPPED | OUTPATIENT
Start: 2024-03-26 | End: 2024-04-05

## 2024-03-26 RX ORDER — NITROFURANTOIN 25; 75 MG/1; MG/1
100 CAPSULE ORAL 2 TIMES DAILY
Qty: 14 CAPSULE | Refills: 0 | Status: SHIPPED | OUTPATIENT
Start: 2024-03-26 | End: 2024-04-02

## 2024-03-26 RX ORDER — IPRATROPIUM BROMIDE AND ALBUTEROL SULFATE 2.5; .5 MG/3ML; MG/3ML
3 SOLUTION RESPIRATORY (INHALATION) EVERY 6 HOURS PRN
Qty: 75 ML | Refills: 0 | Status: SHIPPED | OUTPATIENT
Start: 2024-03-26 | End: 2025-03-26

## 2024-03-26 NOTE — PROGRESS NOTES
"Subjective:       Patient ID: Ana Maria Teague is a 63 y.o. female.    Chief Complaint: Nasal Congestion, Cough, and Sore Throat (Pt may have UTI frequent urination urges  with no urine)    HPI:   The patient location is: [place]  The chief complaint leading to consultation is: cough, URI    Visit type: audiovisual    Face to Face time with patient: 20  40 minutes of total time spent on the encounter, which includes face to face time and non-face to face time preparing to see the patient (eg, review of tests), Obtaining and/or reviewing separately obtained history, Documenting clinical information in the electronic or other health record, Independently interpreting results (not separately reported) and communicating results to the patient/family/caregiver, or Care coordination (not separately reported).         Each patient to whom he or she provides medical services by telemedicine is:  (1) informed of the relationship between the physician and patient and the respective role of any other health care provider with respect to management of the patient; and (2) notified that he or she may decline to receive medical services by telemedicine and may withdraw from such care at any time.    Notes:     Patient seen with 7 day h/o fatigue, worsening cough, nasal congestion. She is using albuterol, zyrtec, and continues her percocet and muscle relaxer. Denies CP or SOB. No known sick contacts. Has not taken home covid test. She was seen in pulmonology clinic for chronic bronchitis in 2020 (Dr. Walker), prescribed nebulized albuterol.   Had pulmonology visit to f/u prolonged cough in 11/23 with following a/p from Dr. Kennedy:  "Likely postviral cough syndrome which is resolving  Normal CT is reassuring  No ongoing worrisome signs or symptoms  Discussed with the patient that current illness should resolve  We will see her back p.r.n."    Sees pain management for back pain. Weight loss continues since 8/8/23 on Trulicity.  Home BP " 120-130/80.   Taking insulin 3-7 units prior to meals and Trulicity. Wants to meet with health  to start an exercise program.   On long-term opiate therapy, PRN gabapentin.   She hasn't followed up in with pulmonology in several years; uses albuterol occasionally; no longer wearing nocturnal oxygen.    Updated/ annual due 6/23:  HM: 10/23 fluvax, 4/21 covid vaccines/booster, 7/19 HAV, 6/15 [id number], 3/14 [id number], 6/15 TDaP, 8/23 MMG, s/p ROSA, 8/23 BMD with OP on prolia now on fosomax due to dental disease, 3/16 Cscope rep 10y, 11/10 HCV neg, Pain DrKiko Anaya, 4/23 Eye DrKiko Gutierrez [place]    Objective:       Vitals:    03/26/24 1152   BP: 120/76     Wt Readings from Last 3 Encounters:   02/22/24 95.2 kg (209 lb 14.1 oz)   01/08/24 97.7 kg (215 lb 4.5 oz)   11/28/23 98.4 kg (216 lb 14.9 oz)     Temp Readings from Last 3 Encounters:   01/08/24 98.6 °F (37 °C) (Oral)   11/06/23 98.3 °F (36.8 °C) (Oral)   10/06/23 99.2 °F (37.3 °C) (Oral)     BP Readings from Last 3 Encounters:   03/26/24 120/76   02/22/24 (!) 144/90   01/08/24 118/76     Pulse Readings from Last 3 Encounters:   02/22/24 79   01/08/24 67   11/28/23 81          Physical Exam  Constitutional:       General: She is not in acute distress.     Appearance: She is well-developed.   HENT:      Head: Normocephalic and atraumatic.      Nose: No congestion or rhinorrhea.   Eyes:      General: No scleral icterus.     Extraocular Movements: Extraocular movements intact.   Pulmonary:      Effort: Pulmonary effort is normal. No respiratory distress.   Musculoskeletal:         General: No signs of injury. Normal range of motion.      Cervical back: Normal range of motion and neck supple.   Skin:     General: Skin is dry.      Findings: No rash.   Neurological:      Mental Status: She is alert and oriented to person, place, and time. Mental status is at baseline.   Psychiatric:         Behavior: Behavior normal.         Thought Content: Thought content normal.            Albumin   Date Value Ref Range Status   02/22/2024 3.9 3.5 - 5.2 g/dL Final     eGFR   Date Value Ref Range Status   02/22/2024 51 (A) >60 mL/min/1.73 m^2 Final       Assessment:       1. Acute cystitis without hematuria    2. Viral URI with cough    3. Nasal congestion    4. Bronchitis        Plan:           Problem List Items Addressed This Visit    None  Visit Diagnoses       Acute cystitis without hematuria    -  Primary    Relevant Medications    nitrofurantoin, macrocrystal-monohydrate, (MACROBID) 100 MG capsule    Viral URI with cough        Relevant Medications    pseudoephedrine (SUDAFED) 30 MG tablet    Nasal congestion        Relevant Medications    pseudoephedrine (SUDAFED) 30 MG tablet    Bronchitis        Relevant Medications    albuterol-ipratropium (DUO-NEB) 2.5 mg-0.5 mg/3 mL nebulizer solution    Other Relevant Orders    NEBULIZER FOR HOME USE    NEBULIZER KIT (SUPPLIES) FOR HOME USE          Take at home COVID test; asked patient to f/u with us in a day or two with report of symptoms.

## 2024-04-04 ENCOUNTER — PATIENT MESSAGE (OUTPATIENT)
Dept: PRIMARY CARE CLINIC | Facility: CLINIC | Age: 64
End: 2024-04-04
Payer: MEDICARE

## 2024-04-04 DIAGNOSIS — R30.0 DYSURIA: Primary | ICD-10-CM

## 2024-04-04 RX ORDER — DOXYCYCLINE 100 MG/1
100 CAPSULE ORAL 2 TIMES DAILY
Qty: 14 CAPSULE | Refills: 0 | Status: SHIPPED | OUTPATIENT
Start: 2024-04-04 | End: 2024-04-11

## 2024-04-05 ENCOUNTER — LAB VISIT (OUTPATIENT)
Dept: LAB | Facility: HOSPITAL | Age: 64
End: 2024-04-05
Attending: INTERNAL MEDICINE
Payer: MEDICARE

## 2024-04-05 DIAGNOSIS — R30.0 DYSURIA: ICD-10-CM

## 2024-04-05 LAB
BACTERIA #/AREA URNS AUTO: ABNORMAL /HPF
BILIRUB UR QL STRIP: NEGATIVE
CLARITY UR REFRACT.AUTO: ABNORMAL
COLOR UR AUTO: YELLOW
GLUCOSE UR QL STRIP: NEGATIVE
HGB UR QL STRIP: ABNORMAL
KETONES UR QL STRIP: NEGATIVE
LEUKOCYTE ESTERASE UR QL STRIP: ABNORMAL
MICROSCOPIC COMMENT: ABNORMAL
NITRITE UR QL STRIP: NEGATIVE
PH UR STRIP: 5 [PH] (ref 5–8)
PROT UR QL STRIP: NEGATIVE
RBC #/AREA URNS AUTO: 3 /HPF (ref 0–4)
SP GR UR STRIP: 1.01 (ref 1–1.03)
SQUAMOUS #/AREA URNS AUTO: 0 /HPF
URN SPEC COLLECT METH UR: ABNORMAL
WBC #/AREA URNS AUTO: 30 /HPF (ref 0–5)

## 2024-04-05 PROCEDURE — 81001 URINALYSIS AUTO W/SCOPE: CPT | Performed by: INTERNAL MEDICINE

## 2024-04-10 ENCOUNTER — TELEPHONE (OUTPATIENT)
Dept: PRIMARY CARE CLINIC | Facility: CLINIC | Age: 64
End: 2024-04-10
Payer: MEDICARE

## 2024-04-10 ENCOUNTER — PATIENT MESSAGE (OUTPATIENT)
Dept: PRIMARY CARE CLINIC | Facility: CLINIC | Age: 64
End: 2024-04-10
Payer: MEDICARE

## 2024-04-11 ENCOUNTER — LAB VISIT (OUTPATIENT)
Dept: LAB | Facility: HOSPITAL | Age: 64
End: 2024-04-11
Attending: INTERNAL MEDICINE
Payer: MEDICARE

## 2024-04-11 ENCOUNTER — PATIENT MESSAGE (OUTPATIENT)
Dept: PRIMARY CARE CLINIC | Facility: CLINIC | Age: 64
End: 2024-04-11
Payer: MEDICARE

## 2024-04-11 ENCOUNTER — TELEPHONE (OUTPATIENT)
Dept: PRIMARY CARE CLINIC | Facility: CLINIC | Age: 64
End: 2024-04-11
Payer: MEDICARE

## 2024-04-11 DIAGNOSIS — R30.0 DYSURIA: ICD-10-CM

## 2024-04-11 DIAGNOSIS — R30.0 DYSURIA: Primary | ICD-10-CM

## 2024-04-11 LAB
BACTERIA #/AREA URNS AUTO: ABNORMAL /HPF
BILIRUB UR QL STRIP: NEGATIVE
CLARITY UR REFRACT.AUTO: CLEAR
COLOR UR AUTO: YELLOW
GLUCOSE UR QL STRIP: NEGATIVE
HGB UR QL STRIP: NEGATIVE
HYALINE CASTS UR QL AUTO: 0 /LPF
KETONES UR QL STRIP: NEGATIVE
LEUKOCYTE ESTERASE UR QL STRIP: NEGATIVE
MICROSCOPIC COMMENT: ABNORMAL
NITRITE UR QL STRIP: NEGATIVE
PH UR STRIP: 5 [PH] (ref 5–8)
PROT UR QL STRIP: ABNORMAL
RBC #/AREA URNS AUTO: 3 /HPF (ref 0–4)
SP GR UR STRIP: 1.01 (ref 1–1.03)
SQUAMOUS #/AREA URNS AUTO: 0 /HPF
URN SPEC COLLECT METH UR: ABNORMAL
WBC #/AREA URNS AUTO: 15 /HPF (ref 0–5)

## 2024-04-11 PROCEDURE — 81001 URINALYSIS AUTO W/SCOPE: CPT | Performed by: INTERNAL MEDICINE

## 2024-04-11 NOTE — TELEPHONE ENCOUNTER
Please check urine at [facility] today, and if it is clear I do want to see her tomorrow.  SM

## 2024-04-11 NOTE — TELEPHONE ENCOUNTER
Appt made for today at [facility] to check urine.  Pt aware of appointment.

## 2024-04-12 ENCOUNTER — PATIENT MESSAGE (OUTPATIENT)
Dept: PRIMARY CARE CLINIC | Facility: CLINIC | Age: 64
End: 2024-04-12
Payer: MEDICARE

## 2024-04-12 RX ORDER — CIPROFLOXACIN 250 MG/1
250 TABLET, FILM COATED ORAL 2 TIMES DAILY
Qty: 10 TABLET | Refills: 0 | Status: SHIPPED | OUTPATIENT
Start: 2024-04-12 | End: 2024-04-17

## 2024-04-12 NOTE — TELEPHONE ENCOUNTER
PC with pt    S/p macrobid, then doxy, still weakness.  Will send in cipro x 5d.    Please make sure I have appt with her in 4-6 weeks.  SM

## 2024-04-23 ENCOUNTER — OFFICE VISIT (OUTPATIENT)
Dept: PAIN MEDICINE | Facility: CLINIC | Age: 64
End: 2024-04-23
Payer: MEDICARE

## 2024-04-23 DIAGNOSIS — M54.16 LUMBAR RADICULOPATHY: ICD-10-CM

## 2024-04-23 DIAGNOSIS — M79.2 NEUROPATHIC PAIN: ICD-10-CM

## 2024-04-23 DIAGNOSIS — Z79.891 OPIOID USE AGREEMENT EXISTS: Primary | ICD-10-CM

## 2024-04-23 DIAGNOSIS — M51.36 DDD (DEGENERATIVE DISC DISEASE), LUMBAR: ICD-10-CM

## 2024-04-23 DIAGNOSIS — M17.0 BILATERAL PRIMARY OSTEOARTHRITIS OF KNEE: ICD-10-CM

## 2024-04-23 PROCEDURE — 99213 OFFICE O/P EST LOW 20 MIN: CPT | Mod: 95,,, | Performed by: ANESTHESIOLOGY

## 2024-04-23 RX ORDER — LIDOCAINE 50 MG/G
PATCH TOPICAL
Qty: 90 PATCH | Refills: 0 | Status: SHIPPED | OUTPATIENT
Start: 2024-04-23

## 2024-04-23 RX ORDER — OXYCODONE AND ACETAMINOPHEN 10; 325 MG/1; MG/1
1 TABLET ORAL EVERY 8 HOURS PRN
Qty: 90 TABLET | Refills: 0 | Status: SHIPPED | OUTPATIENT
Start: 2024-04-23

## 2024-04-23 RX ORDER — OXYCODONE AND ACETAMINOPHEN 10; 325 MG/1; MG/1
1 TABLET ORAL EVERY 8 HOURS PRN
Qty: 90 TABLET | Refills: 0 | Status: SHIPPED | OUTPATIENT
Start: 2024-05-22
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
## 2024-05-13 ENCOUNTER — LAB VISIT (OUTPATIENT)
Dept: LAB | Facility: HOSPITAL | Age: 64
End: 2024-05-13
Attending: INTERNAL MEDICINE
Payer: MEDICARE

## 2024-05-13 DIAGNOSIS — E11.65 TYPE 2 DIABETES MELLITUS WITH HYPERGLYCEMIA, WITH LONG-TERM CURRENT USE OF INSULIN: ICD-10-CM

## 2024-05-13 DIAGNOSIS — Z79.4 TYPE 2 DIABETES MELLITUS WITH HYPERGLYCEMIA, WITH LONG-TERM CURRENT USE OF INSULIN: ICD-10-CM

## 2024-05-13 PROCEDURE — 83036 HEMOGLOBIN GLYCOSYLATED A1C: CPT | Performed by: NURSE PRACTITIONER

## 2024-05-13 PROCEDURE — 36415 COLL VENOUS BLD VENIPUNCTURE: CPT | Mod: PN | Performed by: NURSE PRACTITIONER

## 2024-05-14 LAB
ESTIMATED AVG GLUCOSE: 146 MG/DL (ref 68–131)
HBA1C MFR BLD: 6.7 % (ref 4–5.6)

## 2024-05-16 ENCOUNTER — PATIENT MESSAGE (OUTPATIENT)
Dept: PRIMARY CARE CLINIC | Facility: CLINIC | Age: 64
End: 2024-05-16
Payer: MEDICARE
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
## 2024-05-21 ENCOUNTER — OFFICE VISIT (OUTPATIENT)
Dept: DIABETES | Facility: CLINIC | Age: 64
End: 2024-05-21
Payer: MEDICARE

## 2024-05-21 VITALS
HEART RATE: 74 BPM | DIASTOLIC BLOOD PRESSURE: 83 MMHG | HEIGHT: 66 IN | BODY MASS INDEX: 33.7 KG/M2 | SYSTOLIC BLOOD PRESSURE: 145 MMHG | WEIGHT: 209.69 LBS

## 2024-05-21 DIAGNOSIS — I70.0 CALCIFICATION OF AORTA: ICD-10-CM

## 2024-05-21 DIAGNOSIS — Z79.4 TYPE 2 DIABETES MELLITUS WITH HYPERGLYCEMIA, WITH LONG-TERM CURRENT USE OF INSULIN: Primary | ICD-10-CM

## 2024-05-21 DIAGNOSIS — M81.0 AGE-RELATED OSTEOPOROSIS WITHOUT CURRENT PATHOLOGICAL FRACTURE: ICD-10-CM

## 2024-05-21 DIAGNOSIS — E78.5 HYPERLIPIDEMIA ASSOCIATED WITH TYPE 2 DIABETES MELLITUS: ICD-10-CM

## 2024-05-21 DIAGNOSIS — Z94.0 DECEASED-DONOR KIDNEY TRANSPLANT: Chronic | ICD-10-CM

## 2024-05-21 DIAGNOSIS — E11.69 HYPERLIPIDEMIA ASSOCIATED WITH TYPE 2 DIABETES MELLITUS: ICD-10-CM

## 2024-05-21 DIAGNOSIS — E11.65 TYPE 2 DIABETES MELLITUS WITH HYPERGLYCEMIA, WITH LONG-TERM CURRENT USE OF INSULIN: Primary | ICD-10-CM

## 2024-05-21 DIAGNOSIS — E11.59 HYPERTENSION ASSOCIATED WITH DIABETES: ICD-10-CM

## 2024-05-21 DIAGNOSIS — I15.2 HYPERTENSION ASSOCIATED WITH DIABETES: ICD-10-CM

## 2024-05-21 LAB — GLUCOSE SERPL-MCNC: 68 MG/DL (ref 70–110)

## 2024-05-21 PROCEDURE — 3077F SYST BP >= 140 MM HG: CPT | Mod: CPTII,S$GLB,, | Performed by: NURSE PRACTITIONER

## 2024-05-21 PROCEDURE — 82962 GLUCOSE BLOOD TEST: CPT | Mod: S$GLB,,, | Performed by: NURSE PRACTITIONER

## 2024-05-21 PROCEDURE — 3044F HG A1C LEVEL LT 7.0%: CPT | Mod: CPTII,S$GLB,, | Performed by: NURSE PRACTITIONER

## 2024-05-21 PROCEDURE — 1160F RVW MEDS BY RX/DR IN RCRD: CPT | Mod: CPTII,S$GLB,, | Performed by: NURSE PRACTITIONER

## 2024-05-21 PROCEDURE — 99214 OFFICE O/P EST MOD 30 MIN: CPT | Mod: S$GLB,,, | Performed by: NURSE PRACTITIONER

## 2024-05-21 PROCEDURE — 99999 PR PBB SHADOW E&M-EST. PATIENT-LVL III: CPT | Mod: PBBFAC,,, | Performed by: NURSE PRACTITIONER

## 2024-05-21 PROCEDURE — 3008F BODY MASS INDEX DOCD: CPT | Mod: CPTII,S$GLB,, | Performed by: NURSE PRACTITIONER

## 2024-05-21 PROCEDURE — 3079F DIAST BP 80-89 MM HG: CPT | Mod: CPTII,S$GLB,, | Performed by: NURSE PRACTITIONER

## 2024-05-21 PROCEDURE — 1159F MED LIST DOCD IN RCRD: CPT | Mod: CPTII,S$GLB,, | Performed by: NURSE PRACTITIONER

## 2024-05-21 RX ORDER — INSULIN LISPRO 100 [IU]/ML
INJECTION, SOLUTION INTRAVENOUS; SUBCUTANEOUS
Qty: 45 ML | Refills: 3 | Status: SHIPPED | OUTPATIENT
Start: 2024-05-21

## 2024-05-21 RX ORDER — PEN NEEDLE, DIABETIC 30 GX3/16"
NEEDLE, DISPOSABLE MISCELLANEOUS
Qty: 300 EACH | Refills: 3 | Status: SHIPPED | OUTPATIENT
Start: 2024-05-21

## 2024-05-21 RX ORDER — SEMAGLUTIDE 1.34 MG/ML
1 INJECTION, SOLUTION SUBCUTANEOUS
Qty: 3 ML | Refills: 5 | Status: SHIPPED | OUTPATIENT
Start: 2024-05-21 | End: 2024-06-06

## 2024-05-21 NOTE — PROGRESS NOTES
Subjective:         Patient ID: Ana Maria Teague is a 63 y.o. female.  Patient's current PCP is Karine Olmos MD.     Chief Complaint: Diabetes Mellitus    HPI  Ana Maria Teauge is a 63 y.o. Black or  female presenting for a new consult with me, previously seen by Aide Dougherty NP  for diabetes. Patient has been diagnosed with type 2 diabetes since 2013 .  Received diabetes education: Yes    CURRENT DM MEDICATIONS:   Diabetes Medications               dulaglutide (TRULICITY) 3 mg/0.5 mL pen injector Inject 3 mg into the skin every 7 days.    insulin lispro (HUMALOG KWIKPEN INSULIN) 100 unit/mL pen Inject 4 Units into the skin 3 (three) times daily before meals.     Past failed treatment include: Lantus; Trulicity-availability only    Blood glucose testing Continuous per Nico 2 --Will upgrade to Nico 3  Preferred lab: [facility]    Any episodes of hypoglycemia? Yes,intermittent,mild- nothing consistent    Complications related to diabetes: nephropathy and peripheral neuropathy    Her blood sugar in the clinic today was:   Lab Results   Component Value Date    POCGLU 68 (A) 05/21/2024     Ana Maria Teague presents today for follow up visit to discuss diabetes management. Last visit with Aide Dougherty NP 1/17/2024. This is her first visit with me.     Reports doing well. Currently using Nico 2, but has not been wearing consistently. Only 1 day with data to review, 100% in range. She reports UTD with eye exam - ISABEL today. Denies DR. Foot exam completed today.    Tolerating Trulicity well but having issues with availability. Will switch to Ozempic.     H/o kidney transplant - R kidney- in 2013.    CKD III- not currently seeing renal.    Current diet: 2-3 meals/day, infrequent snacks.  Activity Level: No formal exercise    Lab Results   Component Value Date    HGBA1C 6.7 (H) 05/13/2024    HGBA1C 6.7 (H) 11/02/2023    HGBA1C 6.0 (H) 08/08/2023     STANDARDS OF CARE  Diabetes Management  "Status    Statin: Taking  ACE/ARB: Not taking    Screening or Prevention Patient's value Goal Complete/Controlled?   HgA1C Testing and Control   Lab Results   Component Value Date    HGBA1C 6.7 (H) 05/13/2024      Annually/Less than 8% Yes   Lipid profile : 11/02/2023 Annually Yes   LDL control Lab Results   Component Value Date    LDLCALC 72.8 11/02/2023    Annually/Less than 100 mg/dl  Yes   Nephropathy screening Lab Results   Component Value Date    LABMICR 81.0 08/08/2023     Lab Results   Component Value Date    PROTEINUA 1+ (A) 04/11/2024     Lab Results   Component Value Date    UTPCR 0.13 11/25/2022      Annually Yes   Blood pressure BP Readings from Last 1 Encounters:   05/21/24 (!) 145/83    Less than 140/90 No   Dilated retinal exam : 05/19/2023 Annually Yes-[facility] today   Foot exam   : 05/21/2024 Annually Yes      Labs reviewed and are noted below.    Lab Results   Component Value Date    WBC 13.08 (H) 11/06/2023    HGB 10.9 (L) 11/06/2023    HCT 36.4 (L) 11/06/2023     11/06/2023    CHOL 135 11/02/2023    TRIG 56 11/02/2023    HDL 51 11/02/2023    LDLCALC 72.8 11/02/2023    ALT 13 02/22/2024    AST 14 02/22/2024     02/22/2024    K 4.1 02/22/2024     02/22/2024    ANIONGAP 12 02/22/2024    CREATININE 1.2 02/22/2024    ESTGFRAFRICA 61 06/28/2023    EGFRNONAA 48.6 (A) 07/12/2022    BUN 16 02/22/2024    CO2 27 02/22/2024    TSH 1.092 06/06/2023    INR 1.1 01/04/2021     (H) 02/22/2024    UTPCR 0.13 11/25/2022     Lab Results   Component Value Date    FREET4 1.02 06/06/2023    TSH 1.092 06/06/2023    IRON 60 06/10/2021    TIBC 311 06/10/2021    FERRITIN 982 (H) 06/10/2021    PTH 83.5 (H) 06/06/2023    CALCIUM 9.6 02/22/2024    CAION 1.17 10/08/2013    PHOS 2.9 08/31/2023     No results found for: "CPEPTIDE"  No results found for: "GLUTAMICACID"  Glucose   Date Value Ref Range Status   02/22/2024 111 (H) 70 - 110 mg/dL Final     Anion Gap   Date Value Ref Range " Status   2024 12 8 - 16 mmol/L Final     eGFR if    Date Value Ref Range Status   2022 56.0 (A) >60 mL/min/1.73 m^2 Final     eGFR    Date Value Ref Range Status   2023 61 mL/min/1.73mSq Final     Comment:     In accordance with NKF-ASN Task Force recommendation, calculation based on the Chronic Kidney Disease Epidemiology Collaboration (CKD-EPI) equation without adjustment for race. eGFR adjusted for gender and age and calculated in ml/min/1.73mSquared. eGFR cannot be calculated if patient is under 18 years of age.     Reference Range:   >= 60 ml/min/1.73mSquared.     eGFR if non    Date Value Ref Range Status   2022 48.6 (A) >60 mL/min/1.73 m^2 Final     Comment:     Calculation used to obtain the estimated glomerular filtration  rate (eGFR) is the CKD-EPI equation.          The following portions of the patient's history were reviewed and updated as appropriate: allergies, current medications, past family history, past medical history, past social history, past surgical history, and problem list.    Review of patient's allergies indicates:   Allergen Reactions    Azithromycin Nausea And Vomiting    Adhesive Other (See Comments)     Skin tears    Nsaids (non-steroidal anti-inflammatory drug)      Kidney Transplant     Social History     Socioeconomic History    Marital status:     Number of children: 3   Occupational History    Occupation: disable     Comment: dept manager at [employer]   Tobacco Use    Smoking status: Former     Current packs/day: 0.00     Average packs/day: 1 pack/day for 10.0 years (10.0 ttl pk-yrs)     Types: Cigarettes     Start date: 1992     Quit date: 2002     Years since quittin.5    Smokeless tobacco: Never    Tobacco comments:     quit smoking approx 10-15 years ago    Substance and Sexual Activity    Alcohol use: No    Drug use: No    Sexual activity: Never     Partners: Male   Social History  Narrative    Does housework at home.     Social Determinants of Health     Financial Resource Strain: High Risk (2023)    Overall Financial Resource Strain (CARDIA)     Difficulty of Paying Living Expenses: Hard   Food Insecurity: Food Insecurity Present (2023)    Hunger Vital Sign     Worried About Running Out of Food in the Last Year: Never true     Ran Out of Food in the Last Year: Sometimes true   Transportation Needs: No Transportation Needs (2023)    PRAPARE - Transportation     Lack of Transportation (Medical): No     Lack of Transportation (Non-Medical): No   Physical Activity: Inactive (2023)    Exercise Vital Sign     Days of Exercise per Week: 0 days     Minutes of Exercise per Session: 0 min   Stress: No Stress Concern Present (2023)    [employer] - Occupational Stress Questionnaire     Feeling of Stress : Only a little   Housing Stability: Low Risk  (2023)    Housing Stability Vital Sign     Unable to Pay for Housing in the Last Year: No     Number of Places Lived in the Last Year: 1     Unstable Housing in the Last Year: No     Past Medical History:   Diagnosis Date    Abnormal glucose 2013    Acquired hypothyroidism     Acute bronchiolitis 10/15/2014    Anemia     Anemia of chronic renal failure 2013    Anxiety     Anxiety disorder     Avascular necrosis of bone of hip     Back pain     s/p steroid injections    Bacteremia due to Escherichia coli 2021    Chronic immunosuppression with Prograf and Cellcept 2013    CKD (chronic kidney disease) stage 2, GFR 60-89 ml/min     CKD (chronic kidney disease) stage 5, GFR less than 15 ml/min     -donor kidney transplant - 13    Depression     Hypertension, renal 2013    Hypothyroidism     Immunosuppressed status 10/15/2014    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    Osteoporosis with pathological fracture     Renal disease due to hypertension  "2013    Renal hypertension, non-vascular     Secondary hyperparathyroidism of renal origin 2013    Vertigo      REVIEW OF SYSTEMS:  Eyes No history of DR.  Cardiovascular: History of HTN and HLD.   GI:Tolerating Trulicity well without GI side effects.   : H/o CKD III  Neuro: Positive neuropathy.  PSYCH: No tobacco use.  ENDO: See HPI.        Objective:      Vitals:    24 1045   BP: (!) 145/83   Pulse: 74     RESPIRATORY: No respiratory distress  NEUROLOGIC: Cranial nerves II-XII grossly intact.   PSYCHIATRIC: Alert & oriented x3. Normal mood and affect.  FOOT EXAMINATION: Protective Sensation (w/ 10 gram monofilament):  Right: Intact  Left: Intact    Visual Inspection:  Normal -  Bilateral and Nails Intact - without Evidence of Foot Deformity- Bilateral    Pedal Pulses:   Right: Present  Left: Present    Posterior Tibialis Pulses:   Right:Present  Left: Present    Assessment:       1. Type 2 diabetes mellitus with hyperglycemia, with long-term current use of insulin    2. Hyperlipidemia associated with type 2 diabetes mellitus    3. Hypertension associated with diabetes    4. Calcification of aorta    5. -donor kidney transplant - 13    6. Age-related osteoporosis without current pathological fracture        Plan:   Type 2 diabetes mellitus with hyperglycemia, with long-term current use of insulin  -     POCT Glucose, Hand-Held Device  -     semaglutide (OZEMPIC) 1 mg/dose (4 mg/3 mL); Inject 1 mg into the skin every 7 days.  Dispense: 3 mL; Refill: 5  -     insulin lispro (HUMALOG KWIKPEN INSULIN) 100 unit/mL pen; Inject 4-7 units before each main meal.  Dispense: 45 mL; Refill: 3  -     pen needle, diabetic (BD RORY 2ND GEN PEN NEEDLE) 32 gauge x 5/32" Ndle; USE THREE TIMES DAILY WITH INSULIN AS DIRECTED  Dispense: 300 each; Refill: 3    Chronic -     Medication Regimen:   Finish Trulicity, then start Ozempic 1 mg once a week. We can increase to the highest strength if needed. " "  Continue Humalog 4-7 units three times daily before each meal     Hyperlipidemia associated with type 2 diabetes mellitus    Hypertension associated with diabetes    Calcification of aorta    -donor kidney transplant - 12    Age-related osteoporosis without current pathological fracture        - Follow up:  6 months    Portions of this note may have been created with voice recognition software. Occasional "wrong-word" or "sound-a-like" substitutions may have occurred due to the inherent limitations of voice recognition software. Please, read the note carefully and recognize, using context, where substitutions have occurred.           Sabi Bills,MIRZA-C, BC-ADM  [facility]    "

## 2024-05-21 NOTE — PATIENT INSTRUCTIONS
Medication Regimen:   Finish Trulicity, then start Ozempic 1 mg once a week. We can increase to the highest strength if needed.   Continue Humalog 4-7 units three times daily before each meal     Patient Instructions:  Carbohydrate Count: 30-45 grams/meal and 15 grams/snack with limit of 2 snacks per day.  Exercise: Goal is 150 minutes or more per week.  Bring meter and blood sugar log to each appointment.     Goals for Blood Sugar:  Fastin-130 mg/dl  2 hours after meal:  mg/dl  Before Bed: 100-150 mg/dl  If Blood sugar is below 70 mg/dl, DO NOT take diabetes medication. Eat first and then recheck blood sugar in 20 minutes.  Foods to eat if blood sugar is below 70 mg/dl  1/2 glass of orange juice   1/2 regular cola can   3-4 hard candies   3-4 small glucose tabs.   Foods to eat if blood sugar is below 50 mg/dl  1 glass of orange juice  1 regular cola can  8 hard candies  8 small glucose tabs.   If you have repeated eating/blood sugar recheck process 2 times and blood sugar still below 70 mg/dl, call 911.

## 2024-05-22 ENCOUNTER — OFFICE VISIT (OUTPATIENT)
Dept: PAIN MEDICINE | Facility: CLINIC | Age: 64
End: 2024-05-22
Payer: MEDICARE

## 2024-05-22 VITALS
HEART RATE: 73 BPM | WEIGHT: 208.13 LBS | HEIGHT: 66 IN | BODY MASS INDEX: 33.45 KG/M2 | SYSTOLIC BLOOD PRESSURE: 150 MMHG | DIASTOLIC BLOOD PRESSURE: 86 MMHG

## 2024-05-22 DIAGNOSIS — M17.0 BILATERAL PRIMARY OSTEOARTHRITIS OF KNEE: ICD-10-CM

## 2024-05-22 DIAGNOSIS — M51.36 DDD (DEGENERATIVE DISC DISEASE), LUMBAR: Primary | ICD-10-CM

## 2024-05-22 DIAGNOSIS — M54.16 BILATERAL LUMBAR RADICULOPATHY: ICD-10-CM

## 2024-05-22 DIAGNOSIS — M79.18 MUSCLE PAIN, MYOFASCIAL: ICD-10-CM

## 2024-05-22 DIAGNOSIS — Z79.891 OPIOID USE AGREEMENT EXISTS: ICD-10-CM

## 2024-05-22 PROCEDURE — 1160F RVW MEDS BY RX/DR IN RCRD: CPT | Mod: CPTII,S$GLB,, | Performed by: PHYSICIAN ASSISTANT

## 2024-05-22 PROCEDURE — 99999 PR PBB SHADOW E&M-EST. PATIENT-LVL III: CPT | Mod: PBBFAC,,, | Performed by: PHYSICIAN ASSISTANT

## 2024-05-22 PROCEDURE — 80326 AMPHETAMINES 5 OR MORE: CPT | Performed by: PHYSICIAN ASSISTANT

## 2024-05-22 PROCEDURE — 80347 BENZODIAZEPINES 13 OR MORE: CPT | Performed by: PHYSICIAN ASSISTANT

## 2024-05-22 PROCEDURE — 99214 OFFICE O/P EST MOD 30 MIN: CPT | Mod: S$GLB,,, | Performed by: PHYSICIAN ASSISTANT

## 2024-05-22 PROCEDURE — 80307 DRUG TEST PRSMV CHEM ANLYZR: CPT | Performed by: PHYSICIAN ASSISTANT

## 2024-05-22 PROCEDURE — 3079F DIAST BP 80-89 MM HG: CPT | Mod: CPTII,S$GLB,, | Performed by: PHYSICIAN ASSISTANT

## 2024-05-22 PROCEDURE — 3008F BODY MASS INDEX DOCD: CPT | Mod: CPTII,S$GLB,, | Performed by: PHYSICIAN ASSISTANT

## 2024-05-22 PROCEDURE — 3044F HG A1C LEVEL LT 7.0%: CPT | Mod: CPTII,S$GLB,, | Performed by: PHYSICIAN ASSISTANT

## 2024-05-22 PROCEDURE — 3077F SYST BP >= 140 MM HG: CPT | Mod: CPTII,S$GLB,, | Performed by: PHYSICIAN ASSISTANT

## 2024-05-22 PROCEDURE — 1159F MED LIST DOCD IN RCRD: CPT | Mod: CPTII,S$GLB,, | Performed by: PHYSICIAN ASSISTANT

## 2024-05-22 RX ORDER — TIZANIDINE 4 MG/1
4 TABLET ORAL 2 TIMES DAILY PRN
Qty: 60 TABLET | Refills: 1 | Status: SHIPPED | OUTPATIENT
Start: 2024-05-22

## 2024-05-22 NOTE — PATIENT INSTRUCTIONS
"You can  your pain medications on 5/23/24,  (will allow to fill on 6/21/24 since pharmacy closed on weekend date), 7/22/24.    ______________________________________________________________________    Diagnostic Medial Branch Block - Patient Information    What are facet joints?  Bones called vertebrae make up your spine. Each vertebra has facets (flat surfaces) that touch where the vertebrae fit together. These form a structure called a facet joint on each side of the vertebrae.Back or neck pain may be caused by a problem with your facet joints. If these joints are blocked or numbed, they will not be able to transfer the painful sensation to the brain.   Therefore, this procedure is completed to see if your back pain is (solely or in part) caused by the facet joints.        How is the procedure performed?  The patient lies on his/her stomach. The skin of the back or neck is cleansed with antiseptic solution and a local anesthetic in injected to numb the area. A small needle is then guided using an X-ray to the targeted facet joints which are then numbed. An anesthetic is then injected into the joint.   The injection takes about 15 minutes to complete.    Where will your procedure be performed?  The treatment is done in a hospital or surgery center. Youll be asked to fill out some forms, including a consent form. You may also be examined prior to.         Will the injection hurt?  There is some discomfort with needle insertion which we minimize by numbing the skin over the joint with a   local anesthetic. You may elect to have a small amount of sedating medication to help with discomfort and to   help you relax.     How long does the effect last?  The pain relief will only last a few hours/ less than 12 hours. Pain relief in the first few hours after   the injection is the most important as this tells us our diagnosis of facet joint mediated pain is correct. If the "test injection" provides pain relief, we " can discuss other options available for extended pain relief, such a radiofrequency ablation (RFA).    What is the next step after the injection?  Our staff will call you the next day to document pain relief obtained after the procedure. If the pain relief is significant, our final plan would be to move forward with next part of the treatment: RFA (radiofrequency ablation), which can provide an extended pain relief from 6 months to 18 months. To note: many insurances require 2 diagnostic medial branch blocks prior to moving forward with RFA for the first time.    What are the risks and side effects?  Serious side effects and complications are rare. The most common problem after the injection is having pain in   the area of the injection for a few days. The other complications are infection, bleeding and nerve injury. These complications are minimized by stopping blood thinners, using sterile technique, and fluoroscopy for xray needle guidance.    After the Procedure:  Most often, you can go home in about an hour. Our procedure center requires you to have an adult friend or relative drive you to and from the facility. The anesthetic wears off in a few hours. When it does, your back or neck may feel more sore than usual. This is normal. Take it easy for the rest of the day.      ______________________________________________________________________    If lumbar medial branch block is successful in pain relief (at least 80% pain relief short-term); for longer last relief, we can try:    Lumbar Radiofrequency Ablation (RFA)    What is a facet joint pain?  The spine is made of vertebrae, which makes up the spine. The vertebrae are connected to each other with facet joints, which allows the bending and rotational spine movements. As the joints become inflamed and irritated, there is a small medial branch nerve that transmits the pain signal from the joint to the brain. Furthermore, spine pain may worsen during the  extension of spine.       How does lumbar RFA bring pain relief?  The pain is produced due to inflammation thoracic facet joint which is transmitted via medial branch nerve to the central nervous system. By ablating the medial branch nerve via radiofrequency waves, this results in decreased mid-back pain caused by the facet joints.     How is the lumbar RFA performed?  After sterile preparation of the lumbar region, the injection site is localized under X-ray. Following the local anesthetic applied to the injection site, which can help decrease the injection site pain, and then the special radiofrequency needle is guided toward the target lumbar facet joint area with the help of Xray. After the target is reached, the area is stimulated with to rule out any lumbar nerve root involvement. Thereafter, small amount of local anesthetic with steroid is injected for to minimize postablation procedure pain, and then the radiofrequency ablation is performed. Lastly, the needle is taken out at the end of the procedure.      What to expect after the lumbar RFA?  This is an outpatient procedure. Patient should expect to receive full relief over 3-8 weeks. In some patients, pain may worsen slightly before improvement.    How long the relief from the lumbar RFA would last for?  It varies from patient to patient. Usually, the relief can last from 6 months, up to 18 months.    Do the medial branch nerves ever grow back? And can the lumbar RFA procedure repeated?  Yes, the medial branch nerves do grow back. Yes, the RFA can be repeated in order to achieve the previous pain relief.

## 2024-05-28 LAB
1OH-MIDAZOLAM UR QL SCN: NOT DETECTED
6MAM UR QL: NOT DETECTED
7AMINOCLONAZEPAM UR QL: NOT DETECTED
A-OH ALPRAZ UR QL: NOT DETECTED
ALPRAZ UR QL: NOT DETECTED
AMPHET UR QL SCN: NOT DETECTED
ANNOTATION COMMENT IMP: NORMAL
BARBITURATES UR QL: NEGATIVE
BUPRENORPHINE UR QL: NOT DETECTED
BZE UR QL: NEGATIVE
CARBOXYTHC UR QL: NEGATIVE
CARISOPRODOL UR QL: NEGATIVE
CLONAZEPAM UR QL: NOT DETECTED
CODEINE UR QL: NOT DETECTED
CREAT UR-MCNC: 159.4 MG/DL (ref 20–400)
DIAZEPAM UR QL: NOT DETECTED
ETHYL GLUCURONIDE UR QL: NEGATIVE
FENTANYL UR QL: NOT DETECTED
GABAPENTIN UR QL CFM: NOT DETECTED
HYDROCODONE UR QL: NOT DETECTED
HYDROMORPHONE UR QL: NOT DETECTED
LORAZEPAM UR QL: NOT DETECTED
MDA UR QL: NOT DETECTED
MDEA UR QL: NOT DETECTED
MDMA UR QL: NOT DETECTED
ME-PHENIDATE UR QL: NOT DETECTED
METHADONE UR QL: NEGATIVE
METHAMPHET UR QL: NOT DETECTED
MIDAZOLAM UR QL SCN: NOT DETECTED
MORPHINE UR QL: NOT DETECTED
NALOXONE UR QL CFM: NOT DETECTED
NORBUPRENORPHINE UR QL CFM: NOT DETECTED
NORDIAZEPAM UR QL: NOT DETECTED
NORFENTANYL UR QL: NOT DETECTED
NORHYDROCODONE UR QL CFM: NOT DETECTED
NORMEPERIDINE UR QL CFM: NOT DETECTED
NOROXYCODONE UR QL CFM: PRESENT
NOROXYMORPHONE UR QL SCN: PRESENT
OXAZEPAM UR QL: NOT DETECTED
OXYCODONE UR QL: PRESENT
OXYMORPHONE UR QL: PRESENT
PATHOLOGY STUDY: NORMAL
PCP UR QL: NEGATIVE
PHENTERMINE UR QL: NOT DETECTED
PREGABALIN UR QL CFM: NOT DETECTED
SERVICE CMNT-IMP: NORMAL
TAPENTADOL UR QL SCN: NOT DETECTED
TAPENTADOL UR QL SCN: NOT DETECTED
TEMAZEPAM UR QL: NOT DETECTED
TRAMADOL UR QL: NEGATIVE
ZOLPIDEM PHENYL-4-CARB UR QL SCN: NOT DETECTED
ZOLPIDEM UR QL: NOT DETECTED

## 2024-05-29 ENCOUNTER — PATIENT OUTREACH (OUTPATIENT)
Dept: ADMINISTRATIVE | Facility: HOSPITAL | Age: 64
End: 2024-05-29
Payer: MEDICARE

## 2024-05-29 NOTE — PROGRESS NOTES
ISABEL DM EYE EXAM 5/21/2024 uploaded ; faxed to [facility] 1x to request. Reminder set 1 week.

## 2024-06-06 ENCOUNTER — PATIENT MESSAGE (OUTPATIENT)
Dept: DIABETES | Facility: CLINIC | Age: 64
End: 2024-06-06
Payer: MEDICARE

## 2024-06-06 DIAGNOSIS — Z79.4 TYPE 2 DIABETES MELLITUS WITH HYPERGLYCEMIA, WITH LONG-TERM CURRENT USE OF INSULIN: Primary | ICD-10-CM

## 2024-06-06 DIAGNOSIS — E11.65 TYPE 2 DIABETES MELLITUS WITH HYPERGLYCEMIA, WITH LONG-TERM CURRENT USE OF INSULIN: Primary | ICD-10-CM

## 2024-06-06 RX ORDER — SEMAGLUTIDE 2.68 MG/ML
2 INJECTION, SOLUTION SUBCUTANEOUS
Qty: 3 ML | Refills: 5 | Status: SHIPPED | OUTPATIENT
Start: 2024-06-06

## 2024-06-20 ENCOUNTER — OFFICE VISIT (OUTPATIENT)
Dept: PRIMARY CARE CLINIC | Facility: CLINIC | Age: 64
End: 2024-06-20
Payer: MEDICARE

## 2024-06-20 VITALS
HEART RATE: 82 BPM | HEIGHT: 66 IN | TEMPERATURE: 97 F | WEIGHT: 204.94 LBS | SYSTOLIC BLOOD PRESSURE: 118 MMHG | DIASTOLIC BLOOD PRESSURE: 74 MMHG | OXYGEN SATURATION: 99 % | BODY MASS INDEX: 32.94 KG/M2

## 2024-06-20 DIAGNOSIS — E11.65 TYPE 2 DIABETES MELLITUS WITH HYPERGLYCEMIA, WITH LONG-TERM CURRENT USE OF INSULIN: ICD-10-CM

## 2024-06-20 DIAGNOSIS — E11.22 CKD STAGE 3 DUE TO TYPE 2 DIABETES MELLITUS: ICD-10-CM

## 2024-06-20 DIAGNOSIS — I15.2 HYPERTENSION ASSOCIATED WITH DIABETES: Primary | ICD-10-CM

## 2024-06-20 DIAGNOSIS — Z79.4 TYPE 2 DIABETES MELLITUS WITH HYPERGLYCEMIA, WITH LONG-TERM CURRENT USE OF INSULIN: ICD-10-CM

## 2024-06-20 DIAGNOSIS — E11.59 HYPERTENSION ASSOCIATED WITH DIABETES: Primary | ICD-10-CM

## 2024-06-20 DIAGNOSIS — N18.30 CKD STAGE 3 DUE TO TYPE 2 DIABETES MELLITUS: ICD-10-CM
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
## 2024-06-20 PROCEDURE — 3074F SYST BP LT 130 MM HG: CPT | Mod: CPTII,S$GLB,, | Performed by: NURSE PRACTITIONER

## 2024-06-20 PROCEDURE — 3044F HG A1C LEVEL LT 7.0%: CPT | Mod: CPTII,S$GLB,, | Performed by: NURSE PRACTITIONER

## 2024-06-20 PROCEDURE — 2023F DILAT RTA XM W/O RTNOPTHY: CPT | Mod: CPTII,S$GLB,, | Performed by: NURSE PRACTITIONER

## 2024-06-20 PROCEDURE — 1159F MED LIST DOCD IN RCRD: CPT | Mod: CPTII,S$GLB,, | Performed by: NURSE PRACTITIONER

## 2024-06-20 PROCEDURE — 99213 OFFICE O/P EST LOW 20 MIN: CPT | Mod: S$GLB,,, | Performed by: NURSE PRACTITIONER

## 2024-06-20 PROCEDURE — 3008F BODY MASS INDEX DOCD: CPT | Mod: CPTII,S$GLB,, | Performed by: NURSE PRACTITIONER

## 2024-06-20 PROCEDURE — 3078F DIAST BP <80 MM HG: CPT | Mod: CPTII,S$GLB,, | Performed by: NURSE PRACTITIONER

## 2024-06-20 PROCEDURE — 99999 PR PBB SHADOW E&M-EST. PATIENT-LVL IV: CPT | Mod: PBBFAC,,, | Performed by: NURSE PRACTITIONER

## 2024-06-20 NOTE — PROGRESS NOTES
Ana Maria Teague  06/21/2024  [id number]    Karine Olmos MD  Patient Care Team:  Karine Olmos MD as PCP - General (Internal Medicine)  Jovanna Pugh MD as Consulting Physician (Nephrology)  Aide Dougherty NP (Inactive) as Nurse Practitioner (Endocrinology)  Franklyn Anaya OD as Consulting Physician (Ophthalmology)        [facility] Note      Chief Complaint:  Chief Complaint   Patient presents with    Follow-up     Pt is here for a follow up. Pt slipped in tub and bruised right toe.         Assessment/Plan:  1. Hypertension associated with diabetes  Assessment & Plan:  Controlled on nifedipine      2. Type 2 diabetes mellitus with hyperglycemia, with long-term current use of insulin  Assessment & Plan:  Trulicity switched to Ozempic  Lab Results   Component Value Date    HGBA1C 6.7 (H) 05/13/2024          3. CKD stage 3 due to type 2 diabetes mellitus  Assessment & Plan:  Stable  Continue mycophenolate and tacrolimus  F/U Nephrology      4. Fall, initial encounter  Assessment & Plan:  Left great toe sprain  Tylenol prn  Elevate to decrease swelling and throbbing            Worry Score: 4  History of Present Illness:    Last visit with Dr. Castellon in 3/24 -treated for UTI, bronchitis and viral URI  Ms. Teague has returned for f/u of chronic medical problems and recent fall.     5/21/24 - Diabetes Mgt - Finish Trulicity, then start Ozempic 1 mg once a week. We can increase to the highest strength if needed.   Continue Humalog 4-7 units three times daily before each meal  Sil 2 >>> will switch to librae 3 soon  No adverse symptoms from Ozempic - weight is trending down  Hgb A1 C = 6.7     HTN - Nifedipine bid -  - today 118/74    Reports recent right sided headache. Right temporal/right eye, right face. No visual changes, no nausea/vomiting, no neuro deficits. Reports mass behind right eye - followed by [facility]. They assess the mass behind the right eye.    Slipped in bathtub about  10 days ago, right leg behind, left leg in front  Right toe pain with bruising. No deformity, still with mild swelling, throbbing pain.   Moderate pain to right hip and right knee. Painful gait has improved.     Denies f/c/sw, denies cough/SOB, chest pain.  No palp, leg swelling, bowel and bladder        The following were reviewed: Active problem list, medication list, allergies, family history, social history, and Health Maintenance.     History:  Past Medical History:   Diagnosis Date    Abnormal glucose 2013    Acquired hypothyroidism     Acute bronchiolitis 10/15/2014    Anemia     Anemia of chronic renal failure 2013    Anxiety     Anxiety disorder     Avascular necrosis of bone of hip     Back pain     s/p steroid injections    Bacteremia due to Escherichia coli 2021    Chronic immunosuppression with Prograf and Cellcept 2013    CKD (chronic kidney disease) stage 2, GFR 60-89 ml/min     CKD (chronic kidney disease) stage 5, GFR less than 15 ml/min     -donor kidney transplant - 13    Depression     Hypertension, renal 2013    Hypothyroidism     Immunosuppressed status 10/15/2014    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 7 years ago.     SECTION      COLONOSCOPY N/A 3/9/2016    Procedure: COLONOSCOPY;  Surgeon: Douglas Daniel III, MD;  Location: [facility];  Service: Endoscopy;  Laterality: N/A;    cyst removed form chest wall      HYSTERECTOMY      KIDNEY TRANSPLANT  2013    SELECTIVE INJECTION OF ANESTHETIC AGENT AROUND LUMBAR SPINAL NERVE ROOT BY TRANSFORAMINAL APPROACH Bilateral 2021    Procedure: Bilateral L5/S1 TF SERENITY;  Surgeon: Rafita Muniz  MD;  Location: [facility];  Service: Pain Management;  Laterality: Bilateral;    SKIN GRAFT      revision    THYROIDECTOMY      vascular access surgeries      multiple. last time 2 weeks ago     Family History   Problem Relation Name Age of Onset    Diabetes Mother      Kidney disease Mother      Hyperlipidemia Mother      Hypertension Mother      Heart disease Mother      Arthritis Mother      Hypertension Father      Stroke Father      Hypertension Sister 2     Arthritis Sister 2     Asthma Sister 2     Heart disease Sister 2         heart attack     Patient Active Problem List   Diagnosis    Avascular necrosis of bone of right hip    Acquired hypothyroidism    Osteoporosis without pathological fracture    Depression, recurrent    Anxiety and depression    Vertigo    Abnormal findings on diagnostic imaging of breast    Renal cyst    -donor kidney transplant - 13    Hypertension, renal    Anemia of chronic renal failure    Secondary hyperparathyroidism of renal origin    CKD stage 3 due to type 2 diabetes mellitus    Type 2 diabetes mellitus with hyperglycemia, with long-term current use of insulin    CTS (carpal tunnel syndrome)    Cubital tunnel syndrome on right    Immunosuppressed status    Abnormal PFTs (pulmonary function tests)    Diffusion capacity of lung (dl), decreased    Age-related osteoporosis without current pathological fracture    Hypertension associated with diabetes    Headache    Sleep-related nonobstructive alveolar hypoventilation    Hyperlipidemia associated with type 2 diabetes mellitus    Allergic rhinitis    Chronic bronchitis    Insomnia due to medical condition    Primary osteoarthritis involving multiple joints    Severe obesity (BMI 35.0-39.9) with comorbidity    Hyperglycemia    Chronic pain of both knees    Unspecified inflammatory spondylopathy, lumbar region    Hypoglycemia associated with diabetes    Calcification of aorta    Requires supplemental oxygen    High  pulmonary arterial pressure    Left lower lobe pulmonary nodule    Viral illness    Pharyngitis    Fall     Review of patient's allergies indicates:   Allergen Reactions    Azithromycin Nausea And Vomiting    Adhesive Other (See Comments)     Skin tears    Nsaids (non-steroidal anti-inflammatory drug)      Kidney Transplant       Medications:  Current Outpatient Medications on File Prior to Visit   Medication Sig Dispense Refill    albuterol (PROVENTIL/VENTOLIN HFA) 90 mcg/actuation inhaler Rescue 18 g 2    albuterol-ipratropium (DUO-NEB) 2.5 mg-0.5 mg/3 mL nebulizer solution Take 3 mLs by nebulization every 6 (six) hours as needed for Wheezing (and cough). Rescue 75 mL 0    alendronate (FOSAMAX) 70 MG tablet Take 1 tablet (70 mg total) by mouth every 7 days. 4 tablet 11    atorvastatin (LIPITOR) 20 MG tablet TAKE 1 TABLET(20 MG) BY MOUTH EVERY DAY 90 tablet 3    azelastine (OPTIVAR) 0.05 % ophthalmic solution Place 1 drop into both eyes 2 (two) times daily. 6 mL 0    blood sugar diagnostic (FREESTYLE LITE STRIPS) Strp Use to check blood glucose 3 times a day. 100 strip 11    cetirizine (ZYRTEC) 10 MG tablet TAKE 1 TABLET BY MOUTH EVERY DAY 30 tablet 11    diclofenac sodium (VOLTAREN) 1 % Gel APPLY 2 Grams TOPICALLY FOUR TIMES DAILY AS NEEDED 100 g 0    fluticasone propionate (FLONASE) 50 mcg/actuation nasal spray SHAKE LIQUID AND USE 2 SPRAYS(100 MCG) IN EACH NOSTRIL EVERY DAY 48 g 1    fluticasone-umeclidin-vilanter (TRELEGY ELLIPTA) 100-62.5-25 mcg DsDv Inhale 1 puff into the lungs once daily. 60 each 1    gabapentin (NEURONTIN) 400 MG capsule Take 1 capsule (400 mg total) by mouth every morning AND 1 capsule (400 mg total) with lunch AND 1-2 capsules (400-800 mg total) every evening. 120 capsule 1    inhalation spacing device Use as directed for inhalation. 1 each 0    insulin lispro (HUMALOG KWIKPEN INSULIN) 100 unit/mL pen Inject 4-7 units before each main meal. 45 mL 3    lancets (FREESTYLE LANCETS) 28 gauge  "Misc 1 lancet by Combination route 2 (two) times daily as needed. Qid prn 400 each 2    lancets Misc 1 strip by Misc.(Non-Drug; Combo Route) route 4 (four) times daily with meals and nightly. 150 each 6    LIDOcaine (LIDODERM) 5 % Use 1-3 patches per day over recommended area. Remove & Discard patch within 12 hours or as directed by MD. 90 patch 0    mycophenolate (CELLCEPT) 250 mg Cap Take 2 capsules (500 mg total) by mouth 2 (two) times daily. 120 capsule 11    nebulizer and compressor Ewelina Use as directed 1 each 0    ondansetron (ZOFRAN-ODT) 4 MG TbDL Take 1 tablet (4 mg total) by mouth every 8 (eight) hours as needed. 15 tablet 1    oxyCODONE-acetaminophen (PERCOCET)  mg per tablet Take 1 tablet by mouth every 8 (eight) hours as needed for Pain. 90 tablet 0    oxyCODONE-acetaminophen (PERCOCET)  mg per tablet Take 1 tablet by mouth every 8 (eight) hours as needed for Pain. 90 tablet 0    pen needle, diabetic (BD RORY 2ND GEN PEN NEEDLE) 32 gauge x 5/32" Ndle USE THREE TIMES DAILY WITH INSULIN AS DIRECTED 300 each 3    polymyxin B sulf-trimethoprim (POLYTRIM) 10,000 unit- 1 mg/mL Drop Place 1 drop into both eyes every 6 (six) hours. 10 mL 0    pulse oximeter (PULSE OXIMETER) device by Apply Externally route 2 (two) times a day. Use twice daily at 8 AM and 3 PM and record the value in [facility] as directed. 1 each 0    semaglutide (OZEMPIC) 2 mg/dose (8 mg/3 mL) PnIj Inject 2 mg into the skin every 7 days. 3 mL 5    tacrolimus (PROGRAF) 1 MG Cap TAKE 3 CAPSULES IN THE MORNING, AND 3 IN THE EVENING 180 capsule 11    tiZANidine (ZANAFLEX) 4 MG tablet Take 1 tablet (4 mg total) by mouth 2 (two) times daily as needed (muscle spasms). 60 tablet 1    benzonatate (TESSALON) 100 MG capsule Take 1 capsule (100 mg total) by mouth 3 (three) times daily as needed for Cough. (Patient not taking: Reported on 6/20/2024) 40 capsule 1    levothyroxine (SYNTHROID) 150 MCG tablet Take 1 tablet (150 mcg total) by mouth " before breakfast. 90 tablet 3    NIFEdipine (PROCARDIA-XL) 30 MG (OSM) 24 hr tablet Take 1 tablet (30 mg total) by mouth 2 (two) times a day. 180 tablet 3     No current facility-administered medications on file prior to visit.       Medications have been reviewed and reconciled with patient at visit today.      Exam:  Vitals:    06/20/24 1357   BP: 118/74   Pulse: 82   Temp: 97.3 °F (36.3 °C)     Weight: 92.9 kg (204 lb 14.7 oz)   Body mass index is 33.07 kg/m².      BP Readings from Last 3 Encounters:   06/20/24 118/74   05/22/24 (!) 150/86   05/21/24 (!) 145/83     Wt Readings from Last 3 Encounters:   06/20/24 1357 92.9 kg (204 lb 14.7 oz)   05/22/24 1012 94.4 kg (208 lb 1.8 oz)   05/21/24 1045 95.1 kg (209 lb 10.5 oz)        REVIEW OF SYSTEMS  Review of Systems     Physical Exam  Vitals reviewed.   Constitutional:       General: She is not in acute distress.     Appearance: She is not ill-appearing.   HENT:      Head: Normocephalic and atraumatic.      Nose: Nose normal. No congestion.      Mouth/Throat:      Mouth: Mucous membranes are moist.      Pharynx: Oropharynx is clear.   Cardiovascular:      Rate and Rhythm: Normal rate and regular rhythm.      Heart sounds: No murmur heard.  Pulmonary:      Effort: Pulmonary effort is normal. No respiratory distress.      Breath sounds: Normal breath sounds.   Abdominal:      Palpations: Abdomen is soft.      Tenderness: There is no abdominal tenderness.   Musculoskeletal:         General: Normal range of motion.      Cervical back: Normal range of motion and neck supple.      Right lower leg: No edema.      Left lower leg: No edema.   Lymphadenopathy:      Cervical: No cervical adenopathy.   Skin:     General: Skin is warm and dry.   Neurological:      Mental Status: She is alert and oriented to person, place, and time. Mental status is at baseline.   Psychiatric:         Mood and Affect: Mood normal.         Thought Content: Thought content normal.           Laboratory Reviewed:     Lab Results   Component Value Date    WBC 13.08 (H) 11/06/2023    HGB 10.9 (L) 11/06/2023    HCT 36.4 (L) 11/06/2023     11/06/2023    CHOL 135 11/02/2023    TRIG 56 11/02/2023    HDL 51 11/02/2023    ALT 13 02/22/2024    AST 14 02/22/2024     02/22/2024    K 4.1 02/22/2024     02/22/2024    CREATININE 1.2 02/22/2024    BUN 16 02/22/2024    CO2 27 02/22/2024    TSH 1.092 06/06/2023    INR 1.1 01/04/2021    HGBA1C 6.7 (H) 05/13/2024       Screening or Prevention Patient's value Goal Complete/Controlled?   HgA1C Testing and Control   Lab Results   Component Value Date    HGBA1C 6.7 (H) 05/13/2024      Annually/Less than 8% Yes   Lipid profile : 11/02/2023 Annually Yes   LDL control Lab Results   Component Value Date    LDLCALC 72.8 11/02/2023    Annually/Less than 100 mg/dl  Yes   Nephropathy screening Lab Results   Component Value Date    LABMICR 81.0 08/08/2023     Lab Results   Component Value Date    PROTEINUA 1+ (A) 04/11/2024    Annually Yes   Blood pressure BP Readings from Last 1 Encounters:   06/20/24 118/74    Less than 140/90 Yes   Dilated retinal exam : 01/12/2024 Annually Yes   Foot exam   : 05/21/2024 Annually Yes       Health Maintenance  Health Maintenance Topics with due status: Not Due       Topic Last Completion Date    TETANUS VACCINE 06/29/2015    Colorectal Cancer Screening 03/09/2016    Diabetes Urine Screening 08/08/2023    Lipid Panel 11/02/2023    Eye Exam 01/12/2024    Hemoglobin A1c 05/13/2024    Foot Exam 05/21/2024     Health Maintenance Due   Topic Date Due    Shingles Vaccine (1 of 2) Never done    RSV Vaccine (Age 60+ and Pregnant patients) (1 - 1-dose 60+ series) Never done    COVID-19 Vaccine (4 - 2023-24 season) 09/01/2023    Mammogram  08/08/2024               -Patient's lab results were reviewed and discussed with patient  -Treatment options and alternatives were discussed with the patient. Patient expressed understanding. Patient  was given the opportunity to ask questions and be an active participant in their medical care. Patient had no further questions or concerns at this time.         Future Appointments   Date Time Provider Department [place]   8/9/2024  9:00 AM Karine Olmos MD [facility]   11/21/2024 11:00 AM Sabi Bills NP [facility] [place]          After visit summary printed and given to patient upon discharge.  Patient goals and care plan are included in After visit summary.      The following issues were discussed: The primary encounter diagnosis was Hypertension associated with diabetes. Diagnoses of Type 2 diabetes mellitus with hyperglycemia, with long-term current use of insulin, CKD stage 3 due to type 2 diabetes mellitus, and Fall, initial encounter were also pertinent to this visit.    Health maintenance needs, recent test results and goals of care discussed with pt and questions answered.           FORD Gonzalez, NP-C [facility] [address] [place] [address]

## 2024-06-21 PROBLEM — J20.8 ACUTE BRONCHITIS DUE TO OTHER SPECIFIED ORGANISMS: Status: RESOLVED | Noted: 2023-10-06 | Resolved: 2024-06-21
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
## 2024-06-21 NOTE — ASSESSMENT & PLAN NOTE
Trulicity switched to Ozempic  Lab Results   Component Value Date    HGBA1C 6.7 (H) 05/13/2024

## 2024-06-24 ENCOUNTER — PATIENT MESSAGE (OUTPATIENT)
Dept: PAIN MEDICINE | Facility: CLINIC | Age: 64
End: 2024-06-24
Payer: MEDICARE

## 2024-06-24 ENCOUNTER — PATIENT MESSAGE (OUTPATIENT)
Dept: DIABETES | Facility: CLINIC | Age: 64
End: 2024-06-24
Payer: MEDICARE

## 2024-06-24 DIAGNOSIS — M51.36 DDD (DEGENERATIVE DISC DISEASE), LUMBAR: ICD-10-CM

## 2024-06-24 RX ORDER — OXYCODONE AND ACETAMINOPHEN 10; 325 MG/1; MG/1
1 TABLET ORAL EVERY 8 HOURS PRN
Qty: 90 TABLET | Refills: 0 | Status: SHIPPED | OUTPATIENT
Start: 2024-07-24

## 2024-06-24 RX ORDER — OXYCODONE AND ACETAMINOPHEN 10; 325 MG/1; MG/1
1 TABLET ORAL EVERY 8 HOURS PRN
Qty: 90 TABLET | Refills: 0 | Status: SHIPPED | OUTPATIENT
Start: 2024-06-24

## 2024-06-25 ENCOUNTER — TELEPHONE (OUTPATIENT)
Dept: DIABETES | Facility: CLINIC | Age: 64
End: 2024-06-25
Payer: MEDICARE

## 2024-06-26 NOTE — TELEPHONE ENCOUNTER
Nico 3 [facility][id number]  [facility] it reflects cancelled but the order has been processed and shipped to patient.

## 2024-07-18 ENCOUNTER — PATIENT MESSAGE (OUTPATIENT)
Dept: PRIMARY CARE CLINIC | Facility: CLINIC | Age: 64
End: 2024-07-18
Payer: MEDICARE

## 2024-07-18 DIAGNOSIS — R11.0 NAUSEA: ICD-10-CM

## 2024-07-18 DIAGNOSIS — I10 HTN (HYPERTENSION), BENIGN: ICD-10-CM

## 2024-07-18 RX ORDER — NIFEDIPINE 30 MG/1
30 TABLET, EXTENDED RELEASE ORAL 2 TIMES DAILY
Qty: 180 TABLET | Refills: 3 | Status: SHIPPED | OUTPATIENT
Start: 2024-07-18 | End: 2025-07-18

## 2024-07-18 RX ORDER — ONDANSETRON 4 MG/1
4 TABLET, ORALLY DISINTEGRATING ORAL EVERY 8 HOURS PRN
Qty: 15 TABLET | Refills: 1 | Status: SHIPPED | OUTPATIENT
Start: 2024-07-18

## 2024-07-24 ENCOUNTER — TELEPHONE (OUTPATIENT)
Dept: PAIN MEDICINE | Facility: CLINIC | Age: 64
End: 2024-07-24
Payer: MEDICARE

## 2024-07-25 ENCOUNTER — OFFICE VISIT (OUTPATIENT)
Dept: PAIN MEDICINE | Facility: CLINIC | Age: 64
End: 2024-07-25
Payer: MEDICARE

## 2024-07-25 ENCOUNTER — PATIENT MESSAGE (OUTPATIENT)
Dept: PAIN MEDICINE | Facility: CLINIC | Age: 64
End: 2024-07-25
Payer: MEDICARE

## 2024-07-25 VITALS — RESPIRATION RATE: 16 BRPM

## 2024-07-25 DIAGNOSIS — M51.36 DDD (DEGENERATIVE DISC DISEASE), LUMBAR: Primary | ICD-10-CM

## 2024-07-25 DIAGNOSIS — M47.816 LUMBAR SPONDYLOSIS: ICD-10-CM

## 2024-07-25 DIAGNOSIS — M51.36 DDD (DEGENERATIVE DISC DISEASE), LUMBAR: ICD-10-CM

## 2024-07-25 DIAGNOSIS — G89.29 CHRONIC PAIN OF BOTH KNEES: ICD-10-CM

## 2024-07-25 DIAGNOSIS — M25.562 CHRONIC PAIN OF BOTH KNEES: ICD-10-CM

## 2024-07-25 DIAGNOSIS — M79.18 MUSCLE PAIN, MYOFASCIAL: ICD-10-CM

## 2024-07-25 DIAGNOSIS — M54.16 BILATERAL LUMBAR RADICULOPATHY: ICD-10-CM

## 2024-07-25 DIAGNOSIS — M25.561 CHRONIC PAIN OF BOTH KNEES: ICD-10-CM

## 2024-07-25 DIAGNOSIS — Z79.891 OPIOID USE AGREEMENT EXISTS: ICD-10-CM

## 2024-07-25 DIAGNOSIS — M79.2 NEUROPATHIC PAIN: ICD-10-CM

## 2024-07-25 RX ORDER — OXYCODONE AND ACETAMINOPHEN 10; 325 MG/1; MG/1
1 TABLET ORAL EVERY 8 HOURS PRN
Qty: 90 TABLET | Refills: 0 | Status: SHIPPED | OUTPATIENT
Start: 2024-08-23 | End: 2024-09-22

## 2024-07-25 RX ORDER — OXYCODONE AND ACETAMINOPHEN 10; 325 MG/1; MG/1
1 TABLET ORAL EVERY 8 HOURS PRN
Qty: 90 TABLET | Refills: 0 | Status: SHIPPED | OUTPATIENT
Start: 2024-09-23

## 2024-07-25 RX ORDER — LIDOCAINE 50 MG/G
PATCH TOPICAL
Qty: 90 PATCH | Refills: 0 | Status: SHIPPED | OUTPATIENT
Start: 2024-07-25

## 2024-07-25 NOTE — PATIENT INSTRUCTIONS
"Diagnostic Medial Branch Block - Patient Information    What are facet joints?  Bones called vertebrae make up your spine. Each vertebra has facets (flat surfaces) that touch where the vertebrae fit together. These form a structure called a facet joint on each side of the vertebrae.Back or neck pain may be caused by a problem with your facet joints. If these joints are blocked or numbed, they will not be able to transfer the painful sensation to the brain.   Therefore, this procedure is completed to see if your back pain is (solely or in part) caused by the facet joints.        How is the procedure performed?  The patient lies on his/her stomach. The skin of the back or neck is cleansed with antiseptic solution and a local anesthetic in injected to numb the area. A small needle is then guided using an X-ray to the targeted facet joints which are then numbed. An anesthetic is then injected into the joint.   The injection takes about 15 minutes to complete.    Where will your procedure be performed?  The treatment is done in a hospital or surgery center. Youll be asked to fill out some forms, including a consent form. You may also be examined prior to.         Will the injection hurt?  There is some discomfort with needle insertion which we minimize by numbing the skin over the joint with a local anesthetic. You may elect to have a small amount of sedating medication to help with discomfort and to help you relax.     How long does the effect last?  The pain relief will only last a few hours/ less than 12 hours. Pain relief in the first few hours after the injection is the most important as this tells us our diagnosis of facet joint mediated pain is correct. If the "test injection" provides pain relief, we can discuss other options available for extended pain relief, such a radiofrequency ablation (RFA).    What is the next step after the injection?  Our staff will call you the next day to document pain relief " obtained after the procedure. If the pain relief is significant, our final plan would be to move forward with next part of the treatment: RFA (radiofrequency ablation), which can provide an extended pain relief from 6 months to 18 months. To note: many insurances require 2 diagnostic medial branch blocks prior to moving forward with RFA for the first time.    What are the risks and side effects?  Serious side effects and complications are rare. The most common problem after the injection is having pain in the area of the injection for a few days. The other complications are infection, bleeding and nerve injury. These complications are minimized by stopping blood thinners, using sterile technique, and fluoroscopy for xray needle guidance.    After the Procedure:  Most often, you can go home in about an hour. Our procedure center requires you to have an adult friend or relative drive you to and from the facility. The anesthetic wears off in a few hours. When it does, your back or neck may feel more sore than usual. This is normal. Take it easy for the rest of the day.     ______________________________________________________________________    If lumbar medial branch block is successful in pain relief (at least 80% pain relief short-term); for longer last relief, we can try:    Radiofrequency Ablation (RFA) - Cervical or Lumbar:      What is a facet joint pain?  The spine is made of vertebrae, which makes up the spine. The vertebrae are connected to each other with facet joints, which allows the bending and rotational spine movements. As the joints become inflamed and irritated, there is a small medial branch nerve that transmits the pain signal from the joint to the brain. Furthermore, spine pain may worsen during the extension of spine.       How does cervical/ lumbar RFA bring pain relief?  The pain is produced due to inflammation thoracic facet joint which is transmitted via medial branch nerve to the central  nervous system. By ablating the medial branch nerve via radiofrequency waves, this results in decreased neck/ back pain caused by the facet joints.     How is the cervical/ lumbar RFA performed?  After sterile preparation of the cervical/ lumbar region, the injection site is localized under X-ray. Following the local anesthetic applied to the injection site, which can help decrease the injection site pain, and then the special radiofrequency needle is guided toward the target cervical or lumbar facet joint area with the help of x-ray. After the target is reached, the area is stimulated with to rule out any cervical /  lumbar nerve root involvement. Thereafter, small amount of local anesthetic with steroid is injected for to minimize postablation procedure pain, and then the radiofrequency ablation is performed. Lastly, the needle is taken out at the end of the procedure.      What to expect after the cervical/ lumbar RFA?  This is an outpatient procedure. Patient should expect to receive full relief over 3-8 weeks. In some patients, pain may worsen slightly before improvement.    How long the relief from the cervical/ lumbar RFA would last for?  It varies from patient to patient. Usually, the relief can last from 6 months, up to 18 months.    Do the medial branch nerves ever grow back? And can the cervical/ lumbar RFA procedure repeated?  Yes, the medial branch nerves do grow back. Yes, the RFA can be repeated in order to achieve the previous pain relief.
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
## 2024-08-09 ENCOUNTER — OFFICE VISIT (OUTPATIENT)
Dept: PRIMARY CARE CLINIC | Facility: CLINIC | Age: 64
End: 2024-08-09
Payer: MEDICARE

## 2024-08-09 VITALS
HEIGHT: 66 IN | WEIGHT: 203.69 LBS | DIASTOLIC BLOOD PRESSURE: 60 MMHG | SYSTOLIC BLOOD PRESSURE: 102 MMHG | BODY MASS INDEX: 32.73 KG/M2 | TEMPERATURE: 98 F | HEART RATE: 81 BPM | OXYGEN SATURATION: 96 %

## 2024-08-09 DIAGNOSIS — E11.69 HYPERLIPIDEMIA ASSOCIATED WITH TYPE 2 DIABETES MELLITUS: Primary | ICD-10-CM

## 2024-08-09 DIAGNOSIS — I15.2 HYPERTENSION ASSOCIATED WITH DIABETES: ICD-10-CM

## 2024-08-09 DIAGNOSIS — N18.30 CKD STAGE 3 DUE TO TYPE 2 DIABETES MELLITUS: ICD-10-CM

## 2024-08-09 DIAGNOSIS — E11.65 TYPE 2 DIABETES MELLITUS WITH HYPERGLYCEMIA, WITH LONG-TERM CURRENT USE OF INSULIN: ICD-10-CM

## 2024-08-09 DIAGNOSIS — Z12.31 ENCOUNTER FOR SCREENING MAMMOGRAM FOR MALIGNANT NEOPLASM OF BREAST: ICD-10-CM

## 2024-08-09 DIAGNOSIS — E11.22 CKD STAGE 3 DUE TO TYPE 2 DIABETES MELLITUS: ICD-10-CM

## 2024-08-09 DIAGNOSIS — D84.9 IMMUNOSUPPRESSED STATUS: ICD-10-CM

## 2024-08-09 DIAGNOSIS — E78.5 HYPERLIPIDEMIA ASSOCIATED WITH TYPE 2 DIABETES MELLITUS: Primary | ICD-10-CM

## 2024-08-09 DIAGNOSIS — E03.9 ACQUIRED HYPOTHYROIDISM: ICD-10-CM

## 2024-08-09 DIAGNOSIS — N18.31 CHRONIC KIDNEY DISEASE, STAGE 3A: ICD-10-CM

## 2024-08-09 DIAGNOSIS — Z12.11 SCREEN FOR COLON CANCER: ICD-10-CM

## 2024-08-09 DIAGNOSIS — R13.10 DYSPHAGIA, UNSPECIFIED TYPE: ICD-10-CM

## 2024-08-09 DIAGNOSIS — E11.59 HYPERTENSION ASSOCIATED WITH DIABETES: ICD-10-CM

## 2024-08-09 DIAGNOSIS — M46.96 UNSPECIFIED INFLAMMATORY SPONDYLOPATHY, LUMBAR REGION: ICD-10-CM

## 2024-08-09 DIAGNOSIS — J41.0 SIMPLE CHRONIC BRONCHITIS: ICD-10-CM

## 2024-08-09 DIAGNOSIS — Z94.0 DECEASED-DONOR KIDNEY TRANSPLANT: Chronic | ICD-10-CM

## 2024-08-09 DIAGNOSIS — Z79.4 TYPE 2 DIABETES MELLITUS WITH HYPERGLYCEMIA, WITH LONG-TERM CURRENT USE OF INSULIN: ICD-10-CM

## 2024-08-09 PROCEDURE — 99999 PR PBB SHADOW E&M-EST. PATIENT-LVL IV: CPT | Mod: PBBFAC,,, | Performed by: INTERNAL MEDICINE

## 2024-08-09 RX ORDER — ATORVASTATIN CALCIUM 40 MG/1
40 TABLET, FILM COATED ORAL DAILY
Qty: 90 TABLET | Refills: 3 | Status: SHIPPED | OUTPATIENT
Start: 2024-08-09 | End: 2025-08-09

## 2024-08-19 RX ORDER — SODIUM, POTASSIUM,MAG SULFATES 17.5-3.13G
1 SOLUTION, RECONSTITUTED, ORAL ORAL DAILY
Qty: 1 KIT | Refills: 0 | Status: SHIPPED | OUTPATIENT
Start: 2024-08-19 | End: 2024-08-21

## 2024-08-20 ENCOUNTER — HOSPITAL ENCOUNTER (OUTPATIENT)
Dept: RADIOLOGY | Facility: HOSPITAL | Age: 64
Discharge: HOME | End: 2024-08-20
Attending: INTERNAL MEDICINE
Payer: MEDICARE

## 2024-08-20 ENCOUNTER — HOSPITAL ENCOUNTER (OUTPATIENT)
Dept: PREADMISSION TESTING | Facility: HOSPITAL | Age: 64
Discharge: HOME | End: 2024-08-20
Attending: INTERNAL MEDICINE
Payer: MEDICARE

## 2024-08-20 VITALS — WEIGHT: 203.69 LBS | HEIGHT: 66 IN | BODY MASS INDEX: 32.73 KG/M2

## 2024-08-20 DIAGNOSIS — R13.10 DYSPHAGIA, UNSPECIFIED TYPE: ICD-10-CM

## 2024-08-20 DIAGNOSIS — Z12.31 ENCOUNTER FOR SCREENING MAMMOGRAM FOR MALIGNANT NEOPLASM OF BREAST: ICD-10-CM

## 2024-08-20 PROCEDURE — 77063 BREAST TOMOSYNTHESIS BI: CPT | Mod: 26,,, | Performed by: RADIOLOGY

## 2024-08-20 PROCEDURE — 77067 SCR MAMMO BI INCL CAD: CPT | Mod: TC

## 2024-08-20 PROCEDURE — 77067 SCR MAMMO BI INCL CAD: CPT | Mod: 26,,, | Performed by: RADIOLOGY

## 2024-08-29 DIAGNOSIS — M51.36 DDD (DEGENERATIVE DISC DISEASE), LUMBAR: ICD-10-CM

## 2024-08-29 RX ORDER — OXYCODONE AND ACETAMINOPHEN 10; 325 MG/1; MG/1
1 TABLET ORAL EVERY 8 HOURS PRN
Qty: 90 TABLET | Refills: 0 | Status: SHIPPED | OUTPATIENT
Start: 2024-08-29

## 2024-09-16 ENCOUNTER — ANESTHESIA EVENT (OUTPATIENT)
Dept: ENDOSCOPY | Facility: HOSPITAL | Age: 64
End: 2024-09-16
Payer: MEDICARE

## 2024-09-16 NOTE — ANESTHESIA PREPROCEDURE EVALUATION
2024  Ana Maria Teague is a 63 y.o., female.  Patient Active Problem List   Diagnosis    Avascular necrosis of bone of right hip    Acquired hypothyroidism    Osteoporosis without pathological fracture    Depression, recurrent    Anxiety and depression    Vertigo    Abnormal findings on diagnostic imaging of breast    Renal cyst    -donor kidney transplant - 12    Hypertension, renal    Anemia of chronic renal failure    Secondary hyperparathyroidism of renal origin    CKD stage 3 due to type 2 diabetes mellitus    Type 2 diabetes mellitus with hyperglycemia, with long-term current use of insulin    CTS (carpal tunnel syndrome)    Cubital tunnel syndrome on right    Chronic kidney disease, stage 3a    Immunosuppressed status    Abnormal PFTs (pulmonary function tests)    Diffusion capacity of lung (dl), decreased    Age-related osteoporosis without current pathological fracture    Hypertension associated with diabetes    Headache    Sleep-related nonobstructive alveolar hypoventilation    Hyperlipidemia associated with type 2 diabetes mellitus    Allergic rhinitis    Chronic bronchitis    Insomnia due to medical condition    Primary osteoarthritis involving multiple joints    Severe obesity (BMI 35.0-39.9) with comorbidity    Hyperglycemia    Chronic pain of both knees    Unspecified inflammatory spondylopathy, lumbar region    Hypoglycemia associated with diabetes    Calcification of aorta    Requires supplemental oxygen    High pulmonary arterial pressure    Left lower lobe pulmonary nodule    Viral illness    Pharyngitis    Fall           Pre-op Assessment    I have reviewed the Patient Summary Reports.     I have reviewed the Nursing Notes. I have reviewed the NPO Status.   I have reviewed the Medications.     Review of Systems  Anesthesia Hx:  No problems with previous Anesthesia   History  of prior surgery of interest to airway management or planning:          Denies Family Hx of Anesthesia complications.    Denies Personal Hx of Anesthesia complications.                    Social:  Former Smoker       Hematology/Oncology:       -- Anemia:                                  Cardiovascular:     Hypertension           hyperlipidemia                             Pulmonary:         Chronic bronchitis               Renal/:  Chronic Renal Disease, ESRD   S/p kidney transplant 2013             Hepatic/GI:  Bowel Prep.      Dysphagia           Musculoskeletal:  Arthritis               Neurological:      Headaches                                 Endocrine:  Diabetes, type 2 Hypothyroidism          Psych:  Psychiatric History anxiety depression                Physical Exam  General: Well nourished, Cooperative, Alert and Oriented    Airway:  Mallampati: III   Mouth Opening: Normal  TM Distance: Normal  Tongue: Normal  Neck ROM: Normal ROM    Dental:  Intact        Anesthesia Plan  Type of Anesthesia, risks & benefits discussed:    Anesthesia Type: Gen Natural Airway  Intra-op Monitoring Plan: Standard ASA Monitors  Post Op Pain Control Plan: multimodal analgesia  Induction:  IV  Informed Consent: Informed consent signed with the Patient and all parties understand the risks and agree with anesthesia plan.  All questions answered. Patient consented to blood products? No  ASA Score: 3  Day of Surgery Review of History & Physical: H&P Update referred to the surgeon/provider.    Ready For Surgery From Anesthesia Perspective.     .

## 2024-09-18 ENCOUNTER — HOSPITAL ENCOUNTER (OUTPATIENT)
Facility: HOSPITAL | Age: 64
Discharge: HOME | End: 2024-09-18
Attending: INTERNAL MEDICINE | Admitting: INTERNAL MEDICINE
Payer: MEDICARE

## 2024-09-18 ENCOUNTER — ANESTHESIA (OUTPATIENT)
Dept: ENDOSCOPY | Facility: HOSPITAL | Age: 64
End: 2024-09-18
Payer: MEDICARE

## 2024-09-18 VITALS
DIASTOLIC BLOOD PRESSURE: 85 MMHG | RESPIRATION RATE: 16 BRPM | SYSTOLIC BLOOD PRESSURE: 169 MMHG | TEMPERATURE: 97 F | OXYGEN SATURATION: 100 % | HEART RATE: 83 BPM | BODY MASS INDEX: 32.81 KG/M2 | WEIGHT: 203.25 LBS

## 2024-09-18 DIAGNOSIS — Z12.11 COLON CANCER SCREENING: Primary | ICD-10-CM

## 2024-09-18 LAB — POCT GLUCOSE: 144 MG/DL (ref 70–110)

## 2024-09-18 PROCEDURE — 27201012 HC FORCEPS, HOT/COLD, DISP: Performed by: INTERNAL MEDICINE

## 2024-09-18 PROCEDURE — 37000008 HC ANESTHESIA 1ST 15 MINUTES: Performed by: INTERNAL MEDICINE

## 2024-09-18 PROCEDURE — 43249 ESOPH EGD DILATION <30 MM: CPT | Mod: 51,,, | Performed by: INTERNAL MEDICINE

## 2024-09-18 PROCEDURE — 88305 TISSUE EXAM BY PATHOLOGIST: CPT | Performed by: STUDENT IN AN ORGANIZED HEALTH CARE EDUCATION/TRAINING PROGRAM

## 2024-09-18 PROCEDURE — 37000009 HC ANESTHESIA EA ADD 15 MINS: Performed by: INTERNAL MEDICINE

## 2024-09-18 PROCEDURE — 63600175 PHARM REV CODE 636 W HCPCS: Performed by: NURSE ANESTHETIST, CERTIFIED REGISTERED

## 2024-09-18 PROCEDURE — 25000003 PHARM REV CODE 250: Performed by: INTERNAL MEDICINE

## 2024-09-18 PROCEDURE — 45380 COLONOSCOPY AND BIOPSY: CPT | Mod: PT,,, | Performed by: INTERNAL MEDICINE

## 2024-09-18 PROCEDURE — 45380 COLONOSCOPY AND BIOPSY: CPT | Mod: PT | Performed by: INTERNAL MEDICINE

## 2024-09-18 PROCEDURE — C1726 CATH, BAL DIL, NON-VASCULAR: HCPCS | Performed by: INTERNAL MEDICINE

## 2024-09-18 PROCEDURE — 43249 ESOPH EGD DILATION <30 MM: CPT | Performed by: INTERNAL MEDICINE

## 2024-09-18 PROCEDURE — 25000003 PHARM REV CODE 250: Performed by: NURSE ANESTHETIST, CERTIFIED REGISTERED

## 2024-09-18 RX ORDER — LIDOCAINE HYDROCHLORIDE 20 MG/ML
INJECTION INTRAVENOUS
Status: DISCONTINUED | OUTPATIENT
Start: 2024-09-18 | End: 2024-09-18

## 2024-09-18 RX ORDER — PROPOFOL 10 MG/ML
VIAL (ML) INTRAVENOUS
Status: DISCONTINUED | OUTPATIENT
Start: 2024-09-18 | End: 2024-09-18

## 2024-09-18 RX ORDER — SODIUM CHLORIDE, SODIUM LACTATE, POTASSIUM CHLORIDE, CALCIUM CHLORIDE 600; 310; 30; 20 MG/100ML; MG/100ML; MG/100ML; MG/100ML
INJECTION, SOLUTION INTRAVENOUS CONTINUOUS
Status: ACTIVE | OUTPATIENT
Start: 2024-09-18

## 2024-09-18 RX ORDER — OMEPRAZOLE 40 MG/1
40 CAPSULE, DELAYED RELEASE ORAL
Qty: 180 CAPSULE | Refills: 3 | Status: SHIPPED | OUTPATIENT
Start: 2024-09-18 | End: 2025-09-18

## 2024-09-18 RX ORDER — DEXTROMETHORPHAN/PSEUDOEPHED 2.5-7.5/.8
DROPS ORAL
Status: DISCONTINUED | OUTPATIENT
Start: 2024-09-18 | End: 2024-09-18 | Stop reason: HOSPADM

## 2024-09-18 RX ADMIN — PROPOFOL 50 MG: 10 INJECTION, EMULSION INTRAVENOUS at 09:09

## 2024-09-18 RX ADMIN — PROPOFOL 50 MG: 10 INJECTION, EMULSION INTRAVENOUS at 10:09

## 2024-09-18 RX ADMIN — SODIUM CHLORIDE, POTASSIUM CHLORIDE, SODIUM LACTATE AND CALCIUM CHLORIDE: 600; 310; 30; 20 INJECTION, SOLUTION INTRAVENOUS at 09:09

## 2024-09-18 RX ADMIN — PROPOFOL 20 MG: 10 INJECTION, EMULSION INTRAVENOUS at 10:09

## 2024-09-18 RX ADMIN — GLYCOPYRROLATE 0.2 MG: 0.2 INJECTION, SOLUTION INTRAMUSCULAR; INTRAVITREAL at 09:09

## 2024-09-18 RX ADMIN — LIDOCAINE HYDROCHLORIDE 50 MG: 20 INJECTION INTRAVENOUS at 09:09

## 2024-09-18 NOTE — TRANSFER OF CARE
Anesthesia Transfer of Care Note    Patient: Ana Maria Teague    Procedure(s) Performed: Procedure(s) (LRB):  EGD (ESOPHAGOGASTRODUODENOSCOPY) (N/A)  COLONOSCOPY (N/A)    Patient location: PACU    Anesthesia Type: general    Transport from OR: Transported from OR on room air with adequate spontaneous ventilation    Post pain: adequate analgesia    Post assessment: no apparent anesthetic complications    Post vital signs: stable    Level of consciousness: awake    Nausea/Vomiting: no nausea/vomiting    Complications: none    Transfer of care protocol was followed      Last vitals: Visit Vitals  BP (!) 149/86 (BP Location: Right arm, Patient Position: Sitting)   Pulse 82   Temp 36.2 °C (97.2 °F) (Temporal)   Resp 16   Wt 92.2 kg (203 lb 4.2 oz)   SpO2 98%   Breastfeeding No   BMI 32.81 kg/m²

## 2024-09-18 NOTE — ANESTHESIA POSTPROCEDURE EVALUATION
Anesthesia Post Evaluation    Patient: Ana Maria Teague    Procedure(s) Performed: Procedure(s) (LRB):  EGD (ESOPHAGOGASTRODUODENOSCOPY) (N/A)  COLONOSCOPY (N/A)    Final Anesthesia Type: general      Patient location during evaluation: PACU  Patient participation: Yes- Able to Participate  Level of consciousness: awake  Post-procedure vital signs: reviewed and stable  Pain management: adequate  Airway patency: patent    PONV status at discharge: No PONV  Anesthetic complications: no      Cardiovascular status: stable  Respiratory status: unassisted  Hydration status: euvolemic  Follow-up not needed.              Vitals Value Taken Time   /90 09/18/24 1038   Temp 36.3 °C (97.4 °F) 09/18/24 1018   Pulse 83 09/18/24 1038   Resp 16 09/18/24 1038   SpO2 100 % 09/18/24 1038         No case tracking events are documented in the log.      Pain/Esme Score: Esme Score: 10 (9/18/2024 10:38 AM)

## 2024-09-18 NOTE — PROVATION PATIENT INSTRUCTIONS
Discharge Summary/Instructions after an Endoscopic Procedure  Patient Name: Ana Maria Teague  Patient MRN: [id number]  Patient YOB: 1960  Wednesday, September 18, 2024  Lisa Nevarez MD  Dear patient,  As a result of recent federal legislation (The Federal Cures Act), you may   receive lab or pathology results from your procedure in your [facility]   account before your physician is able to contact you. Your physician or   their representative will relay the results to you with their   recommendations at their soonest availability.  Thank you,  RESTRICTIONS:  During your procedure today, you received medications for sedation.  These   medications may affect your judgment, balance and coordination.  Therefore,   for 24 hours, you have the following restrictions:   - DO NOT drive a car, operate machinery, make legal/financial decisions,   sign important papers or drink alcohol.    ACTIVITY:  Today: no heavy lifting, straining or running due to procedural   sedation/anesthesia.  The following day: return to full activity including work.  DIET:  Eat and drink normally unless instructed otherwise.     TREATMENT FOR COMMON SIDE EFFECTS:  - Mild abdominal pain, nausea, belching, bloating or excessive gas:  rest,   eat lightly and use a heating pad.  - Sore Throat: treat with throat lozenges and/or gargle with warm salt   water.  - Because air was used during the procedure, expelling large amounts of air   from your rectum or belching is normal.  - If a bowel prep was taken, you may not have a bowel movement for 1-3 days.    This is normal.  SYMPTOMS TO WATCH FOR AND REPORT TO YOUR PHYSICIAN:  1. Abdominal pain or bloating, other than gas cramps.  2. Chest pain.  3. Back pain.  4. Signs of infection such as: chills or fever occurring within 24 hours   after the procedure.  5. Rectal bleeding, which would show as bright red, maroon, or black stools.   (A tablespoon of blood from the rectum is not serious,  especially if   hemorrhoids are present.)  6. Vomiting.  7. Weakness or dizziness.  GO DIRECTLY TO THE NEAREST EMERGENCY ROOM IF YOU HAVE ANY OF THE FOLLOWING:      Difficulty breathing              Chills and/or fever over 101 F   Persistent vomiting and/or vomiting blood   Severe abdominal pain   Severe chest pain   Black, tarry stools   Bleeding- more than one tablespoon   Any other symptom or condition that you feel may need urgent attention  Your doctor recommends these additional instructions:  If any biopsies were taken, your doctors clinic will contact you in 1 to 2   weeks with any results.  - Discharge patient to home.   - Resume previous diet.   - Continue present medications.   - Use Prilosec (omeprazole) 40 mg PO BID for 2 months.   - Repeat upper endoscopy in 2 months to evaluate the response to therapy.   - Return to referring physician.  For questions, problems or results please call your physician Lisa Nevarze MD at Work:  [phone]  If you have any questions about the above instructions, call the GI   department at [phone] or call the endoscopy unit at [phone]   from 7am until 3 pm.  [facility], EMERGENCY ROOM PHONE NUMBER:   [phone]  IF A COMPLICATION OR EMERGENCY SITUATION ARISES AND YOU ARE UNABLE TO REACH   YOUR PHYSICIAN - GO DIRECTLY TO THE EMERGENCY ROOM.  I have read or have had read to me these discharge instructions for my   procedure and have received a written copy.  I understand these   instructions and will follow-up with my physician if I have any questions.     __________________________________       _____________________________________  Nurse Signature                                          Patient/Designated   Responsible Party Signature  Lisa Nevarez MD  9/18/2024 10:18:47 AM  This report has been verified and signed electronically.  Dear patient,  As a result of recent federal legislation (The Federal Cures  Act), you may   receive lab or pathology results from your procedure in your [facility]   account before your physician is able to contact you. Your physician or   their representative will relay the results to you with their   recommendations at their soonest availability.  Thank you,  PROVATION

## 2024-09-18 NOTE — PROVATION PATIENT INSTRUCTIONS
Discharge Summary/Instructions after an Endoscopic Procedure  Patient Name: Ana Maria Teague  Patient MRN: [id number]  Patient YOB: 1960  Wednesday, September 18, 2024  Lisa Nevarez MD  Dear patient,  As a result of recent federal legislation (The Federal Cures Act), you may   receive lab or pathology results from your procedure in your [facility]   account before your physician is able to contact you. Your physician or   their representative will relay the results to you with their   recommendations at their soonest availability.  Thank you,  RESTRICTIONS:  During your procedure today, you received medications for sedation.  These   medications may affect your judgment, balance and coordination.  Therefore,   for 24 hours, you have the following restrictions:   - DO NOT drive a car, operate machinery, make legal/financial decisions,   sign important papers or drink alcohol.    ACTIVITY:  Today: no heavy lifting, straining or running due to procedural   sedation/anesthesia.  The following day: return to full activity including work.  DIET:  Eat and drink normally unless instructed otherwise.     TREATMENT FOR COMMON SIDE EFFECTS:  - Mild abdominal pain, nausea, belching, bloating or excessive gas:  rest,   eat lightly and use a heating pad.  - Sore Throat: treat with throat lozenges and/or gargle with warm salt   water.  - Because air was used during the procedure, expelling large amounts of air   from your rectum or belching is normal.  - If a bowel prep was taken, you may not have a bowel movement for 1-3 days.    This is normal.  SYMPTOMS TO WATCH FOR AND REPORT TO YOUR PHYSICIAN:  1. Abdominal pain or bloating, other than gas cramps.  2. Chest pain.  3. Back pain.  4. Signs of infection such as: chills or fever occurring within 24 hours   after the procedure.  5. Rectal bleeding, which would show as bright red, maroon, or black stools.   (A tablespoon of blood from the rectum is not serious,  especially if   hemorrhoids are present.)  6. Vomiting.  7. Weakness or dizziness.  GO DIRECTLY TO THE NEAREST EMERGENCY ROOM IF YOU HAVE ANY OF THE FOLLOWING:      Difficulty breathing              Chills and/or fever over 101 F   Persistent vomiting and/or vomiting blood   Severe abdominal pain   Severe chest pain   Black, tarry stools   Bleeding- more than one tablespoon   Any other symptom or condition that you feel may need urgent attention  Your doctor recommends these additional instructions:  If any biopsies were taken, your doctors clinic will contact you in 1 to 2   weeks with any results.  - Discharge patient to home.   - Resume previous diet.   - Continue present medications.   - Await pathology results.   - Repeat colonoscopy in 5 years for surveillance.   - Return to referring physician.   - Refer to a colo-rectal surgeon.   - Patient has a contact number available for emergencies.  The signs and   symptoms of potential delayed complications were discussed with the   patient.  Return to normal activities tomorrow.  Written discharge   instructions were provided to the patient.  For questions, problems or results please call your physician Lisa Nevarez MD at Work:  [phone]  If you have any questions about the above instructions, call the GI   department at [phone] or call the endoscopy unit at [phone]   from 7am until 3 pm.  [facility], EMERGENCY ROOM PHONE NUMBER:   [phone]  IF A COMPLICATION OR EMERGENCY SITUATION ARISES AND YOU ARE UNABLE TO REACH   YOUR PHYSICIAN - GO DIRECTLY TO THE EMERGENCY ROOM.  I have read or have had read to me these discharge instructions for my   procedure and have received a written copy.  I understand these   instructions and will follow-up with my physician if I have any questions.     __________________________________       _____________________________________  Nurse Signature                                           Patient/Designated   Responsible Party Signature  Lisa Nevarez MD  9/18/2024 10:17:03 AM  This report has been verified and signed electronically.  Dear patient,  As a result of recent federal legislation (The Federal Cures Act), you may   receive lab or pathology results from your procedure in your [facility]   account before your physician is able to contact you. Your physician or   their representative will relay the results to you with their   recommendations at their soonest availability.  Thank you,  PROVATION

## 2024-09-18 NOTE — PLAN OF CARE
D/C instructions reviewed with pt. Verbalized understanding. Pt. Tolerating liquids. VS stable. D/C home with ride. Dr. Nevarez spoke to pt.

## 2024-09-18 NOTE — LETTER
Ana Maria Teauge  [address]  [place] [address]      [facility] Instructions for EGD/Colonoscopy    Date of procedure:09/18/2024  Your arrival time will be given to you prior to the day of your procedure.    Your procedure will be performed at [facility]. [facility] [address], [place] [address].      As soon as possible:     your prep from pharmacy and over the counter DULCOLAX LAXATIVE TABLETS, GAS-X.    Three (3) days before colonoscopy: Avoid high fiber foods such as whole wheat or whole grain breads or pasta, raw vegetables or fruit with peels, corn, beans, peas, lentils, nuts, seeds, and popcorn.    On the day before your procedure     What You CAN do:     You may have CLEAR LIQUIDS ONLY (Drink at least 16 glasses (8oz glass)-   see below for list.   Liquids That Are OK to Drink:    Water    Sports drinks (Gatorade, Power-Aid)    Coffee or tea (no cream or nondairy creamer)    Clear juices without pulp (apple, white grape)    Gelatin desserts (no fruit or toppings)    Clear soda (sprite, coke, ginger ale)    Chicken broth (until 12 midnight the night before procedure)      What You CANNOT do:      Do not EAT solid food, drink milk or anything colored red or purple.    Do not drink alcohol.    Do not take oral medications within 1 hour of starting each dose of prep.    No tobacco products, gum chewing, or candy morning of procedure     PLEASE DISREGARD THE INSERT INSTRUCTIONS FROM THE PHARMACY.  How to take prep:  DOSE 1-Day Before Colonoscopy  12:00 pm (NOON) Take four (4) Dulcolax (Bisacodyl) Laxative tablets and 1 simethicone (GAS-X) 125 mg capsule with at least 8 ounces or more of clear liquids.  3:00 pm Drink one bottle of Magnesium Citrate Oral Saline Laxative Solution 10 oz.  6:00 pm Pour one 6 oz. bottle of prep solution into the mixing container. Add cool water to reach the 16-oz. fill line.  Mix well. Drink ALL the contents in  the mixing container. Drink 2 more 16 oz. containers of water. Finish the prep mixture and the 2 glasses of water within 1 hour and 30 minutes. Drink a minimum of four (8 oz) glasses of clear liquids before midnight to stay hydrated.  Mix the prep with Crystal Light (no red or purple flavoring), apple juice, or Gatorade (no red or purple) to improve the flavor.   After midnight, nothing to drink or eat except for the bowel prep.     DOSE 2-Day of the Colonoscopy     [place] the time you were instructed: 2:00 AM     or     5:00AM    For this dose, REPEAT using the second 6 oz prep solution and mixing container.    After finishing prep, do not drink or eat anything until after the colonoscopy.   You may have a small sip of water with your morning medications. If your stool is brown, orange, or cloudy, your colon is not clean, please call endoscopy staff at [phone].    Endoscopy Scheduling Department at [phone].  Endoscopy Department at [phone].   On-Call Nurse line at [phone].  Financial Services at [phone].  Frequently Asked Questions- Colonoscopy  What are some tips for preparing for a colonoscopy?  Stay near a toilet after taking the prep, you will have diarrhea and may have cramping with your bowel movements.  For skin irritation or flaring of hemorrhoids from frequent bowel movements and wiping, ease with over-the-counter products like baby wipes, Vaseline, or Tucks pads.  If experiencing nausea from the prep, take a 30-minute break, rinse your mouth, and continue drinking the prep. Dramamine (Meclizine) is available over the counter, take ½ of a tablet (12.5 mg) every 6 hours as needed for nausea. Do not take more than 50 mg in 24 hours.   If a long drive or need a change to the prep direction times, please call the endoscopy department to talk with our staff at [phone].  Females between the ages of 10-55 may be asked for a urine sample (pregnancy test) after you check  in for your procedure. Please let staff know before going to the restroom.  Coumadin (WARFARIN), Plavix (CLOPIDOGREL), and Effient (PRASUGREL) MUST be stopped 5 days prior to exam unless discussed with the doctor performing the test. Eliquis (APIXABAN), Savaysa (EDOXABAN), Arixtra (FONDAPARINUX), and Xarelto (RIVAROXABAN) MUST be stopped 2 days prior to exam unless discussed with the doctor performing the test.  Glucagon-like peptide-1 receptor agonists (GLP-1 [place]) medications (semaglutide -OZEMPIC, RYBELSUS, WEGOVY; Dulaglutide- TRULICITY; Liraglutide- SAXENDA; tirzepatide- MOUNJARO) for weight loss or diabetes, MUST be stopped 7 days prior to exam unless discussed with the doctor performing the test.  Weight loss medications such as Phentermine (Adipex/Lomaira) and Diethylpropion (Amfepraone/Tepanil/Tenuate) should be stopped 7 days prior to exam.  You must have a licensed  to bring you home. If using public transportation, you must have an adult come with you.  Do not take any diabetic medications (including insulin) the morning of the exam. If your blood sugar goes below 70, you may drink 2 ounces of clear juice. Wait 15 minutes, then recheck your blood sugar. If it isn't going up, you may drink another 2 ounces of clear juice and contact the On-Call Nurse line at [phone].   Continue to take blood pressure, heart, thyroid, lung, seizure, and psychological medications the morning of procedure.    Do I have to drink all the bowel prep and water?         Yes, in addition to drinking plenty of clear liquids. Drinking plenty of clear liquids helps the prep to work and keep you hydrated. Any clear liquid that isn't red or purple. Drink at least 12 (8 oz) glasses of clear liquids or three 32 oz Gatorades by 5PM the day before your procedure. Drink   at least 1 (8 oz) glass of liquid every hour while you're awake. After the first dose of prep, drink an additional four (8 oz) glasses of clear liquids before  "midnight.  Clear liquids include: Coffee (no cream/milk)  Water  Tea  Clear carbonated drinks (ginger ale, Sprite, or sparkling water)  Gelatin/Jello  Apple, White grape, White Cranberry Juice  Beef/chicken broth/bouillon  Gatorade/Powerade  Lemonade/limeade  Snowballs/slushes  Popsicles  No "Energy" beverages or alcohol. No juices with pulp.  Do not drink red or purple liquids because the dye will stain the walls of your colon and may appear to be a bleed/abnormality.        The first dose of the prep starts to clean out the stool from your colon. The second dose of the prep helps to fully clean out the colon before the procedure.    What are some ways to improve the taste of the prep?  Put the prep solution in the refrigerator to chill before beginning the prep, tastes best cold.  Drinking the prep with a straw.    How will I know that the bowel prep is working? Why is it important to have a good prep or a clean colon before the procedure?  bowel movements are clear or clear yellow.   Colon should be clean as possible so the doctor can see the walls of the colon and find tiny polyps or colon cancer.  If there is stool in the colon, the doctor cannot move the endoscopy scope through the entire colon and the procedure will be canceled.  If a history of poor bowel prep, constipation, opiate medication use, diabetes, or kidney disease, please let us know; you may require an extended bowel prep to clean your colon.  If brown (including dark orange and cloudy) bowel movements on the morning of your procedure, please call the endoscopy department at [phone] (M-F from 6AM-3PM).      What should I bring to my appointment?  -List of your current medications                                              -Clothing that is easy to put back on after the procedure        -Method of payment        -Your ID         - Insurance card     Leave jewelry and other valuables at home.   Please plan to be at the hospital for 3 - 4 " hours.    Why doesn't my appointment time show up in [facility] or [facility]?  [facility] and [facility] are not set up to show surgical times. You will be called the day before the procedure and told your arrival time.    Where is the Endoscopy department located?    [facility], is the 2-story surgery center on the left.  [facility] [address] [place] [address]. Many GPS systems are NOT providing accurate instructions, take [address], from either direction, to [address] and proceed to the eastbound service road, turning between the [facility] and [facility]. Once at the complex, look for signs directing you to Hospital/Surgery. Check in for your procedure at  for Hospital/Surgery.

## 2024-09-18 NOTE — H&P
PRE PROCEDURE H&P    Patient Name: Ana Maria Teague  MRN: [id number]  : 1960  Date of Procedure:  2024  Referring Physician: Karine Olmos MD  Primary Physician: Karine Olmos MD  Procedure Physician: Lisa Nevarez MD       Planned Procedure: Colonoscopy and EGD  Diagnosis: dysphagia and colon cancer screening  Chief Complaint: Same as above    HPI: Patient is an 63 y.o. female is here for the above.     Last colonoscopy:   Family history: None  Anticoagulation: None    Past Medical History:   Past Medical History:   Diagnosis Date    Abnormal glucose 2013    Acquired hypothyroidism     Acute bronchiolitis 10/15/2014    Anemia     Anemia of chronic renal failure 2013    Anxiety     Anxiety disorder     Avascular necrosis of bone of hip     Back pain     s/p steroid injections    Bacteremia due to Escherichia coli 2021    Chronic immunosuppression with Prograf and Cellcept 2013    CKD (chronic kidney disease) stage 2, GFR 60-89 ml/min     CKD (chronic kidney disease) stage 5, GFR less than 15 ml/min     -donor kidney transplant - 13    Depression     Hypertension, renal 2013    Hypothyroidism     Immunosuppressed status 10/15/2014    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 7 years ago.     SECTION      COLONOSCOPY N/A 3/9/2016    Procedure: COLONOSCOPY;  Surgeon: Douglas Daniel III, MD;  Location: [facility];  Service: Endoscopy;  Laterality: N/A;    cyst removed form chest wall      HYSTERECTOMY      KIDNEY TRANSPLANT  2013    SELECTIVE INJECTION OF ANESTHETIC AGENT AROUND LUMBAR SPINAL NERVE ROOT BY  TRANSFORAMINAL APPROACH Bilateral 11/16/2021    Procedure: Bilateral L5/S1 TF SERENITY;  Surgeon: Rafita Muniz MD;  Location: [facility];  Service: Pain Management;  Laterality: Bilateral;    SKIN GRAFT      revision    THYROIDECTOMY      vascular access surgeries      multiple. last time 2 weeks ago        Home Medications:  Prior to Admission medications    Medication Sig Start Date End Date Taking? Authorizing Provider   atorvastatin (LIPITOR) 40 MG tablet Take 1 tablet (40 mg total) by mouth once daily. 8/9/24 8/9/25 Yes Karine Olmos MD   azelastine (OPTIVAR) 0.05 % ophthalmic solution Place 1 drop into both eyes 2 (two) times daily. 12/21/23 12/20/24 Yes Sondra Teague NP   cetirizine (ZYRTEC) 10 MG tablet TAKE 1 TABLET BY MOUTH EVERY DAY 11/28/21  Yes Karine Olmos MD   fluticasone propionate (FLONASE) 50 mcg/actuation nasal spray SHAKE LIQUID AND USE 2 SPRAYS(100 MCG) IN EACH NOSTRIL EVERY DAY 11/26/22  Yes Karine Olmos MD   gabapentin (NEURONTIN) 400 MG capsule Take 1 capsule (400 mg total) by mouth every morning AND 1 capsule (400 mg total) with lunch AND 1-2 capsules (400-800 mg total) every evening. 2/22/24  Yes Oliver Pedro MD   insulin lispro (HUMALOG KWIKPEN INSULIN) 100 unit/mL pen Inject 4-7 units before each main meal. 5/21/24  Yes Sabi Bills NP   mycophenolate (CELLCEPT) 250 mg Cap Take 2 capsules (500 mg total) by mouth 2 (two) times daily. 9/6/23  Yes Jovanna Pugh MD   NIFEdipine (PROCARDIA-XL) 30 MG (OSM) 24 hr tablet Take 1 tablet (30 mg total) by mouth 2 (two) times a day. 7/18/24 7/18/25 Yes Karine Olmos MD   tacrolimus (PROGRAF) 1 MG Cap TAKE 3 CAPSULES IN THE MORNING, AND 3 IN THE EVENING 9/6/23  Yes Jovanna Pugh MD   tiZANidine (ZANAFLEX) 4 MG tablet Take 1 tablet (4 mg total) by mouth 2 (two) times daily as needed (muscle spasms). 5/22/24  Yes Marcella Haas, VENUS   albuterol (PROVENTIL/VENTOLIN HFA) 90 mcg/actuation inhaler Rescue 11/7/23    Anne Castellon MD   albuterol-ipratropium (DUO-NEB) 2.5 mg-0.5 mg/3 mL nebulizer solution Take 3 mLs by nebulization every 6 (six) hours as needed for Wheezing (and cough). Rescue 3/26/24 3/26/25  Anne Castellon MD   alendronate (FOSAMAX) 70 MG tablet Take 1 tablet (70 mg total) by mouth every 7 days. 8/10/23 8/9/24  Pedro Powell MD   blood sugar diagnostic (FREESTYLE LITE STRIPS) Strp Use to check blood glucose 3 times a day. 2/2/22   Aide Dougherty NP   diclofenac sodium (VOLTAREN) 1 % Gel APPLY 2 Grams TOPICALLY FOUR TIMES DAILY AS NEEDED 11/27/23   Marcella Haas, VENUS   fluticasone-umeclidin-vilanter (TRELEGY ELLIPTA) 100-62.5-25 mcg DsDv Inhale 1 puff into the lungs once daily. 11/6/23 11/5/24  Anne Castellon MD   inhalation spacing device Use as directed for inhalation. 11/6/23   Anne Castellon MD   lancets (FREESTYLE LANCETS) 28 gauge Misc 1 lancet by Combination route 2 (two) times daily as needed. Qid prn 12/4/18   Karine Olmos MD   lancets Misc 1 strip by Misc.(Non-Drug; Combo Route) route 4 (four) times daily with meals and nightly. 2/10/15   Odilia Correa MD   levothyroxine (SYNTHROID) 150 MCG tablet Take 1 tablet (150 mcg total) by mouth before breakfast. 9/17/22 5/22/24  Karine Olmos MD   LIDOcaine (LIDODERM) 5 % Use 1-3 patches per day over recommended area. Remove & Discard patch within 12 hours or as directed by MD. 7/25/24   Marcella Haas, PAMarielaC   nebulizer and compressor [place] Use as directed 11/10/23   Karine Olmos MD   ondansetron (ZOFRAN-ODT) 4 MG TbDL Take 1 tablet (4 mg total) by mouth every 8 (eight) hours as needed. 7/18/24   Karine Olmos MD   oxyCODONE-acetaminophen (PERCOCET)  mg per tablet Take 1 tablet by mouth every 8 (eight) hours as needed for Pain. 8/23/24 9/22/24  Oliver Pedro MD   oxyCODONE-acetaminophen (PERCOCET)  mg per tablet Take 1 tablet by mouth every 8 (eight)  "hours as needed for Pain. 24   Oliver Pedro MD   oxyCODONE-acetaminophen (PERCOCET)  mg per tablet Take 1 tablet by mouth every 8 (eight) hours as needed for Pain. 24   Oliver Pedro MD   pen needle, diabetic (BD RORY 2ND GEN PEN NEEDLE) 32 gauge x 5/32" Ndle USE THREE TIMES DAILY WITH INSULIN AS DIRECTED 24   Sabi Bills NP   polymyxin B sulf-trimethoprim (POLYTRIM) 10,000 unit- 1 mg/mL Drop Place 1 drop into both eyes every 6 (six) hours. 23   Anne Castellon MD   pulse oximeter (PULSE OXIMETER) device by Apply Externally route 2 (two) times a day. Use twice daily at 8 AM and 3 PM and record the value in MyChart as directed. 21   Calderon Solorzano MD   semaglutide (OZEMPIC) 2 mg/dose (8 mg/3 mL) PnIj Inject 2 mg into the skin every 7 days. 24   Sabi Bills NP        Allergies:  Review of patient's allergies indicates:   Allergen Reactions    Azithromycin Nausea And Vomiting    Adhesive Other (See Comments)     Skin tears    Nsaids (non-steroidal anti-inflammatory drug)      Kidney Transplant        Social History:   Social History     Socioeconomic History    Marital status:     Number of children: 3   Occupational History    Occupation: disable     Comment: dept manager at [employer]   Tobacco Use    Smoking status: Former     Current packs/day: 0.00     Average packs/day: 1 pack/day for 10.0 years (10.0 ttl pk-yrs)     Types: Cigarettes     Start date: 1992     Quit date: 2002     Years since quittin.8    Smokeless tobacco: Never    Tobacco comments:     quit smoking approx 10-15 years ago    Substance and Sexual Activity    Alcohol use: No    Drug use: No    Sexual activity: Never     Partners: Male   Social History Narrative    Does housework at home.     Social Determinants of Health     Financial Resource Strain: High Risk (2023)    Overall Financial Resource Strain (CARDIA)     Difficulty of Paying Living " Expenses: Hard   Food Insecurity: Food Insecurity Present (9/27/2023)    Hunger Vital Sign     Worried About Running Out of Food in the Last Year: Never true     Ran Out of Food in the Last Year: Sometimes true   Transportation Needs: No Transportation Needs (9/27/2023)    PRAPARE - Transportation     Lack of Transportation (Medical): No     Lack of Transportation (Non-Medical): No   Physical Activity: Inactive (9/27/2023)    Exercise Vital Sign     Days of Exercise per Week: 0 days     Minutes of Exercise per Session: 0 min   Stress: No Stress Concern Present (9/27/2023)    [place] [employer] - Occupational Stress Questionnaire     Feeling of Stress : Only a little   Housing Stability: Low Risk  (9/27/2023)    Housing Stability Vital Sign     Unable to Pay for Housing in the Last Year: No     Number of Places Lived in the Last Year: 1     Unstable Housing in the Last Year: No       Family History:  Family History   Problem Relation Name Age of Onset    Diabetes Mother      Kidney disease Mother      Hyperlipidemia Mother      Hypertension Mother      Heart disease Mother      Arthritis Mother      Hypertension Father      Stroke Father      Hypertension Sister 2     Arthritis Sister 2     Asthma Sister 2     Heart disease Sister 2         heart attack       ROS: No acute cardiac events, no acute respiratory complaints.     Physical Exam (all patients):    BP (!) 149/86 (BP Location: Right arm, Patient Position: Sitting)   Pulse 82   Temp 97.2 °F (36.2 °C) (Temporal)   Resp 16   Wt 92.2 kg (203 lb 4.2 oz)   SpO2 98%   Breastfeeding No   BMI 32.81 kg/m²   Lungs: Clear to auscultation bilaterally, respirations unlabored  Heart: Regular rate and rhythm, S1 and S2 normal, no obvious murmurs  Abdomen:         Soft, non-tender, bowel sounds normal, no masses, no organomegaly    Lab Results   Component Value Date    WBC 13.08 (H) 11/06/2023    MCV 87 11/06/2023    RDW 15.2 (H) 11/06/2023    PLT  291 11/06/2023    INR 1.1 01/04/2021     (H) 02/22/2024    HGBA1C 6.7 (H) 05/13/2024    BUN 16 02/22/2024     02/22/2024    K 4.1 02/22/2024     02/22/2024        SEDATION PLAN: per anesthesia      History reviewed, vital signs satisfactory, cardiopulmonary status satisfactory, sedation options, risks and plans have been discussed with the patient  All their questions were answered and the patient agrees to the sedation procedures as planned and the patient is deemed an appropriate candidate for the sedation as planned.    Procedure explained to patient, informed consent obtained and placed in chart.    Lisa Nevarez  9/18/2024  9:39 AM

## 2024-09-23 LAB
FINAL PATHOLOGIC DIAGNOSIS: NORMAL
GROSS: NORMAL
Lab: NORMAL

## 2024-09-27 ENCOUNTER — TELEPHONE (OUTPATIENT)
Dept: PRIMARY CARE CLINIC | Facility: CLINIC | Age: 64
End: 2024-09-27
Payer: MEDICARE

## 2024-09-27 ENCOUNTER — PATIENT MESSAGE (OUTPATIENT)
Dept: GASTROENTEROLOGY | Facility: CLINIC | Age: 64
End: 2024-09-27
Payer: MEDICARE

## 2024-09-27 DIAGNOSIS — E11.69 HYPERLIPIDEMIA ASSOCIATED WITH TYPE 2 DIABETES MELLITUS: Primary | ICD-10-CM

## 2024-09-27 DIAGNOSIS — E78.5 HYPERLIPIDEMIA ASSOCIATED WITH TYPE 2 DIABETES MELLITUS: Primary | ICD-10-CM

## 2024-09-30 ENCOUNTER — PATIENT OUTREACH (OUTPATIENT)
Dept: ADMINISTRATIVE | Facility: HOSPITAL | Age: 64
End: 2024-09-30
Payer: MEDICARE

## 2024-10-03 ENCOUNTER — LAB VISIT (OUTPATIENT)
Dept: LAB | Facility: HOSPITAL | Age: 64
End: 2024-10-03
Attending: INTERNAL MEDICINE
Payer: MEDICARE

## 2024-10-03 ENCOUNTER — OFFICE VISIT (OUTPATIENT)
Dept: PAIN MEDICINE | Facility: CLINIC | Age: 64
End: 2024-10-03
Payer: MEDICARE

## 2024-10-03 ENCOUNTER — PATIENT MESSAGE (OUTPATIENT)
Dept: PAIN MEDICINE | Facility: CLINIC | Age: 64
End: 2024-10-03

## 2024-10-03 VITALS — HEIGHT: 66 IN | BODY MASS INDEX: 32.81 KG/M2

## 2024-10-03 DIAGNOSIS — M79.2 NEUROPATHIC PAIN: ICD-10-CM

## 2024-10-03 DIAGNOSIS — G89.29 CHRONIC LEFT SHOULDER PAIN: ICD-10-CM

## 2024-10-03 DIAGNOSIS — E78.5 HYPERLIPIDEMIA ASSOCIATED WITH TYPE 2 DIABETES MELLITUS: ICD-10-CM

## 2024-10-03 DIAGNOSIS — Z79.891 OPIOID USE AGREEMENT EXISTS: ICD-10-CM

## 2024-10-03 DIAGNOSIS — M25.562 CHRONIC PAIN OF BOTH KNEES: ICD-10-CM

## 2024-10-03 DIAGNOSIS — M25.512 CHRONIC LEFT SHOULDER PAIN: ICD-10-CM

## 2024-10-03 DIAGNOSIS — M25.561 CHRONIC PAIN OF BOTH KNEES: ICD-10-CM

## 2024-10-03 DIAGNOSIS — M47.816 LUMBAR SPONDYLOSIS: ICD-10-CM

## 2024-10-03 DIAGNOSIS — E11.69 HYPERLIPIDEMIA ASSOCIATED WITH TYPE 2 DIABETES MELLITUS: ICD-10-CM

## 2024-10-03 DIAGNOSIS — M54.16 BILATERAL LUMBAR RADICULOPATHY: Primary | ICD-10-CM

## 2024-10-03 DIAGNOSIS — G89.29 CHRONIC PAIN OF BOTH KNEES: ICD-10-CM

## 2024-10-03 DIAGNOSIS — M54.2 NECK PAIN ON LEFT SIDE: ICD-10-CM

## 2024-10-03 DIAGNOSIS — M51.361 DEGENERATION OF INTERVERTEBRAL DISC OF LUMBAR REGION WITH LOWER EXTREMITY PAIN: ICD-10-CM

## 2024-10-03 DIAGNOSIS — M51.369 DDD (DEGENERATIVE DISC DISEASE), LUMBAR: ICD-10-CM

## 2024-10-03 DIAGNOSIS — M79.18 MUSCLE PAIN, MYOFASCIAL: ICD-10-CM

## 2024-10-03 PROCEDURE — 80061 LIPID PANEL: CPT | Performed by: INTERNAL MEDICINE

## 2024-10-03 PROCEDURE — 80053 COMPREHEN METABOLIC PANEL: CPT | Performed by: INTERNAL MEDICINE

## 2024-10-03 PROCEDURE — 83036 HEMOGLOBIN GLYCOSYLATED A1C: CPT | Performed by: INTERNAL MEDICINE

## 2024-10-03 PROCEDURE — 84439 ASSAY OF FREE THYROXINE: CPT | Performed by: INTERNAL MEDICINE

## 2024-10-03 PROCEDURE — 85025 COMPLETE CBC W/AUTO DIFF WBC: CPT | Performed by: INTERNAL MEDICINE

## 2024-10-03 PROCEDURE — 36415 COLL VENOUS BLD VENIPUNCTURE: CPT | Mod: PN | Performed by: INTERNAL MEDICINE

## 2024-10-03 PROCEDURE — 84443 ASSAY THYROID STIM HORMONE: CPT | Performed by: INTERNAL MEDICINE

## 2024-10-03 RX ORDER — OXYCODONE AND ACETAMINOPHEN 10; 325 MG/1; MG/1
1 TABLET ORAL EVERY 8 HOURS PRN
Qty: 90 TABLET | Refills: 0 | Status: SHIPPED | OUTPATIENT
Start: 2025-01-01

## 2024-10-03 RX ORDER — OXYCODONE AND ACETAMINOPHEN 10; 325 MG/1; MG/1
1 TABLET ORAL EVERY 8 HOURS PRN
Qty: 90 TABLET | Refills: 0 | Status: SHIPPED | OUTPATIENT
Start: 2024-12-02 | End: 2025-01-01

## 2024-10-03 RX ORDER — DICLOFENAC SODIUM 10 MG/G
GEL TOPICAL
Qty: 100 G | Refills: 0 | Status: SHIPPED | OUTPATIENT
Start: 2024-10-03

## 2024-10-03 RX ORDER — TIZANIDINE 4 MG/1
4 TABLET ORAL 2 TIMES DAILY PRN
Qty: 60 TABLET | Refills: 1 | Status: SHIPPED | OUTPATIENT
Start: 2024-10-03

## 2024-10-03 RX ORDER — OXYCODONE AND ACETAMINOPHEN 10; 325 MG/1; MG/1
1 TABLET ORAL EVERY 8 HOURS PRN
Qty: 90 TABLET | Refills: 0 | Status: SHIPPED | OUTPATIENT
Start: 2024-10-03 | End: 2024-11-03

## 2024-10-03 NOTE — PROGRESS NOTES
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 appropriate  1-13-16 :: appropriate  8-9-16 :: appropriate  2/6/2017 :: appropriate  8/21/2017 :: appropriate  7/16/2018 :: appropriate  4/22/2019 :: appropriate  11/7/2019 :: appropriate  8/19/2020 :: appropriate  12/8/2020 :: appropriate  4/27/2021 :: appropriate  2/7/2022 :: appropriate  8/15/2022 :: appropriate   3/20/2023 :: appropriate   11/27/2023 ::  Appropriate  5/22/2024 :: Appropriate

## 2024-10-04 LAB
ALBUMIN SERPL BCP-MCNC: 3.8 G/DL (ref 3.5–5.2)
ALP SERPL-CCNC: 113 U/L (ref 55–135)
ALT SERPL W/O P-5'-P-CCNC: 12 U/L (ref 10–44)
ANION GAP SERPL CALC-SCNC: 11 MMOL/L (ref 8–16)
AST SERPL-CCNC: 17 U/L (ref 10–40)
BASOPHILS # BLD AUTO: 0.06 K/UL (ref 0–0.2)
BASOPHILS NFR BLD: 0.6 % (ref 0–1.9)
BILIRUB SERPL-MCNC: 0.4 MG/DL (ref 0.1–1)
BUN SERPL-MCNC: 10 MG/DL (ref 8–23)
CALCIUM SERPL-MCNC: 9.2 MG/DL (ref 8.7–10.5)
CHLORIDE SERPL-SCNC: 103 MMOL/L (ref 95–110)
CHOLEST SERPL-MCNC: 177 MG/DL (ref 120–199)
CHOLEST/HDLC SERPL: 3.5 {RATIO} (ref 2–5)
CO2 SERPL-SCNC: 24 MMOL/L (ref 23–29)
CREAT SERPL-MCNC: 0.9 MG/DL (ref 0.5–1.4)
DIFFERENTIAL METHOD BLD: ABNORMAL
EOSINOPHIL # BLD AUTO: 0.3 K/UL (ref 0–0.5)
EOSINOPHIL NFR BLD: 2.8 % (ref 0–8)
ERYTHROCYTE [DISTWIDTH] IN BLOOD BY AUTOMATED COUNT: 15 % (ref 11.5–14.5)
EST. GFR  (NO RACE VARIABLE): >60 ML/MIN/1.73 M^2
ESTIMATED AVG GLUCOSE: 126 MG/DL (ref 68–131)
GLUCOSE SERPL-MCNC: 119 MG/DL (ref 70–110)
HBA1C MFR BLD: 6 % (ref 4–5.6)
HCT VFR BLD AUTO: 40.8 % (ref 37–48.5)
HDLC SERPL-MCNC: 50 MG/DL (ref 40–75)
HDLC SERPL: 28.2 % (ref 20–50)
HGB BLD-MCNC: 12.1 G/DL (ref 12–16)
IMM GRANULOCYTES # BLD AUTO: 0.06 K/UL (ref 0–0.04)
IMM GRANULOCYTES NFR BLD AUTO: 0.6 % (ref 0–0.5)
LDLC SERPL CALC-MCNC: 112.6 MG/DL (ref 63–159)
LYMPHOCYTES # BLD AUTO: 3.4 K/UL (ref 1–4.8)
LYMPHOCYTES NFR BLD: 33.8 % (ref 18–48)
MCH RBC QN AUTO: 26 PG (ref 27–31)
MCHC RBC AUTO-ENTMCNC: 29.7 G/DL (ref 32–36)
MCV RBC AUTO: 88 FL (ref 82–98)
MONOCYTES # BLD AUTO: 0.6 K/UL (ref 0.3–1)
MONOCYTES NFR BLD: 5.6 % (ref 4–15)
NEUTROPHILS # BLD AUTO: 5.6 K/UL (ref 1.8–7.7)
NEUTROPHILS NFR BLD: 56.6 % (ref 38–73)
NONHDLC SERPL-MCNC: 127 MG/DL
NRBC BLD-RTO: 0 /100 WBC
PLATELET # BLD AUTO: 300 K/UL (ref 150–450)
PMV BLD AUTO: 11.5 FL (ref 9.2–12.9)
POTASSIUM SERPL-SCNC: 3.9 MMOL/L (ref 3.5–5.1)
PROT SERPL-MCNC: 7.9 G/DL (ref 6–8.4)
RBC # BLD AUTO: 4.65 M/UL (ref 4–5.4)
SODIUM SERPL-SCNC: 138 MMOL/L (ref 136–145)
T4 FREE SERPL-MCNC: 0.93 NG/DL (ref 0.71–1.51)
TRIGL SERPL-MCNC: 72 MG/DL (ref 30–150)
TSH SERPL DL<=0.005 MIU/L-ACNC: 4.65 UIU/ML (ref 0.4–4)
WBC # BLD AUTO: 9.97 K/UL (ref 3.9–12.7)

## 2024-10-07 ENCOUNTER — HOSPITAL ENCOUNTER (OUTPATIENT)
Dept: RADIOLOGY | Facility: HOSPITAL | Age: 64
Discharge: HOME | End: 2024-10-07
Attending: PHYSICIAN ASSISTANT
Payer: MEDICARE

## 2024-10-07 ENCOUNTER — OFFICE VISIT (OUTPATIENT)
Dept: PRIMARY CARE CLINIC | Facility: CLINIC | Age: 64
End: 2024-10-07
Payer: MEDICARE

## 2024-10-07 VITALS
TEMPERATURE: 97 F | DIASTOLIC BLOOD PRESSURE: 66 MMHG | BODY MASS INDEX: 32.72 KG/M2 | SYSTOLIC BLOOD PRESSURE: 118 MMHG | WEIGHT: 203.63 LBS | OXYGEN SATURATION: 98 % | HEIGHT: 66 IN | HEART RATE: 94 BPM

## 2024-10-07 DIAGNOSIS — J96.11 CHRONIC RESPIRATORY FAILURE WITH HYPOXIA, ON HOME O2 THERAPY: ICD-10-CM

## 2024-10-07 DIAGNOSIS — M87.051 AVASCULAR NECROSIS OF BONE OF RIGHT HIP: ICD-10-CM

## 2024-10-07 DIAGNOSIS — N18.30 CKD STAGE 3 DUE TO TYPE 2 DIABETES MELLITUS: ICD-10-CM

## 2024-10-07 DIAGNOSIS — I27.21 HIGH PULMONARY ARTERIAL PRESSURE: ICD-10-CM

## 2024-10-07 DIAGNOSIS — M81.0 OSTEOPOROSIS WITHOUT PATHOLOGICAL FRACTURE: ICD-10-CM

## 2024-10-07 DIAGNOSIS — I15.2 HYPERTENSION ASSOCIATED WITH DIABETES: ICD-10-CM

## 2024-10-07 DIAGNOSIS — F33.9 DEPRESSION, RECURRENT: ICD-10-CM

## 2024-10-07 DIAGNOSIS — N25.81 SECONDARY HYPERPARATHYROIDISM OF RENAL ORIGIN: ICD-10-CM

## 2024-10-07 DIAGNOSIS — E11.29 MICROALBUMINURIA DUE TO TYPE 2 DIABETES MELLITUS: Primary | ICD-10-CM

## 2024-10-07 DIAGNOSIS — E11.69 HYPERLIPIDEMIA ASSOCIATED WITH TYPE 2 DIABETES MELLITUS: ICD-10-CM

## 2024-10-07 DIAGNOSIS — Z99.81 CHRONIC RESPIRATORY FAILURE WITH HYPOXIA, ON HOME O2 THERAPY: ICD-10-CM

## 2024-10-07 DIAGNOSIS — Z94.0 DECEASED-DONOR KIDNEY TRANSPLANT: Chronic | ICD-10-CM

## 2024-10-07 DIAGNOSIS — E11.59 HYPERTENSION ASSOCIATED WITH DIABETES: ICD-10-CM

## 2024-10-07 DIAGNOSIS — E78.5 HYPERLIPIDEMIA ASSOCIATED WITH TYPE 2 DIABETES MELLITUS: ICD-10-CM

## 2024-10-07 DIAGNOSIS — E11.22 CKD STAGE 3 DUE TO TYPE 2 DIABETES MELLITUS: ICD-10-CM

## 2024-10-07 DIAGNOSIS — E66.01 SEVERE OBESITY (BMI 35.0-39.9) WITH COMORBIDITY: ICD-10-CM

## 2024-10-07 DIAGNOSIS — M25.512 CHRONIC LEFT SHOULDER PAIN: ICD-10-CM

## 2024-10-07 DIAGNOSIS — R80.9 MICROALBUMINURIA DUE TO TYPE 2 DIABETES MELLITUS: Primary | ICD-10-CM

## 2024-10-07 DIAGNOSIS — G89.29 CHRONIC LEFT SHOULDER PAIN: ICD-10-CM

## 2024-10-07 DIAGNOSIS — M54.2 NECK PAIN ON LEFT SIDE: ICD-10-CM

## 2024-10-07 PROCEDURE — 2023F DILAT RTA XM W/O RTNOPTHY: CPT | Mod: CPTII,S$GLB,, | Performed by: NURSE PRACTITIONER

## 2024-10-07 PROCEDURE — 72052 X-RAY EXAM NECK SPINE 6/>VWS: CPT | Mod: 26,,, | Performed by: RADIOLOGY

## 2024-10-07 PROCEDURE — 3044F HG A1C LEVEL LT 7.0%: CPT | Mod: CPTII,S$GLB,, | Performed by: NURSE PRACTITIONER

## 2024-10-07 PROCEDURE — 3074F SYST BP LT 130 MM HG: CPT | Mod: CPTII,S$GLB,, | Performed by: NURSE PRACTITIONER

## 2024-10-07 PROCEDURE — 99215 OFFICE O/P EST HI 40 MIN: CPT | Mod: S$GLB,,, | Performed by: NURSE PRACTITIONER

## 2024-10-07 PROCEDURE — 72052 X-RAY EXAM NECK SPINE 6/>VWS: CPT | Mod: TC,FY,PO

## 2024-10-07 PROCEDURE — 3078F DIAST BP <80 MM HG: CPT | Mod: CPTII,S$GLB,, | Performed by: NURSE PRACTITIONER

## 2024-10-07 PROCEDURE — 73030 X-RAY EXAM OF SHOULDER: CPT | Mod: 26,LT,, | Performed by: RADIOLOGY

## 2024-10-07 PROCEDURE — 3008F BODY MASS INDEX DOCD: CPT | Mod: CPTII,S$GLB,, | Performed by: NURSE PRACTITIONER

## 2024-10-07 PROCEDURE — 1159F MED LIST DOCD IN RCRD: CPT | Mod: CPTII,S$GLB,, | Performed by: NURSE PRACTITIONER

## 2024-10-07 PROCEDURE — 3060F POS MICROALBUMINURIA REV: CPT | Mod: CPTII,S$GLB,, | Performed by: NURSE PRACTITIONER

## 2024-10-07 PROCEDURE — 73030 X-RAY EXAM OF SHOULDER: CPT | Mod: TC,FY,PO,LT

## 2024-10-07 PROCEDURE — 99999 PR PBB SHADOW E&M-EST. PATIENT-LVL V: CPT | Mod: PBBFAC,,, | Performed by: NURSE PRACTITIONER

## 2024-10-07 PROCEDURE — 3066F NEPHROPATHY DOC TX: CPT | Mod: CPTII,S$GLB,, | Performed by: NURSE PRACTITIONER

## 2024-10-07 NOTE — ASSESSMENT & PLAN NOTE
Assess and monitor  Lab Results   Component Value Date    CREATININE 0.9 10/03/2024    CREATININE 1.2 02/22/2024    CREATININE 1.3 08/31/2023      Latest Reference Range & Units 08/31/23 09:45 02/22/24 08:52 10/03/24 08:41   Creatinine 0.5 - 1.4 mg/dL 1.3 1.2 0.9   eGFR >60 mL/min/1.73 m^2 46.2 ! 51 ! >60.0   Continue tacrolimus and mycophenolate  Denies any prerenal GI losses, postrenal complaints  F/U with PCP or Nephrology

## 2024-10-07 NOTE — ASSESSMENT & PLAN NOTE
Weight is stable  Continue Ozempic 2 mg  Encouraged increased activity  Monitor sugar and CHO intake

## 2024-10-07 NOTE — ASSESSMENT & PLAN NOTE
Chronic, stable  Noted back to 2012  Prn Percocet  F/U with Ortho if pain worsens  F/U with pain mgt

## 2024-10-07 NOTE — PROGRESS NOTES
Ana Maria Teague  10/07/2024  [id number]    Karine Olmos MD  Patient Care Team:  Karine Olmos MD as PCP - General (Internal Medicine)  Jovanna Pugh MD as Consulting Physician (Nephrology)  Aide Dougherty NP (Inactive) as Nurse Practitioner (Endocrinology)  Franklyn Anaya OD as Consulting Physician (Ophthalmology)        [facility]      Chief Complaint:  Chief Complaint   Patient presents with    Follow-up     2 month f/u          Assessment/Plan:  1. Microalbuminuria due to type 2 diabetes mellitus  Assessment & Plan:   Latest Reference Range & Units 08/08/23 09:54 10/03/24 08:30   MICROALB/CREAT RATIO 0.0 - 30.0 ug/mg 39.9 (H) 96.7 (H)   Elevated  Continue tx of blood pressure and blood glucose  Start Jardiance     Orders:  -     Ambulatory referral/consult to Nephrology; Future; Expected date: 10/14/2024    2. Hyperlipidemia associated with type 2 diabetes mellitus  Assessment & Plan:  Not taking full dose of lipitor 40 mg  Lab Results   Component Value Date    LDLCALC 112.6 10/03/2024    Discussed increased risk for ASCVD  She agreed to resume lipitor 40 mg  Denies any adverse symptoms      3. Hypertension associated with diabetes  Assessment & Plan:  Normotensive  Continue nifedipine 30 mg bid      4. CKD stage 3 due to type 2 diabetes mellitus  Assessment & Plan:  Assess and monitor  Lab Results   Component Value Date    CREATININE 0.9 10/03/2024    CREATININE 1.2 02/22/2024    CREATININE 1.3 08/31/2023      Latest Reference Range & Units 08/31/23 09:45 02/22/24 08:52 10/03/24 08:41   Creatinine 0.5 - 1.4 mg/dL 1.3 1.2 0.9   eGFR >60 mL/min/1.73 m^2 46.2 ! 51 ! >60.0   Continue tacrolimus and mycophenolate  Denies any prerenal GI losses, postrenal complaints  F/U with PCP or Nephrology       5. Chronic respiratory failure with hypoxia, on home O2 therapy  Assessment & Plan:  Has home oxygen at 2 liters  Has not used recently       6. Severe obesity (BMI 35.0-39.9) with  comorbidity  Assessment & Plan:  Weight is stable  Continue Ozempic 2 mg  Encouraged increased activity  Monitor sugar and CHO intake      7. Avascular necrosis of bone of right hip  Assessment & Plan:  Chronic, stable  Noted back to   Prn Percocet  F/U with Ortho if pain worsens  F/U with pain mgt      8. High pulmonary arterial pressure  Overview:  Echo 10/2/23:    Left Ventricle: The left ventricle is normal in size. Normal wall thickness. There is mild concentric hypertrophy. Normal wall motion. There is normal systolic function with a visually estimated ejection fraction of 60 - 65%. Grade I diastolic dysfunction.    Right Ventricle: Normal right ventricular cavity size. Wall thickness is normal. Right ventricle wall motion  is normal. Systolic function is normal.    Tricuspid Valve: There is mild regurgitation.    Pulmonary Artery: There is mild pulmonary hypertension. The estimated pulmonary artery systolic pressure is 39 mmHg.    IVC/SVC: Normal venous pressure at 3 mmHg.      Assessment & Plan:  F/U with Pulmonology  Occasional use of albuterol inhaler        9. Secondary hyperparathyroidism of renal origin  Assessment & Plan:  Lab Results   Component Value Date    PTH 83.5 (H) 2023    CALCIUM 9.2 10/03/2024    CAION 1.17 10/08/2013    PHOS 2.9 2023          10. Depression, recurrent  Assessment & Plan:  Chronic, stable  In remission  F/U with PCP      11. -donor kidney transplant - 13  Overview:  Induction with Campath 30mg and IV solumedrol to total 875mg.  Post op course complicated by delayed graft function with hemodialysis. Last HD treatment 10/2/13.   Post-op course also complicated by bleeding/oozing from abdominal incision. Pt received a total of 4 units of PRBC and DDAVP for blood loss anemia.   Returned to OR 10/713 where section was cauterized and NALLELY drain was placed,after which bleeding stopped.    CMV status: D+,R+    End-stage renal disease and was on chronic  hemodialysis from 2014 until September of 2013, status post kidney transplant in 2013 at [facility] in [place].      Assessment & Plan:  Chronic, stable  Continue immunosuppressants   F/U with Renal  She was released from Transplant service    Orders:  -     Ambulatory referral/consult to Nephrology; Future; Expected date: 10/14/2024    12. Osteoporosis without pathological fracture  Overview:  2021 DEXA - osteoporosis   2023 - DEXA - No evidence of significant bone density loss     Assessment & Plan:  Not taking Fosamax currently as she needs dental work  Has not found dentist at present            Worry Score: 2  History of Present Illness:    Last visit with Dr. Olmos 8/9/2024   Nico sensor - humalog TID, Ozempic 2 mg weekly  Not taking STATIN  Food sensation in lower esophagus - EGD for dysphagia  Restarted atorv 40 mg  ?SGLT2i with transplant  EGD for dysphagia  Labs collected on 10/3/24    Recent Fall:  []Yes  [x]No  Activity:  []Vigorous []Moderate []Sedentary   plans to start walking with cooler weather  Appetite:  [x]Good  []Fair  []Poor  Mood: [x]Stable []Anxious []Depressed   Insomnia: []Yes  []No - sometimes - takes muscle relaxer at night  Fatigue at times    Limited ROM to left neck and left shoulder pain  Weight stable 203.6  Atorvastatin dose increased to 40 mg daily - she is taking 40 mg sometimes, most days 20 mg  EGD - esophageal stenosis - dilated. Also, taking omeprazole 40 mg bid  LA Grade A esophagitis. Some days notes nausea -   Not regurgitating any more, will have cough with swallowing at times.   Repeat upper endoscopy in 2 months to evaluate the response to therapy.   Pt released from transplant team - advised to follow up with Dr. Pugh    No recent wheezing,   SOB with hot weather - albuterol use this AM - does not use often  Needs additional dental work >>> not taking fosamax    Ask Dr. Pugh about Jardiance?    B/P readings - lately low  SBP < 135/ DBP 70s    Blood sugars -  some lows 54  Highs 240 (rarely)    The 10-year ASCVD risk score (Angelique DK, et al., 2019) is: 14.9%    Values used to calculate the score:      Age: 63 years      Sex: Female      Is Non- : Yes      Diabetic: Yes      Tobacco smoker: No      Systolic Blood Pressure: 118 mmHg      Is BP treated: Yes      HDL Cholesterol: 50 mg/dL      Total Cholesterol: 177 mg/dL         Denies fever, chills, URI symptoms, chest pain, SOB, palpitations, vomiting, diarrhea, no gross neuro deficits, urinary symptoms and leg swelling.      The following were reviewed: Active problem list, medication list, allergies, family history, social history, and Health Maintenance.     History:  Past Medical History:   Diagnosis Date    Abnormal glucose 2013    Acquired hypothyroidism     Acute bronchiolitis 10/15/2014    Anemia     Anemia of chronic renal failure 2013    Anxiety     Anxiety disorder     Avascular necrosis of bone of hip     Back pain     s/p steroid injections    Bacteremia due to Escherichia coli 2021    Chronic immunosuppression with Prograf and Cellcept 2013    CKD (chronic kidney disease) stage 2, GFR 60-89 ml/min     CKD (chronic kidney disease) stage 5, GFR less than 15 ml/min     -donor kidney transplant - 13    Depression     Hypertension, renal 2013    Hypothyroidism     Immunosuppressed status 10/15/2014    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 7 years ago.     SECTION      COLONOSCOPY N/A 3/9/2016    Procedure: COLONOSCOPY;  Surgeon: Douglas Daniel III, MD;  Location: [facility];  Service: Endoscopy;  Laterality: N/A;    COLONOSCOPY N/A  2024    Procedure: COLONOSCOPY;  Surgeon: Lisa Nevarez MD;  Location: [facility] ENDO;  Service: Endoscopy;  Laterality: N/A;    cyst removed form chest wall      ESOPHAGOGASTRODUODENOSCOPY N/A 2024    Procedure: EGD (ESOPHAGOGASTRODUODENOSCOPY);  Surgeon: Lisa Nevarez MD;  Location: [facility] ENDO;  Service: Endoscopy;  Laterality: N/A;    HYSTERECTOMY      KIDNEY TRANSPLANT  2013    SELECTIVE INJECTION OF ANESTHETIC AGENT AROUND LUMBAR SPINAL NERVE ROOT BY TRANSFORAMINAL APPROACH Bilateral 2021    Procedure: Bilateral L5/S1 TF SERENITY;  Surgeon: Rafita Muniz MD;  Location: [facility] PAIN MGT;  Service: Pain Management;  Laterality: Bilateral;    SKIN GRAFT      revision    THYROIDECTOMY      vascular access surgeries      multiple. last time 2 weeks ago     Family History   Problem Relation Name Age of Onset    Diabetes Mother      Kidney disease Mother      Hyperlipidemia Mother      Hypertension Mother      Heart disease Mother      Arthritis Mother      Hypertension Father      Stroke Father      Hypertension Sister 2     Arthritis Sister 2     Asthma Sister 2     Heart disease Sister 2         heart attack     Patient Active Problem List   Diagnosis    Avascular necrosis of bone of right hip    Acquired hypothyroidism    Osteoporosis without pathological fracture    Depression, recurrent    Anxiety and depression    Vertigo    Abnormal findings on diagnostic imaging of breast    Renal cyst    -donor kidney transplant - 13    Hypertension, renal    Anemia of chronic renal failure    Secondary hyperparathyroidism of renal origin    CKD stage 3 due to type 2 diabetes mellitus    Type 2 diabetes mellitus with hyperglycemia, with long-term current use of insulin    CTS (carpal tunnel syndrome)    Cubital tunnel syndrome on right    Chronic kidney disease, stage 3a    Immunosuppressed status    Abnormal PFTs (pulmonary function tests)    Diffusion capacity of lung (dl), decreased     Age-related osteoporosis without current pathological fracture    Hypertension associated with diabetes    Headache    Sleep-related nonobstructive alveolar hypoventilation    Hyperlipidemia associated with type 2 diabetes mellitus    Allergic rhinitis    Chronic bronchitis    Insomnia due to medical condition    Primary osteoarthritis involving multiple joints    Severe obesity (BMI 35.0-39.9) with comorbidity    Hyperglycemia    Chronic pain of both knees    Unspecified inflammatory spondylopathy, lumbar region    Hypoglycemia associated with diabetes    Calcification of aorta    Requires supplemental oxygen    High pulmonary arterial pressure    Left lower lobe pulmonary nodule    Viral illness    Pharyngitis    Fall    Microalbuminuria due to type 2 diabetes mellitus    Chronic respiratory failure with hypoxia, on home O2 therapy     Review of patient's allergies indicates:   Allergen Reactions    Azithromycin Nausea And Vomiting    Adhesive Other (See Comments)     Skin tears    Nsaids (non-steroidal anti-inflammatory drug)      Kidney Transplant       Medications:  Current Outpatient Medications on File Prior to Visit   Medication Sig Dispense Refill    albuterol (PROVENTIL/VENTOLIN HFA) 90 mcg/actuation inhaler Rescue 18 g 2    albuterol-ipratropium (DUO-NEB) 2.5 mg-0.5 mg/3 mL nebulizer solution Take 3 mLs by nebulization every 6 (six) hours as needed for Wheezing (and cough). Rescue 75 mL 0    atorvastatin (LIPITOR) 40 MG tablet Take 1 tablet (40 mg total) by mouth once daily. 90 tablet 3    azelastine (OPTIVAR) 0.05 % ophthalmic solution Place 1 drop into both eyes 2 (two) times daily. 6 mL 0    blood sugar diagnostic (FREESTYLE LITE STRIPS) Strp Use to check blood glucose 3 times a day. 100 strip 11    cetirizine (ZYRTEC) 10 MG tablet TAKE 1 TABLET BY MOUTH EVERY DAY 30 tablet 11    diclofenac sodium (VOLTAREN) 1 % Gel APPLY 2 Grams TOPICALLY FOUR TIMES DAILY AS NEEDED 100 g 0    fluticasone propionate  (FLONASE) 50 mcg/actuation nasal spray SHAKE LIQUID AND USE 2 SPRAYS(100 MCG) IN EACH NOSTRIL EVERY DAY 48 g 1    fluticasone-umeclidin-vilanter (TRELEGY ELLIPTA) 100-62.5-25 mcg DsDv Inhale 1 puff into the lungs once daily. 60 each 1    gabapentin (NEURONTIN) 400 MG capsule Take 1 capsule (400 mg total) by mouth every morning AND 1 capsule (400 mg total) with lunch AND 1-2 capsules (400-800 mg total) every evening. 120 capsule 1    inhalation spacing device Use as directed for inhalation. 1 each 0    insulin lispro (HUMALOG KWIKPEN INSULIN) 100 unit/mL pen Inject 4-7 units before each main meal. 45 mL 3    lancets (FREESTYLE LANCETS) 28 gauge Misc 1 lancet by Combination route 2 (two) times daily as needed. Qid prn 400 each 2    lancets Misc 1 strip by Misc.(Non-Drug; Combo Route) route 4 (four) times daily with meals and nightly. 150 each 6    LIDOcaine (LIDODERM) 5 % Use 1-3 patches per day over recommended area. Remove & Discard patch within 12 hours or as directed by MD. 90 patch 0    mycophenolate (CELLCEPT) 250 mg Cap Take 2 capsules (500 mg total) by mouth 2 (two) times daily. 120 capsule 11    nebulizer and compressor Ewelina Use as directed 1 each 0    NIFEdipine (PROCARDIA-XL) 30 MG (OSM) 24 hr tablet Take 1 tablet (30 mg total) by mouth 2 (two) times a day. 180 tablet 3    omeprazole (PRILOSEC) 40 MG capsule Take 1 capsule (40 mg total) by mouth 2 (two) times daily before meals. 180 capsule 3    ondansetron (ZOFRAN-ODT) 4 MG TbDL Take 1 tablet (4 mg total) by mouth every 8 (eight) hours as needed. 15 tablet 1    [START ON 1/1/2025] oxyCODONE-acetaminophen (PERCOCET)  mg per tablet Take 1 tablet by mouth every 8 (eight) hours as needed for Pain. 90 tablet 0    oxyCODONE-acetaminophen (PERCOCET)  mg per tablet Take 1 tablet by mouth every 8 (eight) hours as needed for Pain. 90 tablet 0    [START ON 12/2/2024] oxyCODONE-acetaminophen (PERCOCET)  mg per tablet Take 1 tablet by mouth every 8  "(eight) hours as needed for Pain. 90 tablet 0    pen needle, diabetic (BD RORY 2ND GEN PEN NEEDLE) 32 gauge x 5/32" Ndle USE THREE TIMES DAILY WITH INSULIN AS DIRECTED 300 each 3    polymyxin B sulf-trimethoprim (POLYTRIM) 10,000 unit- 1 mg/mL Drop Place 1 drop into both eyes every 6 (six) hours. 10 mL 0    pulse oximeter (PULSE OXIMETER) device by Apply Externally route 2 (two) times a day. Use twice daily at 8 AM and 3 PM and record the value in MyChart as directed. 1 each 0    semaglutide (OZEMPIC) 2 mg/dose (8 mg/3 mL) PnIj Inject 2 mg into the skin every 7 days. 3 mL 5    tacrolimus (PROGRAF) 1 MG Cap TAKE 3 CAPSULES IN THE MORNING, AND 3 IN THE EVENING 180 capsule 11    tiZANidine (ZANAFLEX) 4 MG tablet Take 1 tablet (4 mg total) by mouth 2 (two) times daily as needed (muscle spasms). 60 tablet 1    alendronate (FOSAMAX) 70 MG tablet Take 1 tablet (70 mg total) by mouth every 7 days. 4 tablet 11    levothyroxine (SYNTHROID) 150 MCG tablet Take 1 tablet (150 mcg total) by mouth before breakfast. 90 tablet 3     Current Facility-Administered Medications on File Prior to Visit   Medication Dose Route Frequency Provider Last Rate Last Admin    lactated ringers infusion   Intravenous Continuous Lisa Nevarez MD           Medications have been reviewed and reconciled with patient at visit today.      Exam:  Vitals:    10/07/24 1029   BP: 118/66   Pulse: 94   Temp: 97.4 °F (36.3 °C)     Weight: 92.4 kg (203 lb 9.5 oz)   Body mass index is 32.86 kg/m².      BP Readings from Last 3 Encounters:   10/07/24 118/66   09/18/24 (!) 169/85   08/09/24 102/60     Wt Readings from Last 3 Encounters:   10/07/24 92.4 kg (203 lb 9.5 oz)   09/18/24 92.2 kg (203 lb 4.2 oz)   08/20/24 92.4 kg (203 lb 11.3 oz)         Physical Exam  Vitals reviewed.   Constitutional:       General: She is not in acute distress.     Appearance: She is obese. She is not ill-appearing.   HENT:      Head: Normocephalic and atraumatic.      Nose: " Nose normal. No congestion.      Mouth/Throat:      Mouth: Mucous membranes are moist.      Pharynx: Oropharynx is clear.   Eyes:      Conjunctiva/sclera: Conjunctivae normal.   Neck:        Comments: Muscular tenderness - LROM  Cardiovascular:      Rate and Rhythm: Normal rate and regular rhythm.      Heart sounds: No murmur heard.  Pulmonary:      Effort: Pulmonary effort is normal. No respiratory distress.      Breath sounds: Decreased breath sounds present.   Abdominal:      Palpations: Abdomen is soft.      Tenderness: There is no abdominal tenderness.   Musculoskeletal:      Cervical back: Neck supple. Muscular tenderness present. No spinous process tenderness.      Right lower leg: No edema.      Left lower leg: No edema.   Lymphadenopathy:      Cervical: No cervical adenopathy.   Skin:     General: Skin is warm and dry.   Neurological:      Mental Status: She is alert and oriented to person, place, and time. Mental status is at baseline.   Psychiatric:         Mood and Affect: Mood normal.         Thought Content: Thought content normal.              Laboratory Reviewed:     Lab Results   Component Value Date    WBC 9.97 10/03/2024    HGB 12.1 10/03/2024    HCT 40.8 10/03/2024     10/03/2024    CHOL 177 10/03/2024    TRIG 72 10/03/2024    HDL 50 10/03/2024    ALT 12 10/03/2024    AST 17 10/03/2024     10/03/2024    K 3.9 10/03/2024     10/03/2024    CREATININE 0.9 10/03/2024    BUN 10 10/03/2024    CO2 24 10/03/2024    TSH 4.649 (H) 10/03/2024    INR 1.1 01/04/2021    HGBA1C 6.0 (H) 10/03/2024       Screening or Prevention Patient's value Goal Complete/Controlled?   HgA1C Testing and Control   Lab Results   Component Value Date    HGBA1C 6.0 (H) 10/03/2024      Annually/Less than 8% Yes   Lipid profile : 10/03/2024 Annually Yes   LDL control Lab Results   Component Value Date    LDLCALC 112.6 10/03/2024    Annually/Less than 100 mg/dl  No   Nephropathy screening Lab Results   Component  Value Date    LABMICR 59.0 10/03/2024     Lab Results   Component Value Date    PROTEINUA 1+ (A) 04/11/2024    Annually Yes   Blood pressure BP Readings from Last 1 Encounters:   10/07/24 118/66    Less than 140/90 No   Dilated retinal exam : 01/12/2024 Annually Yes   Foot exam   : 08/09/2024 Annually Yes       Health Maintenance  Health Maintenance Topics with due status: Not Due       Topic Last Completion Date    TETANUS VACCINE 06/29/2015    Eye Exam 01/12/2024    Foot Exam 08/09/2024    Mammogram 08/20/2024    Colorectal Cancer Screening 09/18/2024    Diabetes Urine Screening 10/03/2024    Lipid Panel 10/03/2024    Hemoglobin A1c 10/03/2024     Health Maintenance Due   Topic Date Due    Complete Opioid Risk Tool  Never done    Shingles Vaccine (1 of 2) Never done    RSV Vaccine (Age 60+ and Pregnant patients) (1 - Risk 60-74 years 1-dose series) Never done    Influenza Vaccine (1) 09/01/2024    COVID-19 Vaccine (4 - 2024-25 season) 09/01/2024               -Patient's lab results were reviewed and discussed with patient  -Treatment options and alternatives were discussed with the patient. Patient expressed understanding. Patient was given the opportunity to ask questions and be an active participant in their medical care. Patient had no further questions or concerns at this time.         Future Appointments   Date Time Provider Department Center   10/9/2024  7:00 AM Marcella Haas PA-C [facility] INT [facility]   10/29/2024 11:40 AM Marcella Haas PA-C [facility] INT [facility] [place]   11/21/2024 11:00 AM Sabi Bills NP [facility]   1/9/2025  8:20 AM Karine Olmos MD [facility]          After visit summary printed and given to patient upon discharge.  Patient goals and care plan are included in After visit summary.      The following issues were discussed: The primary encounter diagnosis was Microalbuminuria due to type 2 diabetes mellitus. Diagnoses of Hyperlipidemia  associated with type 2 diabetes mellitus, Hypertension associated with diabetes, CKD stage 3 due to type 2 diabetes mellitus, Chronic respiratory failure with hypoxia, on home O2 therapy, Severe obesity (BMI 35.0-39.9) with comorbidity, Avascular necrosis of bone of right hip, High pulmonary arterial pressure, Secondary hyperparathyroidism of renal origin, Depression, recurrent, -donor kidney transplant - 13, and Osteoporosis without pathological fracture were also pertinent to this visit.    Health maintenance needs, recent test results and goals of care discussed with pt and questions answered.           Sondra Teague, APRN, NP-C  [facility] [address] [place] [address]

## 2024-10-07 NOTE — ASSESSMENT & PLAN NOTE
Lab Results   Component Value Date    PTH 83.5 (H) 06/06/2023    CALCIUM 9.2 10/03/2024    CAION 1.17 10/08/2013    PHOS 2.9 08/31/2023

## 2024-10-07 NOTE — ASSESSMENT & PLAN NOTE
Not taking full dose of lipitor 40 mg  Lab Results   Component Value Date    LDLCALC 112.6 10/03/2024    Discussed increased risk for ASCVD  She agreed to resume lipitor 40 mg  Denies any adverse symptoms

## 2024-10-08 ENCOUNTER — PATIENT MESSAGE (OUTPATIENT)
Dept: PAIN MEDICINE | Facility: CLINIC | Age: 64
End: 2024-10-08
Payer: MEDICARE

## 2024-10-23 ENCOUNTER — OFFICE VISIT (OUTPATIENT)
Dept: PRIMARY CARE CLINIC | Facility: CLINIC | Age: 64
End: 2024-10-23
Payer: MEDICARE

## 2024-10-23 DIAGNOSIS — N18.30 CKD STAGE 3 DUE TO TYPE 2 DIABETES MELLITUS: Primary | ICD-10-CM

## 2024-10-23 DIAGNOSIS — E11.22 CKD STAGE 3 DUE TO TYPE 2 DIABETES MELLITUS: Primary | ICD-10-CM

## 2024-10-23 PROCEDURE — 99499 UNLISTED E&M SERVICE: CPT | Mod: 95,,, | Performed by: NURSE PRACTITIONER

## 2024-10-28 ENCOUNTER — PATIENT MESSAGE (OUTPATIENT)
Dept: PRIMARY CARE CLINIC | Facility: CLINIC | Age: 64
End: 2024-10-28
Payer: MEDICARE

## 2024-10-29 ENCOUNTER — OFFICE VISIT (OUTPATIENT)
Dept: PAIN MEDICINE | Facility: CLINIC | Age: 64
End: 2024-10-29
Payer: MEDICARE

## 2024-10-29 ENCOUNTER — TELEPHONE (OUTPATIENT)
Dept: PAIN MEDICINE | Facility: CLINIC | Age: 64
End: 2024-10-29

## 2024-10-29 VITALS — BODY MASS INDEX: 32.86 KG/M2 | HEIGHT: 66 IN

## 2024-10-29 DIAGNOSIS — M25.512 CHRONIC LEFT SHOULDER PAIN: ICD-10-CM

## 2024-10-29 DIAGNOSIS — M47.812 CERVICAL SPONDYLOSIS: Primary | ICD-10-CM

## 2024-10-29 DIAGNOSIS — M47.816 LUMBAR SPONDYLOSIS: ICD-10-CM

## 2024-10-29 DIAGNOSIS — M54.16 BILATERAL LUMBAR RADICULOPATHY: ICD-10-CM

## 2024-10-29 DIAGNOSIS — G89.29 CHRONIC PAIN OF BOTH KNEES: ICD-10-CM

## 2024-10-29 DIAGNOSIS — M79.18 MUSCLE PAIN, MYOFASCIAL: ICD-10-CM

## 2024-10-29 DIAGNOSIS — M25.561 CHRONIC PAIN OF BOTH KNEES: ICD-10-CM

## 2024-10-29 DIAGNOSIS — M54.2 NECK PAIN ON LEFT SIDE: ICD-10-CM

## 2024-10-29 DIAGNOSIS — Z79.891 OPIOID USE AGREEMENT EXISTS: ICD-10-CM

## 2024-10-29 DIAGNOSIS — G89.29 CHRONIC LEFT SHOULDER PAIN: ICD-10-CM

## 2024-10-29 DIAGNOSIS — M25.562 CHRONIC PAIN OF BOTH KNEES: ICD-10-CM

## 2024-10-29 PROCEDURE — 1160F RVW MEDS BY RX/DR IN RCRD: CPT | Mod: CPTII,95,, | Performed by: PHYSICIAN ASSISTANT

## 2024-10-29 PROCEDURE — 1159F MED LIST DOCD IN RCRD: CPT | Mod: CPTII,95,, | Performed by: PHYSICIAN ASSISTANT

## 2024-10-29 PROCEDURE — 3044F HG A1C LEVEL LT 7.0%: CPT | Mod: CPTII,95,, | Performed by: PHYSICIAN ASSISTANT

## 2024-10-29 PROCEDURE — 3060F POS MICROALBUMINURIA REV: CPT | Mod: CPTII,95,, | Performed by: PHYSICIAN ASSISTANT

## 2024-10-29 PROCEDURE — 3008F BODY MASS INDEX DOCD: CPT | Mod: CPTII,95,, | Performed by: PHYSICIAN ASSISTANT

## 2024-10-29 PROCEDURE — 99214 OFFICE O/P EST MOD 30 MIN: CPT | Mod: 95,,, | Performed by: PHYSICIAN ASSISTANT

## 2024-10-29 PROCEDURE — G2211 COMPLEX E/M VISIT ADD ON: HCPCS | Mod: 95,,, | Performed by: PHYSICIAN ASSISTANT

## 2024-10-29 PROCEDURE — 3066F NEPHROPATHY DOC TX: CPT | Mod: CPTII,95,, | Performed by: PHYSICIAN ASSISTANT

## 2024-11-01 DIAGNOSIS — D84.9 IMMUNOSUPPRESSED STATUS: ICD-10-CM

## 2024-11-01 DIAGNOSIS — Z94.0 DECEASED-DONOR KIDNEY TRANSPLANT: Chronic | ICD-10-CM

## 2024-11-01 RX ORDER — MYCOPHENOLATE MOFETIL 250 MG/1
500 CAPSULE ORAL 2 TIMES DAILY
Qty: 120 CAPSULE | Refills: 11 | Status: SHIPPED | OUTPATIENT
Start: 2024-11-01

## 2024-11-14 NOTE — PROGRESS NOTES
Subjective:      Patient ID: Ana Maria Teague is a 64 y.o. female.    Chief Complaint: No chief complaint on file.      HPI  Here for follow up of medical problems.      Updated/ annual due 8/25:  HM: 10/22 fluvax, 4/21 covid vaccines/booster, 7/19 HAV, 6/15 [id number], 3/14 [id number], 3/23 [id number], 6/15 Tdap, 8/24 MMG/me, 8/23 BMD with OP on prolia rep 2y, 3/16 Cscope rep 10y, 11/10 HCV neg, Pain DrKiko Here, 4/24 [facility] [place].      Review of Systems      Objective:   There were no vitals taken for this visit.    Physical Exam        Assessment:       No diagnosis found.      Plan:     {There are no diagnoses linked to this encounter. (Refresh or delete this SmartLink)}

## 2024-11-15 ENCOUNTER — PATIENT MESSAGE (OUTPATIENT)
Dept: PRIMARY CARE CLINIC | Facility: CLINIC | Age: 64
End: 2024-11-15
Payer: MEDICARE

## 2024-11-15 ENCOUNTER — PATIENT MESSAGE (OUTPATIENT)
Dept: DIABETES | Facility: CLINIC | Age: 64
End: 2024-11-15
Payer: MEDICARE

## 2024-11-21 ENCOUNTER — OFFICE VISIT (OUTPATIENT)
Dept: DIABETES | Facility: CLINIC | Age: 64
End: 2024-11-21
Payer: MEDICARE

## 2024-11-21 ENCOUNTER — OFFICE VISIT (OUTPATIENT)
Dept: PRIMARY CARE CLINIC | Facility: CLINIC | Age: 64
End: 2024-11-21
Payer: MEDICARE

## 2024-11-21 VITALS — DIASTOLIC BLOOD PRESSURE: 75 MMHG | SYSTOLIC BLOOD PRESSURE: 122 MMHG

## 2024-11-21 DIAGNOSIS — E11.59 HYPERTENSION ASSOCIATED WITH DIABETES: ICD-10-CM

## 2024-11-21 DIAGNOSIS — E11.59 HYPERTENSION ASSOCIATED WITH DIABETES: Primary | ICD-10-CM

## 2024-11-21 DIAGNOSIS — I70.0 CALCIFICATION OF AORTA: ICD-10-CM

## 2024-11-21 DIAGNOSIS — Z79.4 TYPE 2 DIABETES MELLITUS WITH HYPERGLYCEMIA, WITH LONG-TERM CURRENT USE OF INSULIN: ICD-10-CM

## 2024-11-21 DIAGNOSIS — K21.9 GASTROESOPHAGEAL REFLUX DISEASE WITHOUT ESOPHAGITIS: ICD-10-CM

## 2024-11-21 DIAGNOSIS — M81.0 AGE-RELATED OSTEOPOROSIS WITHOUT CURRENT PATHOLOGICAL FRACTURE: ICD-10-CM

## 2024-11-21 DIAGNOSIS — Z94.0 DECEASED-DONOR KIDNEY TRANSPLANT: ICD-10-CM

## 2024-11-21 DIAGNOSIS — E11.65 TYPE 2 DIABETES MELLITUS WITH HYPERGLYCEMIA, WITH LONG-TERM CURRENT USE OF INSULIN: ICD-10-CM

## 2024-11-21 DIAGNOSIS — E11.69 HYPERLIPIDEMIA ASSOCIATED WITH TYPE 2 DIABETES MELLITUS: ICD-10-CM

## 2024-11-21 DIAGNOSIS — I15.2 HYPERTENSION ASSOCIATED WITH DIABETES: ICD-10-CM

## 2024-11-21 DIAGNOSIS — E78.5 HYPERLIPIDEMIA ASSOCIATED WITH TYPE 2 DIABETES MELLITUS: ICD-10-CM

## 2024-11-21 DIAGNOSIS — Z79.4 TYPE 2 DIABETES MELLITUS WITH HYPERGLYCEMIA, WITH LONG-TERM CURRENT USE OF INSULIN: Primary | ICD-10-CM

## 2024-11-21 DIAGNOSIS — E11.65 TYPE 2 DIABETES MELLITUS WITH HYPERGLYCEMIA, WITH LONG-TERM CURRENT USE OF INSULIN: Primary | ICD-10-CM

## 2024-11-21 DIAGNOSIS — I15.2 HYPERTENSION ASSOCIATED WITH DIABETES: Primary | ICD-10-CM

## 2024-11-21 PROCEDURE — 3060F POS MICROALBUMINURIA REV: CPT | Mod: CPTII,95,, | Performed by: NURSE PRACTITIONER

## 2024-11-21 PROCEDURE — 3044F HG A1C LEVEL LT 7.0%: CPT | Mod: CPTII,95,, | Performed by: INTERNAL MEDICINE

## 2024-11-21 PROCEDURE — 3060F POS MICROALBUMINURIA REV: CPT | Mod: CPTII,95,, | Performed by: INTERNAL MEDICINE

## 2024-11-21 PROCEDURE — 99213 OFFICE O/P EST LOW 20 MIN: CPT | Mod: 95,,, | Performed by: INTERNAL MEDICINE

## 2024-11-21 PROCEDURE — 1159F MED LIST DOCD IN RCRD: CPT | Mod: CPTII,95,, | Performed by: INTERNAL MEDICINE

## 2024-11-21 PROCEDURE — G2211 COMPLEX E/M VISIT ADD ON: HCPCS | Mod: 95,,, | Performed by: NURSE PRACTITIONER

## 2024-11-21 PROCEDURE — 2023F DILAT RTA XM W/O RTNOPTHY: CPT | Mod: CPTII,95,, | Performed by: INTERNAL MEDICINE

## 2024-11-21 PROCEDURE — 1160F RVW MEDS BY RX/DR IN RCRD: CPT | Mod: CPTII,95,, | Performed by: NURSE PRACTITIONER

## 2024-11-21 PROCEDURE — 1101F PT FALLS ASSESS-DOCD LE1/YR: CPT | Mod: CPTII,95,, | Performed by: INTERNAL MEDICINE

## 2024-11-21 PROCEDURE — 3044F HG A1C LEVEL LT 7.0%: CPT | Mod: CPTII,95,, | Performed by: NURSE PRACTITIONER

## 2024-11-21 PROCEDURE — 1159F MED LIST DOCD IN RCRD: CPT | Mod: CPTII,95,, | Performed by: NURSE PRACTITIONER

## 2024-11-21 PROCEDURE — 3074F SYST BP LT 130 MM HG: CPT | Mod: CPTII,95,, | Performed by: INTERNAL MEDICINE

## 2024-11-21 PROCEDURE — 3288F FALL RISK ASSESSMENT DOCD: CPT | Mod: CPTII,95,, | Performed by: INTERNAL MEDICINE

## 2024-11-21 PROCEDURE — 99214 OFFICE O/P EST MOD 30 MIN: CPT | Mod: 95,,, | Performed by: NURSE PRACTITIONER

## 2024-11-21 PROCEDURE — 3078F DIAST BP <80 MM HG: CPT | Mod: CPTII,95,, | Performed by: INTERNAL MEDICINE

## 2024-11-21 PROCEDURE — 3066F NEPHROPATHY DOC TX: CPT | Mod: CPTII,95,, | Performed by: NURSE PRACTITIONER

## 2024-11-21 PROCEDURE — 3066F NEPHROPATHY DOC TX: CPT | Mod: CPTII,95,, | Performed by: INTERNAL MEDICINE

## 2024-11-21 RX ORDER — TIRZEPATIDE 7.5 MG/.5ML
7.5 INJECTION, SOLUTION SUBCUTANEOUS
Qty: 4 PEN | Refills: 0 | Status: SHIPPED | OUTPATIENT
Start: 2024-11-21 | End: 2024-11-21 | Stop reason: SDUPTHER

## 2024-11-21 RX ORDER — TIRZEPATIDE 7.5 MG/.5ML
7.5 INJECTION, SOLUTION SUBCUTANEOUS
Qty: 4 PEN | Refills: 0 | Status: SHIPPED | OUTPATIENT
Start: 2024-11-21

## 2024-11-21 NOTE — PROGRESS NOTES
Primary Care Telemedicine Note  The patient location is:  Patient Home   The chief complaint leading to consultation is: Follow up of medical problems.      Visit type: Virtual visit with synchronous audio and video  Each patient to whom he or she provides medical services by telemedicine is:  (1) informed of the relationship between the physician and patient and the respective role of any other health care provider with respect to management of the patient; and (2) notified that he or she may decline to receive medical services by telemedicine and may withdraw from such care at any time.      HPI:  History of Present Illness      No more dysphagia, had dilation with EGD.  On PPI BID now.  BP at home 122/75.  DM NP today changed from ozempic to mounjaro.  Plans to restart fosamax, has held it for dental work.      Updated/ annual due 8/25:  HM: 10/22 fluvax, 4/21 covid vaccines/booster, 7/19 HAV, 6/15 [id number], 3/14 [id number], 3/23 [id number], 6/15 Tdap, 8/24 MMG/me, 8/23 BMD with OP on fosamax rep 2y, 3/16 Cscope rep 10y, 11/10 HCV neg, Pain Dr. Anaya, 4/24 Eye  [facility].       Problem List Items Addressed This Visit       Gastroesophageal reflux disease without esophagitis    Hypertension associated with diabetes - Primary    Type 2 diabetes mellitus with hyperglycemia, with long-term current use of insulin       The patient's Health Maintenance was reviewed and the following appears to be due at this time:  Health Maintenance Due   Topic Date Due    Complete Opioid Risk Tool  Never done    Shingles Vaccine (1 of 2) Never done    RSV Vaccine (Age 60+ and Pregnant patients) (1 - Risk 60-74 years 1-dose series) Never done    Influenza Vaccine (1) 09/01/2024    COVID-19 Vaccine (4 - 2024-25 season) 09/01/2024    Eye Exam  01/12/2025       [unfilled]  Outpatient Medications Prior to Visit   Medication Sig Dispense Refill    albuterol (PROVENTIL/VENTOLIN HFA) 90 mcg/actuation inhaler Rescue 18 g 2     albuterol-ipratropium (DUO-NEB) 2.5 mg-0.5 mg/3 mL nebulizer solution Take 3 mLs by nebulization every 6 (six) hours as needed for Wheezing (and cough). Rescue 75 mL 0    atorvastatin (LIPITOR) 40 MG tablet Take 1 tablet (40 mg total) by mouth once daily. 90 tablet 3    azelastine (OPTIVAR) 0.05 % ophthalmic solution Place 1 drop into both eyes 2 (two) times daily. 6 mL 0    blood sugar diagnostic (FREESTYLE LITE STRIPS) Strp Use to check blood glucose 3 times a day. 100 strip 11    cetirizine (ZYRTEC) 10 MG tablet TAKE 1 TABLET BY MOUTH EVERY DAY 30 tablet 11    diclofenac sodium (VOLTAREN) 1 % Gel APPLY 2 Grams TOPICALLY FOUR TIMES DAILY AS NEEDED 100 g 0    empagliflozin (JARDIANCE) 10 mg tablet Take 1 tablet (10 mg total) by mouth once daily. 90 tablet 0    fluticasone propionate (FLONASE) 50 mcg/actuation nasal spray SHAKE LIQUID AND USE 2 SPRAYS(100 MCG) IN EACH NOSTRIL EVERY DAY 48 g 1    inhalation spacing device Use as directed for inhalation. 1 each 0    insulin lispro (HUMALOG KWIKPEN INSULIN) 100 unit/mL pen Inject 4-7 units before each main meal. 45 mL 3    lancets (FREESTYLE LANCETS) 28 gauge Misc 1 lancet by Combination route 2 (two) times daily as needed. Qid prn 400 each 2    lancets Misc 1 strip by Misc.(Non-Drug; Combo Route) route 4 (four) times daily with meals and nightly. 150 each 6    LIDOcaine (LIDODERM) 5 % Use 1-3 patches per day over recommended area. Remove & Discard patch within 12 hours or as directed by MD. 90 patch 0    mycophenolate (CELLCEPT) 250 mg Cap TAKE (2) CAPSULES TWICE DAILY. 120 capsule 11    nebulizer and compressor Ewelina Use as directed 1 each 0    NIFEdipine (PROCARDIA-XL) 30 MG (OSM) 24 hr tablet Take 1 tablet (30 mg total) by mouth 2 (two) times a day. 180 tablet 3    omeprazole (PRILOSEC) 40 MG capsule Take 1 capsule (40 mg total) by mouth 2 (two) times daily before meals. 180 capsule 3    ondansetron (ZOFRAN-ODT) 4 MG TbDL Take 1 tablet (4 mg total) by mouth every 8  "(eight) hours as needed. 15 tablet 1    [START ON 1/1/2025] oxyCODONE-acetaminophen (PERCOCET)  mg per tablet Take 1 tablet by mouth every 8 (eight) hours as needed for Pain. 90 tablet 0    [START ON 12/2/2024] oxyCODONE-acetaminophen (PERCOCET)  mg per tablet Take 1 tablet by mouth every 8 (eight) hours as needed for Pain. 90 tablet 0    pen needle, diabetic (BD RORY 2ND GEN PEN NEEDLE) 32 gauge x 5/32" Ndle USE THREE TIMES DAILY WITH INSULIN AS DIRECTED 300 each 3    polymyxin B sulf-trimethoprim (POLYTRIM) 10,000 unit- 1 mg/mL Drop Place 1 drop into both eyes every 6 (six) hours. 10 mL 0    pulse oximeter (PULSE OXIMETER) device by Apply Externally route 2 (two) times a day. Use twice daily at 8 AM and 3 PM and record the value in [facility] as directed. 1 each 0    tacrolimus (PROGRAF) 1 MG Cap TAKE 3 CAPSULES IN THE MORNING, AND 3 IN THE EVENING 180 capsule 11    tirzepatide (MOUNJARO) 7.5 mg/0.5 mL PnIj Inject 7.5 mg into the skin every 7 days. If tolerating well, would like to increase to next dose in 4 weeks. 4 Pen 0    tiZANidine (ZANAFLEX) 4 MG tablet Take 1 tablet (4 mg total) by mouth 2 (two) times daily as needed (muscle spasms). 60 tablet 1    alendronate (FOSAMAX) 70 MG tablet Take 1 tablet (70 mg total) by mouth every 7 days. 4 tablet 11    levothyroxine (SYNTHROID) 150 MCG tablet Take 1 tablet (150 mcg total) by mouth before breakfast. 90 tablet 3     Facility-Administered Medications Prior to Visit   Medication Dose Route Frequency Provider Last Rate Last Admin    lactated ringers infusion   Intravenous Continuous Lisa Nevarez MD          ROS   Physical Exam        No data to display                   No data to display                  /75 Comment: unable to obtain    Constitutional: The patient appears well-developed and well-nourished. No distress.   Psychiatric: The mood appears not anxious and the affect is not angry, not blunt, not labile and not inappropriate. Speech " is not rapid and/or pressured, not delayed, not tangential and not slurred. The patient is not agitated, not aggressive, is not hyperactive, not slowed, not withdrawn, not actively hallucinating and not combative. Thought content is not paranoid and not delusional. Cognition and memory are not impaired. The patient does not express impulsivity or inappropriate judgment and does not exhibit a depressed mood. The patient expresses no homicidal and no suicidal ideation and has no suicidal plans and no homicidal plans. The patient is communicative and exhibits a normal recent memory and normal remote memory. The patient is attentive.          Latest Reference Range & Units 10/03/24 08:41   WBC 3.90 - 12.70 K/uL 9.97   RBC 4.00 - 5.40 M/uL 4.65   Hemoglobin 12.0 - 16.0 g/dL 12.1   Hematocrit 37.0 - 48.5 % 40.8   MCV 82 - 98 fL 88   MCH 27.0 - 31.0 pg 26.0 (L)   MCHC 32.0 - 36.0 g/dL 29.7 (L)   RDW 11.5 - 14.5 % 15.0 (H)   Platelet Count 150 - 450 K/uL 300   MPV 9.2 - 12.9 fL 11.5   Gran % 38.0 - 73.0 % 56.6   Lymph % 18.0 - 48.0 % 33.8   Mono % 4.0 - 15.0 % 5.6   Eos % 0.0 - 8.0 % 2.8   Basophil % 0.0 - 1.9 % 0.6   Immature Granulocytes 0.0 - 0.5 % 0.6 (H)   Gran # (ANC) 1.8 - 7.7 K/uL 5.6   Lymph # 1.0 - 4.8 K/uL 3.4   Mono # 0.3 - 1.0 K/uL 0.6   Eos # 0.0 - 0.5 K/uL 0.3   Baso # 0.00 - 0.20 K/uL 0.06   Immature Grans (Abs) 0.00 - 0.04 K/uL 0.06 (H)   nRBC 0 /100 WBC 0   Differential Method  Automated   Sodium 136 - 145 mmol/L 138   Potassium 3.5 - 5.1 mmol/L 3.9   Chloride 95 - 110 mmol/L 103   CO2 23 - 29 mmol/L 24   Anion Gap 8 - 16 mmol/L 11   BUN 8 - 23 mg/dL 10   Creatinine 0.5 - 1.4 mg/dL 0.9   eGFR >60 mL/min/1.73 m^2 >60.0   Glucose 70 - 110 mg/dL 119 (H)   Calcium 8.7 - 10.5 mg/dL 9.2   ALP 55 - 135 U/L 113   PROTEIN TOTAL 6.0 - 8.4 g/dL 7.9   Albumin 3.5 - 5.2 g/dL 3.8   BILIRUBIN TOTAL 0.1 - 1.0 mg/dL 0.4   AST 10 - 40 U/L 17   ALT 10 - 44 U/L 12   Cholesterol Total 120 - 199 mg/dL 177   HDL 40 - 75 mg/dL 50    HDL/Cholesterol Ratio 20.0 - 50.0 % 28.2   Non-HDL Cholesterol mg/dL 127   Total Cholesterol/HDL Ratio 2.0 - 5.0  3.5   Triglycerides 30 - 150 mg/dL 72   LDL Cholesterol 63.0 - 159.0 mg/dL 112.6   Hemoglobin A1C External 4.0 - 5.6 % 6.0 (H)   Estimated Avg Glucose 68 - 131 mg/dL 126   TSH 0.400 - 4.000 uIU/mL 4.649 (H)   Free T4 0.71 - 1.51 ng/dL 0.93     Encounter Diagnoses   Name Primary?    Hypertension associated with diabetes Yes    Gastroesophageal reflux disease without esophagitis     Type 2 diabetes mellitus with hyperglycemia, with long-term current use of insulin        PLAN:    Assessment & Plan            I am having Ana Maria Teague maintain her lancets, lancets, pulse oximeter, cetirizine, blood sugar diagnostic, levothyroxine, fluticasone propionate, alendronate, tacrolimus, inhalation spacing device, albuterol, nebulizer and compressor, polymyxin B sulf-trimethoprim, azelastine, albuterol-ipratropium, insulin lispro, (pen needle, diabetic), NIFEdipine, ondansetron, LIDOcaine, atorvastatin, omeprazole, diclofenac sodium, tiZANidine, oxyCODONE-acetaminophen, oxyCODONE-acetaminophen, empagliflozin, mycophenolate, and MOUNJARO.  No problem-specific Assessment & Plan notes found for this encounter.      Follow up in about 3 months (around 2/21/2025).    Ana Maria was seen today for follow-up.    Diagnoses and all orders for this visit:    Hypertension associated with diabetes- stable, cont rx.    Gastroesophageal reflux disease without esophagitis- dysphagia has resolved, f/u with repeat EGD.    Type 2 diabetes mellitus with hyperglycemia, with long-term current use of insulin- change to mounjaro.    Cont meds.  Get fluvax/RSV at pharmacy.  RTC 3mo.     [unfilled]  No orders of the defined types were placed in this encounter.      Medication List with Changes/Refills   Current Medications    ALBUTEROL (PROVENTIL/VENTOLIN HFA) 90 MCG/ACTUATION INHALER    Rescue    ALBUTEROL-IPRATROPIUM (DUO-NEB) 2.5 MG-0.5  MG/3 ML NEBULIZER SOLUTION    Take 3 mLs by nebulization every 6 (six) hours as needed for Wheezing (and cough). Rescue    ALENDRONATE (FOSAMAX) 70 MG TABLET    Take 1 tablet (70 mg total) by mouth every 7 days.    ATORVASTATIN (LIPITOR) 40 MG TABLET    Take 1 tablet (40 mg total) by mouth once daily.    AZELASTINE (OPTIVAR) 0.05 % OPHTHALMIC SOLUTION    Place 1 drop into both eyes 2 (two) times daily.    BLOOD SUGAR DIAGNOSTIC (FREESTYLE LITE STRIPS) STRP    Use to check blood glucose 3 times a day.    CETIRIZINE (ZYRTEC) 10 MG TABLET    TAKE 1 TABLET BY MOUTH EVERY DAY    DICLOFENAC SODIUM (VOLTAREN) 1 % GEL    APPLY 2 Grams TOPICALLY FOUR TIMES DAILY AS NEEDED    EMPAGLIFLOZIN (JARDIANCE) 10 MG TABLET    Take 1 tablet (10 mg total) by mouth once daily.    FLUTICASONE PROPIONATE (FLONASE) 50 MCG/ACTUATION NASAL SPRAY    SHAKE LIQUID AND USE 2 SPRAYS(100 MCG) IN EACH NOSTRIL EVERY DAY    INHALATION SPACING DEVICE    Use as directed for inhalation.    INSULIN LISPRO (HUMALOG KWIKPEN INSULIN) 100 UNIT/ML PEN    Inject 4-7 units before each main meal.    LANCETS (FREESTYLE LANCETS) 28 GAUGE MISC    1 lancet by Combination route 2 (two) times daily as needed. Qid prn    LANCETS MISC    1 strip by Misc.(Non-Drug; Combo Route) route 4 (four) times daily with meals and nightly.    LEVOTHYROXINE (SYNTHROID) 150 MCG TABLET    Take 1 tablet (150 mcg total) by mouth before breakfast.    LIDOCAINE (LIDODERM) 5 %    Use 1-3 patches per day over recommended area. Remove & Discard patch within 12 hours or as directed by MD.    MYCOPHENOLATE (CELLCEPT) 250 MG CAP    TAKE (2) CAPSULES TWICE DAILY.    NEBULIZER AND COMPRESSOR REKHA    Use as directed    NIFEDIPINE (PROCARDIA-XL) 30 MG (OSM) 24 HR TABLET    Take 1 tablet (30 mg total) by mouth 2 (two) times a day.    OMEPRAZOLE (PRILOSEC) 40 MG CAPSULE    Take 1 capsule (40 mg total) by mouth 2 (two) times daily before meals.    ONDANSETRON (ZOFRAN-ODT) 4 MG TBDL    Take 1 tablet (4  "mg total) by mouth every 8 (eight) hours as needed.    OXYCODONE-ACETAMINOPHEN (PERCOCET)  MG PER TABLET    Take 1 tablet by mouth every 8 (eight) hours as needed for Pain.    OXYCODONE-ACETAMINOPHEN (PERCOCET)  MG PER TABLET    Take 1 tablet by mouth every 8 (eight) hours as needed for Pain.    PEN NEEDLE, DIABETIC (BD RORY 2ND GEN PEN NEEDLE) 32 GAUGE X 5/32" NDLE    USE THREE TIMES DAILY WITH INSULIN AS DIRECTED    POLYMYXIN B SULF-TRIMETHOPRIM (POLYTRIM) 10,000 UNIT- 1 MG/ML DROP    Place 1 drop into both eyes every 6 (six) hours.    PULSE OXIMETER (PULSE OXIMETER) DEVICE    by Apply Externally route 2 (two) times a day. Use twice daily at 8 AM and 3 PM and record the value in MyChart as directed.    TACROLIMUS (PROGRAF) 1 MG CAP    TAKE 3 CAPSULES IN THE MORNING, AND 3 IN THE EVENING    TIRZEPATIDE (MOUNJARO) 7.5 MG/0.5 ML PNIJ    Inject 7.5 mg into the skin every 7 days. If tolerating well, would like to increase to next dose in 4 weeks.    TIZANIDINE (ZANAFLEX) 4 MG TABLET    Take 1 tablet (4 mg total) by mouth 2 (two) times daily as needed (muscle spasms).      Medication List with Changes/Refills   Current Medications    ALBUTEROL (PROVENTIL/VENTOLIN HFA) 90 MCG/ACTUATION INHALER    Rescue       Start Date: 11/7/2023 End Date: --    ALBUTEROL-IPRATROPIUM (DUO-NEB) 2.5 MG-0.5 MG/3 ML NEBULIZER SOLUTION    Take 3 mLs by nebulization every 6 (six) hours as needed for Wheezing (and cough). Rescue       Start Date: 3/26/2024 End Date: 3/26/2025    ALENDRONATE (FOSAMAX) 70 MG TABLET    Take 1 tablet (70 mg total) by mouth every 7 days.       Start Date: 8/10/2023 End Date: 8/9/2024    ATORVASTATIN (LIPITOR) 40 MG TABLET    Take 1 tablet (40 mg total) by mouth once daily.       Start Date: 8/9/2024  End Date: 8/9/2025    AZELASTINE (OPTIVAR) 0.05 % OPHTHALMIC SOLUTION    Place 1 drop into both eyes 2 (two) times daily.       Start Date: 12/21/2023End Date: 12/20/2024    BLOOD SUGAR DIAGNOSTIC " (FREESTYLE LITE STRIPS) STRP    Use to check blood glucose 3 times a day.       Start Date: 2/2/2022  End Date: --    CETIRIZINE (ZYRTEC) 10 MG TABLET    TAKE 1 TABLET BY MOUTH EVERY DAY       Start Date: 11/28/2021End Date: --    DICLOFENAC SODIUM (VOLTAREN) 1 % GEL    APPLY 2 Grams TOPICALLY FOUR TIMES DAILY AS NEEDED       Start Date: 10/3/2024 End Date: --    EMPAGLIFLOZIN (JARDIANCE) 10 MG TABLET    Take 1 tablet (10 mg total) by mouth once daily.       Start Date: 10/29/2024End Date: 10/29/2025    FLUTICASONE PROPIONATE (FLONASE) 50 MCG/ACTUATION NASAL SPRAY    SHAKE LIQUID AND USE 2 SPRAYS(100 MCG) IN EACH NOSTRIL EVERY DAY       Start Date: 11/26/2022End Date: --    INHALATION SPACING DEVICE    Use as directed for inhalation.       Start Date: 11/6/2023 End Date: --    INSULIN LISPRO (HUMALOG KWIKPEN INSULIN) 100 UNIT/ML PEN    Inject 4-7 units before each main meal.       Start Date: 5/21/2024 End Date: --    LANCETS (FREESTYLE LANCETS) 28 GAUGE MISC    1 lancet by Combination route 2 (two) times daily as needed. Qid prn       Start Date: 12/4/2018 End Date: --    LANCETS MISC    1 strip by Misc.(Non-Drug; Combo Route) route 4 (four) times daily with meals and nightly.       Start Date: 2/10/2015 End Date: --    LEVOTHYROXINE (SYNTHROID) 150 MCG TABLET    Take 1 tablet (150 mcg total) by mouth before breakfast.       Start Date: 9/17/2022 End Date: 5/22/2024    LIDOCAINE (LIDODERM) 5 %    Use 1-3 patches per day over recommended area. Remove & Discard patch within 12 hours or as directed by MD.       Start Date: 7/25/2024 End Date: --    MYCOPHENOLATE (CELLCEPT) 250 MG CAP    TAKE (2) CAPSULES TWICE DAILY.       Start Date: 11/1/2024 End Date: --    NEBULIZER AND COMPRESSOR REKHA    Use as directed       Start Date: 11/10/2023End Date: --    NIFEDIPINE (PROCARDIA-XL) 30 MG (OSM) 24 HR TABLET    Take 1 tablet (30 mg total) by mouth 2 (two) times a day.       Start Date: 7/18/2024 End Date: 7/18/2025     "OMEPRAZOLE (PRILOSEC) 40 MG CAPSULE    Take 1 capsule (40 mg total) by mouth 2 (two) times daily before meals.       Start Date: 9/18/2024 End Date: 9/18/2025    ONDANSETRON (ZOFRAN-ODT) 4 MG TBDL    Take 1 tablet (4 mg total) by mouth every 8 (eight) hours as needed.       Start Date: 7/18/2024 End Date: --    OXYCODONE-ACETAMINOPHEN (PERCOCET)  MG PER TABLET    Take 1 tablet by mouth every 8 (eight) hours as needed for Pain.       Start Date: 1/1/2025  End Date: --    OXYCODONE-ACETAMINOPHEN (PERCOCET)  MG PER TABLET    Take 1 tablet by mouth every 8 (eight) hours as needed for Pain.       Start Date: 12/2/2024 End Date: 1/1/2025    PEN NEEDLE, DIABETIC (BD RORY 2ND GEN PEN NEEDLE) 32 GAUGE X 5/32" NDLE    USE THREE TIMES DAILY WITH INSULIN AS DIRECTED       Start Date: 5/21/2024 End Date: --    POLYMYXIN B SULF-TRIMETHOPRIM (POLYTRIM) 10,000 UNIT- 1 MG/ML DROP    Place 1 drop into both eyes every 6 (six) hours.       Start Date: 12/18/2023End Date: --    PULSE OXIMETER (PULSE OXIMETER) DEVICE    by Apply Externally route 2 (two) times a day. Use twice daily at 8 AM and 3 PM and record the value in MyChart as directed.       Start Date: 1/8/2021  End Date: --    TACROLIMUS (PROGRAF) 1 MG CAP    TAKE 3 CAPSULES IN THE MORNING, AND 3 IN THE EVENING       Start Date: 9/6/2023  End Date: --    TIRZEPATIDE (MOUNJARO) 7.5 MG/0.5 ML PNIJ    Inject 7.5 mg into the skin every 7 days. If tolerating well, would like to increase to next dose in 4 weeks.       Start Date: 11/21/2024End Date: --    TIZANIDINE (ZANAFLEX) 4 MG TABLET    Take 1 tablet (4 mg total) by mouth 2 (two) times daily as needed (muscle spasms).       Start Date: 10/3/2024 End Date: --              "

## 2024-11-21 NOTE — PROGRESS NOTES
The patient location is: [place]  The chief complaint leading to consultation is: Diabetes management follow up     Visit type: audiovisual    Face to Face time with patient: 10 minutes  15 minutes of total time spent on the encounter, which includes face to face time and non-face to face time preparing to see the patient (eg, review of tests), Obtaining and/or reviewing separately obtained history, Documenting clinical information in the electronic or other health record, Independently interpreting results (not separately reported) and communicating results to the patient/family/caregiver, or Care coordination (not separately reported).         Each patient to whom he or she provides medical services by telemedicine is:  (1) informed of the relationship between the physician and patient and the respective role of any other health care provider with respect to management of the patient; and (2) notified that he or she may decline to receive medical services by telemedicine and may withdraw from such care at any time.    Notes:      Subjective:         Patient ID: Ana Maria Teague is a 64 y.o. female.  Patient's current PCP is Karine Olmos MD.     Chief Complaint: Diabetes Mellitus    HPI  Ana Maria Teague is a 64 y.o. Black or  female presenting for a follow up for diabetes. Patient has been diagnosed with type 2 diabetes since 2013 .  Received diabetes education: Yes    CURRENT DM MEDICATIONS:   Diabetes Medications               empagliflozin (JARDIANCE) 10 mg tablet Take 1 tablet (10 mg total) by mouth once daily.    insulin lispro (HUMALOG KWIKPEN INSULIN) 100 unit/mL pen Inject 4-7 units before each main meal.    semaglutide (OZEMPIC) 2 mg/dose (8 mg/3 mL) PnIj Inject 2 mg into the skin every 7 days.     Past failed treatment include: Lantus; Trulicity-availability only; Ozempic-desires further weight loss so will try Mounjaro    Blood glucose testing Continuous per Nico 3  Preferred lab:  [facility]    Any episodes of hypoglycemia? Denies    Complications related to diabetes: nephropathy and peripheral neuropathy    Her blood sugar in the clinic today was:   Lab Results   Component Value Date    POCGLU 68 (A) 05/21/2024     Ana Maria Teague presents today for follow up visit to discuss diabetes management.     Between visits, switched to Nico 3. I do not have her Nico download for this visit. Reports stable glycemic control. No hypoglycemia.    Tolerating Ozempic well without GI side effects but desires further weight loss- will try Mounjaro. We discussed MOA and side effects. Will titrate as needed.    H/o kidney transplant - R kidney- in 2013. Currently with CKD III. Nephrology added Jardiance- tolerating well from a  perspective. We discussed most common side effects and importance of staying hydrated and when necessary to hold the medication.    UTD eye and foot exams.    Current diet: 2-3 meals/day, infrequent snacks.  Activity Level: No formal exercise    Lab Results   Component Value Date    HGBA1C 6.0 (H) 10/03/2024    HGBA1C 6.7 (H) 05/13/2024    HGBA1C 6.7 (H) 11/02/2023     STANDARDS OF CARE  Diabetes Management Status    Statin: Taking  ACE/ARB: Not taking    Screening or Prevention Patient's value Goal Complete/Controlled?   HgA1C Testing and Control   Lab Results   Component Value Date    HGBA1C 6.0 (H) 10/03/2024      Annually/Less than 8% Yes   Lipid profile : 10/03/2024 Annually Yes   LDL control Lab Results   Component Value Date    LDLCALC 112.6 10/03/2024    Annually/Less than 100 mg/dl  No   Nephropathy screening Lab Results   Component Value Date    LABMICR 59.0 10/03/2024     Lab Results   Component Value Date    PROTEINUA 1+ (A) 04/11/2024     Lab Results   Component Value Date    UTPCR 0.13 11/25/2022      Annually Yes   Blood pressure BP Readings from Last 1 Encounters:   10/07/24 118/66    Less than 140/90 Yes   Dilated retinal exam : 01/12/2024 Annually Yes   Foot exam    ": 08/09/2024 Annually Yes      Labs reviewed and are noted below.    Lab Results   Component Value Date    WBC 9.97 10/03/2024    HGB 12.1 10/03/2024    HCT 40.8 10/03/2024     10/03/2024    CHOL 177 10/03/2024    TRIG 72 10/03/2024    HDL 50 10/03/2024    LDLCALC 112.6 10/03/2024    ALT 12 10/03/2024    AST 17 10/03/2024     10/03/2024    K 3.9 10/03/2024     10/03/2024    ANIONGAP 11 10/03/2024    CREATININE 0.9 10/03/2024    ESTGFRAFRICA 61 06/28/2023    EGFRNONAA 48.6 (A) 07/12/2022    BUN 10 10/03/2024    CO2 24 10/03/2024    TSH 4.649 (H) 10/03/2024    INR 1.1 01/04/2021     (H) 10/03/2024    UTPCR 0.13 11/25/2022     Lab Results   Component Value Date    FREET4 0.93 10/03/2024    TSH 4.649 (H) 10/03/2024    IRON 60 06/10/2021    TIBC 311 06/10/2021    FERRITIN 982 (H) 06/10/2021    PTH 83.5 (H) 06/06/2023    CALCIUM 9.2 10/03/2024    CAION 1.17 10/08/2013    PHOS 2.9 08/31/2023     No results found for: "CPEPTIDE"  No results found for: "GLUTAMICACID"  Glucose   Date Value Ref Range Status   10/03/2024 119 (H) 70 - 110 mg/dL Final     Anion Gap   Date Value Ref Range Status   10/03/2024 11 8 - 16 mmol/L Final     eGFR if    Date Value Ref Range Status   07/12/2022 56.0 (A) >60 mL/min/1.73 m^2 Final     eGFR    Date Value Ref Range Status   06/28/2023 61 mL/min/1.73mSq Final     Comment:     In accordance with NKF-ASN Task Force recommendation, calculation based on the Chronic Kidney Disease Epidemiology Collaboration (CKD-EPI) equation without adjustment for race. eGFR adjusted for gender and age and calculated in ml/min/1.73mSquared. eGFR cannot be calculated if patient is under 18 years of age.     Reference Range:   >= 60 ml/min/1.73mSquared.     eGFR if non    Date Value Ref Range Status   07/12/2022 48.6 (A) >60 mL/min/1.73 m^2 Final     Comment:     Calculation used to obtain the estimated glomerular filtration  rate (eGFR) is the " CKD-EPI equation.          The following portions of the patient's history were reviewed and updated as appropriate: allergies, current medications, past family history, past medical history, past social history, past surgical history, and problem list.    Review of patient's allergies indicates:   Allergen Reactions    Azithromycin Nausea And Vomiting    Adhesive Other (See Comments)     Skin tears    Nsaids (non-steroidal anti-inflammatory drug)      Kidney Transplant     Social History     Socioeconomic History    Marital status:     Number of children: 3   Occupational History    Occupation: disable     Comment: dept manager at [employer]   Tobacco Use    Smoking status: Former     Current packs/day: 0.00     Average packs/day: 1 pack/day for 10.0 years (10.0 ttl pk-yrs)     Types: Cigarettes     Start date: 1992     Quit date: 2002     Years since quittin.0    Smokeless tobacco: Never    Tobacco comments:     quit smoking approx 10-15 years ago    Substance and Sexual Activity    Alcohol use: No    Drug use: No    Sexual activity: Never     Partners: Male   Social History Narrative    Does housework at home.     Social Drivers of Health     Financial Resource Strain: Low Risk  (2024)    Overall Financial Resource Strain (CARDIA)     Difficulty of Paying Living Expenses: Not very hard   Food Insecurity: Patient Declined (2024)    Hunger Vital Sign     Worried About Running Out of Food in the Last Year: Patient declined     Ran Out of Food in the Last Year: Patient declined   Transportation Needs: No Transportation Needs (2023)    PRAPARE - Transportation     Lack of Transportation (Medical): No     Lack of Transportation (Non-Medical): No   Physical Activity: Insufficiently Active (2024)    Exercise Vital Sign     Days of Exercise per Week: 1 day     Minutes of Exercise per Session: 10 min   Stress: No Stress Concern Present (2024)    [facility]  [facility] - Occupational Stress Questionnaire     Feeling of Stress : Not at all   Housing Stability: Low Risk  (2023)    Housing Stability Vital Sign     Unable to Pay for Housing in the Last Year: No     Number of Places Lived in the Last Year: 1     Unstable Housing in the Last Year: No     Past Medical History:   Diagnosis Date    Abnormal glucose 2013    Acquired hypothyroidism     Acute bronchiolitis 10/15/2014    Anemia     Anemia of chronic renal failure 2013    Anxiety     Anxiety disorder     Avascular necrosis of bone of hip     Back pain     s/p steroid injections    Bacteremia due to Escherichia coli 2021    Chronic immunosuppression with Prograf and Cellcept 2013    CKD (chronic kidney disease) stage 2, GFR 60-89 ml/min     CKD (chronic kidney disease) stage 5, GFR less than 15 ml/min     -donor kidney transplant - 13    Depression     Hypertension, renal 2013    Hypothyroidism     Immunosuppressed status 10/15/2014    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    Osteoporosis with pathological fracture     Renal disease due to hypertension 2013    Renal hypertension, non-vascular     Secondary hyperparathyroidism of renal origin 2013    Vertigo      REVIEW OF SYSTEMS:  Eyes No history of DR.  Cardiovascular: History of HTN and HLD.   GI:Tolerating Ozempic well without GI side effects.   : H/o CKD III  Neuro: Positive neuropathy.  PSYCH: No tobacco use.  ENDO: See HPI.        Objective:      There were no vitals filed for this visit.  RESPIRATORY: No respiratory distress  NEUROLOGIC: not assessed-virtual visit  PSYCHIATRIC: Alert & oriented x3. Normal mood and affect.  FOOT EXAMINATION: UTD    Assessment:       1. Type 2 diabetes mellitus with hyperglycemia, with long-term current use of insulin    2. Hyperlipidemia associated with type 2 diabetes mellitus    3. Hypertension associated with diabetes    4. Calcification  "of aorta    5. -donor kidney transplant    6. Age-related osteoporosis without current pathological fracture          Plan:   Ana Maria was seen today for diabetes mellitus.    Diagnoses and all orders for this visit:    Type 2 diabetes mellitus with hyperglycemia, with long-term current use of insulin  -     tirzepatide (MOUNJARO) 7.5 mg/0.5 mL PnIj; Inject 7.5 mg into the skin every 7 days. If tolerating well, would like to increase to next dose in 4 weeks.    Chronic -     Medication Regimen:   Finish Ozempic then start Mounjaro 7.5 mg once a week. We can increase as tolerated.  Continue Humalog 4-7 units three times daily before each meal  Continue Jardiance 10 mg once daily.      Hyperlipidemia associated with type 2 diabetes mellitus    Hypertension associated with diabetes    Calcification of aorta    -donor kidney transplant    Age-related osteoporosis without current pathological fracture      - Follow up:  6 months    Portions of this note may have been created with voice recognition software. Occasional "wrong-word" or "sound-a-like" substitutions may have occurred due to the inherent limitations of voice recognition software. Please, read the note carefully and recognize, using context, where substitutions have occurred.           Sabi Bills,XINP-C, BC-ADM  [facility]  "

## 2024-11-21 NOTE — PATIENT INSTRUCTIONS
Addendum Note by Gail Brice DO at 12/6/2018  4:50 PM     Author: Gail Brice DO Service: -- Author Type: Physician    Filed: 12/6/2018  4:50 PM Encounter Date: 10/25/2018 Status: Signed    : Gail Brice DO (Physician)    Addended by: GAIL BRICE on: 12/6/2018 04:50 PM        Modules accepted: Orders         Medication Regimen:   Finish Ozempic then start Mounjaro 7.5 mg once a week. We can increase as tolerated.  Continue Humalog 4-7 units three times daily before each meal  Continue Jardiance 10 mg once daily.     Patient Instructions:  Carbohydrate Count: 30-45 grams/meal and 15 grams/snack with limit of 2 snacks per day.  Exercise: Goal is 150 minutes or more per week.  Bring meter and blood sugar log to each appointment.     Goals for Blood Sugar:  Fastin-130 mg/dl  2 hours after meal:  mg/dl  Before Bed: 100-150 mg/dl  If Blood sugar is below 70 mg/dl, DO NOT take diabetes medication. Eat first and then recheck blood sugar in 20 minutes.  Foods to eat if blood sugar is below 70 mg/dl  1/2 glass of orange juice   1/2 regular cola can   3-4 hard candies   3-4 small glucose tabs.   Foods to eat if blood sugar is below 50 mg/dl  1 glass of orange juice  1 regular cola can  8 hard candies  8 small glucose tabs.   If you have repeated eating/blood sugar recheck process 2 times and blood sugar still below 70 mg/dl, call 911.

## 2024-12-03 DIAGNOSIS — D84.9 IMMUNOSUPPRESSED STATUS: ICD-10-CM

## 2024-12-03 DIAGNOSIS — I12.9 HYPERTENSION, RENAL: Primary | Chronic | ICD-10-CM

## 2024-12-03 DIAGNOSIS — Z94.0 DECEASED-DONOR KIDNEY TRANSPLANT: Chronic | ICD-10-CM

## 2024-12-03 RX ORDER — TACROLIMUS 1 MG/1
CAPSULE ORAL
Qty: 180 CAPSULE | Refills: 3 | Status: SHIPPED | OUTPATIENT
Start: 2024-12-03

## 2024-12-11 ENCOUNTER — LAB VISIT (OUTPATIENT)
Dept: LAB | Facility: HOSPITAL | Age: 64
End: 2024-12-11
Attending: INTERNAL MEDICINE
Payer: MEDICARE

## 2024-12-11 DIAGNOSIS — I12.9 HYPERTENSION, RENAL: Chronic | ICD-10-CM

## 2024-12-11 LAB
ANION GAP SERPL CALC-SCNC: 12 MMOL/L (ref 8–16)
BASOPHILS # BLD AUTO: 0.08 K/UL (ref 0–0.2)
BASOPHILS NFR BLD: 0.8 % (ref 0–1.9)
BUN SERPL-MCNC: 15 MG/DL (ref 8–23)
CALCIUM SERPL-MCNC: 9.3 MG/DL (ref 8.7–10.5)
CHLORIDE SERPL-SCNC: 105 MMOL/L (ref 95–110)
CO2 SERPL-SCNC: 24 MMOL/L (ref 23–29)
CREAT SERPL-MCNC: 1.2 MG/DL (ref 0.5–1.4)
DIFFERENTIAL METHOD BLD: ABNORMAL
EOSINOPHIL # BLD AUTO: 0.2 K/UL (ref 0–0.5)
EOSINOPHIL NFR BLD: 1.7 % (ref 0–8)
ERYTHROCYTE [DISTWIDTH] IN BLOOD BY AUTOMATED COUNT: 14.8 % (ref 11.5–14.5)
EST. GFR  (NO RACE VARIABLE): 50.2 ML/MIN/1.73 M^2
GLUCOSE SERPL-MCNC: 101 MG/DL (ref 70–110)
HCT VFR BLD AUTO: 39.7 % (ref 37–48.5)
HGB BLD-MCNC: 12.4 G/DL (ref 12–16)
IMM GRANULOCYTES # BLD AUTO: 0.09 K/UL (ref 0–0.04)
IMM GRANULOCYTES NFR BLD AUTO: 0.9 % (ref 0–0.5)
LYMPHOCYTES # BLD AUTO: 3.4 K/UL (ref 1–4.8)
LYMPHOCYTES NFR BLD: 32.5 % (ref 18–48)
MCH RBC QN AUTO: 26.4 PG (ref 27–31)
MCHC RBC AUTO-ENTMCNC: 31.2 G/DL (ref 32–36)
MCV RBC AUTO: 85 FL (ref 82–98)
MONOCYTES # BLD AUTO: 0.5 K/UL (ref 0.3–1)
MONOCYTES NFR BLD: 4.6 % (ref 4–15)
NEUTROPHILS # BLD AUTO: 6.3 K/UL (ref 1.8–7.7)
NEUTROPHILS NFR BLD: 59.5 % (ref 38–73)
NRBC BLD-RTO: 0 /100 WBC
PLATELET # BLD AUTO: 248 K/UL (ref 150–450)
PMV BLD AUTO: 12.1 FL (ref 9.2–12.9)
POTASSIUM SERPL-SCNC: 3.9 MMOL/L (ref 3.5–5.1)
RBC # BLD AUTO: 4.69 M/UL (ref 4–5.4)
SODIUM SERPL-SCNC: 141 MMOL/L (ref 136–145)
WBC # BLD AUTO: 10.54 K/UL (ref 3.9–12.7)

## 2024-12-11 PROCEDURE — 80048 BASIC METABOLIC PNL TOTAL CA: CPT | Performed by: INTERNAL MEDICINE

## 2024-12-11 PROCEDURE — 85025 COMPLETE CBC W/AUTO DIFF WBC: CPT | Performed by: INTERNAL MEDICINE

## 2024-12-11 PROCEDURE — 36415 COLL VENOUS BLD VENIPUNCTURE: CPT | Mod: PN | Performed by: INTERNAL MEDICINE

## 2024-12-16 ENCOUNTER — PATIENT MESSAGE (OUTPATIENT)
Dept: PRIMARY CARE CLINIC | Facility: CLINIC | Age: 64
End: 2024-12-16
Payer: MEDICARE

## 2024-12-17 ENCOUNTER — OFFICE VISIT (OUTPATIENT)
Dept: PRIMARY CARE CLINIC | Facility: CLINIC | Age: 64
End: 2024-12-17
Payer: MEDICARE

## 2024-12-17 VITALS
WEIGHT: 200.31 LBS | HEART RATE: 89 BPM | TEMPERATURE: 97 F | BODY MASS INDEX: 32.19 KG/M2 | OXYGEN SATURATION: 97 % | DIASTOLIC BLOOD PRESSURE: 72 MMHG | HEIGHT: 66 IN | SYSTOLIC BLOOD PRESSURE: 124 MMHG

## 2024-12-17 DIAGNOSIS — J32.9 CHRONIC CONGESTION OF PARANASAL SINUS: ICD-10-CM

## 2024-12-17 DIAGNOSIS — Z79.4 TYPE 2 DIABETES MELLITUS WITH HYPERGLYCEMIA, WITH LONG-TERM CURRENT USE OF INSULIN: ICD-10-CM

## 2024-12-17 DIAGNOSIS — E11.65 TYPE 2 DIABETES MELLITUS WITH HYPERGLYCEMIA, WITH LONG-TERM CURRENT USE OF INSULIN: ICD-10-CM

## 2024-12-17 DIAGNOSIS — R09.81 CONGESTION OF PARANASAL SINUS: Primary | ICD-10-CM

## 2024-12-17 DIAGNOSIS — E11.59 HYPERTENSION ASSOCIATED WITH DIABETES: ICD-10-CM

## 2024-12-17 DIAGNOSIS — Z23 NEED FOR VACCINATION: ICD-10-CM

## 2024-12-17 DIAGNOSIS — I15.2 HYPERTENSION ASSOCIATED WITH DIABETES: ICD-10-CM

## 2024-12-17 PROBLEM — J34.9 SINUS DISORDER: Status: ACTIVE | Noted: 2024-12-17

## 2024-12-17 PROCEDURE — 3008F BODY MASS INDEX DOCD: CPT | Mod: CPTII,S$GLB,, | Performed by: FAMILY MEDICINE

## 2024-12-17 PROCEDURE — 90653 IIV ADJUVANT VACCINE IM: CPT | Mod: S$GLB,,, | Performed by: FAMILY MEDICINE

## 2024-12-17 PROCEDURE — 1101F PT FALLS ASSESS-DOCD LE1/YR: CPT | Mod: CPTII,S$GLB,, | Performed by: FAMILY MEDICINE

## 2024-12-17 PROCEDURE — 99999 PR PBB SHADOW E&M-EST. PATIENT-LVL V: CPT | Mod: PBBFAC,,, | Performed by: FAMILY MEDICINE

## 2024-12-17 PROCEDURE — 3044F HG A1C LEVEL LT 7.0%: CPT | Mod: CPTII,S$GLB,, | Performed by: FAMILY MEDICINE

## 2024-12-17 PROCEDURE — 3066F NEPHROPATHY DOC TX: CPT | Mod: CPTII,S$GLB,, | Performed by: FAMILY MEDICINE

## 2024-12-17 PROCEDURE — 1159F MED LIST DOCD IN RCRD: CPT | Mod: CPTII,S$GLB,, | Performed by: FAMILY MEDICINE

## 2024-12-17 PROCEDURE — 99214 OFFICE O/P EST MOD 30 MIN: CPT | Mod: 25,S$GLB,, | Performed by: FAMILY MEDICINE

## 2024-12-17 PROCEDURE — 2023F DILAT RTA XM W/O RTNOPTHY: CPT | Mod: CPTII,S$GLB,, | Performed by: FAMILY MEDICINE

## 2024-12-17 PROCEDURE — 3078F DIAST BP <80 MM HG: CPT | Mod: CPTII,S$GLB,, | Performed by: FAMILY MEDICINE

## 2024-12-17 PROCEDURE — 3074F SYST BP LT 130 MM HG: CPT | Mod: CPTII,S$GLB,, | Performed by: FAMILY MEDICINE

## 2024-12-17 PROCEDURE — G0008 ADMIN INFLUENZA VIRUS VAC: HCPCS | Mod: S$GLB,,, | Performed by: FAMILY MEDICINE

## 2024-12-17 PROCEDURE — 3060F POS MICROALBUMINURIA REV: CPT | Mod: CPTII,S$GLB,, | Performed by: FAMILY MEDICINE

## 2024-12-17 PROCEDURE — 3288F FALL RISK ASSESSMENT DOCD: CPT | Mod: CPTII,S$GLB,, | Performed by: FAMILY MEDICINE

## 2024-12-17 RX ORDER — DEXBROMPHENIRAMINE MALEATE, PHENYLEPHRINE HYDROCHLORIDE 2; 7.5 MG/1; MG/1
1 TABLET ORAL 2 TIMES DAILY
Qty: 20 TABLET | Refills: 0 | Status: SHIPPED | OUTPATIENT
Start: 2024-12-17

## 2024-12-17 RX ORDER — FLUTICASONE PROPIONATE 50 MCG
2 SPRAY, SUSPENSION (ML) NASAL 2 TIMES DAILY
Qty: 48 G | Refills: 1 | Status: SHIPPED | OUTPATIENT
Start: 2024-12-17

## 2024-12-17 NOTE — PROGRESS NOTES
Pt identified by name & .  Flu vaccine administered and information sheet given. Pt tolerated well and instructed to wait in clinic for 15 minutes.  Verbalized understanding.

## 2024-12-17 NOTE — ASSESSMENT & PLAN NOTE
Lab Results   Component Value Date    HGBA1C 6.0 (H) 10/03/2024    HGBA1C 6.7 (H) 05/13/2024    HGBA1C 6.7 (H) 11/02/2023    Continue current regimen of diabetic medications:  Jardiance 10 mg, Mounjaro 7.5 mg weekly, and insulin Lisipro 4-7 units before each meal based upon the blood sugar readings

## 2024-12-17 NOTE — PATIENT INSTRUCTIONS
Apply nasal spray to the affected nose three times a day  Ok to apply cortisone (OTC) to the skin over the left side of the nose for itching.

## 2024-12-17 NOTE — PROGRESS NOTES
[facility]  Ana Maria Teague  12/17/2024  [id number]    Karine Olmos MD  Patient Care Team:  Karine Olmos MD as PCP - General (Internal Medicine)  Jovanna Pugh MD as Consulting Physician (Nephrology)  Aide Dougherty NP (Inactive) as Nurse Practitioner (Endocrinology)  Franklyn Anaya OD as Consulting Physician (Ophthalmology)      Chief Complaint:Facial Swelling (Pt noticed pain for 1 week and started noticing swelling on 2 days ago)     Assessment/Plan:    1. Congestion of paranasal sinus  -     dexbrompheniramine-phenyleph (ALAHIST PE) 2-7.5 mg Tab; Take 1 tablet by mouth 2 (two) times a day.  Dispense: 20 tablet; Refill: 0    2. Chronic congestion of paranasal sinus  -     fluticasone propionate (FLONASE) 50 mcg/actuation nasal spray; 2 sprays (100 mcg total) by Each Nostril route 2 (two) times a day.  Dispense: 48 g; Refill: 1    3. Hypertension associated with diabetes  Assessment & Plan:  Blood pressure well controlled, continue nifedipine 30 mg twice a day,      4. Need for vaccination  -     Influenza - Trivalent (Adjuvanted)    5. Type 2 diabetes mellitus with hyperglycemia, with long-term current use of insulin  Assessment & Plan:  Lab Results   Component Value Date    HGBA1C 6.0 (H) 10/03/2024    HGBA1C 6.7 (H) 05/13/2024    HGBA1C 6.7 (H) 11/02/2023    Continue current regimen of diabetic medications:  Jardiance 10 mg, Mounjaro 7.5 mg weekly, and insulin Lisipro 4-7 units before each meal based upon the blood sugar readings           Worry Score: 2    Health Maintenance         Date Due Completion Date    Complete Opioid Risk Tool Never done ---    Shingles Vaccine (1 of 2) Never done ---    RSV Vaccine (Age 60+ and Pregnant patients) (1 - Risk 60-74 years 1-dose series) Never done ---    Influenza Vaccine (1) 09/01/2024 10/2/2023    Override on 9/25/2013: Done    Override on 8/25/2013: Done    COVID-19 Vaccine (4 - 2024-25 season) 09/01/2024 1/19/2022    Eye Exam  01/12/2025 1/12/2024    Override on 10/28/2013: Done    Hemoglobin A1c 04/03/2025 10/3/2024    TETANUS VACCINE 06/29/2025 6/29/2015    Foot Exam 08/09/2025 8/9/2024    Override on 2/15/2023: Done    Override on 1/8/2016: Done (per Karine Fernandez MD)    Override on 3/25/2014: Done    Mammogram 08/20/2025 8/20/2024    Override on 4/22/2013: Done    Diabetes Urine Screening 10/03/2025 10/3/2024    Lipid Panel 10/03/2025 10/3/2024    DEXA Scan 08/08/2026 8/8/2023    Colorectal Cancer Screening 09/18/2031 9/18/2024                Advance Care Planning     Date: 12/17/2024         The following were reviewed: Active problem list, medication list, allergies, family history, social history, and Health Maintenance.     History:    History of Present Illness    CHIEF COMPLAINT:  Ana Maria presents today with nasal pain and swelling.    HISTORY OF PRESENT ILLNESS:  She reports pain and swelling on the left side of her nose that began a few days ago with associated itching. The pain affects her ability to wear glasses. She denies any known trauma to the nose.  She denies upper respiratory symptoms such as maxillary sinus pain, fever, cough,eye pain or ear pressure.  She denies similar discomfort on the right side of the face.  Blood pressure is well controlled.  Patient has history of kidney transplant, currently on immunosuppressant medications.  She has chronic renal failure, under the care of nephrologist.  Due for nephrology visit tomorrow.    CURRENT MEDICATIONS:  She takes Sudafed every 12 hours, Benadryl, pain medicine, and a muscle relaxer.            Review of patient's allergies indicates:   Allergen Reactions    Azithromycin Nausea And Vomiting    Adhesive Other (See Comments)     Skin tears    Nsaids (non-steroidal anti-inflammatory drug)      Kidney Transplant       Medications:        Medications have been reviewed and reconciled with patient at visit today.      Exam:  Vitals:    12/17/24 1006   BP: 124/72   Pulse:  89   Temp: 97.3 °F (36.3 °C)     Weight: 90.9 kg (200 lb 4.6 oz)   Body mass index is 32.33 kg/m².    BP Readings from Last 3 Encounters:   12/17/24 124/72   11/21/24 122/75   10/07/24 118/66     Wt Readings from Last 3 Encounters:   12/17/24 90.9 kg (200 lb 4.6 oz)   10/07/24 92.4 kg (203 lb 9.5 oz)   09/18/24 92.2 kg (203 lb 4.2 oz)       ROS   Constitutional: Negative Fever,chills, weight changes   HENT:  Facial pain inside the left nose by the nasal bridge and itching on the outside on the left side of the nose  Eyes: Neg vision issues, eye pain   Respiratory: Neg cough, SOB, wheezing.    Cardiovascular:  Neg  chest pain, palpitations, orthopnea and LE edema   Gastrointestinal:  Neg  abdo pain, Con/Perla/N/V.   Musculoskeletal: Neg Joint pains, myalgia  Skin: Neg rashes, discoloration  Neurol:  Neg LOC/dizz/ sen & motor changes, H/A  Endo/Heme/Allergies:  History of allergy, but patient is not using Flonase as she should but takes cetirizine on as-needed basis   Psychiatric/Behavioral:  Neg deprd/anx/ memory loss  PE   Constitutional:  Not in acute distress, alert and oriented x3  HENT:  Oropharynx within normal limits, no swollen glands in the neck, Normal oropharynx, enlarged rashard on the left sinus, inflamed sinus mucosa.    Eyes: EOMI, conjunctiva within normal limits, PERRLA  Cardiovascular:  Rate and rhythm within normal limits, normal pulses  Pulmonary:  Normal breath sounds, and normal pulmonary efforts w/o wheezing or rales  Musculoskeletal:  Adequate range of motion of the spine and the joints  Skin:  Skin is warm and dry.   Neurological:  No sensory or motor deficits.   Psychiatric:  Mood and affect within normal limits, behavior appropriate, normal thought process          Health Maintenance  Health Maintenance Topics with due status: Not Due       Topic Last Completion Date    TETANUS VACCINE 06/29/2015    DEXA Scan 08/08/2023    Foot Exam 08/09/2024    Mammogram 08/20/2024    Colorectal Cancer  Screening 09/18/2024    Diabetes Urine Screening 10/03/2024    Lipid Panel 10/03/2024    Hemoglobin A1c 10/03/2024     Health Maintenance Due   Topic Date Due    Complete Opioid Risk Tool  Never done    Shingles Vaccine (1 of 2) Never done    RSV Vaccine (Age 60+ and Pregnant patients) (1 - Risk 60-74 years 1-dose series) Never done    Influenza Vaccine (1) 09/01/2024    COVID-19 Vaccine (4 - 2024-25 season) 09/01/2024    Eye Exam  01/12/2025         -Patient's lab results were reviewed and discussed with patient  -Treatment options and alternatives were discussed with the patient. Patient expressed understanding. Patient was given the opportunity to ask questions and be an active participant in their medical care. Patient had no further questions or concerns at this time.       Future Appointments   Date Time Provider Department Center   12/18/2024 10:00 AM Jovanna Pugh MD [facility] [place]   2/14/2025 11:00 AM Karine Olmos MD [facility]   5/22/2025 10:00 AM Sabi Bills NP [facility] DIABETE [place]   F/U visit -3 mos - [place]       After visit summary printed and given to patient upon discharge.  Patient goals and care plan are included in After visit summary.  Health maintenance needs, recent test results and goals of care discussed with pt and questions answered.    .    Annamarie Browne MD  [facility] [address] [place] [address]      This note was generated with the assistance of ambient listening technology. Verbal consent was obtained by the patient and accompanying visitor(s) for the recording of patient appointment to facilitate this note. I attest to having reviewed and edited the generated note for accuracy, though some syntax or spelling errors may persist. Please contact the author of this note for any clarification.     THIS DOCUMENT WAS MADE IN PART WITH VOICE RECOGNITION SOFTWARE.  OCCASIONALLY THIS SOFTWARE WILL MISINTERPRET WORDS OR  PHRASES.

## 2024-12-18 ENCOUNTER — OFFICE VISIT (OUTPATIENT)
Dept: NEPHROLOGY | Facility: CLINIC | Age: 64
End: 2024-12-18
Payer: MEDICARE

## 2024-12-18 VITALS
HEIGHT: 66 IN | BODY MASS INDEX: 32.27 KG/M2 | HEART RATE: 91 BPM | WEIGHT: 200.81 LBS | SYSTOLIC BLOOD PRESSURE: 126 MMHG | DIASTOLIC BLOOD PRESSURE: 70 MMHG | RESPIRATION RATE: 18 BRPM

## 2024-12-18 DIAGNOSIS — E11.29 MICROALBUMINURIA DUE TO TYPE 2 DIABETES MELLITUS: ICD-10-CM

## 2024-12-18 DIAGNOSIS — Z94.0 DECEASED-DONOR KIDNEY TRANSPLANT: Chronic | ICD-10-CM

## 2024-12-18 DIAGNOSIS — R80.9 MICROALBUMINURIA DUE TO TYPE 2 DIABETES MELLITUS: ICD-10-CM

## 2024-12-18 PROCEDURE — 99215 OFFICE O/P EST HI 40 MIN: CPT | Mod: S$GLB,,, | Performed by: INTERNAL MEDICINE

## 2024-12-18 PROCEDURE — 3074F SYST BP LT 130 MM HG: CPT | Mod: CPTII,S$GLB,, | Performed by: INTERNAL MEDICINE

## 2024-12-18 PROCEDURE — 3078F DIAST BP <80 MM HG: CPT | Mod: CPTII,S$GLB,, | Performed by: INTERNAL MEDICINE

## 2024-12-18 PROCEDURE — 3060F POS MICROALBUMINURIA REV: CPT | Mod: CPTII,S$GLB,, | Performed by: INTERNAL MEDICINE

## 2024-12-18 PROCEDURE — 3008F BODY MASS INDEX DOCD: CPT | Mod: CPTII,S$GLB,, | Performed by: INTERNAL MEDICINE

## 2024-12-18 PROCEDURE — 3066F NEPHROPATHY DOC TX: CPT | Mod: CPTII,S$GLB,, | Performed by: INTERNAL MEDICINE

## 2024-12-18 PROCEDURE — 1101F PT FALLS ASSESS-DOCD LE1/YR: CPT | Mod: CPTII,S$GLB,, | Performed by: INTERNAL MEDICINE

## 2024-12-18 PROCEDURE — 2023F DILAT RTA XM W/O RTNOPTHY: CPT | Mod: CPTII,S$GLB,, | Performed by: INTERNAL MEDICINE

## 2024-12-18 PROCEDURE — 99999 PR PBB SHADOW E&M-EST. PATIENT-LVL V: CPT | Mod: PBBFAC,,, | Performed by: INTERNAL MEDICINE

## 2024-12-18 PROCEDURE — 1159F MED LIST DOCD IN RCRD: CPT | Mod: CPTII,S$GLB,, | Performed by: INTERNAL MEDICINE

## 2024-12-18 PROCEDURE — 3044F HG A1C LEVEL LT 7.0%: CPT | Mod: CPTII,S$GLB,, | Performed by: INTERNAL MEDICINE

## 2024-12-18 PROCEDURE — 3288F FALL RISK ASSESSMENT DOCD: CPT | Mod: CPTII,S$GLB,, | Performed by: INTERNAL MEDICINE

## 2024-12-18 NOTE — PROGRESS NOTES
NEPHROLOGY CONSULTATION CLINIC F/U NOTE:  Date of clinic visit: 24   REASON FOR CONSULTATION F/U and Chief c/o  History of kidney transplant.     HISTORY OF PRESENT ILLNESS:  Ms. Teague is a 64-year-old female with h/o of kidney transplant in  who present for f/u. She was last seen in renal clinic 14 months ago appears to have been lost to f/u. Pt has a pertinent h/o of DM and HTN. Pt feels well, no acute c/o's, no CP, no SOB. Labs and meds and the doses of the immunosuppressive meds reviewed with pt.     PAST MEDICAL HISTORY:  1.  End-stage renal disease and was on chronic hemodialysis from  until 2013, status post kidney transplant in  at [facility] in [place].  2.  Hypertension.  3.  Diabetes mellitus post-transplant.  4.  Avascular necrosis of the bone hip.  5.  Depression and anxiety.  6.  Hypothyroidism.  7.  Secondary hyperparathyroidism.  8. Diastolic CHF     PAST SURGICAL HISTORY:  Kidney transplant in , hysterectomy, thyroidectomy,   vascular access surgeries, , cyst removal from chest wall, skin graft   and breast surgery.     FAMILY HISTORY:  Reviewed.  Both parents with diabetes mellitus.     ALLERGIES:  ADHESIVE TAPE AND AZITHROMYCIN.     SOCIAL HISTORY:  Distant smoking in the very past.  No current smoker.  No   alcohol.     MEDICATIONS:  Reviewed.    Current Outpatient Medications:     albuterol (PROVENTIL/VENTOLIN HFA) 90 mcg/actuation inhaler, Rescue, Disp: 18 g, Rfl: 2    albuterol-ipratropium (DUO-NEB) 2.5 mg-0.5 mg/3 mL nebulizer solution, Take 3 mLs by nebulization every 6 (six) hours as needed for Wheezing (and cough). Rescue, Disp: 75 mL, Rfl: 0    atorvastatin (LIPITOR) 40 MG tablet, Take 1 tablet (40 mg total) by mouth once daily., Disp: 90 tablet, Rfl: 3    blood sugar diagnostic (FREESTYLE LITE STRIPS) Strp, Use to check blood glucose 3 times a day., Disp: 100 strip, Rfl: 11    cetirizine (ZYRTEC) 10 MG tablet, TAKE 1 TABLET BY MOUTH EVERY DAY,  Disp: 30 tablet, Rfl: 11    diclofenac sodium (VOLTAREN) 1 % Gel, APPLY 2 Grams TOPICALLY FOUR TIMES DAILY AS NEEDED, Disp: 100 g, Rfl: 0    empagliflozin (JARDIANCE) 10 mg tablet, Take 1 tablet (10 mg total) by mouth once daily., Disp: 90 tablet, Rfl: 0    fluticasone propionate (FLONASE) 50 mcg/actuation nasal spray, 2 sprays (100 mcg total) by Each Nostril route 2 (two) times a day., Disp: 48 g, Rfl: 1    inhalation spacing device, Use as directed for inhalation., Disp: 1 each, Rfl: 0    insulin lispro (HUMALOG KWIKPEN INSULIN) 100 unit/mL pen, Inject 4-7 units before each main meal., Disp: 45 mL, Rfl: 3    lancets (FREESTYLE LANCETS) 28 gauge Misc, 1 lancet by Combination route 2 (two) times daily as needed. Qid prn, Disp: 400 each, Rfl: 2    lancets Misc, 1 strip by Misc.(Non-Drug; Combo Route) route 4 (four) times daily with meals and nightly., Disp: 150 each, Rfl: 6    levothyroxine (SYNTHROID) 150 MCG tablet, Take 1 tablet (150 mcg total) by mouth before breakfast., Disp: 90 tablet, Rfl: 3    LIDOcaine (LIDODERM) 5 %, Use 1-3 patches per day over recommended area. Remove & Discard patch within 12 hours or as directed by MD., Disp: 90 patch, Rfl: 0    mycophenolate (CELLCEPT) 250 mg Cap, TAKE (2) CAPSULES TWICE DAILY., Disp: 120 capsule, Rfl: 11    nebulizer and compressor Ewelina, Use as directed, Disp: 1 each, Rfl: 0    NIFEdipine (PROCARDIA-XL) 30 MG (OSM) 24 hr tablet, Take 1 tablet (30 mg total) by mouth 2 (two) times a day., Disp: 180 tablet, Rfl: 3    omeprazole (PRILOSEC) 40 MG capsule, Take 1 capsule (40 mg total) by mouth 2 (two) times daily before meals., Disp: 180 capsule, Rfl: 3    ondansetron (ZOFRAN-ODT) 4 MG TbDL, Take 1 tablet (4 mg total) by mouth every 8 (eight) hours as needed., Disp: 15 tablet, Rfl: 1    [START ON 1/1/2025] oxyCODONE-acetaminophen (PERCOCET)  mg per tablet, Take 1 tablet by mouth every 8 (eight) hours as needed for Pain., Disp: 90 tablet, Rfl: 0     "oxyCODONE-acetaminophen (PERCOCET)  mg per tablet, Take 1 tablet by mouth every 8 (eight) hours as needed for Pain., Disp: 90 tablet, Rfl: 0    pen needle, diabetic (BD RORY 2ND GEN PEN NEEDLE) 32 gauge x 5/32" Ndle, USE THREE TIMES DAILY WITH INSULIN AS DIRECTED, Disp: 300 each, Rfl: 3    pulse oximeter (PULSE OXIMETER) device, by Apply Externally route 2 (two) times a day. Use twice daily at 8 AM and 3 PM and record the value in MyChart as directed., Disp: 1 each, Rfl: 0    tacrolimus (PROGRAF) 1 MG Cap, TAKE 3 CAPSULE IN THE MORNING AND 3 IN THE EVENING., Disp: 180 capsule, Rfl: 3    tirzepatide (MOUNJARO) 7.5 mg/0.5 mL PnIj, Inject 7.5 mg into the skin every 7 days. If tolerating well, would like to increase to next dose in 4 weeks., Disp: 4 Pen, Rfl: 0    tiZANidine (ZANAFLEX) 4 MG tablet, Take 1 tablet (4 mg total) by mouth 2 (two) times daily as needed (muscle spasms)., Disp: 60 tablet, Rfl: 1    alendronate (FOSAMAX) 70 MG tablet, Take 1 tablet (70 mg total) by mouth every 7 days., Disp: 4 tablet, Rfl: 11    azelastine (OPTIVAR) 0.05 % ophthalmic solution, Place 1 drop into both eyes 2 (two) times daily. (Patient not taking: Reported on 12/18/2024), Disp: 6 mL, Rfl: 0    dexbrompheniramine-phenyleph (ALAHIST PE) 2-7.5 mg Tab, Take 1 tablet by mouth 2 (two) times a day. (Patient not taking: Reported on 12/18/2024), Disp: 20 tablet, Rfl: 0    polymyxin B sulf-trimethoprim (POLYTRIM) 10,000 unit- 1 mg/mL Drop, Place 1 drop into both eyes every 6 (six) hours. (Patient not taking: Reported on 12/18/2024), Disp: 10 mL, Rfl: 0       REVIEW OF SYSTEMS:  No recent hospitalizations.  GENERAL:  Negative.  HEAD, EYES, EARS, NOSE AND THROAT:  Negative.  CARDIAC:  Negative.  PULMONARY:  Negative.  GASTROINTESTINAL:  Negative other than fully reviewed above.  GENITOURINARY:  Negative, other than fully reviewed above.  PSYCHOLOGICAL:  Negative.  NEUROLOGICAL:  Negative.  ENDOCRINE:  As above, otherwise " "negative.  INFECTIOUS DISEASE:  Negative.  HEMATOLOGIC AND ONCOLOGIC:  Negative.  The rest of the review of systems negative.     PHYSICAL EXAMINATION: Blood pressure 126/70, pulse 91, resp. rate 18, height 5' 6" (1.676 m), weight 91.1 kg (200 lb 13.4 oz).  Looks comfortable, in NAD  Ambulatory  Speech and thought process normal  No JVD  RRR S1 and S2 audible  CTA B no rales,  Abd soft, NT  Ext's no edema       LABORATORY VALUES:  Reviewed.    BMP  Lab Results   Component Value Date     12/11/2024    K 3.9 12/11/2024     12/11/2024    CO2 24 12/11/2024    BUN 15 12/11/2024    CREATININE 1.2 12/11/2024    CALCIUM 9.3 12/11/2024    ANIONGAP 12 12/11/2024    EGFRNORACEVR 50.2 (A) 12/11/2024     Lab Results   Component Value Date    WBC 10.54 12/11/2024    HGB 12.4 12/11/2024    HCT 39.7 12/11/2024    MCV 85 12/11/2024     12/11/2024      Prior u/a reviewed: trace protein, no blood, no nitrites     Prograf pending today         ASSESSMENT AND PLAN:  This is a 64-year-old female with past h/o of KTx presents for f/u:     1.  Renal.  s Cr stable, in normal range.  Stable renal function.   K normal  Na normal  Ca normal  Acid base stable   .  2. H/o of kidney transplant in 2013  Immunosuppressive treatment as reviewed above.  Prograf level is pending today. Was ordered  Continue same dose of 3 mg po bid until level is back, pt was advised  On Cellcept now was previously on hold for about 4 weeks after the COVID infection  Not on prednisone     3.  Hypertension.  BP controlled  Meds reviewed       PLANS AND RECOMMENDATIONS:    As above for each individual problem.   Opportunity for questions provided  RTC 6 months  Total time spent 40 minutes including time needed to review the records, the   patient evaluation, documentation, discussion with the patient,   more than 50% of the time was spent on coordination of care and counseling.    Level V visit.     Jovanna Pugh MD    "

## 2024-12-19 ENCOUNTER — PATIENT MESSAGE (OUTPATIENT)
Dept: NEPHROLOGY | Facility: CLINIC | Age: 64
End: 2024-12-19
Payer: MEDICARE

## 2024-12-20 ENCOUNTER — LAB VISIT (OUTPATIENT)
Dept: LAB | Facility: HOSPITAL | Age: 64
End: 2024-12-20
Attending: INTERNAL MEDICINE
Payer: MEDICARE

## 2024-12-20 DIAGNOSIS — Z94.0 DECEASED-DONOR KIDNEY TRANSPLANT: Chronic | ICD-10-CM

## 2024-12-20 PROCEDURE — 36415 COLL VENOUS BLD VENIPUNCTURE: CPT | Mod: PN | Performed by: INTERNAL MEDICINE

## 2024-12-20 PROCEDURE — 80197 ASSAY OF TACROLIMUS: CPT | Performed by: INTERNAL MEDICINE

## 2024-12-21 LAB — TACROLIMUS BLD-MCNC: 5.1 NG/ML (ref 5–15)

## 2024-12-29 ENCOUNTER — PATIENT MESSAGE (OUTPATIENT)
Dept: DIABETES | Facility: CLINIC | Age: 64
End: 2024-12-29
Payer: MEDICARE

## 2024-12-29 DIAGNOSIS — Z79.4 TYPE 2 DIABETES MELLITUS WITH HYPERGLYCEMIA, WITH LONG-TERM CURRENT USE OF INSULIN: Primary | ICD-10-CM

## 2024-12-29 DIAGNOSIS — E11.65 TYPE 2 DIABETES MELLITUS WITH HYPERGLYCEMIA, WITH LONG-TERM CURRENT USE OF INSULIN: Primary | ICD-10-CM

## 2024-12-30 RX ORDER — TIRZEPATIDE 10 MG/.5ML
10 INJECTION, SOLUTION SUBCUTANEOUS
Qty: 4 PEN | Refills: 5 | Status: SHIPPED | OUTPATIENT
Start: 2024-12-30

## 2025-01-13 DIAGNOSIS — Z00.00 ENCOUNTER FOR MEDICARE ANNUAL WELLNESS EXAM: ICD-10-CM

## 2025-01-14 ENCOUNTER — PATIENT MESSAGE (OUTPATIENT)
Dept: PAIN MEDICINE | Facility: CLINIC | Age: 65
End: 2025-01-14
Payer: MEDICARE

## 2025-01-14 NOTE — PROGRESS NOTES
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 Refill Percocet 10/325mg TID PRN pain x 2 months. Will e-prescribe to fill on 01/15/25 and 2/15/25.  Patient takes more sparingly than she has in the past.   - Refill LIDOcaine (LIDODERM) 5% topical patches to apply Q12H PRN pain.    - Refill diclofenac 1% gel to apply 2g topically up to 4 times per day.   - Refill Zanaflex 4mg BID PRN; does not need Refill.  - No steroids due to DM.     Failed meds: gabapentin (SE: swelling).     - Opioid contract updated with Dr. Pedro on 10/28/2020.   * 04/23/2024-seen in clinic by Dr. Pedro.    - [place]  reviewed and appropriate. Unable to access updated .       - Last UDS 5/22/2024 was compliant for medications prescribed.     3. Rehabilitative: Encouraged regular exercise.    4. Diagnostic/ Imaging: No new imaging ordered. Previous imaging reviewed.     5. Consult/ Referral:   - Consider follow-up with orthopedics for bilateral knee pain. Currently seeing Dr. Leach and discussing Iovera. Last seen August 2023; encouraged to follow-up with worsening knee pain.   - Continue follow-up with nephrology for status post kidney transplant  - Continue follow-up with endocrinology for diabetes  - Consider new Podiatry referral for chronic left ankle pain.     6. Return to clinic: 2-3 months medication refill  - virtual visit  - will send online ticket scheduling on Yusra Haas PA-C       Future Appointments   Date Time  Provider Department Center   2/14/2025 11:00 AM Karine Olmos MD BSFC [id number] [facility]   5/22/2025 10:00 AM Sabi Bills, CHRISTINA [facility] DIABETE [facility]   6/13/2025  9:20 AM LABORATORY, HGV HGV[facility] LAB [facility]   6/20/2025 10:00 AM June Navarro, FNP-BC [facility] NEPHRO [facility]       Visit today included increased complexity associated with the care of the episodic problem of chronic pain which was addressed and continue to manage the longitudinal care of the patient due to the serious and/or complex managed problem(s) listed above.    - Patient Questions: Answered all of the patient's questions regarding diagnosis, therapy, and treatment.    - This condition does not require this patient to take time off of work, and the primary goal of our Pain Management services is to improve the patient's functional capacity.   - I discussed the risks, benefits, and alternatives to potential treatment options. All questions and concerns were fully addressed today in clinic.         Marcella Haas PA-C  Interventional Pain Management - [facility]    Disclaimer:  This note was prepared using voice recognition system and is likely to have sound alike errors that may have been overlooked even after proof reading.  Please call me with any questions.     ______________________________________________________________________      >>UDS/ oral swab:  11-8-15 :: appropriate  1-13-16 :: appropriate  8-9-16 :: appropriate  2/6/2017 :: appropriate  8/21/2017 :: appropriate  7/16/2018 :: appropriate  4/22/2019 :: appropriate  11/7/2019 :: appropriate  8/19/2020 :: appropriate  12/8/2020 :: appropriate  4/27/2021 :: appropriate  2/7/2022 :: appropriate  8/15/2022 :: appropriate   3/20/2023 :: appropriate   11/27/2023 ::  Appropriate  5/22/2024 :: Appropriate

## 2025-01-15 ENCOUNTER — OFFICE VISIT (OUTPATIENT)
Dept: PAIN MEDICINE | Facility: CLINIC | Age: 65
End: 2025-01-15
Payer: MEDICARE

## 2025-01-15 VITALS — BODY MASS INDEX: 32.42 KG/M2 | HEIGHT: 66 IN

## 2025-01-15 DIAGNOSIS — M47.816 LUMBAR SPONDYLOSIS: ICD-10-CM

## 2025-01-15 DIAGNOSIS — R11.0 NAUSEA: ICD-10-CM

## 2025-01-15 DIAGNOSIS — M79.2 NEUROPATHIC PAIN: ICD-10-CM

## 2025-01-15 DIAGNOSIS — M54.2 NECK PAIN ON LEFT SIDE: ICD-10-CM

## 2025-01-15 DIAGNOSIS — M51.369 DDD (DEGENERATIVE DISC DISEASE), LUMBAR: ICD-10-CM

## 2025-01-15 DIAGNOSIS — Z79.891 OPIOID USE AGREEMENT EXISTS: ICD-10-CM

## 2025-01-15 DIAGNOSIS — M47.812 CERVICAL SPONDYLOSIS: Primary | ICD-10-CM

## 2025-01-15 RX ORDER — ATORVASTATIN CALCIUM 40 MG/1
40 TABLET, FILM COATED ORAL DAILY
Qty: 90 TABLET | Refills: 3 | Status: SHIPPED | OUTPATIENT
Start: 2025-01-15 | End: 2026-01-15

## 2025-01-15 RX ORDER — OXYCODONE AND ACETAMINOPHEN 10; 325 MG/1; MG/1
1 TABLET ORAL EVERY 8 HOURS PRN
Qty: 90 TABLET | Refills: 0 | Status: SHIPPED | OUTPATIENT
Start: 2025-02-14

## 2025-01-15 RX ORDER — OXYCODONE AND ACETAMINOPHEN 10; 325 MG/1; MG/1
1 TABLET ORAL EVERY 8 HOURS PRN
Qty: 90 TABLET | Refills: 0 | Status: SHIPPED | OUTPATIENT
Start: 2025-01-15

## 2025-01-15 RX ORDER — ONDANSETRON 4 MG/1
4 TABLET, ORALLY DISINTEGRATING ORAL EVERY 8 HOURS PRN
Qty: 15 TABLET | Refills: 1 | Status: SHIPPED | OUTPATIENT
Start: 2025-01-15

## 2025-01-17 ENCOUNTER — PATIENT MESSAGE (OUTPATIENT)
Dept: PAIN MEDICINE | Facility: CLINIC | Age: 65
End: 2025-01-17
Payer: MEDICARE

## 2025-02-14 ENCOUNTER — OFFICE VISIT (OUTPATIENT)
Dept: PRIMARY CARE CLINIC | Facility: CLINIC | Age: 65
End: 2025-02-14
Payer: MEDICARE

## 2025-02-14 VITALS — SYSTOLIC BLOOD PRESSURE: 129 MMHG | DIASTOLIC BLOOD PRESSURE: 68 MMHG

## 2025-02-14 DIAGNOSIS — E03.9 ACQUIRED HYPOTHYROIDISM: ICD-10-CM

## 2025-02-14 DIAGNOSIS — E11.69 HYPERLIPIDEMIA ASSOCIATED WITH TYPE 2 DIABETES MELLITUS: ICD-10-CM

## 2025-02-14 DIAGNOSIS — M81.0 AGE-RELATED OSTEOPOROSIS WITHOUT CURRENT PATHOLOGICAL FRACTURE: ICD-10-CM

## 2025-02-14 DIAGNOSIS — Z79.4 TYPE 2 DIABETES MELLITUS WITH HYPERGLYCEMIA, WITH LONG-TERM CURRENT USE OF INSULIN: ICD-10-CM

## 2025-02-14 DIAGNOSIS — J96.11 CHRONIC RESPIRATORY FAILURE WITH HYPOXIA, ON HOME O2 THERAPY: ICD-10-CM

## 2025-02-14 DIAGNOSIS — E11.59 HYPERTENSION ASSOCIATED WITH DIABETES: Primary | ICD-10-CM

## 2025-02-14 DIAGNOSIS — N18.31 CHRONIC KIDNEY DISEASE, STAGE 3A: ICD-10-CM

## 2025-02-14 DIAGNOSIS — E78.5 HYPERLIPIDEMIA ASSOCIATED WITH TYPE 2 DIABETES MELLITUS: ICD-10-CM

## 2025-02-14 DIAGNOSIS — R06.09 DOE (DYSPNEA ON EXERTION): ICD-10-CM

## 2025-02-14 DIAGNOSIS — E11.65 TYPE 2 DIABETES MELLITUS WITH HYPERGLYCEMIA, WITH LONG-TERM CURRENT USE OF INSULIN: ICD-10-CM

## 2025-02-14 DIAGNOSIS — Z99.81 CHRONIC RESPIRATORY FAILURE WITH HYPOXIA, ON HOME O2 THERAPY: ICD-10-CM

## 2025-02-14 DIAGNOSIS — I15.2 HYPERTENSION ASSOCIATED WITH DIABETES: Primary | ICD-10-CM

## 2025-02-14 RX ORDER — LEVOTHYROXINE SODIUM 150 UG/1
150 TABLET ORAL
Qty: 90 TABLET | Refills: 3 | Status: SHIPPED | OUTPATIENT
Start: 2025-02-14 | End: 2026-02-14

## 2025-02-14 NOTE — PROGRESS NOTES
"Primary Care Telemedicine Note  The patient location is:  Patient Home   The chief complaint leading to consultation is: Follow up of medical problems.      Visit type: Virtual visit with synchronous audio and video  Each patient to whom he or she provides medical services by telemedicine is:  (1) informed of the relationship between the physician and patient and the respective role of any other health care provider with respect to management of the patient; and (2) notified that he or she may decline to receive medical services by telemedicine and may withdraw from such care at any time.      HPI:  History of Present Illness      BPat home 118-140/65-70.  AC breakfast sugars 103 range.  Sinus drainage lately, taking flonase/decongestant/zyrtec- working well for sx.  Today feels a little short winded on exertion.  This happens "every now and then".  Using albuterol, which is "helping some".  No cp/pslp.  No leg swelling.  No f/c/sw/cough.  No weight gain, down about 55=60# on ozempic/then mounjaro.  9/24 EGD with dilation, did not return for f/u.  Takes PPI BID, if doesn't take it she has reflux sx.          Updated/ annual due 8/25:  HM: 12/24 fluvax, 4/21 covid vaccines/booster, 7/19 HAV, 6/15 [id number], 3/14 [id number], 3/23 [id number], 6/15 Tdap, 8/24 MMG/me, 8/23 BMD with OP on fosamax rep 2y, 9/24 Cscope rep 5y, 11/10 HCV neg, Pain Dr. Anaya, [facility]           Problem List Items Addressed This Visit       Acquired hypothyroidism    Overview     Synthroid 150mcg daily.         Relevant Medications    levothyroxine (SYNTHROID) 150 MCG tablet    Other Relevant Orders    TSH    Age-related osteoporosis without current pathological fracture    Overview     Fosamax on hold while getting dental work done.         Chronic kidney disease, stage 3a    Chronic respiratory failure with hypoxia, on home O2 therapy    Overview     No O2 lately.  Uses Duo-neb just prn,  Albuterol prn.         Hyperlipidemia " associated with type 2 diabetes mellitus    Overview     Atorva 40mg daily.         Relevant Orders    CBC Auto Differential    Comprehensive Metabolic Panel    Lipid Panel    Hypertension associated with diabetes - Primary    Overview     Procardia XL 30mg BID.         Type 2 diabetes mellitus with hyperglycemia, with long-term current use of insulin    Overview     Jardiance 10mg daily,   Mounjaro 10mg weekly.         Relevant Orders    Hemoglobin A1C    Microalbumin/Creatinine Ratio, Urine     Other Visit Diagnoses       LANCE (dyspnea on exertion)        Relevant Orders    B-TYPE NATRIURETIC PEPTIDE            The patient's Health Maintenance was reviewed and the following appears to be due at this time:  Health Maintenance Due   Topic Date Due    Complete Opioid Risk Tool  Never done    Naloxone Prescription  Never done    Shingles Vaccine (1 of 2) Never done    RSV Vaccine (Age 60+ and Pregnant patients) (1 - Risk 60-74 years 1-dose series) Never done    COVID-19 Vaccine (4 - 2024-25 season) 09/01/2024    Urine Drug Screen  11/22/2024    Diabetic Eye Exam  01/12/2025       [unfilled]  Outpatient Medications Prior to Visit   Medication Sig Dispense Refill    albuterol (PROVENTIL/VENTOLIN HFA) 90 mcg/actuation inhaler Rescue 18 g 2    albuterol-ipratropium (DUO-NEB) 2.5 mg-0.5 mg/3 mL nebulizer solution Take 3 mLs by nebulization every 6 (six) hours as needed for Wheezing (and cough). Rescue 75 mL 0    alendronate (FOSAMAX) 70 MG tablet Take 1 tablet (70 mg total) by mouth every 7 days. 4 tablet 11    atorvastatin (LIPITOR) 40 MG tablet Take 1 tablet (40 mg total) by mouth once daily. 90 tablet 3    blood sugar diagnostic (FREESTYLE LITE STRIPS) Strp Use to check blood glucose 3 times a day. 100 strip 11    cetirizine (ZYRTEC) 10 MG tablet TAKE 1 TABLET BY MOUTH EVERY DAY 30 tablet 11    diclofenac sodium (VOLTAREN) 1 % Gel APPLY 2 Grams TOPICALLY FOUR TIMES DAILY AS NEEDED 100 g 0    empagliflozin (JARDIANCE)  "10 mg tablet Take 1 tablet (10 mg total) by mouth once daily. 90 tablet 3    fluticasone propionate (FLONASE) 50 mcg/actuation nasal spray 2 sprays (100 mcg total) by Each Nostril route 2 (two) times a day. 48 g 1    inhalation spacing device Use as directed for inhalation. 1 each 0    insulin lispro (HUMALOG KWIKPEN INSULIN) 100 unit/mL pen Inject 4-7 units before each main meal. 45 mL 3    lancets (FREESTYLE LANCETS) 28 gauge Misc 1 lancet by Combination route 2 (two) times daily as needed. Qid prn 400 each 2    lancets Misc 1 strip by Misc.(Non-Drug; Combo Route) route 4 (four) times daily with meals and nightly. 150 each 6    LIDOcaine (LIDODERM) 5 % Use 1-3 patches per day over recommended area. Remove & Discard patch within 12 hours or as directed by MD. 90 patch 0    mycophenolate (CELLCEPT) 250 mg Cap TAKE (2) CAPSULES TWICE DAILY. 120 capsule 11    nebulizer and compressor Ewelina Use as directed 1 each 0    NIFEdipine (PROCARDIA-XL) 30 MG (OSM) 24 hr tablet Take 1 tablet (30 mg total) by mouth 2 (two) times a day. 180 tablet 3    omeprazole (PRILOSEC) 40 MG capsule Take 1 capsule (40 mg total) by mouth 2 (two) times daily before meals. 180 capsule 3    ondansetron (ZOFRAN-ODT) 4 MG TbDL Take 1 tablet (4 mg total) by mouth every 8 (eight) hours as needed. 15 tablet 1    oxyCODONE-acetaminophen (PERCOCET)  mg per tablet Take 1 tablet by mouth every 8 (eight) hours as needed for Pain. 90 tablet 0    oxyCODONE-acetaminophen (PERCOCET)  mg per tablet Take 1 tablet by mouth every 8 (eight) hours as needed for Pain. 90 tablet 0    pen needle, diabetic (BD RORY 2ND GEN PEN NEEDLE) 32 gauge x 5/32" Ndle USE THREE TIMES DAILY WITH INSULIN AS DIRECTED 300 each 3    pulse oximeter (PULSE OXIMETER) device by Apply Externally route 2 (two) times a day. Use twice daily at 8 AM and 3 PM and record the value in [facility] as directed. 1 each 0    tacrolimus (PROGRAF) 1 MG Cap TAKE 3 CAPSULE IN THE MORNING AND 3 IN " THE EVENING. 180 capsule 3    tirzepatide (MOUNJARO) 10 mg/0.5 mL PnIj Inject 10 mg into the skin every 7 days. 4 Pen 5    tiZANidine (ZANAFLEX) 4 MG tablet Take 1 tablet (4 mg total) by mouth 2 (two) times daily as needed (muscle spasms). 60 tablet 1    azelastine (OPTIVAR) 0.05 % ophthalmic solution Place 1 drop into both eyes 2 (two) times daily. (Patient not taking: Reported on 12/18/2024) 6 mL 0    levothyroxine (SYNTHROID) 150 MCG tablet Take 1 tablet (150 mcg total) by mouth before breakfast. 90 tablet 3    polymyxin B sulf-trimethoprim (POLYTRIM) 10,000 unit- 1 mg/mL Drop Place 1 drop into both eyes every 6 (six) hours. (Patient not taking: Reported on 12/18/2024) 10 mL 0     Facility-Administered Medications Prior to Visit   Medication Dose Route Frequency Provider Last Rate Last Admin    lactated ringers infusion   Intravenous Continuous Lisa Nevarez MD          ROS   Physical Exam        No data to display                   No data to display                  /68     Constitutional: The patient appears well-developed and well-nourished. No distress.   Psychiatric: The mood appears not anxious and the affect is not angry, not blunt, not labile and not inappropriate. Speech is not rapid and/or pressured, not delayed, not tangential and not slurred. The patient is not agitated, not aggressive, is not hyperactive, not slowed, not withdrawn, not actively hallucinating and not combative. Thought content is not paranoid and not delusional. Cognition and memory are not impaired. The patient does not express impulsivity or inappropriate judgment and does not exhibit a depressed mood. The patient expresses no homicidal and no suicidal ideation and has no suicidal plans and no homicidal plans. The patient is communicative and exhibits a normal recent memory and normal remote memory. The patient is attentive.     Encounter Diagnoses   Name Primary?    Acquired hypothyroidism     Hypertension associated  with diabetes Yes    Hyperlipidemia associated with type 2 diabetes mellitus     Age-related osteoporosis without current pathological fracture     LANCE (dyspnea on exertion)     Type 2 diabetes mellitus with hyperglycemia, with long-term current use of insulin     Chronic kidney disease, stage 3a     Chronic respiratory failure with hypoxia, on home O2 therapy        PLAN:    Assessment & Plan            I have discontinued Ana Maria Teague's polymyxin B sulf-trimethoprim and azelastine. I am also having her maintain her lancets, lancets, pulse oximeter, cetirizine, blood sugar diagnostic, alendronate, inhalation spacing device, albuterol, nebulizer and compressor, albuterol-ipratropium, insulin lispro, (pen needle, diabetic), NIFEdipine, LIDOcaine, omeprazole, diclofenac sodium, tiZANidine, mycophenolate, tacrolimus, fluticasone propionate, MOUNJARO, atorvastatin, oxyCODONE-acetaminophen, oxyCODONE-acetaminophen, ondansetron, empagliflozin, and levothyroxine.  No problem-specific Assessment & Plan notes found for this encounter.      Follow up in about 3 months (around 5/14/2025).    Diagnoses and all orders for this visit:    Hypertension associated with diabetes- stable, cont rx,    Acquired hypothyroidism- recheck now.  -     levothyroxine (SYNTHROID) 150 MCG tablet; Take 1 tablet (150 mcg total) by mouth before breakfast.  -     TSH; Future    Hyperlipidemia associated with type 2 diabetes mellitus- recheck now.  -     CBC Auto Differential; Future  -     Comprehensive Metabolic Panel; Future  -     Lipid Panel; Future    Age-related osteoporosis without current pathological fracture- bisphos on hold for dental work.    LANCE (dyspnea on exertion)  -     B-TYPE NATRIURETIC PEPTIDE; Future    Type 2 diabetes mellitus with hyperglycemia, with long-term current use of insulin- recheck now.  -     Hemoglobin A1C; Future  -     Microalbumin/Creatinine Ratio, Urine; Future    Chronic kidney disease, stage 3a    Chronic  respiratory failure with hypoxia, on home O2 therapy- check pulse ox at home and let me know how running.    RTC 3 mo in clinic.  Lab in 3d with [place].      Medications Ordered This Encounter   Medications    levothyroxine (SYNTHROID) 150 MCG tablet     Sig: Take 1 tablet (150 mcg total) by mouth before breakfast.     Dispense:  90 tablet     Refill:  3     [unfilled]  Orders Placed This Encounter   Procedures    CBC Auto Differential     Standing Status:   Future     Standing Expiration Date:   2/14/2026     Order Specific Question:   Send normal result to authorizing provider's In Basket if patient is active on MyChart:     Answer:   Yes    Comprehensive Metabolic Panel     Standing Status:   Future     Standing Expiration Date:   2/14/2026     Order Specific Question:   Send normal result to authorizing provider's In Basket if patient is active on MyChart:     Answer:   Yes    Lipid Panel     Standing Status:   Future     Standing Expiration Date:   2/14/2026     Order Specific Question:   Send normal result to authorizing provider's In Basket if patient is active on MyChart:     Answer:   Yes    TSH     Standing Status:   Future     Standing Expiration Date:   2/14/2026     Order Specific Question:   Send normal result to authorizing provider's In Basket if patient is active on MyChart:     Answer:   Yes    Hemoglobin A1C     Standing Status:   Future     Standing Expiration Date:   4/15/2026     Order Specific Question:   Send normal result to authorizing provider's In Basket if patient is active on MyChart:     Answer:   Yes    Microalbumin/Creatinine Ratio, Urine     Standing Status:   Future     Standing Expiration Date:   4/15/2026     Order Specific Question:   Specimen Source     Answer:   Urine    B-TYPE NATRIURETIC PEPTIDE     Standing Status:   Future     Standing Expiration Date:   5/15/2026     Order Specific Question:   Send normal result to authorizing provider's In Basket if patient is active on  MyChart:     Answer:   Yes       Medication List with Changes/Refills   Current Medications    ALBUTEROL (PROVENTIL/VENTOLIN HFA) 90 MCG/ACTUATION INHALER    Rescue    ALBUTEROL-IPRATROPIUM (DUO-NEB) 2.5 MG-0.5 MG/3 ML NEBULIZER SOLUTION    Take 3 mLs by nebulization every 6 (six) hours as needed for Wheezing (and cough). Rescue    ALENDRONATE (FOSAMAX) 70 MG TABLET    Take 1 tablet (70 mg total) by mouth every 7 days.    ATORVASTATIN (LIPITOR) 40 MG TABLET    Take 1 tablet (40 mg total) by mouth once daily.    BLOOD SUGAR DIAGNOSTIC (FREESTYLE LITE STRIPS) STRP    Use to check blood glucose 3 times a day.    CETIRIZINE (ZYRTEC) 10 MG TABLET    TAKE 1 TABLET BY MOUTH EVERY DAY    DICLOFENAC SODIUM (VOLTAREN) 1 % GEL    APPLY 2 Grams TOPICALLY FOUR TIMES DAILY AS NEEDED    EMPAGLIFLOZIN (JARDIANCE) 10 MG TABLET    Take 1 tablet (10 mg total) by mouth once daily.    FLUTICASONE PROPIONATE (FLONASE) 50 MCG/ACTUATION NASAL SPRAY    2 sprays (100 mcg total) by Each Nostril route 2 (two) times a day.    INHALATION SPACING DEVICE    Use as directed for inhalation.    INSULIN LISPRO (HUMALOG KWIKPEN INSULIN) 100 UNIT/ML PEN    Inject 4-7 units before each main meal.    LANCETS (FREESTYLE LANCETS) 28 GAUGE MISC    1 lancet by Combination route 2 (two) times daily as needed. Qid prn    LANCETS MISC    1 strip by Misc.(Non-Drug; Combo Route) route 4 (four) times daily with meals and nightly.    LIDOCAINE (LIDODERM) 5 %    Use 1-3 patches per day over recommended area. Remove & Discard patch within 12 hours or as directed by MD.    MYCOPHENOLATE (CELLCEPT) 250 MG CAP    TAKE (2) CAPSULES TWICE DAILY.    NEBULIZER AND COMPRESSOR REKHA    Use as directed    NIFEDIPINE (PROCARDIA-XL) 30 MG (OSM) 24 HR TABLET    Take 1 tablet (30 mg total) by mouth 2 (two) times a day.    OMEPRAZOLE (PRILOSEC) 40 MG CAPSULE    Take 1 capsule (40 mg total) by mouth 2 (two) times daily before meals.    ONDANSETRON (ZOFRAN-ODT) 4 MG TBDL    Take 1  "tablet (4 mg total) by mouth every 8 (eight) hours as needed.    OXYCODONE-ACETAMINOPHEN (PERCOCET)  MG PER TABLET    Take 1 tablet by mouth every 8 (eight) hours as needed for Pain.    OXYCODONE-ACETAMINOPHEN (PERCOCET)  MG PER TABLET    Take 1 tablet by mouth every 8 (eight) hours as needed for Pain.    PEN NEEDLE, DIABETIC (BD RORY 2ND GEN PEN NEEDLE) 32 GAUGE X 5/32" NDLE    USE THREE TIMES DAILY WITH INSULIN AS DIRECTED    PULSE OXIMETER (PULSE OXIMETER) DEVICE    by Apply Externally route 2 (two) times a day. Use twice daily at 8 AM and 3 PM and record the value in [facility] as directed.    TACROLIMUS (PROGRAF) 1 MG CAP    TAKE 3 CAPSULE IN THE MORNING AND 3 IN THE EVENING.    TIRZEPATIDE (MOUNJARO) 10 MG/0.5 ML PNIJ    Inject 10 mg into the skin every 7 days.    TIZANIDINE (ZANAFLEX) 4 MG TABLET    Take 1 tablet (4 mg total) by mouth 2 (two) times daily as needed (muscle spasms).   Changed and/or Refilled Medications    Modified Medication Previous Medication    LEVOTHYROXINE (SYNTHROID) 150 MCG TABLET levothyroxine (SYNTHROID) 150 MCG tablet       Take 1 tablet (150 mcg total) by mouth before breakfast.    Take 1 tablet (150 mcg total) by mouth before breakfast.   Discontinued Medications    AZELASTINE (OPTIVAR) 0.05 % OPHTHALMIC SOLUTION    Place 1 drop into both eyes 2 (two) times daily.    POLYMYXIN B SULF-TRIMETHOPRIM (POLYTRIM) 10,000 UNIT- 1 MG/ML DROP    Place 1 drop into both eyes every 6 (six) hours.      Medication List with Changes/Refills   Current Medications    ALBUTEROL (PROVENTIL/VENTOLIN HFA) 90 MCG/ACTUATION INHALER    Rescue       Start Date: 11/7/2023 End Date: --    ALBUTEROL-IPRATROPIUM (DUO-NEB) 2.5 MG-0.5 MG/3 ML NEBULIZER SOLUTION    Take 3 mLs by nebulization every 6 (six) hours as needed for Wheezing (and cough). Rescue       Start Date: 3/26/2024 End Date: 3/26/2025    ALENDRONATE (FOSAMAX) 70 MG TABLET    Take 1 tablet (70 mg total) by mouth every 7 days.       Start " Date: 8/10/2023 End Date: 8/9/2024    ATORVASTATIN (LIPITOR) 40 MG TABLET    Take 1 tablet (40 mg total) by mouth once daily.       Start Date: 1/15/2025 End Date: 1/15/2026    BLOOD SUGAR DIAGNOSTIC (FREESTYLE LITE STRIPS) STRP    Use to check blood glucose 3 times a day.       Start Date: 2/2/2022  End Date: --    CETIRIZINE (ZYRTEC) 10 MG TABLET    TAKE 1 TABLET BY MOUTH EVERY DAY       Start Date: 11/28/2021End Date: --    DICLOFENAC SODIUM (VOLTAREN) 1 % GEL    APPLY 2 Grams TOPICALLY FOUR TIMES DAILY AS NEEDED       Start Date: 10/3/2024 End Date: --    EMPAGLIFLOZIN (JARDIANCE) 10 MG TABLET    Take 1 tablet (10 mg total) by mouth once daily.       Start Date: 1/31/2025 End Date: 1/31/2026    FLUTICASONE PROPIONATE (FLONASE) 50 MCG/ACTUATION NASAL SPRAY    2 sprays (100 mcg total) by Each Nostril route 2 (two) times a day.       Start Date: 12/17/2024End Date: --    INHALATION SPACING DEVICE    Use as directed for inhalation.       Start Date: 11/6/2023 End Date: --    INSULIN LISPRO (HUMALOG KWIKPEN INSULIN) 100 UNIT/ML PEN    Inject 4-7 units before each main meal.       Start Date: 5/21/2024 End Date: --    LANCETS (FREESTYLE LANCETS) 28 GAUGE MISC    1 lancet by Combination route 2 (two) times daily as needed. Qid prn       Start Date: 12/4/2018 End Date: --    LANCETS MISC    1 strip by Misc.(Non-Drug; Combo Route) route 4 (four) times daily with meals and nightly.       Start Date: 2/10/2015 End Date: --    LIDOCAINE (LIDODERM) 5 %    Use 1-3 patches per day over recommended area. Remove & Discard patch within 12 hours or as directed by MD.       Start Date: 7/25/2024 End Date: --    MYCOPHENOLATE (CELLCEPT) 250 MG CAP    TAKE (2) CAPSULES TWICE DAILY.       Start Date: 11/1/2024 End Date: --    NEBULIZER AND COMPRESSOR REKHA    Use as directed       Start Date: 11/10/2023End Date: --    NIFEDIPINE (PROCARDIA-XL) 30 MG (OSM) 24 HR TABLET    Take 1 tablet (30 mg total) by mouth 2 (two) times a day.        "Start Date: 7/18/2024 End Date: 7/18/2025    OMEPRAZOLE (PRILOSEC) 40 MG CAPSULE    Take 1 capsule (40 mg total) by mouth 2 (two) times daily before meals.       Start Date: 9/18/2024 End Date: 9/18/2025    ONDANSETRON (ZOFRAN-ODT) 4 MG TBDL    Take 1 tablet (4 mg total) by mouth every 8 (eight) hours as needed.       Start Date: 1/15/2025 End Date: --    OXYCODONE-ACETAMINOPHEN (PERCOCET)  MG PER TABLET    Take 1 tablet by mouth every 8 (eight) hours as needed for Pain.       Start Date: 1/15/2025 End Date: --    OXYCODONE-ACETAMINOPHEN (PERCOCET)  MG PER TABLET    Take 1 tablet by mouth every 8 (eight) hours as needed for Pain.       Start Date: 2/14/2025 End Date: --    PEN NEEDLE, DIABETIC (BD RORY 2ND GEN PEN NEEDLE) 32 GAUGE X 5/32" NDLE    USE THREE TIMES DAILY WITH INSULIN AS DIRECTED       Start Date: 5/21/2024 End Date: --    PULSE OXIMETER (PULSE OXIMETER) DEVICE    by Apply Externally route 2 (two) times a day. Use twice daily at 8 AM and 3 PM and record the value in [facility] as directed.       Start Date: 1/8/2021  End Date: --    TACROLIMUS (PROGRAF) 1 MG CAP    TAKE 3 CAPSULE IN THE MORNING AND 3 IN THE EVENING.       Start Date: 12/3/2024 End Date: --    TIRZEPATIDE (MOUNJARO) 10 MG/0.5 ML PNIJ    Inject 10 mg into the skin every 7 days.       Start Date: 12/30/2024End Date: --    TIZANIDINE (ZANAFLEX) 4 MG TABLET    Take 1 tablet (4 mg total) by mouth 2 (two) times daily as needed (muscle spasms).       Start Date: 10/3/2024 End Date: --   Changed and/or Refilled Medications    Modified Medication Previous Medication    LEVOTHYROXINE (SYNTHROID) 150 MCG TABLET levothyroxine (SYNTHROID) 150 MCG tablet       Take 1 tablet (150 mcg total) by mouth before breakfast.    Take 1 tablet (150 mcg total) by mouth before breakfast.       Start Date: 2/14/2025 End Date: 2/14/2026    Start Date: 9/17/2022 End Date: 2/14/2025   Discontinued Medications    AZELASTINE (OPTIVAR) 0.05 % OPHTHALMIC SOLUTION "    Place 1 drop into both eyes 2 (two) times daily.       Start Date: 12/21/2023End Date: 2/14/2025    POLYMYXIN B SULF-TRIMETHOPRIM (POLYTRIM) 10,000 UNIT- 1 MG/ML DROP    Place 1 drop into both eyes every 6 (six) hours.       Start Date: 12/18/2023End Date: 2/14/2025

## 2025-02-17 ENCOUNTER — PATIENT MESSAGE (OUTPATIENT)
Dept: PRIMARY CARE CLINIC | Facility: CLINIC | Age: 65
End: 2025-02-17
Payer: MEDICARE

## 2025-02-18 ENCOUNTER — LAB VISIT (OUTPATIENT)
Dept: LAB | Facility: HOSPITAL | Age: 65
End: 2025-02-18
Attending: INTERNAL MEDICINE
Payer: MEDICARE

## 2025-02-18 DIAGNOSIS — R06.09 DOE (DYSPNEA ON EXERTION): ICD-10-CM

## 2025-02-18 DIAGNOSIS — E11.65 TYPE 2 DIABETES MELLITUS WITH HYPERGLYCEMIA, WITH LONG-TERM CURRENT USE OF INSULIN: ICD-10-CM

## 2025-02-18 DIAGNOSIS — E11.69 HYPERLIPIDEMIA ASSOCIATED WITH TYPE 2 DIABETES MELLITUS: ICD-10-CM

## 2025-02-18 DIAGNOSIS — E03.9 ACQUIRED HYPOTHYROIDISM: ICD-10-CM

## 2025-02-18 DIAGNOSIS — E78.5 HYPERLIPIDEMIA ASSOCIATED WITH TYPE 2 DIABETES MELLITUS: ICD-10-CM

## 2025-02-18 DIAGNOSIS — Z79.4 TYPE 2 DIABETES MELLITUS WITH HYPERGLYCEMIA, WITH LONG-TERM CURRENT USE OF INSULIN: ICD-10-CM

## 2025-02-18 LAB
ALBUMIN SERPL BCP-MCNC: 3.6 G/DL (ref 3.5–5.2)
ALP SERPL-CCNC: 144 U/L (ref 40–150)
ALT SERPL W/O P-5'-P-CCNC: 12 U/L (ref 10–44)
ANION GAP SERPL CALC-SCNC: 13 MMOL/L (ref 8–16)
AST SERPL-CCNC: 46 U/L (ref 10–40)
BASOPHILS # BLD AUTO: 0.06 K/UL (ref 0–0.2)
BASOPHILS NFR BLD: 0.6 % (ref 0–1.9)
BILIRUB SERPL-MCNC: 0.3 MG/DL (ref 0.1–1)
BNP SERPL-MCNC: 31 PG/ML (ref 0–99)
BUN SERPL-MCNC: 16 MG/DL (ref 8–23)
CALCIUM SERPL-MCNC: 9.5 MG/DL (ref 8.7–10.5)
CHLORIDE SERPL-SCNC: 107 MMOL/L (ref 95–110)
CHOLEST SERPL-MCNC: 140 MG/DL (ref 120–199)
CHOLEST/HDLC SERPL: 2.8 {RATIO} (ref 2–5)
CO2 SERPL-SCNC: 22 MMOL/L (ref 23–29)
CREAT SERPL-MCNC: 0.9 MG/DL (ref 0.5–1.4)
DIFFERENTIAL METHOD BLD: ABNORMAL
EOSINOPHIL # BLD AUTO: 0.2 K/UL (ref 0–0.5)
EOSINOPHIL NFR BLD: 2 % (ref 0–8)
ERYTHROCYTE [DISTWIDTH] IN BLOOD BY AUTOMATED COUNT: 15 % (ref 11.5–14.5)
EST. GFR  (NO RACE VARIABLE): >60 ML/MIN/1.73 M^2
GLUCOSE SERPL-MCNC: 92 MG/DL (ref 70–110)
HCT VFR BLD AUTO: 40.1 % (ref 37–48.5)
HDLC SERPL-MCNC: 50 MG/DL (ref 40–75)
HDLC SERPL: 35.7 % (ref 20–50)
HGB BLD-MCNC: 12.4 G/DL (ref 12–16)
IMM GRANULOCYTES # BLD AUTO: 0.09 K/UL (ref 0–0.04)
IMM GRANULOCYTES NFR BLD AUTO: 0.8 % (ref 0–0.5)
LDLC SERPL CALC-MCNC: 75.8 MG/DL (ref 63–159)
LYMPHOCYTES # BLD AUTO: 3.6 K/UL (ref 1–4.8)
LYMPHOCYTES NFR BLD: 33.6 % (ref 18–48)
MCH RBC QN AUTO: 26.6 PG (ref 27–31)
MCHC RBC AUTO-ENTMCNC: 30.9 G/DL (ref 32–36)
MCV RBC AUTO: 86 FL (ref 82–98)
MONOCYTES # BLD AUTO: 0.5 K/UL (ref 0.3–1)
MONOCYTES NFR BLD: 4.7 % (ref 4–15)
NEUTROPHILS # BLD AUTO: 6.2 K/UL (ref 1.8–7.7)
NEUTROPHILS NFR BLD: 58.3 % (ref 38–73)
NONHDLC SERPL-MCNC: 90 MG/DL
NRBC BLD-RTO: 0 /100 WBC
PLATELET # BLD AUTO: 262 K/UL (ref 150–450)
PMV BLD AUTO: 12.2 FL (ref 9.2–12.9)
POTASSIUM SERPL-SCNC: 4 MMOL/L (ref 3.5–5.1)
PROT SERPL-MCNC: 7.5 G/DL (ref 6–8.4)
RBC # BLD AUTO: 4.66 M/UL (ref 4–5.4)
SODIUM SERPL-SCNC: 142 MMOL/L (ref 136–145)
TRIGL SERPL-MCNC: 71 MG/DL (ref 30–150)
TSH SERPL DL<=0.005 MIU/L-ACNC: 0.89 UIU/ML (ref 0.4–4)
WBC # BLD AUTO: 10.64 K/UL (ref 3.9–12.7)

## 2025-02-18 PROCEDURE — 83880 ASSAY OF NATRIURETIC PEPTIDE: CPT | Performed by: INTERNAL MEDICINE

## 2025-02-18 PROCEDURE — 36415 COLL VENOUS BLD VENIPUNCTURE: CPT | Mod: PN | Performed by: INTERNAL MEDICINE

## 2025-02-18 PROCEDURE — 80061 LIPID PANEL: CPT | Performed by: INTERNAL MEDICINE

## 2025-02-18 PROCEDURE — 84443 ASSAY THYROID STIM HORMONE: CPT | Performed by: INTERNAL MEDICINE

## 2025-02-18 PROCEDURE — 83036 HEMOGLOBIN GLYCOSYLATED A1C: CPT | Performed by: INTERNAL MEDICINE

## 2025-02-18 PROCEDURE — 85025 COMPLETE CBC W/AUTO DIFF WBC: CPT | Performed by: INTERNAL MEDICINE

## 2025-02-18 PROCEDURE — 80053 COMPREHEN METABOLIC PANEL: CPT | Performed by: INTERNAL MEDICINE

## 2025-02-19 ENCOUNTER — PATIENT MESSAGE (OUTPATIENT)
Dept: PRIMARY CARE CLINIC | Facility: CLINIC | Age: 65
End: 2025-02-19
Payer: MEDICARE

## 2025-02-19 LAB
ESTIMATED AVG GLUCOSE: 148 MG/DL (ref 68–131)
HBA1C MFR BLD: 6.8 % (ref 4–5.6)

## 2025-03-13 DIAGNOSIS — R11.0 NAUSEA: ICD-10-CM

## 2025-03-13 RX ORDER — ONDANSETRON 4 MG/1
4 TABLET, ORALLY DISINTEGRATING ORAL EVERY 8 HOURS PRN
Qty: 15 TABLET | Refills: 1 | Status: SHIPPED | OUTPATIENT
Start: 2025-03-13

## 2025-04-03 ENCOUNTER — OFFICE VISIT (OUTPATIENT)
Dept: PAIN MEDICINE | Facility: CLINIC | Age: 65
End: 2025-04-03
Payer: MEDICARE

## 2025-04-03 DIAGNOSIS — Z79.891 OPIOID USE AGREEMENT EXISTS: ICD-10-CM

## 2025-04-03 DIAGNOSIS — M51.369 DDD (DEGENERATIVE DISC DISEASE), LUMBAR: ICD-10-CM

## 2025-04-03 DIAGNOSIS — M79.18 MUSCLE PAIN, MYOFASCIAL: ICD-10-CM

## 2025-04-03 DIAGNOSIS — M79.2 NEUROPATHIC PAIN: ICD-10-CM

## 2025-04-03 DIAGNOSIS — M47.816 LUMBAR SPONDYLOSIS: ICD-10-CM

## 2025-04-03 DIAGNOSIS — M47.812 CERVICAL SPONDYLOSIS: Primary | ICD-10-CM

## 2025-04-03 PROCEDURE — 3044F HG A1C LEVEL LT 7.0%: CPT | Mod: CPTII,95,, | Performed by: PHYSICIAN ASSISTANT

## 2025-04-03 PROCEDURE — 1159F MED LIST DOCD IN RCRD: CPT | Mod: CPTII,95,, | Performed by: PHYSICIAN ASSISTANT

## 2025-04-03 PROCEDURE — 1160F RVW MEDS BY RX/DR IN RCRD: CPT | Mod: CPTII,95,, | Performed by: PHYSICIAN ASSISTANT

## 2025-04-03 PROCEDURE — 3066F NEPHROPATHY DOC TX: CPT | Mod: CPTII,95,, | Performed by: PHYSICIAN ASSISTANT

## 2025-04-03 PROCEDURE — 3060F POS MICROALBUMINURIA REV: CPT | Mod: CPTII,95,, | Performed by: PHYSICIAN ASSISTANT

## 2025-04-03 PROCEDURE — 98006 SYNCH AUDIO-VIDEO EST MOD 30: CPT | Mod: 95,,, | Performed by: PHYSICIAN ASSISTANT

## 2025-04-03 RX ORDER — TIZANIDINE 4 MG/1
4 TABLET ORAL 2 TIMES DAILY PRN
Qty: 60 TABLET | Refills: 1 | Status: SHIPPED | OUTPATIENT
Start: 2025-04-03

## 2025-04-03 RX ORDER — OXYCODONE AND ACETAMINOPHEN 10; 325 MG/1; MG/1
1 TABLET ORAL EVERY 8 HOURS PRN
Qty: 90 TABLET | Refills: 0 | Status: SHIPPED | OUTPATIENT
Start: 2025-04-03

## 2025-04-03 NOTE — PROGRESS NOTES
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 SERENITY.   2nd MBB: if > 80% pain relief, schedule bilateral cervical RFA (orders in).      - Consider left shoulder injection.  We will hold off for now as she wants to avoid NSAIDs and steroids at this point.  - Consider diagnostic bilateral L3-5 MBB then RFA. Information provided on AVS.  Patient will call to schedule once she gets school schedule for her grandkids.  - She is also considering Iovera procedure with Dr. Leach (Orthopedics) for knee pain.      - Anticoagulation use: None.     2. Pharmacologic:   - Refill Percocet 10/325mg TID PRN pain x 2 months. Will e-prescribe to fill on 4/3/2025 and 5/3/2025.  Patient takes more sparingly than she has in the past.   - Refill LIDOcaine (LIDODERM) 5% topical patches to apply Q12H PRN pain.    - Refill diclofenac 1% gel to apply 2g topically up to 4 times per day.   - Refill Zanaflex 4mg BID PRN; does not need Refill.  - No steroids due to DM.     Failed meds: gabapentin (SE: swelling).     - Opioid contract updated with Dr. Pedro on 10/28/2020.   * 04/23/2024-seen in clinic by Dr. Pedro.    - [place]  reviewed and appropriate.       - Last UDS 5/22/2024 was compliant for medications prescribed. Order drug screen (UDS/ oral swab) next visit to ensure med compliance.     3. Rehabilitative: Encouraged regular exercise.    4. Diagnostic/ Imaging: No new imaging ordered. Previous imaging reviewed.     5. Consult/ Referral:    - Consider follow-up with orthopedics for bilateral knee pain. Currently seeing Dr. Leach and discussing Iovera. Last seen August 2023; encouraged to follow-up with worsening knee pain.   - Continue follow-up with nephrology for status post kidney transplant  - Continue follow-up with endocrinology for diabetes  - Consider new Podiatry referral for chronic left ankle pain.     6. Return to clinic: 2-3 months medication refill in clinic with UDS       Future Appointments   Date Time Provider Department Center   5/15/2025  1:40 PM Karine Olmos MD BSFC 65PLUS [facility]   5/22/2025 10:00 AM Sabi Bills, NP [facility] DIABETE [facility]   6/13/2025  9:20 AM LABORATORY, [facility] LAB [facility]   6/20/2025 10:00 AM June Navarro, XINP-BC [facility] NEPHRO [facility]         - Patient Questions: Answered all of the patient's questions regarding diagnosis, therapy, and treatment.    - This condition does not require this patient to take time off of work, and the primary goal of our Pain Management services is to improve the patient's functional capacity.   - I discussed the risks, benefits, and alternatives to potential treatment options. All questions and concerns were fully addressed today in clinic.         Marcella Haas PA-C  Interventional Pain Management - [facility]    Disclaimer:  This note was prepared using voice recognition system and is likely to have sound alike errors that may have been overlooked even after proof reading.  Please call me with any questions.     ______________________________________________________________________      >>UDS/ oral swab:  11-8-15 :: appropriate  1-13-16 :: appropriate  8-9-16 :: appropriate  2/6/2017 :: appropriate  8/21/2017 :: appropriate  7/16/2018 :: appropriate  4/22/2019 :: appropriate  11/7/2019 :: appropriate  8/19/2020 :: appropriate  12/8/2020 :: appropriate  4/27/2021 :: appropriate  2/7/2022 :: appropriate  8/15/2022 :: appropriate   3/20/2023 ::  appropriate   11/27/2023 ::  Appropriate  5/22/2024 :: Appropriate

## 2025-04-11 RX ORDER — OXYCODONE AND ACETAMINOPHEN 10; 325 MG/1; MG/1
1 TABLET ORAL EVERY 8 HOURS PRN
Qty: 90 TABLET | Refills: 0 | Status: SHIPPED | OUTPATIENT
Start: 2025-05-03

## 2025-05-12 DIAGNOSIS — D84.9 IMMUNOSUPPRESSED STATUS: ICD-10-CM

## 2025-05-12 DIAGNOSIS — Z94.0 DECEASED-DONOR KIDNEY TRANSPLANT: Chronic | ICD-10-CM

## 2025-05-12 RX ORDER — TACROLIMUS 1 MG/1
CAPSULE ORAL
Qty: 180 CAPSULE | Refills: 3 | Status: SHIPPED | OUTPATIENT
Start: 2025-05-12

## 2025-05-14 ENCOUNTER — PATIENT MESSAGE (OUTPATIENT)
Dept: PAIN MEDICINE | Facility: CLINIC | Age: 65
End: 2025-05-14
Payer: MEDICARE

## 2025-05-14 ENCOUNTER — TELEPHONE (OUTPATIENT)
Dept: PAIN MEDICINE | Facility: CLINIC | Age: 65
End: 2025-05-14
Payer: MEDICARE

## 2025-05-14 NOTE — TELEPHONE ENCOUNTER
Reach out to pt to inform her she will need to stop by the O [facility] at the [place] or [facility] location to merger her and her  charts. Pt did not answer left shiloh Neff   Certified Medical Assistant

## 2025-05-14 NOTE — TELEPHONE ENCOUNTER
Reached out to patient to schedule appointment fo rher  from messages. Apt has been made.   Pt understand. All questions answered.     Chucho Neff  Certified Medical Assistant

## 2025-06-05 NOTE — PROGRESS NOTES
Subjective:      Patient ID: Ana Maria Teague is a 64 y.o. female.    Chief Complaint: Follow-up (3 month f/u ), Fatigue, and Nausea (Need RX)      HPI  Here for follow up of medical problems.  Down 55-60#, on mounjaro 10mg weekly with jardiance.  Has been taking some humalog prn, today low sugar 64 after taking humalog.  No f/c/sw/cough.  Off O2 now, breathing better.  Only uses albuterol prn.  BMs and urine normal.  Denies depression now.  Sometimes trouble sleeping.  Fosamax on hold, has had tooth pull, caps, to have deep cleaning soon.      Advance Care Planning     Date: 06/12/2025    Power of   I initiated the process of voluntary advance care planning today and explained the importance of this process to the patient.  I introduced the concept of advance directives to the patient, as well. Then the patient received detailed information about the importance of designating a Health Care Power of  (HCPOA). She was also instructed to communicate with this person about their wishes for future healthcare, should she become sick and lose decision-making capacity. The patient has not previously appointed a HCPOA. After our discussion, the patient has decided to complete a HCPOA and has appointed her daughter, , health care agent: Calvin Wall & health care agent number: [phone], [phone]. I encouraged her to communicate with this person about their wishes for future healthcare, should she become sick and lose decision-making capacity.      A total of 10 min was spent on advance care planning, goals of care discussion, emotional support, formulating and communicating prognosis and exploring burden/benefit of various approaches of treatment. This discussion occurred on a fully voluntary basis with the verbal consent of the patient and/or family.         Updated/ annual due 8/25:  HM: 12/24 fluvax, 4/21 covid vaccines/booster, 7/19 HAV, 6/15 [id number], 3/14 [id number], 3/23 [id number],  "6/15 Tdap, 8/24 MMG/me, 8/23 BMD with OP on fosamax rep 2y, 9/24 Cscope rep 5y, 11/10 HCV neg, Pain DrKiko Anaya, 4/24 Eye DrKiko Braden.      Review of Systems   Constitutional:  Negative for chills, diaphoresis and fever.   Respiratory:  Negative for cough and shortness of breath.    Cardiovascular:  Negative for chest pain, palpitations and leg swelling.   Gastrointestinal:  Negative for blood in stool, constipation, diarrhea, nausea and vomiting.   Genitourinary:  Negative for dysuria, frequency and hematuria.   Psychiatric/Behavioral:  The patient is not nervous/anxious.          Objective:   /64 (BP Location: Right arm, Patient Position: Sitting)   Pulse 85   Temp 97.5 °F (36.4 °C) (Skin)   Ht 5' 6" (1.676 m)   Wt 87.2 kg (192 lb 3.9 oz)   SpO2 96%   BMI 31.03 kg/m²     Physical Exam  Constitutional:       Appearance: She is well-developed.   Neck:      Thyroid: No thyroid mass.      Vascular: No carotid bruit.   Cardiovascular:      Rate and Rhythm: Normal rate and regular rhythm.      Pulses:           Dorsalis pedis pulses are 2+ on the right side and 2+ on the left side.        Posterior tibial pulses are 2+ on the right side and 2+ on the left side.      Heart sounds: No murmur heard.     No friction rub. No gallop.   Pulmonary:      Effort: Pulmonary effort is normal.      Breath sounds: Normal breath sounds. No wheezing or rales.   Abdominal:      General: Bowel sounds are normal.      Palpations: Abdomen is soft. There is no mass.      Tenderness: There is no abdominal tenderness.   Musculoskeletal:      Cervical back: Neck supple.   Feet:      Right foot:      Protective Sensation: 10 sites tested.  10 sites sensed.      Skin integrity: No ulcer, blister, skin breakdown, erythema, warmth, callus or dry skin.      Left foot:      Protective Sensation: 10 sites tested.  10 sites sensed.      Skin integrity: No ulcer, blister, skin breakdown, erythema, warmth, callus or dry skin. "   Lymphadenopathy:      Cervical: No cervical adenopathy.   Neurological:      Mental Status: She is alert and oriented to person, place, and time.        Latest Reference Range & Units 02/18/25 09:24   WBC 3.90 - 12.70 K/uL 10.64   RBC 4.00 - 5.40 M/uL 4.66   Hemoglobin 12.0 - 16.0 g/dL 12.4   Hematocrit 37.0 - 48.5 % 40.1   MCV 82 - 98 fL 86   MCH 27.0 - 31.0 pg 26.6 (L)   MCHC 32.0 - 36.0 g/dL 30.9 (L)   RDW 11.5 - 14.5 % 15.0 (H)   Platelet Count 150 - 450 K/uL 262   MPV 9.2 - 12.9 fL 12.2   Gran % 38.0 - 73.0 % 58.3   Lymph % 18.0 - 48.0 % 33.6   Mono % 4.0 - 15.0 % 4.7   Eos % 0.0 - 8.0 % 2.0   Basophil % 0.0 - 1.9 % 0.6   Immature Granulocytes 0.0 - 0.5 % 0.8 (H)   Gran # (ANC) 1.8 - 7.7 K/uL 6.2   Lymph # 1.0 - 4.8 K/uL 3.6   Mono # 0.3 - 1.0 K/uL 0.5   Eos # 0.0 - 0.5 K/uL 0.2   Baso # 0.00 - 0.20 K/uL 0.06   Immature Grans (Abs) 0.00 - 0.04 K/uL 0.09 (H)   nRBC 0 /100 WBC 0   Differential Method  Automated   Sodium 136 - 145 mmol/L 142   Potassium 3.5 - 5.1 mmol/L 4.0   Chloride 95 - 110 mmol/L 107   CO2 23 - 29 mmol/L 22 (L)   Anion Gap 8 - 16 mmol/L 13   BUN 8 - 23 mg/dL 16   Creatinine 0.5 - 1.4 mg/dL 0.9   eGFR >60 mL/min/1.73 m^2 >60.0   Glucose 70 - 110 mg/dL 92   Calcium 8.7 - 10.5 mg/dL 9.5   ALP 40 - 150 U/L 144   PROTEIN TOTAL 6.0 - 8.4 g/dL 7.5   Albumin 3.5 - 5.2 g/dL 3.6   BILIRUBIN TOTAL 0.1 - 1.0 mg/dL 0.3   AST 10 - 40 U/L 46 (H)   ALT 10 - 44 U/L 12   Cholesterol Total 120 - 199 mg/dL 140   HDL 40 - 75 mg/dL 50   HDL/Cholesterol Ratio 20.0 - 50.0 % 35.7   Non-HDL Cholesterol mg/dL 90   Total Cholesterol/HDL Ratio 2.0 - 5.0  2.8   Triglycerides 30 - 150 mg/dL 71   LDL Cholesterol 63.0 - 159.0 mg/dL 75.8   BNP 0 - 99 pg/mL 31   Hemoglobin A1C External 4.0 - 5.6 % 6.8 (H)   Estimated Avg Glucose 68 - 131 mg/dL 148 (H)   TSH 0.400 - 4.000 uIU/mL 0.888      [facility] Reference Range & Units 02/18/25 09:16   Urine Creatinine 15.0 - 325.0 mg/dL 70.0   Urine Microalbumin ug/mL 42.0   MICROALB/CREAT  RATIO 0.0 - 30.0 ug/mg 60.0 (H)       Assessment:       1. Hypertension associated with diabetes    2. Hyperlipidemia associated with type 2 diabetes mellitus    3. Type 2 diabetes mellitus with hyperglycemia, with long-term current use of insulin    4. Osteoporosis without pathological fracture    5. Acquired hypothyroidism    6. Age-related osteoporosis without current pathological fracture    7. Chronic kidney disease, stage 3a    8. Chronic respiratory failure with hypoxia, on home O2 therapy    9. Depression, recurrent    10. Encounter for screening mammogram for malignant neoplasm of breast    11. Simple chronic bronchitis    12. -donor kidney transplant - 13    13. Immunosuppressed status    14. Pulmonary hypertension    15. Insomnia due to medical condition    16. Type 2 diabetes mellitus with diabetic microalbuminuria, with long-term current use of insulin    17. Gastroesophageal reflux disease without esophagitis          Plan:     1. Hypertension associated with diabetes  Overview:  Procardia XL 30mg BID.    Assessment & Plan:  Stable, cont rx.    2. Hyperlipidemia associated with type 2 diabetes mellitus  Overview:  Atorva 40mg daily.    Assessment & Plan:  LDL 75, increase exercise, cont statin.    3. Type 2 diabetes mellitus with hyperglycemia, with long-term current use of insulin  Overview:  Jardiance 10mg daily,   Mounjaro 10mg weekly.    Assessment & Plan:  Doing well, cont rxs.    Orders:  -     Hemoglobin A1C; Future; Expected date: 2025    4. Osteoporosis without pathological fracture  Overview:   DEXA - osteoporosis    - DEXA - No evidence of significant bone density loss     Assessment & Plan:  Cont hold Prolia while dental issues.      5. Acquired hypothyroidism  Overview:  Synthroid 150mcg daily.    Assessment & Plan:  Clinically stable, continue present treatment.    6. Age-related osteoporosis without current pathological fracture  Overview:  Prolia on hold while  getting dental work done.    Assessment & Plan:  Cont hold while dental issues.    7. Chronic kidney disease, stage 3a- improved on SGLT2i.    8. Chronic respiratory failure with hypoxia, on home O2 therapy  Overview:  No O2 lately.  Uses Duo-neb just prn,  Albuterol prn.    Assessment & Plan:  Doing well since 60# wt loss.    9. Depression, recurrent- resolved.    10. Encounter for screening mammogram for malignant neoplasm of breast  -     Mammo Digital Screening Bilat w/ Neptali (XPD); Future; Expected date: 2025    11. Simple chronic bronchitis  Overview:  Now only albuterol prn.    12. -donor kidney transplant - 13, Immunosuppressed status  Overview:  Induction with Campath 30mg and IV solumedrol to total 875mg.  Post op course complicated by delayed graft function with hemodialysis. Last HD treatment 10/2/13.   Post-op course also complicated by bleeding/oozing from abdominal incision. Pt received a total of 4 units of PRBC and DDAVP for blood loss anemia.   Returned to OR 10/713 where section was cauterized and NALLELY drain was placed,after which bleeding stopped.    CMV status: D+,R+    End-stage renal disease and was on chronic hemodialysis from  until 2013, status post kidney transplant in  at [facility] in [place].      Assessment & Plan:  Per Nephro.    14. Pulmonary hypertension  Overview:  10/23 ECHO:  Pulmonary Artery: There is mild pulmonary hypertension. The estimated pulmonary artery systolic pressure is 39 mmHg.     15. Insomnia due to medical condition- restart rx prn.  -     amitriptyline (ELAVIL) 10 MG tablet; Take 1 tablet (10 mg total) by mouth nightly as needed for Insomnia.  Dispense: 30 tablet; Refill: 3    16. Type 2 diabetes mellitus with diabetic microalbuminuria, with long-term current use of insulin- no more humalog, cont meds.  Overview:  Jardiance 10mg daily,   Mounjaro 10mg weekly.    Assessment & Plan:  Doing well, cont rxs.    17.  Gastroesophageal reflux disease without esophagitis  Overview:  PPI BID.    Assessment & Plan:  Cont rx.     Tdap at pharmacy.  RTC 3mo with gHb prior.

## 2025-06-12 ENCOUNTER — OFFICE VISIT (OUTPATIENT)
Dept: PRIMARY CARE CLINIC | Facility: CLINIC | Age: 65
End: 2025-06-12
Payer: MEDICARE

## 2025-06-12 VITALS
DIASTOLIC BLOOD PRESSURE: 64 MMHG | WEIGHT: 192.25 LBS | TEMPERATURE: 98 F | OXYGEN SATURATION: 96 % | BODY MASS INDEX: 30.9 KG/M2 | HEIGHT: 66 IN | SYSTOLIC BLOOD PRESSURE: 124 MMHG | HEART RATE: 85 BPM

## 2025-06-12 DIAGNOSIS — E11.69 HYPERLIPIDEMIA ASSOCIATED WITH TYPE 2 DIABETES MELLITUS: ICD-10-CM

## 2025-06-12 DIAGNOSIS — I27.20 PULMONARY HYPERTENSION: ICD-10-CM

## 2025-06-12 DIAGNOSIS — G47.01 INSOMNIA DUE TO MEDICAL CONDITION: ICD-10-CM

## 2025-06-12 DIAGNOSIS — Z94.0 DECEASED-DONOR KIDNEY TRANSPLANT: Chronic | ICD-10-CM

## 2025-06-12 DIAGNOSIS — K21.9 GASTROESOPHAGEAL REFLUX DISEASE WITHOUT ESOPHAGITIS: ICD-10-CM

## 2025-06-12 DIAGNOSIS — N18.31 CHRONIC KIDNEY DISEASE, STAGE 3A: ICD-10-CM

## 2025-06-12 DIAGNOSIS — E11.65 TYPE 2 DIABETES MELLITUS WITH HYPERGLYCEMIA, WITH LONG-TERM CURRENT USE OF INSULIN: ICD-10-CM

## 2025-06-12 DIAGNOSIS — E11.29 TYPE 2 DIABETES MELLITUS WITH DIABETIC MICROALBUMINURIA, WITH LONG-TERM CURRENT USE OF INSULIN: ICD-10-CM

## 2025-06-12 DIAGNOSIS — R80.9 TYPE 2 DIABETES MELLITUS WITH DIABETIC MICROALBUMINURIA, WITH LONG-TERM CURRENT USE OF INSULIN: ICD-10-CM

## 2025-06-12 DIAGNOSIS — E11.59 HYPERTENSION ASSOCIATED WITH DIABETES: Primary | ICD-10-CM

## 2025-06-12 DIAGNOSIS — E03.9 ACQUIRED HYPOTHYROIDISM: ICD-10-CM

## 2025-06-12 DIAGNOSIS — M81.0 OSTEOPOROSIS WITHOUT PATHOLOGICAL FRACTURE: ICD-10-CM

## 2025-06-12 DIAGNOSIS — J96.11 CHRONIC RESPIRATORY FAILURE WITH HYPOXIA, ON HOME O2 THERAPY: ICD-10-CM

## 2025-06-12 DIAGNOSIS — M81.0 AGE-RELATED OSTEOPOROSIS WITHOUT CURRENT PATHOLOGICAL FRACTURE: ICD-10-CM

## 2025-06-12 DIAGNOSIS — Z79.4 TYPE 2 DIABETES MELLITUS WITH DIABETIC MICROALBUMINURIA, WITH LONG-TERM CURRENT USE OF INSULIN: ICD-10-CM

## 2025-06-12 DIAGNOSIS — Z99.81 CHRONIC RESPIRATORY FAILURE WITH HYPOXIA, ON HOME O2 THERAPY: ICD-10-CM

## 2025-06-12 DIAGNOSIS — E78.5 HYPERLIPIDEMIA ASSOCIATED WITH TYPE 2 DIABETES MELLITUS: ICD-10-CM

## 2025-06-12 DIAGNOSIS — Z79.4 TYPE 2 DIABETES MELLITUS WITH HYPERGLYCEMIA, WITH LONG-TERM CURRENT USE OF INSULIN: ICD-10-CM

## 2025-06-12 DIAGNOSIS — D84.9 IMMUNOSUPPRESSED STATUS: ICD-10-CM

## 2025-06-12 DIAGNOSIS — I15.2 HYPERTENSION ASSOCIATED WITH DIABETES: Primary | ICD-10-CM

## 2025-06-12 DIAGNOSIS — J41.0 SIMPLE CHRONIC BRONCHITIS: ICD-10-CM

## 2025-06-12 DIAGNOSIS — R11.0 NAUSEA: ICD-10-CM

## 2025-06-12 DIAGNOSIS — F33.9 DEPRESSION, RECURRENT: ICD-10-CM

## 2025-06-12 DIAGNOSIS — Z12.31 ENCOUNTER FOR SCREENING MAMMOGRAM FOR MALIGNANT NEOPLASM OF BREAST: ICD-10-CM

## 2025-06-12 PROCEDURE — 3008F BODY MASS INDEX DOCD: CPT | Mod: CPTII,S$GLB,, | Performed by: INTERNAL MEDICINE

## 2025-06-12 PROCEDURE — 3288F FALL RISK ASSESSMENT DOCD: CPT | Mod: CPTII,S$GLB,, | Performed by: INTERNAL MEDICINE

## 2025-06-12 PROCEDURE — 1125F AMNT PAIN NOTED PAIN PRSNT: CPT | Mod: CPTII,S$GLB,, | Performed by: INTERNAL MEDICINE

## 2025-06-12 PROCEDURE — 3044F HG A1C LEVEL LT 7.0%: CPT | Mod: CPTII,S$GLB,, | Performed by: INTERNAL MEDICINE

## 2025-06-12 PROCEDURE — 3074F SYST BP LT 130 MM HG: CPT | Mod: CPTII,S$GLB,, | Performed by: INTERNAL MEDICINE

## 2025-06-12 PROCEDURE — 1159F MED LIST DOCD IN RCRD: CPT | Mod: CPTII,S$GLB,, | Performed by: INTERNAL MEDICINE

## 2025-06-12 PROCEDURE — 99214 OFFICE O/P EST MOD 30 MIN: CPT | Mod: S$GLB,,, | Performed by: INTERNAL MEDICINE

## 2025-06-12 PROCEDURE — 3066F NEPHROPATHY DOC TX: CPT | Mod: CPTII,S$GLB,, | Performed by: INTERNAL MEDICINE

## 2025-06-12 PROCEDURE — 3078F DIAST BP <80 MM HG: CPT | Mod: CPTII,S$GLB,, | Performed by: INTERNAL MEDICINE

## 2025-06-12 PROCEDURE — 99999 PR PBB SHADOW E&M-EST. PATIENT-LVL V: CPT | Mod: PBBFAC,,, | Performed by: INTERNAL MEDICINE

## 2025-06-12 PROCEDURE — 3060F POS MICROALBUMINURIA REV: CPT | Mod: CPTII,S$GLB,, | Performed by: INTERNAL MEDICINE

## 2025-06-12 PROCEDURE — 1158F ADVNC CARE PLAN TLK DOCD: CPT | Mod: CPTII,S$GLB,, | Performed by: INTERNAL MEDICINE

## 2025-06-12 PROCEDURE — 1101F PT FALLS ASSESS-DOCD LE1/YR: CPT | Mod: CPTII,S$GLB,, | Performed by: INTERNAL MEDICINE

## 2025-06-12 RX ORDER — ONDANSETRON 4 MG/1
4 TABLET, ORALLY DISINTEGRATING ORAL EVERY 8 HOURS PRN
Qty: 15 TABLET | Refills: 1 | Status: SHIPPED | OUTPATIENT
Start: 2025-06-12

## 2025-06-12 RX ORDER — AMITRIPTYLINE HYDROCHLORIDE 10 MG/1
10 TABLET, FILM COATED ORAL NIGHTLY PRN
Qty: 30 TABLET | Refills: 3 | Status: SHIPPED | OUTPATIENT
Start: 2025-06-12

## 2025-06-13 ENCOUNTER — LAB VISIT (OUTPATIENT)
Dept: LAB | Facility: HOSPITAL | Age: 65
End: 2025-06-13
Attending: INTERNAL MEDICINE
Payer: MEDICARE

## 2025-06-13 DIAGNOSIS — Z79.4 TYPE 2 DIABETES MELLITUS WITH HYPERGLYCEMIA, WITH LONG-TERM CURRENT USE OF INSULIN: ICD-10-CM

## 2025-06-13 DIAGNOSIS — Z94.0 DECEASED-DONOR KIDNEY TRANSPLANT: ICD-10-CM

## 2025-06-13 DIAGNOSIS — E11.65 TYPE 2 DIABETES MELLITUS WITH HYPERGLYCEMIA, WITH LONG-TERM CURRENT USE OF INSULIN: ICD-10-CM

## 2025-06-13 LAB
ABSOLUTE EOSINOPHIL (OHS): 0.23 K/UL
ABSOLUTE MONOCYTE (OHS): 0.61 K/UL (ref 0.3–1)
ABSOLUTE NEUTROPHIL COUNT (OHS): 9.75 K/UL (ref 1.8–7.7)
ALBUMIN SERPL BCP-MCNC: 3.8 G/DL (ref 3.5–5.2)
ANION GAP (OHS): 10 MMOL/L (ref 8–16)
BASOPHILS # BLD AUTO: 0.1 K/UL
BASOPHILS NFR BLD AUTO: 0.7 %
BUN SERPL-MCNC: 15 MG/DL (ref 8–23)
CALCIUM SERPL-MCNC: 9.5 MG/DL (ref 8.7–10.5)
CHLORIDE SERPL-SCNC: 106 MMOL/L (ref 95–110)
CO2 SERPL-SCNC: 24 MMOL/L (ref 23–29)
CREAT SERPL-MCNC: 1 MG/DL (ref 0.5–1.4)
EAG (OHS): 137 MG/DL (ref 68–131)
ERYTHROCYTE [DISTWIDTH] IN BLOOD BY AUTOMATED COUNT: 15.5 % (ref 11.5–14.5)
GFR SERPLBLD CREATININE-BSD FMLA CKD-EPI: >60 ML/MIN/1.73/M2
GLUCOSE SERPL-MCNC: 113 MG/DL (ref 70–110)
HBA1C MFR BLD: 6.4 % (ref 4–5.6)
HCT VFR BLD AUTO: 40.1 % (ref 37–48.5)
HGB BLD-MCNC: 12.4 GM/DL (ref 12–16)
IMM GRANULOCYTES # BLD AUTO: 0.05 K/UL (ref 0–0.04)
IMM GRANULOCYTES NFR BLD AUTO: 0.4 % (ref 0–0.5)
LYMPHOCYTES # BLD AUTO: 2.95 K/UL (ref 1–4.8)
MCH RBC QN AUTO: 26.3 PG (ref 27–31)
MCHC RBC AUTO-ENTMCNC: 30.9 G/DL (ref 32–36)
MCV RBC AUTO: 85 FL (ref 82–98)
NUCLEATED RBC (/100WBC) (OHS): 0 /100 WBC
PHOSPHATE SERPL-MCNC: 3.1 MG/DL (ref 2.7–4.5)
PLATELET # BLD AUTO: 253 K/UL (ref 150–450)
PMV BLD AUTO: 11.5 FL (ref 9.2–12.9)
POTASSIUM SERPL-SCNC: 4.5 MMOL/L (ref 3.5–5.1)
RBC # BLD AUTO: 4.71 M/UL (ref 4–5.4)
RELATIVE EOSINOPHIL (OHS): 1.7 %
RELATIVE LYMPHOCYTE (OHS): 21.5 % (ref 18–48)
RELATIVE MONOCYTE (OHS): 4.5 % (ref 4–15)
RELATIVE NEUTROPHIL (OHS): 71.2 % (ref 38–73)
SODIUM SERPL-SCNC: 140 MMOL/L (ref 136–145)
WBC # BLD AUTO: 13.69 K/UL (ref 3.9–12.7)

## 2025-06-13 PROCEDURE — 80069 RENAL FUNCTION PANEL: CPT

## 2025-06-13 PROCEDURE — 36415 COLL VENOUS BLD VENIPUNCTURE: CPT | Mod: PN

## 2025-06-13 PROCEDURE — 85025 COMPLETE CBC W/AUTO DIFF WBC: CPT

## 2025-06-13 PROCEDURE — 83036 HEMOGLOBIN GLYCOSYLATED A1C: CPT

## 2025-06-13 PROCEDURE — 80197 ASSAY OF TACROLIMUS: CPT

## 2025-06-14 LAB — TACROLIMUS BLD-MCNC: 5.9 NG/ML (ref 5–15)

## 2025-06-16 ENCOUNTER — OFFICE VISIT (OUTPATIENT)
Dept: PRIMARY CARE CLINIC | Facility: CLINIC | Age: 65
End: 2025-06-16
Payer: MEDICARE

## 2025-06-16 ENCOUNTER — PATIENT MESSAGE (OUTPATIENT)
Dept: PRIMARY CARE CLINIC | Facility: CLINIC | Age: 65
End: 2025-06-16

## 2025-06-16 DIAGNOSIS — Z99.81 CHRONIC RESPIRATORY FAILURE WITH HYPOXIA, ON HOME O2 THERAPY: ICD-10-CM

## 2025-06-16 DIAGNOSIS — M87.051 AVASCULAR NECROSIS OF BONE OF RIGHT HIP: ICD-10-CM

## 2025-06-16 DIAGNOSIS — E66.811 CLASS 1 OBESITY WITH ALVEOLAR HYPOVENTILATION WITHOUT SERIOUS COMORBIDITY WITH BODY MASS INDEX (BMI) OF 31.0 TO 31.9 IN ADULT: ICD-10-CM

## 2025-06-16 DIAGNOSIS — J96.11 CHRONIC RESPIRATORY FAILURE WITH HYPOXIA, ON HOME O2 THERAPY: ICD-10-CM

## 2025-06-16 DIAGNOSIS — J42 CHRONIC BRONCHITIS, UNSPECIFIED CHRONIC BRONCHITIS TYPE: ICD-10-CM

## 2025-06-16 DIAGNOSIS — D84.9 IMMUNOSUPPRESSED STATUS: Primary | ICD-10-CM

## 2025-06-16 DIAGNOSIS — E66.2 CLASS 1 OBESITY WITH ALVEOLAR HYPOVENTILATION WITHOUT SERIOUS COMORBIDITY WITH BODY MASS INDEX (BMI) OF 31.0 TO 31.9 IN ADULT: ICD-10-CM

## 2025-06-16 PROCEDURE — 98004 SYNCH AUDIO-VIDEO EST SF 10: CPT | Mod: 95,,, | Performed by: NURSE PRACTITIONER

## 2025-06-16 PROCEDURE — 3060F POS MICROALBUMINURIA REV: CPT | Mod: CPTII,95,, | Performed by: NURSE PRACTITIONER

## 2025-06-16 PROCEDURE — 3066F NEPHROPATHY DOC TX: CPT | Mod: CPTII,95,, | Performed by: NURSE PRACTITIONER

## 2025-06-16 PROCEDURE — 3044F HG A1C LEVEL LT 7.0%: CPT | Mod: CPTII,95,, | Performed by: NURSE PRACTITIONER

## 2025-06-16 RX ORDER — BENZONATATE 100 MG/1
100 CAPSULE ORAL 3 TIMES DAILY PRN
Qty: 30 CAPSULE | Refills: 0 | Status: SHIPPED | OUTPATIENT
Start: 2025-06-16 | End: 2025-06-26

## 2025-06-16 RX ORDER — DOXYCYCLINE 100 MG/1
100 CAPSULE ORAL 2 TIMES DAILY
Qty: 20 CAPSULE | Refills: 0 | Status: SHIPPED | OUTPATIENT
Start: 2025-06-16 | End: 2025-06-26

## 2025-06-16 NOTE — PROGRESS NOTES
Primary Care Telemedicine Appointment      The patient location is:  Patient Home   The chief complaint leading to consultation is: URI symptoms  Total time spent on medical decision making was 10 min.    Visit type: Virtual visit with synchronous audio only and video total 30 minutes  Each patient to whom he or she provides medical services by telemedicine is:  (1) informed of the relationship between the physician and patient and the respective role of any other health care provider with respect to management of the patient; and (2) notified that he or she may decline to receive medical services by telemedicine and may withdraw from such care at any time.      Subjective:      Patient ID: Ana Maria Teague is a 64 y.o. female     Chief Complaint: No chief complaint on file.    Prior to this visit, patient's last encounter with PCP was 2025.      Ms. Teague visits virtually with c/o cough.  She has a hx of chronic respiratory failure and chronic bronchitis.   She saw Dr. Olmos on 25 when she noted sinus symptoms. The symptoms progressed to light cough.     Now increased frequency of cough with associated symptoms including sinus headache, sinus pressure, pressure in right ear,  post nasal drip, clearing throat  Frequent cough during the day with worse at night when lying down  Feels chest congestion,  productive - clear to light yellow  No wheezing or SOB, denies fever, chills        Past Surgical History:   Procedure Laterality Date    BREAST BIOPSY Right     benign. 7 years ago.     SECTION      COLONOSCOPY N/A 3/9/2016    Procedure: COLONOSCOPY;  Surgeon: Douglas Daniel III, MD;  Location: [facility];  Service: Endoscopy;  Laterality: N/A;    COLONOSCOPY N/A 2024    Procedure: COLONOSCOPY;  Surgeon: Lisa Nevarez MD;  Location: [facility] ENDO;  Service: Endoscopy;  Laterality: N/A;    cyst removed form chest wall      ESOPHAGOGASTRODUODENOSCOPY N/A 2024    Procedure: EGD  (ESOPHAGOGASTRODUODENOSCOPY);  Surgeon: Lisa Nevarez MD;  Location: [facility] ENDO;  Service: Endoscopy;  Laterality: N/A;    HYSTERECTOMY      KIDNEY TRANSPLANT  2013    SELECTIVE INJECTION OF ANESTHETIC AGENT AROUND LUMBAR SPINAL NERVE ROOT BY TRANSFORAMINAL APPROACH Bilateral 2021    Procedure: Bilateral L5/S1 TF SERENITY;  Surgeon: Rafita Muniz MD;  Location: [facility] PAIN MGT;  Service: Pain Management;  Laterality: Bilateral;    SKIN GRAFT      revision    THYROIDECTOMY      vascular access surgeries      multiple. last time 2 weeks ago       Past Medical History:   Diagnosis Date    Abnormal glucose 2013    Acquired hypothyroidism     Acute bronchiolitis 10/15/2014    Anemia     Anemia of chronic renal failure 2013    Anxiety     Anxiety disorder     Avascular necrosis of bone of hip     Back pain     s/p steroid injections    Bacteremia due to Escherichia coli 2021    Chronic immunosuppression with Prograf and Cellcept 2013    CKD (chronic kidney disease) stage 2, GFR 60-89 ml/min     CKD (chronic kidney disease) stage 5, GFR less than 15 ml/min     -donor kidney transplant - 2013    Depression     Hypertension, renal 2013    Hypothyroidism     Immunosuppressed status 10/15/2014    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    Osteoporosis with pathological fracture     Renal disease due to hypertension 2013    Renal hypertension, non-vascular     Secondary hyperparathyroidism of renal origin 2013    Vertigo     Vertigo        Denies fever, chills, chest pain, SOB, palpitations, vomiting, diarrhea, no gross neuro deficits, urinary symptoms and leg swelling     Objective:   There were no vitals filed for this visit.      There is no height or weight on file to calculate BMI.  BP Readings from Last 3 Encounters:   25 124/64   25 129/68   24 126/70      Wt Readings from Last 3 Encounters:   25 0854 87.2 kg  (192 lb 3.9 oz)   12/18/24 0949 91.1 kg (200 lb 13.4 oz)   12/17/24 1006 90.9 kg (200 lb 4.6 oz)        Physical Exam  Constitutional:       General: She is not in acute distress.     Appearance: She is ill-appearing.   HENT:      Head: Normocephalic.   Eyes:      Conjunctiva/sclera: Conjunctivae normal.   Pulmonary:      Effort: Pulmonary effort is normal. No respiratory distress.      Breath sounds: No stridor. No wheezing.   Skin:     Coloration: Skin is not jaundiced or pale.   Neurological:      Mental Status: She is alert and oriented to person, place, and time.         Lab Results   Component Value Date    WBC 13.69 (H) 06/13/2025    HGB 12.4 06/13/2025    HCT 40.1 06/13/2025     06/13/2025    CHOL 140 02/18/2025    TRIG 71 02/18/2025    HDL 50 02/18/2025    ALT 12 02/18/2025    AST 46 (H) 02/18/2025     06/13/2025    K 4.5 06/13/2025     06/13/2025    CREATININE 1.0 06/13/2025    BUN 15 06/13/2025    CO2 24 06/13/2025    TSH 0.888 02/18/2025    INR 1.1 01/04/2021    HGBA1C 6.4 (H) 06/13/2025         Assessment:       Plan:       Health Maintenance         Date Due Completion Date    Shingles Vaccine (1 of 2) Never done ---    RSV Vaccine (Age 60+ and Pregnant patients) (1 - Risk 60-74 years 1-dose series) Never done ---    COVID-19 Vaccine (4 - 2024-25 season) 09/01/2024 1/19/2022    Diabetic Eye Exam 01/12/2025 1/12/2024    Override on 10/28/2013: Done    Mammogram 08/20/2025 8/20/2024    Override on 4/22/2013: Done    TETANUS VACCINE 06/29/2025 6/29/2015    Hemoglobin A1c 12/13/2025 6/13/2025    Diabetes Urine Screening 02/18/2026 2/18/2025    Lipid Panel 02/18/2026 2/18/2025    Foot Exam 06/12/2026 6/12/2025    Override on 2/15/2023: Done    Override on 1/8/2016: Done (per Karine Fernandez MD)    Override on 3/25/2014: Done    DEXA Scan 08/08/2026 8/8/2023    Colorectal Cancer Screening 09/18/2031 9/18/2024            1. Immunosuppressed status  Assessment & Plan:  Taking cellcept and  tacrolimus for kidney transplant      2. Chronic bronchitis, unspecified chronic bronchitis type  Overview:  Now only albuterol prn.    Assessment & Plan:  Acute on chronic bronchitis  Doxycycline, Tessalon Perle, albuterol or nebulizer treatments    Rest  Drink plenty of clear fluids  Nasal saline spray to clear nasal drainage and help with nasal congestion  Zyrtec or Claritin to help dry mucus and post nasal drip  Mucinex or Mucinex DM for cough and chest congestion  Tylenol for fever, headache and body aches  Warm salt water gargles for throat comfort  Chloraseptic spray or lozenges for throat comfort  RTC with no improvement or worsening       3. Chronic respiratory failure with hypoxia, on home O2 therapy  Overview:  No O2 lately.  Uses Duo-neb just prn,  Albuterol prn.    Assessment & Plan:  Use oxygen if necessary especially with coughing  DuoNebs prn  Albuterol inhaler prn      4. Class 1 obesity with alveolar hypoventilation without serious comorbidity with body mass index (BMI) of 31.0 to 31.9 in adult  Assessment & Plan:  Weight continues to decline - lower BMI  Continue Ozempic  Monitor  Try to exercise  F/U with PCP      5. Avascular necrosis of bone of right hip  Assessment & Plan:  Chronic, stable  Noted back to 2012  Prn Percocet  F/U with Ortho if pain worsens  F/U with pain mgt      Other orders  -     doxycycline (VIBRAMYCIN) 100 MG Cap; Take 1 capsule (100 mg total) by mouth 2 (two) times daily. for 10 days  Dispense: 20 capsule; Refill: 0  -     benzonatate (TESSALON) 100 MG capsule; Take 1 capsule (100 mg total) by mouth 3 (three) times daily as needed for Cough.  Dispense: 30 capsule; Refill: 0         No follow-ups on file.              Sondra Teague, APRN, NP-C  [facility] [address], [place] [address]  [phone] ph  [phone] fax

## 2025-06-16 NOTE — ASSESSMENT & PLAN NOTE
Acute on chronic bronchitis  Doxycycline, Tessalon Perle, albuterol or nebulizer treatments    Rest  Drink plenty of clear fluids  Nasal saline spray to clear nasal drainage and help with nasal congestion  Zyrtec or Claritin to help dry mucus and post nasal drip  Mucinex or Mucinex DM for cough and chest congestion  Tylenol for fever, headache and body aches  Warm salt water gargles for throat comfort  Chloraseptic spray or lozenges for throat comfort  RTC with no improvement or worsening

## 2025-06-17 PROBLEM — E66.2 CLASS 1 OBESITY WITH ALVEOLAR HYPOVENTILATION WITHOUT SERIOUS COMORBIDITY WITH BODY MASS INDEX (BMI) OF 31.0 TO 31.9 IN ADULT: Status: ACTIVE | Noted: 2019-10-30

## 2025-06-17 PROBLEM — E66.811 CLASS 1 OBESITY WITH ALVEOLAR HYPOVENTILATION WITHOUT SERIOUS COMORBIDITY WITH BODY MASS INDEX (BMI) OF 31.0 TO 31.9 IN ADULT: Status: ACTIVE | Noted: 2019-10-30

## 2025-06-18 ENCOUNTER — PATIENT MESSAGE (OUTPATIENT)
Dept: PRIMARY CARE CLINIC | Facility: CLINIC | Age: 65
End: 2025-06-18
Payer: MEDICARE

## 2025-06-18 ENCOUNTER — PATIENT MESSAGE (OUTPATIENT)
Dept: PAIN MEDICINE | Facility: CLINIC | Age: 65
End: 2025-06-18
Payer: MEDICARE

## 2025-06-20 ENCOUNTER — OFFICE VISIT (OUTPATIENT)
Dept: NEPHROLOGY | Facility: CLINIC | Age: 65
End: 2025-06-20
Payer: MEDICARE

## 2025-06-20 VITALS
RESPIRATION RATE: 18 BRPM | HEART RATE: 85 BPM | BODY MASS INDEX: 30.18 KG/M2 | HEIGHT: 66 IN | WEIGHT: 187.81 LBS | DIASTOLIC BLOOD PRESSURE: 76 MMHG | SYSTOLIC BLOOD PRESSURE: 136 MMHG

## 2025-06-20 DIAGNOSIS — E66.811 OBESITY (BMI 30.0-34.9): ICD-10-CM

## 2025-06-20 DIAGNOSIS — E11.59 HYPERTENSION ASSOCIATED WITH DIABETES: ICD-10-CM

## 2025-06-20 DIAGNOSIS — E11.29 TYPE 2 DIABETES MELLITUS WITH DIABETIC MICROALBUMINURIA, WITH LONG-TERM CURRENT USE OF INSULIN: ICD-10-CM

## 2025-06-20 DIAGNOSIS — N18.31 CHRONIC KIDNEY DISEASE, STAGE 3A: Primary | ICD-10-CM

## 2025-06-20 DIAGNOSIS — R80.9 TYPE 2 DIABETES MELLITUS WITH DIABETIC MICROALBUMINURIA, WITH LONG-TERM CURRENT USE OF INSULIN: ICD-10-CM

## 2025-06-20 DIAGNOSIS — I15.2 HYPERTENSION ASSOCIATED WITH DIABETES: ICD-10-CM

## 2025-06-20 DIAGNOSIS — Z94.0 DECEASED-DONOR KIDNEY TRANSPLANT: ICD-10-CM

## 2025-06-20 DIAGNOSIS — Z79.4 TYPE 2 DIABETES MELLITUS WITH DIABETIC MICROALBUMINURIA, WITH LONG-TERM CURRENT USE OF INSULIN: ICD-10-CM

## 2025-06-20 DIAGNOSIS — D84.9 IMMUNOSUPPRESSED STATUS: ICD-10-CM

## 2025-06-20 PROCEDURE — 99999 PR PBB SHADOW E&M-EST. PATIENT-LVL V: CPT | Mod: PBBFAC,,, | Performed by: NURSE PRACTITIONER

## 2025-06-20 NOTE — PROGRESS NOTES
Subjective:       Patient ID: Ana Maria Teague is a 64 y.o. female.  Date of clinic visit: 2025   Chief Complaint: Chronic Kidney Disease 3A / DD kidney tranplant       HPI  Pt presents for a fu for CKD 3A. She had a DD kidney tranplant . CKD 3a in the setting of DM, HTN. Takes prograf, cellcept. Follows a low sodium diet. Hydrates with water well. Some respiratory infection in the past week. Smoked 30+ years ago. Pt denies taking NSAIDs, no GI losses, no abx use, dysuria, no visible blood/foam in the urine, no cardiopulmonary sx's.   No acute illness, hospitalization or exposure to IV radiocontrast. Her laboratory studies and medications were reviewed. All Nephrology related questions were answered to her satisfaction.    Past Medical History:   Diagnosis Date    Abnormal glucose 2013    Acquired hypothyroidism     Acute bronchiolitis 10/15/2014    Anemia     Anemia of chronic renal failure 2013    Anxiety     Anxiety disorder     Avascular necrosis of bone of hip     Back pain     s/p steroid injections    Bacteremia due to Escherichia coli 2021    Chronic immunosuppression with Prograf and Cellcept 2013    CKD (chronic kidney disease) stage 2, GFR 60-89 ml/min     CKD (chronic kidney disease) stage 5, GFR less than 15 ml/min     -donor kidney transplant - 2013    Depression     Hypertension, renal 2013    Hypothyroidism     Immunosuppressed status 10/15/2014    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    Osteoporosis with pathological fracture     Renal disease due to hypertension 2013    Renal hypertension, non-vascular     Secondary hyperparathyroidism of renal origin 2013    Vertigo     Vertigo        Past Surgical History:   Procedure Laterality Date    BREAST BIOPSY Right     benign. 7 years ago.     SECTION      COLONOSCOPY N/A 3/9/2016    Procedure: COLONOSCOPY;  Surgeon: Douglas Daniel III, MD;  Location: [facility]  ENDO;  Service: Endoscopy;  Laterality: N/A;    COLONOSCOPY N/A 2024    Procedure: COLONOSCOPY;  Surgeon: Lisa Nevarez MD;  Location: [facility] ENDO;  Service: Endoscopy;  Laterality: N/A;    cyst removed form chest wall      ESOPHAGOGASTRODUODENOSCOPY N/A 2024    Procedure: EGD (ESOPHAGOGASTRODUODENOSCOPY);  Surgeon: Lisa Nevarez MD;  Location: [facility] ENDO;  Service: Endoscopy;  Laterality: N/A;    HYSTERECTOMY      KIDNEY TRANSPLANT  2013    SELECTIVE INJECTION OF ANESTHETIC AGENT AROUND LUMBAR SPINAL NERVE ROOT BY TRANSFORAMINAL APPROACH Bilateral 2021    Procedure: Bilateral L5/S1 TF SERENITY;  Surgeon: Rafita Muniz MD;  Location: [facility] PAIN MGT;  Service: Pain Management;  Laterality: Bilateral;    SKIN GRAFT      revision    THYROIDECTOMY      vascular access surgeries      multiple. last time 2 weeks ago       Family History   Problem Relation Name Age of Onset    Diabetes Mother      Kidney disease Mother      Hyperlipidemia Mother      Hypertension Mother      Heart disease Mother      Arthritis Mother      Hypertension Father      Stroke Father      Hypertension Sister 2     Arthritis Sister 2     Asthma Sister 2     Heart disease Sister 2         heart attack       Social History     Socioeconomic History    Marital status:     Number of children: 3   Occupational History    Occupation: disable     Comment: dept manager at [employer]   Tobacco Use    Smoking status: Former     Current packs/day: 0.00     Average packs/day: 1 pack/day for 10.0 years (10.0 ttl pk-yrs)     Types: Cigarettes     Start date: 1992     Quit date: 2002     Years since quittin.6    Smokeless tobacco: Never    Tobacco comments:     quit smoking approx 10-15 years ago    Substance and Sexual Activity    Alcohol use: No    Drug use: No    Sexual activity: Never     Partners: Male   Social History Narrative    Does housework at home.     Social Drivers of Health     Financial Resource  "Strain: Low Risk  (9/24/2024)    Overall Financial Resource Strain (CARDIA)     Difficulty of Paying Living Expenses: Not very hard   Food Insecurity: Patient Declined (9/24/2024)    Hunger Vital Sign     Worried About Running Out of Food in the Last Year: Patient declined     Ran Out of Food in the Last Year: Patient declined   Transportation Needs: No Transportation Needs (9/27/2023)    PRAPARE - Transportation     Lack of Transportation (Medical): No     Lack of Transportation (Non-Medical): No   Physical Activity: Insufficiently Active (9/24/2024)    Exercise Vital Sign     Days of Exercise per Week: 1 day     Minutes of Exercise per Session: 10 min   Stress: No Stress Concern Present (9/24/2024)    [place] [place] [employer] - Occupational Stress Questionnaire     Feeling of Stress : Not at all   Housing Stability: Low Risk  (9/27/2023)    Housing Stability Vital Sign     Unable to Pay for Housing in the Last Year: No     Number of Places Lived in the Last Year: 1     Unstable Housing in the Last Year: No       Review of Systems   Constitutional:  Negative for fatigue and fever.   Respiratory:  Negative for shortness of breath and wheezing.    Cardiovascular:  Negative for chest pain and leg swelling.   Gastrointestinal:  Negative for abdominal pain.   Genitourinary:  Negative for flank pain.   Allergic/Immunologic: Positive for immunocompromised state (immunosuppressive drugs).   Psychiatric/Behavioral:  Negative for behavioral problems and confusion.          /76   Pulse 85   Resp 18   Ht 5' 6" (1.676 m)   Wt 85.2 kg (187 lb 13.3 oz)   BMI 30.32 kg/m²     BMP  Lab Results   Component Value Date     06/13/2025    K 4.5 06/13/2025     06/13/2025    CO2 24 06/13/2025    BUN 15 06/13/2025    CREATININE 1.0 06/13/2025    CALCIUM 9.5 06/13/2025    ANIONGAP 10 06/13/2025    EGFRNORACEVR >60 06/13/2025     Lab Results   Component Value Date    WBC 13.69 (H) 06/13/2025    HGB 12.4 " 2025    HCT 40.1 2025    MCV 85 2025     2025         Medications Ordered Prior to Encounter[1]       Objective:     Physical Exam  Constitutional:       General: She is not in acute distress.  Cardiovascular:      Rate and Rhythm: Normal rate.   Pulmonary:      Effort: Pulmonary effort is normal. No respiratory distress.      Breath sounds: Normal breath sounds. No wheezing.   Musculoskeletal:         General: Normal range of motion.   Skin:     General: Skin is warm and dry.   Neurological:      Mental Status: Mental status is at baseline.         Assessment:       1. Chronic kidney disease, stage 3a    2. Immunosuppressed status    3. Obesity (BMI 30.0-34.9)    4. Type 2 diabetes mellitus with diabetic microalbuminuria, with long-term current use of insulin    5. -donor kidney transplant - 13    6. Hypertension associated with diabetes        Plan:            CKD  3A / Immunosuppression   -stable, S Cr 1.0 (ranging between 0.9 to 1.2 in the past year)  -electrolytes WNL- K, Na, Cl, PO4, Ca  -continue jardiance for kidney preservation  -Tacro level 5.9 at goal- continue 3/3 dose, also on cellcept  -Educated about importance of having good blood pressure and blood sugar control  - Ensure adequate hydration- Water is best, flavored water/diluted juice (drink to thirst) daily as tolerated with no swelling/shortness of breath. Drink water adequately, general rule is when thirsty.  - Avoid NSAIDS: Advil, Ibuprofen, Aleve, Naproxen, Meloxicam, etc. (Aspirin is ok), Tylenol is Best as needed for pain/fever    DM  -A1c 6.4; take meds as ordered  -keep blood sugar controlled  -Low carbohydrate/sugar diet: avoid/limit sugary drinks, white breads, pasta, rice, etc as discussed    HTN  -stable. Continue current management  - Keep blood pressure controlled  -Low sodium diet: avoid/limit salt, processed foods (boxed or canned), fried foods, fast foods, etc as  discussed    Obesity  -BMI 30, improved, lost about 60 lbs  -Obesity can lead to inflammation and hyperfiltration that contribute to kidney damage.  -discussed importance of losing weight and how to manage such as comprehensive lifestyle modification and exercise.      RTC 6 months  40 minutes of total time spent on the encounter, which includes face to face time and non-face to face time preparing to see the patient (eg, review of tests), Obtaining and/or reviewing separately obtained history, Documenting clinical information in the electronic or other health record, Independently interpreting results (not separately reported) and communicating results to the patient/family/caregiver, or Care coordination (not separately reported).     MIRZA Pineda-BC           [1]   Current Outpatient Medications on File Prior to Visit   Medication Sig Dispense Refill    albuterol (PROVENTIL/VENTOLIN HFA) 90 mcg/actuation inhaler Rescue 18 g 2    albuterol-ipratropium (DUO-NEB) 2.5 mg-0.5 mg/3 mL nebulizer solution Take 3 mLs by nebulization every 6 (six) hours as needed for Wheezing (and cough). Rescue 75 mL 0    amitriptyline (ELAVIL) 10 MG tablet Take 1 tablet (10 mg total) by mouth nightly as needed for Insomnia. 30 tablet 3    atorvastatin (LIPITOR) 40 MG tablet Take 1 tablet (40 mg total) by mouth once daily. 90 tablet 3    benzonatate (TESSALON) 100 MG capsule Take 1 capsule (100 mg total) by mouth 3 (three) times daily as needed for Cough. 30 capsule 0    blood sugar diagnostic (FREESTYLE LITE STRIPS) Strp Use to check blood glucose 3 times a day. 100 strip 11    cetirizine (ZYRTEC) 10 MG tablet TAKE 1 TABLET BY MOUTH EVERY DAY 30 tablet 11    diclofenac sodium (VOLTAREN) 1 % Gel APPLY 2 Grams TOPICALLY FOUR TIMES DAILY AS NEEDED 100 g 0    doxycycline (VIBRAMYCIN) 100 MG Cap Take 1 capsule (100 mg total) by mouth 2 (two) times daily. for 10 days 20 capsule 0    empagliflozin (JARDIANCE) 10 mg tablet Take 1 tablet (10 mg  "total) by mouth once daily. 90 tablet 3    fluticasone propionate (FLONASE) 50 mcg/actuation nasal spray 2 sprays (100 mcg total) by Each Nostril route 2 (two) times a day. 48 g 1    inhalation spacing device Use as directed for inhalation. 1 each 0    lancets (FREESTYLE LANCETS) 28 gauge Misc 1 lancet by Combination route 2 (two) times daily as needed. Qid prn 400 each 2    lancets Misc 1 strip by Misc.(Non-Drug; Combo Route) route 4 (four) times daily with meals and nightly. 150 each 6    levothyroxine (SYNTHROID) 150 MCG tablet Take 1 tablet (150 mcg total) by mouth before breakfast. 90 tablet 3    mycophenolate (CELLCEPT) 250 mg Cap TAKE (2) CAPSULES TWICE DAILY. 120 capsule 11    nebulizer and compressor Ewelina Use as directed 1 each 0    NIFEdipine (PROCARDIA-XL) 30 MG (OSM) 24 hr tablet Take 1 tablet (30 mg total) by mouth 2 (two) times a day. 180 tablet 3    omeprazole (PRILOSEC) 40 MG capsule Take 1 capsule (40 mg total) by mouth 2 (two) times daily before meals. 180 capsule 3    ondansetron (ZOFRAN-ODT) 4 MG TbDL Take 1 tablet (4 mg total) by mouth every 8 (eight) hours as needed. 15 tablet 1    oxyCODONE-acetaminophen (PERCOCET)  mg per tablet Take 1 tablet by mouth every 8 (eight) hours as needed for Pain. 90 tablet 0    oxyCODONE-acetaminophen (PERCOCET)  mg per tablet Take 1 tablet by mouth every 8 (eight) hours as needed for Pain. 90 tablet 0    pen needle, diabetic (BD RORY 2ND GEN PEN NEEDLE) 32 gauge x 5/32" Ndle USE THREE TIMES DAILY WITH INSULIN AS DIRECTED 300 each 3    pulse oximeter (PULSE OXIMETER) device by Apply Externally route 2 (two) times a day. Use twice daily at 8 AM and 3 PM and record the value in [facility] as directed. 1 each 0    tacrolimus (PROGRAF) 1 MG Cap TAKE 3 CAPSULE IN THE MORNING AND 3 IN THE EVENING. 180 capsule 3    tirzepatide (MOUNJARO) 10 mg/0.5 mL PnIj Inject 10 mg into the skin every 7 days. 4 Pen 5    tiZANidine (ZANAFLEX) 4 MG tablet Take 1 tablet (4 mg " total) by mouth 2 (two) times daily as needed (muscle spasms). 60 tablet 1    alendronate (FOSAMAX) 70 MG tablet Take 1 tablet (70 mg total) by mouth every 7 days. 4 tablet 11    insulin lispro (HUMALOG KWIKPEN INSULIN) 100 unit/mL pen Inject 4-7 units before each main meal. (Patient not taking: Reported on 6/20/2025) 45 mL 3    LIDOcaine (LIDODERM) 5 % Use 1-3 patches per day over recommended area. Remove & Discard patch within 12 hours or as directed by MD. (Patient not taking: Reported on 6/20/2025) 90 patch 0     Current Facility-Administered Medications on File Prior to Visit   Medication Dose Route Frequency Provider Last Rate Last Admin    lactated ringers infusion   Intravenous Continuous Lisa Nevarez MD

## 2025-06-24 ENCOUNTER — OFFICE VISIT (OUTPATIENT)
Dept: DIABETES | Facility: CLINIC | Age: 65
End: 2025-06-24
Payer: MEDICARE

## 2025-06-24 ENCOUNTER — OFFICE VISIT (OUTPATIENT)
Dept: PAIN MEDICINE | Facility: CLINIC | Age: 65
End: 2025-06-24
Payer: MEDICARE

## 2025-06-24 VITALS
HEIGHT: 66 IN | DIASTOLIC BLOOD PRESSURE: 62 MMHG | BODY MASS INDEX: 29.76 KG/M2 | BODY MASS INDEX: 30.11 KG/M2 | SYSTOLIC BLOOD PRESSURE: 100 MMHG | WEIGHT: 187.38 LBS | HEIGHT: 66 IN | RESPIRATION RATE: 17 BRPM | DIASTOLIC BLOOD PRESSURE: 79 MMHG | WEIGHT: 185.19 LBS | HEART RATE: 84 BPM | SYSTOLIC BLOOD PRESSURE: 107 MMHG

## 2025-06-24 DIAGNOSIS — M47.812 CERVICAL SPONDYLOSIS: Primary | ICD-10-CM

## 2025-06-24 DIAGNOSIS — M79.2 NEUROPATHIC PAIN: ICD-10-CM

## 2025-06-24 DIAGNOSIS — E04.1 THYROID NODULE: ICD-10-CM

## 2025-06-24 DIAGNOSIS — E11.22 CKD STAGE 3 DUE TO TYPE 2 DIABETES MELLITUS: ICD-10-CM

## 2025-06-24 DIAGNOSIS — E11.59 HYPERTENSION ASSOCIATED WITH DIABETES: ICD-10-CM

## 2025-06-24 DIAGNOSIS — E03.9 ACQUIRED HYPOTHYROIDISM: ICD-10-CM

## 2025-06-24 DIAGNOSIS — Z94.0 DECEASED-DONOR KIDNEY TRANSPLANT: Chronic | ICD-10-CM

## 2025-06-24 DIAGNOSIS — E11.69 HYPERLIPIDEMIA ASSOCIATED WITH TYPE 2 DIABETES MELLITUS: ICD-10-CM

## 2025-06-24 DIAGNOSIS — M51.369 DDD (DEGENERATIVE DISC DISEASE), LUMBAR: ICD-10-CM

## 2025-06-24 DIAGNOSIS — Z79.891 OPIOID USE AGREEMENT EXISTS: ICD-10-CM

## 2025-06-24 DIAGNOSIS — E78.5 HYPERLIPIDEMIA ASSOCIATED WITH TYPE 2 DIABETES MELLITUS: ICD-10-CM

## 2025-06-24 DIAGNOSIS — M47.816 LUMBAR SPONDYLOSIS: ICD-10-CM

## 2025-06-24 DIAGNOSIS — E11.65 TYPE 2 DIABETES MELLITUS WITH HYPERGLYCEMIA, WITH LONG-TERM CURRENT USE OF INSULIN: Primary | ICD-10-CM

## 2025-06-24 DIAGNOSIS — I15.2 HYPERTENSION ASSOCIATED WITH DIABETES: ICD-10-CM

## 2025-06-24 DIAGNOSIS — N18.30 CKD STAGE 3 DUE TO TYPE 2 DIABETES MELLITUS: ICD-10-CM

## 2025-06-24 DIAGNOSIS — M79.18 MUSCLE PAIN, MYOFASCIAL: ICD-10-CM

## 2025-06-24 DIAGNOSIS — M54.16 BILATERAL LUMBAR RADICULOPATHY: ICD-10-CM

## 2025-06-24 DIAGNOSIS — Z79.4 TYPE 2 DIABETES MELLITUS WITH HYPERGLYCEMIA, WITH LONG-TERM CURRENT USE OF INSULIN: Primary | ICD-10-CM

## 2025-06-24 LAB — GLUCOSE SERPL-MCNC: 137 MG/DL (ref 70–110)

## 2025-06-24 PROCEDURE — 3078F DIAST BP <80 MM HG: CPT | Mod: CPTII,S$GLB,, | Performed by: PHYSICIAN ASSISTANT

## 2025-06-24 PROCEDURE — 99999 PR PBB SHADOW E&M-EST. PATIENT-LVL V: CPT | Mod: PBBFAC,,, | Performed by: NURSE PRACTITIONER

## 2025-06-24 PROCEDURE — 1160F RVW MEDS BY RX/DR IN RCRD: CPT | Mod: CPTII,S$GLB,, | Performed by: NURSE PRACTITIONER

## 2025-06-24 PROCEDURE — 3288F FALL RISK ASSESSMENT DOCD: CPT | Mod: CPTII,S$GLB,, | Performed by: NURSE PRACTITIONER

## 2025-06-24 PROCEDURE — 3008F BODY MASS INDEX DOCD: CPT | Mod: CPTII,S$GLB,, | Performed by: PHYSICIAN ASSISTANT

## 2025-06-24 PROCEDURE — 1101F PT FALLS ASSESS-DOCD LE1/YR: CPT | Mod: CPTII,S$GLB,, | Performed by: NURSE PRACTITIONER

## 2025-06-24 PROCEDURE — 99214 OFFICE O/P EST MOD 30 MIN: CPT | Mod: S$GLB,,, | Performed by: PHYSICIAN ASSISTANT

## 2025-06-24 PROCEDURE — 1159F MED LIST DOCD IN RCRD: CPT | Mod: CPTII,S$GLB,, | Performed by: NURSE PRACTITIONER

## 2025-06-24 PROCEDURE — 1125F AMNT PAIN NOTED PAIN PRSNT: CPT | Mod: CPTII,S$GLB,, | Performed by: NURSE PRACTITIONER

## 2025-06-24 PROCEDURE — 1157F ADVNC CARE PLAN IN RCRD: CPT | Mod: CPTII,S$GLB,, | Performed by: NURSE PRACTITIONER

## 2025-06-24 PROCEDURE — 3074F SYST BP LT 130 MM HG: CPT | Mod: CPTII,S$GLB,, | Performed by: NURSE PRACTITIONER

## 2025-06-24 PROCEDURE — 1159F MED LIST DOCD IN RCRD: CPT | Mod: CPTII,S$GLB,, | Performed by: PHYSICIAN ASSISTANT

## 2025-06-24 PROCEDURE — 3044F HG A1C LEVEL LT 7.0%: CPT | Mod: CPTII,S$GLB,, | Performed by: PHYSICIAN ASSISTANT

## 2025-06-24 PROCEDURE — 95251 CONT GLUC MNTR ANALYSIS I&R: CPT | Mod: S$GLB,,, | Performed by: NURSE PRACTITIONER

## 2025-06-24 PROCEDURE — 3008F BODY MASS INDEX DOCD: CPT | Mod: CPTII,S$GLB,, | Performed by: NURSE PRACTITIONER

## 2025-06-24 PROCEDURE — 99999 PR PBB SHADOW E&M-EST. PATIENT-LVL V: CPT | Mod: PBBFAC,,, | Performed by: PHYSICIAN ASSISTANT

## 2025-06-24 PROCEDURE — 3066F NEPHROPATHY DOC TX: CPT | Mod: CPTII,S$GLB,, | Performed by: NURSE PRACTITIONER

## 2025-06-24 PROCEDURE — G2211 COMPLEX E/M VISIT ADD ON: HCPCS | Mod: S$GLB,,, | Performed by: NURSE PRACTITIONER

## 2025-06-24 PROCEDURE — 82962 GLUCOSE BLOOD TEST: CPT | Mod: S$GLB,,, | Performed by: NURSE PRACTITIONER

## 2025-06-24 PROCEDURE — 1101F PT FALLS ASSESS-DOCD LE1/YR: CPT | Mod: CPTII,S$GLB,, | Performed by: PHYSICIAN ASSISTANT

## 2025-06-24 PROCEDURE — 3044F HG A1C LEVEL LT 7.0%: CPT | Mod: CPTII,S$GLB,, | Performed by: NURSE PRACTITIONER

## 2025-06-24 PROCEDURE — 99214 OFFICE O/P EST MOD 30 MIN: CPT | Mod: S$GLB,,, | Performed by: NURSE PRACTITIONER

## 2025-06-24 PROCEDURE — 3060F POS MICROALBUMINURIA REV: CPT | Mod: CPTII,S$GLB,, | Performed by: NURSE PRACTITIONER

## 2025-06-24 PROCEDURE — 1160F RVW MEDS BY RX/DR IN RCRD: CPT | Mod: CPTII,S$GLB,, | Performed by: PHYSICIAN ASSISTANT

## 2025-06-24 PROCEDURE — 3288F FALL RISK ASSESSMENT DOCD: CPT | Mod: CPTII,S$GLB,, | Performed by: PHYSICIAN ASSISTANT

## 2025-06-24 PROCEDURE — 3066F NEPHROPATHY DOC TX: CPT | Mod: CPTII,S$GLB,, | Performed by: PHYSICIAN ASSISTANT

## 2025-06-24 PROCEDURE — 1125F AMNT PAIN NOTED PAIN PRSNT: CPT | Mod: CPTII,S$GLB,, | Performed by: PHYSICIAN ASSISTANT

## 2025-06-24 PROCEDURE — 1157F ADVNC CARE PLAN IN RCRD: CPT | Mod: CPTII,S$GLB,, | Performed by: PHYSICIAN ASSISTANT

## 2025-06-24 PROCEDURE — 3060F POS MICROALBUMINURIA REV: CPT | Mod: CPTII,S$GLB,, | Performed by: PHYSICIAN ASSISTANT

## 2025-06-24 PROCEDURE — 3074F SYST BP LT 130 MM HG: CPT | Mod: CPTII,S$GLB,, | Performed by: PHYSICIAN ASSISTANT

## 2025-06-24 PROCEDURE — 3078F DIAST BP <80 MM HG: CPT | Mod: CPTII,S$GLB,, | Performed by: NURSE PRACTITIONER

## 2025-06-24 RX ORDER — OXYCODONE AND ACETAMINOPHEN 10; 325 MG/1; MG/1
1 TABLET ORAL EVERY 8 HOURS PRN
Qty: 90 TABLET | Refills: 0 | Status: SHIPPED | OUTPATIENT
Start: 2025-06-24

## 2025-06-24 RX ORDER — TIZANIDINE 4 MG/1
4 TABLET ORAL 2 TIMES DAILY PRN
Qty: 60 TABLET | Refills: 1 | Status: SHIPPED | OUTPATIENT
Start: 2025-06-24

## 2025-06-24 RX ORDER — OXYCODONE AND ACETAMINOPHEN 10; 325 MG/1; MG/1
1 TABLET ORAL EVERY 8 HOURS PRN
Qty: 90 TABLET | Refills: 0 | Status: SHIPPED | OUTPATIENT
Start: 2025-07-24

## 2025-06-24 NOTE — PROGRESS NOTES
Patient ID: Ana Maria Teague is a 64 y.o. female.  Patient's current PCP is Karine Olmos MD.   Collaborating Physician: MARY Ha MD    Chief Complaint: Diabetes Mellitus ([facility] NEW PT)    HPI  Ana Maria Teague is a 64 y.o. Black or  female presenting for a new consult with me, previously seen by HUSSAIN Bills with LOV 11/21/24 for diabetes.       Patient has been diagnosed with type 2 diabetes since 2013 .  Complications related to diabetes: nephropathy and peripheral neuropathy    H/o kidney transplant - R kidney- in 2013. Currently with CKD III. Nephrology ([place]) added Jardiance- tolerating well from a  perspective.     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 Eating less.   Current weight: 187 on 6/24/25  Weight at LOV: 209 on 5/21/24  Weight at FOV: -- Highest weight of 250#    Activity level: None set - plans to start at home exercises/yoga     Changes made at the last visit: per Sabi  Finish Ozempic then start Mounjaro 7.5 mg once a week. We can increase as tolerated.  Continue Humalog 4-7 units three times daily before each meal  Continue Jardiance 10 mg once daily.       Current issues: Occasional nausea after Mounjaro injection. Denies constipation. No  complaint with Jardiance. Recent issues with URI/sinusitis/bronchitis - no steroids. C/o trouble swallowing/globus sensation    Personal history of pancreatitis: denies  Personal history of abdominal surgery: Kidney transplant 2013  Personal history of thyroid surgery: denies  Family history of pancreatic cancer in first-degree relative: denies  Family history of MTC/MEN/endocrine tumors: denies       Past Medical History:   Diagnosis Date    Abnormal glucose 12/30/2013    Acquired hypothyroidism     Acute bronchiolitis 10/15/2014    Anemia     Anemia of chronic renal failure 09/30/2013     Anxiety     Anxiety disorder     Avascular necrosis of bone of hip     Back pain     s/p steroid injections    Bacteremia due to Escherichia coli 2021    Chronic immunosuppression with Prograf and Cellcept 2013    CKD (chronic kidney disease) stage 2, GFR 60-89 ml/min     CKD (chronic kidney disease) stage 5, GFR less than 15 ml/min     -donor kidney transplant - 2013    Depression     Hypertension, renal 2013    Hypothyroidism     Immunosuppressed status 10/15/2014    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    Osteoporosis with pathological fracture     Renal disease due to hypertension 2013    Renal hypertension, non-vascular     Secondary hyperparathyroidism of renal origin 2013    Vertigo     Vertigo      Social History[1]    Review of patient's allergies indicates:   Allergen Reactions    Azithromycin Nausea And Vomiting    Adhesive Other (See Comments)     Skin tears    Nsaids (non-steroidal anti-inflammatory drug)      Kidney Transplant       CURRENT DM MEDICATIONS:   Diabetes Medications              empagliflozin (JARDIANCE) 10 mg tablet Take 1 tablet (10 mg total) by mouth once daily.    tirzepatide (MOUNJARO) 10 mg/0.5 mL PnIj Inject 10 mg into the skin every 7 days.    insulin lispro (HUMALOG KWIKPEN INSULIN) 100 unit/mL pen Inject 4-7 units before each main meal.- STOPPED BY PCP LAST WEEK     Past failed treatment(s) include:   Lantus;   Trulicity-availability only;   Ozempic-desires further weight loss so will try Mounjaro       Meter/cgm: Nico 3  Blood glucose testing is performed regularly.   Patient is testing continuously times per day.  Any episodes of hypoglycemia? Denies  Glucose trends:   Per CGM download, for the last 14 days:  Average glucose of 130 mg/dL. Patient is 95% in range. 5% of readings are mildly elevated with 0% of readings > 250. 0% hypoglycemia. SD 21 mg/dL. Estimated GMI 6.4%. Glycemic control is stable -  Occasional spike after a meal or with illness      CGM data:      Her blood sugar in the clinic today was:   Lab Results   Component Value Date    POCGLU 137 (A) 06/24/2025       Statin: Taking  ACE/ARB: Not taking    Labs reviewed and are noted below.    Screening or Prevention Patient's value Goal Complete/Controlled?   HgA1C Testing and Control   Lab Results   Component Value Date    HGBA1C 6.4 (H) 06/13/2025      Annually/Less than 8% Yes   Lipid profile : 02/18/2025 Annually Yes   LDL control Lab Results   Component Value Date    LDLCALC 75.8 02/18/2025    Annually/Less than 100 mg/dl  Yes   Nephropathy screening Lab Results   Component Value Date    MICALBCREAT 60.0 (H) 02/18/2025     Lab Results   Component Value Date    PROTEINUA 1+ (A) 04/11/2024    Annually Yes   Blood pressure BP Readings from Last 1 Encounters:   06/24/25 107/79    Less than 140/90 Yes   Dilated retinal exam : 01/12/2024 [facility] [place] [facility] Annually Yes    Foot exam   : 06/12/2025 Annually Yes     Glucose   Date Value Ref Range Status   06/13/2025 113 (H) 70 - 110 mg/dL Final   02/18/2025 92 70 - 110 mg/dL Final     Anion Gap   Date Value Ref Range Status   06/13/2025 10 8 - 16 mmol/L Final     eGFR if    Date Value Ref Range Status   07/12/2022 56.0 (A) >60 mL/min/1.73 m^2 Final     eGFR    Date Value Ref Range Status   06/28/2023 61 mL/min/1.73mSq Final     Comment:     In accordance with NKF-ASN Task Force recommendation, calculation based on the Chronic Kidney Disease Epidemiology Collaboration (CKD-EPI) equation without adjustment for race. eGFR adjusted for gender and age and calculated in ml/min/1.73mSquared. eGFR cannot be calculated if patient is under 18 years of age.     Reference Range:   >= 60 ml/min/1.73mSquared.     eGFR if non    Date Value Ref Range Status   07/12/2022 48.6 (A) >60 mL/min/1.73 m^2 Final     Comment:     Calculation used to obtain the  "estimated glomerular filtration  rate (eGFR) is the CKD-EPI equation.        Lab Results   Component Value Date    FREET4 0.93 10/03/2024    TSH 0.888 02/18/2025     No results found for: "CPEPTIDE"  No results found for: "GLUTAMICACID"    Wt Readings from Last 3 Encounters:   06/24/25 1043 85 kg (187 lb 6.3 oz)   06/24/25 1006 84 kg (185 lb 3 oz)   06/20/25 1002 85.2 kg (187 lb 13.3 oz)       Review of Systems   Constitutional:  Negative for malaise/fatigue and weight loss.   HENT:  Positive for congestion. Negative for sore throat.         Trouble swallowing/throat clearing   Eyes:  Negative for blurred vision and double vision.   Respiratory:  Negative for shortness of breath.    Cardiovascular: Negative.    Gastrointestinal:  Positive for nausea (intermittent). Negative for vomiting.   Genitourinary:  Negative for frequency.   Musculoskeletal:  Negative for myalgias.   Neurological: Negative.    Psychiatric/Behavioral: Negative.         Physical Exam  Vitals reviewed.   Constitutional:       General: She is not in acute distress.     Appearance: Normal appearance. She is obese.   Eyes:      Conjunctiva/sclera: Conjunctivae normal.      Pupils: Pupils are equal, round, and reactive to light.   Neck:      Thyroid: Thyroid mass (1.5 cm nodule vs lymph node right upper lobe, 1 cm nodule right lower lobe. Hx of left lobectomy) present. No thyromegaly or thyroid tenderness.      Vascular: No carotid bruit.   Cardiovascular:      Rate and Rhythm: Normal rate and regular rhythm.      Pulses: Normal pulses.      Heart sounds: Normal heart sounds.   Pulmonary:      Effort: Pulmonary effort is normal.      Breath sounds: Normal breath sounds. No wheezing.   Abdominal:      General: Bowel sounds are normal.      Palpations: Abdomen is soft.   Musculoskeletal:      Cervical back: Normal range of motion and neck supple.      Right lower leg: No edema.      Left lower leg: No edema.   Skin:     General: Skin is warm and dry. "      Comments: Sites without hypertrophy and/or infection   Neurological:      General: No focal deficit present.      Mental Status: She is alert and oriented to person, place, and time.   Psychiatric:         Mood and Affect: Mood normal.         Behavior: Behavior normal.           Assessment & Plan    Ana Maria was seen today for diabetes mellitus.    Diagnoses and all orders for this visit:    Type 2 diabetes mellitus with hyperglycemia, with long-term current use of insulin  -     POCT Glucose, Hand-Held Device  -     Continue same  -     Humalog per sliding scale ac meals  -     ISABEL for eye exam - [facility] in [place]    -donor kidney transplant - 13    CKD stage 3 due to type 2 diabetes mellitus - per renal    Acquired hypothyroidism - TSH at goal on current replacement    Thyroid nodule  -     US Thyroid    Hypertension associated with diabetes - at goal per renal    Hyperlipidemia associated with type 2 diabetes mellitus - on statin    - Reviewed with patient:  The basic pathophysiology of Type 2 diabetes  Mechanism of action and action time of medications  Use of home glucose monitor/cgm  Basic diet/carbohydrate counting/avoiding simple sugars/plate method  Proper hydration   Risk of complications and preventive measures  When to call for assistance  Call for bs < 80 or > 180 consistently          - Follow up: 6 months    Visit today included increased complexity associated with the care of the episodic problem GLUCOSE CONTROL addressed and managing the longitudinal care of the patient due to the serious and/or complex managed problem(s) DIABETES.                   [1]   Social History  Socioeconomic History    Marital status:     Number of children: 3   Occupational History    Occupation: disable     Comment: dept manager at [employer]   Tobacco Use    Smoking status: Former     Current packs/day: 0.00     Average packs/day: 1 pack/day for 10.0 years (10.0 ttl pk-yrs)     Types:  Cigarettes     Start date: 1992     Quit date: 2002     Years since quittin.6    Smokeless tobacco: Never    Tobacco comments:     quit smoking approx 10-15 years ago    Substance and Sexual Activity    Alcohol use: No    Drug use: No    Sexual activity: Never     Partners: Male   Social History Narrative    Does housework at home.     Social Drivers of Health     Financial Resource Strain: Low Risk  (2024)    Overall Financial Resource Strain (CARDIA)     Difficulty of Paying Living Expenses: Not very hard   Food Insecurity: Patient Declined (2024)    Hunger Vital Sign     Worried About Running Out of Food in the Last Year: Patient declined     Ran Out of Food in the Last Year: Patient declined   Transportation Needs: No Transportation Needs (2023)    PRAPARE - Transportation     Lack of Transportation (Medical): No     Lack of Transportation (Non-Medical): No   Physical Activity: Insufficiently Active (2024)    Exercise Vital Sign     Days of Exercise per Week: 1 day     Minutes of Exercise per Session: 10 min   Stress: No Stress Concern Present (2024)    [place] [employer] - Occupational Stress Questionnaire     Feeling of Stress : Not at all   Housing Stability: Low Risk  (2023)    Housing Stability Vital Sign     Unable to Pay for Housing in the Last Year: No     Number of Places Lived in the Last Year: 1     Unstable Housing in the Last Year: No

## 2025-06-24 NOTE — PROGRESS NOTES
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 Anticoagulation use: None.     2. Pharmacologic:   - Refill Percocet 10/325mg TID PRN pain x 2 months. Will e-prescribe to fill on 6/24/2025, 7/24/2025.  Patient takes more sparingly than she has in the past.  Will e-prescribe to [facility].   - Refill LIDOcaine (LIDODERM) 5% topical patches to apply Q12H PRN pain.    - Refill diclofenac 1% gel to apply 2g topically up to 4 times per day.   - Refill Zanaflex 4mg BID PRN.  - Continue amitriptyline 10mg QHS - from PCP for insomnia.  - No steroids due to DM.     Failed meds: gabapentin (SE: swelling).     - Opioid contract updated with Dr. Pedro on 10/28/2020.   * 04/23/2024-seen in clinic by Dr. Pedro.    - [place]  reviewed and appropriate.       - Last UDS 5/22/2024 was compliant for medications prescribed. Will order drug screen (UDS or oral swab) today 6/24/2025 to ensure med compliance.     3. Rehabilitative: Encouraged regular exercise.    4. Diagnostic/ Imaging: No new imaging ordered. Previous imaging reviewed.     5. Consult/ Referral:   - Consider follow-up with orthopedics for bilateral knee pain. Previously seeing Dr. Leach and discussing Iovera. Last seen August 2023; encouraged to follow-up with worsening knee pain.   - Continue follow-up with nephrology for status post kidney transplant  - Continue follow-up with endocrinology for diabetes  - Consider new Podiatry referral for chronic left ankle pain.     6. Return to clinic: 2-3  months medication refill -- virtual visit  -  Wednesday, September 10th at 11:20 AM.       Future Appointments   Date Time Provider Department Center   8/28/2025 10:00 AM [facility] MAMMO1-SCR [facility] MAMMO [facility]   8/28/2025 10:35 AM LABORATORY, [facility] LAB [facility]   9/9/2025 10:00 AM Sondra Teague NP [facility] 65PLUS 65+ [facility]   9/10/2025 11:20 AM Marcella Haas PA-C [facility]   12/5/2025  8:45 AM LABORATORY, [facility] LAB [facility]   12/12/2025 10:00 AM June Navarro, XINP-BC [facility] NEPHRO [facility]         - Patient Questions: Answered all of the patient's questions regarding diagnosis, therapy, and treatment.    - This condition does not require this patient to take time off of work, and the primary goal of our Pain Management services is to improve the patient's functional capacity.   - I discussed the risks, benefits, and alternatives to potential treatment options. All questions and concerns were fully addressed today in clinic.         Marcella Haas PA-C  Interventional Pain Management - [facility]    Disclaimer:  This note was prepared using voice recognition system and is likely to have sound alike errors that may have been overlooked even after proof reading.  Please call me with any questions.     This note was generated with the assistance of ambient listening technology. Recording of patient appointment was utlized to facilitate this note. I attest to having reviewed and edited the generated note for accuracy, though some syntax or spelling errors may persist. Please contact the author of this note for any clarification.  ______________________________________________________________________      >>UDS/ oral swab:  11-8-15 :: appropriate  1-13-16 :: appropriate  8-9-16 :: appropriate  2/6/2017 :: appropriate  8/21/2017 :: appropriate  7/16/2018 :: appropriate  4/22/2019 :: appropriate  11/7/2019 :: appropriate  8/19/2020 :: appropriate  12/8/2020 ::  appropriate  4/27/2021 :: appropriate  2/7/2022 :: appropriate  8/15/2022 :: appropriate   3/20/2023 :: appropriate   11/27/2023 ::  Appropriate  5/22/2024 :: Appropriate   6/24/2025 :: pending

## 2025-07-01 ENCOUNTER — HOSPITAL ENCOUNTER (OUTPATIENT)
Dept: RADIOLOGY | Facility: HOSPITAL | Age: 65
Discharge: HOME | End: 2025-07-01
Attending: NURSE PRACTITIONER
Payer: MEDICARE

## 2025-07-01 PROCEDURE — 76536 US EXAM OF HEAD AND NECK: CPT | Mod: 26,,, | Performed by: RADIOLOGY

## 2025-07-01 PROCEDURE — 76536 US EXAM OF HEAD AND NECK: CPT | Mod: TC

## 2025-07-02 ENCOUNTER — PATIENT MESSAGE (OUTPATIENT)
Dept: DIABETES | Facility: CLINIC | Age: 65
End: 2025-07-02
Payer: MEDICARE

## 2025-07-02 ENCOUNTER — RESULTS FOLLOW-UP (OUTPATIENT)
Dept: DIABETES | Facility: CLINIC | Age: 65
End: 2025-07-02

## 2025-07-02 DIAGNOSIS — R22.1 NECK MASS: Primary | ICD-10-CM

## 2025-07-07 ENCOUNTER — HOSPITAL ENCOUNTER (OUTPATIENT)
Dept: RADIOLOGY | Facility: HOSPITAL | Age: 65
Discharge: HOME | End: 2025-07-07
Attending: NURSE PRACTITIONER
Payer: MEDICARE

## 2025-07-07 DIAGNOSIS — R22.1 NECK MASS: ICD-10-CM

## 2025-07-07 PROCEDURE — 70492 CT SFT TSUE NCK W/O & W/DYE: CPT | Mod: TC

## 2025-07-07 PROCEDURE — 70492 CT SFT TSUE NCK W/O & W/DYE: CPT | Mod: 26,,, | Performed by: RADIOLOGY

## 2025-07-07 PROCEDURE — 25500020 PHARM REV CODE 255: Performed by: NURSE PRACTITIONER

## 2025-07-07 RX ADMIN — IOHEXOL 75 ML: 350 INJECTION, SOLUTION INTRAVENOUS at 01:07

## 2025-07-08 ENCOUNTER — PATIENT MESSAGE (OUTPATIENT)
Dept: DIABETES | Facility: CLINIC | Age: 65
End: 2025-07-08
Payer: MEDICARE

## 2025-07-15 ENCOUNTER — PATIENT OUTREACH (OUTPATIENT)
Dept: ADMINISTRATIVE | Facility: HOSPITAL | Age: 65
End: 2025-07-15
Payer: MEDICARE

## 2025-07-15 NOTE — PROGRESS NOTES
Received signed ISABEL via fax in box.  Uploaded to media ISABEL e faxed for eye exam report. Signed Authorization attached.  Reminder set

## 2025-07-15 NOTE — LETTER
AUTHORIZATION FOR RELEASE OF   CONFIDENTIAL INFORMATION        We are seeing Ana Maria Teague, date of birth 1960, in the clinic at [address]. Karine Olmos MD is the patient's PCP. Ana Maria Teague has an outstanding lab/procedure at the time we reviewed her chart. In order to help keep her health information updated, she has authorized us to request the following medical record(s):                                             ( x )  EYE EXAM            Please fax records to [facility], McNamara, Susan N., MD,  at [phone] or email to [email or web address].            Patient Name: Ana Maria Teague  : 1960  Patient Phone #: [phone]

## 2025-07-21 ENCOUNTER — OFFICE VISIT (OUTPATIENT)
Dept: OTOLARYNGOLOGY | Facility: CLINIC | Age: 65
End: 2025-07-21
Payer: MEDICARE

## 2025-07-21 VITALS — HEIGHT: 66 IN | BODY MASS INDEX: 30.04 KG/M2 | WEIGHT: 186.94 LBS

## 2025-07-21 DIAGNOSIS — J30.89 NON-SEASONAL ALLERGIC RHINITIS, UNSPECIFIED TRIGGER: ICD-10-CM

## 2025-07-21 DIAGNOSIS — K21.9 LARYNGOPHARYNGEAL REFLUX (LPR): ICD-10-CM

## 2025-07-21 DIAGNOSIS — R13.10 DYSPHAGIA, UNSPECIFIED TYPE: Primary | ICD-10-CM

## 2025-07-21 DIAGNOSIS — R22.1 NECK MASS: ICD-10-CM

## 2025-07-21 PROCEDURE — 1160F RVW MEDS BY RX/DR IN RCRD: CPT | Mod: CPTII,S$GLB,, | Performed by: OTOLARYNGOLOGY

## 2025-07-21 PROCEDURE — 3066F NEPHROPATHY DOC TX: CPT | Mod: CPTII,S$GLB,, | Performed by: OTOLARYNGOLOGY

## 2025-07-21 PROCEDURE — 1101F PT FALLS ASSESS-DOCD LE1/YR: CPT | Mod: CPTII,S$GLB,, | Performed by: OTOLARYNGOLOGY

## 2025-07-21 PROCEDURE — 31575 DIAGNOSTIC LARYNGOSCOPY: CPT | Mod: S$GLB,,, | Performed by: OTOLARYNGOLOGY

## 2025-07-21 PROCEDURE — 3060F POS MICROALBUMINURIA REV: CPT | Mod: CPTII,S$GLB,, | Performed by: OTOLARYNGOLOGY

## 2025-07-21 PROCEDURE — 1159F MED LIST DOCD IN RCRD: CPT | Mod: CPTII,S$GLB,, | Performed by: OTOLARYNGOLOGY

## 2025-07-21 PROCEDURE — 3008F BODY MASS INDEX DOCD: CPT | Mod: CPTII,S$GLB,, | Performed by: OTOLARYNGOLOGY

## 2025-07-21 PROCEDURE — 99204 OFFICE O/P NEW MOD 45 MIN: CPT | Mod: 25,S$GLB,, | Performed by: OTOLARYNGOLOGY

## 2025-07-21 PROCEDURE — 3044F HG A1C LEVEL LT 7.0%: CPT | Mod: CPTII,S$GLB,, | Performed by: OTOLARYNGOLOGY

## 2025-07-21 PROCEDURE — 1157F ADVNC CARE PLAN IN RCRD: CPT | Mod: CPTII,S$GLB,, | Performed by: OTOLARYNGOLOGY

## 2025-07-21 PROCEDURE — 1126F AMNT PAIN NOTED NONE PRSNT: CPT | Mod: CPTII,S$GLB,, | Performed by: OTOLARYNGOLOGY

## 2025-07-21 PROCEDURE — 3288F FALL RISK ASSESSMENT DOCD: CPT | Mod: CPTII,S$GLB,, | Performed by: OTOLARYNGOLOGY

## 2025-07-21 PROCEDURE — 99999 PR PBB SHADOW E&M-EST. PATIENT-LVL III: CPT | Mod: PBBFAC,,, | Performed by: OTOLARYNGOLOGY

## 2025-07-21 NOTE — PROGRESS NOTES
Medication Administration Note - Otorhinolaryngology (Ambulatory)    Purpose: Pre-procedural intranasal anesthesia and decongestion prior to nasal endoscopy.  Patient Age Requirement: 13 years and older  Order Type: Written    Medication Administered: Lidocaine Hydrochloride 4% - 1 mL + Phenylephrine Hydrochloride 1.0% (Anton-Synephrine) - 1 mL  Combined and drawn up into a 3 mL syringe  Medication combination was administered using an attached nasal atomizer    Route: Intranasal  Dosage: up to 1 mL per nostril  Timing: Administered immediately prior to nasal endoscopy    Notes: Medication prepared and administered per clinic protocol. Patient tolerated administration without complication.

## 2025-07-21 NOTE — H&P (VIEW-ONLY)
Referring Provider:    Ayse Lakhani, Jesus  [address] [facility] [place] [address]  Subjective:   Patient: Ana Maria Teague [id number], :1960   Visit date:2025 10:49 AM    Chief Complaint:  Mass (Pt is coming today for a neck mass and results from her CT and ultrasound. Pt states that she is coughing which is productive and problems swallowing food. This has been going on for the past couple of weeks off/on. She is also having Sinus issues and ear aches when she goes outside it makes her symptoms worse. ), Sinus Problem, and Ear Problem    HPI:    Prior notes reviewed by myself.  Clinical documentation obtained by nursing staff reviewed.     History of Present Illness    CHIEF COMPLAINT:  Patient presents today for evaluation of thyroid abnormality found on imaging.    CURRENT SYMPTOMS:  She reports right-sided ear pain associated with allergies and neck tenderness, which she attributes to recent ultrasound manipulation of the neck area. She continues to experience ongoing sinus problems with occasional coughing spells triggered by sinus drainage. She recently completed treatment for bronchitis with antibiotics two weeks ago, which lasted approximately 10 days. Her current coughing is mild and sporadic compared to her previous condition.    SWALLOWING DIFFICULTIES:  She reports intermittent dysphagia primarily affecting solid foods, with occasional difficulty with liquids. She experiences episodes where food feels stuck in her throat, causing coughing. The problem has occurred episodically this year with periods of resolution. No prior swallowing study has been completed. She underwent esophageal balloon dilation earlier this year and currently experiences some difficulty swallowing in the area of the procedure.    MEDICAL HISTORY:  She has a history of dialysis for approximately 10 years. Prior to thyroid removal, she had a goiter or nodule on the left side of her neck.    SURGICAL HISTORY:  She  "underwent left thyroid surgery approximately 15-20 years ago at what she believes was [facility], concurrent with a period of dialysis. She also had surgical removal of an unspecified chest mass.      ROS:  General: -fever, -chills, -fatigue, -weight gain, -weight loss  Eyes: -vision changes, -redness, -discharge  ENT: +ear pain, -nasal congestion, +sore throat, +sinus pressure, +nasal discharge, +runny nose, +post nasal drip  Cardiovascular: -chest pain, -palpitations, -lower extremity edema  Respiratory: +cough, -shortness of breath  Gastrointestinal: -abdominal pain, -nausea, -vomiting, -diarrhea, -constipation, -blood in stool  Genitourinary: -dysuria, -hematuria, -frequency  Musculoskeletal: -joint pain, -muscle pain  Skin: -rash, -lesion  Neurological: -headache, -dizziness, -numbness, -tingling  Psychiatric: -anxiety, -depression, -sleep difficulty  Neck: +neck pain         Objective:     Physical Exam:  Vitals:  Ht 5' 6" (1.676 m)   Wt 84.8 kg (186 lb 15.2 oz)   BMI 30.17 kg/m²   General appearance:  Well developed, well nourished    Ears:  Otoscopy of external auditory canals and tympanic membranes was normal, clinical speech reception thresholds grossly intact, no mass/lesion of auricle.    Nose:  No masses/lesions of external nose, nasal mucosa, septum, and turbinates were within normal limits.    Mouth:  No mass/lesion of lips, teeth, gums, hard/soft palate, tongue, tonsils, or oropharynx.    Neck & Lymphatics:  No cervical lymphadenopathy, no neck mass/crepitus/ asymmetry, trachea is midline, no thyroid enlargement/tenderness/mass.    Due to indication in patient's history, presentation or risk factors,  a fiber optic exam was performed.    SEPARATE PROCEDURE NOTE:    ANESTHESIA:  Topical xylocaine with muriel-synephrine  FINDINGS:  Moderate to severe inaterarytenoid erythema and edema    PROCEDURE:  After verbal consent was obtained, the flexible scope was passed through the patient's nasal cavity without " difficulty.  The nasopharynx (adenoid pad) and eustachian tube orifices were first visualized and were found to be normal, without masses or irregularity.  The posterior pharyngeal wall and base of tongue were then examined and no mass or irregular tissue was seen.  The scope was then advanced to the larynx, and the epiglottis, valleculae, and piriform sinuses were normal, without masses or mucosal irregularity.  The false vocal folds and true vocal folds were then examined and were found to have normal mobility (full abduction and adduction) and no masses or mucosal irregularity was seen.  The interartyenoid area had moderate to severe edema and erythema consistent with reflux. Visualized portions of the subglottis were also noted to be normal in appearance with normal tracheal mucosa.      [x]  Data Reviewed:    Lab Results   Component Value Date    WBC 13.69 (H) 06/13/2025    HGB 12.4 06/13/2025    HCT 40.1 06/13/2025    MCV 85 06/13/2025    EOSINOPHIL 2.0 02/18/2025         [x]  Independent interpretation of test: right paramedian anterior neck mass, possibly thyroid tissue  CT Soft Tissue Neck W WO Contrast  Order: [id number]   Status: Final result       Next appt: 08/28/2025 at 10:00 AM in Radiology ([facility] MAMMO1-SCR)       Dx: Neck mass    Test Result Released: Yes (seen)    1 Result Note       View Follow-Up Encounter  Details    Reading Physician Reading Date Result Priority   Deni Mcarthur,   [phone]  7/7/2025 Routine     Narrative & Impression  EXAMINATION:  CT SOFT TISSUE NECK W WO CONTRAST     CLINICAL HISTORY:  Neck mass, nonpulsatile;  Localized swelling, mass and lump, neck     TECHNIQUE:  CT of the soft tissues of the neck was obtained from the skull base through the level of the lung apices status post administration of 75 cc of Omnipaque 350.  Coronal and sagittal reformats were provided.     COMPARISON:  Thyroid ultrasound from 07/01/2025     FINDINGS:  The visualized brain parenchyma  is unremarkable in appearance.  The paranasal sinuses and mastoid air cells are clear.  The carotid and jugular vessels are normally opacified.  The mucosal space is symmetric.  There are no definitively enlarged lymph nodes.  Multiple surgical clips seen in the expected location of the thyroid bed.  The parotid and submandibular glands are unremarkable in appearance.  There is some hyperdense tissue seen to the right of midline just superior to the thyroid bed series 6, image 128 that measures approximately 1.7 by 1.4 cm in size and is suspicious for ectopic/residual thyroid tissue within the strap musculature.  Also in the region of the expected location of the thyroid bed there is some mildly hyperdense tissue series 6, image 150 that measures 1.5 by 1.2 cm in size that is suspicious for residual thyroid as well.  Additional hyperdense tissue seen just inferior to the left lobe of the thyroid just to the left of midline series 6, image 161 that measures approximately 1.4 by 0.8 cm in size.  These findings are suspicious for residual thyroid tissue.     Impression:     At least 3 sites of somewhat heterogeneous hyperdense tissue within and adjacent to the thyroid bed suspicious for ectopic/residual thyroid tissue.        Electronically signed by:Deni Mcarthur DO  Date:                                            07/07/2025  Time:                                           14:16        Exam Ended: 07/07/25 13:52 CDT Last Resulted: 07/07/25 14:16 CDT       ntains abnormal data US Thyroid  Order: [id number]   Status: Final result       Next appt: 08/28/2025 at 10:00 AM in Radiology ([facility] MAMMO1-SCR)       Dx: Thyroid nodule    Test Result Released: Yes (seen)    1 Result Note       View Follow-Up Encounter  Details    Reading Physician Reading Date Result Priority   Zach Newton MD  [phone] 7/1/2025 Routine     Narrative & Impression  EXAMINATION:  US THYROID     CLINICAL HISTORY:  right 1.5 cm nodule vs  lymph node right upper lobe and 1 cm nodule right lower lobe. Hx of left lobectomy; Nontoxic single thyroid nodule     TECHNIQUE:  Ultrasound of the thyroid and cervical lymph nodes was performed.     COMPARISON:  No recent ultrasound imaging; CT chest 11/17/2023     FINDINGS:  History of partial thyroidectomy.  Some residual heterogeneous right lobe thyroid tissue noted.  Possible heterogeneous soft tissue measuring 2.6 cm noted superior to the thyroid, uncertain significance.  2.0 x 1.5 x 0.7 cm left neck lymph node noted without fatty adalberto.  CT soft tissue neck with contrast recommended to evaluate lymph node and possible heterogeneous soft tissue superior to the right thyroid lobe.     Impression:     As above.  This report was flagged in Epic as abnormal.        Electronically signed by:Zach Newton MD  Date:                                            07/01/2025  Time:                                           13:17         Assessment & Plan:   Dysphagia, unspecified type    Neck mass  -     Ambulatory referral/consult to ENT  -     US FNA Biopsy w/ Imaging 1st Lesion; Future; Expected date: 07/21/2025    Non-seasonal allergic rhinitis, unspecified trigger    Laryngopharyngeal reflux (LPR)        Assessment & Plan      IMPRESSION:  - Evaluated history of thyroid surgery on left side, approximately 15-20 years ago.  - Assessed recent cough and sinus problems.  - Considered intermittent dysphagia, primarily with solids, in context of previous esophageal dilation procedure.  - Examined ears, nose, and throat, noting tenderness in neck area.  - Reviewed history of goiter removal and previous chest nodule excision.   -  flexible laryngoscopy demonstrated LPR changes, recommended adhering to her current reflux medication.   If her dysphagia does not improve with increased compliance on reflux medication, she understands that she will need to contact her GI for possible EGD and repeat dilation  - Recommend FNA of  midline neck mass        Bal Tolliver M.D.  Department of Otolaryngology - Head & Neck Surgery  [address] [facility].  [place] [address]  P: [phone]  F: [phone]        DISCLAIMER: This note was prepared with [employer] voice recognition transcription software. Garbled syntax, mangled pronouns, and other bizarre constructions may be attributed to that software system. While efforts were made to correct any mistakes made by this voice recognition program, some errors and/or omissions may remain in the note that were missed when the note was originally created.     This note was generated with the assistance of ambient listening technology. Verbal consent was obtained by the patient and accompanying visitor(s) for the recording of patient appointment to facilitate this note. I attest to having reviewed and edited the generated note for accuracy, though some syntax or spelling errors may persist. Please contact the author of this note for any clarification.

## 2025-07-21 NOTE — PROGRESS NOTES
Referring Provider:    Ayse Lakhani, Jesus  [address] [facility] [place] [address]  Subjective:   Patient: Ana Maria Teague [id number], :1960   Visit date:2025 10:49 AM    Chief Complaint:  Mass (Pt is coming today for a neck mass and results from her CT and ultrasound. Pt states that she is coughing which is productive and problems swallowing food. This has been going on for the past couple of weeks off/on. She is also having Sinus issues and ear aches when she goes outside it makes her symptoms worse. ), Sinus Problem, and Ear Problem    HPI:    Prior notes reviewed by myself.  Clinical documentation obtained by nursing staff reviewed.     History of Present Illness    CHIEF COMPLAINT:  Patient presents today for evaluation of thyroid abnormality found on imaging.    CURRENT SYMPTOMS:  She reports right-sided ear pain associated with allergies and neck tenderness, which she attributes to recent ultrasound manipulation of the neck area. She continues to experience ongoing sinus problems with occasional coughing spells triggered by sinus drainage. She recently completed treatment for bronchitis with antibiotics two weeks ago, which lasted approximately 10 days. Her current coughing is mild and sporadic compared to her previous condition.    SWALLOWING DIFFICULTIES:  She reports intermittent dysphagia primarily affecting solid foods, with occasional difficulty with liquids. She experiences episodes where food feels stuck in her throat, causing coughing. The problem has occurred episodically this year with periods of resolution. No prior swallowing study has been completed. She underwent esophageal balloon dilation earlier this year and currently experiences some difficulty swallowing in the area of the procedure.    MEDICAL HISTORY:  She has a history of dialysis for approximately 10 years. Prior to thyroid removal, she had a goiter or nodule on the left side of her neck.    SURGICAL HISTORY:  She  "underwent left thyroid surgery approximately 15-20 years ago at what she believes was [facility], concurrent with a period of dialysis. She also had surgical removal of an unspecified chest mass.      ROS:  General: -fever, -chills, -fatigue, -weight gain, -weight loss  Eyes: -vision changes, -redness, -discharge  ENT: +ear pain, -nasal congestion, +sore throat, +sinus pressure, +nasal discharge, +runny nose, +post nasal drip  Cardiovascular: -chest pain, -palpitations, -lower extremity edema  Respiratory: +cough, -shortness of breath  Gastrointestinal: -abdominal pain, -nausea, -vomiting, -diarrhea, -constipation, -blood in stool  Genitourinary: -dysuria, -hematuria, -frequency  Musculoskeletal: -joint pain, -muscle pain  Skin: -rash, -lesion  Neurological: -headache, -dizziness, -numbness, -tingling  Psychiatric: -anxiety, -depression, -sleep difficulty  Neck: +neck pain         Objective:     Physical Exam:  Vitals:  Ht 5' 6" (1.676 m)   Wt 84.8 kg (186 lb 15.2 oz)   BMI 30.17 kg/m²   General appearance:  Well developed, well nourished    Ears:  Otoscopy of external auditory canals and tympanic membranes was normal, clinical speech reception thresholds grossly intact, no mass/lesion of auricle.    Nose:  No masses/lesions of external nose, nasal mucosa, septum, and turbinates were within normal limits.    Mouth:  No mass/lesion of lips, teeth, gums, hard/soft palate, tongue, tonsils, or oropharynx.    Neck & Lymphatics:  No cervical lymphadenopathy, no neck mass/crepitus/ asymmetry, trachea is midline, no thyroid enlargement/tenderness/mass.    Due to indication in patient's history, presentation or risk factors,  a fiber optic exam was performed.    SEPARATE PROCEDURE NOTE:    ANESTHESIA:  Topical xylocaine with muriel-synephrine  FINDINGS:  Moderate to severe inaterarytenoid erythema and edema    PROCEDURE:  After verbal consent was obtained, the flexible scope was passed through the patient's nasal cavity without " difficulty.  The nasopharynx (adenoid pad) and eustachian tube orifices were first visualized and were found to be normal, without masses or irregularity.  The posterior pharyngeal wall and base of tongue were then examined and no mass or irregular tissue was seen.  The scope was then advanced to the larynx, and the epiglottis, valleculae, and piriform sinuses were normal, without masses or mucosal irregularity.  The false vocal folds and true vocal folds were then examined and were found to have normal mobility (full abduction and adduction) and no masses or mucosal irregularity was seen.  The interartyenoid area had moderate to severe edema and erythema consistent with reflux. Visualized portions of the subglottis were also noted to be normal in appearance with normal tracheal mucosa.      [x]  Data Reviewed:    Lab Results   Component Value Date    WBC 13.69 (H) 06/13/2025    HGB 12.4 06/13/2025    HCT 40.1 06/13/2025    MCV 85 06/13/2025    EOSINOPHIL 2.0 02/18/2025         [x]  Independent interpretation of test: right paramedian anterior neck mass, possibly thyroid tissue  CT Soft Tissue Neck W WO Contrast  Order: [id number]   Status: Final result       Next appt: 08/28/2025 at 10:00 AM in Radiology ([facility] [id number])       Dx: Neck mass    Test Result Released: Yes (seen)    1 Result Note       View Follow-Up Encounter  Details    Reading Physician Reading Date Result Priority   Deni Mcarthur,   [phone]  7/7/2025 Routine     Narrative & Impression  EXAMINATION:  CT SOFT TISSUE NECK W WO CONTRAST     CLINICAL HISTORY:  Neck mass, nonpulsatile;  Localized swelling, mass and lump, neck     TECHNIQUE:  CT of the soft tissues of the neck was obtained from the skull base through the level of the lung apices status post administration of 75 cc of Omnipaque 350.  Coronal and sagittal reformats were provided.     COMPARISON:  Thyroid ultrasound from 07/01/2025     FINDINGS:  The visualized brain parenchyma  is unremarkable in appearance.  The paranasal sinuses and mastoid air cells are clear.  The carotid and jugular vessels are normally opacified.  The mucosal space is symmetric.  There are no definitively enlarged lymph nodes.  Multiple surgical clips seen in the expected location of the thyroid bed.  The parotid and submandibular glands are unremarkable in appearance.  There is some hyperdense tissue seen to the right of midline just superior to the thyroid bed series 6, image 128 that measures approximately 1.7 by 1.4 cm in size and is suspicious for ectopic/residual thyroid tissue within the strap musculature.  Also in the region of the expected location of the thyroid bed there is some mildly hyperdense tissue series 6, image 150 that measures 1.5 by 1.2 cm in size that is suspicious for residual thyroid as well.  Additional hyperdense tissue seen just inferior to the left lobe of the thyroid just to the left of midline series 6, image 161 that measures approximately 1.4 by 0.8 cm in size.  These findings are suspicious for residual thyroid tissue.     Impression:     At least 3 sites of somewhat heterogeneous hyperdense tissue within and adjacent to the thyroid bed suspicious for ectopic/residual thyroid tissue.        Electronically signed by:Deni Mcarthur DO  Date:                                            07/07/2025  Time:                                           14:16        Exam Ended: 07/07/25 13:52 CDT Last Resulted: 07/07/25 14:16 CDT       ntains abnormal data US Thyroid  Order: [id number]   Status: Final result       Next appt: 08/28/2025 at 10:00 AM in Radiology ([facility] MAMMO1-SCR)       Dx: Thyroid nodule    Test Result Released: Yes (seen)    1 Result Note       View Follow-Up Encounter  Details    Reading Physician Reading Date Result Priority   Zach Newton MD  [phone] 7/1/2025 Routine     Narrative & Impression  EXAMINATION:  US THYROID     CLINICAL HISTORY:  right 1.5 cm nodule vs  lymph node right upper lobe and 1 cm nodule right lower lobe. Hx of left lobectomy; Nontoxic single thyroid nodule     TECHNIQUE:  Ultrasound of the thyroid and cervical lymph nodes was performed.     COMPARISON:  No recent ultrasound imaging; CT chest 11/17/2023     FINDINGS:  History of partial thyroidectomy.  Some residual heterogeneous right lobe thyroid tissue noted.  Possible heterogeneous soft tissue measuring 2.6 cm noted superior to the thyroid, uncertain significance.  2.0 x 1.5 x 0.7 cm left neck lymph node noted without fatty adalberto.  CT soft tissue neck with contrast recommended to evaluate lymph node and possible heterogeneous soft tissue superior to the right thyroid lobe.     Impression:     As above.  This report was flagged in Epic as abnormal.        Electronically signed by:Zach Newton MD  Date:                                            07/01/2025  Time:                                           13:17         Assessment & Plan:   Dysphagia, unspecified type    Neck mass  -     Ambulatory referral/consult to ENT  -     US FNA Biopsy w/ Imaging 1st Lesion; Future; Expected date: 07/21/2025    Non-seasonal allergic rhinitis, unspecified trigger    Laryngopharyngeal reflux (LPR)        Assessment & Plan      IMPRESSION:  - Evaluated history of thyroid surgery on left side, approximately 15-20 years ago.  - Assessed recent cough and sinus problems.  - Considered intermittent dysphagia, primarily with solids, in context of previous esophageal dilation procedure.  - Examined ears, nose, and throat, noting tenderness in neck area.  - Reviewed history of goiter removal and previous chest nodule excision.   -  flexible laryngoscopy demonstrated LPR changes, recommended adhering to her current reflux medication.   If her dysphagia does not improve with increased compliance on reflux medication, she understands that she will need to contact her GI for possible EGD and repeat dilation  - Recommend FNA of  midline neck mass        Bal Tolliver M.D.  Department of Otolaryngology - Head & Neck Surgery  [address] [facility].  [place] [address]  P: [phone]  F: [phone]        DISCLAIMER: This note was prepared with [employer] voice recognition transcription software. Garbled syntax, mangled pronouns, and other bizarre constructions may be attributed to that software system. While efforts were made to correct any mistakes made by this voice recognition program, some errors and/or omissions may remain in the note that were missed when the note was originally created.     This note was generated with the assistance of ambient listening technology. Verbal consent was obtained by the patient and accompanying visitor(s) for the recording of patient appointment to facilitate this note. I attest to having reviewed and edited the generated note for accuracy, though some syntax or spelling errors may persist. Please contact the author of this note for any clarification.

## 2025-08-01 ENCOUNTER — HOSPITAL ENCOUNTER (OUTPATIENT)
Dept: RADIOLOGY | Facility: HOSPITAL | Age: 65
Discharge: HOME | End: 2025-08-01
Attending: OTOLARYNGOLOGY
Payer: MEDICARE

## 2025-08-01 DIAGNOSIS — R22.1 NECK MASS: ICD-10-CM

## 2025-08-01 PROCEDURE — C1729 CATH, DRAINAGE: HCPCS

## 2025-08-01 PROCEDURE — 88173 CYTOPATH EVAL FNA REPORT: CPT | Mod: TC | Performed by: OTOLARYNGOLOGY

## 2025-08-01 NOTE — DISCHARGE SUMMARY
[facility] Lab [facility] (Hospital)  Discharge Note  Short Stay    US FNA Biopsy w/ Imaging 1st Lesion      OUTCOME: Patient tolerated treatment/procedure well without complication and is now ready for discharge.    DISPOSITION: Home or Self Care    FINAL DIAGNOSIS:  <principal problem not specified>    FOLLOWUP: In clinic    DISCHARGE INSTRUCTIONS:  No discharge procedures on file.     TIME SPENT ON DISCHARGE: 15 minutes    Pre Op Diagnosis: neck mass right     Post Op Diagnosis: same     Procedure:  fna     Procedure performed by: Garland DRAKE, Skylar DONOVAN     Written Informed Consent Obtained: Yes     Specimen Removed:  yes     Estimated Blood Loss:  minimal     Findings: Local anesthesia     Sedation:  no     The patient tolerated the procedure well and there were no complications.      Disposition:  F/U in clinic or with ordering physician    Discharge instructions:  Light activity for 24 hours.  Remove band aid in 24 hours.  No baths (showers are appropriate).      Sterile technique was performed in the anterior right neck, lidocaine was used as a local anesthetic.  Multiple samples taken percutaneously from the right neck mass.  Pt tolerated the procedure well without immediate complications.  Please see radiologist report for details. F/u with PCP and/or ordering physician.

## 2025-08-04 DIAGNOSIS — I10 HTN (HYPERTENSION), BENIGN: ICD-10-CM

## 2025-08-04 DIAGNOSIS — Z79.4 TYPE 2 DIABETES MELLITUS WITH HYPERGLYCEMIA, WITH LONG-TERM CURRENT USE OF INSULIN: ICD-10-CM

## 2025-08-04 DIAGNOSIS — E11.65 TYPE 2 DIABETES MELLITUS WITH HYPERGLYCEMIA, WITH LONG-TERM CURRENT USE OF INSULIN: ICD-10-CM

## 2025-08-04 RX ORDER — NIFEDIPINE 30 MG/1
30 TABLET, EXTENDED RELEASE ORAL 2 TIMES DAILY
Qty: 180 TABLET | Refills: 3 | Status: SHIPPED | OUTPATIENT
Start: 2025-08-04 | End: 2026-08-04

## 2025-08-04 RX ORDER — TIRZEPATIDE 10 MG/.5ML
10 INJECTION, SOLUTION SUBCUTANEOUS
Qty: 4 PEN | Refills: 1 | Status: SHIPPED | OUTPATIENT
Start: 2025-08-04

## 2025-08-05 LAB
ESTROGEN SERPL-MCNC: NORMAL PG/ML
INSULIN SERPL-ACNC: NORMAL U[IU]/ML
LAB AP CLINICAL INFORMATION: NORMAL
LAB AP GROSS DESCRIPTION: NORMAL
LAB AP NON-GYN INTERPRETATION SPECIMEN 1: NORMAL
LAB AP PERFORMING LOCATION(S): NORMAL
LAB AP REPORT FOOTNOTES: NORMAL

## 2025-08-06 ENCOUNTER — RESULTS FOLLOW-UP (OUTPATIENT)
Dept: OTOLARYNGOLOGY | Facility: CLINIC | Age: 65
End: 2025-08-06
Payer: MEDICARE

## 2025-08-07 DIAGNOSIS — R11.0 NAUSEA: ICD-10-CM

## 2025-08-07 RX ORDER — ONDANSETRON 4 MG/1
4 TABLET, ORALLY DISINTEGRATING ORAL EVERY 8 HOURS PRN
Qty: 15 TABLET | Refills: 1 | Status: SHIPPED | OUTPATIENT
Start: 2025-08-07

## 2025-08-13 ENCOUNTER — PATIENT MESSAGE (OUTPATIENT)
Dept: GASTROENTEROLOGY | Facility: CLINIC | Age: 65
End: 2025-08-13
Payer: MEDICARE

## 2025-08-13 DIAGNOSIS — R13.10 DYSPHAGIA, UNSPECIFIED TYPE: Primary | ICD-10-CM

## 2025-08-15 ENCOUNTER — HOSPITAL ENCOUNTER (OUTPATIENT)
Dept: PREADMISSION TESTING | Facility: HOSPITAL | Age: 65
Discharge: HOME | End: 2025-08-15
Attending: INTERNAL MEDICINE
Payer: MEDICARE

## 2025-08-15 DIAGNOSIS — R13.10 DYSPHAGIA, UNSPECIFIED TYPE: ICD-10-CM

## 2025-08-18 ENCOUNTER — HOSPITAL ENCOUNTER (OUTPATIENT)
Dept: PREADMISSION TESTING | Facility: HOSPITAL | Age: 65
Discharge: HOME | End: 2025-08-18
Attending: FAMILY MEDICINE
Payer: MEDICARE

## 2025-08-18 ENCOUNTER — E-CONSULT (OUTPATIENT)
Dept: CARDIOLOGY | Facility: CLINIC | Age: 65
End: 2025-08-18
Payer: MEDICARE

## 2025-08-18 DIAGNOSIS — Z01.810 PREOP CARDIOVASCULAR EXAM: Primary | ICD-10-CM

## 2025-08-18 DIAGNOSIS — R13.10 DYSPHAGIA, UNSPECIFIED TYPE: Primary | ICD-10-CM

## 2025-08-20 ENCOUNTER — TELEPHONE (OUTPATIENT)
Dept: DIABETES | Facility: CLINIC | Age: 65
End: 2025-08-20
Payer: MEDICARE

## 2025-08-20 ENCOUNTER — HOSPITAL ENCOUNTER (OUTPATIENT)
Dept: CARDIOLOGY | Facility: HOSPITAL | Age: 65
Discharge: HOME | End: 2025-08-20
Attending: INTERNAL MEDICINE
Payer: MEDICARE

## 2025-08-20 ENCOUNTER — PATIENT MESSAGE (OUTPATIENT)
Dept: DIABETES | Facility: CLINIC | Age: 65
End: 2025-08-20
Payer: MEDICARE

## 2025-08-25 PROBLEM — Z01.810 PREOP CARDIOVASCULAR EXAM: Status: RESOLVED | Noted: 2025-08-18 | Resolved: 2025-08-25

## 2025-08-28 ENCOUNTER — HOSPITAL ENCOUNTER (OUTPATIENT)
Dept: RADIOLOGY | Facility: HOSPITAL | Age: 65
Discharge: HOME | End: 2025-08-28
Attending: INTERNAL MEDICINE
Payer: MEDICARE

## 2025-08-28 DIAGNOSIS — Z12.31 ENCOUNTER FOR SCREENING MAMMOGRAM FOR MALIGNANT NEOPLASM OF BREAST: ICD-10-CM

## 2025-08-28 PROCEDURE — 77067 SCR MAMMO BI INCL CAD: CPT | Mod: 26,,, | Performed by: RADIOLOGY

## 2025-08-28 PROCEDURE — 77063 BREAST TOMOSYNTHESIS BI: CPT | Mod: 26,,, | Performed by: RADIOLOGY

## 2025-08-28 PROCEDURE — 77067 SCR MAMMO BI INCL CAD: CPT | Mod: TC

## 2025-08-29 ENCOUNTER — HOSPITAL ENCOUNTER (OUTPATIENT)
Dept: ENDOSCOPY | Facility: HOSPITAL | Age: 65
Discharge: HOME | End: 2025-08-29
Attending: INTERNAL MEDICINE | Admitting: INTERNAL MEDICINE
Payer: MEDICARE

## 2025-08-29 ENCOUNTER — ANESTHESIA EVENT (OUTPATIENT)
Dept: ENDOSCOPY | Facility: HOSPITAL | Age: 65
End: 2025-08-29
Payer: MEDICARE

## 2025-08-29 ENCOUNTER — ANESTHESIA (OUTPATIENT)
Dept: ENDOSCOPY | Facility: HOSPITAL | Age: 65
End: 2025-08-29
Payer: MEDICARE

## 2025-08-29 DIAGNOSIS — Z01.810 PREOP CARDIOVASCULAR EXAM: Primary | ICD-10-CM

## 2025-08-29 PROBLEM — K21.00 GASTROESOPHAGEAL REFLUX DISEASE WITH ESOPHAGITIS: Status: ACTIVE | Noted: 2025-08-29

## 2025-08-29 PROBLEM — R13.10 DYSPHAGIA: Status: ACTIVE | Noted: 2025-08-29

## 2025-08-29 PROCEDURE — 63600175 PHARM REV CODE 636 W HCPCS: Performed by: ANESTHESIOLOGY

## 2025-08-29 RX ORDER — LIDOCAINE HYDROCHLORIDE 10 MG/ML
INJECTION, SOLUTION EPIDURAL; INFILTRATION; INTRACAUDAL; PERINEURAL
Status: DISCONTINUED | OUTPATIENT
Start: 2025-08-29 | End: 2025-08-29

## 2025-08-29 RX ORDER — PROPOFOL 10 MG/ML
VIAL (ML) INTRAVENOUS
Status: DISCONTINUED | OUTPATIENT
Start: 2025-08-29 | End: 2025-08-29

## 2025-08-29 RX ADMIN — PROPOFOL 50 MG: 10 INJECTION, EMULSION INTRAVENOUS at 09:08

## 2025-08-29 RX ADMIN — LIDOCAINE HYDROCHLORIDE 100 MG: 10 INJECTION, SOLUTION EPIDURAL; INFILTRATION; INTRACAUDAL; PERINEURAL at 09:08

## 2025-09-05 ENCOUNTER — TELEPHONE (OUTPATIENT)
Dept: PRIMARY CARE CLINIC | Facility: CLINIC | Age: 65
End: 2025-09-05
Payer: MEDICARE

## 2025-09-05 ENCOUNTER — OFFICE VISIT (OUTPATIENT)
Dept: PRIMARY CARE CLINIC | Facility: CLINIC | Age: 65
End: 2025-09-05
Payer: MEDICARE

## 2025-09-05 DIAGNOSIS — E11.29 TYPE 2 DIABETES MELLITUS WITH DIABETIC MICROALBUMINURIA, WITH LONG-TERM CURRENT USE OF INSULIN: ICD-10-CM

## 2025-09-05 DIAGNOSIS — R13.10 DYSPHAGIA, UNSPECIFIED TYPE: ICD-10-CM

## 2025-09-05 DIAGNOSIS — E11.59 HYPERTENSION ASSOCIATED WITH DIABETES: ICD-10-CM

## 2025-09-05 DIAGNOSIS — Z79.4 TYPE 2 DIABETES MELLITUS WITH DIABETIC MICROALBUMINURIA, WITH LONG-TERM CURRENT USE OF INSULIN: ICD-10-CM

## 2025-09-05 DIAGNOSIS — B37.81 CANDIDA ESOPHAGITIS: ICD-10-CM

## 2025-09-05 DIAGNOSIS — R80.9 TYPE 2 DIABETES MELLITUS WITH DIABETIC MICROALBUMINURIA, WITH LONG-TERM CURRENT USE OF INSULIN: ICD-10-CM

## 2025-09-05 DIAGNOSIS — I15.2 HYPERTENSION ASSOCIATED WITH DIABETES: ICD-10-CM

## 2025-09-05 DIAGNOSIS — K21.9 GASTROESOPHAGEAL REFLUX DISEASE WITHOUT ESOPHAGITIS: Primary | ICD-10-CM

## 2025-09-05 DIAGNOSIS — B37.81 CANDIDA ESOPHAGITIS: Primary | ICD-10-CM

## 2025-09-05 RX ORDER — FLUCONAZOLE 100 MG/1
100 TABLET ORAL DAILY
Qty: 15 TABLET | Refills: 0 | Status: SHIPPED | OUTPATIENT
Start: 2025-09-05 | End: 2025-09-19

## 2025-09-05 RX ORDER — FLUCONAZOLE 200 MG/1
TABLET ORAL
Qty: 15 TABLET | Refills: 0 | Status: CANCELLED | OUTPATIENT
Start: 2025-09-05